# Patient Record
Sex: FEMALE | Race: ASIAN | Employment: OTHER | URBAN - METROPOLITAN AREA
[De-identification: names, ages, dates, MRNs, and addresses within clinical notes are randomized per-mention and may not be internally consistent; named-entity substitution may affect disease eponyms.]

---

## 2017-01-01 ENCOUNTER — APPOINTMENT (OUTPATIENT)
Dept: ULTRASOUND IMAGING | Facility: CLINIC | Age: 52
End: 2017-01-01
Attending: EMERGENCY MEDICINE
Payer: MEDICARE

## 2017-01-01 ENCOUNTER — APPOINTMENT (OUTPATIENT)
Dept: GENERAL RADIOLOGY | Facility: CLINIC | Age: 52
End: 2017-01-01
Attending: EMERGENCY MEDICINE
Payer: MEDICARE

## 2017-01-01 ENCOUNTER — HOSPITAL ENCOUNTER (EMERGENCY)
Facility: CLINIC | Age: 52
Discharge: HOME | End: 2017-01-01
Attending: EMERGENCY MEDICINE | Admitting: EMERGENCY MEDICINE
Payer: MEDICARE

## 2017-01-01 ENCOUNTER — APPOINTMENT (OUTPATIENT)
Dept: NUCLEAR MEDICINE | Facility: CLINIC | Age: 52
End: 2017-01-01
Attending: EMERGENCY MEDICINE
Payer: MEDICARE

## 2017-01-01 VITALS
SYSTOLIC BLOOD PRESSURE: 175 MMHG | RESPIRATION RATE: 18 BRPM | OXYGEN SATURATION: 100 % | DIASTOLIC BLOOD PRESSURE: 89 MMHG | TEMPERATURE: 97.8 F | HEIGHT: 59 IN | HEART RATE: 65 BPM

## 2017-01-01 DIAGNOSIS — R06.02 SHORTNESS OF BREATH: ICD-10-CM

## 2017-01-01 LAB
ANION GAP SERPL CALCULATED.3IONS-SCNC: 10 MMOL/L (ref 3–14)
BASOPHILS # BLD AUTO: 0 10E9/L (ref 0–0.2)
BASOPHILS NFR BLD AUTO: 0.3 %
BUN SERPL-MCNC: 22 MG/DL (ref 7–30)
CALCIUM SERPL-MCNC: 9.6 MG/DL (ref 8.5–10.1)
CHLORIDE SERPL-SCNC: 108 MMOL/L (ref 94–109)
CO2 SERPL-SCNC: 21 MMOL/L (ref 20–32)
CREAT SERPL-MCNC: 1.49 MG/DL (ref 0.52–1.04)
D DIMER PPP FEU-MCNC: 0.6 UG/ML FEU (ref 0–0.5)
DIFFERENTIAL METHOD BLD: ABNORMAL
EOSINOPHIL # BLD AUTO: 0.1 10E9/L (ref 0–0.7)
EOSINOPHIL NFR BLD AUTO: 1.8 %
ERYTHROCYTE [DISTWIDTH] IN BLOOD BY AUTOMATED COUNT: 13.1 % (ref 10–15)
GFR SERPL CREATININE-BSD FRML MDRD: 37 ML/MIN/1.7M2
GLUCOSE SERPL-MCNC: 95 MG/DL (ref 70–99)
HCT VFR BLD AUTO: 33.6 % (ref 35–47)
HGB BLD-MCNC: 10.9 G/DL (ref 11.7–15.7)
IMM GRANULOCYTES # BLD: 0 10E9/L (ref 0–0.4)
IMM GRANULOCYTES NFR BLD: 0.3 %
INTERPRETATION ECG - MUSE: NORMAL
LYMPHOCYTES # BLD AUTO: 1.3 10E9/L (ref 0.8–5.3)
LYMPHOCYTES NFR BLD AUTO: 32.5 %
MCH RBC QN AUTO: 27.8 PG (ref 26.5–33)
MCHC RBC AUTO-ENTMCNC: 32.4 G/DL (ref 31.5–36.5)
MCV RBC AUTO: 86 FL (ref 78–100)
MONOCYTES # BLD AUTO: 0.5 10E9/L (ref 0–1.3)
MONOCYTES NFR BLD AUTO: 12.3 %
NEUTROPHILS # BLD AUTO: 2.1 10E9/L (ref 1.6–8.3)
NEUTROPHILS NFR BLD AUTO: 52.8 %
NRBC # BLD AUTO: 0 10*3/UL
NRBC BLD AUTO-RTO: 1 /100
NT-PROBNP SERPL-MCNC: 1695 PG/ML (ref 0–900)
PLATELET # BLD AUTO: 154 10E9/L (ref 150–450)
POTASSIUM SERPL-SCNC: 4 MMOL/L (ref 3.4–5.3)
RBC # BLD AUTO: 3.92 10E12/L (ref 3.8–5.2)
SODIUM SERPL-SCNC: 139 MMOL/L (ref 133–144)
TROPONIN I SERPL-MCNC: 0.02 UG/L (ref 0–0.04)
TROPONIN I SERPL-MCNC: 0.02 UG/L (ref 0–0.04)
WBC # BLD AUTO: 3.9 10E9/L (ref 4–11)

## 2017-01-01 PROCEDURE — 99285 EMERGENCY DEPT VISIT HI MDM: CPT | Mod: 25 | Performed by: EMERGENCY MEDICINE

## 2017-01-01 PROCEDURE — 93010 ELECTROCARDIOGRAM REPORT: CPT | Mod: Z6 | Performed by: EMERGENCY MEDICINE

## 2017-01-01 PROCEDURE — 84484 ASSAY OF TROPONIN QUANT: CPT | Performed by: EMERGENCY MEDICINE

## 2017-01-01 PROCEDURE — 85379 FIBRIN DEGRADATION QUANT: CPT | Performed by: EMERGENCY MEDICINE

## 2017-01-01 PROCEDURE — 83880 ASSAY OF NATRIURETIC PEPTIDE: CPT | Performed by: EMERGENCY MEDICINE

## 2017-01-01 PROCEDURE — 93005 ELECTROCARDIOGRAM TRACING: CPT | Performed by: EMERGENCY MEDICINE

## 2017-01-01 PROCEDURE — 99284 EMERGENCY DEPT VISIT MOD MDM: CPT | Mod: 25 | Performed by: EMERGENCY MEDICINE

## 2017-01-01 PROCEDURE — 71020 XR CHEST 2 VW: CPT

## 2017-01-01 PROCEDURE — A9540 TC99M MAA: HCPCS

## 2017-01-01 PROCEDURE — 27210210 NM LUNG SCAN VENTILATION AND PERFUSION

## 2017-01-01 PROCEDURE — A9567 TECHNETIUM TC-99M AEROSOL: HCPCS

## 2017-01-01 PROCEDURE — 93970 EXTREMITY STUDY: CPT

## 2017-01-01 PROCEDURE — 34300033 ZZH RX 343

## 2017-01-01 PROCEDURE — 85025 COMPLETE CBC W/AUTO DIFF WBC: CPT | Performed by: EMERGENCY MEDICINE

## 2017-01-01 PROCEDURE — 36415 COLL VENOUS BLD VENIPUNCTURE: CPT | Performed by: EMERGENCY MEDICINE

## 2017-01-01 PROCEDURE — 80048 BASIC METABOLIC PNL TOTAL CA: CPT | Performed by: EMERGENCY MEDICINE

## 2017-01-01 RX ORDER — LIDOCAINE 40 MG/G
CREAM TOPICAL
Status: DISCONTINUED | OUTPATIENT
Start: 2017-01-01 | End: 2017-01-01 | Stop reason: HOSPADM

## 2017-01-01 RX ORDER — ONDANSETRON 2 MG/ML
4 INJECTION INTRAMUSCULAR; INTRAVENOUS ONCE
Status: DISCONTINUED | OUTPATIENT
Start: 2017-01-01 | End: 2017-01-01 | Stop reason: HOSPADM

## 2017-01-01 RX ADMIN — Medication 6.8 MCI.: at 13:35

## 2017-01-01 RX ADMIN — KIT FOR THE PREPARATION OF TECHNETIUM TC 99M PENTETATE 2 MCI.: 20 INJECTION, POWDER, LYOPHILIZED, FOR SOLUTION INTRAVENOUS; RESPIRATORY (INHALATION) at 13:15

## 2017-01-01 ASSESSMENT — ENCOUNTER SYMPTOMS
FEVER: 0
ABDOMINAL PAIN: 0
SHORTNESS OF BREATH: 1

## 2017-01-01 NOTE — PHARMACY-ADMISSION MEDICATION HISTORY
Admission medication history interview status for the 1/1/2017 admission is complete. See Epic admission navigator for allergy information, pharmacy, prior to admission medications and immunization status.     Medication history interview sources:  Patient, Daughter ([place] [phone]), Pill bottles    Changes made to PTA medication list (reason)  Added: none  Deleted: none  Changed: none    Additional medication history information (including reliability of information, actions taken by pharmacist):  *Patient and family appeared to be reliable historians  *Patient reports that her Gengraf was replaced with cyclosporine modified (generic equivalent) a couple of days ago.  She has taken 3 doses (2 on 12/31/16 and 1 on 1/1/17) of this different form of cyclosporine and has experienced shortness of breath every time she has taken it.  She thinks her symptoms are because of this new brand of cyclosporine   *Patient takes Sensipar every other day and did take today, 1/1/17  *Patient is on brand mycophenolate (Cellcept)  *Per [facility], has received an influenza vaccine this season.      Prior to Admission medications    Medication Sig Last Dose Taking? Auth Provider   GENGRAF 25 MG PO CAPSULE Take 2 capsules (50 mg) by mouth 2 times daily 1/1/2017 at AM Yes Dipak Meehan MD   CELLCEPT 250 MG PO CAPSULE Take 4 capsules (1,000 mg) by mouth 2 times daily 1/1/2017 at AM Yes Dipak Meehan MD   sulfamethoxazole-trimethoprim (BACTRIM,SEPTRA) 400-80 MG per tablet TAKE ONE TABLET BY MOUTH EVERY DAY 12/31/2016 at PM Yes Dipak Meehan MD   cinacalcet (SENSIPAR) 30 MG tablet Take one half tablet every other day. 1/1/2017 at AM Yes Dipak Meehan MD         Medication history completed by: Marifer Franklin, Pharmacy Intern

## 2017-01-01 NOTE — ED NOTES
Pt arrived to ED after taking her BP at home that was 180-205 systolically. Pt reports she recently had a change in medication and tonight has felt very nauseated and short of breath

## 2017-01-01 NOTE — ED AVS SNAPSHOT
[facility], Emergency Department    [address] [address]    Phone:  [phone]                                       Joshua Lala   MRN: [id number]    Department:  [facility], Emergency Department   Date of Visit:  1/1/2017           Patient Information     Date Of Birth          1965        Your diagnoses for this visit were:     Shortness of breath        You were seen by Jose Eduardo Iyer MD.        Discharge Instructions       Please make an appointment to follow up with Your Primary Care Provider as soon as possible.  Results for orders placed or performed during the hospital encounter of 01/01/17   XR Chest 2 Views    Narrative    XR CHEST 2 VW  1/1/2017 3:33 AM      HISTORY: dyspnea    COMPARISON: 5/30/2016    FINDINGS: PA and lateral views of the chest. Stable cardiomegaly.  Calcific atherosclerosis of the thoracic aorta. No acute airspace  opacities. No pneumothorax or pleural effusion. Cholecystectomy clips  right upper quadrant.         Impression    IMPRESSION: No acute pulmonary findings. Stable cardiomegaly.    I have personally reviewed the examination and initial interpretation  and I agree with the findings.    OMEGA MANRIQUE MD   NM Lung Scan Ventilation and Perfusion    Narrative     Multiple segmental ventilation and perfusion defects. No unmatched  perfusion defects to suggest pulmonary embolism.   US Lower Extremity Venous Duplex Bilateral    Narrative    EXAMINATION: DOPPLER VENOUS ULTRASOUND OF BILATERAL LOWER EXTREMITIES,  1/1/2017 8:37 AM     COMPARISON: None.    HISTORY: Eval for DVT, swelling    TECHNIQUE:  Gray-scale evaluation with compression, spectral flow and  color Doppler assessment of the deep venous system of both legs from  groin to knee, and then at the ankles.    FINDINGS:  In both lower extremities, the common femoral, femoral, popliteal and  posterior tibial veins demonstrate normal compressibility and blood  flow.        Impression     IMPRESSION: No evidence of deep venous thrombosis in either lower  extremity.    I have personally reviewed the examination and initial interpretation  and I agree with the findings.    OMEGA MANRIQUE MD   Basic metabolic panel   Result Value Ref Range    Sodium 139 133 - 144 mmol/L    Potassium 4.0 3.4 - 5.3 mmol/L    Chloride 108 94 - 109 mmol/L    Carbon Dioxide 21 20 - 32 mmol/L    Anion Gap 10 3 - 14 mmol/L    Glucose 95 70 - 99 mg/dL    Urea Nitrogen 22 7 - 30 mg/dL    Creatinine 1.49 (H) 0.52 - 1.04 mg/dL    GFR Estimate 37 (L) >60 mL/min/1.7m2    GFR Estimate If Black 45 (L) >60 mL/min/1.7m2    Calcium 9.6 8.5 - 10.1 mg/dL   Nt probnp inpatient (BNP)   Result Value Ref Range    N-Terminal Pro BNP Inpatient 1695 (H) 0 - 900 pg/mL   Troponin I   Result Value Ref Range    Troponin I ES 0.016 0.000 - 0.045 ug/L   CBC with platelets differential   Result Value Ref Range    WBC 3.9 (L) 4.0 - 11.0 10e9/L    RBC Count 3.92 3.8 - 5.2 10e12/L    Hemoglobin 10.9 (L) 11.7 - 15.7 g/dL    Hematocrit 33.6 (L) 35.0 - 47.0 %    MCV 86 78 - 100 fl    MCH 27.8 26.5 - 33.0 pg    MCHC 32.4 31.5 - 36.5 g/dL    RDW 13.1 10.0 - 15.0 %    Platelet Count 154 150 - 450 10e9/L    Diff Method Automated Method     % Neutrophils 52.8 %    % Lymphocytes 32.5 %    % Monocytes 12.3 %    % Eosinophils 1.8 %    % Basophils 0.3 %    % Immature Granulocytes 0.3 %    Nucleated RBCs 1 (H) 0 /100    Absolute Neutrophil 2.1 1.6 - 8.3 10e9/L    Absolute Lymphocytes 1.3 0.8 - 5.3 10e9/L    Absolute Monocytes 0.5 0.0 - 1.3 10e9/L    Absolute Eosinophils 0.1 0.0 - 0.7 10e9/L    Absolute Basophils 0.0 0.0 - 0.2 10e9/L    Abs Immature Granulocytes 0.0 0 - 0.4 10e9/L    Absolute Nucleated RBC 0.0    D dimer quantitative   Result Value Ref Range    D Dimer 0.6 (H) 0.0 - 0.50 ug/ml FEU   Troponin I   Result Value Ref Range    Troponin I ES 0.017 0.000 - 0.045 ug/L   EKG 12-lead, tracing only   Result Value Ref Range    Interpretation ECG Click View Image  link to view waveform and result          Shortness of Breath (Dyspnea)  Shortness of breath is the feeling that you can't catch your breath or get enough air. It is also known as dyspnea.  Dyspnea can be caused by many different conditions. They include:    Acute asthma attack.    Worsening of chronic lung diseases such as chronic bronchitis and emphysema.     Heart failure. This is when weak heart muscle allows extra fluid to collect in the lungs.    Panic attacks or anxiety. Fear can cause rapid breathing (hyperventilation).    Pneumonia, or an infection in the lung tissue.    Exposure to toxic substances, fumes, smoke, or certain medicines.    Blood clot in the lung (pulmonary embolism). This is often from a piece of blood clot in a deep vein of the leg (deep vein thrombosis) that breaks off and travels to the lungs.     Heart attack or heart-related chest pain (angina).    Anemia.    Collapsed lung (pneumothorax).    Dehydration.    Pregnancy.  Based on your visit today, the exact cause of your shortness of breath is not certain. Your tests don t show any of the serious causes of dyspnea. You may need other tests to find out if you have a serious problem. It s important to watch for any new symptoms or symptoms that get worse. Follow up with your healthcare provider as directed.  Home care  Follow these tips to take care of yourself at home:    When your symptoms are better, go back to your usual activities.    If you smoke, you should stop. Join a quit-smoking program or ask your healthcare provider for help.    Eat a healthy diet and get plenty of sleep.    Get regular exercise. Talk with your healthcare provider before starting to exercise, especially if you have other medical problems.    Cut down on the amount of caffeine and stimulants you consume.  Follow-up care  Follow up with your healthcare provider, or as advised.  If tests were done, you will be told if your treatment needs to be changed. You can  call as directed for the results.  (Note: If an X-ray was taken, a specialist will review it. You will be notified of any new findings that may affect your care.)  Call 911 or get immediate medical care  Shortness of breath may be a sign of a serious medical problem. For example, it may be a problem with your heart or lungs. Call 911 if you have worsening shortness of breath or trouble breathing, especially with any of the symptoms below:    You are confused or it s difficult to wake you.    You faint or lose consciousness.    You have a fast heartbeat, or your heartbeat is irregular.     You are coughing up blood.    You have pain in your chest, arm, shoulder, neck, or upper back.    You break out in a sweat.  When to seek medical advice  Call your healthcare provider right away if any of these occur:    Slight shortness of breath or wheezing     Redness, pain or swelling in your leg, arm, or other body area    Swelling in both legs or ankles    Fast weight gain    Dizziness or weakness    Fever of 100.4 F (38 C) or higher, or as directed by your healthcare provider    8805-8866 [employer]. [address] [place] [address]. All rights reserved. This information is not intended as a substitute for professional medical care. Always follow your healthcare professional's instructions.          Future Appointments        Provider Department Dept Phone Center    4/28/2017 8:30 AM Lab [facility] Lab [phone] [facility]    4/28/2017 9:40 AM Dipak Meehan MD [facility] Nephrology [phone] [facility]      24 Hour Appointment Hotline       To make an appointment at any [facility], call [phone] ([phone]). If you don't have a family doctor or clinic, we will help you find one. [facility] clinics are conveniently located to serve the needs of you and your family.             Review of your medicines      Our records show that you are taking the medicines listed below. If these are  "incorrect, please call your family doctor or clinic.        Dose / Directions Last dose taken    cinacalcet 30 MG tablet   Commonly known as:  SENSIPAR   Quantity:  30 tablet        Take one half tablet every other day.   Refills:  4        cycloSPORINE modified capsule   Dose:  50 mg   Quantity:  120 capsule        Take 2 capsules (50 mg) by mouth 2 times daily   Refills:  11        mycophenolate capsule   Dose:  1000 mg   Quantity:  240 capsule        Take 4 capsules (1,000 mg) by mouth 2 times daily   Refills:  6        sulfamethoxazole-trimethoprim 400-80 MG per tablet   Commonly known as:  BACTRIM/SEPTRA   Quantity:  30 tablet        TAKE ONE TABLET BY MOUTH EVERY DAY   Refills:  11                Procedures and tests performed during your visit     Procedure/Test Number of Times Performed    Basic metabolic panel 1    CBC with platelets differential 1    D dimer quantitative 1    ED Bed Request 1    EKG 12-lead, tracing only 1    Medication History IP Pharmacy Consult 1    NM Lung Scan Ventilation and Perfusion 1    Nt probnp inpatient (BNP) 1    Peripheral IV catheter 1    Troponin I 2    US Lower Extremity Venous Duplex Bilateral 1    XR Chest 2 Views 1      Orders Needing Specimen Collection     None      Pending Results     Date and Time Order Name Status Description    1/1/2017 0712 NM Lung Scan Ventilation and Perfusion Preliminary             Thank you for choosing [facility]       Thank you for choosing [facility] for your care. Our goal is always to provide you with excellent care. Hearing back from our patients is one way we can continue to improve our services. Please take a few minutes to complete the written survey that you may receive in the mail after you visit with us. Thank you!        [employer] Information     [employer] lets you send messages to your doctor, view your test results, renew your prescriptions, schedule appointments and more. To sign up, go to [email or web address] . Click on \"Log in\" " "on the left side of the screen, which will take you to the Welcome page. Then click on \"Sign up Now\" on the right side of the page.     You will be asked to enter the access code listed below, as well as some personal information. Please follow the directions to create your username and password.     Your access code is: [email or web address]  Expires: 2017  2:56 PM     Your access code will  in 90 days. If you need help or a new code, please call your [facility] or [phone].        After Visit Summary       This is your record. Keep this with you and show to your community pharmacist(s) and doctor(s) at your next visit.                  "

## 2017-01-01 NOTE — ED NOTES
Patient stated she felt more short of breath; patient repositioned in bed and oxygen applied by NC at 2 L/min. Patient feels better; oxygen saturation levels at 100%.

## 2017-01-01 NOTE — ED PROVIDER NOTES
"  History     Chief Complaint   Patient presents with     Hypertension     Shortness of Breath     Nausea & Vomiting     HPI  Joshua Lala is a 51 year old female with history kidney transplantwho presents with dyspnea.  Has had progressive dyspnea for the last couple of days.  No cough.  No chest pain.  Denies any fever.  No leg pain or leg swelling.  Does note that a couple of days ago they get a new prescription for cyclosporine it was a different brand.  Also noted that earlier tonight her blood pressure was elevated.  She didn't take her home meds.  No URI symptoms.    I have reviewed the Medications, Allergies, Past Medical and Surgical History, and Social History in the [employer] system.  Past Medical History   Diagnosis Date     Renal disease      Hypomagnesaemia      Hypercalcemia      Henoch-Schonlein purpura (H)      Dehydration      Tertiary hyperparathyroidism (H)        Past Surgical History   Procedure Laterality Date     Renal transplant  1998, 2007     Cholecystectomy       Transplant       Kidney 2008       No family history on file.    Social History   Substance Use Topics     Smoking status: Never Smoker      Smokeless tobacco: Never Used     Alcohol Use: No      Comment: ERINN- need       Review of Systems   Constitutional: Negative for fever.   Respiratory: Positive for shortness of breath.    Cardiovascular: Negative for chest pain.   Gastrointestinal: Negative for abdominal pain.   All other systems reviewed and are negative.      Physical Exam   BP: 191/81 mmHg  Pulse: 68  Heart Rate: 65  Temp: 97.8  F (36.6  C)  Resp: 24  Height: 149.9 cm (4' 11\")  SpO2: 98 %  Physical Exam   Vital Signs and Nursing Notes Reviewed.  General:  NAD  HEENT: Oropharynx clear.  No obvious signs of trauma.  PERRL.  EOMI  Neck: Supple.  No lymphadenopathy.  Cardiac: RRR.  No murmurs, rubs or gallops  Lungs: Clear bilaterally.  Normal respiratory rate.    Abdomen:  Soft, Nontender, no rebound or guarding.  Back: No " CVA tenderness.  Skin:  No rash.  No diaphoresis  Extremities:  No cyanosis, clubbing, or edema.  Neurological:  CN II-XII intact, Strength intact, Sensation intact, No pronator drift, Normal gait.  Pulse:  Symmetric in bilateral upper and lower extremities.      ED Course   Procedures             EKG Interpretation:      Interpreted by Jose Eduardo Iyer  Time reviewed: 300  Symptoms at time of EKG: dyspnea   Rhythm: normal sinus   Rate: normal  Axis: normal  Ectopy: none  Conduction: normal  ST Segments/ T Waves: No ST-T wave changes  Q Waves: none  Comparison to prior: Unchanged    Clinical Impression: normal EKG          Critical Care time:  none        I independently visualized the xray:  Chest Xray:  No acute disease.  No infiltate, pneumothorax or effusion.         Labs Ordered and Resulted from Time of ED Arrival Up to the Time of Departure from the ED   BASIC METABOLIC PANEL - Abnormal; Notable for the following:     Creatinine 1.49 (*)     GFR Estimate 37 (*)     GFR Estimate If Black 45 (*)     All other components within normal limits   NT PROBNP INPATIENT - Abnormal; Notable for the following:     N-Terminal Pro BNP Inpatient 1695 (*)     All other components within normal limits   CBC WITH PLATELETS DIFFERENTIAL - Abnormal; Notable for the following:     WBC 3.9 (*)     Hemoglobin 10.9 (*)     Hematocrit 33.6 (*)     Nucleated RBCs 1 (*)     All other components within normal limits   D DIMER QUANTITATIVE - Abnormal; Notable for the following:     D Dimer 0.6 (*)     All other components within normal limits   TROPONIN I   TROPONIN I   PERIPHERAL IV CATHETER       Assessments & Plan (with Medical Decision Making)  51 year old with kidney Of Present with Worsening Dyspnea.  Unclear Etiology.  No Leg Swelling or Findings of CHF.  Chest X-Ray Clear.  No Signs of Pneumonia.  EKG Is Normal.  Initial Troponins Negative.  BNP Is Slightly Elevated but at Baseline.  Creatinine Is at Baseline As Well.  Did  Consider Pulmonary Embolus.  She Is Low Risk but D-Dimer Is Elevated.  Given This Patient Will Need Further Imaging.  We Will Get a VQ Scan.  Discussed with Radiologist.  We'll Also Get Ultrasound of Legs to Look for DVT.  Patient Will Be Signed out to Dr. Solano Pending the above Workup.  If Negative for PE Patient Likely Needs Admission to the Observation Unit for Rule Out.     I have reviewed the nursing notes.    I have reviewed the findings, diagnosis, plan and need for follow up with the patient.    New Prescriptions    No medications on file       Final diagnoses:   None       1/1/2017   [facility], [place], EMERGENCY DEPARTMENT      Jose Eduardo Iyer MD  01/01/17 0856

## 2017-01-01 NOTE — DISCHARGE INSTRUCTIONS
Please make an appointment to follow up with Your Primary Care Provider as soon as possible.  Results for orders placed or performed during the hospital encounter of 01/01/17   XR Chest 2 Views    Narrative    XR CHEST 2 VW  1/1/2017 3:33 AM      HISTORY: dyspnea    COMPARISON: 5/30/2016    FINDINGS: PA and lateral views of the chest. Stable cardiomegaly.  Calcific atherosclerosis of the thoracic aorta. No acute airspace  opacities. No pneumothorax or pleural effusion. Cholecystectomy clips  right upper quadrant.         Impression    IMPRESSION: No acute pulmonary findings. Stable cardiomegaly.    I have personally reviewed the examination and initial interpretation  and I agree with the findings.    OMEGA MANRIQUE MD   NM Lung Scan Ventilation and Perfusion    Narrative     Multiple segmental ventilation and perfusion defects. No unmatched  perfusion defects to suggest pulmonary embolism.   US Lower Extremity Venous Duplex Bilateral    Narrative    EXAMINATION: DOPPLER VENOUS ULTRASOUND OF BILATERAL LOWER EXTREMITIES,  1/1/2017 8:37 AM     COMPARISON: None.    HISTORY: Eval for DVT, swelling    TECHNIQUE:  Gray-scale evaluation with compression, spectral flow and  color Doppler assessment of the deep venous system of both legs from  groin to knee, and then at the ankles.    FINDINGS:  In both lower extremities, the common femoral, femoral, popliteal and  posterior tibial veins demonstrate normal compressibility and blood  flow.        Impression    IMPRESSION: No evidence of deep venous thrombosis in either lower  extremity.    I have personally reviewed the examination and initial interpretation  and I agree with the findings.    OMEGA MANRIQUE MD   Basic metabolic panel   Result Value Ref Range    Sodium 139 133 - 144 mmol/L    Potassium 4.0 3.4 - 5.3 mmol/L    Chloride 108 94 - 109 mmol/L    Carbon Dioxide 21 20 - 32 mmol/L    Anion Gap 10 3 - 14 mmol/L    Glucose 95 70 - 99 mg/dL    Urea Nitrogen 22 7 - 30  mg/dL    Creatinine 1.49 (H) 0.52 - 1.04 mg/dL    GFR Estimate 37 (L) >60 mL/min/1.7m2    GFR Estimate If Black 45 (L) >60 mL/min/1.7m2    Calcium 9.6 8.5 - 10.1 mg/dL   Nt probnp inpatient (BNP)   Result Value Ref Range    N-Terminal Pro BNP Inpatient 1695 (H) 0 - 900 pg/mL   Troponin I   Result Value Ref Range    Troponin I ES 0.016 0.000 - 0.045 ug/L   CBC with platelets differential   Result Value Ref Range    WBC 3.9 (L) 4.0 - 11.0 10e9/L    RBC Count 3.92 3.8 - 5.2 10e12/L    Hemoglobin 10.9 (L) 11.7 - 15.7 g/dL    Hematocrit 33.6 (L) 35.0 - 47.0 %    MCV 86 78 - 100 fl    MCH 27.8 26.5 - 33.0 pg    MCHC 32.4 31.5 - 36.5 g/dL    RDW 13.1 10.0 - 15.0 %    Platelet Count 154 150 - 450 10e9/L    Diff Method Automated Method     % Neutrophils 52.8 %    % Lymphocytes 32.5 %    % Monocytes 12.3 %    % Eosinophils 1.8 %    % Basophils 0.3 %    % Immature Granulocytes 0.3 %    Nucleated RBCs 1 (H) 0 /100    Absolute Neutrophil 2.1 1.6 - 8.3 10e9/L    Absolute Lymphocytes 1.3 0.8 - 5.3 10e9/L    Absolute Monocytes 0.5 0.0 - 1.3 10e9/L    Absolute Eosinophils 0.1 0.0 - 0.7 10e9/L    Absolute Basophils 0.0 0.0 - 0.2 10e9/L    Abs Immature Granulocytes 0.0 0 - 0.4 10e9/L    Absolute Nucleated RBC 0.0    D dimer quantitative   Result Value Ref Range    D Dimer 0.6 (H) 0.0 - 0.50 ug/ml FEU   Troponin I   Result Value Ref Range    Troponin I ES 0.017 0.000 - 0.045 ug/L   EKG 12-lead, tracing only   Result Value Ref Range    Interpretation ECG Click View Image link to view waveform and result          Shortness of Breath (Dyspnea)  Shortness of breath is the feeling that you can't catch your breath or get enough air. It is also known as dyspnea.  Dyspnea can be caused by many different conditions. They include:    Acute asthma attack.    Worsening of chronic lung diseases such as chronic bronchitis and emphysema.     Heart failure. This is when weak heart muscle allows extra fluid to collect in the lungs.    Panic attacks or  anxiety. Fear can cause rapid breathing (hyperventilation).    Pneumonia, or an infection in the lung tissue.    Exposure to toxic substances, fumes, smoke, or certain medicines.    Blood clot in the lung (pulmonary embolism). This is often from a piece of blood clot in a deep vein of the leg (deep vein thrombosis) that breaks off and travels to the lungs.     Heart attack or heart-related chest pain (angina).    Anemia.    Collapsed lung (pneumothorax).    Dehydration.    Pregnancy.  Based on your visit today, the exact cause of your shortness of breath is not certain. Your tests don t show any of the serious causes of dyspnea. You may need other tests to find out if you have a serious problem. It s important to watch for any new symptoms or symptoms that get worse. Follow up with your healthcare provider as directed.  Home care  Follow these tips to take care of yourself at home:    When your symptoms are better, go back to your usual activities.    If you smoke, you should stop. Join a quit-smoking program or ask your healthcare provider for help.    Eat a healthy diet and get plenty of sleep.    Get regular exercise. Talk with your healthcare provider before starting to exercise, especially if you have other medical problems.    Cut down on the amount of caffeine and stimulants you consume.  Follow-up care  Follow up with your healthcare provider, or as advised.  If tests were done, you will be told if your treatment needs to be changed. You can call as directed for the results.  (Note: If an X-ray was taken, a specialist will review it. You will be notified of any new findings that may affect your care.)  Call 911 or get immediate medical care  Shortness of breath may be a sign of a serious medical problem. For example, it may be a problem with your heart or lungs. Call 911 if you have worsening shortness of breath or trouble breathing, especially with any of the symptoms below:    You are confused or it s  difficult to wake you.    You faint or lose consciousness.    You have a fast heartbeat, or your heartbeat is irregular.     You are coughing up blood.    You have pain in your chest, arm, shoulder, neck, or upper back.    You break out in a sweat.  When to seek medical advice  Call your healthcare provider right away if any of these occur:    Slight shortness of breath or wheezing     Redness, pain or swelling in your leg, arm, or other body area    Swelling in both legs or ankles    Fast weight gain    Dizziness or weakness    Fever of 100.4 F (38 C) or higher, or as directed by your healthcare provider    5806-7763 [employer]. [address] [place] [address], [place] [address]. All rights reserved. This information is not intended as a substitute for professional medical care. Always follow your healthcare professional's instructions.

## 2017-01-01 NOTE — ED AVS SNAPSHOT
[facility], [place], Emergency Department    [address] [address]    Phone:  [phone]                                       Joshua Lala   MRN: [id number]    Department:  [facility], Emergency Department   Date of Visit:  1/1/2017           After Visit Summary Signature Page     I have received my discharge instructions, and my questions have been answered. I have discussed any challenges I see with this plan with the nurse or doctor.    ..........................................................................................................................................  Patient/Patient Representative Signature      ..........................................................................................................................................  Patient Representative Print Name and Relationship to Patient    ..................................................               ................................................  Date                                            Time    ..........................................................................................................................................  Reviewed by Signature/Title    ...................................................              ..............................................  Date                                                            Time

## 2017-01-04 ENCOUNTER — TELEPHONE (OUTPATIENT)
Dept: TRANSPLANT | Facility: CLINIC | Age: 52
End: 2017-01-04

## 2017-01-04 DIAGNOSIS — Z94.0 KIDNEY REPLACED BY TRANSPLANT: Primary | ICD-10-CM

## 2017-01-04 DIAGNOSIS — Z94.0 KIDNEY TRANSPLANTED: Primary | ICD-10-CM

## 2017-01-04 RX ORDER — CYCLOSPORINE 25 MG/1
50 CAPSULE, LIQUID FILLED ORAL 2 TIMES DAILY
Qty: 120 CAPSULE | Refills: 11 | Status: SHIPPED | OUTPATIENT
Start: 2017-01-04 | End: 2017-12-18

## 2017-01-04 NOTE — TELEPHONE ENCOUNTER
Spoke to Joshua Weston daughter who reports mother is still SOB  -  She also reports that her mother has not taken any cyclosporine  For the past 4  Days   Reports that mother has some diarrhea with SOB  --[facility]   Reviewed that SOB should have gone away when stopping generic cyclosporine -   Reviewed concern about rejection when not taking immunosuppression --   Plan   Sent RX for brand name Neoral -   Plan to repeat labs in one week   IF continues SOB/Diarrhea  must follow up with local PCP

## 2017-01-04 NOTE — TELEPHONE ENCOUNTER
Daughter Nichelle called;  Mom-Joshua Lala will be starting the Neoral tomorrow.  Nichelle spoke with her mom-Joshua and realized Joshua has been taking the Gengraf  And she believes this has been making Joshua sick.  Nichelle wanted to let you know Joshua was not taking the Neoral yet.  Nichelle's # [phone]

## 2017-01-04 NOTE — TELEPHONE ENCOUNTER
Status: Signed         Expand All Collapse All    Daughter Nichelle called;  Mom-Joshua Lala will be starting the Neoral tomorrow.  Nichelle spoke with her mom-Joshua and realized Joshua has been taking the Gengraf  And she believes this has been making Joshua sick.  Nichelle wanted to let you know Joshua was not taking the Neoral yet.  Nichelle's # [phone]           HasI have reviewed this note and agreed with its content. Please note, pt was recently switched to cyclosporine generic equivalent a couple days ago (from Gengraf). She reports taking 3 doses of it thus far and each time after she takes a dose she feels shortness of breath. It is unclear if this is a true adverse effect associated with the cyclosporine generic equivalent or if there is another underlying medical issue.     Her cyclosporine was left in her PTA med list as Gengraf for the time being for potential safety reasons, but understand that she was recently switched from Gengraf to cyclosporine generic equivalent.     Ac Beltran, PharmD, BCPS  January 1, 2017 been using Gengraf and [facility] told them they need to switch to Nioral because they no longer Gengraf, so they need a new order. Please call daughter Johanna, [phone]

## 2017-01-06 NOTE — TELEPHONE ENCOUNTER
Left message with patient's daughter regarding current health.  Asked her to return call with update and to confirm that her Mom is taking CSA.

## 2017-01-09 NOTE — TELEPHONE ENCOUNTER
Spoke to patient's daughter and she states that patient is doing well and has been taking Neoral.  Made lab appt. For patient on 1/12/17.  Patient has no concerns/questions at this time.

## 2017-01-12 DIAGNOSIS — Z94.0 KIDNEY TRANSPLANTED: ICD-10-CM

## 2017-01-12 LAB
ANION GAP SERPL CALCULATED.3IONS-SCNC: 7 MMOL/L (ref 3–14)
BUN SERPL-MCNC: 16 MG/DL (ref 7–30)
CALCIUM SERPL-MCNC: 9.9 MG/DL (ref 8.5–10.1)
CHLORIDE SERPL-SCNC: 109 MMOL/L (ref 94–109)
CO2 SERPL-SCNC: 22 MMOL/L (ref 20–32)
CREAT SERPL-MCNC: 1.51 MG/DL (ref 0.52–1.04)
CYCLOSPORINE BLD LC/MS/MS-MCNC: 123 UG/L (ref 50–400)
ERYTHROCYTE [DISTWIDTH] IN BLOOD BY AUTOMATED COUNT: 13 % (ref 10–15)
GFR SERPL CREATININE-BSD FRML MDRD: 36 ML/MIN/1.7M2
GLUCOSE SERPL-MCNC: 87 MG/DL (ref 70–99)
HCT VFR BLD AUTO: 32.9 % (ref 35–47)
HGB BLD-MCNC: 10.6 G/DL (ref 11.7–15.7)
MCH RBC QN AUTO: 27.8 PG (ref 26.5–33)
MCHC RBC AUTO-ENTMCNC: 32.2 G/DL (ref 31.5–36.5)
MCV RBC AUTO: 86 FL (ref 78–100)
PLATELET # BLD AUTO: 165 10E9/L (ref 150–450)
POTASSIUM SERPL-SCNC: 4.5 MMOL/L (ref 3.4–5.3)
RBC # BLD AUTO: 3.81 10E12/L (ref 3.8–5.2)
SODIUM SERPL-SCNC: 138 MMOL/L (ref 133–144)
TME LAST DOSE: 2030 H
WBC # BLD AUTO: 3.4 10E9/L (ref 4–11)

## 2017-01-12 PROCEDURE — 80158 DRUG ASSAY CYCLOSPORINE: CPT | Performed by: INTERNAL MEDICINE

## 2017-02-07 DIAGNOSIS — E83.52 PARATHYROID RELATED HYPERCALCEMIA (H): Primary | ICD-10-CM

## 2017-02-07 DIAGNOSIS — E21.5 PARATHYROID RELATED HYPERCALCEMIA (H): Primary | ICD-10-CM

## 2017-02-08 RX ORDER — CINACALCET HYDROCHLORIDE 30 MG/1
TABLET, COATED ORAL
Qty: 30 TABLET | Refills: 4 | Status: SHIPPED | OUTPATIENT
Start: 2017-02-08 | End: 2018-03-02

## 2017-02-08 NOTE — TELEPHONE ENCOUNTER
Last Office Visit with Nephrologist:  10/28/16.  Medication refilled per Nephrology Clinic protocol.     Morena Ghotra RN

## 2017-02-13 ENCOUNTER — TELEPHONE (OUTPATIENT)
Dept: TRANSPLANT | Facility: CLINIC | Age: 52
End: 2017-02-13

## 2017-02-13 DIAGNOSIS — Z94.0 KIDNEY TRANSPLANTED: Primary | ICD-10-CM

## 2017-02-13 NOTE — TELEPHONE ENCOUNTER
CYCLOSPORINE   123 132CM 135CM   Slightly above baseline   Plan call confirm taking cyclosporine  50 mg twice per day   Confirm 12 hour trough Prograf tacrolimus level   If above accurate lower Prograf tacrolimus 50 mg in am and 25 mg in pm   Repeat transplant  Labs with Prograf tacrolimus  2 weeks after dose change Prograf tacrolimus

## 2017-02-13 NOTE — TELEPHONE ENCOUNTER
Spoke to patient via  regarding CSA level = 123.  Patient confirms current dose of Cyclosporine 50 mg BID.  Patient states that this was NOT a good 12 hour trough level.  Patient will repeat labs in 1-2 weeks.

## 2017-02-21 DIAGNOSIS — Z94.0 KIDNEY TRANSPLANTED: ICD-10-CM

## 2017-02-21 LAB
CYCLOSPORINE BLD LC/MS/MS-MCNC: 126 UG/L (ref 50–400)
TME LAST DOSE: NORMAL H

## 2017-02-21 PROCEDURE — 80158 DRUG ASSAY CYCLOSPORINE: CPT | Performed by: INTERNAL MEDICINE

## 2017-02-26 ENCOUNTER — APPOINTMENT (OUTPATIENT)
Dept: GENERAL RADIOLOGY | Facility: CLINIC | Age: 52
End: 2017-02-26
Attending: EMERGENCY MEDICINE
Payer: MEDICARE

## 2017-02-26 ENCOUNTER — HOSPITAL ENCOUNTER (EMERGENCY)
Facility: CLINIC | Age: 52
Discharge: HOME | End: 2017-02-26
Attending: EMERGENCY MEDICINE | Admitting: EMERGENCY MEDICINE
Payer: MEDICARE

## 2017-02-26 VITALS
DIASTOLIC BLOOD PRESSURE: 87 MMHG | TEMPERATURE: 99.3 F | HEART RATE: 80 BPM | RESPIRATION RATE: 20 BRPM | SYSTOLIC BLOOD PRESSURE: 173 MMHG | OXYGEN SATURATION: 94 %

## 2017-02-26 DIAGNOSIS — E21.5 PARATHYROID RELATED HYPERCALCEMIA (H): ICD-10-CM

## 2017-02-26 DIAGNOSIS — Z94.0 S/P KIDNEY TRANSPLANT: ICD-10-CM

## 2017-02-26 DIAGNOSIS — R07.0 THROAT PAIN: ICD-10-CM

## 2017-02-26 DIAGNOSIS — E83.52 PARATHYROID RELATED HYPERCALCEMIA (H): ICD-10-CM

## 2017-02-26 LAB
ALBUMIN UR-MCNC: 30 MG/DL
APPEARANCE UR: CLEAR
BILIRUB UR QL STRIP: NEGATIVE
COLOR UR AUTO: ABNORMAL
DEPRECATED S PYO AG THROAT QL EIA: NORMAL
FLUAV+FLUBV AG SPEC QL: NEGATIVE
FLUAV+FLUBV AG SPEC QL: NORMAL
GLUCOSE UR STRIP-MCNC: NEGATIVE MG/DL
HGB UR QL STRIP: NEGATIVE
KETONES UR STRIP-MCNC: NEGATIVE MG/DL
LEUKOCYTE ESTERASE UR QL STRIP: NEGATIVE
MICRO REPORT STATUS: NORMAL
MUCOUS THREADS #/AREA URNS LPF: PRESENT /LPF
NITRATE UR QL: NEGATIVE
PH UR STRIP: 6 PH (ref 5–7)
RBC #/AREA URNS AUTO: 0 /HPF (ref 0–2)
SP GR UR STRIP: 1 (ref 1–1.03)
SPECIMEN SOURCE: NORMAL
SPECIMEN SOURCE: NORMAL
TRANS CELLS #/AREA URNS HPF: <1 /HPF (ref 0–1)
URN SPEC COLLECT METH UR: ABNORMAL
UROBILINOGEN UR STRIP-MCNC: NORMAL MG/DL (ref 0–2)
WBC #/AREA URNS AUTO: <1 /HPF (ref 0–2)

## 2017-02-26 PROCEDURE — 87804 INFLUENZA ASSAY W/OPTIC: CPT | Performed by: EMERGENCY MEDICINE

## 2017-02-26 PROCEDURE — 99283 EMERGENCY DEPT VISIT LOW MDM: CPT | Mod: Z6 | Performed by: EMERGENCY MEDICINE

## 2017-02-26 PROCEDURE — 81001 URINALYSIS AUTO W/SCOPE: CPT | Performed by: EMERGENCY MEDICINE

## 2017-02-26 PROCEDURE — A9270 NON-COVERED ITEM OR SERVICE: HCPCS | Mod: GY | Performed by: EMERGENCY MEDICINE

## 2017-02-26 PROCEDURE — 71020 XR CHEST 2 VW: CPT

## 2017-02-26 PROCEDURE — 25000131 ZZH RX MED GY IP 250 OP 636 PS 637: Mod: GY | Performed by: EMERGENCY MEDICINE

## 2017-02-26 PROCEDURE — 87081 CULTURE SCREEN ONLY: CPT | Performed by: EMERGENCY MEDICINE

## 2017-02-26 PROCEDURE — 99284 EMERGENCY DEPT VISIT MOD MDM: CPT | Mod: 25 | Performed by: EMERGENCY MEDICINE

## 2017-02-26 PROCEDURE — 87880 STREP A ASSAY W/OPTIC: CPT | Performed by: EMERGENCY MEDICINE

## 2017-02-26 RX ORDER — HYDROCODONE BITARTRATE AND ACETAMINOPHEN 5; 325 MG/1; MG/1
1-2 TABLET ORAL EVERY 6 HOURS PRN
Qty: 6 TABLET | Refills: 0 | Status: SHIPPED | OUTPATIENT
Start: 2017-02-26 | End: 2017-03-04

## 2017-02-26 RX ADMIN — DEXAMETHASONE 10 MG: 2 TABLET ORAL at 20:08

## 2017-02-26 ASSESSMENT — ENCOUNTER SYMPTOMS
DIARRHEA: 1
SORE THROAT: 1
COUGH: 1
DYSURIA: 1
CHILLS: 1
FREQUENCY: 0
TROUBLE SWALLOWING: 1
SHORTNESS OF BREATH: 0
HEMATURIA: 0

## 2017-02-26 NOTE — ED NOTES
Pt arrived in the ED accompanied by her  with reports of fever, cough, and sore throat.  Pt is awake and alert; ambulatory with slow, steady gait.  Pt is Hmong-speaking only,  paged.

## 2017-02-26 NOTE — ED AVS SNAPSHOT
[facility], [place], Emergency Department    [address] [address]    Phone:  [phone]                                       Joshua Lala   MRN: [id number]    Department:  [facility], Emergency Department   Date of Visit:  2/26/2017           After Visit Summary Signature Page     I have received my discharge instructions, and my questions have been answered. I have discussed any challenges I see with this plan with the nurse or doctor.    ..........................................................................................................................................  Patient/Patient Representative Signature      ..........................................................................................................................................  Patient Representative Print Name and Relationship to Patient    ..................................................               ................................................  Date                                            Time    ..........................................................................................................................................  Reviewed by Signature/Title    ...................................................              ..............................................  Date                                                            Time

## 2017-02-26 NOTE — ED AVS SNAPSHOT
[facility], Emergency Department    [address], [place] [address]    Phone:  [phone]                                       Joshua Lala   MRN: [id number]    Department:  [facility], Emergency Department   Date of Visit:  2/26/2017           Patient Information     Date Of Birth          1965        Your diagnoses for this visit were:     Throat pain        You were seen by Krysten Jackson MD.      Follow-up Information     Follow up with Bandar Romero In 2 days.    Specialty:  Family Practice    Why:  As needed, If symptoms worsen    Contact information:    [facility]  [address]  [facility] [address]  [phone]          Follow up with [facility], Emergency Department.    Specialty:  EMERGENCY MEDICINE    Why:  If symptoms worsen    Contact information:    [address] [id number] [phone]    Additional information:    The [facility] is located on the corner of [address] and [address] on the [facility]. It is easily accessible from virtually any point in the [facility] area, via [address] and [place].        Discharge Instructions       Please make an appointment to follow up with Your Primary Care Provider in 2 days. Return earlier if your symptoms persist.      Future Appointments        Provider Department Dept Phone Center    4/28/2017 8:30 AM Lab [facility] Lab [phone] [facility]    4/28/2017 9:40 AM Dipak Meehan MD [facility] Nephrology [phone] [facility]      24 Hour Appointment Hotline       To make an appointment at any [facility], call [phone] ([phone]). If you don't have a family doctor or clinic, we will help you find one. [facility] clinics are conveniently located to serve the needs of you and your family.             Review of your medicines      START taking        Dose / Directions Last dose taken    FIRST-HARINDER MOUTHWASH Susp   Dose:  5-10 mL   Quantity:  237 mL         Swish and swallow 5-10 mLs in mouth every 6 hours as needed   Refills:  1        HYDROcodone-acetaminophen 5-325 MG per tablet   Commonly known as:  NORCO   Dose:  1-2 tablet   Quantity:  6 tablet        Take 1-2 tablets by mouth every 6 hours as needed for moderate to severe pain   Refills:  0          Our records show that you are taking the medicines listed below. If these are incorrect, please call your family doctor or clinic.        Dose / Directions Last dose taken    cycloSPORINE modified capsule   Dose:  50 mg   Quantity:  120 capsule        Take 2 capsules (50 mg) by mouth 2 times daily   Refills:  11        mycophenolate capsule   Dose:  1000 mg   Quantity:  240 capsule        Take 4 capsules (1,000 mg) by mouth 2 times daily   Refills:  6        SENSIPAR 30 MG tablet   Quantity:  30 tablet   Generic drug:  cinacalcet        TAKE ONE-HALF TABLET BY MOUTH EVERY OTHER DAY   Refills:  4        sulfamethoxazole-trimethoprim 400-80 MG per tablet   Commonly known as:  BACTRIM/SEPTRA   Quantity:  30 tablet        TAKE ONE TABLET BY MOUTH EVERY DAY   Refills:  11                Prescriptions were sent or printed at these locations (2 Prescriptions)                   Other Prescriptions                Printed at Department/Unit printer (2 of 2)         HYDROcodone-acetaminophen (NORCO) 5-325 MG per tablet               Diphenhyd-HC-Nystatin-Tetracyc (FIRST-HARINDER MOUTHWASH) SUSP                Procedures and tests performed during your visit     Beta strep group A culture    Influenza A/B antigen swab    Rapid strep screen    UA with Microscopic    XR Chest 2 Views      Orders Needing Specimen Collection     None      Pending Results     Date and Time Order Name Status Description    2/26/2017 1900 Beta strep group A culture In process     2/26/2017 1828 XR Chest 2 Views Preliminary             Pending Culture Results     Date and Time Order Name Status Description    2/26/2017 1900 Beta strep group A culture In  "process             Thank you for choosing [facility]       Thank you for choosing [facility] for your care. Our goal is always to provide you with excellent care. Hearing back from our patients is one way we can continue to improve our services. Please take a few minutes to complete the written survey that you may receive in the mail after you visit with us. Thank you!        [employer] Information     [employer] lets you send messages to your doctor, view your test results, renew your prescriptions, schedule appointments and more. To sign up, go to [email or web address] . Click on \"Log in\" on the left side of the screen, which will take you to the Welcome page. Then click on \"Sign up Now\" on the right side of the page.     You will be asked to enter the access code listed below, as well as some personal information. Please follow the directions to create your username and password.     Your access code is: [email or web address]  Expires: 2017  2:56 PM     Your access code will  in 90 days. If you need help or a new code, please call your [facility] clinic or [phone].        Care EveryWhere ID     This is your Care EveryWhere ID. This could be used by other organizations to access your [facility] medical records  [id number]        After Visit Summary       This is your record. Keep this with you and show to your community pharmacist(s) and doctor(s) at your next visit.                  "

## 2017-02-27 ENCOUNTER — OFFICE VISIT (OUTPATIENT)
Dept: INTERPRETER SERVICES | Facility: CLINIC | Age: 52
End: 2017-02-27

## 2017-02-27 ENCOUNTER — APPOINTMENT (OUTPATIENT)
Dept: CARDIOLOGY | Facility: CLINIC | Age: 52
End: 2017-02-27
Payer: MEDICARE

## 2017-02-27 ENCOUNTER — APPOINTMENT (OUTPATIENT)
Dept: GENERAL RADIOLOGY | Facility: CLINIC | Age: 52
End: 2017-02-27
Attending: EMERGENCY MEDICINE
Payer: MEDICARE

## 2017-02-27 ENCOUNTER — HOSPITAL ENCOUNTER (OUTPATIENT)
Facility: CLINIC | Age: 52
Setting detail: OBSERVATION
Discharge: HOME | End: 2017-02-27
Attending: EMERGENCY MEDICINE | Admitting: INTERNAL MEDICINE
Payer: MEDICARE

## 2017-02-27 VITALS
RESPIRATION RATE: 22 BRPM | BODY MASS INDEX: 31.52 KG/M2 | SYSTOLIC BLOOD PRESSURE: 155 MMHG | OXYGEN SATURATION: 96 % | HEART RATE: 63 BPM | DIASTOLIC BLOOD PRESSURE: 80 MMHG | HEIGHT: 57 IN | TEMPERATURE: 97.8 F | WEIGHT: 146.13 LBS

## 2017-02-27 DIAGNOSIS — I50.30 DIASTOLIC HEART FAILURE, UNSPECIFIED HEART FAILURE CHRONICITY: ICD-10-CM

## 2017-02-27 DIAGNOSIS — R07.9 CHEST PAIN AT REST: ICD-10-CM

## 2017-02-27 DIAGNOSIS — I10 ESSENTIAL HYPERTENSION: Primary | ICD-10-CM

## 2017-02-27 PROBLEM — I20.0 UNSTABLE ANGINA (H): Status: ACTIVE | Noted: 2017-02-27

## 2017-02-27 PROBLEM — R07.89 CHEST PAIN, NON-CARDIAC: Status: ACTIVE | Noted: 2017-02-27

## 2017-02-27 LAB
ALBUMIN SERPL-MCNC: 3.5 G/DL (ref 3.4–5)
ALP SERPL-CCNC: 138 U/L (ref 40–150)
ALT SERPL W P-5'-P-CCNC: 11 U/L (ref 0–50)
ANION GAP SERPL CALCULATED.3IONS-SCNC: 12 MMOL/L (ref 3–14)
AST SERPL W P-5'-P-CCNC: 14 U/L (ref 0–45)
BASOPHILS # BLD AUTO: 0 10E9/L (ref 0–0.2)
BASOPHILS NFR BLD AUTO: 0 %
BILIRUB SERPL-MCNC: 1 MG/DL (ref 0.2–1.3)
BUN SERPL-MCNC: 14 MG/DL (ref 7–30)
CALCIUM SERPL-MCNC: 9.7 MG/DL (ref 8.5–10.1)
CHLORIDE SERPL-SCNC: 99 MMOL/L (ref 94–109)
CO2 SERPL-SCNC: 20 MMOL/L (ref 20–32)
CREAT SERPL-MCNC: 1.6 MG/DL (ref 0.52–1.04)
DIFFERENTIAL METHOD BLD: ABNORMAL
EOSINOPHIL # BLD AUTO: 0 10E9/L (ref 0–0.7)
EOSINOPHIL NFR BLD AUTO: 0 %
ERYTHROCYTE [DISTWIDTH] IN BLOOD BY AUTOMATED COUNT: 12.7 % (ref 10–15)
GFR SERPL CREATININE-BSD FRML MDRD: 34 ML/MIN/1.7M2
GLUCOSE SERPL-MCNC: 325 MG/DL (ref 70–99)
HCT VFR BLD AUTO: 34.7 % (ref 35–47)
HGB BLD-MCNC: 11.3 G/DL (ref 11.7–15.7)
IMM GRANULOCYTES # BLD: 0 10E9/L (ref 0–0.4)
IMM GRANULOCYTES NFR BLD: 0.5 %
INR PPP: 1.11 (ref 0.86–1.14)
INTERPRETATION ECG - MUSE: NORMAL
LIPASE SERPL-CCNC: 326 U/L (ref 73–393)
LYMPHOCYTES # BLD AUTO: 0.3 10E9/L (ref 0.8–5.3)
LYMPHOCYTES NFR BLD AUTO: 5.4 %
MAGNESIUM SERPL-MCNC: 1.6 MG/DL (ref 1.6–2.3)
MCH RBC QN AUTO: 27.8 PG (ref 26.5–33)
MCHC RBC AUTO-ENTMCNC: 32.6 G/DL (ref 31.5–36.5)
MCV RBC AUTO: 86 FL (ref 78–100)
MONOCYTES # BLD AUTO: 0 10E9/L (ref 0–1.3)
MONOCYTES NFR BLD AUTO: 0.2 %
NEUTROPHILS # BLD AUTO: 5.4 10E9/L (ref 1.6–8.3)
NEUTROPHILS NFR BLD AUTO: 93.9 %
NRBC # BLD AUTO: 0 10*3/UL
NRBC BLD AUTO-RTO: 0 /100
NT-PROBNP SERPL-MCNC: 2096 PG/ML (ref 0–900)
PLATELET # BLD AUTO: 149 10E9/L (ref 150–450)
PLATELET # BLD AUTO: 167 10E9/L (ref 150–450)
POTASSIUM SERPL-SCNC: 4.2 MMOL/L (ref 3.4–5.3)
PROT SERPL-MCNC: 8.2 G/DL (ref 6.8–8.8)
RBC # BLD AUTO: 4.06 10E12/L (ref 3.8–5.2)
SODIUM SERPL-SCNC: 131 MMOL/L (ref 133–144)
TROPONIN I SERPL-MCNC: NORMAL UG/L (ref 0–0.04)
TROPONIN I SERPL-MCNC: NORMAL UG/L (ref 0–0.04)
WBC # BLD AUTO: 5.7 10E9/L (ref 4–11)

## 2017-02-27 PROCEDURE — 25000131 ZZH RX MED GY IP 250 OP 636 PS 637: Mod: GY | Performed by: INTERNAL MEDICINE

## 2017-02-27 PROCEDURE — 25000128 H RX IP 250 OP 636: Performed by: INTERNAL MEDICINE

## 2017-02-27 PROCEDURE — 85049 AUTOMATED PLATELET COUNT: CPT | Performed by: INTERNAL MEDICINE

## 2017-02-27 PROCEDURE — 85025 COMPLETE CBC W/AUTO DIFF WBC: CPT | Performed by: EMERGENCY MEDICINE

## 2017-02-27 PROCEDURE — 71010 XR CHEST PORT 1 VW: CPT

## 2017-02-27 PROCEDURE — 83735 ASSAY OF MAGNESIUM: CPT | Performed by: EMERGENCY MEDICINE

## 2017-02-27 PROCEDURE — 36415 COLL VENOUS BLD VENIPUNCTURE: CPT | Performed by: INTERNAL MEDICINE

## 2017-02-27 PROCEDURE — A9270 NON-COVERED ITEM OR SERVICE: HCPCS | Mod: GY | Performed by: EMERGENCY MEDICINE

## 2017-02-27 PROCEDURE — 96374 THER/PROPH/DIAG INJ IV PUSH: CPT | Performed by: EMERGENCY MEDICINE

## 2017-02-27 PROCEDURE — 99285 EMERGENCY DEPT VISIT HI MDM: CPT | Mod: 25 | Performed by: EMERGENCY MEDICINE

## 2017-02-27 PROCEDURE — G0378 HOSPITAL OBSERVATION PER HR: HCPCS

## 2017-02-27 PROCEDURE — 25000132 ZZH RX MED GY IP 250 OP 250 PS 637: Mod: GY | Performed by: INTERNAL MEDICINE

## 2017-02-27 PROCEDURE — 93010 ELECTROCARDIOGRAM REPORT: CPT | Mod: Z6 | Performed by: EMERGENCY MEDICINE

## 2017-02-27 PROCEDURE — 93005 ELECTROCARDIOGRAM TRACING: CPT | Performed by: EMERGENCY MEDICINE

## 2017-02-27 PROCEDURE — 85610 PROTHROMBIN TIME: CPT | Performed by: EMERGENCY MEDICINE

## 2017-02-27 PROCEDURE — A9270 NON-COVERED ITEM OR SERVICE: HCPCS | Mod: GY | Performed by: INTERNAL MEDICINE

## 2017-02-27 PROCEDURE — 84484 ASSAY OF TROPONIN QUANT: CPT | Performed by: EMERGENCY MEDICINE

## 2017-02-27 PROCEDURE — 93306 TTE W/DOPPLER COMPLETE: CPT | Mod: 26 | Performed by: INTERNAL MEDICINE

## 2017-02-27 PROCEDURE — 83880 ASSAY OF NATRIURETIC PEPTIDE: CPT | Performed by: EMERGENCY MEDICINE

## 2017-02-27 PROCEDURE — 93306 TTE W/DOPPLER COMPLETE: CPT

## 2017-02-27 PROCEDURE — 25000132 ZZH RX MED GY IP 250 OP 250 PS 637: Mod: GY | Performed by: EMERGENCY MEDICINE

## 2017-02-27 PROCEDURE — 80053 COMPREHEN METABOLIC PANEL: CPT | Performed by: EMERGENCY MEDICINE

## 2017-02-27 PROCEDURE — 84484 ASSAY OF TROPONIN QUANT: CPT | Mod: 91 | Performed by: INTERNAL MEDICINE

## 2017-02-27 PROCEDURE — 83690 ASSAY OF LIPASE: CPT | Performed by: EMERGENCY MEDICINE

## 2017-02-27 PROCEDURE — 25000128 H RX IP 250 OP 636: Performed by: EMERGENCY MEDICINE

## 2017-02-27 RX ORDER — MYCOPHENOLATE MOFETIL 250 MG/1
1000 CAPSULE ORAL 2 TIMES DAILY
Status: DISCONTINUED | OUTPATIENT
Start: 2017-02-27 | End: 2017-02-27 | Stop reason: HOSPADM

## 2017-02-27 RX ORDER — HEPARIN SODIUM 5000 [USP'U]/.5ML
5000 INJECTION, SOLUTION INTRAVENOUS; SUBCUTANEOUS EVERY 12 HOURS
Status: DISCONTINUED | OUTPATIENT
Start: 2017-02-27 | End: 2017-02-27 | Stop reason: HOSPADM

## 2017-02-27 RX ORDER — MORPHINE SULFATE 4 MG/ML
4 INJECTION, SOLUTION INTRAMUSCULAR; INTRAVENOUS ONCE
Status: COMPLETED | OUTPATIENT
Start: 2017-02-27 | End: 2017-02-27

## 2017-02-27 RX ORDER — ASPIRIN 81 MG/1
324 TABLET, CHEWABLE ORAL ONCE
Status: COMPLETED | OUTPATIENT
Start: 2017-02-27 | End: 2017-02-27

## 2017-02-27 RX ORDER — CYCLOSPORINE 25 MG/1
50 CAPSULE, LIQUID FILLED ORAL 2 TIMES DAILY
Status: DISCONTINUED | OUTPATIENT
Start: 2017-02-27 | End: 2017-02-27 | Stop reason: HOSPADM

## 2017-02-27 RX ORDER — SULFAMETHOXAZOLE AND TRIMETHOPRIM 400; 80 MG/1; MG/1
1 TABLET ORAL DAILY
Status: DISCONTINUED | OUTPATIENT
Start: 2017-02-27 | End: 2017-02-27 | Stop reason: HOSPADM

## 2017-02-27 RX ORDER — NALOXONE HYDROCHLORIDE 0.4 MG/ML
.1-.4 INJECTION, SOLUTION INTRAMUSCULAR; INTRAVENOUS; SUBCUTANEOUS
Status: DISCONTINUED | OUTPATIENT
Start: 2017-02-27 | End: 2017-02-27 | Stop reason: HOSPADM

## 2017-02-27 RX ORDER — SODIUM CHLORIDE 9 MG/ML
INJECTION, SOLUTION INTRAVENOUS CONTINUOUS
Status: ACTIVE | OUTPATIENT
Start: 2017-02-27 | End: 2017-02-27

## 2017-02-27 RX ORDER — AMLODIPINE BESYLATE 2.5 MG/1
2.5 TABLET ORAL DAILY
Status: DISCONTINUED | OUTPATIENT
Start: 2017-02-27 | End: 2017-02-27 | Stop reason: HOSPADM

## 2017-02-27 RX ORDER — AMOXICILLIN 250 MG
1-2 CAPSULE ORAL 2 TIMES DAILY
Status: DISCONTINUED | OUTPATIENT
Start: 2017-02-27 | End: 2017-02-27 | Stop reason: HOSPADM

## 2017-02-27 RX ORDER — LIDOCAINE 40 MG/G
CREAM TOPICAL
Status: DISCONTINUED | OUTPATIENT
Start: 2017-02-27 | End: 2017-02-27 | Stop reason: HOSPADM

## 2017-02-27 RX ORDER — NITROGLYCERIN 0.4 MG/1
0.4 TABLET SUBLINGUAL EVERY 5 MIN PRN
Status: DISCONTINUED | OUTPATIENT
Start: 2017-02-27 | End: 2017-02-27 | Stop reason: HOSPADM

## 2017-02-27 RX ORDER — AMLODIPINE BESYLATE 2.5 MG/1
2.5 TABLET ORAL DAILY
Qty: 30 TABLET | Refills: 0 | Status: SHIPPED | OUTPATIENT
Start: 2017-02-27 | End: 2017-07-28

## 2017-02-27 RX ADMIN — CYCLOSPORINE 50 MG: 25 CAPSULE, LIQUID FILLED ORAL at 08:53

## 2017-02-27 RX ADMIN — ASPIRIN 81 MG CHEWABLE TABLET 324 MG: 81 TABLET CHEWABLE at 03:09

## 2017-02-27 RX ADMIN — CINACALCET HYDROCHLORIDE 15 MG: 30 TABLET, COATED ORAL at 11:48

## 2017-02-27 RX ADMIN — AMLODIPINE BESYLATE 2.5 MG: 2.5 TABLET ORAL at 11:52

## 2017-02-27 RX ADMIN — HEPARIN SODIUM 5000 UNITS: 5000 INJECTION, SOLUTION INTRAVENOUS; SUBCUTANEOUS at 08:55

## 2017-02-27 RX ADMIN — NITROGLYCERIN 0.4 MG: 0.4 TABLET SUBLINGUAL at 03:48

## 2017-02-27 RX ADMIN — MYCOPHENOLATE MOFETIL 1000 MG: 250 CAPSULE ORAL at 08:52

## 2017-02-27 RX ADMIN — MORPHINE SULFATE 4 MG: 4 INJECTION, SOLUTION INTRAMUSCULAR; INTRAVENOUS at 03:10

## 2017-02-27 ASSESSMENT — ACTIVITIES OF DAILY LIVING (ADL)
COMMUNICATION: 2-->DIFFICULTY UNDERSTANDING (NOT RELATED TO LANGUAGE BARRIER)
TOILETING: 2-->ASSISTIVE PERSON
COGNITION: 0 - NO COGNITION ISSUES REPORTED
AMBULATION: 2-->ASSISTIVE PERSON
EATING: 2-->ASSISTIVE PERSON
TRANSFERRING: 2-->ASSISTIVE PERSON
SWALLOWING: 0-->SWALLOWS FOODS/LIQUIDS WITHOUT DIFFICULTY
TOILETING: 2-->ASSISTIVE PERSON
DRESS: 2-->ASSISTIVE PERSON
TRANSFERRING: 2-->ASSISTIVE PERSON
RETIRED_EATING: 0-->INDEPENDENT
FALL_HISTORY_WITHIN_LAST_SIX_MONTHS: NO
DRESS: 2-->ASSISTIVE PERSON
BATHING: 2-->ASSISTIVE PERSON
RETIRED_COMMUNICATION: 2-->DIFFICULTY UNDERSTANDING (NOT RELATED TO LANGUAGE BARRIER)
SWALLOWING: 2-->DIFFICULTY SWALLOWING LIQUIDS/FOODS
BATHING: 2-->ASSISTIVE PERSON
AMBULATION: 2-->ASSISTIVE PERSON

## 2017-02-27 ASSESSMENT — ENCOUNTER SYMPTOMS
NAUSEA: 1
SHORTNESS OF BREATH: 1
PALPITATIONS: 1

## 2017-02-27 NOTE — ED NOTES
Patient comes in from home with c/o chest pain and blood pressure of 207/140. Patient reports chest pain started around midnight and radiates to back; denies nausea. Reports taking pain pill at home, not aspirin or nitro. Patient is alert and oriented.

## 2017-02-27 NOTE — DISCHARGE SUMMARY
"                                                                 Medicine Discharge Summary  Joshua Lala MRN: [id number]  1965  Home clinic: [facility]  Primary care provider: Bandar Romero  ___________________________________          Date of Admission:  2/27/2017  Date of Discharge:  2/27/2017  Admitting Physician:  Lula Miranda MD  Discharge Physician:  Esther Jordan MD  Discharging Service:  Cardiology, Cards 1     Primary Provider: Bandar Romero         Reason for Admission:   Chest pain          Discharge Diagnosis:   Atypical chest pain  Hypertension         Procedures & Significant Findings:   ECHO  Interpretation Summary  Global and regional left ventricular function is hyperkinetic with an EF of  65-70%.  No regional wall motion abnormalities are seen.  Right ventricular function, chamber size, wall motion, and thickness are  normal.  Ascending aorta 4 cm.  No pericardial effusion is present.         Consultations:   None, discussed with transplant nephrology         Hospital Course by Problem:    Atypical chest pain  Chest pain had resolved after admission. Suspect this was musculoskeletal related to recent viral illness. Also could be related to uncontrolled hypertension as she was elevated to 180s systolic on presentation. No ischemic EKG changes, troponin negative x 2, and no wall motion abnormalities on ECHO. If symptoms were to recur, stress test could be considered.     Hypertension  Has multiple elevated readings in clinic and at the ED. Was started on amlodipine 2.5 mg daily. She will follow up with her nephrologist Dr. Meehan in 1-2 weeks to access BP control.     ESRD s/p DDKT x2  Discussed case with transplant nephrology. Will continue current immunosuppression and follow up as stated above.    Physical Exam on day of Discharge:  Blood pressure 155/80, pulse 63, temperature 97.8  F (36.6  C), temperature source Oral, resp. rate 22, height 1.448 m (4' 9\"), weight 66.3 kg " (146 lb 2 oz), SpO2 96 %, not currently breastfeeding.    General: lying in bed, comfortable  HEENT: MMM, EOMI, no icterus  CV: regular, 2/6 systolic murmur at LUSB and at apex  Resp: clear bilaterally  Abd: Soft, non-tender, BS+  Extremities: no edema  Neuro: alert, moving all extremities without focal deficit    Lines/Tubes:  None         Pending Results:   None          Discharge Medications:     Current Discharge Medication List      START taking these medications    Details   amLODIPine (NORVASC) 2.5 MG tablet Take 1 tablet (2.5 mg) by mouth daily  Qty: 30 tablet, Refills: 0    Associated Diagnoses: Essential hypertension         CONTINUE these medications which have NOT CHANGED    Details   SENSIPAR 30 MG tablet TAKE ONE-HALF TABLET BY MOUTH EVERY OTHER DAY  Qty: 30 tablet, Refills: 4    Associated Diagnoses: Parathyroid related hypercalcemia (H)      CELLCEPT 250 MG PO CAPSULE Take 4 capsules (1,000 mg) by mouth 2 times daily  Qty: 240 capsule, Refills: 6    Associated Diagnoses: Kidney transplanted      HYDROcodone-acetaminophen (NORCO) 5-325 MG per tablet Take 1-2 tablets by mouth every 6 hours as needed for moderate to severe pain  Qty: 6 tablet, Refills: 0      Diphenhyd-HC-Nystatin-Tetracyc (FIRST-HARINDER MOUTHWASH) SUSP Swish and swallow 5-10 mLs in mouth every 6 hours as needed  Qty: 237 mL, Refills: 1      NEORAL 25 MG PO CAPSULE Take 2 capsules (50 mg) by mouth 2 times daily  Qty: 120 capsule, Refills: 11    Associated Diagnoses: Kidney replaced by transplant      sulfamethoxazole-trimethoprim (BACTRIM,SEPTRA) 400-80 MG per tablet TAKE ONE TABLET BY MOUTH EVERY DAY  Qty: 30 tablet, Refills: 11    Comments: Refill  Associated Diagnoses: Kidney replaced by transplant                  Discharge Instructions and Follow-Up:     Discharge Procedure Orders  Reason for your hospital stay   Order Comments: You were in the hospital due to chest pain. This likely was related to your ongoing viral infection. You  were found to have elevated blood pressures and were started on a medication to help.     Adult [facility] Follow-up and recommended labs and tests   Order Comments: Follow up with Dr. Meehan , at (location with clinic name or city) [facility] Nephrology, within 1-2  for hospital follow- up, regarding new diagnosis and discuss if there is any ongoing chest pain. No follow up labs or test are needed.    Appointments on [place] and/or [facility] (with [facility] or [facility] provider or service). Call [phone] if you haven't heard regarding these appointments within 7 days of discharge.     Activity   Order Comments: Your activity upon discharge: activity as tolerated   Order Specific Question Answer Comments   Is discharge order? Yes      Discharge Instructions   Order Comments: You will start a new medication called amlodipine for your high blood pressure. Please take this medication daily. You will follow up with Dr. Meehan in 1-2 weeks to see if your blood pressure is lower and to see if you are still having episodes of chest pain.     Full Code     Diet   Order Comments: Follow this diet upon discharge: Orders Placed This Encounter     Renal Diet (non-dialysis)   Order Specific Question Answer Comments   Is discharge order? Yes             Discharge Disposition:   Home         Condition on Discharge:   Discharge condition: Stable   Code status on discharge: Full Code        Date of service: 2/27/2017    The patient was discussed with Dr. Jordan, who is in agreement of above plan    Ramana Hendrix, PGY-2  Internal Medicine  [phone]    ATTENDING ATTESTATION:  Patient has been seen and evaluated by me on February 27, 2017. I agree with the discharge summary note above. I have reviewed pertinent labs and studies. More than 30 minutes was spent organizing this patient's hospital discharge.     Esther Jordan MD, MS  Staff Cardiologist, [facility]   Pager: [phone]

## 2017-02-27 NOTE — IP AVS SNAPSHOT
[address] Observation [address] [address]    Phone:  [phone]    Fax:  [phone]                                       After Visit Summary   2/27/2017    Joshua Lala    MRN: [id number]           After Visit Summary Signature Page     I have received my discharge instructions, and my questions have been answered. I have discussed any challenges I see with this plan with the nurse or doctor.    ..........................................................................................................................................  Patient/Patient Representative Signature      ..........................................................................................................................................  Patient Representative Print Name and Relationship to Patient    ..................................................               ................................................  Date                                            Time    ..........................................................................................................................................  Reviewed by Signature/Title    ...................................................              ..............................................  Date                                                            Time

## 2017-02-27 NOTE — DISCHARGE INSTRUCTIONS
Please make an appointment to follow up with Your Primary Care Provider in 2 days. Return earlier if your symptoms persist.

## 2017-02-27 NOTE — H&P
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       #Unstable Angina:   #Recent findings of LVH, PH, diastolic dysfunction   Trops and EKG neg in ED on admission. Increasing frequency of chest pain over the past 2 years; awoke her during sleep on night of admission.  Seen by Dr. Jim of Cardiology for initial evaluation in Pulmonary Hypertension clinic due to CT chest 10/15 showing an enlarged pulmonary artery concerning for PH. Per assessment tat that time: \"Her dyspnea on exertion is concerning and unexplained. Multiple TTE and stress tests have been essentially normal. She has evidence of LVH and diastolic dysfunction on TTE 10/15, not seen on 12/15. Multiple CT scans have showed cardiomegaly and dilated pulmonary artery with pulmonary arterial calcifications of unclear significance. She also has chest pain with exertion as well as separate abdominal pain with eating. ddx for DUEÑAS includes high output HF (AV fistula and signs of high output on exam) vs exercise induced PH, vs HFpEF. Her chest pain needs further ischemic evaluation despite negative stress testing. Her abdominal pain may be GERD but is somewhat concerning for mesenteric ischemia given its association with eating as well as her history of HSP.\"  Plan was to pursue RHC, coronary angiogram, and PFT's, but pt did not followup.  Her symptoms now are atypical for angina. Her blood pressure is elevated and has the case on prior outpatient " visits. We will focus on managing her blood pressure this admission in light of neg troponins and prior negative stress tests and have her follow up in the outpatient setting if her symptoms persist for further ischemia workup.     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 Follows with Dr. Meehan. Last visit 10/28/16. Baseline Cr appears to be 1.3-1.6. Cr of 1.6 on admission.   --Cellcept level    #Hyperparathyroidism: 2/2 CKD. Calcium normal on admit.   --Continue PTA Sensipar 15mg qod     ##Other  - Prophylaxis:   -DVT: Heparin    -GI:    -Bowel: Senna docusate  - FEN: NPO   - IVF: None  - Electrolyte Protocol:   - Telemetry: Yes  - Code status: Full     Disposition:  Admit to [place] [address]     Daisylaury Neff  PGY-2  Internal Medicine  [phone]    This patient will be formally staffed in AM.       Chief Complaint   Chest pain   This history was primarily obtained through sign out from ED provider and chart review, given unavailability of [facility]  and pt with limited English.       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      Recently evaluated in ED on 2/26 evening for mildly productive cough, sore throat, and subjective fever. Influenza, strep, CXR negative, felt likely viral URI, sent home with magic mouth wash and Percocet.  At around 10pm that evening, told  that she had been experiencing some chest pain and nausea. She tried to sleep through it, but at approximately 3am, she had recurrence of the chest pain, so her  brought her into the ED.     ED Course: Hypertensive to 207/108 mmHg, HR 80, normal  sats. Given aspirin, morphine, NTG with relief in chest pain and decrease in BP to 157/72.     At time of exam, pt denies any chest pain, sob, abdominal pain, nausea/vomiting.       Review of Systems   All 12 systems reviewed.  Relevant positives/negatives noted in HPI above        Past Medical History   (Reviewed and updated)  Past Medical History   Diagnosis Date     Dehydration      Henoch-Schonlein purpura (H)      Hypercalcemia      Hypomagnesaemia      Renal disease      Tertiary hyperparathyroidism (H)           Past Surgical History   (Reviewed and updated)  Past Surgical History   Procedure Laterality Date     Renal transplant  1998, 2007     Cholecystectomy       Transplant       Kidney 2008         Allergies   (Reviewed and updated)   Allergies   Allergen Reactions     Contrast Dye Shortness Of Breath         Medications   (Reviewed and updated)    Current Facility-Administered Medications on File Prior to Encounter:  [COMPLETED] dexamethasone (DECADRON) tablet 10 mg     Current Outpatient Prescriptions on File Prior to Encounter:  HYDROcodone-acetaminophen (NORCO) 5-325 MG per tablet Take 1-2 tablets by mouth every 6 hours as needed for moderate to severe pain   Diphenhyd-HC-Nystatin-Tetracyc (FIRST-HARINDER MOUTHWASH) SUSP Swish and swallow 5-10 mLs in mouth every 6 hours as needed   SENSIPAR 30 MG tablet TAKE ONE-HALF TABLET BY MOUTH EVERY OTHER DAY   NEORAL 25 MG PO CAPSULE Take 2 capsules (50 mg) by mouth 2 times daily   CELLCEPT 250 MG PO CAPSULE Take 4 capsules (1,000 mg) by mouth 2 times daily   sulfamethoxazole-trimethoprim (BACTRIM,SEPTRA) 400-80 MG per tablet TAKE ONE TABLET BY MOUTH EVERY DAY         Family History   (Reviewed and updated)  No family history on file.      Social History   (Reviewed and updated)  Social History   Substance Use Topics     Smoking status: Never Smoker     Smokeless tobacco: Never Used     Alcohol use No       Physical Exam   Vitals: Blood pressure 157/72,  "temperature 97.9  F (36.6  C), temperature source Oral, resp. rate 22, height 1.448 m (4' 9\"), weight 66.3 kg (146 lb 2 oz), SpO2 98 %, not currently breastfeeding.  Gen: Hmong speaking woman, Resting comfortably, in no acute distress  Head: Normocephalic, atraumatic   Eyes: NICA, EOMI   ENT: No sinus tenderness, oropharynx clear with no erythema or exudates, no LAD, neck supple   CV: Normal rate, regular rhythm, no murmurs/gallops/rubs  Resp: Non-labored breathing on 2LNC for comfort, CTAB with no wheezing or rhonchi   Abd: +BS, soft, NTND, no costovertebral angle tenderness, no peritoneal signs   Ext: Warm, well-perfused, no edema      Data   Labs:  BMP  Recent Labs  Lab 02/27/17 0302   *   POTASSIUM 4.2   CHLORIDE 99   SHAVON 9.7   CO2 20   BUN 14   CR 1.60*   *     CBC  Recent Labs  Lab 02/27/17 0302   WBC 5.7   RBC 4.06   HGB 11.3*   HCT 34.7*   MCV 86   MCH 27.8   MCHC 32.6   RDW 12.7   *     INR  Recent Labs  Lab 02/27/17 0302   INR 1.11     LFTs  Recent Labs  Lab 02/27/17 0302   ALKPHOS 138   AST 14   ALT 11   BILITOTAL 1.0   PROTTOTAL 8.2   ALBUMIN 3.5      PANC  Recent Labs  Lab 02/27/17 0302   LIPASE 326     CXR:   2/26 no acute pathology     ATTENDING ATTESTATION:  Patient has been seen and evaluated by me on February 27, 2017. I have reviewed the medications, laboratory, imaging, and other relevant results.  I agree with the above note which I have edited, as necessary.  I have discussed my assessment and plan with the house staff.    Esther Jordan MD, MS  Staff Cardiologist, [facility]  Pager: [phone]                      "

## 2017-02-27 NOTE — ED PROVIDER NOTES
History     Chief Complaint   Patient presents with     Fever     Cough     HPI  Joshua Lala is a 51 year old female with a history of kidney transplant x 2 and parathyroid-related hypercalcemia who presents for evaluation of fever and cough. The patient reports that 3 days ago, she was out for a walk, when she developed a cough mildly productive of whitish sputum, which has progressively been worsening. She states that she developed a sore throat shortly thereafter, which also has been worsening, now to the point where swallowing is quite difficult, only tolerating small quantities of fluids. The patient states that she has had sore throat previously, but never as bad as this bout. She has tried liquid Tylenol which helped the pain somewhat. The patient endorses some chills in this timeframe, but no fevers. She denies any otodynia. The patient reports that she's had diarrhea, which is new for her, and just yesterday she's begun to have intermittent dysuria as well, though no associated urinary frequency change and no hematuria. The patient denies any shortness of breath or chest pain.    I have reviewed the Medications, Allergies, Past Medical and Surgical History, and Social History in the Epic system.    No current facility-administered medications for this encounter.      Current Outpatient Prescriptions   Medication     HYDROcodone-acetaminophen (NORCO) 5-325 MG per tablet     Diphenhyd-HC-Nystatin-Tetracyc (FIRST-HARINDER MOUTHWASH) SUSP     SENSIPAR 30 MG tablet     NEORAL 25 MG PO CAPSULE     CELLCEPT 250 MG PO CAPSULE     sulfamethoxazole-trimethoprim (BACTRIM,SEPTRA) 400-80 MG per tablet     Past Medical History   Diagnosis Date     Dehydration      Henoch-Schonlein purpura (H)      Hypercalcemia      Hypomagnesaemia      Renal disease      Tertiary hyperparathyroidism (H)        Past Surgical History   Procedure Laterality Date     Renal transplant  1998, 2007     Cholecystectomy       Transplant       Kidney  2008       No family history on file.    Social History   Substance Use Topics     Smoking status: Never Smoker     Smokeless tobacco: Never Used     Alcohol use No        Allergies   Allergen Reactions     Contrast Dye Shortness Of Breath       Review of Systems   Constitutional: Positive for chills.   HENT: Positive for sore throat and trouble swallowing.    Respiratory: Positive for cough. Negative for shortness of breath.    Cardiovascular: Negative for chest pain.   Gastrointestinal: Positive for diarrhea.   Genitourinary: Positive for dysuria. Negative for frequency and hematuria.   All other systems reviewed and are negative.      Physical Exam   BP: 165/78  Pulse: 80  Temp: 99.3  F (37.4  C)  Resp: 20  SpO2: 94 %  Physical Exam   Constitutional: She appears well-developed and well-nourished. No distress.   HENT:   Head: Normocephalic.   Mouth/Throat: Uvula is midline. Mucous membranes are not pale. No oral lesions. No dental abscesses. Posterior oropharyngeal erythema present. No oropharyngeal exudate, posterior oropharyngeal edema or tonsillar abscesses.   Eyes: Right eye exhibits no discharge. Left eye exhibits no discharge.   Neck: No tracheal tenderness present. No rigidity. Normal range of motion present.   Cardiovascular: Normal rate.    Pulmonary/Chest: Effort normal. No stridor. No respiratory distress. She has no wheezes.   Abdominal: Soft. She exhibits no distension. There is no tenderness. There is no CVA tenderness.   Musculoskeletal: She exhibits no edema.   Lymphadenopathy:     She has no cervical adenopathy.   Neurological: She is alert. No cranial nerve deficit.   Skin: She is not diaphoretic. No erythema.   Psychiatric: She has a normal mood and affect.       ED Course     Procedures    Results for orders placed or performed during the hospital encounter of 02/26/17 (from the past 24 hour(s))   Influenza A/B antigen swab   Result Value Ref Range    Influenza A/B Agn Specimen Nasopharyngeal      Influenza A Negative NEG    Influenza B  NEG     Negative   Test results must be correlated with clinical data. If necessary, results   should be confirmed by a molecular assay or viral culture.     XR Chest 2 Views    Narrative    Preliminary     Impression    Impression:  No acute airspace disease.    Full report to follow.   UA with Microscopic   Result Value Ref Range    Color Urine Straw     Appearance Urine Clear     Glucose Urine Negative NEG mg/dL    Bilirubin Urine Negative NEG    Ketones Urine Negative NEG mg/dL    Specific Gravity Urine 1.002 (L) 1.003 - 1.035    Blood Urine Negative NEG    pH Urine 6.0 5.0 - 7.0 pH    Protein Albumin Urine 30 (A) NEG mg/dL    Urobilinogen mg/dL Normal 0.0 - 2.0 mg/dL    Nitrite Urine Negative NEG    Leukocyte Esterase Urine Negative NEG    Source Midstream Urine     WBC Urine <1 0 - 2 /HPF    RBC Urine 0 0 - 2 /HPF    Transitional Epi <1 0 - 1 /HPF    Mucous Urine Present (A) NEG /LPF   Rapid strep screen   Result Value Ref Range    Specimen Description Throat     Rapid Strep A Screen       NEGATIVE: No Group A streptococcal antigen detected by immunoassay, await   culture report.      Micro Report Status FINAL 02/26/2017        Assessments & Plan (with Medical Decision Making)   Joshua Lala is a 51 year old female who is presenting to the ED with sore throat and a cough. The patient appears well. No exudates. No peritonsillar abscess. Neck is freely mobile. I suspect viral URI symptoms. No concern for major systemic illness.    I have reviewed the nursing notes.    I have reviewed the findings, diagnosis, plan and need for follow up with the patient.    New Prescriptions    DIPHENHYD-HC-NYSTATIN-TETRACYC (FIRST-HARINDER MOUTHWASH) SUSP    Swish and swallow 5-10 mLs in mouth every 6 hours as needed    HYDROCODONE-ACETAMINOPHEN (NORCO) 5-325 MG PER TABLET    Take 1-2 tablets by mouth every 6 hours as needed for moderate to severe pain       Final diagnoses:   Throat pain      I, Fermin Hagan, am serving as a trained medical scribe to document services personally performed by Krysten Jackosn MD, based on the provider's statements to me.      I, Krysten Jackson MD, was physically present and have reviewed and verified the accuracy of this note documented by Fermin Hagan.      2/26/2017   [facility], [place], EMERGENCY DEPARTMENT     Krysten Jackson MD  02/26/17 [id number]

## 2017-02-27 NOTE — ED PROVIDER NOTES
History   No chief complaint on file.    The history is provided by the patient and the spouse. The history is limited by a language barrier.  used:  translated has no ong  was available.     Joshua Lala is a 51 year old female with a history of Henoch-Schonlein purpura, Tertiary Hyperparathyroidism, and Kidney Transplant x2 (1998, 2007) who presents to ER for chest pain.  Patient was seen earlier yesterday evening for sore throat.  She states receiving some pain medication and after workup was discharged home.  She was feeling better after the pain medication however reports just prior to bed possibly around 10:30 to 11pm per her 's report she began having some heavy beating in her chest with shortness of breath.  He reports that they went to bed however she woke him around 12:30 AM complaining about chest pain.  She tried to get up and let it subside.  Symptoms seemed to improve per their report and they went back to bed.  She will come at 3 AM with return or continued pain and continued shortness of breath with feelings of palpitation before he brought her to the ER.  She denies any fevers or chills, no vomiting but nausea, no abdominal pain, no lightheadedness.  She does report some worsening of symptoms when she lays completely flat.  No leg swelling, no leg pain per report.    Further questioning with her  after reviewing her chart where it was found that she had cardiac appointment in December 2015 that recommended angiography he states that she has had chest pain intermittently since that time but they have not followed up with a cardiologist.  From description it sounds that the chest pain has been similar to episode as tonight.    I have reviewed the Medications, Allergies, Past Medical and Surgical History, and Social History in the [employer] system.  Past Medical History   Diagnosis Date     Dehydration      Henoch-Schonlein purpura (H)      Hypercalcemia   "    Hypomagnesaemia      Renal disease      Tertiary hyperparathyroidism (H)        Past Surgical History   Procedure Laterality Date     Renal transplant  1998, 2007     Cholecystectomy       Transplant       Kidney 2008       No family history on file.    Social History   Substance Use Topics     Smoking status: Never Smoker     Smokeless tobacco: Never Used     Alcohol use No        Allergies   Allergen Reactions     Contrast Dye Shortness Of Breath     Current Facility-Administered Medications   Medication     lidocaine 1 % 1 mL     lidocaine (LMX4) kit     sodium chloride (PF) 0.9% PF flush 3 mL     sodium chloride (PF) 0.9% PF flush 3 mL     nitroglycerin (NITROSTAT) sublingual tablet 0.4 mg     Review of Systems   Respiratory: Positive for shortness of breath.    Cardiovascular: Positive for chest pain and palpitations.   Gastrointestinal: Positive for nausea.   All other systems reviewed and are negative.    Physical Exam   BP: (!) 185/91  Heart Rate: 87  Temp: 97.9  F (36.6  C)  Resp: 18  Height: 144.8 cm (4' 9\")  Weight: 65.8 kg (145 lb)  SpO2: 100 %  Physical Exam   Constitutional: She appears well-developed and well-nourished. She appears distressed (tachypnic).   HENT:   Head: Normocephalic and atraumatic.   Nose: Nose normal.   Mouth/Throat: Uvula is midline. Mucous membranes are not dry. No trismus in the jaw. No uvula swelling. Posterior oropharyngeal erythema present. No oropharyngeal exudate, posterior oropharyngeal edema or tonsillar abscesses.   Eyes: Conjunctivae and EOM are normal. Pupils are equal, round, and reactive to light.   Neck: Normal range of motion. Neck supple. No tracheal deviation present.   Pulmonary/Chest: Accessory muscle usage present. No stridor. Tachypnea noted. No respiratory distress. She has no decreased breath sounds. She has no wheezes. She has no rhonchi. She has rales in the right lower field and the left lower field.   Abdominal: Soft. There is no tenderness. "   Musculoskeletal: Normal range of motion. She exhibits edema (trace bilateral lower extremity edema). She exhibits no tenderness.   Neurological: She is alert.   Skin: Skin is warm and dry. She is not diaphoretic. No erythema. No pallor.   Psychiatric: She has a normal mood and affect.   Nursing note and vitals reviewed.    ED Course     ED Course     Procedures             EKG Interpretation:      Interpreted by Arnoldo Arellano  Time reviewed: 0248  Symptoms at time of EKG: chest pain   Rhythm: normal sinus   Rate: normal  Axis: normal  Ectopy: none  Conduction: normal  ST Segments/ T Waves: Nonspecific ST finding  Q Waves: none  Comparison to prior: Unchanged from January 1, 2017.    Clinical Impression: Nonspecific EKG.    Critical Care time:  none        Labs Ordered and Resulted from Time of ED Arrival Up to the Time of Departure from the ED   CBC WITH PLATELETS DIFFERENTIAL - Abnormal; Notable for the following:        Result Value    Hemoglobin 11.3 (*)     Hematocrit 34.7 (*)     Platelet Count 149 (*)     Absolute Lymphocytes 0.3 (*)     All other components within normal limits   COMPREHENSIVE METABOLIC PANEL - Abnormal; Notable for the following:     Sodium 131 (*)     Glucose 325 (*)     Creatinine 1.60 (*)     GFR Estimate 34 (*)     GFR Estimate If Black 41 (*)     All other components within normal limits   NT PROBNP INPATIENT - Abnormal; Notable for the following:     N-Terminal Pro BNP Inpatient 2096 (*)     All other components within normal limits   TROPONIN I   INR   LIPASE   MAGNESIUM   PULSE OXIMETRY NURSING   CARDIAC CONTINUOUS MONITORING   PERIPHERAL IV CATHETER     Xr Chest 2 Views    Result Date: 2/26/2017  EXAM: XR CHEST 2 VW  2/26/2017 6:56 PM HISTORY:  cough producing sputum   COMPARISON:  1/1/2017 FINDINGS: PA and lateral radiographs of the chest. Stable cardiomegaly. The mediastinal border and pulmonary vasculature are within normal limits. No focal airspace opacities. No  pneumothorax. No pleural effusions. Cholecystectomy clips present.     IMPRESSION: Stable cardiomegaly with no focal airspace disease. I have personally reviewed the examination and initial interpretation and I agree with the findings. SANG RAY MD    Xr Chest Port 1 View    IMPRESSION: 1. No acute airspace disease. 2. Stable cardiomegaly.    Assessments & Plan (with Medical Decision Making)   I was physically present and have reviewed and verified the accuracy of this note documented by (myself).     Disclaimer: This note consists of symbols derived from keyboarding, dictation, and/or voice recognition software. As a result, there may be errors in the script that have gone undetected.  Please consider this when interpreting information found in the chart.These sections of the chart were reviewed for accuracy to the best of my knowledge and ability.    Patient was clinically assessed and consented to treatment. After assessment, medical decision making and workup were discussed with the patient. The patient was agreeable to plan for testing, workup, and treatment.  Joshua Lala is a 51 year old female who presents today for chest pain.  Patient was to keep neck on arrival and did complain of continued chest pain.  She had EKG that was done that was similar to previous EKGs.  No signs of acute ischemia.  She was placed on oxygen and had labs drawn.  She was given nitroglycerin and aspirin.  She had good relief of some of the pain and blood pressure began to come down as well.  Patient received a dose of morphine 4 mg as well with resolution of pain.  Her blood pressure continued to come down without further doses of nitroglycerin.  Patient was kept on cardiac monitor with no ectopy noticed.  Chest x-ray was done which showed stable cardiomegaly but no new pulmonary edema.  Labs returned showing a negative troponin.  Her BNP was elevated to 2096 from 1500 just a month ago.  Creatinine is stable at 1.6.  There  is no leukocytosis and hemoglobin was stable.  Patient was not hypoxemic and breathing did  and improve with less tachypnea and less discomfort.  Patient still preferred to be sitting up.  After labs returned and symptoms were better controlled I did speak with Dr. Miranda from cardiology.  Given the patient's concern appointment with recommendation for angiography 2 years ago along with a diastolic failure in history as well as the episodes of chest pain she will be plan for admission to cardiology for further workup and rule out.  Patient was feeling better and more comfortable.  She'll be plan for cardiology admission for further care.    I have reviewed the nursing notes.    I have reviewed the findings, diagnosis, plan and need for follow up with the patient.  Current Discharge Medication List          Final diagnoses:   Chest pain at rest   Diastolic heart failure, unspecified heart failure chronicity (H)       2/27/2017   [facility], [place], EMERGENCY DEPARTMENT     Arnoldo Arellano MD  02/27/17 0610

## 2017-02-27 NOTE — UTILIZATION REVIEW
"  Admission Status; Secondary Review Determination       Under the authority of the Utilization Management Committee, the utilization review process indicated a secondary review on the above patient.  The review outcome is based on review of the medical records, discussions with staff, and applying clinical experience noted on the date of the review.       (x) Observation Status Appropriate - This patient does not meet hospital inpatient criteria and is placed in observation status. If this patient's primary payer is Medicare and was admitted as an inpatient, Condition Code 44 should be used and patient status changed to \"observation\".     RATIONALE FOR DETERMINATION   51 year old female with a history of Henoch-Schonlein purpura, Tertiary Hyperparathyroidism, and Kidney Transplant x2 (1998, 2007) who presents to ER for chest pain. Patient was seen earlier yesterday evening for sore throat. She states receiving some pain medication and after workup was discharged home. She was feeling better after the pain medication however reports around 10:30 pm  she began having some heavy beating in her chest with shortness of breath. Normal VSS, trop and no EKG changes. Appropriate for OBS admit to eval cause of CP, discussed with attending.      The severity of illness, intensity of service provided, expected LOS and risk for adverse outcome make the care appropriate for further observation; however, doesn't meet criteria for hospital inpatient admission. This was discussed with attending physician who concurred with this determination.        The information on this document is developed by the utilization review team in order for the business office to ensure compliance.  This only denotes the appropriateness of proper admission status and does not reflect the quality of care rendered.         The definitions of Inpatient Status and Observation Status used in making the determination above are those provided in the CMS " Coverage Manual, Chapter 1 and Chapter 6, section 70.4.      Sincerely,     Dom Simmons MD    Physician Advisor  Utilization Review/ Case Management  [facility].

## 2017-02-27 NOTE — PLAN OF CARE
Problem: Goal Outcome Summary  Goal: Goal Outcome Summary  Outcome: No Change  Admission          2/27/2017  2:49 AM  -----------------------------------------------------------  Diagnosis: Chest Pain     Admitted from: [facility]  Via: stretcher  Accompanied by: Gilbert Larios RN.  Belongings: in personal bag at the window seal.   Family with come to collect earing/necklace and jewery.    Admission Profile: complete with  Hmong language.  Teaching: orientation to unit, call don't fall, use of console, meal times, visiting hours,  when to call for the RN (angina/sob/dizzyness, etc.), and enforced importance of safety   Access: PIV SL.  No blood pressure/draw on L-arm d/t fistula.  Telemetry:Placed on pt.  Commode order and placed patient on bed alarm.    Ht./Wt.: complete     Temp:  [97.5  F (36.4  C)-97.9  F (36.6  C)] 97.5  F (36.4  C)  Heart Rate:  [59-87] 60  Resp:  [16-24] 22  BP: (149-212)/() 150/75  SpO2:  [95 %-100 %] 98 %

## 2017-02-27 NOTE — IP AVS SNAPSHOT
MRN:[id number]                      After Visit Summary   2/27/2017    Joshua Lala    MRN: [id number]           Thank you!     Thank you for choosing [facility] for your care. Our goal is always to provide you with excellent care. Hearing back from our patients is one way we can continue to improve our services. Please take a few minutes to complete the written survey that you may receive in the mail after you visit with us. Thank you!        Patient Information     Date Of Birth          1965        About your hospital stay     You were admitted on:  February 27, 2017 You last received care in the:  Unit 6D Observation [facility]    You were discharged on:  February 27, 2017        Reason for your hospital stay       You were in the hospital due to chest pain. This likely was related to your ongoing viral infection. You were found to have elevated blood pressures and were started on a medication to help.                  Who to Call     For medical emergencies, please call 911.  For non-urgent questions about your medical care, please call your primary care provider or clinic, [phone]          Attending Provider     Provider Specialty    Arnoldo Arellano MD Emergency Medicine    Miranda, Lula Billings MD Cardiology    JamesFazal MD Clinical Cardiac Electrophysiology       Primary Care Provider Office Phone # Fax #    Phua Heather [phone]       [facility] [address] [address]        After Care Instructions     Activity       Your activity upon discharge: activity as tolerated            Diet       Follow this diet upon discharge: Orders Placed This Encounter      Renal Diet (non-dialysis)            Discharge Instructions       You will start a new medication called amlodipine for your high blood pressure. Please take this medication daily. You will follow up with Dr. Meehan in 1-2 weeks to see if your blood pressure is lower and  to see if you are still having episodes of chest pain.                  Follow-up Appointments     Adult [facility] Follow-up and recommended labs and tests       Follow up with Dr. Meehan , at (location with clinic name or city) [facility], within 1-2  for hospital follow- up, regarding new diagnosis and discuss if there is any ongoing chest pain. No follow up labs or test are needed.    Appointments on [place] and/or [facility] (with [facility] or [facility] provider or service). Call [phone] if you haven't heard regarding these appointments within 7 days of discharge.                  Your next 10 appointments already scheduled     Feb 28, 2017 10:00 AM [facility] Inpatient with Jennifer Romero    Services Department ([facility])    [address] [place] [phone]               Mar 01, 2017  9:00 AM [facility] Inpatient with Josefa Ontiveros    Services Department ([facility])    [address] [place] [phone]               Mar 02, 2017  9:00 AM [facility] Inpatient with melina Robles MD    Services Department ([facility])    [address] [place] [phone]               Mar 03, 2017  9:00 AM [facility] Inpatient with Vee Robles MD    Services Department ([facility])    [address] [place] [phone]               Apr 28, 2017  8:30 AM CDT   Lab with  LAB   [facility] ([facility])    [address] [place] [id number]   [phone]            Apr 28, 2017  9:40 AM CDT   (Arrive by 9:10 AM)   Return Kidney Transplant with Dipak Meehan MD   [facility]ph[facility] ([facility])    [address] [place] [address]  "[facility] [id number] [phone]              General Recommendations To Control Heart Failure When You Get Home     Instructions To Patients and Families: Please read and check off each of these important instructions as you do them when you get home.           Weight and symptoms      ___ Put a scale in your bathroom  ___ Post a weight chart or calendar next to the scale  ___Weigh yourself every day as soon as you you get up in the morning. You should only be wearing your pajamas. Write your weight on the chart/calendar.  ___ Bring your weight chart/calendar with you to all appointments    ___Call your doctor if you gain 2 pounds in 1 day or 5 pounds in 1 week from your \"dry\" weight (baseline weight). Also call your doctor if you have shortness of breath that gets worse over time, leg swelling or fatigue.         Medicines and diet     ___ Make sure to take your medicines as prescribed.    ___Bring a current list of your medicines and all of your medicine bottles with you to all appointments.    ___ Limit fluids if you still have swelling or shortness of breath, or if your doctor tells you to do so.  ___ Eat less than 2000 mg of sodium (salt) every day. Read food labels, and do not add salt to meals.   ___ Heart healthy diet with low fat and low cholesterol          Activity and suggested lifestyle changes    ___ Stay active. Talk to your doctor about an exercise program that is safe for your heart.    ___ Stop smoking. Reduce alcohol use.      ___ Lose weight if you are overweight. Extra weight puts a lot of stress on the heart.          Control for Leg Swelling   ___ Keep your legs elevated (raised) as needed for swelling. If swelling is uncomfortable or elevation doesn t help, ask your doctor about using ACE wrap or Jobst stockings.          Follow-up appointments   ___ Make a [facility] appointment with a basic metabolic panel lab draw 3 to 5 days after you leave the hospital. Call one of " "the following locations:   [facility]  [phone],  [facility] [phone],  [facility]  [phone].     ___ Make sure to take your medications as prescribed and bring an accurate list of your medications and your weight chart/calendar to your follow up appointment at the [facility] for continued education and adjustments          What is the C[facility] clinic?    The [facility] (Cardiomyopathy, Optimization, Rehabilitation, Education) Clinic is a heart failure specialty clinic within the [facility]. At [facility] you will work with nurse practitioners to carefully adjust medicines, get education and learn who and when to call if symptoms appear. [facility] nurses specialize in helping you:    better understand your disease.    slow the progress of your disease.    improve the length and quality of your life.    detect future heart problems before they become life threatening.    avoid hospital stays.            Pending Results     Date and Time Order Name Status Description    2/26/2017 1900 Beta strep group A culture Preliminary             Statement of Approval     Ordered          02/27/17 1538  I have reviewed and agree with all the recommendations and orders detailed in this document.  EFFECTIVE NOW     Approved and electronically signed by:  Ramana Hendrix MD             Admission Information     Date & Time Department Dept. Phone    2/27/2017 Unit 6D Observation [facility] [place] [phone]      Your Vitals Were     Blood Pressure Pulse Temperature Respirations Height Weight    155/80 (BP Location: Right arm) 63 97.8  F (36.6  C) (Oral) 22 1.448 m (4' 9\") 66.3 kg (146 lb 2 oz)    Pulse Oximetry BMI (Body Mass Index)                96% 31.62 kg/m2          MyChart Information     [employer] lets you send messages to your doctor, view your test results, renew your prescriptions, " "schedule appointments and more. To sign up, go to [email or web address] . Click on \"Log in\" on the left side of the screen, which will take you to the Welcome page. Then click on \"Sign up Now\" on the right side of the page.     You will be asked to enter the access code listed below, as well as some personal information. Please follow the directions to create your username and password.     Your access code is: [email or web address]  Expires: 2017  2:56 PM     Your access code will  in 90 days. If you need help or a new code, please call your [facility] clinic or [phone].        Care EveryWhere ID     This is your Care EveryWhere ID. This could be used by other organizations to access your [facility] medical records  [id number]           Review of your medicines      START taking        Dose / Directions    amLODIPine 2.5 MG tablet   Commonly known as:  NORVASC        Dose:  2.5 mg   Take 1 tablet (2.5 mg) by mouth daily   Quantity:  30 tablet   Refills:  0         CONTINUE these medicines which have NOT CHANGED        Dose / Directions    cycloSPORINE modified capsule   Used for:  Kidney replaced by transplant        Dose:  50 mg   Take 2 capsules (50 mg) by mouth 2 times daily   Quantity:  120 capsule   Refills:  11       FIRST-HARINDER MOUTHWASH Susp        Dose:  5-10 mL   Swish and swallow 5-10 mLs in mouth every 6 hours as needed   Quantity:  237 mL   Refills:  1       HYDROcodone-acetaminophen 5-325 MG per tablet   Commonly known as:  NORCO        Dose:  1-2 tablet   Take 1-2 tablets by mouth every 6 hours as needed for moderate to severe pain   Quantity:  6 tablet   Refills:  0       mycophenolate capsule   Used for:  Kidney transplanted        Dose:  1000 mg   Take 4 capsules (1,000 mg) by mouth 2 times daily   Quantity:  240 capsule   Refills:  6       SENSIPAR 30 MG tablet   Used for:  Parathyroid related hypercalcemia (H)   Generic drug:  cinacalcet        TAKE ONE-HALF TABLET BY MOUTH EVERY OTHER DAY "   Quantity:  30 tablet   Refills:  4       sulfamethoxazole-trimethoprim 400-80 MG per tablet   Commonly known as:  BACTRIM/SEPTRA   Used for:  Kidney replaced by transplant        TAKE ONE TABLET BY MOUTH EVERY DAY   Quantity:  30 tablet   Refills:  11            Where to get your medicines      These medications were sent to [facility] [place] - [place], [place] - [address]  [address] [address]     Phone:  [phone]     amLODIPine 2.5 MG tablet                Protect others around you: Learn how to safely use, store and throw away your medicines at www.disposemymeds.org.             Medication List: This is a list of all your medications and when to take them. Check marks below indicate your daily home schedule. Keep this list as a reference.      Medications           Morning Afternoon Evening Bedtime As Needed    amLODIPine 2.5 MG tablet   Commonly known as:  NORVASC   Take 1 tablet (2.5 mg) by mouth daily   Last time this was given:  2.5 mg on 2/27/2017 11:52 AM                                   cycloSPORINE modified capsule   Take 2 capsules (50 mg) by mouth 2 times daily   Last time this was given:  50 mg on 2/27/2017  8:53 AM                                      [facility] MOUTHWASH Susp   Swish and swallow 5-10 mLs in mouth every 6 hours as needed                                   HYDROcodone-acetaminophen 5-325 MG per tablet   Commonly known as:  NORCO   Take 1-2 tablets by mouth every 6 hours as needed for moderate to severe pain                                   mycophenolate capsule   Take 4 capsules (1,000 mg) by mouth 2 times daily   Last time this was given:  1,000 mg on 2/27/2017  8:52 AM                                      SENSIPAR 30 MG tablet   TAKE ONE-HALF TABLET BY MOUTH EVERY OTHER DAY   Last time this was given:  15 mg on 2/27/2017 11:48 AM   Generic drug:  cinacalcet            Every other day                       sulfamethoxazole-trimethoprim  400-80 MG per tablet   Commonly known as:  BACTRIM/SEPTRA   TAKE ONE TABLET BY MOUTH EVERY DAY

## 2017-02-28 LAB
BACTERIA SPEC CULT: NORMAL
MICRO REPORT STATUS: NORMAL
SPECIMEN SOURCE: NORMAL

## 2017-02-28 NOTE — PLAN OF CARE
Problem: Individualization  Goal: Patient Preferences  Pt deemed ready for discharge. PIV and tele removed. Pt was educated on new prescription of Norvasc, pt understood indications and use of new med. Pt picked up new prescription in the discharge pharmacy. Pt safely discharged to home accompanied by daughter.

## 2017-03-06 ENCOUNTER — HOSPITAL ENCOUNTER (EMERGENCY)
Facility: CLINIC | Age: 52
Discharge: HOME | End: 2017-03-06
Attending: EMERGENCY MEDICINE | Admitting: EMERGENCY MEDICINE
Payer: MEDICARE

## 2017-03-06 ENCOUNTER — APPOINTMENT (OUTPATIENT)
Dept: GENERAL RADIOLOGY | Facility: CLINIC | Age: 52
End: 2017-03-06
Attending: EMERGENCY MEDICINE
Payer: MEDICARE

## 2017-03-06 ENCOUNTER — APPOINTMENT (OUTPATIENT)
Dept: ULTRASOUND IMAGING | Facility: CLINIC | Age: 52
End: 2017-03-06
Attending: EMERGENCY MEDICINE
Payer: MEDICARE

## 2017-03-06 VITALS
HEART RATE: 79 BPM | SYSTOLIC BLOOD PRESSURE: 175 MMHG | HEIGHT: 57 IN | DIASTOLIC BLOOD PRESSURE: 87 MMHG | WEIGHT: 147.7 LBS | BODY MASS INDEX: 31.87 KG/M2 | OXYGEN SATURATION: 97 % | RESPIRATION RATE: 18 BRPM | TEMPERATURE: 99 F

## 2017-03-06 DIAGNOSIS — R39.11 URINARY HESITANCY: ICD-10-CM

## 2017-03-06 DIAGNOSIS — R05.9 COUGH: ICD-10-CM

## 2017-03-06 LAB
ALBUMIN SERPL-MCNC: 3.3 G/DL (ref 3.4–5)
ALBUMIN UR-MCNC: 10 MG/DL
ALP SERPL-CCNC: 106 U/L (ref 40–150)
ALT SERPL W P-5'-P-CCNC: 13 U/L (ref 0–50)
ANION GAP SERPL CALCULATED.3IONS-SCNC: 9 MMOL/L (ref 3–14)
APPEARANCE UR: CLEAR
AST SERPL W P-5'-P-CCNC: 18 U/L (ref 0–45)
BASOPHILS # BLD AUTO: 0 10E9/L (ref 0–0.2)
BASOPHILS NFR BLD AUTO: 0.2 %
BILIRUB DIRECT SERPL-MCNC: 0.3 MG/DL (ref 0–0.2)
BILIRUB SERPL-MCNC: 0.6 MG/DL (ref 0.2–1.3)
BILIRUB UR QL STRIP: NEGATIVE
BUN SERPL-MCNC: 13 MG/DL (ref 7–30)
CALCIUM SERPL-MCNC: 9.9 MG/DL (ref 8.5–10.1)
CHLORIDE SERPL-SCNC: 105 MMOL/L (ref 94–109)
CO2 SERPL-SCNC: 22 MMOL/L (ref 20–32)
COLOR UR AUTO: ABNORMAL
CREAT SERPL-MCNC: 1.55 MG/DL (ref 0.52–1.04)
DEPRECATED S PYO AG THROAT QL EIA: NORMAL
DIFFERENTIAL METHOD BLD: ABNORMAL
EOSINOPHIL # BLD AUTO: 0.2 10E9/L (ref 0–0.7)
EOSINOPHIL NFR BLD AUTO: 2.3 %
ERYTHROCYTE [DISTWIDTH] IN BLOOD BY AUTOMATED COUNT: 12.9 % (ref 10–15)
FLUAV+FLUBV AG SPEC QL: NEGATIVE
FLUAV+FLUBV AG SPEC QL: NORMAL
GFR SERPL CREATININE-BSD FRML MDRD: 35 ML/MIN/1.7M2
GLUCOSE SERPL-MCNC: 80 MG/DL (ref 70–99)
GLUCOSE UR STRIP-MCNC: NEGATIVE MG/DL
HCT VFR BLD AUTO: 34.4 % (ref 35–47)
HETEROPH AB SER QL: NEGATIVE
HGB BLD-MCNC: 11.2 G/DL (ref 11.7–15.7)
HGB UR QL STRIP: NEGATIVE
IMM GRANULOCYTES # BLD: 0.1 10E9/L (ref 0–0.4)
IMM GRANULOCYTES NFR BLD: 0.7 %
INR PPP: 1.06 (ref 0.86–1.14)
KETONES UR STRIP-MCNC: NEGATIVE MG/DL
LACTATE BLD-SCNC: 0.9 MMOL/L (ref 0.7–2.1)
LEUKOCYTE ESTERASE UR QL STRIP: NEGATIVE
LYMPHOCYTES # BLD AUTO: 1.4 10E9/L (ref 0.8–5.3)
LYMPHOCYTES NFR BLD AUTO: 17 %
MCH RBC QN AUTO: 28.2 PG (ref 26.5–33)
MCHC RBC AUTO-ENTMCNC: 32.6 G/DL (ref 31.5–36.5)
MCV RBC AUTO: 87 FL (ref 78–100)
MICRO REPORT STATUS: NORMAL
MONOCYTES # BLD AUTO: 0.9 10E9/L (ref 0–1.3)
MONOCYTES NFR BLD AUTO: 11.1 %
NEUTROPHILS # BLD AUTO: 5.7 10E9/L (ref 1.6–8.3)
NEUTROPHILS NFR BLD AUTO: 68.7 %
NITRATE UR QL: NEGATIVE
NRBC # BLD AUTO: 0 10*3/UL
NRBC BLD AUTO-RTO: 0 /100
PH UR STRIP: 5.5 PH (ref 5–7)
PLATELET # BLD AUTO: 234 10E9/L (ref 150–450)
POTASSIUM SERPL-SCNC: 4.4 MMOL/L (ref 3.4–5.3)
PROT SERPL-MCNC: 8 G/DL (ref 6.8–8.8)
RBC # BLD AUTO: 3.97 10E12/L (ref 3.8–5.2)
RBC #/AREA URNS AUTO: 0 /HPF (ref 0–2)
SODIUM SERPL-SCNC: 136 MMOL/L (ref 133–144)
SP GR UR STRIP: 1 (ref 1–1.03)
SPECIMEN SOURCE: NORMAL
SPECIMEN SOURCE: NORMAL
SQUAMOUS #/AREA URNS AUTO: <1 /HPF (ref 0–1)
URN SPEC COLLECT METH UR: ABNORMAL
UROBILINOGEN UR STRIP-MCNC: NORMAL MG/DL (ref 0–2)
WBC # BLD AUTO: 8.3 10E9/L (ref 4–11)
WBC #/AREA URNS AUTO: <1 /HPF (ref 0–2)

## 2017-03-06 PROCEDURE — 81001 URINALYSIS AUTO W/SCOPE: CPT | Performed by: EMERGENCY MEDICINE

## 2017-03-06 PROCEDURE — 80076 HEPATIC FUNCTION PANEL: CPT | Performed by: EMERGENCY MEDICINE

## 2017-03-06 PROCEDURE — 87081 CULTURE SCREEN ONLY: CPT | Performed by: EMERGENCY MEDICINE

## 2017-03-06 PROCEDURE — 86308 HETEROPHILE ANTIBODY SCREEN: CPT | Performed by: EMERGENCY MEDICINE

## 2017-03-06 PROCEDURE — 87804 INFLUENZA ASSAY W/OPTIC: CPT | Mod: 91 | Performed by: EMERGENCY MEDICINE

## 2017-03-06 PROCEDURE — 76776 US EXAM K TRANSPL W/DOPPLER: CPT

## 2017-03-06 PROCEDURE — 87880 STREP A ASSAY W/OPTIC: CPT | Performed by: EMERGENCY MEDICINE

## 2017-03-06 PROCEDURE — 99284 EMERGENCY DEPT VISIT MOD MDM: CPT

## 2017-03-06 PROCEDURE — 85025 COMPLETE CBC W/AUTO DIFF WBC: CPT | Performed by: EMERGENCY MEDICINE

## 2017-03-06 PROCEDURE — 80048 BASIC METABOLIC PNL TOTAL CA: CPT | Performed by: EMERGENCY MEDICINE

## 2017-03-06 PROCEDURE — 99284 EMERGENCY DEPT VISIT MOD MDM: CPT | Mod: Z6 | Performed by: EMERGENCY MEDICINE

## 2017-03-06 PROCEDURE — 71020 XR CHEST 2 VW: CPT

## 2017-03-06 PROCEDURE — 85610 PROTHROMBIN TIME: CPT | Performed by: EMERGENCY MEDICINE

## 2017-03-06 PROCEDURE — 83605 ASSAY OF LACTIC ACID: CPT | Performed by: EMERGENCY MEDICINE

## 2017-03-06 ASSESSMENT — ENCOUNTER SYMPTOMS
NUMBNESS: 0
DIARRHEA: 0
SHORTNESS OF BREATH: 0
DIZZINESS: 0
HEADACHES: 0
WEAKNESS: 0
VOMITING: 0
DYSPHORIC MOOD: 0
VOICE CHANGE: 0
DIFFICULTY URINATING: 1
BACK PAIN: 0
PALPITATIONS: 0
POLYDIPSIA: 0
EYE PAIN: 0
FREQUENCY: 0
NERVOUS/ANXIOUS: 0
DIAPHORESIS: 0
HEMATURIA: 0
COLOR CHANGE: 0
SORE THROAT: 1
BRUISES/BLEEDS EASILY: 0
LIGHT-HEADEDNESS: 0
DYSURIA: 0
BLOOD IN STOOL: 0
ADENOPATHY: 0
CONSTIPATION: 0
ABDOMINAL PAIN: 0
CHILLS: 1
NAUSEA: 0
NECK PAIN: 0

## 2017-03-06 NOTE — ED AVS SNAPSHOT
[facility], [place], Emergency Department    [address] [address]    Phone:  [phone]                                       Joshua Lala   MRN: [id number]    Department:  [facility], Emergency Department   Date of Visit:  3/6/2017           After Visit Summary Signature Page     I have received my discharge instructions, and my questions have been answered. I have discussed any challenges I see with this plan with the nurse or doctor.    ..........................................................................................................................................  Patient/Patient Representative Signature      ..........................................................................................................................................  Patient Representative Print Name and Relationship to Patient    ..................................................               ................................................  Date                                            Time    ..........................................................................................................................................  Reviewed by Signature/Title    ...................................................              ..............................................  Date                                                            Time

## 2017-03-06 NOTE — ED PROVIDER NOTES
"  History     Chief Complaint   Patient presents with     Dysuria     Pt reports decreased urine output since seen last week for cough. Productive cough continues. hx kidney tx.     Cough     The history is provided by the patient. The history is limited by a language barrier.  used: Refused formal interpetation. Patient's daughter interpreted for the patient (Dianaong). lLater when daughter was away, patient did agree for us to use the iPad , which was utilized.     Joshua Lala is a 51 year old female with a history of Henoch-Schonlein purpura, hypercalcemia, hypomagnesemia, tertiary hyperparathyroidism, s/p cholecystectomy, and renal disease s/p kidney transplant (2007) who presents for evaluation of cough and decreased urine output.     For about the past week, patient complains of difficulty urinating described as having the urge to urinate, or at least feeling like she should given what she believes would be an appropriate amount for her amount of fluid consumed, but when she sits down to pee, thinking it should be time to urinate, she passes urine that is normal in appearance, just lower in volume than what she would expect. She denies dysuria or hematuria. She also complains of chills (feels like that a lot normally too apparently), and some feeling warmer (but no measured fevers), as well as a persistent intermittent cough, having had a recent respiratory infection (reports has been improving). Patient also notes a mild sore throat, some mild discomfort with swallowing and some throat \"itchiness\" secondary to her cough. No difficulty breathing. No drooling, handling own secretions well. Does not feel like it's hard to breathe. She denies abdominal pain, nausea, vomiting, or diarrhea. No rash or skin changes.     No other symptoms or complaints at this time. Please see ROS for further details.     I have reviewed the Medications, Allergies, Past Medical and Surgical History, and " Social History in the Epic system.  Past Medical History   Diagnosis Date     Dehydration      Henoch-Schonlein purpura (H)      Hypercalcemia      Hypomagnesaemia      Renal disease      Tertiary hyperparathyroidism (H)        Past Surgical History   Procedure Laterality Date     Renal transplant  1998, 2007     Cholecystectomy       Transplant       Kidney 2008       No family history on file.    Social History   Substance Use Topics     Smoking status: Never Smoker     Smokeless tobacco: Never Used     Alcohol use No     No current facility-administered medications for this encounter.      Current Outpatient Prescriptions   Medication     amLODIPine (NORVASC) 2.5 MG tablet     Diphenhyd-HC-Nystatin-Tetracyc (FIRST-HARINDER MOUTHWASH) SUSP     SENSIPAR 30 MG tablet     NEORAL 25 MG PO CAPSULE     CELLCEPT 250 MG PO CAPSULE     sulfamethoxazole-trimethoprim (BACTRIM,SEPTRA) 400-80 MG per tablet        Allergies   Allergen Reactions     Contrast Dye Shortness Of Breath       Review of Systems   Constitutional: Positive for chills. Negative for diaphoresis. Fever: no measured fevers.   HENT: Positive for sore throat. Negative for ear pain, tinnitus, trouble swallowing (discomfort with swallowing with sore throat, but able to so easily, handling own secretions w/o deficit) and voice change.    Eyes: Negative for pain and visual disturbance.   Respiratory: Positive for cough (no hemoptysis). Negative for shortness of breath.    Cardiovascular: Negative for chest pain, palpitations and leg swelling.   Gastrointestinal: Negative for abdominal pain, blood in stool, constipation, diarrhea, nausea and vomiting.   Endocrine: Negative for polydipsia and polyuria.   Genitourinary: Positive for difficulty urinating and urgency. Negative for dysuria, frequency and hematuria.   Musculoskeletal: Negative for back pain and neck pain.   Skin: Negative for color change and rash.   Allergic/Immunologic: Positive for immunocompromised  "state (renal tx patient, immunosuppresed. ).   Neurological: Negative for dizziness, weakness, light-headedness, numbness and headaches.   Hematological: Negative for adenopathy. Does not bruise/bleed easily.   Psychiatric/Behavioral: Negative for dysphoric mood. The patient is not nervous/anxious.        Physical Exam   BP: 165/67  Pulse: 79  Temp: 99  F (37.2  C)  Resp: 24  Height: 144.8 cm (4' 9\")  Weight: 67 kg (147 lb 11.2 oz)  SpO2: 96 %  Physical Exam  CONSTITUTIONAL: Well-developed and well-nourished. Awake and alert. Non-toxic appearance. No acute distress.   HENT:   - Head: Normocephalic and atraumatic.   - Ears: Hearing and external ear grossly normal.   - Nose: Nose normal. No rhinorrhea. No epistaxis.   - Mouth/Throat: Oropharynx is clear and moist and mucous membranes are normal. No exudates, no drooling, no tongue elevation, no trismus.   EYES: Conjunctivae and lids are normal. No scleral icterus.   NECK: Normal range of motion and phonation normal. Neck supple. No tracheal deviation, no stridor. No edema or erythema noted.  CARDIOVASCULAR: Normal rate, regular rhythm and loud heart murmur w/o rub  PULMONARY/CHEST: Effort normal/normal work of breathing. No accessory muscle usage or stridor. No respiratory distress. RR normal. Normal SpO2. Very faint basilar expiratory wheezes.  ABDOMEN: Soft, non-distended. No tenderness. No peritoneal findings/no rigidity, rebound or guarding.   MUSCULOSKELETAL: Extremities warm and seemingly well perfused. No edema or calf tenderness.  NEUROLOGIC: Awake, alert. Not disoriented. She displays no atrophy and no tremor. Normal tone. No seizure activity. Coordination normal. GCS 15  SKIN: Skin is warm and dry. No rash noted. No diaphoresis. No pallor.   PSYCHIATRIC: Normal mood and affect. Speech and behavior normal. Thought processes linear. Cognition and memory are normal.     ED Course   4:07 PM  The patient was seen and examined by Dr. Feldman in Room 10.     ED " Course   Value Comment By Time   Hemoglobin: (!) 11.2 Near baseline Karen Feldman MD 03/06 1620    Urine negative for UTI or blood. No ketones. Karen Feldman MD 03/06 1621   Creatinine: (!) 1.55 Slightly improved from previous. Karen Feldman MD 03/06 1704     Procedures       Labs Ordered and Resulted from Time of ED Arrival Up to the Time of Departure from the ED   ROUTINE UA WITH MICROSCOPIC REFLEX TO CULTURE - Abnormal; Notable for the following:        Result Value    Protein Albumin Urine 10 (*)     All other components within normal limits   CBC WITH PLATELETS DIFFERENTIAL - Abnormal; Notable for the following:     Hemoglobin 11.2 (*)     Hematocrit 34.4 (*)     All other components within normal limits   BASIC METABOLIC PANEL   LACTIC ACID WHOLE BLOOD   HEPATIC PANEL   INR   INFLUENZA A/B ANTIGEN       Assessments & Plan (with Medical Decision Making)   IMPRESSION: 50 yo F, who underwent kidney tx 7 years ago, with PMH notable for previous HSP, other electrolyte abnormalities, etc. Who presents today with ongoing but improving cough, and some urinary hesitancy where she feels like she would need to pee, but then doesn't urinate very much, as described further in the HPI/ROS. Clinically looks well. Has a murmur but reportedly not new, lungs are clear, normal work of breathing, RR and SpO2 on RA. Abdomen benign.    Unclear cause of symptoms at this point. Pt reports being in process of recovering from respiratory infections and does not appear significantly symptomatic or to have any notable clinically worrisome effects from such. Will evaluate with new CXR, viral swabs and such, urine studies, and renal US of transplanted kidney to ensure appropriate function given urine symptoms.     PLAN: Labs, urine studies, CXR, transplanted renal US    RESULTS:  See ED Course section above for particular pertinent findings and comments  - Labs: Negative flu, mono and strep  - Urine: No UTI, no blood, no  ketones  - Imaging: Images and written preliminary reports reviewed by myself and revealed:  --- US: Elevated RI of arcuate arteries (discussed w/ Transplant)  --- CXR: Prelim written report denies any acute changes from previous.     RE-EVALUATION:  See ED Course section above for particular pertinent findings and comments  - The patient's symptoms were stable during ED stay. No O2 requirement, no other abnormal findings. Continues to look well.  - Patient observed for multiple hours with multiple repeat exams and remains stable.  - Patient further clarifies that its not that she's not urinating, just that she would expect to urinate more based upon the amount of fluids she drank.     DISCUSSIONS:  - I discussed the care of the patient with Transplant. They are not immediately concerned about her US results given her baseline/slightly improved renal function on labs, and think can be D/C'd to have f/u with usual providers as outpatient.   - I have reviewed the findings, plan, strict return instructions and need for follow up with the patient through use of iPad . She expressed understanding and agreement with this plan. All questions answered to the best of our ability at this time.  DISPOSITION/PLANNING:  - FINAL IMPRESSION: Ongoing cough, urinary hesitancy  - DISPOSITION: D/C to home  --- Follow-up: with PCP, Transplant and usual providers   --- Recommendations: Conservative symptom management, strict return instructions.       ______________________________________________________________________________    - I have reviewed the available nursing notes.    New Prescriptions    No medications on file       Final diagnoses:   None   I, Annette Alex, am serving as a trained medical scribe to document services personally performed by Karen Feldman MD, based on the provider's statements to me.   I, Karen Feldman MD, was physically present and have reviewed and verified the accuracy of this note  documented by Annette Alex.      3/6/2017   [facility], [place], EMERGENCY DEPARTMENT     Karen Feldman MD  03/08/17 5800

## 2017-03-06 NOTE — ED NOTES
Pt reports decreased urine output since seen last week for cough. Productive cough continues. hx kidney tx.

## 2017-03-06 NOTE — ED AVS SNAPSHOT
[facility], Emergency Department    [address] [address]    Phone:  [phone]                                       Joshua Lala   MRN: [id number]    Department:  [facility], Emergency Department   Date of Visit:  3/6/2017           Patient Information     Date Of Birth          1965        Your diagnoses for this visit were:     Urinary hesitancy        You were seen by Karen Feldman MD.        Discharge Instructions       TODAY'S VISIT:  You were seen today for ongoing cough as well as urinary concerns.  - Your laboratory studies and urine studies today were grossly reassuring.  - He did have a mild abnormality on your renal ultrasound but we discussed this with the Transplant Surgery team and based upon this I think it would still be safe free to be discharged and follow-up as an outpatient and did not need any further emergent evaluation or management here today but that you should follow-up regularly with you usual providers.    FOLLOW-UP:  Please make an appointment to follow up with Your Primary Care Provider and Transplant teams/Coordinator.    PRESCRIPTIONS / MEDICATIONS:  - No new changes recommended.     OTHER INSTRUCTIONS:  - Stay hydrated.   - Watch closely for any new/worsening symptoms.     RETURN TO THE EMERGENCY DEPARTMENT  Return to the Emergency Department at any time for new/worsening symptoms.           Future Appointments        Provider Department Dept Phone Center    4/28/2017 8:30 AM Lab [facility] Lab [phone] [facility]    4/28/2017 9:40 AM Dipak Meehan MD [facility] Nephrology [phone] [facility]      24 Hour Appointment Hotline       To make an appointment at any [facility], call [phone] ([phone]). If you don't have a family doctor or clinic, we will help you find one. [facility] are conveniently located to serve the needs of you and your family.             Review of your medicines      Our records show that you are taking the  medicines listed below. If these are incorrect, please call your family doctor or clinic.        Dose / Directions Last dose taken    amLODIPine 2.5 MG tablet   Commonly known as:  NORVASC   Dose:  2.5 mg   Quantity:  30 tablet        Take 1 tablet (2.5 mg) by mouth daily   Refills:  0        cycloSPORINE modified capsule   Dose:  50 mg   Quantity:  120 capsule        Take 2 capsules (50 mg) by mouth 2 times daily   Refills:  11        FIRST-HARINDER MOUTHWASH Susp   Dose:  5-10 mL   Quantity:  237 mL        Swish and swallow 5-10 mLs in mouth every 6 hours as needed   Refills:  1        mycophenolate capsule   Dose:  1000 mg   Quantity:  240 capsule        Take 4 capsules (1,000 mg) by mouth 2 times daily   Refills:  6        SENSIPAR 30 MG tablet   Quantity:  30 tablet   Generic drug:  cinacalcet        TAKE ONE-HALF TABLET BY MOUTH EVERY OTHER DAY   Refills:  4        sulfamethoxazole-trimethoprim 400-80 MG per tablet   Commonly known as:  BACTRIM/SEPTRA   Quantity:  30 tablet        TAKE ONE TABLET BY MOUTH EVERY DAY   Refills:  11                Procedures and tests performed during your visit     Basic metabolic panel    Beta strep group A culture    CBC with platelets differential    Hepatic panel    INR    Influenza A/B antigen    Lactic acid whole blood    Mononucleosis screen    Rapid strep screen    UA with Microscopic reflex to Culture    US Renal Transplant    XR Chest 2 Views      Orders Needing Specimen Collection     None      Pending Results     Date and Time Order Name Status Description    3/6/2017 1639 Beta strep group A culture In process     3/6/2017 1549 XR Chest 2 Views Preliminary             Pending Culture Results     Date and Time Order Name Status Description    3/6/2017 1639 Beta strep group A culture In process             Thank you for choosing [facility]       Thank you for choosing [facility] for your care. Our goal is always to provide you with excellent care. Hearing back from our  "patients is one way we can continue to improve our services. Please take a few minutes to complete the written survey that you may receive in the mail after you visit with us. Thank you!        [employer] Information     [employer] lets you send messages to your doctor, view your test results, renew your prescriptions, schedule appointments and more. To sign up, go to [email or web address] . Click on \"Log in\" on the left side of the screen, which will take you to the Welcome page. Then click on \"Sign up Now\" on the right side of the page.     You will be asked to enter the access code listed below, as well as some personal information. Please follow the directions to create your username and password.     Your access code is: [email or web address]  Expires: 2017  2:56 PM     Your access code will  in 90 days. If you need help or a new code, please call your [place] clinic or [phone].        Care EveryWhere ID     This is your Care EveryWhere ID. This could be used by other organizations to access your [facility] medical records  [id number]        After Visit Summary       This is your record. Keep this with you and show to your community pharmacist(s) and doctor(s) at your next visit.                  "

## 2017-03-07 NOTE — DISCHARGE INSTRUCTIONS
TODAY'S VISIT:  You were seen today for ongoing cough as well as urinary concerns.  - Your laboratory studies and urine studies today were grossly reassuring.  - He did have a mild abnormality on your renal ultrasound but we discussed this with the Transplant Surgery team and based upon this I think it would still be safe free to be discharged and follow-up as an outpatient and did not need any further emergent evaluation or management here today but that you should follow-up regularly with you usual providers.    FOLLOW-UP:  Please make an appointment to follow up with Your Primary Care Provider and Transplant teams/Coordinator.    PRESCRIPTIONS / MEDICATIONS:  - No new changes recommended.     OTHER INSTRUCTIONS:  - Stay hydrated.   - Watch closely for any new/worsening symptoms.     RETURN TO THE EMERGENCY DEPARTMENT  Return to the Emergency Department at any time for new/worsening symptoms.

## 2017-03-08 LAB
BACTERIA SPEC CULT: NORMAL
MICRO REPORT STATUS: NORMAL
SPECIMEN SOURCE: NORMAL

## 2017-03-08 ASSESSMENT — ENCOUNTER SYMPTOMS
COUGH: 1
TROUBLE SWALLOWING: 0

## 2017-03-17 DIAGNOSIS — Z94.0 S/P KIDNEY TRANSPLANT: Primary | ICD-10-CM

## 2017-03-22 ENCOUNTER — OFFICE VISIT (OUTPATIENT)
Dept: NEPHROLOGY | Facility: CLINIC | Age: 52
End: 2017-03-22
Payer: MEDICARE

## 2017-03-22 VITALS
HEART RATE: 67 BPM | WEIGHT: 148.2 LBS | SYSTOLIC BLOOD PRESSURE: 172 MMHG | OXYGEN SATURATION: 99 % | TEMPERATURE: 98.7 F | DIASTOLIC BLOOD PRESSURE: 72 MMHG | BODY MASS INDEX: 31.97 KG/M2 | HEIGHT: 57 IN

## 2017-03-22 DIAGNOSIS — N25.81 SECONDARY RENAL HYPERPARATHYROIDISM (H): ICD-10-CM

## 2017-03-22 DIAGNOSIS — N18.30 CKD (CHRONIC KIDNEY DISEASE) STAGE 3, GFR 30-59 ML/MIN (H): Primary | ICD-10-CM

## 2017-03-22 PROCEDURE — 99213 OFFICE O/P EST LOW 20 MIN: CPT | Mod: ZF

## 2017-03-22 ASSESSMENT — PAIN SCALES - GENERAL: PAINLEVEL: SEVERE PAIN (6)

## 2017-03-22 NOTE — MR AVS SNAPSHOT
"              After Visit Summary   3/22/2017    Joshua Lala    MRN: [id number]           Patient Information     Date Of Birth          1965        Visit Information        Provider Department      3/22/2017 12:30 PM Abby Nunez NP; [employer] [employer] [facility] Nephrology        Today's Diagnoses     CKD (chronic kidney disease) stage 3, GFR 30-59 ml/min    -  1    Secondary renal hyperparathyroidism (H)           Follow-ups after your visit        Your next 10 appointments already scheduled     Apr 28, 2017  8:30 AM CDT   Lab with  LAB   [facility] ([facility])    [address] [place] [address] [phone]            Apr 28, 2017  9:40 AM CDT   (Arrive by 9:10 AM)   Return Kidney Transplant with Dipak Meehan MD   [facility]phrology ([facility])    [address] [place] [address] [phone]              Who to contact     If you have questions or need follow up information about today's clinic visit or your schedule please contact [facility] NEPHROLOGY directly at [phone].  Normal or non-critical lab and imaging results will be communicated to you by [employer], letter or phone within 4 business days after the clinic has received the results. If you do not hear from us within 7 days, please contact the clinic through [employer] or phone. If you have a critical or abnormal lab result, we will notify you by phone as soon as possible.  Submit refill requests through [employer] or call your pharmacy and they will forward the refill request to us. Please allow 3 business days for your refill to be completed.          Additional Information About Your Visit        MyChart Information     [employer] lets you send messages to your doctor, view your test results, renew your prescriptions, schedule appointments and more. To sign up, go to [email or web address] . Click on \"Log in\" on the left " "side of the screen, which will take you to the Welcome page. Then click on \"Sign up Now\" on the right side of the page.     You will be asked to enter the access code listed below, as well as some personal information. Please follow the directions to create your username and password.     Your access code is: [email or web address]  Expires: 2017  3:56 PM     Your access code will  in 90 days. If you need help or a new code, please call your [place] clinic or [phone].        Care EveryWhere ID     This is your Care EveryWhere ID. This could be used by other organizations to access your [place] medical records  [id number]        Your Vitals Were     Pulse Temperature Height Pulse Oximetry BMI (Body Mass Index)       67 98.7  F (37.1  C) (Oral) 1.448 m (4' 9\") 99% 32.07 kg/m2        Blood Pressure from Last 3 Encounters:   17 172/72   17 175/87   17 155/80    Weight from Last 3 Encounters:   17 67.2 kg (148 lb 3.2 oz)   17 67 kg (147 lb 11.2 oz)   17 66.3 kg (146 lb 2 oz)              Today, you had the following     No orders found for display       Primary Care Provider Office Phone # Fax #    Phua Heather [phone]       [facility] [address] [address]        Thank you!     Thank you for choosing [facility] NEPHROLOGY  for your care. Our goal is always to provide you with excellent care. Hearing back from our patients is one way we can continue to improve our services. Please take a few minutes to complete the written survey that you may receive in the mail after your visit with us. Thank you!             Your Updated Medication List - Protect others around you: Learn how to safely use, store and throw away your medicines at www.disposemymeds.org.          This list is accurate as of: 3/22/17  2:09 PM.  Always use your most recent med list.                   Brand Name Dispense Instructions for use    amLODIPine 2.5 MG tablet    " NORVASC    30 tablet    Take 1 tablet (2.5 mg) by mouth daily       cycloSPORINE modified capsule     120 capsule    Take 2 capsules (50 mg) by mouth 2 times daily       FIRST-HARINDER MOUTHWASH Susp     237 mL    Swish and swallow 5-10 mLs in mouth every 6 hours as needed       mycophenolate capsule     240 capsule    Take 4 capsules (1,000 mg) by mouth 2 times daily       SENSIPAR 30 MG tablet   Generic drug:  cinacalcet     30 tablet    TAKE ONE-HALF TABLET BY MOUTH EVERY OTHER DAY       sulfamethoxazole-trimethoprim 400-80 MG per tablet    BACTRIM/SEPTRA    30 tablet    TAKE ONE TABLET BY MOUTH EVERY DAY

## 2017-03-22 NOTE — PROGRESS NOTES
Nephrology clinic visit 3/22/17    Assessment and Plan:   1. CKD, stage 3- Renal function is stable. Creat 1.5, slightly higher than Dr Meehan's visit in Oct. 2016. Was 1.3 at that visit    2. Henoch Scholein Purpura s/p DDKT 2007 - IS regimen Cellcept, Gengraf. Most recent Gengraf level was 126 in Feb which is stable.     3. White coat HTN - Blood pressures in clinic today 160-170/. Not on antihypertensives. Monitors blood pressures at home and notes they are generally in the 130-140/ range.   - Recommend that she bring in her b/p machine next visit to check against ours.    - May need ambulatory blood pressure monitoring     4. Hyperparathyroidism - Remains on qod Cinacelet    5. Recent URI - Has been to ED x 3 since January for c/o cough, ST, dyspnea. All symptoms have resolved. Influenza was neg. No assoc edema.     6. Depression/anxiety - Pt notes symptoms of poor appetite, disrupted sleep, low motivation, anxiety and past h/o depression. She has been prescribed antidepressants in the past but did not use regularly. She was just seen by her PCP in January and did not mention these symptoms. Discussed benefits of treating depression.     7. Disposition - Has previously scheduled appt with Dr Meehan end of April.     Assessment and plan was discussed with patient and she voiced her understanding and agreement.    Reason for Visit:  Joshua Lala is a 51 year old female presents today for CKD f/u    HPI:  Ms Lala is a 50 yo female with h/o HSP, s/p DDKT 2007 with CKD stage 3. Last seen by Dr Meehan in October. Has been seen in ED in Jan, Feb and March for c/o cough, dyspnea, ST. Denies edema. Patient presents with interpretor. No labs obtained prior to this visit.     Home BP: 130-140/    ROS:  Patient notes decreased cough, dyspnea. No further ST. Denies CP, abdominal pain, but has poor appetite, disrupted sleep, low energy, anxiety. Denies bowel concerns. No voiding concerns. No edema    Active Medical  "Problems:    Henoch Schonlein Purpura  DDKT 2007  CKD3  Secondary Hyperparathyroidism  Depression  Language barrier    Personal Hx:   Social History     Social History     Marital status:      Spouse name: N/A     Number of children: N/A     Years of education: N/A     Occupational History     Not on file.     Social History Main Topics     Smoking status: Never Smoker     Smokeless tobacco: Never Used     Alcohol use No     Drug use: No     Sexual activity: Not on file      Comment: ERINN- need      Other Topics Concern     Not on file     Social History Narrative       Allergies:  Allergies   Allergen Reactions     Contrast Dye Shortness Of Breath       Medications:  Prior to Admission medications    Medication Sig Start Date End Date Taking? Authorizing Provider   amLODIPine (NORVASC) 2.5 MG tablet Take 1 tablet (2.5 mg) by mouth daily 2/27/17  Yes Esther Jordan MD   Diphenhyd-HC-Nystatin-Tetracyc (FIRST-HARINDER MOUTHWASH) SUSP Swish and swallow 5-10 mLs in mouth every 6 hours as needed 2/26/17  Yes Krysten Jackson MD   SENSIPAR 30 MG tablet TAKE ONE-HALF TABLET BY MOUTH EVERY OTHER DAY 2/8/17  Yes Dipak Meehan MD   NEORAL 25 MG PO CAPSULE Take 2 capsules (50 mg) by mouth 2 times daily 1/4/17  Yes Dipak Meehan MD   CELLCEPT 250 MG PO CAPSULE Take 4 capsules (1,000 mg) by mouth 2 times daily 10/7/16  Yes Dipak Meehan MD   sulfamethoxazole-trimethoprim (BACTRIM,SEPTRA) 400-80 MG per tablet TAKE ONE TABLET BY MOUTH EVERY DAY 4/7/16  Yes Dipak Meehan MD       Vitals:  /72  Pulse 67  Temp 98.7  F (37.1  C) (Oral)  Ht 1.448 m (4' 9\")  Wt 67.2 kg (148 lb 3.2 oz)  SpO2 99%  BMI 32.07 kg/m2    Exam:  General: WDWN female in NAD. Daughter,interpretor present  CARDIAC: RRR  LUNGS: CTA.   ABDOMEN: Soft, NT  EXT: No edema      Results:  Results for ALIN ARCHIBALD (MRN [id number]) as of 3/22/2017 13:53   Ref. Range 3/6/2017 15:44   Sodium Latest Ref Range: 133 - 144 mmol/L 136   Potassium " Latest Ref Range: 3.4 - 5.3 mmol/L 4.4   Chloride Latest Ref Range: 94 - 109 mmol/L 105   Carbon Dioxide Latest Ref Range: 20 - 32 mmol/L 22   Urea Nitrogen Latest Ref Range: 7 - 30 mg/dL 13   Creatinine Latest Ref Range: 0.52 - 1.04 mg/dL 1.55 (H)   GFR Estimate Latest Ref Range: >60 mL/min/1.7m2 35 (L)   GFR Estimate If Black Latest Ref Range: >60 mL/min/1.7m2 43 (L)   Calcium Latest Ref Range: 8.5 - 10.1 mg/dL 9.9   Anion Gap Latest Ref Range: 3 - 14 mmol/L 9   Albumin Latest Ref Range: 3.4 - 5.0 g/dL 3.3 (L)   Protein Total Latest Ref Range: 6.8 - 8.8 g/dL 8.0   Bilirubin Total Latest Ref Range: 0.2 - 1.3 mg/dL 0.6   Alkaline Phosphatase Latest Ref Range: 40 - 150 U/L 106   ALT Latest Ref Range: 0 - 50 U/L 13   AST Latest Ref Range: 0 - 45 U/L 18   Bilirubin Direct Latest Ref Range: 0.0 - 0.2 mg/dL 0.3 (H)   Lactic Acid Latest Ref Range: 0.7 - 2.1 mmol/L 0.9

## 2017-03-22 NOTE — NURSING NOTE
"Chief Complaint   Patient presents with     RECHECK     Follow up CKD stage 3       Initial /72  Pulse 67  Temp 98.7  F (37.1  C) (Oral)  Ht 1.448 m (4' 9\")  Wt 67.2 kg (148 lb 3.2 oz)  SpO2 99%  BMI 32.07 kg/m2 Estimated body mass index is 32.07 kg/(m^2) as calculated from the following:    Height as of this encounter: 1.448 m (4' 9\").    Weight as of this encounter: 67.2 kg (148 lb 3.2 oz).  Medication Reconciliation: complete   Batool Dean. CMA    "

## 2017-03-22 NOTE — LETTER
3/22/2017      RE: Joshua Lala  [address]  [place] [address]       Nephrology clinic visit 3/22/17    Assessment and Plan:   1. CKD, stage 3- Renal function is stable. Creat 1.5, slightly higher than Dr Meehan's visit in Oct. 2016. Was 1.3 at that visit    2. Henoch Scholein Purpura s/p DDKT 2007 - IS regimen Cellcept, Gengraf. Most recent Gengraf level was 126 in Feb which is stable.     3. White coat HTN - Blood pressures in clinic today 160-170/. Not on antihypertensives. Monitors blood pressures at home and notes they are generally in the 130-140/ range.   - Recommend that she bring in her b/p machine next visit to check against ours.    - May need ambulatory blood pressure monitoring     4. Hyperparathyroidism - Remains on qod Cinacelet    5. Recent URI - Has been to ED x 3 since January for c/o cough, ST, dyspnea. All symptoms have resolved. Influenza was neg. No assoc edema.     6. Depression/anxiety - Pt notes symptoms of poor appetite, disrupted sleep, low motivation, anxiety and past h/o depression. She has been prescribed antidepressants in the past but did not use regularly. She was just seen by her PCP in January and did not mention these symptoms. Discussed benefits of treating depression.     7. Disposition - Has previously scheduled appt with Dr Meehan end of April.     Assessment and plan was discussed with patient and she voiced her understanding and agreement.    Reason for Visit:  Joshua Lala is a 51 year old female presents today for CKD f/u    HPI:  Ms Lala is a 50 yo female with h/o HSP, s/p DDKT 2007 with CKD stage 3. Last seen by Dr Meehan in October. Has been seen in ED in Jan, Feb and March for c/o cough, dyspnea, ST. Denies edema. Patient presents with interpretor. No labs obtained prior to this visit.     Home BP: 130-140/    ROS:  Patient notes decreased cough, dyspnea. No further ST. Denies CP, abdominal pain, but has poor appetite, disrupted sleep, low energy, anxiety. Denies  "bowel concerns. No voiding concerns. No edema    Active Medical Problems:    Henoch Schonlein Purpura  DDKT 2007  CKD3  Secondary Hyperparathyroidism  Depression  Language barrier    Personal Hx:   Social History     Social History     Marital status:      Spouse name: N/A     Number of children: N/A     Years of education: N/A     Occupational History     Not on file.     Social History Main Topics     Smoking status: Never Smoker     Smokeless tobacco: Never Used     Alcohol use No     Drug use: No     Sexual activity: Not on file      Comment: ERINN- need      Other Topics Concern     Not on file     Social History Narrative       Allergies:  Allergies   Allergen Reactions     Contrast Dye Shortness Of Breath       Medications:  Prior to Admission medications    Medication Sig Start Date End Date Taking? Authorizing Provider   amLODIPine (NORVASC) 2.5 MG tablet Take 1 tablet (2.5 mg) by mouth daily 2/27/17  Yes Esther Jordan MD   Diphenhyd-HC-Nystatin-Tetracyc (FIRST-HARINDER MOUTHWASH) SUSP Swish and swallow 5-10 mLs in mouth every 6 hours as needed 2/26/17  Yes Krysten Jackson MD   SENSIPAR 30 MG tablet TAKE ONE-HALF TABLET BY MOUTH EVERY OTHER DAY 2/8/17  Yes Dipak Meehan MD   NEORAL 25 MG PO CAPSULE Take 2 capsules (50 mg) by mouth 2 times daily 1/4/17  Yes Dipak Meehan MD   CELLCEPT 250 MG PO CAPSULE Take 4 capsules (1,000 mg) by mouth 2 times daily 10/7/16  Yes Dipak Meehan MD   sulfamethoxazole-trimethoprim (BACTRIM,SEPTRA) 400-80 MG per tablet TAKE ONE TABLET BY MOUTH EVERY DAY 4/7/16  Yes Dipak Meehan MD       Vitals:  /72  Pulse 67  Temp 98.7  F (37.1  C) (Oral)  Ht 1.448 m (4' 9\")  Wt 67.2 kg (148 lb 3.2 oz)  SpO2 99%  BMI 32.07 kg/m2    Exam:  General: WDWN female in NAD. Daughter,interpretor present  CARDIAC: RRR  LUNGS: CTA.   ABDOMEN: Soft, NT  EXT: No edema      Results:  Results for ALIN ARCHIBALD (MRN [id number]) as of 3/22/2017 13:53   Ref. Range 3/6/2017 " 15:44   Sodium Latest Ref Range: 133 - 144 mmol/L 136   Potassium Latest Ref Range: 3.4 - 5.3 mmol/L 4.4   Chloride Latest Ref Range: 94 - 109 mmol/L 105   Carbon Dioxide Latest Ref Range: 20 - 32 mmol/L 22   Urea Nitrogen Latest Ref Range: 7 - 30 mg/dL 13   Creatinine Latest Ref Range: 0.52 - 1.04 mg/dL 1.55 (H)   GFR Estimate Latest Ref Range: >60 mL/min/1.7m2 35 (L)   GFR Estimate If Black Latest Ref Range: >60 mL/min/1.7m2 43 (L)   Calcium Latest Ref Range: 8.5 - 10.1 mg/dL 9.9   Anion Gap Latest Ref Range: 3 - 14 mmol/L 9   Albumin Latest Ref Range: 3.4 - 5.0 g/dL 3.3 (L)   Protein Total Latest Ref Range: 6.8 - 8.8 g/dL 8.0   Bilirubin Total Latest Ref Range: 0.2 - 1.3 mg/dL 0.6   Alkaline Phosphatase Latest Ref Range: 40 - 150 U/L 106   ALT Latest Ref Range: 0 - 50 U/L 13   AST Latest Ref Range: 0 - 45 U/L 18   Bilirubin Direct Latest Ref Range: 0.0 - 0.2 mg/dL 0.3 (H)   Lactic Acid Latest Ref Range: 0.7 - 2.1 mmol/L 0.9       Abby Nunez, NP

## 2017-04-05 DIAGNOSIS — Z94.0 KIDNEY REPLACED BY TRANSPLANT: Primary | ICD-10-CM

## 2017-04-06 RX ORDER — SULFAMETHOXAZOLE AND TRIMETHOPRIM 400; 80 MG/1; MG/1
1 TABLET ORAL DAILY
Qty: 30 TABLET | Refills: 11 | Status: SHIPPED | OUTPATIENT
Start: 2017-04-06 | End: 2017-04-28

## 2017-04-28 ENCOUNTER — OFFICE VISIT (OUTPATIENT)
Dept: NEPHROLOGY | Facility: CLINIC | Age: 52
End: 2017-04-28
Attending: INTERNAL MEDICINE
Payer: MEDICARE

## 2017-04-28 VITALS
SYSTOLIC BLOOD PRESSURE: 159 MMHG | HEIGHT: 57 IN | DIASTOLIC BLOOD PRESSURE: 80 MMHG | HEART RATE: 65 BPM | TEMPERATURE: 97.9 F | BODY MASS INDEX: 32.12 KG/M2 | OXYGEN SATURATION: 98 % | WEIGHT: 148.9 LBS

## 2017-04-28 DIAGNOSIS — Z94.0 S/P KIDNEY TRANSPLANT: Primary | ICD-10-CM

## 2017-04-28 DIAGNOSIS — Z94.0 KIDNEY TRANSPLANTED: ICD-10-CM

## 2017-04-28 DIAGNOSIS — Z94.0 S/P KIDNEY TRANSPLANT: ICD-10-CM

## 2017-04-28 DIAGNOSIS — I10 ESSENTIAL HYPERTENSION: ICD-10-CM

## 2017-04-28 DIAGNOSIS — N18.30 CKD (CHRONIC KIDNEY DISEASE) STAGE 3, GFR 30-59 ML/MIN (H): ICD-10-CM

## 2017-04-28 LAB
ALBUMIN SERPL-MCNC: 3.5 G/DL (ref 3.4–5)
ANION GAP SERPL CALCULATED.3IONS-SCNC: 8 MMOL/L (ref 3–14)
BUN SERPL-MCNC: 14 MG/DL (ref 7–30)
CALCIUM SERPL-MCNC: 9.7 MG/DL (ref 8.5–10.1)
CHLORIDE SERPL-SCNC: 107 MMOL/L (ref 94–109)
CO2 SERPL-SCNC: 23 MMOL/L (ref 20–32)
CREAT SERPL-MCNC: 1.67 MG/DL (ref 0.52–1.04)
CREAT UR-MCNC: 27 MG/DL
ERYTHROCYTE [DISTWIDTH] IN BLOOD BY AUTOMATED COUNT: 13 % (ref 10–15)
GFR SERPL CREATININE-BSD FRML MDRD: 32 ML/MIN/1.7M2
GLUCOSE SERPL-MCNC: 89 MG/DL (ref 70–99)
HCT VFR BLD AUTO: 34.3 % (ref 35–47)
HGB BLD-MCNC: 10.7 G/DL (ref 11.7–15.7)
MCH RBC QN AUTO: 27.6 PG (ref 26.5–33)
MCHC RBC AUTO-ENTMCNC: 31.2 G/DL (ref 31.5–36.5)
MCV RBC AUTO: 89 FL (ref 78–100)
PHOSPHATE SERPL-MCNC: 2.8 MG/DL (ref 2.5–4.5)
PLATELET # BLD AUTO: 169 10E9/L (ref 150–450)
POTASSIUM SERPL-SCNC: 4.9 MMOL/L (ref 3.4–5.3)
PROT UR-MCNC: 0.49 G/L
PROT/CREAT 24H UR: 1.86 G/G CR (ref 0–0.2)
RBC # BLD AUTO: 3.87 10E12/L (ref 3.8–5.2)
SODIUM SERPL-SCNC: 138 MMOL/L (ref 133–144)
WBC # BLD AUTO: 3.2 10E9/L (ref 4–11)

## 2017-04-28 PROCEDURE — 84156 ASSAY OF PROTEIN URINE: CPT

## 2017-04-28 PROCEDURE — 85027 COMPLETE CBC AUTOMATED: CPT

## 2017-04-28 PROCEDURE — 36415 COLL VENOUS BLD VENIPUNCTURE: CPT

## 2017-04-28 PROCEDURE — 99213 OFFICE O/P EST LOW 20 MIN: CPT | Mod: ZF

## 2017-04-28 PROCEDURE — 80069 RENAL FUNCTION PANEL: CPT

## 2017-04-28 ASSESSMENT — PAIN SCALES - GENERAL: PAINLEVEL: MILD PAIN (2)

## 2017-04-28 NOTE — LETTER
4/28/2017       RE: Joshua Lala  [address]  [place] [address]     Dear Colleague,    Thank you for referring your patient, Joshua Lala, to the [facility] NEPHROLOGY at [facility]. Please see a copy of my visit note below.    Reason for Visit:  Joshua Lala is a 51 year old year old female with DDKT 2007 for HSP and CKD stage 3, who presents for f/u.    HPI:  Patient interviewed with  and younger daughter present. Was seen in ED 2 months ago with diarrhea which she attributed to change in Rx from Gengraf to another generic cyclosporine dispensed by pharmacy. Was reversed by coordinator. Last month was seen in ED for cough and decreased urine output and by renal NP Soni Coyx in f/u. Now says she is feeling well. No diarrhea, cough gone, energy ok. No real health concerns today.      ROS: A comprehensive review of systems was performed and was negative except as noted in the HPI.    Patient Active Problem List   Diagnosis     S/P kidney transplant     Parathyroid related hypercalcemia (H)     Encounter for long-term current use of medication     CKD (chronic kidney disease) stage 3, GFR 30-59 ml/min     Flu syndrome     Influenza     Chest pain     Shortness of breath     Acute kidney injury (H)     Acute pharyngitis     Weakness     Diarrhea     Decreased urine volume     Essential hypertension     Chest pain at rest     Chest pain, non-cardiac     Personal Hx:   Social History     Social History     Marital status:      Spouse name: N/A     Number of children: N/A     Years of education: N/A     Occupational History     Not on file.     Social History Main Topics     Smoking status: Never Smoker     Smokeless tobacco: Never Used     Alcohol use No     Drug use: No     Sexual activity: Not on file      Comment: ERINN- need      Other Topics Concern     Not on file     Social History Narrative       Pain Score this Visit: None    Allergies   Allergen  "Reactions     Contrast Dye Shortness Of Breath     Medications:  Prior to Admission medications    Medication Sig Start Date End Date Taking? Authorizing Provider   CELLCEPT 250 MG capsule Take 4 capsules by mouth 2 times daily. 5/17/13   Ryley Higgins MD   sulfamethoxazole-trimethoprim (BACTRIM) 400-80 MG per tablet Take 1 tablet by mouth daily. 5/17/13   Ryley Higgins MD   GENGRAF 25 MG capsule Take 2 capsules by mouth 2 times daily. 3/25/13   Amanda Houston MD   omeprazole (PRILOSEC) 20 MG capsule Take 20 mg by mouth daily.    Reported, Patient     Vitals:  /80  Pulse 65  Temp 97.9  F (36.6  C) (Oral)  Ht 1.448 m (4' 9\")  Wt 67.5 kg (148 lb 14.4 oz)  SpO2 98%  BMI 32.22 kg/m2     Exam:   Alert, in no acute distress.  HEENT: Eyes, ears, nose, mouth grossly normal  Nodes: No cervical adenopathy  Chest: Clear to percussion and auscultation. Normal breath sounds bilaterally  Heart: Regular rate and rhythm. Normal S1 and S2, 3/6 GISSEL LUSB, no gallop or rub  Abdomen: Normal bowel sounds. No masses or tenderness. Graft palpable in RLQ, firm and not tender. Nl size, no bruit.  Extremities: No edema; large tortuous LUE fistula  Neuro: Oriented. Normal gait. No tremor.  Skin: No lesions noted  Psych - smiled at joke, normally responsive      Results:   Recent Results (from the past 168 hour(s))   Renal panel    Collection Time: 04/28/17  8:30 AM   Result Value Ref Range    Sodium 138 133 - 144 mmol/L    Potassium 4.9 3.4 - 5.3 mmol/L    Chloride 107 94 - 109 mmol/L    Carbon Dioxide 23 20 - 32 mmol/L    Anion Gap 8 3 - 14 mmol/L    Glucose 89 70 - 99 mg/dL    Urea Nitrogen 14 7 - 30 mg/dL    Creatinine 1.67 (H) 0.52 - 1.04 mg/dL    GFR Estimate 32 (L) >60 mL/min/1.7m2    GFR Estimate If Black 39 (L) >60 mL/min/1.7m2    Calcium 9.7 8.5 - 10.1 mg/dL    Phosphorus 2.8 2.5 - 4.5 mg/dL    Albumin 3.5 3.4 - 5.0 g/dL   CBC with platelets    Collection Time: 04/28/17  8:30 AM   Result Value Ref Range    WBC 3.2 " (L) 4.0 - 11.0 10e9/L    RBC Count 3.87 3.8 - 5.2 10e12/L    Hemoglobin 10.7 (L) 11.7 - 15.7 g/dL    Hematocrit 34.3 (L) 35.0 - 47.0 %    MCV 89 78 - 100 fl    MCH 27.6 26.5 - 33.0 pg    MCHC 31.2 (L) 31.5 - 36.5 g/dL    RDW 13.0 10.0 - 15.0 %    Platelet Count 169 150 - 450 10e9/L   Protein  random urine    Collection Time: 04/28/17  8:43 AM   Result Value Ref Range    Protein Random Urine 0.49 g/L    Protein Total Urine g/gr Creatinine 1.86 (H) 0 - 0.2 g/g Cr   Creatinine urine calculation only    Collection Time: 04/28/17  8:43 AM   Result Value Ref Range    Creatinine Urine 27 mg/dL       Assessment and Plan:     1. DDKT  2. Chronic Kidney Disease (CKD), Stage 3 - Renal function slightly worse with creat 1.67. She has had previous increases in creat without identified cause, but still must be concerned if this is a new trend. Will repeat creat(BMP) in 7-10 d. If again worse, will get renal U/S and graft biopsy. Otherwise, RTC 3 months. Will discuss with her coordinator.  3. Hyperparathyroidism - Ca controlled on low dose of cinacalcet (15 mg every other day).   4. IS - will not change doses or meds.  5. Depression - no spontaneous complaints today. Has not wanted interventio in past.  RTC 6 months with labs.  Assessment and plan was discussed with the patient.    CC  Patient Care Team:  Tay Romero as PCP - Cindy Gamez RN as Registered Nurse (Transplant)  TAY ROMERO              Again, thank you for allowing me to participate in the care of your patient.      Sincerely,    Dipak Meehan MD

## 2017-04-28 NOTE — NURSING NOTE
"Chief Complaint   Patient presents with     RECHECK     kIDNEY TRANSPLANT follow up       Initial /80  Pulse 65  Temp 97.9  F (36.6  C) (Oral)  Ht 1.448 m (4' 9\")  Wt 67.5 kg (148 lb 14.4 oz)  SpO2 98%  BMI 32.22 kg/m2 Estimated body mass index is 32.22 kg/(m^2) as calculated from the following:    Height as of this encounter: 1.448 m (4' 9\").    Weight as of this encounter: 67.5 kg (148 lb 14.4 oz).  Medication Reconciliation: complete    "

## 2017-04-28 NOTE — MR AVS SNAPSHOT
After Visit Summary   4/28/2017    Joshua Lala    MRN: [id number]           Patient Information     Date Of Birth          1965        Visit Information        Provider Department      4/28/2017 9:10 AM Dipak Meehan MD; [employer] [employer] [facility] Nephrology        Today's Diagnoses     S/P kidney transplant    -  1    CKD (chronic kidney disease) stage 3, GFR 30-59 ml/min        Essential hypertension           Follow-ups after your visit        Follow-up notes from your care team     Return in about 3 months (around 7/28/2017).      Your next 10 appointments already scheduled     May 12, 2017  8:30 AM CDT   LAB with  LAB   [facility] ([facility])    [address] [place] [id number] [phone]           Patient must bring picture ID.  Patient should be prepared to give a urine specimen  Please do not eat 10-12 hours before your appointment if you are coming in fasting for labs on lipids, cholesterol, or glucose (sugar).  Pregnant women should follow their Care Team instructions. Water with medications is okay. Do not drink coffee or other fluids.   If you have concerns about taking  your medications, please ask at office or if scheduling via [employer], send a message by clicking on Secure Messaging, Message Your Care Team.            Jul 28, 2017  8:00 AM CDT   Lab with  LAB   [facility] ([facility])    [address] [place] [id number] [phone]            Jul 28, 2017  9:10 AM CDT   (Arrive by 8:40 AM)   Return Kidney Transplant with Dipak Meehan MD   [facility]phrology ([facility])    [address] [place] [id number] [phone]              Who to contact     If you have questions or need follow up information about today's clinic visit or your schedule please contact [facility] NEPHROLOGY directly at  "[phone].  Normal or non-critical lab and imaging results will be communicated to you by MyChart, letter or phone within 4 business days after the clinic has received the results. If you do not hear from us within 7 days, please contact the clinic through [employer] or phone. If you have a critical or abnormal lab result, we will notify you by phone as soon as possible.  Submit refill requests through [email or web address] or call your pharmacy and they will forward the refill request to us. Please allow 3 business days for your refill to be completed.          Additional Information About Your Visit        [employer] Information     [email or web address] lets you send messages to your doctor, view your test results, renew your prescriptions, schedule appointments and more. To sign up, go to [email or web address] . Click on \"Log in\" on the left side of the screen, which will take you to the Welcome page. Then click on \"Sign up Now\" on the right side of the page.     You will be asked to enter the access code listed below, as well as some personal information. Please follow the directions to create your username and password.     Your access code is: [email or web address]  Expires: 2017  6:31 AM     Your access code will  in 90 days. If you need help or a new code, please call your [facility] clinic or [phone].        Care EveryWhere ID     This is your Care EveryWhere ID. This could be used by other organizations to access your [facility] medical records  [id number]        Your Vitals Were     Pulse Temperature Height Pulse Oximetry BMI (Body Mass Index)       65 97.9  F (36.6  C) (Oral) 1.448 m (4' 9\") 98% 32.22 kg/m2        Blood Pressure from Last 3 Encounters:   17 159/80   17 172/72   17 175/87    Weight from Last 3 Encounters:   17 67.5 kg (148 lb 14.4 oz)   17 67.2 kg (148 lb 3.2 oz)   17 67 kg (147 lb 11.2 oz)              Today, you had the following     No orders found for display       "   Today's Medication Changes          These changes are accurate as of: 4/28/17  9:50 AM.  If you have any questions, ask your nurse or doctor.               These medicines have changed or have updated prescriptions.        Dose/Directions    sulfamethoxazole-trimethoprim 400-80 MG per tablet   Commonly known as:  BACTRIM/SEPTRA   This may have changed:  Another medication with the same name was removed. Continue taking this medication, and follow the directions you see here.   Used for:  Kidney replaced by transplant   Changed by:  Dipak Meehan MD        TAKE ONE TABLET BY MOUTH EVERY DAY   Quantity:  30 tablet   Refills:  11                Primary Care Provider Office Phone # Fax #    Phua Heather [phone]       [facility] [address] [facility] [address]        Thank you!     Thank you for choosing [facility] NEPHROLOGY  for your care. Our goal is always to provide you with excellent care. Hearing back from our patients is one way we can continue to improve our services. Please take a few minutes to complete the written survey that you may receive in the mail after your visit with us. Thank you!             Your Updated Medication List - Protect others around you: Learn how to safely use, store and throw away your medicines at www.disposemymeds.org.          This list is accurate as of: 4/28/17  9:50 AM.  Always use your most recent med list.                   Brand Name Dispense Instructions for use    amLODIPine 2.5 MG tablet    NORVASC    30 tablet    Take 1 tablet (2.5 mg) by mouth daily       cycloSPORINE modified capsule     120 capsule    Take 2 capsules (50 mg) by mouth 2 times daily       FIRST-HARINDER MOUTHWASH Susp     237 mL    Swish and swallow 5-10 mLs in mouth every 6 hours as needed       mycophenolate capsule     240 capsule    Take 4 capsules (1,000 mg) by mouth 2 times daily       SENSIPAR 30 MG tablet   Generic drug:  cinacalcet     30 tablet    TAKE ONE-HALF  TABLET BY MOUTH EVERY OTHER DAY       sulfamethoxazole-trimethoprim 400-80 MG per tablet    BACTRIM/SEPTRA    30 tablet    TAKE ONE TABLET BY MOUTH EVERY DAY

## 2017-04-28 NOTE — PROGRESS NOTES
Reason for Visit:  Joshua aLla is a 51 year old year old female with DDKT 2007 for HSP and CKD stage 3, who presents for f/u.    HPI:  Patient interviewed with  and younger daughter present. Was seen in ED 2 months ago with diarrhea which she attributed to change in Rx from Gengraf to another generic cyclosporine dispensed by pharmacy. Was reversed by coordinator. Last month was seen in ED for cough and decreased urine output and by renal NP Soni Jessica in f/u. Now says she is feeling well. No diarrhea, cough gone, energy ok. No real health concerns today.      ROS: A comprehensive review of systems was performed and was negative except as noted in the HPI.    Patient Active Problem List   Diagnosis     S/P kidney transplant     Parathyroid related hypercalcemia (H)     Encounter for long-term current use of medication     CKD (chronic kidney disease) stage 3, GFR 30-59 ml/min     Flu syndrome     Influenza     Chest pain     Shortness of breath     Acute kidney injury (H)     Acute pharyngitis     Weakness     Diarrhea     Decreased urine volume     Essential hypertension     Chest pain at rest     Chest pain, non-cardiac     Personal Hx:   Social History     Social History     Marital status:      Spouse name: N/A     Number of children: N/A     Years of education: N/A     Occupational History     Not on file.     Social History Main Topics     Smoking status: Never Smoker     Smokeless tobacco: Never Used     Alcohol use No     Drug use: No     Sexual activity: Not on file      Comment: ERINN- need      Other Topics Concern     Not on file     Social History Narrative       Pain Score this Visit: None    Allergies   Allergen Reactions     Contrast Dye Shortness Of Breath     Medications:  Prior to Admission medications    Medication Sig Start Date End Date Taking? Authorizing Provider   CELLCEPT 250 MG capsule Take 4 capsules by mouth 2 times daily. 5/17/13   Ryley Higgins MD  "  sulfamethoxazole-trimethoprim (BACTRIM) 400-80 MG per tablet Take 1 tablet by mouth daily. 5/17/13   Ryley Higgins MD   GENGRAF 25 MG capsule Take 2 capsules by mouth 2 times daily. 3/25/13   Amanda Houston MD   omeprazole (PRILOSEC) 20 MG capsule Take 20 mg by mouth daily.    Reported, Patient     Vitals:  /80  Pulse 65  Temp 97.9  F (36.6  C) (Oral)  Ht 1.448 m (4' 9\")  Wt 67.5 kg (148 lb 14.4 oz)  SpO2 98%  BMI 32.22 kg/m2     Exam:   Alert, in no acute distress.  HEENT: Eyes, ears, nose, mouth grossly normal  Nodes: No cervical adenopathy  Chest: Clear to percussion and auscultation. Normal breath sounds bilaterally  Heart: Regular rate and rhythm. Normal S1 and S2, 3/6 GISSEL LUSB, no gallop or rub  Abdomen: Normal bowel sounds. No masses or tenderness. Graft palpable in RLQ, firm and not tender. Nl size, no bruit.  Extremities: No edema; large tortuous LUE fistula  Neuro: Oriented. Normal gait. No tremor.  Skin: No lesions noted  Psych - smiled at joke, normally responsive      Results:   Recent Results (from the past 168 hour(s))   Renal panel    Collection Time: 04/28/17  8:30 AM   Result Value Ref Range    Sodium 138 133 - 144 mmol/L    Potassium 4.9 3.4 - 5.3 mmol/L    Chloride 107 94 - 109 mmol/L    Carbon Dioxide 23 20 - 32 mmol/L    Anion Gap 8 3 - 14 mmol/L    Glucose 89 70 - 99 mg/dL    Urea Nitrogen 14 7 - 30 mg/dL    Creatinine 1.67 (H) 0.52 - 1.04 mg/dL    GFR Estimate 32 (L) >60 mL/min/1.7m2    GFR Estimate If Black 39 (L) >60 mL/min/1.7m2    Calcium 9.7 8.5 - 10.1 mg/dL    Phosphorus 2.8 2.5 - 4.5 mg/dL    Albumin 3.5 3.4 - 5.0 g/dL   CBC with platelets    Collection Time: 04/28/17  8:30 AM   Result Value Ref Range    WBC 3.2 (L) 4.0 - 11.0 10e9/L    RBC Count 3.87 3.8 - 5.2 10e12/L    Hemoglobin 10.7 (L) 11.7 - 15.7 g/dL    Hematocrit 34.3 (L) 35.0 - 47.0 %    MCV 89 78 - 100 fl    MCH 27.6 26.5 - 33.0 pg    MCHC 31.2 (L) 31.5 - 36.5 g/dL    RDW 13.0 10.0 - 15.0 %    Platelet " Count 169 150 - 450 10e9/L   Protein  random urine    Collection Time: 04/28/17  8:43 AM   Result Value Ref Range    Protein Random Urine 0.49 g/L    Protein Total Urine g/gr Creatinine 1.86 (H) 0 - 0.2 g/g Cr   Creatinine urine calculation only    Collection Time: 04/28/17  8:43 AM   Result Value Ref Range    Creatinine Urine 27 mg/dL       Assessment and Plan:     1. DDKT  2. Chronic Kidney Disease (CKD), Stage 3 - Renal function slightly worse with creat 1.67. She has had previous increases in creat without identified cause, but still must be concerned if this is a new trend. Will repeat creat(BMP) in 7-10 d. If again worse, will get renal U/S and graft biopsy. Otherwise, RTC 3 months. Will discuss with her coordinator.  3. Hyperparathyroidism - Ca controlled on low dose of cinacalcet (15 mg every other day).   4. IS - will not change doses or meds.  5. Depression - no spontaneous complaints today. Has not wanted interventio in past.  RTC 6 months with labs.  Assessment and plan was discussed with the patient.    CC  Patient Care Team:  Tay Romero as PCP - Cindy Gamez RN as Registered Nurse (Transplant)  TAY ROMERO

## 2017-05-03 DIAGNOSIS — Z94.0 KIDNEY REPLACED BY TRANSPLANT: Primary | ICD-10-CM

## 2017-05-03 RX ORDER — MYCOPHENOLATE MOFETIL 250 MG/1
1000 CAPSULE ORAL 2 TIMES DAILY
Qty: 240 CAPSULE | Refills: 11 | Status: ON HOLD | OUTPATIENT
Start: 2017-05-03 | End: 2019-04-08

## 2017-05-12 DIAGNOSIS — N18.30 CKD (CHRONIC KIDNEY DISEASE) STAGE 3, GFR 30-59 ML/MIN (H): ICD-10-CM

## 2017-05-12 LAB
ANION GAP SERPL CALCULATED.3IONS-SCNC: 9 MMOL/L (ref 3–14)
BUN SERPL-MCNC: 16 MG/DL (ref 7–30)
CALCIUM SERPL-MCNC: 9.6 MG/DL (ref 8.5–10.1)
CHLORIDE SERPL-SCNC: 106 MMOL/L (ref 94–109)
CO2 SERPL-SCNC: 21 MMOL/L (ref 20–32)
CREAT SERPL-MCNC: 1.6 MG/DL (ref 0.52–1.04)
GFR SERPL CREATININE-BSD FRML MDRD: 34 ML/MIN/1.7M2
GLUCOSE SERPL-MCNC: 91 MG/DL (ref 70–99)
POTASSIUM SERPL-SCNC: 4.7 MMOL/L (ref 3.4–5.3)
SODIUM SERPL-SCNC: 136 MMOL/L (ref 133–144)

## 2017-05-23 ENCOUNTER — TELEPHONE (OUTPATIENT)
Dept: TRANSPLANT | Facility: CLINIC | Age: 52
End: 2017-05-23

## 2017-05-23 NOTE — TELEPHONE ENCOUNTER
----- Message from Dipak Meehan MD sent at 5/12/2017  9:42 AM CDT -----  Regarding: RE: Cr 1.6  Does she need a kidney bx - slightly improved   Lets just watch since this value is a little better.  ----- Message -----     From: Cindy Weir RN     Sent: 5/12/2017   9:34 AM       To: Dipak Meehan MD  Subject: Cr 1.6  Does she need a kidney bx - slightly#    Dr Meehan    I assume that she dose NOT need a kidney biopsy Cr 1.6 from 1.67 since creatinine lower on repeat ? Thanks   (does spill protein ) -        2. Chronic Kidney Disease (CKD), Stage 3 - Renal function slightly worse with creat 1.67. She has had previous increases in creat without identified cause, but still must be concerned if this is a new trend. Will repeat creat(BMP) in 7-10 d. If again worse, will get renal U/S and graft biopsy. Otherwise, RTC 3 months. Will discuss with her coordinator.

## 2017-07-20 ENCOUNTER — TELEPHONE (OUTPATIENT)
Dept: NEPHROLOGY | Facility: CLINIC | Age: 52
End: 2017-07-20

## 2017-07-20 NOTE — TELEPHONE ENCOUNTER
Hello, this is Zoran's office from Medicine Specialty on the 3rd floor at [facility] located at [address] [place] [address]. We are reminding you of your upcoming Nephrology appointment on 7/31/2017 at 9:40 am. Please arrive an hour prior to your appointment time for labs. Also, please bring an updated medication list including dose of prescribed and over the counter medications you are currently taking or your labeled medication bottles with you to your appointment. If you have any questions or would like to cancel or reschedule your appointment, please call us at [phone].     If you are having labs draw same day you need a lab appointment scheduled 1 hour prior to your visit.    You are welcome to have your labs done 2-7 days before your appointment at any [facility] or [facility] facility. If you have your labs completed before your appointment, please still come 30 minutes early for check-in.     Thank-You for allowing us to be a member of your Health Care Team,     Batool Crespo., CMA

## 2017-07-21 DIAGNOSIS — Z79.899 ENCOUNTER FOR LONG-TERM CURRENT USE OF MEDICATION: ICD-10-CM

## 2017-07-21 DIAGNOSIS — Z94.0 KIDNEY REPLACED BY TRANSPLANT: Primary | ICD-10-CM

## 2017-07-21 DIAGNOSIS — Z48.298 AFTERCARE FOLLOWING ORGAN TRANSPLANT: ICD-10-CM

## 2017-07-28 ENCOUNTER — OFFICE VISIT (OUTPATIENT)
Dept: NEPHROLOGY | Facility: CLINIC | Age: 52
End: 2017-07-28
Attending: INTERNAL MEDICINE
Payer: MEDICARE

## 2017-07-28 VITALS
TEMPERATURE: 97.5 F | BODY MASS INDEX: 28.78 KG/M2 | WEIGHT: 146.6 LBS | SYSTOLIC BLOOD PRESSURE: 170 MMHG | HEART RATE: 61 BPM | DIASTOLIC BLOOD PRESSURE: 77 MMHG | OXYGEN SATURATION: 98 % | HEIGHT: 60 IN

## 2017-07-28 DIAGNOSIS — I10 ESSENTIAL HYPERTENSION: ICD-10-CM

## 2017-07-28 DIAGNOSIS — Z79.899 ENCOUNTER FOR LONG-TERM CURRENT USE OF MEDICATION: ICD-10-CM

## 2017-07-28 DIAGNOSIS — Z94.0 S/P KIDNEY TRANSPLANT: Primary | ICD-10-CM

## 2017-07-28 DIAGNOSIS — E83.52 PARATHYROID RELATED HYPERCALCEMIA (H): ICD-10-CM

## 2017-07-28 DIAGNOSIS — Z94.0 KIDNEY REPLACED BY TRANSPLANT: ICD-10-CM

## 2017-07-28 DIAGNOSIS — E21.5 PARATHYROID RELATED HYPERCALCEMIA (H): ICD-10-CM

## 2017-07-28 DIAGNOSIS — Z48.298 AFTERCARE FOLLOWING ORGAN TRANSPLANT: ICD-10-CM

## 2017-07-28 DIAGNOSIS — N18.30 CKD (CHRONIC KIDNEY DISEASE) STAGE 3, GFR 30-59 ML/MIN (H): ICD-10-CM

## 2017-07-28 LAB
ANION GAP SERPL CALCULATED.3IONS-SCNC: 7 MMOL/L (ref 3–14)
BUN SERPL-MCNC: 26 MG/DL (ref 7–30)
CALCIUM SERPL-MCNC: 10 MG/DL (ref 8.5–10.1)
CHLORIDE SERPL-SCNC: 104 MMOL/L (ref 94–109)
CO2 SERPL-SCNC: 22 MMOL/L (ref 20–32)
CREAT SERPL-MCNC: 1.68 MG/DL (ref 0.52–1.04)
CYCLOSPORINE BLD LC/MS/MS-MCNC: 129 UG/L (ref 50–400)
ERYTHROCYTE [DISTWIDTH] IN BLOOD BY AUTOMATED COUNT: 12.6 % (ref 10–15)
GFR SERPL CREATININE-BSD FRML MDRD: 32 ML/MIN/1.7M2
GLUCOSE SERPL-MCNC: 92 MG/DL (ref 70–99)
HCT VFR BLD AUTO: 35.3 % (ref 35–47)
HGB BLD-MCNC: 11.3 G/DL (ref 11.7–15.7)
MCH RBC QN AUTO: 27.8 PG (ref 26.5–33)
MCHC RBC AUTO-ENTMCNC: 32 G/DL (ref 31.5–36.5)
MCV RBC AUTO: 87 FL (ref 78–100)
PLATELET # BLD AUTO: 158 10E9/L (ref 150–450)
POTASSIUM SERPL-SCNC: 4.4 MMOL/L (ref 3.4–5.3)
RBC # BLD AUTO: 4.07 10E12/L (ref 3.8–5.2)
SODIUM SERPL-SCNC: 132 MMOL/L (ref 133–144)
TME LAST DOSE: 2100 H
WBC # BLD AUTO: 3.6 10E9/L (ref 4–11)

## 2017-07-28 PROCEDURE — 99212 OFFICE O/P EST SF 10 MIN: CPT | Mod: ZF

## 2017-07-28 PROCEDURE — 36415 COLL VENOUS BLD VENIPUNCTURE: CPT | Performed by: INTERNAL MEDICINE

## 2017-07-28 PROCEDURE — 99213 OFFICE O/P EST LOW 20 MIN: CPT

## 2017-07-28 PROCEDURE — 80158 DRUG ASSAY CYCLOSPORINE: CPT | Performed by: INTERNAL MEDICINE

## 2017-07-28 PROCEDURE — 85027 COMPLETE CBC AUTOMATED: CPT | Performed by: INTERNAL MEDICINE

## 2017-07-28 PROCEDURE — 80048 BASIC METABOLIC PNL TOTAL CA: CPT | Performed by: INTERNAL MEDICINE

## 2017-07-28 RX ORDER — AMLODIPINE BESYLATE 5 MG/1
5 TABLET ORAL DAILY
Qty: 30 TABLET | Refills: 11 | Status: ON HOLD | OUTPATIENT
Start: 2017-07-28 | End: 2019-04-08

## 2017-07-28 RX ORDER — GUAIFENESIN 600 MG/1
600 TABLET, EXTENDED RELEASE ORAL PRN
Status: ON HOLD | COMMUNITY
End: 2020-03-15

## 2017-07-28 ASSESSMENT — PAIN SCALES - GENERAL: PAINLEVEL: NO PAIN (0)

## 2017-07-28 NOTE — PROGRESS NOTES
Reason for Visit:  Joshua Lala is a 51 year old year old female with DDKT 2007 for HSP and CKD stage 3, who presents for f/u.    HPI:  Patient interviewed with . Today pt is smiling, very well groomed. Has had no interval health issues and no real health concerns today. Appetite and energy good. No abd pain.      ROS: A comprehensive review of systems was performed and was negative except as noted in the HPI.    Patient Active Problem List   Diagnosis     S/P kidney transplant     Parathyroid related hypercalcemia (H)     Encounter for long-term current use of medication     CKD (chronic kidney disease) stage 3, GFR 30-59 ml/min     Flu syndrome     Influenza     Chest pain     Shortness of breath     Acute kidney injury (H)     Acute pharyngitis     Weakness     Diarrhea     Decreased urine volume     Essential hypertension     Chest pain at rest     Chest pain, non-cardiac     Personal Hx:   Social History     Social History     Marital status:      Spouse name: N/A     Number of children: N/A     Years of education: N/A     Occupational History     Not on file.     Social History Main Topics     Smoking status: Never Smoker     Smokeless tobacco: Never Used     Alcohol use No     Drug use: No     Sexual activity: Not on file      Comment: ERINN- need      Other Topics Concern     Not on file     Social History Narrative       Pain Score this Visit: None    Allergies   Allergen Reactions     Contrast Dye Shortness Of Breath     Medications:  Current Outpatient Prescriptions   Medication     guaiFENesin (MUCINEX) 600 MG 12 hr tablet     CELLCEPT 250 MG PO CAPSULE     amLODIPine (NORVASC) 2.5 MG tablet     SENSIPAR 30 MG tablet     NEORAL 25 MG PO CAPSULE     CELLCEPT 250 MG PO CAPSULE     sulfamethoxazole-trimethoprim (BACTRIM,SEPTRA) 400-80 MG per tablet     Diphenhyd-HC-Nystatin-Tetracyc (FIRST-HARINDER MOUTHWASH) SUSP     No current facility-administered medications for this visit.         Prior to Admission medications    Medication Sig Start Date End Date Taking? Authorizing Provider   CELLCEPT 250 MG capsule Take 4 capsules by mouth 2 times daily. 5/17/13   Ryley Higgins MD   sulfamethoxazole-trimethoprim (BACTRIM) 400-80 MG per tablet Take 1 tablet by mouth daily. 5/17/13   Ryley Higgins MD   GENGRAF 25 MG capsule Take 2 capsules by mouth 2 times daily. 3/25/13   Amanda Houston MD   omeprazole (PRILOSEC) 20 MG capsule Take 20 mg by mouth daily.    Reported, Patient     Vitals:  Pulse 61  Temp 97.5  F (36.4  C) (Oral)  Wt 66.5 kg (146 lb 9.6 oz)  SpO2 98%  BMI 31.72 kg/m2     Exam:   Alert, in no acute distress.  HEENT: Eyes, ears, nose, mouth grossly normal  Nodes: No cervical adenopathy  Chest: Clear to percussion and auscultation. Normal breath sounds bilaterally  Heart: Regular rate and rhythm. Normal S1 and S2, 3/6 GISSEL LUSB, no gallop or rub  Abdomen: Normal bowel sounds. No masses or tenderness. Graft palpable in RLQ, firm and not tender. Nl size, no bruit.  Extremities: No edema; large tortuous LUE fistula  Neuro: Oriented. Normal gait. No tremor.  Skin: No lesions noted  Psych - normally responsive      Results:   Recent Results (from the past 168 hour(s))   CBC with platelets    Collection Time: 07/28/17  8:37 AM   Result Value Ref Range    WBC 3.6 (L) 4.0 - 11.0 10e9/L    RBC Count 4.07 3.8 - 5.2 10e12/L    Hemoglobin 11.3 (L) 11.7 - 15.7 g/dL    Hematocrit 35.3 35.0 - 47.0 %    MCV 87 78 - 100 fl    MCH 27.8 26.5 - 33.0 pg    MCHC 32.0 31.5 - 36.5 g/dL    RDW 12.6 10.0 - 15.0 %    Platelet Count 158 150 - 450 10e9/L   Basic metabolic panel    Collection Time: 07/28/17  8:37 AM   Result Value Ref Range    Sodium 132 (L) 133 - 144 mmol/L    Potassium 4.4 3.4 - 5.3 mmol/L    Chloride 104 94 - 109 mmol/L    Carbon Dioxide 22 20 - 32 mmol/L    Anion Gap 7 3 - 14 mmol/L    Glucose 92 70 - 99 mg/dL    Urea Nitrogen 26 7 - 30 mg/dL    Creatinine 1.68 (H) 0.52 - 1.04 mg/dL     GFR Estimate 32 (L) >60 mL/min/1.7m2    GFR Estimate If Black 39 (L) >60 mL/min/1.7m2    Calcium 10.0 8.5 - 10.1 mg/dL       Assessment and Plan:     1. DDKT  2. Chronic Kidney Disease (CKD), Stage 3 - Renal function again unchanged.  3. Hypertension - NEW PROBLEM- chart says she is on amlodipine 2.5 mg since February, but patient denies. BP is high. Will start (restat) amlodipine at 5 mg daily.   4 Hyperparathyroidism - Ca controlled on low dose of cinacalcet (15 mg every other day).   5. IS - will not change doses or meds.    RTC 6 months with labs.  Assessment and plan was discussed with the patient.    CC  Patient Care Team:  Tay Romero as PCP - Cindy Gamez RN as Registered Nurse (Transplant)  TAY ROMERO

## 2017-07-28 NOTE — LETTER
7/28/2017      RE: Joshua Lala  [address]  [place] [address]       Reason for Visit:  Joshua Lala is a 51 year old year old female with DDKT 2007 for HSP and CKD stage 3, who presents for f/u.    HPI:  Patient interviewed with . Today pt is smiling, very well groomed. Has had no interval health issues and no real health concerns today. Appetite and energy good. No abd pain.      ROS: A comprehensive review of systems was performed and was negative except as noted in the HPI.    Patient Active Problem List   Diagnosis     S/P kidney transplant     Parathyroid related hypercalcemia (H)     Encounter for long-term current use of medication     CKD (chronic kidney disease) stage 3, GFR 30-59 ml/min     Flu syndrome     Influenza     Chest pain     Shortness of breath     Acute kidney injury (H)     Acute pharyngitis     Weakness     Diarrhea     Decreased urine volume     Essential hypertension     Chest pain at rest     Chest pain, non-cardiac     Personal Hx:   Social History     Social History     Marital status:      Spouse name: N/A     Number of children: N/A     Years of education: N/A     Occupational History     Not on file.     Social History Main Topics     Smoking status: Never Smoker     Smokeless tobacco: Never Used     Alcohol use No     Drug use: No     Sexual activity: Not on file      Comment: ERINN- need      Other Topics Concern     Not on file     Social History Narrative       Pain Score this Visit: None    Allergies   Allergen Reactions     Contrast Dye Shortness Of Breath     Medications:  Current Outpatient Prescriptions   Medication     guaiFENesin (MUCINEX) 600 MG 12 hr tablet     CELLCEPT 250 MG PO CAPSULE     amLODIPine (NORVASC) 2.5 MG tablet     SENSIPAR 30 MG tablet     NEORAL 25 MG PO CAPSULE     CELLCEPT 250 MG PO CAPSULE     sulfamethoxazole-trimethoprim (BACTRIM,SEPTRA) 400-80 MG per tablet     Diphenhyd-HC-Nystatin-Tetracyc (FIRST-HARINDER MOUTHWASH) SUSP      No current facility-administered medications for this visit.        Prior to Admission medications    Medication Sig Start Date End Date Taking? Authorizing Provider   CELLCEPT 250 MG capsule Take 4 capsules by mouth 2 times daily. 5/17/13   Ryley Higgins MD   sulfamethoxazole-trimethoprim (BACTRIM) 400-80 MG per tablet Take 1 tablet by mouth daily. 5/17/13   Ryley Higgins MD   GENGRAF 25 MG capsule Take 2 capsules by mouth 2 times daily. 3/25/13   Amanda Houston MD   omeprazole (PRILOSEC) 20 MG capsule Take 20 mg by mouth daily.    Reported, Patient     Vitals:  Pulse 61  Temp 97.5  F (36.4  C) (Oral)  Wt 66.5 kg (146 lb 9.6 oz)  SpO2 98%  BMI 31.72 kg/m2     Exam:   Alert, in no acute distress.  HEENT: Eyes, ears, nose, mouth grossly normal  Nodes: No cervical adenopathy  Chest: Clear to percussion and auscultation. Normal breath sounds bilaterally  Heart: Regular rate and rhythm. Normal S1 and S2, 3/6 GISSEL LUSB, no gallop or rub  Abdomen: Normal bowel sounds. No masses or tenderness. Graft palpable in RLQ, firm and not tender. Nl size, no bruit.  Extremities: No edema; large tortuous LUE fistula  Neuro: Oriented. Normal gait. No tremor.  Skin: No lesions noted  Psych - normally responsive      Results:   Recent Results (from the past 168 hour(s))   CBC with platelets    Collection Time: 07/28/17  8:37 AM   Result Value Ref Range    WBC 3.6 (L) 4.0 - 11.0 10e9/L    RBC Count 4.07 3.8 - 5.2 10e12/L    Hemoglobin 11.3 (L) 11.7 - 15.7 g/dL    Hematocrit 35.3 35.0 - 47.0 %    MCV 87 78 - 100 fl    MCH 27.8 26.5 - 33.0 pg    MCHC 32.0 31.5 - 36.5 g/dL    RDW 12.6 10.0 - 15.0 %    Platelet Count 158 150 - 450 10e9/L   Basic metabolic panel    Collection Time: 07/28/17  8:37 AM   Result Value Ref Range    Sodium 132 (L) 133 - 144 mmol/L    Potassium 4.4 3.4 - 5.3 mmol/L    Chloride 104 94 - 109 mmol/L    Carbon Dioxide 22 20 - 32 mmol/L    Anion Gap 7 3 - 14 mmol/L    Glucose 92 70 - 99 mg/dL    Urea  Nitrogen 26 7 - 30 mg/dL    Creatinine 1.68 (H) 0.52 - 1.04 mg/dL    GFR Estimate 32 (L) >60 mL/min/1.7m2    GFR Estimate If Black 39 (L) >60 mL/min/1.7m2    Calcium 10.0 8.5 - 10.1 mg/dL       Assessment and Plan:     1. DDKT  2. Chronic Kidney Disease (CKD), Stage 3 - Renal function again unchanged.  3. Hypertension - NEW PROBLEM- chart says she is on amlodipine 2.5 mg since February, but patient denies. BP is high. Will start (restat) amlodipine at 5 mg daily.   4 Hyperparathyroidism - Ca controlled on low dose of cinacalcet (15 mg every other day).   5. IS - will not change doses or meds.    RTC 6 months with labs.  Assessment and plan was discussed with the patient.    CC  Patient Care Team:  Tay Romero as PCP - Cindy Gamez RN as Registered Nurse (Transplant)  TAY ROMERO MD

## 2017-07-28 NOTE — NURSING NOTE
Chief Complaint   Patient presents with     RECHECK     Follow up TOSIN, CKD stage 3.       Initial /77  Pulse 61  Temp 97.5  F (36.4  C) (Oral)  Ht 1.524 m (5')  Wt 66.5 kg (146 lb 9.6 oz)  SpO2 98%  BMI 28.63 kg/m2 Estimated body mass index is 28.63 kg/(m^2) as calculated from the following:    Height as of this encounter: 1.524 m (5').    Weight as of this encounter: 66.5 kg (146 lb 9.6 oz).  Medication Reconciliation: complete   Batool Crespo.,CMA

## 2017-07-28 NOTE — MR AVS SNAPSHOT
After Visit Summary   7/28/2017    Joshua Lala    MRN: [id number]           Patient Information     Date Of Birth          1965        Visit Information        Provider Department      7/28/2017 9:10 AM Dipak Meehan MD; [place] [employer] [facility] Nephrology        Today's Diagnoses     S/P kidney transplant    -  1    Parathyroid related hypercalcemia (H)        CKD (chronic kidney disease) stage 3, GFR 30-59 ml/min        Essential hypertension           Follow-ups after your visit        Follow-up notes from your care team     Return in about 6 months (around 1/28/2018).      Your next 10 appointments already scheduled     Feb 02, 2018  7:45 AM CST   Lab with  LAB   [facility] ([facility])    [address] [place] [address]  [address] [facility] [address] [phone]            Feb 02, 2018  8:40 AM CST   (Arrive by 8:10 AM)   Return Kidney Transplant with Dipak Meehan MD   [facility] Nephrology ([facility])    [address] [place] [address] [phone]              Who to contact     If you have questions or need follow up information about today's clinic visit or your schedule please contact [facility] NEPHROLOGY directly at [phone].  Normal or non-critical lab and imaging results will be communicated to you by [employer], letter or phone within 4 business days after the clinic has received the results. If you do not hear from us within 7 days, please contact the clinic through [employer] or phone. If you have a critical or abnormal lab result, we will notify you by phone as soon as possible.  Submit refill requests through [employer] or call your pharmacy and they will forward the refill request to us. Please allow 3 business days for your refill to be completed.          Additional Information About Your Visit        [employer] Information     [employer] lets you send messages to your doctor, view your test  "results, renew your prescriptions, schedule appointments and more. To sign up, go to [email or web address] . Click on \"Log in\" on the left side of the screen, which will take you to the Welcome page. Then click on \"Sign up Now\" on the right side of the page.     You will be asked to enter the access code listed below, as well as some personal information. Please follow the directions to create your username and password.     Your access code is: [email or web address]  Expires: 10/12/2017  6:31 AM     Your access code will  in 90 days. If you need help or a new code, please call your [facility] clinic or [phone].        Care EveryWhere ID     This is your Care EveryWhere ID. This could be used by other organizations to access your [facility] medical records  [id number]        Your Vitals Were     Pulse Temperature Height Pulse Oximetry BMI (Body Mass Index)       61 97.5  F (36.4  C) (Oral) 1.524 m (5') 98% 28.63 kg/m2        Blood Pressure from Last 3 Encounters:   17 170/77   17 159/80   17 172/72    Weight from Last 3 Encounters:   17 66.5 kg (146 lb 9.6 oz)   17 67.5 kg (148 lb 14.4 oz)   17 67.2 kg (148 lb 3.2 oz)              Today, you had the following     No orders found for display         Today's Medication Changes          These changes are accurate as of: 17  9:49 AM.  If you have any questions, ask your nurse or doctor.               These medicines have changed or have updated prescriptions.        Dose/Directions    amLODIPine 5 MG tablet   Commonly known as:  NORVASC   This may have changed:    - medication strength  - how much to take   Used for:  Essential hypertension   Changed by:  Dipak Meehan MD        Dose:  5 mg   Take 1 tablet (5 mg) by mouth daily   Quantity:  30 tablet   Refills:  11            Where to get your medicines      These medications were sent to [facility] - [place], [place] - [facility] [address]   [address] , [facility] [address]    Hours:  Mon-Fri 8:30am-5:00pm Toll Free [phone] Phone:  [phone]     amLODIPine 5 MG tablet                Primary Care Provider Office Phone # Fax #    Bandar Romero [phone]       [facility] [address]  [facility] [address]        Equal Access to Services     BEN JUAREZ : Hadii aad ku hadasho Soomaali, waaxda luqadaha, qaybta kaalmada adeegyada, waxay idiin hayaan adeeg khtroysh la'portian ah. So [facility] [phone].    ATENCIÓN: Si ventura adair, tiene a dobbs disposición servicios gratuitos de asistencia lingüística. Maciej al [phone].    We comply with applicable federal civil rights laws and [place] laws. We do not discriminate on the basis of race, color, national origin, age, disability sex, sexual orientation or gender identity.            Thank you!     Thank you for choosing [facility] NEPHROLOGY  for your care. Our goal is always to provide you with excellent care. Hearing back from our patients is one way we can continue to improve our services. Please take a few minutes to complete the written survey that you may receive in the mail after your visit with us. Thank you!             Your Updated Medication List - Protect others around you: Learn how to safely use, store and throw away your medicines at www.disposemymeds.org.          This list is accurate as of: 7/28/17  9:49 AM.  Always use your most recent med list.                   Brand Name Dispense Instructions for use Diagnosis    amLODIPine 5 MG tablet    NORVASC    30 tablet    Take 1 tablet (5 mg) by mouth daily    Essential hypertension       cycloSPORINE modified capsule     120 capsule    Take 2 capsules (50 mg) by mouth 2 times daily    Kidney replaced by transplant       FIRST-HARINDER MOUTHWASH Susp     237 mL    Swish and swallow 5-10 mLs in mouth every 6 hours as needed        guaiFENesin 600 MG 12 hr tablet    MUCINEX     Take 600 mg by mouth as needed        *  mycophenolate capsule     240 capsule    Take 4 capsules (1,000 mg) by mouth 2 times daily    Kidney transplanted       * mycophenolate capsule     240 capsule    Take 4 capsules (1,000 mg) by mouth 2 times daily    Kidney replaced by transplant       SENSIPAR 30 MG tablet   Generic drug:  cinacalcet     30 tablet    TAKE ONE-HALF TABLET BY MOUTH EVERY OTHER DAY    Parathyroid related hypercalcemia (H)       sulfamethoxazole-trimethoprim 400-80 MG per tablet    BACTRIM/SEPTRA    30 tablet    TAKE ONE TABLET BY MOUTH EVERY DAY    Kidney replaced by transplant       * Notice:  This list has 2 medication(s) that are the same as other medications prescribed for you. Read the directions carefully, and ask your doctor or other care provider to review them with you.

## 2017-11-17 DIAGNOSIS — Z94.0 KIDNEY TRANSPLANTED: ICD-10-CM

## 2017-11-17 DIAGNOSIS — Z94.0 KIDNEY REPLACED BY TRANSPLANT: ICD-10-CM

## 2017-11-17 DIAGNOSIS — Z79.899 ENCOUNTER FOR LONG-TERM CURRENT USE OF MEDICATION: ICD-10-CM

## 2017-11-17 DIAGNOSIS — Z48.298 AFTERCARE FOLLOWING ORGAN TRANSPLANT: ICD-10-CM

## 2017-11-17 LAB
ANION GAP SERPL CALCULATED.3IONS-SCNC: 8 MMOL/L (ref 3–14)
BUN SERPL-MCNC: 20 MG/DL (ref 7–30)
CALCIUM SERPL-MCNC: 10 MG/DL (ref 8.5–10.1)
CHLORIDE SERPL-SCNC: 107 MMOL/L (ref 94–109)
CO2 SERPL-SCNC: 20 MMOL/L (ref 20–32)
CREAT SERPL-MCNC: 1.49 MG/DL (ref 0.52–1.04)
CREAT UR-MCNC: 24 MG/DL
CYCLOSPORINE BLD LC/MS/MS-MCNC: 135 UG/L (ref 50–400)
ERYTHROCYTE [DISTWIDTH] IN BLOOD BY AUTOMATED COUNT: 12.6 % (ref 10–15)
GFR SERPL CREATININE-BSD FRML MDRD: 37 ML/MIN/1.7M2
GLUCOSE SERPL-MCNC: 91 MG/DL (ref 70–99)
HCT VFR BLD AUTO: 34.9 % (ref 35–47)
HGB BLD-MCNC: 11.3 G/DL (ref 11.7–15.7)
MCH RBC QN AUTO: 28.3 PG (ref 26.5–33)
MCHC RBC AUTO-ENTMCNC: 32.4 G/DL (ref 31.5–36.5)
MCV RBC AUTO: 87 FL (ref 78–100)
PLATELET # BLD AUTO: 170 10E9/L (ref 150–450)
POTASSIUM SERPL-SCNC: 4.6 MMOL/L (ref 3.4–5.3)
PROT UR-MCNC: 0.41 G/L
PROT/CREAT 24H UR: 1.74 G/G CR (ref 0–0.2)
RBC # BLD AUTO: 4 10E12/L (ref 3.8–5.2)
SODIUM SERPL-SCNC: 136 MMOL/L (ref 133–144)
TME LAST DOSE: NORMAL H
WBC # BLD AUTO: 3.4 10E9/L (ref 4–11)

## 2017-11-17 PROCEDURE — 80158 DRUG ASSAY CYCLOSPORINE: CPT | Performed by: INTERNAL MEDICINE

## 2017-12-18 DIAGNOSIS — Z94.0 KIDNEY REPLACED BY TRANSPLANT: ICD-10-CM

## 2017-12-18 RX ORDER — CYCLOSPORINE 25 MG/1
50 CAPSULE, LIQUID FILLED ORAL 2 TIMES DAILY
Qty: 120 CAPSULE | Refills: 11 | Status: SHIPPED | OUTPATIENT
Start: 2017-12-18 | End: 2018-01-29

## 2017-12-18 NOTE — TELEPHONE ENCOUNTER
Drug Name: neoral 25mg  Last Fill Date: 11/15/17  Quantity: 120    Shannan Wiley   [facility]  [phone]

## 2018-01-26 DIAGNOSIS — Z94.0 KIDNEY REPLACED BY TRANSPLANT: ICD-10-CM

## 2018-01-26 DIAGNOSIS — Z48.298 AFTERCARE FOLLOWING ORGAN TRANSPLANT: ICD-10-CM

## 2018-01-26 DIAGNOSIS — Z79.899 ENCOUNTER FOR LONG-TERM CURRENT USE OF MEDICATION: ICD-10-CM

## 2018-01-26 LAB
ANION GAP SERPL CALCULATED.3IONS-SCNC: 6 MMOL/L (ref 3–14)
BUN SERPL-MCNC: 13 MG/DL (ref 7–30)
CALCIUM SERPL-MCNC: 9.4 MG/DL (ref 8.5–10.1)
CHLORIDE SERPL-SCNC: 110 MMOL/L (ref 94–109)
CO2 SERPL-SCNC: 24 MMOL/L (ref 20–32)
CREAT SERPL-MCNC: 1.67 MG/DL (ref 0.52–1.04)
CYCLOSPORINE BLD LC/MS/MS-MCNC: 133 UG/L (ref 50–400)
ERYTHROCYTE [DISTWIDTH] IN BLOOD BY AUTOMATED COUNT: 12.8 % (ref 10–15)
GFR SERPL CREATININE-BSD FRML MDRD: 32 ML/MIN/1.7M2
GLUCOSE SERPL-MCNC: 85 MG/DL (ref 70–99)
HCT VFR BLD AUTO: 33.2 % (ref 35–47)
HGB BLD-MCNC: 10.2 G/DL (ref 11.7–15.7)
MCH RBC QN AUTO: 27.8 PG (ref 26.5–33)
MCHC RBC AUTO-ENTMCNC: 30.7 G/DL (ref 31.5–36.5)
MCV RBC AUTO: 91 FL (ref 78–100)
PLATELET # BLD AUTO: 143 10E9/L (ref 150–450)
POTASSIUM SERPL-SCNC: 4.3 MMOL/L (ref 3.4–5.3)
RBC # BLD AUTO: 3.67 10E12/L (ref 3.8–5.2)
SODIUM SERPL-SCNC: 140 MMOL/L (ref 133–144)
TME LAST DOSE: NORMAL H
WBC # BLD AUTO: 3 10E9/L (ref 4–11)

## 2018-01-26 PROCEDURE — 80158 DRUG ASSAY CYCLOSPORINE: CPT | Performed by: INTERNAL MEDICINE

## 2018-01-29 ENCOUNTER — HOSPITAL ENCOUNTER (EMERGENCY)
Facility: CLINIC | Age: 53
Discharge: HOME | End: 2018-01-29
Attending: FAMILY MEDICINE | Admitting: FAMILY MEDICINE
Payer: MEDICARE

## 2018-01-29 VITALS
SYSTOLIC BLOOD PRESSURE: 172 MMHG | DIASTOLIC BLOOD PRESSURE: 78 MMHG | TEMPERATURE: 98.8 F | BODY MASS INDEX: 30.56 KG/M2 | OXYGEN SATURATION: 98 % | WEIGHT: 156.5 LBS | RESPIRATION RATE: 18 BRPM | HEART RATE: 80 BPM

## 2018-01-29 DIAGNOSIS — Z01.89 LABORATORY TEST: ICD-10-CM

## 2018-01-29 DIAGNOSIS — Z94.0 KIDNEY REPLACED BY TRANSPLANT: ICD-10-CM

## 2018-01-29 DIAGNOSIS — Z94.0 KIDNEY TRANSPLANTED: ICD-10-CM

## 2018-01-29 LAB
ALBUMIN SERPL-MCNC: 3.6 G/DL (ref 3.4–5)
ALBUMIN UR-MCNC: 30 MG/DL
ALP SERPL-CCNC: 147 U/L (ref 40–150)
ALT SERPL W P-5'-P-CCNC: 15 U/L (ref 0–50)
ANION GAP SERPL CALCULATED.3IONS-SCNC: 9 MMOL/L (ref 3–14)
APPEARANCE UR: CLEAR
AST SERPL W P-5'-P-CCNC: 26 U/L (ref 0–45)
BASOPHILS # BLD AUTO: 0 10E9/L (ref 0–0.2)
BASOPHILS NFR BLD AUTO: 0.2 %
BILIRUB SERPL-MCNC: 0.9 MG/DL (ref 0.2–1.3)
BILIRUB UR QL STRIP: NEGATIVE
BUN SERPL-MCNC: 14 MG/DL (ref 7–30)
CALCIUM SERPL-MCNC: 9.6 MG/DL (ref 8.5–10.1)
CHLORIDE SERPL-SCNC: 111 MMOL/L (ref 94–109)
CO2 SERPL-SCNC: 20 MMOL/L (ref 20–32)
COLOR UR AUTO: ABNORMAL
CREAT SERPL-MCNC: 1.48 MG/DL (ref 0.52–1.04)
DIFFERENTIAL METHOD BLD: ABNORMAL
EOSINOPHIL # BLD AUTO: 0.1 10E9/L (ref 0–0.7)
EOSINOPHIL NFR BLD AUTO: 1.9 %
ERYTHROCYTE [DISTWIDTH] IN BLOOD BY AUTOMATED COUNT: 13.1 % (ref 10–15)
GFR SERPL CREATININE-BSD FRML MDRD: 37 ML/MIN/1.7M2
GLUCOSE SERPL-MCNC: 92 MG/DL (ref 70–99)
GLUCOSE UR STRIP-MCNC: NEGATIVE MG/DL
HCT VFR BLD AUTO: 33.2 % (ref 35–47)
HGB BLD-MCNC: 10.3 G/DL (ref 11.7–15.7)
HGB UR QL STRIP: NEGATIVE
IMM GRANULOCYTES # BLD: 0 10E9/L (ref 0–0.4)
IMM GRANULOCYTES NFR BLD: 0 %
KETONES UR STRIP-MCNC: NEGATIVE MG/DL
LEUKOCYTE ESTERASE UR QL STRIP: ABNORMAL
LYMPHOCYTES # BLD AUTO: 1.2 10E9/L (ref 0.8–5.3)
LYMPHOCYTES NFR BLD AUTO: 28.8 %
MAGNESIUM SERPL-MCNC: 1.7 MG/DL (ref 1.6–2.3)
MCH RBC QN AUTO: 27.8 PG (ref 26.5–33)
MCHC RBC AUTO-ENTMCNC: 31 G/DL (ref 31.5–36.5)
MCV RBC AUTO: 90 FL (ref 78–100)
MONOCYTES # BLD AUTO: 0.4 10E9/L (ref 0–1.3)
MONOCYTES NFR BLD AUTO: 8.7 %
NEUTROPHILS # BLD AUTO: 2.5 10E9/L (ref 1.6–8.3)
NEUTROPHILS NFR BLD AUTO: 60.4 %
NITRATE UR QL: NEGATIVE
NRBC # BLD AUTO: 0 10*3/UL
NRBC BLD AUTO-RTO: 0 /100
PH UR STRIP: 6 PH (ref 5–7)
PHOSPHATE SERPL-MCNC: 2.8 MG/DL (ref 2.5–4.5)
PLATELET # BLD AUTO: 156 10E9/L (ref 150–450)
POTASSIUM SERPL-SCNC: 4.6 MMOL/L (ref 3.4–5.3)
PROT SERPL-MCNC: 7.9 G/DL (ref 6.8–8.8)
RBC # BLD AUTO: 3.71 10E12/L (ref 3.8–5.2)
RBC #/AREA URNS AUTO: 0 /HPF (ref 0–2)
SODIUM SERPL-SCNC: 140 MMOL/L (ref 133–144)
SOURCE: ABNORMAL
SP GR UR STRIP: 1 (ref 1–1.03)
UROBILINOGEN UR STRIP-MCNC: NORMAL MG/DL (ref 0–2)
WBC # BLD AUTO: 4.2 10E9/L (ref 4–11)
WBC #/AREA URNS AUTO: 5 /HPF (ref 0–2)

## 2018-01-29 PROCEDURE — 80053 COMPREHEN METABOLIC PANEL: CPT | Performed by: FAMILY MEDICINE

## 2018-01-29 PROCEDURE — 81001 URINALYSIS AUTO W/SCOPE: CPT | Performed by: FAMILY MEDICINE

## 2018-01-29 PROCEDURE — 99283 EMERGENCY DEPT VISIT LOW MDM: CPT | Mod: Z6 | Performed by: FAMILY MEDICINE

## 2018-01-29 PROCEDURE — 84100 ASSAY OF PHOSPHORUS: CPT | Performed by: FAMILY MEDICINE

## 2018-01-29 PROCEDURE — 99284 EMERGENCY DEPT VISIT MOD MDM: CPT | Performed by: FAMILY MEDICINE

## 2018-01-29 PROCEDURE — 85025 COMPLETE CBC W/AUTO DIFF WBC: CPT | Performed by: FAMILY MEDICINE

## 2018-01-29 PROCEDURE — 83735 ASSAY OF MAGNESIUM: CPT | Performed by: FAMILY MEDICINE

## 2018-01-29 RX ORDER — CYCLOSPORINE 25 MG/1
25 CAPSULE, LIQUID FILLED ORAL 2 TIMES DAILY
Qty: 60 CAPSULE | Refills: 11 | Status: SHIPPED | OUTPATIENT
Start: 2018-01-29 | End: 2018-02-19

## 2018-01-29 RX ORDER — LIDOCAINE 40 MG/G
CREAM TOPICAL
Status: DISCONTINUED | OUTPATIENT
Start: 2018-01-29 | End: 2018-01-29 | Stop reason: HOSPADM

## 2018-01-29 ASSESSMENT — ENCOUNTER SYMPTOMS
FEVER: 0
COUGH: 0

## 2018-01-29 NOTE — TELEPHONE ENCOUNTER
Spoke to patient with  and she confirmed 12 hour CSA level, will take 25 mg twice per day and has follow up lab scheduled. Patient does complain of pain with urination and advised to see PCP today for this issue, patient agreed.

## 2018-01-29 NOTE — TELEPHONE ENCOUNTER
Cyclosporine 133, goal per protocol 75  Please call pt to verify this was a good trough level. Confirm dose 50 mg BID. Decrease cyclosporine to 25 mg BID. Repeat labs in 1-2 weeks.

## 2018-01-29 NOTE — ED AVS SNAPSHOT
[facility], [place], Emergency Department    [address] [address]    Phone:  [phone]                                       Joshua Lala   MRN: [id number]    Department:  [facility], Emergency Department   Date of Visit:  1/29/2018           After Visit Summary Signature Page     I have received my discharge instructions, and my questions have been answered. I have discussed any challenges I see with this plan with the nurse or doctor.    ..........................................................................................................................................  Patient/Patient Representative Signature      ..........................................................................................................................................  Patient Representative Print Name and Relationship to Patient    ..................................................               ................................................  Date                                            Time    ..........................................................................................................................................  Reviewed by Signature/Title    ...................................................              ..............................................  Date                                                            Time

## 2018-01-29 NOTE — ED AVS SNAPSHOT
[facility], Emergency Department    [address] [address]    Phone:  [phone]                                       Joshua Lala   MRN: [id number]    Department:  [facility], [place], Emergency Department   Date of Visit:  1/29/2018           Patient Information     Date Of Birth          1965        Your diagnoses for this visit were:     Kidney transplanted     Laboratory test        You were seen by Chandra Walsh MD.      Follow-up Information     Follow up with Bandar Romero.    Specialty:  Family Practice    Contact information:    [facility]  [address]  [facility] [address]  [phone]          Discharge Instructions       Home.  Your labs looked good/stable.  Call you transplant coordinator tomorrow to review lab tests.  See MD Friday as planned.  Return if any concerns.    Results for orders placed or performed during the hospital encounter of 01/29/18   CBC with platelets differential   Result Value Ref Range    WBC 4.2 4.0 - 11.0 10e9/L    RBC Count 3.71 (L) 3.8 - 5.2 10e12/L    Hemoglobin 10.3 (L) 11.7 - 15.7 g/dL    Hematocrit 33.2 (L) 35.0 - 47.0 %    MCV 90 78 - 100 fl    MCH 27.8 26.5 - 33.0 pg    MCHC 31.0 (L) 31.5 - 36.5 g/dL    RDW 13.1 10.0 - 15.0 %    Platelet Count 156 150 - 450 10e9/L    Diff Method Automated Method     % Neutrophils 60.4 %    % Lymphocytes 28.8 %    % Monocytes 8.7 %    % Eosinophils 1.9 %    % Basophils 0.2 %    % Immature Granulocytes 0.0 %    Nucleated RBCs 0 0 /100    Absolute Neutrophil 2.5 1.6 - 8.3 10e9/L    Absolute Lymphocytes 1.2 0.8 - 5.3 10e9/L    Absolute Monocytes 0.4 0.0 - 1.3 10e9/L    Absolute Eosinophils 0.1 0.0 - 0.7 10e9/L    Absolute Basophils 0.0 0.0 - 0.2 10e9/L    Abs Immature Granulocytes 0.0 0 - 0.4 10e9/L    Absolute Nucleated RBC 0.0    Comprehensive metabolic panel   Result Value Ref Range    Sodium 140 133 - 144 mmol/L    Potassium 4.6 3.4 - 5.3 mmol/L    Chloride 111 (H) 94 - 109 mmol/L    Carbon  Dioxide 20 20 - 32 mmol/L    Anion Gap 9 3 - 14 mmol/L    Glucose 92 70 - 99 mg/dL    Urea Nitrogen 14 7 - 30 mg/dL    Creatinine 1.48 (H) 0.52 - 1.04 mg/dL    GFR Estimate 37 (L) >60 mL/min/1.7m2    GFR Estimate If Black 45 (L) >60 mL/min/1.7m2    Calcium 9.6 8.5 - 10.1 mg/dL    Bilirubin Total 0.9 0.2 - 1.3 mg/dL    Albumin 3.6 3.4 - 5.0 g/dL    Protein Total 7.9 6.8 - 8.8 g/dL    Alkaline Phosphatase 147 40 - 150 U/L    ALT 15 0 - 50 U/L    AST 26 0 - 45 U/L   Magnesium   Result Value Ref Range    Magnesium 1.7 1.6 - 2.3 mg/dL   Phosphorus   Result Value Ref Range    Phosphorus 2.8 2.5 - 4.5 mg/dL   UA with Microscopic reflex to Culture   Result Value Ref Range    Color Urine Straw     Appearance Urine Clear     Glucose Urine Negative NEG^Negative mg/dL    Bilirubin Urine Negative NEG^Negative    Ketones Urine Negative NEG^Negative mg/dL    Specific Gravity Urine 1.002 (L) 1.003 - 1.035    Blood Urine Negative NEG^Negative    pH Urine 6.0 5.0 - 7.0 pH    Protein Albumin Urine 30 (A) NEG^Negative mg/dL    Urobilinogen mg/dL Normal 0.0 - 2.0 mg/dL    Nitrite Urine Negative NEG^Negative    Leukocyte Esterase Urine Small (A) NEG^Negative    Source Clean catch urine     WBC Urine 5 (H) 0 - 2 /HPF    RBC Urine 0 0 - 2 /HPF           Future Appointments        Provider Department Dept Phone Center    2/2/2018 7:45 AM Lab [facility] Lab [phone] [facility]    2/2/2018 8:40 AM Dipak Meehan MD [facility] Nephrology [phone] [facility]      24 Hour Appointment Hotline       To make an appointment at any [facility], call [phone] ([phone]). If you don't have a family doctor or clinic, we will help you find one. [facility] (formerly [facility]] are conveniently located to serve the needs of you and your family.             Review of your medicines      Our records show that you are taking the medicines listed below. If these are incorrect, please call your family doctor or clinic.        Dose / Directions Last dose taken     amLODIPine 5 MG tablet   Commonly known as:  NORVASC   Dose:  5 mg   Quantity:  30 tablet        Take 1 tablet (5 mg) by mouth daily   Refills:  11        cycloSPORINE modified 25 MG capsule   Dose:  25 mg   Quantity:  60 capsule        Take 1 capsule (25 mg) by mouth 2 times daily   Refills:  11        FIRST-HARINDER MOUTHWASH Susp   Dose:  5-10 mL   Quantity:  237 mL        Swish and swallow 5-10 mLs in mouth every 6 hours as needed   Refills:  1        guaiFENesin 600 MG 12 hr tablet   Commonly known as:  MUCINEX   Dose:  600 mg        Take 600 mg by mouth as needed   Refills:  0        * mycophenolate 250 MG capsule   Dose:  1000 mg   Quantity:  240 capsule        Take 4 capsules (1,000 mg) by mouth 2 times daily   Refills:  6        * mycophenolate 250 MG capsule   Dose:  1000 mg   Quantity:  240 capsule        Take 4 capsules (1,000 mg) by mouth 2 times daily   Refills:  11        SENSIPAR 30 MG tablet   Quantity:  30 tablet   Generic drug:  cinacalcet        TAKE ONE-HALF TABLET BY MOUTH EVERY OTHER DAY   Refills:  4        sulfamethoxazole-trimethoprim 400-80 MG per tablet   Commonly known as:  BACTRIM/SEPTRA   Quantity:  30 tablet        TAKE ONE TABLET BY MOUTH EVERY DAY   Refills:  11        * Notice:  This list has 2 medication(s) that are the same as other medications prescribed for you. Read the directions carefully, and ask your doctor or other care provider to review them with you.            Procedures and tests performed during your visit     CBC with platelets differential    Comprehensive metabolic panel    Magnesium    Peripheral IV catheter    Phosphorus    UA with Microscopic reflex to Culture      Orders Needing Specimen Collection     None      Pending Results     No orders found from 1/27/2018 to 1/30/2018.            Pending Culture Results     No orders found from 1/27/2018 to 1/30/2018.            Pending Results Instructions     If you had any lab results that were not finalized at the  "time of your Discharge, you can call the ED Lab Result RN at [phone]. You will be contacted by this team for any positive Lab results or changes in treatment. The nurses are available 7 days a week from 10A to 6:30P.  You can leave a message 24 hours per day and they will return your call.        Thank you for choosing [facility]       Thank you for choosing [facility] for your care. Our goal is always to provide you with excellent care. Hearing back from our patients is one way we can continue to improve our services. Please take a few minutes to complete the written survey that you may receive in the mail after you visit with us. Thank you!        [employer] Information     [facility] lets you send messages to your doctor, view your test results, renew your prescriptions, schedule appointments and more. To sign up, go to [email or web address] . Click on \"Log in\" on the left side of the screen, which will take you to the Welcome page. Then click on \"Sign up Now\" on the right side of the page.     You will be asked to enter the access code listed below, as well as some personal information. Please follow the directions to create your username and password.     Your access code is: [email or web address]  Expires: 2018  6:30 AM     Your access code will  in 90 days. If you need help or a new code, please call your [facility] clinic or [phone].        Care EveryWhere ID     This is your Care EveryWhere ID. This could be used by other organizations to access your [facility] medical records  [id number]        Equal Access to Services     [facility] HospitalSHANNA : Hadii lorrie cervantes Sostacy, waaxda luqadaha, qaybta kaalmada valarie, ervin pichardo . So [facility] [phone].    ATENCIÓN: Si habla español, tiene a dobbs disposición servicios gratuitos de asistencia lingüística. Llame al [phone].    We comply with applicable federal civil rights laws and [place] laws. We do not discriminate on the " basis of race, color, national origin, age, disability, sex, sexual orientation, or gender identity.            After Visit Summary       This is your record. Keep this with you and show to your community pharmacist(s) and doctor(s) at your next visit.

## 2018-01-29 NOTE — ED NOTES
Pt arrived to the ED with c/o painfulness with urination for about three days. Pt also has a history of Kidney transplant and was informed to reduce her transplant medications due to high medication levels in her blood. On arrival /84. All other vitals stable, afebrile. Hmong  used during triage.

## 2018-01-29 NOTE — ED PROVIDER NOTES
History   No chief complaint on file.  HPI interpreted by patient's daughter per her request.   HPI  Joshua Lala is a 52 year old female with a history of kidney txp (2007), CKD,  parathyroid related hypercalcemia, and Henoch-Schonlein among others who presents to the ED with follow up after abnormal lab results last Friday, 3 days ago. Patient states she had high kidney function results. She does report she has had decreased urine output since last night and has a bit of right lower leg swelling. She otherwise currently denies any fever, cough, or chest pain.  Patient otherwise denies chest pain shortness of breath is noted.  No fevers or chills.  Generalized weakness without focal findings noted.  Now presents with her daughter who does interpret.    PAST MEDICAL HISTORY  Past Medical History:   Diagnosis Date     Dehydration      Henoch-Schonlein purpura (H)      Hypercalcemia      Hypomagnesaemia      Renal disease      Tertiary hyperparathyroidism (H)      PAST SURGICAL HISTORY  Past Surgical History:   Procedure Laterality Date     CHOLECYSTECTOMY       renal transplant  1998, 2007     TRANSPLANT      Kidney 2008     FAMILY HISTORY  No family history on file.  SOCIAL HISTORY  Social History   Substance Use Topics     Smoking status: Never Smoker     Smokeless tobacco: Never Used     Alcohol use No     MEDICATIONS  No current facility-administered medications for this encounter.      Current Outpatient Prescriptions   Medication     NEORAL (BRAND) 25 MG CAPSULE     amLODIPine (NORVASC) 5 MG tablet     CELLCEPT 250 MG PO CAPSULE     SENSIPAR 30 MG tablet     CELLCEPT 250 MG PO CAPSULE     sulfamethoxazole-trimethoprim (BACTRIM,SEPTRA) 400-80 MG per tablet     guaiFENesin (MUCINEX) 600 MG 12 hr tablet     Diphenhyd-HC-Nystatin-Tetracyc (FIRST-[facility] MOUTHWASH) SUSP     ALLERGIES  Allergies   Allergen Reactions     Contrast Dye Shortness Of Breath     No Clinical Screening - See Comments Shortness Of Breath       I  have reviewed the Medications, Allergies, Past Medical and Surgical History, and Social History in the Epic system.    Review of Systems   Constitutional: Positive for activity change, appetite change and fatigue. Negative for fever.   HENT: Negative for sore throat and trouble swallowing.    Eyes: Negative for visual disturbance.   Respiratory: Negative for cough, choking and shortness of breath.    Cardiovascular: Positive for leg swelling. Negative for chest pain.        Patient has noticed some mild lower extremity swelling without pain.   Gastrointestinal: Negative for diarrhea, nausea and vomiting.   Genitourinary: Positive for decreased urine volume. Negative for dysuria.   Musculoskeletal: Negative for gait problem.   Skin: Negative for rash.   Allergic/Immunologic: Positive for immunocompromised state.   Neurological: Positive for weakness.        Generalized   Psychiatric/Behavioral: Positive for decreased concentration and dysphoric mood.   All other systems reviewed and are negative.      Physical Exam   BP: 189/84  Pulse: 80  Heart Rate: 74  Temp: 98.8  F (37.1  C)  Resp: 16  Weight: 71 kg (156 lb 8 oz)  SpO2: 98 %      Physical Exam   Constitutional: She is oriented to person, place, and time. She appears well-developed and well-nourished. She appears distressed.   Patient minimal distress this point daughter does interpret.   HENT:   Head: Normocephalic and atraumatic.   Eyes: EOM are normal. Pupils are equal, round, and reactive to light. No scleral icterus.   Neck: Normal range of motion. Neck supple.   Cardiovascular: Regular rhythm.    Pulmonary/Chest: No stridor. No respiratory distress. She has no rales.   Abdominal: She exhibits no distension. There is no tenderness.   Musculoskeletal: She exhibits edema.   Minimal edema lower extremities without Homans sign   Neurological: She is alert and oriented to person, place, and time. She has normal reflexes. No cranial nerve deficit. Coordination  normal.   No focal findings   Skin: Skin is warm and dry. No rash noted. She is not diaphoretic. No erythema. No pallor.   Psychiatric:   Mildly flat otherwise appropriate   Nursing note and vitals reviewed.      ED Course     ED Course     Procedures   5:47 PM  The patient was seen and examined by Dr. Walsh in Room 19.           Her daughter to interpret.  Patient evaluated the ER.  Patient had IV established.  Labs are drawn.  Patient did receive a liter normal saline bolus in the ER.  Findings reveal white count 4.2 hemoglobin 10.3.  Sodium 140 potassium 4.6 chloride 111 creatinine 1.48 which is improved.  Glucose 92 bicarb is 20.  Magnesium 1.7 phosphorus 2.8.  Calcium 9.6.  Urinalysis did show 5 white cells no significant signs of infection at this point.  The ER patient otherwise doing well discussed lab findings which appear to be improved from previous.  No abnormal findings or any other concerns at this point she is not chest pain or any other neurological findings.  This point patient comfortable going home feeling better will follow up with transplant coordinator tomorrow to review laboratory findings and further recommendations return if any concerns.  Both patient and daughter agree.    Critical Care time:  none             Labs Ordered and Resulted from Time of ED Arrival Up to the Time of Departure from the ED   CBC WITH PLATELETS DIFFERENTIAL - Abnormal; Notable for the following:        Result Value    RBC Count 3.71 (*)     Hemoglobin 10.3 (*)     Hematocrit 33.2 (*)     MCHC 31.0 (*)     All other components within normal limits   COMPREHENSIVE METABOLIC PANEL - Abnormal; Notable for the following:     Chloride 111 (*)     Creatinine 1.48 (*)     GFR Estimate 37 (*)     GFR Estimate If Black 45 (*)     All other components within normal limits   ROUTINE UA WITH MICROSCOPIC REFLEX TO CULTURE - Abnormal; Notable for the following:     Specific Gravity Urine 1.002 (*)     Protein Albumin Urine 30 (*)      Leukocyte Esterase Urine Small (*)     WBC Urine 5 (*)     All other components within normal limits   MAGNESIUM   PHOSPHORUS   PERIPHERAL IV CATHETER     Results for orders placed or performed during the hospital encounter of 01/29/18   CBC with platelets differential   Result Value Ref Range    WBC 4.2 4.0 - 11.0 10e9/L    RBC Count 3.71 (L) 3.8 - 5.2 10e12/L    Hemoglobin 10.3 (L) 11.7 - 15.7 g/dL    Hematocrit 33.2 (L) 35.0 - 47.0 %    MCV 90 78 - 100 fl    MCH 27.8 26.5 - 33.0 pg    MCHC 31.0 (L) 31.5 - 36.5 g/dL    RDW 13.1 10.0 - 15.0 %    Platelet Count 156 150 - 450 10e9/L    Diff Method Automated Method     % Neutrophils 60.4 %    % Lymphocytes 28.8 %    % Monocytes 8.7 %    % Eosinophils 1.9 %    % Basophils 0.2 %    % Immature Granulocytes 0.0 %    Nucleated RBCs 0 0 /100    Absolute Neutrophil 2.5 1.6 - 8.3 10e9/L    Absolute Lymphocytes 1.2 0.8 - 5.3 10e9/L    Absolute Monocytes 0.4 0.0 - 1.3 10e9/L    Absolute Eosinophils 0.1 0.0 - 0.7 10e9/L    Absolute Basophils 0.0 0.0 - 0.2 10e9/L    Abs Immature Granulocytes 0.0 0 - 0.4 10e9/L    Absolute Nucleated RBC 0.0    Comprehensive metabolic panel   Result Value Ref Range    Sodium 140 133 - 144 mmol/L    Potassium 4.6 3.4 - 5.3 mmol/L    Chloride 111 (H) 94 - 109 mmol/L    Carbon Dioxide 20 20 - 32 mmol/L    Anion Gap 9 3 - 14 mmol/L    Glucose 92 70 - 99 mg/dL    Urea Nitrogen 14 7 - 30 mg/dL    Creatinine 1.48 (H) 0.52 - 1.04 mg/dL    GFR Estimate 37 (L) >60 mL/min/1.7m2    GFR Estimate If Black 45 (L) >60 mL/min/1.7m2    Calcium 9.6 8.5 - 10.1 mg/dL    Bilirubin Total 0.9 0.2 - 1.3 mg/dL    Albumin 3.6 3.4 - 5.0 g/dL    Protein Total 7.9 6.8 - 8.8 g/dL    Alkaline Phosphatase 147 40 - 150 U/L    ALT 15 0 - 50 U/L    AST 26 0 - 45 U/L   Magnesium   Result Value Ref Range    Magnesium 1.7 1.6 - 2.3 mg/dL   Phosphorus   Result Value Ref Range    Phosphorus 2.8 2.5 - 4.5 mg/dL   UA with Microscopic reflex to Culture   Result Value Ref Range    Color  Urine Straw     Appearance Urine Clear     Glucose Urine Negative NEG^Negative mg/dL    Bilirubin Urine Negative NEG^Negative    Ketones Urine Negative NEG^Negative mg/dL    Specific Gravity Urine 1.002 (L) 1.003 - 1.035    Blood Urine Negative NEG^Negative    pH Urine 6.0 5.0 - 7.0 pH    Protein Albumin Urine 30 (A) NEG^Negative mg/dL    Urobilinogen mg/dL Normal 0.0 - 2.0 mg/dL    Nitrite Urine Negative NEG^Negative    Leukocyte Esterase Urine Small (A) NEG^Negative    Source Clean catch urine     WBC Urine 5 (H) 0 - 2 /HPF    RBC Urine 0 0 - 2 /HPF              Assessments & Plan (with Medical Decision Making)  52-year-old female history of previous kidney transplant presents for lab recheck.  Patient has some mild general fatigue but no chest pain no signs of neurological focal findings.  No sign of infection.  Patient evaluated in the ER.  I did review records in [facility].  Repeated laboratory testing patient's recent CellCept level was normal.  Patient repeat CBC chemistries reveal improved creatinine function 1.48.  Other testing such as calcium magnesium phosphorus normal urine displays no sign of infection.  Patient is point feeling better discharged follow-up assessment coronary tomorrow return if any concerns.         I have reviewed the nursing notes.    I have reviewed the findings, diagnosis, plan and need for follow up with the patient.    Discharge Medication List as of 1/29/2018  8:12 PM          Final diagnoses:   Kidney transplanted   Laboratory test     INeil, am serving as a trained medical scribe to document services personally performed by Chandra Walsh MD, based on the provider's statements to me.      IChandra MD, was physically present and have reviewed and verified the accuracy of this note documented by Neil Rivas.     1/29/2018   [facility] EMERGENCY DEPARTMENT    This note was created at least in part by the use of dragon voice dictation system. Inadvertent  typographical errors may still exist.  Chandra Walsh MD.         Chandra Walsh MD  01/30/18 2971

## 2018-01-30 ENCOUNTER — TELEPHONE (OUTPATIENT)
Dept: TRANSPLANT | Facility: CLINIC | Age: 53
End: 2018-01-30

## 2018-01-30 ASSESSMENT — ENCOUNTER SYMPTOMS
SORE THROAT: 0
FATIGUE: 1
CHOKING: 0
SHORTNESS OF BREATH: 0
DIARRHEA: 0
ACTIVITY CHANGE: 1
DYSPHORIC MOOD: 1
VOMITING: 0
NAUSEA: 0
APPETITE CHANGE: 1
TROUBLE SWALLOWING: 0
DECREASED CONCENTRATION: 1
WEAKNESS: 1
DYSURIA: 0

## 2018-01-30 NOTE — TELEPHONE ENCOUNTER
"Spoke with Nichelle. Her mother is feeling \"fine\" today. Has appointment with her MD on Friday. Will let us know if anything changes.   "

## 2018-01-30 NOTE — TELEPHONE ENCOUNTER
Patient Call: Transplant Illness  Duration of illness: Went to the ED yesterday 01/29/18.   Regarding Joshua's labs  Please call Daughter Nichelle Holly # [phone]

## 2018-01-30 NOTE — DISCHARGE INSTRUCTIONS
Home.  Your labs looked good/stable.  Call you transplant coordinator tomorrow to review lab tests.  See MD Friday as planned.  Return if any concerns.    Results for orders placed or performed during the hospital encounter of 01/29/18   CBC with platelets differential   Result Value Ref Range    WBC 4.2 4.0 - 11.0 10e9/L    RBC Count 3.71 (L) 3.8 - 5.2 10e12/L    Hemoglobin 10.3 (L) 11.7 - 15.7 g/dL    Hematocrit 33.2 (L) 35.0 - 47.0 %    MCV 90 78 - 100 fl    MCH 27.8 26.5 - 33.0 pg    MCHC 31.0 (L) 31.5 - 36.5 g/dL    RDW 13.1 10.0 - 15.0 %    Platelet Count 156 150 - 450 10e9/L    Diff Method Automated Method     % Neutrophils 60.4 %    % Lymphocytes 28.8 %    % Monocytes 8.7 %    % Eosinophils 1.9 %    % Basophils 0.2 %    % Immature Granulocytes 0.0 %    Nucleated RBCs 0 0 /100    Absolute Neutrophil 2.5 1.6 - 8.3 10e9/L    Absolute Lymphocytes 1.2 0.8 - 5.3 10e9/L    Absolute Monocytes 0.4 0.0 - 1.3 10e9/L    Absolute Eosinophils 0.1 0.0 - 0.7 10e9/L    Absolute Basophils 0.0 0.0 - 0.2 10e9/L    Abs Immature Granulocytes 0.0 0 - 0.4 10e9/L    Absolute Nucleated RBC 0.0    Comprehensive metabolic panel   Result Value Ref Range    Sodium 140 133 - 144 mmol/L    Potassium 4.6 3.4 - 5.3 mmol/L    Chloride 111 (H) 94 - 109 mmol/L    Carbon Dioxide 20 20 - 32 mmol/L    Anion Gap 9 3 - 14 mmol/L    Glucose 92 70 - 99 mg/dL    Urea Nitrogen 14 7 - 30 mg/dL    Creatinine 1.48 (H) 0.52 - 1.04 mg/dL    GFR Estimate 37 (L) >60 mL/min/1.7m2    GFR Estimate If Black 45 (L) >60 mL/min/1.7m2    Calcium 9.6 8.5 - 10.1 mg/dL    Bilirubin Total 0.9 0.2 - 1.3 mg/dL    Albumin 3.6 3.4 - 5.0 g/dL    Protein Total 7.9 6.8 - 8.8 g/dL    Alkaline Phosphatase 147 40 - 150 U/L    ALT 15 0 - 50 U/L    AST 26 0 - 45 U/L   Magnesium   Result Value Ref Range    Magnesium 1.7 1.6 - 2.3 mg/dL   Phosphorus   Result Value Ref Range    Phosphorus 2.8 2.5 - 4.5 mg/dL   UA with Microscopic reflex to Culture   Result Value Ref Range    Color Urine  Straw     Appearance Urine Clear     Glucose Urine Negative NEG^Negative mg/dL    Bilirubin Urine Negative NEG^Negative    Ketones Urine Negative NEG^Negative mg/dL    Specific Gravity Urine 1.002 (L) 1.003 - 1.035    Blood Urine Negative NEG^Negative    pH Urine 6.0 5.0 - 7.0 pH    Protein Albumin Urine 30 (A) NEG^Negative mg/dL    Urobilinogen mg/dL Normal 0.0 - 2.0 mg/dL    Nitrite Urine Negative NEG^Negative    Leukocyte Esterase Urine Small (A) NEG^Negative    Source Clean catch urine     WBC Urine 5 (H) 0 - 2 /HPF    RBC Urine 0 0 - 2 /HPF

## 2018-02-02 ENCOUNTER — OFFICE VISIT (OUTPATIENT)
Dept: NEPHROLOGY | Facility: CLINIC | Age: 53
End: 2018-02-02
Attending: INTERNAL MEDICINE
Payer: MEDICARE

## 2018-02-02 VITALS
DIASTOLIC BLOOD PRESSURE: 77 MMHG | HEART RATE: 65 BPM | SYSTOLIC BLOOD PRESSURE: 166 MMHG | RESPIRATION RATE: 20 BRPM | BODY MASS INDEX: 30.15 KG/M2 | WEIGHT: 153.6 LBS | HEIGHT: 60 IN

## 2018-02-02 DIAGNOSIS — I10 ESSENTIAL HYPERTENSION: ICD-10-CM

## 2018-02-02 DIAGNOSIS — E83.52 PARATHYROID RELATED HYPERCALCEMIA (H): ICD-10-CM

## 2018-02-02 DIAGNOSIS — Z94.0 S/P KIDNEY TRANSPLANT: ICD-10-CM

## 2018-02-02 DIAGNOSIS — Z94.0 KIDNEY REPLACED BY TRANSPLANT: ICD-10-CM

## 2018-02-02 DIAGNOSIS — N18.30 CKD (CHRONIC KIDNEY DISEASE) STAGE 3, GFR 30-59 ML/MIN (H): Primary | ICD-10-CM

## 2018-02-02 DIAGNOSIS — Z79.899 ENCOUNTER FOR LONG-TERM CURRENT USE OF MEDICATION: ICD-10-CM

## 2018-02-02 DIAGNOSIS — Z23 INFLUENZA VACCINE NEEDED: ICD-10-CM

## 2018-02-02 DIAGNOSIS — Z48.298 AFTERCARE FOLLOWING ORGAN TRANSPLANT: ICD-10-CM

## 2018-02-02 DIAGNOSIS — E21.5 PARATHYROID RELATED HYPERCALCEMIA (H): ICD-10-CM

## 2018-02-02 LAB
ANION GAP SERPL CALCULATED.3IONS-SCNC: 6 MMOL/L (ref 3–14)
BUN SERPL-MCNC: 10 MG/DL (ref 7–30)
CALCIUM SERPL-MCNC: 9.2 MG/DL (ref 8.5–10.1)
CHLORIDE SERPL-SCNC: 111 MMOL/L (ref 94–109)
CO2 SERPL-SCNC: 24 MMOL/L (ref 20–32)
CREAT SERPL-MCNC: 1.63 MG/DL (ref 0.52–1.04)
CREAT UR-MCNC: 26 MG/DL
CYCLOSPORINE BLD LC/MS/MS-MCNC: 86 UG/L (ref 50–400)
ERYTHROCYTE [DISTWIDTH] IN BLOOD BY AUTOMATED COUNT: 12.6 % (ref 10–15)
GFR SERPL CREATININE-BSD FRML MDRD: 33 ML/MIN/1.7M2
GLUCOSE SERPL-MCNC: 89 MG/DL (ref 70–99)
HCT VFR BLD AUTO: 33.7 % (ref 35–47)
HGB BLD-MCNC: 10.6 G/DL (ref 11.7–15.7)
MCH RBC QN AUTO: 28 PG (ref 26.5–33)
MCHC RBC AUTO-ENTMCNC: 31.5 G/DL (ref 31.5–36.5)
MCV RBC AUTO: 89 FL (ref 78–100)
PLATELET # BLD AUTO: 150 10E9/L (ref 150–450)
POTASSIUM SERPL-SCNC: 4.4 MMOL/L (ref 3.4–5.3)
PROT UR-MCNC: 0.6 G/L
PROT/CREAT 24H UR: 2.31 G/G CR (ref 0–0.2)
RBC # BLD AUTO: 3.79 10E12/L (ref 3.8–5.2)
SODIUM SERPL-SCNC: 141 MMOL/L (ref 133–144)
TME LAST DOSE: NORMAL H
WBC # BLD AUTO: 3 10E9/L (ref 4–11)

## 2018-02-02 PROCEDURE — 90686 IIV4 VACC NO PRSV 0.5 ML IM: CPT | Mod: ZF | Performed by: INTERNAL MEDICINE

## 2018-02-02 PROCEDURE — G0008 ADMIN INFLUENZA VIRUS VAC: HCPCS | Mod: ZF

## 2018-02-02 PROCEDURE — 80048 BASIC METABOLIC PNL TOTAL CA: CPT | Performed by: INTERNAL MEDICINE

## 2018-02-02 PROCEDURE — 84156 ASSAY OF PROTEIN URINE: CPT | Performed by: INTERNAL MEDICINE

## 2018-02-02 PROCEDURE — 80158 DRUG ASSAY CYCLOSPORINE: CPT | Performed by: INTERNAL MEDICINE

## 2018-02-02 PROCEDURE — 25000128 H RX IP 250 OP 636: Mod: ZF | Performed by: INTERNAL MEDICINE

## 2018-02-02 PROCEDURE — 36415 COLL VENOUS BLD VENIPUNCTURE: CPT | Performed by: INTERNAL MEDICINE

## 2018-02-02 PROCEDURE — 85027 COMPLETE CBC AUTOMATED: CPT | Performed by: INTERNAL MEDICINE

## 2018-02-02 PROCEDURE — G0463 HOSPITAL OUTPT CLINIC VISIT: HCPCS | Mod: ZF

## 2018-02-02 RX ORDER — LISINOPRIL 5 MG/1
5 TABLET ORAL DAILY
Qty: 30 TABLET | Refills: 11 | Status: ON HOLD | OUTPATIENT
Start: 2018-02-02 | End: 2019-04-08

## 2018-02-02 RX ADMIN — INFLUENZA A VIRUS A/MICHIGAN/45/2015 X-275 (H1N1) ANTIGEN (FORMALDEHYDE INACTIVATED), INFLUENZA A VIRUS A/HONG KONG/4801/2014 X-263B (H3N2) ANTIGEN (FORMALDEHYDE INACTIVATED), INFLUENZA B VIRUS B/PHUKET/3073/2013 ANTIGEN (FORMALDEHYDE INACTIVATED), AND INFLUENZA B VIRUS B/BRISBANE/60/2008 ANTIGEN (FORMALDEHYDE INACTIVATED) 0.5 ML: 15; 15; 15; 15 INJECTION, SUSPENSION INTRAMUSCULAR at 09:29

## 2018-02-02 ASSESSMENT — PAIN SCALES - GENERAL: PAINLEVEL: NO PAIN (0)

## 2018-02-02 NOTE — PROGRESS NOTES
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    HPI:  Patient interviewed with . Has had no interval health issues and no real health concerns today. Appetite poor but weight stable. Mild edema at end of day. No abd pain. Has not yet had flu vaccine this year. Takes her BP meds. Had cyclosporine dose reduced this week because CSA level still > 100.    Baseline creat 1.3-1.6 (since 2003 or before).    ROS: A comprehensive review of systems was performed and was negative except as noted in the HPI.    Patient Active Problem List   Diagnosis     S/P kidney transplant     Parathyroid related hypercalcemia (H)     Encounter for long-term current use of medication     CKD (chronic kidney disease) stage 3, GFR 30-59 ml/min     Flu syndrome     Influenza     Chest pain     Shortness of breath     Acute kidney injury (H)     Acute pharyngitis     Weakness     Diarrhea     Decreased urine volume     Essential hypertension     Chest pain at rest     Chest pain, non-cardiac     Personal Hx:   Social History     Social History     Marital status:      Spouse name: N/A     Number of children: N/A     Years of education: N/A     Occupational History     Not on file.     Social History Main Topics     Smoking status: Never Smoker     Smokeless tobacco: Never Used     Alcohol use No     Drug use: No     Sexual activity: Not on file      Comment: ERINN- need      Other Topics Concern     Not on file     Social History Narrative       Pain Score this Visit: None    Allergies   Allergen Reactions     Contrast Dye Shortness Of Breath     No Clinical Screening - See Comments Shortness Of Breath     Medications:  Current Outpatient Prescriptions   Medication     NEORAL (BRAND) 25 MG CAPSULE     guaiFENesin (MUCINEX) 600 MG 12 hr tablet     amLODIPine (NORVASC) 5 MG tablet     CELLCEPT 250 MG PO CAPSULE     Diphenhyd-HC-Nystatin-Tetracyc  ([facility] MOUTHWASH) SUSP     SENSIPAR 30 MG tablet     CELLCEPT 250 MG PO CAPSULE     sulfamethoxazole-trimethoprim (BACTRIM,SEPTRA) 400-80 MG per tablet     No current facility-administered medications for this visit.      Vitals:  /77  Pulse 65  Resp 20  Ht 1.524 m (5')  Wt 69.7 kg (153 lb 9.6 oz)  BMI 30 kg/m2     Exam:   Alert, in no acute distress.  HEENT: Eyes, ears, nose, mouth grossly normal  Nodes: No cervical adenopathy  Chest: Clear to percussion and auscultation. Normal breath sounds bilaterally  Heart: Regular rate and rhythm. Normal S1 and S2, 3/6 GISSEL LUSB, no gallop or rub  Abdomen: Normal bowel sounds. No masses or tenderness. Graft palpable in RLQ, firm and not tender. Nl size, no bruit.  Extremities: No edema; large tortuous LUE fistula  Neuro: Oriented. Normal gait. No tremor.  Skin: No lesions noted  Psych - normally responsive      Results:   Recent Results (from the past 168 hour(s))   UA with Microscopic reflex to Culture    Collection Time: 01/29/18  6:07 PM   Result Value Ref Range    Color Urine Straw     Appearance Urine Clear     Glucose Urine Negative NEG^Negative mg/dL    Bilirubin Urine Negative NEG^Negative    Ketones Urine Negative NEG^Negative mg/dL    Specific Gravity Urine 1.002 (L) 1.003 - 1.035    Blood Urine Negative NEG^Negative    pH Urine 6.0 5.0 - 7.0 pH    Protein Albumin Urine 30 (A) NEG^Negative mg/dL    Urobilinogen mg/dL Normal 0.0 - 2.0 mg/dL    Nitrite Urine Negative NEG^Negative    Leukocyte Esterase Urine Small (A) NEG^Negative    Source Clean catch urine     WBC Urine 5 (H) 0 - 2 /HPF    RBC Urine 0 0 - 2 /HPF   CBC with platelets differential    Collection Time: 01/29/18  7:01 PM   Result Value Ref Range    WBC 4.2 4.0 - 11.0 10e9/L    RBC Count 3.71 (L) 3.8 - 5.2 10e12/L    Hemoglobin 10.3 (L) 11.7 - 15.7 g/dL    Hematocrit 33.2 (L) 35.0 - 47.0 %    MCV 90 78 - 100 fl    MCH 27.8 26.5 - 33.0 pg    MCHC 31.0 (L) 31.5 - 36.5 g/dL    RDW 13.1 10.0 -  15.0 %    Platelet Count 156 150 - 450 10e9/L    Diff Method Automated Method     % Neutrophils 60.4 %    % Lymphocytes 28.8 %    % Monocytes 8.7 %    % Eosinophils 1.9 %    % Basophils 0.2 %    % Immature Granulocytes 0.0 %    Nucleated RBCs 0 0 /100    Absolute Neutrophil 2.5 1.6 - 8.3 10e9/L    Absolute Lymphocytes 1.2 0.8 - 5.3 10e9/L    Absolute Monocytes 0.4 0.0 - 1.3 10e9/L    Absolute Eosinophils 0.1 0.0 - 0.7 10e9/L    Absolute Basophils 0.0 0.0 - 0.2 10e9/L    Abs Immature Granulocytes 0.0 0 - 0.4 10e9/L    Absolute Nucleated RBC 0.0    Comprehensive metabolic panel    Collection Time: 01/29/18  7:01 PM   Result Value Ref Range    Sodium 140 133 - 144 mmol/L    Potassium 4.6 3.4 - 5.3 mmol/L    Chloride 111 (H) 94 - 109 mmol/L    Carbon Dioxide 20 20 - 32 mmol/L    Anion Gap 9 3 - 14 mmol/L    Glucose 92 70 - 99 mg/dL    Urea Nitrogen 14 7 - 30 mg/dL    Creatinine 1.48 (H) 0.52 - 1.04 mg/dL    GFR Estimate 37 (L) >60 mL/min/1.7m2    GFR Estimate If Black 45 (L) >60 mL/min/1.7m2    Calcium 9.6 8.5 - 10.1 mg/dL    Bilirubin Total 0.9 0.2 - 1.3 mg/dL    Albumin 3.6 3.4 - 5.0 g/dL    Protein Total 7.9 6.8 - 8.8 g/dL    Alkaline Phosphatase 147 40 - 150 U/L    ALT 15 0 - 50 U/L    AST 26 0 - 45 U/L   Magnesium    Collection Time: 01/29/18  7:01 PM   Result Value Ref Range    Magnesium 1.7 1.6 - 2.3 mg/dL   Phosphorus    Collection Time: 01/29/18  7:01 PM   Result Value Ref Range    Phosphorus 2.8 2.5 - 4.5 mg/dL   CBC with platelets    Collection Time: 02/02/18  7:29 AM   Result Value Ref Range    WBC 3.0 (L) 4.0 - 11.0 10e9/L    RBC Count 3.79 (L) 3.8 - 5.2 10e12/L    Hemoglobin 10.6 (L) 11.7 - 15.7 g/dL    Hematocrit 33.7 (L) 35.0 - 47.0 %    MCV 89 78 - 100 fl    MCH 28.0 26.5 - 33.0 pg    MCHC 31.5 31.5 - 36.5 g/dL    RDW 12.6 10.0 - 15.0 %    Platelet Count 150 150 - 450 10e9/L   Basic metabolic panel    Collection Time: 02/02/18  7:29 AM   Result Value Ref Range    Sodium 141 133 - 144 mmol/L    Potassium  4.4 3.4 - 5.3 mmol/L    Chloride 111 (H) 94 - 109 mmol/L    Carbon Dioxide 24 20 - 32 mmol/L    Anion Gap 6 3 - 14 mmol/L    Glucose 89 70 - 99 mg/dL    Urea Nitrogen 10 7 - 30 mg/dL    Creatinine 1.63 (H) 0.52 - 1.04 mg/dL    GFR Estimate 33 (L) >60 mL/min/1.7m2    GFR Estimate If Black 40 (L) >60 mL/min/1.7m2    Calcium 9.2 8.5 - 10.1 mg/dL   Protein  random urine    Collection Time: 02/02/18  7:42 AM   Result Value Ref Range    Protein Random Urine 0.60 g/L    Protein Total Urine g/gr Creatinine 2.31 (H) 0 - 0.2 g/g Cr   Creatinine urine calculation only    Collection Time: 02/02/18  7:42 AM   Result Value Ref Range    Creatinine Urine 26 mg/dL       Assessment and Plan:     1. DDKT  2. Chronic Kidney Disease (CKD), Stage 3 - Renal function again unchanged.  3. Hypertension - not at goal. Add lisinopril 5 mg daily. If edema gets worse, consider diuretic.   4 Hyperparathyroidism - Ca remains controlled on low dose of cinacalcet (15 mg every other day).   5. IS - working to get CSA level <100.    RTC 4 months.  Assessment and plan was discussed with the patient and daughter.    CC  Patient Care Team:  Tay Romero as PCP - Cindy Gamez RN as Registered Nurse (Transplant)  TAY ROMERO

## 2018-02-02 NOTE — LETTER
2/2/2018      RE: Joshua Lala  [address]  [place] [address]       Reason for Visit:  Joshua Lala is a 52 year old year old female with DDKT 2007 for HSP and CKD stage 3, who presents for f/u.    HPI:  Patient interviewed with . Has had no interval health issues and no real health concerns today. Appetite poor but weight stable. Mild edema at end of day. No abd pain. Has not yet had flu vaccine this year. Takes her BP meds. Had cyclosporine dose reduced this week because CSA level still > 100.    Baseline creat 1.3-1.6 (since 2003 or before).    ROS: A comprehensive review of systems was performed and was negative except as noted in the HPI.    Patient Active Problem List   Diagnosis     S/P kidney transplant     Parathyroid related hypercalcemia (H)     Encounter for long-term current use of medication     CKD (chronic kidney disease) stage 3, GFR 30-59 ml/min     Flu syndrome     Influenza     Chest pain     Shortness of breath     Acute kidney injury (H)     Acute pharyngitis     Weakness     Diarrhea     Decreased urine volume     Essential hypertension     Chest pain at rest     Chest pain, non-cardiac     Personal Hx:   Social History     Social History     Marital status:      Spouse name: N/A     Number of children: N/A     Years of education: N/A     Occupational History     Not on file.     Social History Main Topics     Smoking status: Never Smoker     Smokeless tobacco: Never Used     Alcohol use No     Drug use: No     Sexual activity: Not on file      Comment: ERINN- need      Other Topics Concern     Not on file     Social History Narrative       Pain Score this Visit: None    Allergies   Allergen Reactions     Contrast Dye Shortness Of Breath     No Clinical Screening - See Comments Shortness Of Breath     Medications:  Current Outpatient Prescriptions   Medication     NEORAL (BRAND) 25 MG CAPSULE     guaiFENesin (MUCINEX) 600 MG 12 hr tablet     amLODIPine (NORVASC) 5  MG tablet     CELLCEPT 250 MG PO CAPSULE     Diphenhyd-HC-Nystatin-Tetracyc ([facility] MOUTHWASH) SUSP     SENSIPAR 30 MG tablet     CELLCEPT 250 MG PO CAPSULE     sulfamethoxazole-trimethoprim (BACTRIM,SEPTRA) 400-80 MG per tablet     No current facility-administered medications for this visit.      Vitals:  /77  Pulse 65  Resp 20  Ht 1.524 m (5')  Wt 69.7 kg (153 lb 9.6 oz)  BMI 30 kg/m2     Exam:   Alert, in no acute distress.  HEENT: Eyes, ears, nose, mouth grossly normal  Nodes: No cervical adenopathy  Chest: Clear to percussion and auscultation. Normal breath sounds bilaterally  Heart: Regular rate and rhythm. Normal S1 and S2, 3/6 GISSEL LUSB, no gallop or rub  Abdomen: Normal bowel sounds. No masses or tenderness. Graft palpable in RLQ, firm and not tender. Nl size, no bruit.  Extremities: No edema; large tortuous LUE fistula  Neuro: Oriented. Normal gait. No tremor.  Skin: No lesions noted  Psych - normally responsive      Results:   Recent Results (from the past 168 hour(s))   UA with Microscopic reflex to Culture    Collection Time: 01/29/18  6:07 PM   Result Value Ref Range    Color Urine Straw     Appearance Urine Clear     Glucose Urine Negative NEG^Negative mg/dL    Bilirubin Urine Negative NEG^Negative    Ketones Urine Negative NEG^Negative mg/dL    Specific Gravity Urine 1.002 (L) 1.003 - 1.035    Blood Urine Negative NEG^Negative    pH Urine 6.0 5.0 - 7.0 pH    Protein Albumin Urine 30 (A) NEG^Negative mg/dL    Urobilinogen mg/dL Normal 0.0 - 2.0 mg/dL    Nitrite Urine Negative NEG^Negative    Leukocyte Esterase Urine Small (A) NEG^Negative    Source Clean catch urine     WBC Urine 5 (H) 0 - 2 /HPF    RBC Urine 0 0 - 2 /HPF   CBC with platelets differential    Collection Time: 01/29/18  7:01 PM   Result Value Ref Range    WBC 4.2 4.0 - 11.0 10e9/L    RBC Count 3.71 (L) 3.8 - 5.2 10e12/L    Hemoglobin 10.3 (L) 11.7 - 15.7 g/dL    Hematocrit 33.2 (L) 35.0 - 47.0 %    MCV 90 78 - 100 fl     MCH 27.8 26.5 - 33.0 pg    MCHC 31.0 (L) 31.5 - 36.5 g/dL    RDW 13.1 10.0 - 15.0 %    Platelet Count 156 150 - 450 10e9/L    Diff Method Automated Method     % Neutrophils 60.4 %    % Lymphocytes 28.8 %    % Monocytes 8.7 %    % Eosinophils 1.9 %    % Basophils 0.2 %    % Immature Granulocytes 0.0 %    Nucleated RBCs 0 0 /100    Absolute Neutrophil 2.5 1.6 - 8.3 10e9/L    Absolute Lymphocytes 1.2 0.8 - 5.3 10e9/L    Absolute Monocytes 0.4 0.0 - 1.3 10e9/L    Absolute Eosinophils 0.1 0.0 - 0.7 10e9/L    Absolute Basophils 0.0 0.0 - 0.2 10e9/L    Abs Immature Granulocytes 0.0 0 - 0.4 10e9/L    Absolute Nucleated RBC 0.0    Comprehensive metabolic panel    Collection Time: 01/29/18  7:01 PM   Result Value Ref Range    Sodium 140 133 - 144 mmol/L    Potassium 4.6 3.4 - 5.3 mmol/L    Chloride 111 (H) 94 - 109 mmol/L    Carbon Dioxide 20 20 - 32 mmol/L    Anion Gap 9 3 - 14 mmol/L    Glucose 92 70 - 99 mg/dL    Urea Nitrogen 14 7 - 30 mg/dL    Creatinine 1.48 (H) 0.52 - 1.04 mg/dL    GFR Estimate 37 (L) >60 mL/min/1.7m2    GFR Estimate If Black 45 (L) >60 mL/min/1.7m2    Calcium 9.6 8.5 - 10.1 mg/dL    Bilirubin Total 0.9 0.2 - 1.3 mg/dL    Albumin 3.6 3.4 - 5.0 g/dL    Protein Total 7.9 6.8 - 8.8 g/dL    Alkaline Phosphatase 147 40 - 150 U/L    ALT 15 0 - 50 U/L    AST 26 0 - 45 U/L   Magnesium    Collection Time: 01/29/18  7:01 PM   Result Value Ref Range    Magnesium 1.7 1.6 - 2.3 mg/dL   Phosphorus    Collection Time: 01/29/18  7:01 PM   Result Value Ref Range    Phosphorus 2.8 2.5 - 4.5 mg/dL   CBC with platelets    Collection Time: 02/02/18  7:29 AM   Result Value Ref Range    WBC 3.0 (L) 4.0 - 11.0 10e9/L    RBC Count 3.79 (L) 3.8 - 5.2 10e12/L    Hemoglobin 10.6 (L) 11.7 - 15.7 g/dL    Hematocrit 33.7 (L) 35.0 - 47.0 %    MCV 89 78 - 100 fl    MCH 28.0 26.5 - 33.0 pg    MCHC 31.5 31.5 - 36.5 g/dL    RDW 12.6 10.0 - 15.0 %    Platelet Count 150 150 - 450 10e9/L   Basic metabolic panel    Collection Time: 02/02/18   7:29 AM   Result Value Ref Range    Sodium 141 133 - 144 mmol/L    Potassium 4.4 3.4 - 5.3 mmol/L    Chloride 111 (H) 94 - 109 mmol/L    Carbon Dioxide 24 20 - 32 mmol/L    Anion Gap 6 3 - 14 mmol/L    Glucose 89 70 - 99 mg/dL    Urea Nitrogen 10 7 - 30 mg/dL    Creatinine 1.63 (H) 0.52 - 1.04 mg/dL    GFR Estimate 33 (L) >60 mL/min/1.7m2    GFR Estimate If Black 40 (L) >60 mL/min/1.7m2    Calcium 9.2 8.5 - 10.1 mg/dL   Protein  random urine    Collection Time: 02/02/18  7:42 AM   Result Value Ref Range    Protein Random Urine 0.60 g/L    Protein Total Urine g/gr Creatinine 2.31 (H) 0 - 0.2 g/g Cr   Creatinine urine calculation only    Collection Time: 02/02/18  7:42 AM   Result Value Ref Range    Creatinine Urine 26 mg/dL       Assessment and Plan:     1. DDKT  2. Chronic Kidney Disease (CKD), Stage 3 - Renal function again unchanged.  3. Hypertension - not at goal. Add lisinopril 5 mg daily. If edema gets worse, consider diuretic.   4 Hyperparathyroidism - Ca remains controlled on low dose of cinacalcet (15 mg every other day).   5. IS - working to get CSA level <100.    RTC 4 months.  Assessment and plan was discussed with the patient and daughter.    CC  Patient Care Team:  Bandar Romero as PCP - [facility] Center[facility]indy fay RN as Registered Nurse (Transplant)

## 2018-02-02 NOTE — NURSING NOTE
Chief Complaint   Patient presents with     RECHECK     6 MONTHS KIDNEY TX FOLLOW UP       Initial /77  Pulse 65  Resp 20  Ht 1.524 m (5')  Wt 69.7 kg (153 lb 9.6 oz)  BMI 30 kg/m2 Estimated body mass index is 30 kg/(m^2) as calculated from the following:    Height as of this encounter: 1.524 m (5').    Weight as of this encounter: 69.7 kg (153 lb 9.6 oz).  Medication Reconciliation: complete   ALFONSO LUIS CMA

## 2018-02-02 NOTE — NURSING NOTE
Joshua Lala      1.  Has the patient received the information for the influenza vaccine? YES    2.  Does the patient have any of the following contraindications?     Allergy to eggs? No     Allergic reaction to previous influenza vaccines? No     Any other problems to previous influenza vaccines? No     Paralyzed by Guillain-Channing syndrome? No     Currently pregnant? NO     Current moderate or severe illness? No     Allergy to contact lens solution? No    3.  The vaccine has been administered in the usual fashion and the patient was instructed to wait 20 minutes before leaving the building in the event of an allergic reaction: YES    Vaccination given by ALFONSO LUIS.  Recorded by Alfonso Luis

## 2018-02-02 NOTE — MR AVS SNAPSHOT
After Visit Summary   2/2/2018    Joshua Lala    MRN: [id number]           Patient Information     Date Of Birth          1965        Visit Information        Provider Department      2/2/2018 8:10 AM Dipak Meehan MD; [place] [employer] [facility]        Today's Diagnoses     CKD (chronic kidney disease) stage 3, GFR 30-59 ml/min    -  1    S/P kidney transplant        Essential hypertension        Parathyroid related hypercalcemia (H)        Influenza vaccine needed           Follow-ups after your visit        Follow-up notes from your care team     Return in about 4 months (around 6/2/2018).      Your next 10 appointments already scheduled     Jun 29, 2018  7:45 AM CDT   Lab with  LAB   [facility] ([facility])    [address]  1st Floor  [facility] [address] [phone]            Jun 29, 2018  8:40 AM CDT   (Arrive by 8:10 AM)   Return Kidney Transplant with Dipak Meehan MD   [facility] ([facility])    [address]  [address]  [facility] [address] [phone]              Who to contact     If you have questions or need follow up information about today's clinic visit or your schedule please contact [facility]ROLOG[facility] directly at [phone].  Normal or non-critical lab and imaging results will be communicated to you by [employer], letter or phone within 4 business days after the clinic has received the results. If you do not hear from us within 7 days, please contact the clinic through [employer] or phone. If you have a critical or abnormal lab result, we will notify you by phone as soon as possible.  Submit refill requests through Store[employer] or call your pharmacy and they will forward the refill request to us. Please allow 3 business days for your refill to be completed.          Additional Information About Your Visit        [employer] Information     Storee lets you send messages to  "your doctor, view your test results, renew your prescriptions, schedule appointments and more. To sign up, go to [email or web address] . Click on \"Log in\" on the left side of the screen, which will take you to the Welcome page. Then click on \"Sign up Now\" on the right side of the page.     You will be asked to enter the access code listed below, as well as some personal information. Please follow the directions to create your username and password.     Your access code is: [email or web address]  Expires: 2018  6:30 AM     Your access code will  in 90 days. If you need help or a new code, please call your [facility] clinic or [phone].        Care EveryWhere ID     This is your Care EveryWhere ID. This could be used by other organizations to access your [facility] medical records  [id number]        Your Vitals Were     Pulse Respirations Height BMI (Body Mass Index)          65 20 1.524 m (5') 30 kg/m2         Blood Pressure from Last 3 Encounters:   18 166/77   18 172/78   17 170/77    Weight from Last 3 Encounters:   18 69.7 kg (153 lb 9.6 oz)   18 71 kg (156 lb 8 oz)   17 66.5 kg (146 lb 9.6 oz)              We Performed the Following     FLU Vaccine, 3 YRS +, Quadrivalent          Today's Medication Changes          These changes are accurate as of 18  9:13 AM.  If you have any questions, ask your nurse or doctor.               Start taking these medicines.        Dose/Directions    lisinopril 5 MG tablet   Commonly known as:  PRINIVIL/ZESTRIL   Used for:  Essential hypertension   Started by:  Dipak Meehan MD        Dose:  5 mg   Take 1 tablet (5 mg) by mouth daily   Quantity:  30 tablet   Refills:  11            Where to get your medicines      These medications were sent to [facility] - [place], [place] - [address]   [address] [place] [address] [place] , [facility] [address]    Hours:  Mon-Fri 8:30am-5:00pm Toll Free [phone] Phone:  " [phone]     lisinopril 5 MG tablet                Primary Care Provider Office Phone # Fax #    Bandar Romero [phone]       [facility] [address]  [facility] [address]        Equal Access to Services     CECILPÉREZ LARRY : Hadii lorrie curtis hadhaleigho Soomaali, waaxda luqadaha, qaybta kaalmada adeegyada, waxay idiin hayportian adeangela faustin kylee walton. So [facility] [phone].    ATENCIÓN: Si habla español, tiene a dobbs disposición servicios gratuitos de asistencia lingüística. Llame al [phone].    We comply with applicable federal civil rights laws and [place] laws. We do not discriminate on the basis of race, color, national origin, age, disability, sex, sexual orientation, or gender identity.            Thank you!     Thank you for choosing [facility]  for your care. Our goal is always to provide you with excellent care. Hearing back from our patients is one way we can continue to improve our services. Please take a few minutes to complete the written survey that you may receive in the mail after your visit with us. Thank you!             Your Updated Medication List - Protect others around you: Learn how to safely use, store and throw away your medicines at www.disposemymeds.org.          This list is accurate as of 2/2/18  9:13 AM.  Always use your most recent med list.                   Brand Name Dispense Instructions for use Diagnosis    amLODIPine 5 MG tablet    NORVASC    30 tablet    Take 1 tablet (5 mg) by mouth daily    Essential hypertension       cycloSPORINE modified 25 MG capsule     60 capsule    Take 1 capsule (25 mg) by mouth 2 times daily    Kidney replaced by transplant       [facility] MOUTHWASH Susp     237 mL    Swish and swallow 5-10 mLs in mouth every 6 hours as needed        guaiFENesin 600 MG 12 hr tablet    MUCINEX     Take 600 mg by mouth as needed        lisinopril 5 MG tablet    PRINIVIL/ZESTRIL    30 tablet    Take 1 tablet (5 mg) by mouth daily     Essential hypertension       * mycophenolate 250 MG capsule     240 capsule    Take 4 capsules (1,000 mg) by mouth 2 times daily    Kidney transplanted       * mycophenolate 250 MG capsule     240 capsule    Take 4 capsules (1,000 mg) by mouth 2 times daily    Kidney replaced by transplant       SENSIPAR 30 MG tablet   Generic drug:  cinacalcet     30 tablet    TAKE ONE-HALF TABLET BY MOUTH EVERY OTHER DAY    Parathyroid related hypercalcemia (H)       sulfamethoxazole-trimethoprim 400-80 MG per tablet    BACTRIM/SEPTRA    30 tablet    TAKE ONE TABLET BY MOUTH EVERY DAY    Kidney replaced by transplant       * Notice:  This list has 2 medication(s) that are the same as other medications prescribed for you. Read the directions carefully, and ask your doctor or other care provider to review them with you.

## 2018-02-02 NOTE — LETTER
2/2/2018       RE: Joshua Lala  [address]  [place] [address]     Dear Colleague,    Thank you for referring your patient, Joshua Lala, to the [facility] at [facility]. Please see a copy of my visit note below.    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    HPI:  Patient interviewed with . Has had no interval health issues and no real health concerns today. Appetite poor but weight stable. Mild edema at end of day. No abd pain. Has not yet had flu vaccine this year. Takes her BP meds. Had cyclosporine dose reduced this week because CSA level still > 100.    Baseline creat 1.3-1.6 (since 2003 or before).    ROS: A comprehensive review of systems was performed and was negative except as noted in the HPI.    Patient Active Problem List   Diagnosis     S/P kidney transplant     Parathyroid related hypercalcemia (H)     Encounter for long-term current use of medication     CKD (chronic kidney disease) stage 3, GFR 30-59 ml/min     Flu syndrome     Influenza     Chest pain     Shortness of breath     Acute kidney injury (H)     Acute pharyngitis     Weakness     Diarrhea     Decreased urine volume     Essential hypertension     Chest pain at rest     Chest pain, non-cardiac     Personal Hx:   Social History     Social History     Marital status:      Spouse name: N/A     Number of children: N/A     Years of education: N/A     Occupational History     Not on file.     Social History Main Topics     Smoking status: Never Smoker     Smokeless tobacco: Never Used     Alcohol use No     Drug use: No     Sexual activity: Not on file      Comment: ERINN- need      Other Topics Concern     Not on file     Social History Narrative       Pain Score this Visit: None    Allergies   Allergen Reactions     Contrast Dye Shortness Of Breath     No Clinical Screening - See Comments  Shortness Of Breath     Medications:  Current Outpatient Prescriptions   Medication     NEORAL (BRAND) 25 MG CAPSULE     guaiFENesin (MUCINEX) 600 MG 12 hr tablet     amLODIPine (NORVASC) 5 MG tablet     CELLCEPT 250 MG PO CAPSULE     Diphenhyd-HC-Nystatin-Tetracyc (FIRST-HARINDER MOUTHWASH) SUSP     SENSIPAR 30 MG tablet     CELLCEPT 250 MG PO CAPSULE     sulfamethoxazole-trimethoprim (BACTRIM,SEPTRA) 400-80 MG per tablet     No current facility-administered medications for this visit.      Vitals:  /77  Pulse 65  Resp 20  Ht 1.524 m (5')  Wt 69.7 kg (153 lb 9.6 oz)  BMI 30 kg/m2     Exam:   Alert, in no acute distress.  HEENT: Eyes, ears, nose, mouth grossly normal  Nodes: No cervical adenopathy  Chest: Clear to percussion and auscultation. Normal breath sounds bilaterally  Heart: Regular rate and rhythm. Normal S1 and S2, 3/6 GISSEL LUSB, no gallop or rub  Abdomen: Normal bowel sounds. No masses or tenderness. Graft palpable in RLQ, firm and not tender. Nl size, no bruit.  Extremities: No edema; large tortuous LUE fistula  Neuro: Oriented. Normal gait. No tremor.  Skin: No lesions noted  Psych - normally responsive      Results:   Recent Results (from the past 168 hour(s))   UA with Microscopic reflex to Culture    Collection Time: 01/29/18  6:07 PM   Result Value Ref Range    Color Urine Straw     Appearance Urine Clear     Glucose Urine Negative NEG^Negative mg/dL    Bilirubin Urine Negative NEG^Negative    Ketones Urine Negative NEG^Negative mg/dL    Specific Gravity Urine 1.002 (L) 1.003 - 1.035    Blood Urine Negative NEG^Negative    pH Urine 6.0 5.0 - 7.0 pH    Protein Albumin Urine 30 (A) NEG^Negative mg/dL    Urobilinogen mg/dL Normal 0.0 - 2.0 mg/dL    Nitrite Urine Negative NEG^Negative    Leukocyte Esterase Urine Small (A) NEG^Negative    Source Clean catch urine     WBC Urine 5 (H) 0 - 2 /HPF    RBC Urine 0 0 - 2 /HPF   CBC with platelets differential    Collection Time: 01/29/18  7:01 PM    Result Value Ref Range    WBC 4.2 4.0 - 11.0 10e9/L    RBC Count 3.71 (L) 3.8 - 5.2 10e12/L    Hemoglobin 10.3 (L) 11.7 - 15.7 g/dL    Hematocrit 33.2 (L) 35.0 - 47.0 %    MCV 90 78 - 100 fl    MCH 27.8 26.5 - 33.0 pg    MCHC 31.0 (L) 31.5 - 36.5 g/dL    RDW 13.1 10.0 - 15.0 %    Platelet Count 156 150 - 450 10e9/L    Diff Method Automated Method     % Neutrophils 60.4 %    % Lymphocytes 28.8 %    % Monocytes 8.7 %    % Eosinophils 1.9 %    % Basophils 0.2 %    % Immature Granulocytes 0.0 %    Nucleated RBCs 0 0 /100    Absolute Neutrophil 2.5 1.6 - 8.3 10e9/L    Absolute Lymphocytes 1.2 0.8 - 5.3 10e9/L    Absolute Monocytes 0.4 0.0 - 1.3 10e9/L    Absolute Eosinophils 0.1 0.0 - 0.7 10e9/L    Absolute Basophils 0.0 0.0 - 0.2 10e9/L    Abs Immature Granulocytes 0.0 0 - 0.4 10e9/L    Absolute Nucleated RBC 0.0    Comprehensive metabolic panel    Collection Time: 01/29/18  7:01 PM   Result Value Ref Range    Sodium 140 133 - 144 mmol/L    Potassium 4.6 3.4 - 5.3 mmol/L    Chloride 111 (H) 94 - 109 mmol/L    Carbon Dioxide 20 20 - 32 mmol/L    Anion Gap 9 3 - 14 mmol/L    Glucose 92 70 - 99 mg/dL    Urea Nitrogen 14 7 - 30 mg/dL    Creatinine 1.48 (H) 0.52 - 1.04 mg/dL    GFR Estimate 37 (L) >60 mL/min/1.7m2    GFR Estimate If Black 45 (L) >60 mL/min/1.7m2    Calcium 9.6 8.5 - 10.1 mg/dL    Bilirubin Total 0.9 0.2 - 1.3 mg/dL    Albumin 3.6 3.4 - 5.0 g/dL    Protein Total 7.9 6.8 - 8.8 g/dL    Alkaline Phosphatase 147 40 - 150 U/L    ALT 15 0 - 50 U/L    AST 26 0 - 45 U/L   Magnesium    Collection Time: 01/29/18  7:01 PM   Result Value Ref Range    Magnesium 1.7 1.6 - 2.3 mg/dL   Phosphorus    Collection Time: 01/29/18  7:01 PM   Result Value Ref Range    Phosphorus 2.8 2.5 - 4.5 mg/dL   CBC with platelets    Collection Time: 02/02/18  7:29 AM   Result Value Ref Range    WBC 3.0 (L) 4.0 - 11.0 10e9/L    RBC Count 3.79 (L) 3.8 - 5.2 10e12/L    Hemoglobin 10.6 (L) 11.7 - 15.7 g/dL    Hematocrit 33.7 (L) 35.0 - 47.0 %     MCV 89 78 - 100 fl    MCH 28.0 26.5 - 33.0 pg    MCHC 31.5 31.5 - 36.5 g/dL    RDW 12.6 10.0 - 15.0 %    Platelet Count 150 150 - 450 10e9/L   Basic metabolic panel    Collection Time: 02/02/18  7:29 AM   Result Value Ref Range    Sodium 141 133 - 144 mmol/L    Potassium 4.4 3.4 - 5.3 mmol/L    Chloride 111 (H) 94 - 109 mmol/L    Carbon Dioxide 24 20 - 32 mmol/L    Anion Gap 6 3 - 14 mmol/L    Glucose 89 70 - 99 mg/dL    Urea Nitrogen 10 7 - 30 mg/dL    Creatinine 1.63 (H) 0.52 - 1.04 mg/dL    GFR Estimate 33 (L) >60 mL/min/1.7m2    GFR Estimate If Black 40 (L) >60 mL/min/1.7m2    Calcium 9.2 8.5 - 10.1 mg/dL   Protein  random urine    Collection Time: 02/02/18  7:42 AM   Result Value Ref Range    Protein Random Urine 0.60 g/L    Protein Total Urine g/gr Creatinine 2.31 (H) 0 - 0.2 g/g Cr   Creatinine urine calculation only    Collection Time: 02/02/18  7:42 AM   Result Value Ref Range    Creatinine Urine 26 mg/dL       Assessment and Plan:     1. DDKT  2. Chronic Kidney Disease (CKD), Stage 3 - Renal function again unchanged.  3. Hypertension - not at goal. Add lisinopril 5 mg daily. If edema gets worse, consider diuretic.   4 Hyperparathyroidism - Ca remains controlled on low dose of cinacalcet (15 mg every other day).   5. IS - working to get CSA level <100.    RTC 4 months.  Assessment and plan was discussed with the patient and daughter.    Dipak Meehan MD        CC  Patient Care Team:  Bandar Romero as PCP - General  KermitCindy fay RN as Registered Nurse (Transplant)

## 2018-02-19 DIAGNOSIS — Z94.0 KIDNEY REPLACED BY TRANSPLANT: ICD-10-CM

## 2018-02-19 RX ORDER — CYCLOSPORINE 25 MG/1
25 CAPSULE, LIQUID FILLED ORAL 2 TIMES DAILY
Qty: 60 CAPSULE | Refills: 11 | Status: SHIPPED | OUTPATIENT
Start: 2018-02-19 | End: 2019-02-13

## 2018-02-19 RX ORDER — CYCLOSPORINE 25 MG/1
25 CAPSULE, LIQUID FILLED ORAL 2 TIMES DAILY
Qty: 60 CAPSULE | Refills: 11 | Status: CANCELLED | OUTPATIENT
Start: 2018-02-19

## 2018-02-19 NOTE — TELEPHONE ENCOUNTER
Need new rx for  Neoral 25mg  With a NORMA 1    Please send new rx    Thank you    Shannan Wiley   [facility]  [phone]

## 2018-03-02 DIAGNOSIS — E21.5 PARATHYROID RELATED HYPERCALCEMIA (H): ICD-10-CM

## 2018-03-02 DIAGNOSIS — E83.52 PARATHYROID RELATED HYPERCALCEMIA (H): ICD-10-CM

## 2018-03-02 RX ORDER — CINACALCET 30 MG/1
TABLET, FILM COATED ORAL
Qty: 30 TABLET | Refills: 4 | Status: SHIPPED | OUTPATIENT
Start: 2018-03-02 | End: 2019-03-15

## 2018-03-07 ENCOUNTER — TELEPHONE (OUTPATIENT)
Dept: TRANSPLANT | Facility: CLINIC | Age: 53
End: 2018-03-07

## 2018-03-07 DIAGNOSIS — Z94.0 KIDNEY REPLACED BY TRANSPLANT: Primary | ICD-10-CM

## 2018-03-07 NOTE — TELEPHONE ENCOUNTER
Patient Call: Transplant   Reason for call: patient has some concerns since one of her medication has been decreased she has less urination per patient's daughter. Patient will need a [facility] .

## 2018-03-08 NOTE — TELEPHONE ENCOUNTER
Returned call to patient to discuss symptoms.   She reports decreased urination and a weak stream while urinating for about the last month. It was difficult to get more information about her urine output, but patient says she is urinating less frequently and the volume is less. She has no other urinary symptoms- no pain/ burning, no cloudiness, no foul odor, no fever, no nausea, no flank pain.     Patient is wondering if symptoms could be related to the decrease in her Neoral dose that was made on 1/29. Informed patient I would review with Dr. Meehan, but symptoms may require further evaluation by her PCP.    Jerman Bauer RN

## 2018-03-09 NOTE — TELEPHONE ENCOUNTER
Reviewed with Dr. Meehan. He recommends starting with getting a BMP checked. Called patient to review recommendations. She will come in tomorrow for labs.    Jerman Bauer RN

## 2018-03-10 DIAGNOSIS — Z94.0 KIDNEY REPLACED BY TRANSPLANT: ICD-10-CM

## 2018-03-10 LAB
ANION GAP SERPL CALCULATED.3IONS-SCNC: 6 MMOL/L (ref 3–14)
BUN SERPL-MCNC: 16 MG/DL (ref 7–30)
CALCIUM SERPL-MCNC: 9.9 MG/DL (ref 8.5–10.1)
CHLORIDE SERPL-SCNC: 111 MMOL/L (ref 94–109)
CO2 SERPL-SCNC: 24 MMOL/L (ref 20–32)
CREAT SERPL-MCNC: 1.55 MG/DL (ref 0.52–1.04)
GFR SERPL CREATININE-BSD FRML MDRD: 35 ML/MIN/1.7M2
GLUCOSE SERPL-MCNC: 91 MG/DL (ref 70–99)
POTASSIUM SERPL-SCNC: 4.6 MMOL/L (ref 3.4–5.3)
SODIUM SERPL-SCNC: 140 MMOL/L (ref 133–144)

## 2018-03-28 DIAGNOSIS — Z94.0 KIDNEY REPLACED BY TRANSPLANT: Primary | ICD-10-CM

## 2018-03-29 RX ORDER — SULFAMETHOXAZOLE AND TRIMETHOPRIM 400; 80 MG/1; MG/1
1 TABLET ORAL DAILY
Qty: 30 TABLET | Refills: 11 | Status: SHIPPED | OUTPATIENT
Start: 2018-03-29 | End: 2019-03-15

## 2018-05-02 DIAGNOSIS — Z94.0 KIDNEY TRANSPLANTED: ICD-10-CM

## 2018-05-02 RX ORDER — MYCOPHENOLATE MOFETIL 250 MG/1
1000 CAPSULE ORAL 2 TIMES DAILY
Qty: 240 CAPSULE | Refills: 11 | Status: SHIPPED | OUTPATIENT
Start: 2018-05-02 | End: 2019-02-14

## 2018-05-02 NOTE — TELEPHONE ENCOUNTER
Drug Name: cellcept 250mg  Last Fill Date: 3/29/18  Quantity: 240    Shannan Saurabh   [facility]  [phone]

## 2018-05-29 ENCOUNTER — TELEPHONE (OUTPATIENT)
Dept: TRANSPLANT | Facility: CLINIC | Age: 53
End: 2018-05-29

## 2018-05-29 ENCOUNTER — HOSPITAL ENCOUNTER (EMERGENCY)
Facility: CLINIC | Age: 53
Discharge: HOME | End: 2018-05-29
Attending: EMERGENCY MEDICINE | Admitting: EMERGENCY MEDICINE
Payer: MEDICARE

## 2018-05-29 ENCOUNTER — APPOINTMENT (OUTPATIENT)
Dept: ULTRASOUND IMAGING | Facility: CLINIC | Age: 53
End: 2018-05-29
Attending: EMERGENCY MEDICINE
Payer: MEDICARE

## 2018-05-29 VITALS
RESPIRATION RATE: 16 BRPM | OXYGEN SATURATION: 96 % | TEMPERATURE: 97.6 F | SYSTOLIC BLOOD PRESSURE: 148 MMHG | DIASTOLIC BLOOD PRESSURE: 70 MMHG | WEIGHT: 155.8 LBS | HEART RATE: 66 BPM | BODY MASS INDEX: 30.43 KG/M2

## 2018-05-29 DIAGNOSIS — Z94.0 S/P KIDNEY TRANSPLANT: ICD-10-CM

## 2018-05-29 DIAGNOSIS — R82.90 ABNORMAL URINALYSIS: ICD-10-CM

## 2018-05-29 DIAGNOSIS — N18.9 CHRONIC RENAL IMPAIRMENT, UNSPECIFIED CKD STAGE: ICD-10-CM

## 2018-05-29 LAB
ALBUMIN UR-MCNC: 30 MG/DL
ANION GAP SERPL CALCULATED.3IONS-SCNC: 10 MMOL/L (ref 3–14)
APPEARANCE UR: CLEAR
BASOPHILS # BLD AUTO: 0 10E9/L (ref 0–0.2)
BASOPHILS NFR BLD AUTO: 0.2 %
BILIRUB UR QL STRIP: NEGATIVE
BUN SERPL-MCNC: 18 MG/DL (ref 7–30)
CALCIUM SERPL-MCNC: 10 MG/DL (ref 8.5–10.1)
CHLORIDE SERPL-SCNC: 111 MMOL/L (ref 94–109)
CO2 SERPL-SCNC: 20 MMOL/L (ref 20–32)
COLOR UR AUTO: ABNORMAL
CREAT SERPL-MCNC: 1.59 MG/DL (ref 0.52–1.04)
DIFFERENTIAL METHOD BLD: ABNORMAL
EOSINOPHIL # BLD AUTO: 0.1 10E9/L (ref 0–0.7)
EOSINOPHIL NFR BLD AUTO: 2.3 %
ERYTHROCYTE [DISTWIDTH] IN BLOOD BY AUTOMATED COUNT: 13.4 % (ref 10–15)
GFR SERPL CREATININE-BSD FRML MDRD: 34 ML/MIN/1.7M2
GLUCOSE SERPL-MCNC: 89 MG/DL (ref 70–99)
GLUCOSE UR STRIP-MCNC: NEGATIVE MG/DL
HCT VFR BLD AUTO: 32.8 % (ref 35–47)
HGB BLD-MCNC: 10.5 G/DL (ref 11.7–15.7)
HGB UR QL STRIP: NEGATIVE
IMM GRANULOCYTES # BLD: 0 10E9/L (ref 0–0.4)
IMM GRANULOCYTES NFR BLD: 0.5 %
KETONES UR STRIP-MCNC: NEGATIVE MG/DL
LEUKOCYTE ESTERASE UR QL STRIP: ABNORMAL
LYMPHOCYTES # BLD AUTO: 1.2 10E9/L (ref 0.8–5.3)
LYMPHOCYTES NFR BLD AUTO: 27 %
MCH RBC QN AUTO: 27.6 PG (ref 26.5–33)
MCHC RBC AUTO-ENTMCNC: 32 G/DL (ref 31.5–36.5)
MCV RBC AUTO: 86 FL (ref 78–100)
MONOCYTES # BLD AUTO: 0.6 10E9/L (ref 0–1.3)
MONOCYTES NFR BLD AUTO: 13.8 %
NEUTROPHILS # BLD AUTO: 2.4 10E9/L (ref 1.6–8.3)
NEUTROPHILS NFR BLD AUTO: 56.2 %
NITRATE UR QL: NEGATIVE
NRBC # BLD AUTO: 0 10*3/UL
NRBC BLD AUTO-RTO: 0 /100
PH UR STRIP: 6 PH (ref 5–7)
PLATELET # BLD AUTO: 138 10E9/L (ref 150–450)
POTASSIUM SERPL-SCNC: 4.2 MMOL/L (ref 3.4–5.3)
RBC # BLD AUTO: 3.8 10E12/L (ref 3.8–5.2)
RBC #/AREA URNS AUTO: 1 /HPF (ref 0–2)
SODIUM SERPL-SCNC: 140 MMOL/L (ref 133–144)
SOURCE: ABNORMAL
SP GR UR STRIP: 1 (ref 1–1.03)
TRANS CELLS #/AREA URNS HPF: <1 /HPF (ref 0–1)
UROBILINOGEN UR STRIP-MCNC: NORMAL MG/DL (ref 0–2)
WBC # BLD AUTO: 4.3 10E9/L (ref 4–11)
WBC #/AREA URNS AUTO: 7 /HPF (ref 0–5)

## 2018-05-29 PROCEDURE — 76776 US EXAM K TRANSPL W/DOPPLER: CPT

## 2018-05-29 PROCEDURE — 99285 EMERGENCY DEPT VISIT HI MDM: CPT | Mod: Z6 | Performed by: EMERGENCY MEDICINE

## 2018-05-29 PROCEDURE — 25000128 H RX IP 250 OP 636: Performed by: EMERGENCY MEDICINE

## 2018-05-29 PROCEDURE — 85025 COMPLETE CBC W/AUTO DIFF WBC: CPT | Performed by: EMERGENCY MEDICINE

## 2018-05-29 PROCEDURE — 81001 URINALYSIS AUTO W/SCOPE: CPT | Performed by: EMERGENCY MEDICINE

## 2018-05-29 PROCEDURE — 99284 EMERGENCY DEPT VISIT MOD MDM: CPT | Mod: 25 | Performed by: EMERGENCY MEDICINE

## 2018-05-29 PROCEDURE — 80048 BASIC METABOLIC PNL TOTAL CA: CPT | Performed by: EMERGENCY MEDICINE

## 2018-05-29 PROCEDURE — 96360 HYDRATION IV INFUSION INIT: CPT | Performed by: EMERGENCY MEDICINE

## 2018-05-29 PROCEDURE — 87086 URINE CULTURE/COLONY COUNT: CPT | Performed by: EMERGENCY MEDICINE

## 2018-05-29 RX ORDER — LEVOFLOXACIN 500 MG/1
500 TABLET, FILM COATED ORAL DAILY
Qty: 5 TABLET | Refills: 0 | Status: SHIPPED | OUTPATIENT
Start: 2018-05-29 | End: 2018-06-03

## 2018-05-29 RX ADMIN — SODIUM CHLORIDE 500 ML: 9 INJECTION, SOLUTION INTRAVENOUS at 21:22

## 2018-05-29 ASSESSMENT — ENCOUNTER SYMPTOMS
ARTHRALGIAS: 0
NECK STIFFNESS: 0
COUGH: 0
DIFFICULTY URINATING: 1
SORE THROAT: 0
DIARRHEA: 0
FEVER: 0
NAUSEA: 0
APPETITE CHANGE: 1
HEADACHES: 0
SHORTNESS OF BREATH: 0
VOMITING: 0
FATIGUE: 1
ABDOMINAL PAIN: 0
RHINORRHEA: 0
COLOR CHANGE: 0
EYE REDNESS: 0
DYSURIA: 0
CONFUSION: 0
HEMATURIA: 0
VOICE CHANGE: 0

## 2018-05-29 NOTE — ED TRIAGE NOTES
Pt presents ambulatory to triage with c/o urinary retention stating she has been unable to urinate for 3 days. States she has had only very little out. Pt also reports fatigue and loss of appetite. Hx: Dialysis prior to Kidney Transplant in 2017

## 2018-05-29 NOTE — ED AVS SNAPSHOT
[facility], Emergency Department    [address] [address]    Phone:  [phone]                                       Joshua Lala   MRN: [id number]    Department:  [facility], Emergency Department   Date of Visit:  5/29/2018           Patient Information     Date Of Birth          1965        Your diagnoses for this visit were:     S/P kidney transplant     Abnormal urinalysis        You were seen by Radha Person MD.        Discharge Instructions       You have been seen in the emergency department due to decreased urine output.  All of your blood tests today look normal or baseline for you.  Your creatinine is the same as it has been in the past several times it has been checked, today is 1.59.  It is possible you may have an early urinary tract infection, we are going to give you a prescription for antibiotics to treat this. Your ultrasound looks improved from before.     You need to contact your clinic tomorrow to clarify whether or not you should be on additional blood pressure medications.  It looks like at your last clinic visit they recommended additional blood pressure medicines, your blood pressure is high today so you should contact your clinic to find out if you need to be on those.      Continue to drink plenty of fluids and follow-up with your clinic    Your next 10 appointments already scheduled     Jun 29, 2018  7:45 AM CDT   Lab with  LAB   [facility] ([facility])    [address] [place] [address]  1st Floor  [facility] [address] [phone]            Jun 29, 2018  8:40 AM CDT   (Arrive by 8:10 AM)   Return Kidney Transplant with Dipak Meehan MD   [facility] ([facility])    [address] [place] [address]  [address]  [facility] [address] [phone]              24 Hour Appointment Hotline       To make an appointment at any [facility], call [phone] ([phone]). If you don't have a  family doctor or clinic, we will help you find one. [facility] clinics are conveniently located to serve the needs of you and your family.             Review of your medicines      START taking        Dose / Directions Last dose taken    levofloxacin 500 MG tablet   Commonly known as:  LEVAQUIN   Dose:  500 mg   Quantity:  5 tablet        Take 1 tablet (500 mg) by mouth daily for 5 days   Refills:  0          Our records show that you are taking the medicines listed below. If these are incorrect, please call your family doctor or clinic.        Dose / Directions Last dose taken    amLODIPine 5 MG tablet   Commonly known as:  NORVASC   Dose:  5 mg   Quantity:  30 tablet        Take 1 tablet (5 mg) by mouth daily   Refills:  11        cinacalcet 30 MG tablet   Commonly known as:  SENSIPAR   Quantity:  30 tablet        TAKE ONE-HALF TABLET BY MOUTH EVERY OTHER DAY   Refills:  4        cycloSPORINE modified 25 MG capsule   Dose:  25 mg   Quantity:  60 capsule        Take 1 capsule (25 mg) by mouth 2 times daily   Refills:  11        FIRST-HARINDER MOUTHWASH Susp   Dose:  5-10 mL   Quantity:  237 mL        Swish and swallow 5-10 mLs in mouth every 6 hours as needed   Refills:  1        guaiFENesin 600 MG 12 hr tablet   Commonly known as:  MUCINEX   Dose:  600 mg        Take 600 mg by mouth as needed   Refills:  0        lisinopril 5 MG tablet   Commonly known as:  PRINIVIL/ZESTRIL   Dose:  5 mg   Quantity:  30 tablet        Take 1 tablet (5 mg) by mouth daily   Refills:  11        * mycophenolate 250 MG capsule   Dose:  1000 mg   Quantity:  240 capsule        Take 4 capsules (1,000 mg) by mouth 2 times daily   Refills:  11        * mycophenolate 250 MG capsule   Dose:  1000 mg   Quantity:  240 capsule        Take 4 capsules (1,000 mg) by mouth 2 times daily   Refills:  11        * sulfamethoxazole-trimethoprim 400-80 MG per tablet   Commonly known as:  BACTRIM/SEPTRA   Quantity:  30 tablet        TAKE ONE TABLET BY MOUTH  EVERY DAY   Refills:  11        * sulfamethoxazole-trimethoprim 400-80 MG per tablet   Commonly known as:  BACTRIM/SEPTRA   Dose:  1 tablet   Quantity:  30 tablet        Take 1 tablet by mouth daily   Refills:  11        * Notice:  This list has 4 medication(s) that are the same as other medications prescribed for you. Read the directions carefully, and ask your doctor or other care provider to review them with you.            Prescriptions were sent or printed at these locations (1 Prescription)                   Other Prescriptions                Printed at Department/Unit printer (1 of 1)         levofloxacin (LEVAQUIN) 500 MG tablet                Procedures and tests performed during your visit     Basic metabolic panel    CBC with platelets differential    UA with Microscopic reflex to Culture    US Renal Transplant    Urine Culture Aerobic Bacterial      Orders Needing Specimen Collection     None      Pending Results     Date and Time Order Name Status Description    5/29/2018 2052 Urine Culture Aerobic Bacterial Preliminary     5/29/2018 2039 US Renal Transplant Preliminary             Pending Culture Results     Date and Time Order Name Status Description    5/29/2018 2052 Urine Culture Aerobic Bacterial Preliminary             Pending Results Instructions     If you had any lab results that were not finalized at the time of your Discharge, you can call the ED Lab Result RN at [phone]. You will be contacted by this team for any positive Lab results or changes in treatment. The nurses are available 7 days a week from 10A to 6:30P.  You can leave a message 24 hours per day and they will return your call.        Thank you for choosing [facility]       Thank you for choosing [facility] for your care. Our goal is always to provide you with excellent care. Hearing back from our patients is one way we can continue to improve our services. Please take a few minutes to complete the written survey that you may  "receive in the mail after you visit with us. Thank you!        [employer] Information     [employer] lets you send messages to your doctor, view your test results, renew your prescriptions, schedule appointments and more. To sign up, go to [email or web address] . Click on \"Log in\" on the left side of the screen, which will take you to the Welcome page. Then click on \"Sign up Now\" on the right side of the page.     You will be asked to enter the access code listed below, as well as some personal information. Please follow the directions to create your username and password.     Your access code is: [email or web address]  Expires: 6/3/2018 10:19 PM     Your access code will  in 90 days. If you need help or a new code, please call your [facility] clinic or [phone].        Care EveryWhere ID     This is your Care EveryWhere ID. This could be used by other organizations to access your [facility] medical records  [id number]        Equal Access to Services     [facility] HospitalSHANNA : Hadii aad ku hadasho Soaydeeali, waaxda luqadaha, qaybta kaalmada adeangelayasammy, ervin pichardo . So [facility] [phone].    ATENCIÓN: Si habla español, tiene a dobbs disposición servicios gratuitos de asistencia lingüística. Llame al [phone].    We comply with applicable federal civil rights laws and [place] laws. We do not discriminate on the basis of race, color, national origin, age, disability, sex, sexual orientation, or gender identity.            After Visit Summary       This is your record. Keep this with you and show to your community pharmacist(s) and doctor(s) at your next visit.                  "

## 2018-05-29 NOTE — ED AVS SNAPSHOT
[facility] [place], Emergency Department    [address] [address]    Phone:  [phone]                                       Joshua Lala   MRN: [id number]    Department:  [facility], Emergency Department   Date of Visit:  5/29/2018           After Visit Summary Signature Page     I have received my discharge instructions, and my questions have been answered. I have discussed any challenges I see with this plan with the nurse or doctor.    ..........................................................................................................................................  Patient/Patient Representative Signature      ..........................................................................................................................................  Patient Representative Print Name and Relationship to Patient    ..................................................               ................................................  Date                                            Time    ..........................................................................................................................................  Reviewed by Signature/Title    ...................................................              ..............................................  Date                                                            Time

## 2018-05-29 NOTE — TELEPHONE ENCOUNTER
Patient Call: General    Reason for call: Family called stated wanted this mssage added to request- pt drinking fluids OK- no appetite and fatigue    Call back needed? Yes    Return Call Needed  Call patient back with an   When to return call?: Same day: Route [facility]

## 2018-05-29 NOTE — TELEPHONE ENCOUNTER
Patient Call: Transplant Illness  If the patient reports CHEST PAIN, SEVERE SHORTNESS OF BREATH, ONE SIDED WEAKNESS, or DIFFICULTY SPEAKING: CONTACT RN FACE-TO-FACE IMMEDIATELY    Duration of illness: More than a couple of days   (small amount of urine this AM 05/19/18)  Transplanted organ? kidney  Illness: Unable to urinate   Requested a lab appt today at 5:00pm., 05/29/18  This has been scheduled.

## 2018-05-29 NOTE — TELEPHONE ENCOUNTER
"Per Nichelle, her mother told her this morning that she has no urinated \"really anything\" in three days. She is fatigued, decreased appetite. Encouraged her to have mother seen today to be assessed. She voiced understanding.   "

## 2018-05-29 NOTE — ED PROVIDER NOTES
History     Chief Complaint   Patient presents with     Urinary Retention     The history is provided by the patient.     Joshua Lala is a 52 year old female with a history of kidney transplantation in 2008, parathyroid-related hypercalcemia, and hypertension who presents to the emergency department today for evaluation of urinary retention.  The patient is brought in by her daughter who translates a history from [facility] [place] [facility] at the patient's request.    The patient reports that she is unable to urinate despite the urge to do so.  She states that the last time she urinated was shortly after arrival to this ER this afternoon, though it was only a very small amount.  She states that her urinary retention is in spite of heavy fluid intake.  Additionally, the patient does report feeling rather fatigued and also reports an associated decreased appetite, though states that she has been eating and drinking okay.  The patient does report a history of similar urinary retention and has required emergent urinary catheterization, but states she she has never required home catheterization or chronic sutherland.  The patient denies any fevers, vomiting, diarrhea, or abdominal pain.  No hematuria or dysuria. She denies any cough, cold, runny nose, sore throat, or other URI type symptoms.  She denies chest pain, shortness breath, or leg swelling, additionally    As above, the patient has a history of kidney transplant 2008 performed here.  Daughter reports that since then the patient has had fluctuating kidney function.  The patient also does have a history of hypertension.  I asked the patient whether she has been taking her blood pressure medications, which are listed as lisinopril and amlodipine.  While it seems that she has been on amlodipine for a number of years and lisinopril prescribed just February of this year, the patient reports that she has never taken either of these medication.  There appears to be significant confusion about  this on the patient's part and it is not clear if she is or is not taking antihypertensives. The patient denies a history of kidney stone.  She states that she was having kidney infections leading up to her kidney transplant, but states she has had none subsequent to that procedure.  Other than the transplant, she states she has had no other abdominal surgeries, reporting that she does have her appendix, gallbladder, uterus, and other abdominal viscera intact.      No current facility-administered medications for this encounter.      Current Outpatient Prescriptions   Medication     amLODIPine (NORVASC) 5 MG tablet     CELLCEPT (BRAND) 250 MG CAPSULE     CELLCEPT 250 MG PO CAPSULE     cinacalcet (SENSIPAR) 30 MG tablet     Diphenhyd-HC-Nystatin-Tetracyc (FIRST-HARINDER MOUTHWASH) SUSP     guaiFENesin (MUCINEX) 600 MG 12 hr tablet     levofloxacin (LEVAQUIN) 500 MG tablet     lisinopril (PRINIVIL/ZESTRIL) 5 MG tablet     NEORAL (BRAND) 25 MG CAPSULE     sulfamethoxazole-trimethoprim (BACTRIM,SEPTRA) 400-80 MG per tablet     sulfamethoxazole-trimethoprim (BACTRIM/SEPTRA) 400-80 MG per tablet     Past Medical History:   Diagnosis Date     Dehydration      Henoch-Schonlein purpura (H)      Hypercalcemia      Hypomagnesaemia      Renal disease      Tertiary hyperparathyroidism (H)        Past Surgical History:   Procedure Laterality Date     CHOLECYSTECTOMY       renal transplant  1998, 2007     TRANSPLANT      Kidney 2008       No family history on file.    Social History   Substance Use Topics     Smoking status: Never Smoker     Smokeless tobacco: Never Used     Alcohol use No     Allergies   Allergen Reactions     Contrast Dye Shortness Of Breath     No Clinical Screening - See Comments Shortness Of Breath           I have reviewed the Medications, Allergies, Past Medical and Surgical History, and Social History in the Epic system.    Review of Systems   Constitutional: Positive for appetite change (Decreased) and  fatigue. Negative for fever.   HENT: Negative for congestion, rhinorrhea, sore throat and voice change.    Eyes: Negative for redness.   Respiratory: Negative for cough and shortness of breath.    Cardiovascular: Negative for chest pain.   Gastrointestinal: Negative for abdominal pain, diarrhea, nausea and vomiting.   Genitourinary: Positive for difficulty urinating (Retention). Negative for dysuria and hematuria.   Musculoskeletal: Negative for arthralgias and neck stiffness.   Skin: Negative for color change.   Neurological: Negative for headaches.   Psychiatric/Behavioral: Negative for confusion.   All other systems reviewed and are negative.      Physical Exam   BP: 172/72  Pulse: 66  Heart Rate: 68  Temp: 98.4  F (36.9  C)  Resp: 16  Weight: 70.7 kg (155 lb 12.8 oz)  SpO2: 98 %      Physical Exam   Constitutional: No distress.    female, lying back in bed, NAD   HENT:   Head: Normocephalic and atraumatic.   Mouth/Throat: Oropharynx is clear and moist. No oropharyngeal exudate.   Eyes: Pupils are equal, round, and reactive to light. No scleral icterus.   Cardiovascular: Normal rate, regular rhythm, normal heart sounds and intact distal pulses.    No murmur heard.  Pulmonary/Chest: Effort normal and breath sounds normal. No respiratory distress. She has no wheezes. She has no rales.   Abdominal: Soft. Bowel sounds are normal. She exhibits no distension. There is no tenderness. There is no rebound and no guarding.   Obese, soft, nontender   Musculoskeletal: She exhibits no edema or tenderness.   Skin: Skin is warm. No rash noted. She is not diaphoretic.   Nursing note and vitals reviewed.      ED Course     ED Course     6:54 PM  The patient was seen and examined by Radha Person MD, in Room 12.     Procedures             Critical Care time:  none             Results for orders placed or performed during the hospital encounter of 05/29/18 (from the past 24 hour(s))   CBC with platelets differential    Result Value Ref Range    WBC 4.3 4.0 - 11.0 10e9/L    RBC Count 3.80 3.8 - 5.2 10e12/L    Hemoglobin 10.5 (L) 11.7 - 15.7 g/dL    Hematocrit 32.8 (L) 35.0 - 47.0 %    MCV 86 78 - 100 fl    MCH 27.6 26.5 - 33.0 pg    MCHC 32.0 31.5 - 36.5 g/dL    RDW 13.4 10.0 - 15.0 %    Platelet Count 138 (L) 150 - 450 10e9/L    Diff Method Automated Method     % Neutrophils 56.2 %    % Lymphocytes 27.0 %    % Monocytes 13.8 %    % Eosinophils 2.3 %    % Basophils 0.2 %    % Immature Granulocytes 0.5 %    Nucleated RBCs 0 0 /100    Absolute Neutrophil 2.4 1.6 - 8.3 10e9/L    Absolute Lymphocytes 1.2 0.8 - 5.3 10e9/L    Absolute Monocytes 0.6 0.0 - 1.3 10e9/L    Absolute Eosinophils 0.1 0.0 - 0.7 10e9/L    Absolute Basophils 0.0 0.0 - 0.2 10e9/L    Abs Immature Granulocytes 0.0 0 - 0.4 10e9/L    Absolute Nucleated RBC 0.0    Basic metabolic panel   Result Value Ref Range    Sodium 140 133 - 144 mmol/L    Potassium 4.2 3.4 - 5.3 mmol/L    Chloride 111 (H) 94 - 109 mmol/L    Carbon Dioxide 20 20 - 32 mmol/L    Anion Gap 10 3 - 14 mmol/L    Glucose 89 70 - 99 mg/dL    Urea Nitrogen 18 7 - 30 mg/dL    Creatinine 1.59 (H) 0.52 - 1.04 mg/dL    GFR Estimate 34 (L) >60 mL/min/1.7m2    GFR Estimate If Black 41 (L) >60 mL/min/1.7m2    Calcium 10.0 8.5 - 10.1 mg/dL   UA with Microscopic reflex to Culture   Result Value Ref Range    Color Urine Straw     Appearance Urine Clear     Glucose Urine Negative NEG^Negative mg/dL    Bilirubin Urine Negative NEG^Negative    Ketones Urine Negative NEG^Negative mg/dL    Specific Gravity Urine 1.003 1.003 - 1.035    Blood Urine Negative NEG^Negative    pH Urine 6.0 5.0 - 7.0 pH    Protein Albumin Urine 30 (A) NEG^Negative mg/dL    Urobilinogen mg/dL Normal 0.0 - 2.0 mg/dL    Nitrite Urine Negative NEG^Negative    Leukocyte Esterase Urine Large (A) NEG^Negative    Source Clean catch urine     WBC Urine 7 (H) 0 - 5 /HPF    RBC Urine 1 0 - 2 /HPF    Transitional Epi <1 0 - 1 /HPF   Urine Culture Aerobic  Bacterial   Result Value Ref Range    Specimen Description Midstream Urine     Special Requests Specimen received in preservative     Culture Micro PENDING    US Renal Transplant    Narrative    1.  Elevated but slightly improved resistive indices in the arcuate  arteries ranging between 0.71-0.87, previously 0.81-0.93. Otherwise  patent Doppler evaluation of the transplant kidney. These findings can  be seen in rejection or medical renal disease.  2.  No hydronephrosis.                Assessments & Plan (with Medical Decision Making)     This is a 52-year-old with a history of a kidney transplant in 2007 who presents to the ED today due to poor urine output for the past 3 days.  Patient reports she feels need to urinate but has been unable to urinate much.  She does states that she is eating and drinking well and has no reason to be dehydrated.  Certainly need to consider acute kidney injury/dehydration as etiology.  Also obstructive process is always possible.  On exam she is alert, cooperative, in no distress.  Pressure is 172/72.  Interestingly the patient states that she was supposed to be on antihypertensives but there was some sort of problem with her pharmacy receiving it therefore she has not started it.  It is a bit unclear to me exactly when that was supposed to happen as the patient and her family are confused by what she was supposed to be taking and when she was supposed to be starting it.  Interestingly also there was an office visit to her nephrologist in on 2/2/18 where it was documented that she takes her blood pressure medications.    Bladder scan done here in the Emergency Department shows 48 mL's.  We did establish IV access and we did draw blood for analysis.      This part of the document was transcribed by Erin Reed Scribe.      CBC shows a normal white count of 4.3, hemoglobin is 10.5, BMP is essentially normal or baseline for her. Electrolytes are normal, creatinine is 1.59,  which is stable from most recent comparison in March, which was 1.55. UA showed large LE with just 7 white cells and 0 red cells. This will be cultured. We did have the patient void here in the ED and post-void residual was less than 50 mL. I do not believe that urinary retention is responsible for her symptoms based on this. We also did a renal transplant ultrasound which shows elevated but slightly improved resistive indices in the arcuate arteries. Patent Doppler evaluation of the transplanted kidney. No hydronephrosis.    The patient is eager to go home. I will instruct her to continue taking her regular medications. Of note, she needs to contact her clinic, who is managing her hypertension. Again, it is unclear to me if she is to be on two antihypertensive medications, as the patient states she is only aware that she should be on one. She needs to contact them to clarify this and if so take those regularly. I will give her a prescription for Levaquin, she is not having dysuria or hematuria, but given difficulty with urination and no other explanation I think it is reasonable to treat. The urine will be cultured. She needs to follow up with her clinic as directed.    This part of the document was transcribed by Fermin Hagan for Radha Person MD.    I have reviewed the nursing notes.    I have reviewed the findings, diagnosis, plan and need for follow up with the patient.    Discharge Medication List as of 5/29/2018 11:14 PM      START taking these medications    Details   levofloxacin (LEVAQUIN) 500 MG tablet Take 1 tablet (500 mg) by mouth daily for 5 days, Disp-5 tablet, R-0, Local Print             Final diagnoses:   S/P kidney transplant   Abnormal urinalysis   Chronic renal impairment, unspecified CKD stage     I, Fermin Hagan, am serving as a trained medical scribe to document services personally performed by Radha Person MD, based on the provider's statements to me.      I, Radha Person MD, was  physically present and have reviewed and verified the accuracy of this note documented by Fermin Hagan.    5/29/2018   [facility], [place], EMERGENCY DEPARTMENT     Radha Person MD  05/30/18 0030

## 2018-05-30 LAB
BACTERIA SPEC CULT: NO GROWTH
Lab: NORMAL
SPECIMEN SOURCE: NORMAL

## 2018-05-30 NOTE — DISCHARGE INSTRUCTIONS
You have been seen in the emergency department due to decreased urine output.  All of your blood tests today look normal or baseline for you.  Your creatinine is the same as it has been in the past several times it has been checked, today is 1.59.  It is possible you may have an early urinary tract infection, we are going to give you a prescription for antibiotics to treat this. Your ultrasound looks improved from before.     You need to contact your clinic tomorrow to clarify whether or not you should be on additional blood pressure medications.  It looks like at your last clinic visit they recommended additional blood pressure medicines, your blood pressure is high today so you should contact your clinic to find out if you need to be on those.      Continue to drink plenty of fluids and follow-up with your clinic

## 2018-05-31 ENCOUNTER — TELEPHONE (OUTPATIENT)
Dept: EMERGENCY MEDICINE | Facility: CLINIC | Age: 53
End: 2018-05-31

## 2018-05-31 ENCOUNTER — TELEPHONE (OUTPATIENT)
Dept: TRANSPLANT | Facility: CLINIC | Age: 53
End: 2018-05-31

## 2018-05-31 NOTE — TELEPHONE ENCOUNTER
Patient Call: General    Reason for call: Daughter called- Mom went to ER a few days ago and was told to get appointment soon for  follow- up.  Having trouble getting it scheduled- call Pt with .      Call back needed? Yes pt with

## 2018-05-31 NOTE — TELEPHONE ENCOUNTER
"[facility] Emergency Department Lab result notification:    [facility] ED lab result protocol used  Urine Culture    Reason for call  Notify of lab results, assess symptoms,  review ED providers recommendations/discharge instructions (if necessary) and advise per ED lab result f/u protocol    Lab Result  Final urine culture report shows \"NO GROWTH\" and is NEGATIVE.  Emergency Dept discharge antibiotic: Levofloxacin (Levaquin) 500 mg PO tablet, 1 tablet (500 mg) by mouth once daily for 5 days.  Is ED discharge Rx antibiotic for UTI only (Yes/No): Yes  Recommendations per [facility] ED Lab result protocol - Urine culture protocol.  Information table from ED Provider visit on 5/29/18  Symptoms reported at ED visit (Chief complaint, HPI) Patient presents with     Urinary Retention      The history is provided by the patient.      Joshua Lala is a 52 year old female with a history of kidney transplantation in 2008, parathyroid-related hypercalcemia, and hypertension who presents to the emergency department today for evaluation of urinary retention.  The patient is brought in by her daughter who translates a history from [facility] at the patient's request.     The patient reports that she is unable to urinate despite the urge to do so.  She states that the last time she urinated was shortly after arrival to this ER this afternoon, though it was only a very small amount.  She states that her urinary retention is in spite of heavy fluid intake.  Additionally, the patient does report feeling rather fatigued and also reports an associated decreased appetite, though states that she has been eating and drinking okay.  The patient does report a history of similar urinary retention and has required emergent urinary catheterization, but states she she has never required home catheterization or chronic sutherland.  The patient denies any fevers, vomiting, diarrhea, or abdominal pain.  No hematuria or dysuria. She denies any cough, cold, runny " nose, sore throat, or other URI type symptoms.  She denies chest pain, shortness breath, or leg swelling, additionally     As above, the patient has a history of kidney transplant 2008 performed here.  Daughter reports that since then the patient has had fluctuating kidney function.  The patient also does have a history of hypertension.  I asked the patient whether she has been taking her blood pressure medications, which are listed as lisinopril and amlodipine.  While it seems that she has been on amlodipine for a number of years and lisinopril prescribed just February of this year, the patient reports that she has never taken either of these medication.  There appears to be significant confusion about this on the patient's part and it is not clear if she is or is not taking antihypertensives. The patient denies a history of kidney stone.  She states that she was having kidney infections leading up to her kidney transplant, but states she has had none subsequent to that procedure.  Other than the transplant, she states she has had no other abdominal surgeries, reporting that she does have her appendix, gallbladder, uterus, and other abdominal viscera intact.   ED providers Impression and Plan (applicable information) Assessments & Plan (with Medical Decision Making)      This is a 52-year-old with a history of a kidney transplant in 2007 who presents to the ED today due to poor urine output for the past 3 days.  Patient reports she feels need to urinate but has been unable to urinate much.  She does states that she is eating and drinking well and has no reason to be dehydrated.  Certainly need to consider acute kidney injury/dehydration as etiology.  Also obstructive process is always possible.  On exam she is alert, cooperative, in no distress.  Pressure is 172/72.  Interestingly the patient states that she was supposed to be on antihypertensives but there was some sort of problem with her pharmacy receiving it  therefore she has not started it.  It is a bit unclear to me exactly when that was supposed to happen as the patient and her family are confused by what she was supposed to be taking and when she was supposed to be starting it.  Interestingly also there was an office visit to her nephrologist in on 2/2/18 where it was documented that she takes her blood pressure medications.     Bladder scan done here in the Emergency Department shows 48 mL's.  We did establish IV access and we did draw blood for analysis.       This part of the document was transcribed by Erin Reed Scribe.       CBC shows a normal white count of 4.3, hemoglobin is 10.5, BMP is essentially normal or baseline for her. Electrolytes are normal, creatinine is 1.59, which is stable from most recent comparison in March, which was 1.55. UA showed large LE with just 7 white cells and 0 red cells. This will be cultured. We did have the patient void here in the ED and post-void residual was less than 50 mL. I do not believe that urinary retention is responsible for her symptoms based on this. We also did a renal transplant ultrasound which shows elevated but slightly improved resistive indices in the arcuate arteries. Patent Doppler evaluation of the transplanted kidney. No hydronephrosis.     The patient is eager to go home. I will instruct her to continue taking her regular medications. Of note, she needs to contact her clinic, who is managing her hypertension. Again, it is unclear to me if she is to be on two antihypertensive medications, as the patient states she is only aware that she should be on one. She needs to contact them to clarify this and if so take those regularly. I will give her a prescription for Levaquin, she is not having dysuria or hematuria, but given difficulty with urination and no other explanation I think it is reasonable to treat. The urine will be cultured. She needs to follow up with her clinic as directed.                   START taking these medications     Details   levofloxacin (LEVAQUIN) 500 MG tablet Take 1 tablet (500 mg) by mouth daily for 5 days, Disp-5 tablet, R-0, Local Print           Final diagnoses:   S/P kidney transplant   Abnormal urinalysis   Chronic renal impairment, unspecified CKD stage       Miscellaneous information Speaks Polina     RN Assessment (Patient s current Symptoms), include time called.  [Insert Left message here if message left]  1:21 pm Contacted ong  to speak with patient. Voice mail box not set up yet.    1:21 pm Next I tried calling her Daughter Nichelle who speaks English and was with her in the ED. Message left to call us back at [phone], between 10 am and 6 pm, seven days a week. May leave a message 24/7, if no one available.     PCP follow-up Questions asked: NO    Tanvi Chu RN  [place] [facility] RN  Lung Nodule and ED Lab Result F/u RN  Epic pool (ED late result f/u RN): [id number]  # [phone]

## 2018-05-31 NOTE — TELEPHONE ENCOUNTER
[facility] Emergency Department Lab result notification     Patient/parent Name  Daughter Nichelle    RN Assessment (Patient s current Symptoms), include time called.  [Insert Left message here if message left]  Says her mom is still having some trouble urinating. Medication list says patient is on a daily Bactrim as below:    sulfamethoxazole-trimethoprim (BACTRIM/SEPTRA) 400-80 MG per tablet 30 tablet 11 3/29/2018     Sig - Route: Take 1 tablet by mouth daily - Oral      RN Recommendations/Instructions per [facility] ED lab result protocol  I let the daughter know the final urine culture was negative and that if Mom is on daily antibiotic that may be why final culture was negative. Also, provider may want her on only one antibiotic at a time. Daughter not sure what her mom is taking. I advised the daughter to have Mom seen today by her PCP or call the nephrologist today for advise on antibiotics and continued trouble urinating. Can always have her seen in the ED today. Very important to sort this out today. Daughter voices understanding and is in agreement with this plan.      Please Contact your PCP clinic or return to the Emergency department if your:    Symptoms return.    Symptoms do not improve after 3 days on antibiotic.    Symptoms do not resolve after completing antibiotic.    Symptoms worsen or other concerning symptom's.    PCP follow-up Questions asked: YES       Tanvi Chu RN  [facility] RN  Lung Nodule and ED Lab Result F/u RN  Epic pool (ED late result f/u RN): [id number]  # [phone]

## 2018-06-20 ENCOUNTER — TELEPHONE (OUTPATIENT)
Dept: NEPHROLOGY | Facility: CLINIC | Age: 53
End: 2018-06-20

## 2018-06-20 NOTE — TELEPHONE ENCOUNTER
[facility] Call Center    Phone Message    May a detailed message be left on voicemail: yes    Reason for Call: Other: Pt daughter had to reschedule 6/29/18 due to lack of transportation     Action Taken: Message routed to:  [facility] ([facility] Pt was in the ED in May for a kidney issue so is the appt. now on 8/24/18 ok or does she need to be squeezed in sooner. - please call if she does

## 2018-08-22 DIAGNOSIS — Z48.298 AFTERCARE FOLLOWING ORGAN TRANSPLANT: ICD-10-CM

## 2018-08-22 DIAGNOSIS — Z94.0 KIDNEY REPLACED BY TRANSPLANT: Primary | ICD-10-CM

## 2018-08-22 DIAGNOSIS — Z79.899 ENCOUNTER FOR LONG-TERM CURRENT USE OF MEDICATION: ICD-10-CM

## 2018-08-23 ENCOUNTER — TELEPHONE (OUTPATIENT)
Dept: NEPHROLOGY | Facility: CLINIC | Age: 53
End: 2018-08-23

## 2018-11-07 ENCOUNTER — APPOINTMENT (OUTPATIENT)
Dept: ULTRASOUND IMAGING | Facility: CLINIC | Age: 53
End: 2018-11-07
Attending: FAMILY MEDICINE
Payer: MEDICARE

## 2018-11-07 ENCOUNTER — HOSPITAL ENCOUNTER (EMERGENCY)
Facility: CLINIC | Age: 53
Discharge: HOME | End: 2018-11-07
Attending: FAMILY MEDICINE | Admitting: FAMILY MEDICINE
Payer: MEDICARE

## 2018-11-07 VITALS
BODY MASS INDEX: 30.47 KG/M2 | RESPIRATION RATE: 18 BRPM | HEIGHT: 60 IN | WEIGHT: 155.2 LBS | TEMPERATURE: 99.6 F | DIASTOLIC BLOOD PRESSURE: 89 MMHG | OXYGEN SATURATION: 97 % | SYSTOLIC BLOOD PRESSURE: 168 MMHG

## 2018-11-07 DIAGNOSIS — E86.0 DEHYDRATION: ICD-10-CM

## 2018-11-07 DIAGNOSIS — Z94.0 KIDNEY REPLACED BY TRANSPLANT: ICD-10-CM

## 2018-11-07 DIAGNOSIS — R53.1 GENERALIZED WEAKNESS: ICD-10-CM

## 2018-11-07 LAB
ALBUMIN SERPL-MCNC: 3.4 G/DL (ref 3.4–5)
ALBUMIN UR-MCNC: 30 MG/DL
ALP SERPL-CCNC: 134 U/L (ref 40–150)
ALT SERPL W P-5'-P-CCNC: 14 U/L (ref 0–50)
ANION GAP SERPL CALCULATED.3IONS-SCNC: 9 MMOL/L (ref 3–14)
APPEARANCE UR: CLEAR
AST SERPL W P-5'-P-CCNC: 19 U/L (ref 0–45)
BASOPHILS # BLD AUTO: 0 10E9/L (ref 0–0.2)
BASOPHILS NFR BLD AUTO: 0.2 %
BILIRUB SERPL-MCNC: 0.8 MG/DL (ref 0.2–1.3)
BILIRUB UR QL STRIP: NEGATIVE
BUN SERPL-MCNC: 16 MG/DL (ref 7–30)
CALCIUM SERPL-MCNC: 9.4 MG/DL (ref 8.5–10.1)
CHLORIDE SERPL-SCNC: 103 MMOL/L (ref 94–109)
CO2 SERPL-SCNC: 22 MMOL/L (ref 20–32)
COLOR UR AUTO: ABNORMAL
CREAT SERPL-MCNC: 1.56 MG/DL (ref 0.52–1.04)
DIFFERENTIAL METHOD BLD: ABNORMAL
EOSINOPHIL # BLD AUTO: 0.1 10E9/L (ref 0–0.7)
EOSINOPHIL NFR BLD AUTO: 0.9 %
ERYTHROCYTE [DISTWIDTH] IN BLOOD BY AUTOMATED COUNT: 12.8 % (ref 10–15)
FLUAV+FLUBV RNA SPEC QL NAA+PROBE: NEGATIVE
FLUAV+FLUBV RNA SPEC QL NAA+PROBE: NEGATIVE
GFR SERPL CREATININE-BSD FRML MDRD: 35 ML/MIN/1.7M2
GLUCOSE SERPL-MCNC: 158 MG/DL (ref 70–99)
GLUCOSE UR STRIP-MCNC: NEGATIVE MG/DL
HCT VFR BLD AUTO: 35 % (ref 35–47)
HGB BLD-MCNC: 11.1 G/DL (ref 11.7–15.7)
HGB UR QL STRIP: NEGATIVE
IMM GRANULOCYTES # BLD: 0 10E9/L (ref 0–0.4)
IMM GRANULOCYTES NFR BLD: 0.2 %
INTERPRETATION ECG - MUSE: NORMAL
KETONES UR STRIP-MCNC: NEGATIVE MG/DL
LEUKOCYTE ESTERASE UR QL STRIP: ABNORMAL
LYMPHOCYTES # BLD AUTO: 1.5 10E9/L (ref 0.8–5.3)
LYMPHOCYTES NFR BLD AUTO: 23 %
MAGNESIUM SERPL-MCNC: 1.9 MG/DL (ref 1.6–2.3)
MCH RBC QN AUTO: 27.5 PG (ref 26.5–33)
MCHC RBC AUTO-ENTMCNC: 31.7 G/DL (ref 31.5–36.5)
MCV RBC AUTO: 87 FL (ref 78–100)
MONOCYTES # BLD AUTO: 0.4 10E9/L (ref 0–1.3)
MONOCYTES NFR BLD AUTO: 6.9 %
NEUTROPHILS # BLD AUTO: 4.4 10E9/L (ref 1.6–8.3)
NEUTROPHILS NFR BLD AUTO: 68.8 %
NITRATE UR QL: NEGATIVE
NRBC # BLD AUTO: 0 10*3/UL
NRBC BLD AUTO-RTO: 0 /100
PH UR STRIP: 6 PH (ref 5–7)
PLATELET # BLD AUTO: 173 10E9/L (ref 150–450)
POTASSIUM SERPL-SCNC: 4.1 MMOL/L (ref 3.4–5.3)
PROT SERPL-MCNC: 8.4 G/DL (ref 6.8–8.8)
RBC # BLD AUTO: 4.04 10E12/L (ref 3.8–5.2)
RBC #/AREA URNS AUTO: <1 /HPF (ref 0–2)
RSV RNA SPEC NAA+PROBE: NEGATIVE
SODIUM SERPL-SCNC: 134 MMOL/L (ref 133–144)
SOURCE: ABNORMAL
SP GR UR STRIP: 1 (ref 1–1.03)
SPECIMEN SOURCE: NORMAL
TROPONIN I SERPL-MCNC: <0.015 UG/L (ref 0–0.04)
TSH SERPL DL<=0.005 MIU/L-ACNC: 2.71 MU/L (ref 0.4–4)
UROBILINOGEN UR STRIP-MCNC: NORMAL MG/DL (ref 0–2)
WBC # BLD AUTO: 6.4 10E9/L (ref 4–11)
WBC #/AREA URNS AUTO: 2 /HPF (ref 0–5)

## 2018-11-07 PROCEDURE — 51798 US URINE CAPACITY MEASURE: CPT

## 2018-11-07 PROCEDURE — 93005 ELECTROCARDIOGRAM TRACING: CPT

## 2018-11-07 PROCEDURE — 80053 COMPREHEN METABOLIC PANEL: CPT | Performed by: EMERGENCY MEDICINE

## 2018-11-07 PROCEDURE — 84443 ASSAY THYROID STIM HORMONE: CPT | Performed by: EMERGENCY MEDICINE

## 2018-11-07 PROCEDURE — 93010 ELECTROCARDIOGRAM REPORT: CPT | Mod: Z6 | Performed by: FAMILY MEDICINE

## 2018-11-07 PROCEDURE — 96360 HYDRATION IV INFUSION INIT: CPT

## 2018-11-07 PROCEDURE — 76776 US EXAM K TRANSPL W/DOPPLER: CPT

## 2018-11-07 PROCEDURE — 99285 EMERGENCY DEPT VISIT HI MDM: CPT | Mod: 25

## 2018-11-07 PROCEDURE — 96361 HYDRATE IV INFUSION ADD-ON: CPT

## 2018-11-07 PROCEDURE — 81001 URINALYSIS AUTO W/SCOPE: CPT | Performed by: EMERGENCY MEDICINE

## 2018-11-07 PROCEDURE — 84484 ASSAY OF TROPONIN QUANT: CPT | Performed by: EMERGENCY MEDICINE

## 2018-11-07 PROCEDURE — 83735 ASSAY OF MAGNESIUM: CPT | Performed by: EMERGENCY MEDICINE

## 2018-11-07 PROCEDURE — 87631 RESP VIRUS 3-5 TARGETS: CPT | Performed by: FAMILY MEDICINE

## 2018-11-07 PROCEDURE — 25000128 H RX IP 250 OP 636: Performed by: FAMILY MEDICINE

## 2018-11-07 PROCEDURE — 84443 ASSAY THYROID STIM HORMONE: CPT | Performed by: FAMILY MEDICINE

## 2018-11-07 PROCEDURE — 85025 COMPLETE CBC W/AUTO DIFF WBC: CPT | Performed by: EMERGENCY MEDICINE

## 2018-11-07 PROCEDURE — 99285 EMERGENCY DEPT VISIT HI MDM: CPT | Mod: 25 | Performed by: FAMILY MEDICINE

## 2018-11-07 RX ADMIN — SODIUM CHLORIDE 1000 ML: 9 INJECTION, SOLUTION INTRAVENOUS at 20:29

## 2018-11-07 ASSESSMENT — ENCOUNTER SYMPTOMS
ABDOMINAL PAIN: 0
SHORTNESS OF BREATH: 0
HEADACHES: 1
NECK STIFFNESS: 0
COUGH: 0
DIFFICULTY URINATING: 1
EYE REDNESS: 0
ARTHRALGIAS: 0
CONFUSION: 0
FEVER: 0
CHILLS: 1
SORE THROAT: 0
COLOR CHANGE: 0
MYALGIAS: 1

## 2018-11-07 NOTE — ED AVS SNAPSHOT
[facility], [place], Emergency Department    [address] [address]    Phone:  [phone]                                       Joshua Lala   MRN: [id number]    Department:  [facility], [place], Emergency Department   Date of Visit:  11/7/2018           Patient Information     Date Of Birth          1965        Your diagnoses for this visit were:     Generalized weakness     Dehydration     Kidney replaced by transplant        You were seen by Chandra Walsh MD.      Follow-up Information     Schedule an appointment as soon as possible for a visit with Bandar Romero MD.    Specialty:  Family Practice    Contact information:    [facility]  [address]  [facility] [address]  [phone]          Discharge Instructions       Home.  Your labs and ekg look good.  Ultrasound appears stable.  It appears your symptoms may be related to some dehydration.  Make sure to encourage fluids.  Monitor symptoms.  Follow up with MD for recheck.  Return if any concerns..  Results for orders placed or performed during the hospital encounter of 11/07/18   US Renal Transplant    Narrative    EXAMINATION: US RENAL TRANSPLANT,  11/7/2018 9:56 PM     COMPARISON: 5/29/2018, 3/6/2017    HISTORY: rlq pain with urinary sx hx of renal transplant;     TECHNIQUE:  Grey-scale, color Doppler and spectral flow analysis.    FINDINGS:  The transplant kidney is located in the right lower quadrant, and  measures 11.3 cm. Parenchyma is of normal thickness and echogenicity.  No focal lesions. No hydronephrosis. No perinephric fluid collection.    Renal artery flow:   171 cm/s cm/sec peak systolic at hilum, previously 89 cm/s.  244 cm/s peak systolic at anastomosis, previously 90 cm/s.  Arcuate artery resistive indices (upper to lower): 0.78, 0.88, 0.83    Renal Vein Flow:  40 cm/s at hilum, previously 39 cm/s.   52 cm/s at anastomosis, previously 23 cm/s.    Iliac artery flow:  231.0 cm/s peak systolic above anastomosis,  previously 150 cm/s.  116.1 cm/s peak systolic below anastomosis, previously 139 cm/s.    Iliac vein flow:  Patent above and below the anastomosis.      Impression    IMPRESSION:  1. Normal grayscale evaluation of the transplant kidney. Elevated peak  systolic velocities in the renal artery at the hilum and anastomosis  preservation of systolic upstroke and no evidence of stenosis.  2. Elevated resistive indices of the arcuate arteries, slightly  increased from prior. This can be indicative of rejection or medical  renal disease.  3. No evidence of hydronephrosis or perirenal collections.    I have personally reviewed the examination and initial interpretation  and I agree with the findings.    KRISTY ASTORGA MD   CBC with platelets differential   Result Value Ref Range    WBC 6.4 4.0 - 11.0 10e9/L    RBC Count 4.04 3.8 - 5.2 10e12/L    Hemoglobin 11.1 (L) 11.7 - 15.7 g/dL    Hematocrit 35.0 35.0 - 47.0 %    MCV 87 78 - 100 fl    MCH 27.5 26.5 - 33.0 pg    MCHC 31.7 31.5 - 36.5 g/dL    RDW 12.8 10.0 - 15.0 %    Platelet Count 173 150 - 450 10e9/L    Diff Method Automated Method     % Neutrophils 68.8 %    % Lymphocytes 23.0 %    % Monocytes 6.9 %    % Eosinophils 0.9 %    % Basophils 0.2 %    % Immature Granulocytes 0.2 %    Nucleated RBCs 0 0 /100    Absolute Neutrophil 4.4 1.6 - 8.3 10e9/L    Absolute Lymphocytes 1.5 0.8 - 5.3 10e9/L    Absolute Monocytes 0.4 0.0 - 1.3 10e9/L    Absolute Eosinophils 0.1 0.0 - 0.7 10e9/L    Absolute Basophils 0.0 0.0 - 0.2 10e9/L    Abs Immature Granulocytes 0.0 0 - 0.4 10e9/L    Absolute Nucleated RBC 0.0    Comprehensive metabolic panel   Result Value Ref Range    Sodium 134 133 - 144 mmol/L    Potassium 4.1 3.4 - 5.3 mmol/L    Chloride 103 94 - 109 mmol/L    Carbon Dioxide 22 20 - 32 mmol/L    Anion Gap 9 3 - 14 mmol/L    Glucose 158 (H) 70 - 99 mg/dL    Urea Nitrogen 16 7 - 30 mg/dL    Creatinine 1.56 (H) 0.52 - 1.04 mg/dL    GFR Estimate 35 (L) >60 mL/min/1.7m2    GFR  Estimate If Black 42 (L) >60 mL/min/1.7m2    Calcium 9.4 8.5 - 10.1 mg/dL    Bilirubin Total 0.8 0.2 - 1.3 mg/dL    Albumin 3.4 3.4 - 5.0 g/dL    Protein Total 8.4 6.8 - 8.8 g/dL    Alkaline Phosphatase 134 40 - 150 U/L    ALT 14 0 - 50 U/L    AST 19 0 - 45 U/L   UA reflex to Microscopic   Result Value Ref Range    Color Urine Straw     Appearance Urine Clear     Glucose Urine Negative NEG^Negative mg/dL    Bilirubin Urine Negative NEG^Negative    Ketones Urine Negative NEG^Negative mg/dL    Specific Gravity Urine 1.003 1.003 - 1.035    Blood Urine Negative NEG^Negative    pH Urine 6.0 5.0 - 7.0 pH    Protein Albumin Urine 30 (A) NEG^Negative mg/dL    Urobilinogen mg/dL Normal 0.0 - 2.0 mg/dL    Nitrite Urine Negative NEG^Negative    Leukocyte Esterase Urine Moderate (A) NEG^Negative    Source Clean catch urine     RBC Urine <1 0 - 2 /HPF    WBC Urine 2 0 - 5 /HPF   Magnesium   Result Value Ref Range    Magnesium 1.9 1.6 - 2.3 mg/dL   TSH with free T4 reflex   Result Value Ref Range    TSH 2.71 0.40 - 4.00 mU/L   Troponin I   Result Value Ref Range    Troponin I ES <0.015 0.000 - 0.045 ug/L   EKG 12 lead   Result Value Ref Range    Interpretation ECG Click View Image link to view waveform and result    Influenza A and B and RSV PCR   Result Value Ref Range    Specimen Description Nasopharyngeal     Influenza A PCR Negative NEG^Negative    Influenza B PCR Negative NEG^Negative    Resp Syncytial Virus Negative NEG^Negative           24 Hour Appointment Hotline       To make an appointment at any [facility], call [phone] ([phone]). If you don't have a family doctor or clinic, we will help you find one. [facility] are conveniently located to serve the needs of you and your family.             Review of your medicines      Our records show that you are taking the medicines listed below. If these are incorrect, please call your family doctor or clinic.        Dose / Directions Last dose taken     amLODIPine 5 MG tablet   Commonly known as:  NORVASC   Dose:  5 mg   Quantity:  30 tablet        Take 1 tablet (5 mg) by mouth daily   Refills:  11        cinacalcet 30 MG tablet   Commonly known as:  SENSIPAR   Quantity:  30 tablet        TAKE ONE-HALF TABLET BY MOUTH EVERY OTHER DAY   Refills:  4        cycloSPORINE modified 25 MG capsule   Dose:  25 mg   Quantity:  60 capsule        Take 1 capsule (25 mg) by mouth 2 times daily   Refills:  11        FIRST-HARINDER MOUTHWASH Susp   Dose:  5-10 mL   Quantity:  237 mL        Swish and swallow 5-10 mLs in mouth every 6 hours as needed   Refills:  1        guaiFENesin 600 MG 12 hr tablet   Commonly known as:  MUCINEX   Dose:  600 mg        Take 600 mg by mouth as needed   Refills:  0        lisinopril 5 MG tablet   Commonly known as:  PRINIVIL/ZESTRIL   Dose:  5 mg   Quantity:  30 tablet        Take 1 tablet (5 mg) by mouth daily   Refills:  11        * mycophenolate 250 MG capsule   Dose:  1000 mg   Quantity:  240 capsule        Take 4 capsules (1,000 mg) by mouth 2 times daily   Refills:  11        * mycophenolate 250 MG capsule   Dose:  1000 mg   Quantity:  240 capsule        Take 4 capsules (1,000 mg) by mouth 2 times daily   Refills:  11        * sulfamethoxazole-trimethoprim 400-80 MG per tablet   Commonly known as:  BACTRIM/SEPTRA   Quantity:  30 tablet        TAKE ONE TABLET BY MOUTH EVERY DAY   Refills:  11        * sulfamethoxazole-trimethoprim 400-80 MG per tablet   Commonly known as:  BACTRIM/SEPTRA   Dose:  1 tablet   Quantity:  30 tablet        Take 1 tablet by mouth daily   Refills:  11        * Notice:  This list has 4 medication(s) that are the same as other medications prescribed for you. Read the directions carefully, and ask your doctor or other care provider to review them with you.            Procedures and tests performed during your visit     Bladder scan    CBC with platelets differential    Comprehensive metabolic panel    EKG 12 lead     "Influenza A and B and RSV PCR    Magnesium    TSH with free T4 reflex    Troponin I    UA reflex to Microscopic    US Renal Transplant      Orders Needing Specimen Collection     None      Pending Results     No orders found from 2018 to 2018.            Pending Culture Results     No orders found from 2018 to 2018.            Pending Results Instructions     If you had any lab results that were not finalized at the time of your Discharge, you can call the ED Lab Result RN at [phone]. You will be contacted by this team for any positive Lab results or changes in treatment. The nurses are available 7 days a week from 10A to 6:30P.  You can leave a message 24 hours per day and they will return your call.        Thank you for choosing [facility]       Thank you for choosing [facility] for your care. Our goal is always to provide you with excellent care. Hearing back from our patients is one way we can continue to improve our services. Please take a few minutes to complete the written survey that you may receive in the mail after you visit with us. Thank you!        [employer] Information     [employer] lets you send messages to your doctor, view your test results, renew your prescriptions, schedule appointments and more. To sign up, go to [email or web address] . Click on \"Log in\" on the left side of the screen, which will take you to the Welcome page. Then click on \"Sign up Now\" on the right side of the page.     You will be asked to enter the access code listed below, as well as some personal information. Please follow the directions to create your username and password.     Your access code is: [email or web address]  Expires: 2019 11:13 PM     Your access code will  in 90 days. If you need help or a new code, please call your [facility] clinic or [phone].        Care EveryWhere ID     This is your Care EveryWhere ID. This could be used by other organizations to access your [facility] medical " records  [id number]        Equal Access to Services     BEN JUAREZ : Nathan Garcia, ezra mak, ervin goss. So [facility] [phone].    ATENCIÓN: Si habla español, tiene a dobbs disposición servicios gratuitos de asistencia lingüística. Llame al [phone].    We comply with applicable federal civil rights laws and [place] laws. We do not discriminate on the basis of race, color, national origin, age, disability, sex, sexual orientation, or gender identity.            After Visit Summary       This is your record. Keep this with you and show to your community pharmacist(s) and doctor(s) at your next visit.

## 2018-11-07 NOTE — ED AVS SNAPSHOT
[facility], [place], Emergency Department    [address] [address]    Phone:  [phone]                                       Joshua Lala   MRN: [id number]    Department:  [facility], Emergency Department   Date of Visit:  11/7/2018           After Visit Summary Signature Page     I have received my discharge instructions, and my questions have been answered. I have discussed any challenges I see with this plan with the nurse or doctor.    ..........................................................................................................................................  Patient/Patient Representative Signature      ..........................................................................................................................................  Patient Representative Print Name and Relationship to Patient    ..................................................               ................................................  Date                                   Time    ..........................................................................................................................................  Reviewed by Signature/Title    ...................................................              ..............................................  Date                                               Time          22EPIC Rev 08/18

## 2018-11-08 ASSESSMENT — ENCOUNTER SYMPTOMS
DIARRHEA: 0
WEAKNESS: 1
NAUSEA: 0
DYSPHORIC MOOD: 1
VOMITING: 0
APPETITE CHANGE: 1
TROUBLE SWALLOWING: 0
DECREASED CONCENTRATION: 1
BLOOD IN STOOL: 0
FLANK PAIN: 0

## 2018-11-08 NOTE — ED TRIAGE NOTES
Presents with generalized weakness/body aches, chills and urinary retention starting yesterday. Hx kidney tx 2007.

## 2018-11-08 NOTE — DISCHARGE INSTRUCTIONS
Home.  Your labs and ekg look good.  Ultrasound appears stable.  It appears your symptoms may be related to some dehydration.  Make sure to encourage fluids.  Monitor symptoms.  Follow up with MD for recheck.  Return if any concerns..  Results for orders placed or performed during the hospital encounter of 11/07/18   US Renal Transplant    Narrative    EXAMINATION: US RENAL TRANSPLANT,  11/7/2018 9:56 PM     COMPARISON: 5/29/2018, 3/6/2017    HISTORY: rlq pain with urinary sx hx of renal transplant;     TECHNIQUE:  Grey-scale, color Doppler and spectral flow analysis.    FINDINGS:  The transplant kidney is located in the right lower quadrant, and  measures 11.3 cm. Parenchyma is of normal thickness and echogenicity.  No focal lesions. No hydronephrosis. No perinephric fluid collection.    Renal artery flow:   171 cm/s cm/sec peak systolic at hilum, previously 89 cm/s.  244 cm/s peak systolic at anastomosis, previously 90 cm/s.  Arcuate artery resistive indices (upper to lower): 0.78, 0.88, 0.83    Renal Vein Flow:  40 cm/s at hilum, previously 39 cm/s.   52 cm/s at anastomosis, previously 23 cm/s.    Iliac artery flow:  231.0 cm/s peak systolic above anastomosis, previously 150 cm/s.  116.1 cm/s peak systolic below anastomosis, previously 139 cm/s.    Iliac vein flow:  Patent above and below the anastomosis.      Impression    IMPRESSION:  1. Normal grayscale evaluation of the transplant kidney. Elevated peak  systolic velocities in the renal artery at the hilum and anastomosis  preservation of systolic upstroke and no evidence of stenosis.  2. Elevated resistive indices of the arcuate arteries, slightly  increased from prior. This can be indicative of rejection or medical  renal disease.  3. No evidence of hydronephrosis or perirenal collections.    I have personally reviewed the examination and initial interpretation  and I agree with the findings.    KRISTY ASTORGA MD   CBC with platelets differential   Result  Value Ref Range    WBC 6.4 4.0 - 11.0 10e9/L    RBC Count 4.04 3.8 - 5.2 10e12/L    Hemoglobin 11.1 (L) 11.7 - 15.7 g/dL    Hematocrit 35.0 35.0 - 47.0 %    MCV 87 78 - 100 fl    MCH 27.5 26.5 - 33.0 pg    MCHC 31.7 31.5 - 36.5 g/dL    RDW 12.8 10.0 - 15.0 %    Platelet Count 173 150 - 450 10e9/L    Diff Method Automated Method     % Neutrophils 68.8 %    % Lymphocytes 23.0 %    % Monocytes 6.9 %    % Eosinophils 0.9 %    % Basophils 0.2 %    % Immature Granulocytes 0.2 %    Nucleated RBCs 0 0 /100    Absolute Neutrophil 4.4 1.6 - 8.3 10e9/L    Absolute Lymphocytes 1.5 0.8 - 5.3 10e9/L    Absolute Monocytes 0.4 0.0 - 1.3 10e9/L    Absolute Eosinophils 0.1 0.0 - 0.7 10e9/L    Absolute Basophils 0.0 0.0 - 0.2 10e9/L    Abs Immature Granulocytes 0.0 0 - 0.4 10e9/L    Absolute Nucleated RBC 0.0    Comprehensive metabolic panel   Result Value Ref Range    Sodium 134 133 - 144 mmol/L    Potassium 4.1 3.4 - 5.3 mmol/L    Chloride 103 94 - 109 mmol/L    Carbon Dioxide 22 20 - 32 mmol/L    Anion Gap 9 3 - 14 mmol/L    Glucose 158 (H) 70 - 99 mg/dL    Urea Nitrogen 16 7 - 30 mg/dL    Creatinine 1.56 (H) 0.52 - 1.04 mg/dL    GFR Estimate 35 (L) >60 mL/min/1.7m2    GFR Estimate If Black 42 (L) >60 mL/min/1.7m2    Calcium 9.4 8.5 - 10.1 mg/dL    Bilirubin Total 0.8 0.2 - 1.3 mg/dL    Albumin 3.4 3.4 - 5.0 g/dL    Protein Total 8.4 6.8 - 8.8 g/dL    Alkaline Phosphatase 134 40 - 150 U/L    ALT 14 0 - 50 U/L    AST 19 0 - 45 U/L   UA reflex to Microscopic   Result Value Ref Range    Color Urine Straw     Appearance Urine Clear     Glucose Urine Negative NEG^Negative mg/dL    Bilirubin Urine Negative NEG^Negative    Ketones Urine Negative NEG^Negative mg/dL    Specific Gravity Urine 1.003 1.003 - 1.035    Blood Urine Negative NEG^Negative    pH Urine 6.0 5.0 - 7.0 pH    Protein Albumin Urine 30 (A) NEG^Negative mg/dL    Urobilinogen mg/dL Normal 0.0 - 2.0 mg/dL    Nitrite Urine Negative NEG^Negative    Leukocyte Esterase Urine  Moderate (A) NEG^Negative    Source Clean catch urine     RBC Urine <1 0 - 2 /HPF    WBC Urine 2 0 - 5 /HPF   Magnesium   Result Value Ref Range    Magnesium 1.9 1.6 - 2.3 mg/dL   TSH with free T4 reflex   Result Value Ref Range    TSH 2.71 0.40 - 4.00 mU/L   Troponin I   Result Value Ref Range    Troponin I ES <0.015 0.000 - 0.045 ug/L   EKG 12 lead   Result Value Ref Range    Interpretation ECG Click View Image link to view waveform and result    Influenza A and B and RSV PCR   Result Value Ref Range    Specimen Description Nasopharyngeal     Influenza A PCR Negative NEG^Negative    Influenza B PCR Negative NEG^Negative    Resp Syncytial Virus Negative NEG^Negative

## 2018-11-08 NOTE — ED PROVIDER NOTES
History     Chief Complaint   Patient presents with     Generalized Weakness     Urinary Retention     HPI  Joshua Lala is a 52 year old female with a history of kidney transplant (2008), tertiary hyperparathyroidsism who presents to the Emergency Department for the evaluation of generalized body aches and urinary retention. Patient states that since yesterday, she has been experiencing chills and generalized body aches with a mild headache. Patient notes that she feels that she needs to urinate but is unable to when she tries. Patient denies sore throat, cough, or change in baseline shortness of breath. Patient has not had the flu shot. Patient notes a decrease in appetite. Patient states she has had these symptoms in the past.  Patient denies cough otherwise no shortness of breath.  No blood in the urine.  No back pain.    I have reviewed the Medications, Allergies, Past Medical and Surgical History, and Social History in the [facility] system.  Past Medical History:   Diagnosis Date     Dehydration      Henoch-Schonlein purpura (H)      Hypercalcemia      Hypomagnesaemia      Renal disease      Tertiary hyperparathyroidism (H)        Past Surgical History:   Procedure Laterality Date     CHOLECYSTECTOMY       renal transplant  1998, 2007     TRANSPLANT      Kidney 2008       No family history on file.    Social History   Substance Use Topics     Smoking status: Never Smoker     Smokeless tobacco: Never Used     Alcohol use No       No current facility-administered medications for this encounter.      Current Outpatient Prescriptions   Medication     amLODIPine (NORVASC) 5 MG tablet     CELLCEPT (BRAND) 250 MG CAPSULE     cinacalcet (SENSIPAR) 30 MG tablet     lisinopril (PRINIVIL/ZESTRIL) 5 MG tablet     NEORAL (BRAND) 25 MG CAPSULE     sulfamethoxazole-trimethoprim (BACTRIM,SEPTRA) 400-80 MG per tablet     CELLCEPT 250 MG PO CAPSULE     Diphenhyd-HC-Nystatin-Tetracyc (FIRST-[facility] MOUTHWASH) SUSP     guaiFENesin  (MUCINEX) 600 MG 12 hr tablet     sulfamethoxazole-trimethoprim (BACTRIM/SEPTRA) 400-80 MG per tablet        Allergies   Allergen Reactions     Contrast Dye Shortness Of Breath     No Clinical Screening - See Comments Shortness Of Breath       Review of Systems   Constitutional: Positive for appetite change and chills. Negative for fever.        Loss of appetite   HENT: Negative for congestion, sore throat and trouble swallowing.    Eyes: Negative for redness and visual disturbance.   Respiratory: Negative for cough and shortness of breath.    Cardiovascular: Negative for chest pain.   Gastrointestinal: Negative for abdominal pain, blood in stool, diarrhea, nausea and vomiting.   Genitourinary: Positive for difficulty urinating and urgency. Negative for flank pain.   Musculoskeletal: Positive for myalgias. Negative for arthralgias and neck stiffness.   Skin: Negative for color change and rash.   Allergic/Immunologic: Positive for immunocompromised state.   Neurological: Positive for weakness (general) and headaches.   Psychiatric/Behavioral: Positive for decreased concentration and dysphoric mood. Negative for confusion.   All other systems reviewed and are negative.      Physical Exam   BP: 177/86  Heart Rate: 96  Temp: 99.6  F (37.6  C)  Resp: 18  Height: 152.4 cm (5')  Weight: 70.4 kg (155 lb 3.2 oz)  SpO2: 95 %      Physical Exam   Constitutional: She is oriented to person, place, and time. She appears well-developed and well-nourished. She appears distressed.   Patient with daughter daughter does interpret.  Patient does not appear to be toxic   HENT:   Head: Normocephalic and atraumatic.   Mouth/Throat: Oropharynx is clear and moist. No oropharyngeal exudate.   Eyes: Pupils are equal, round, and reactive to light. No scleral icterus.   Neck: Normal range of motion. Neck supple.   Cardiovascular: Regular rhythm and intact distal pulses.    Murmur (3/6 systolic murmur) heard.  Pulmonary/Chest: Breath sounds  normal. No stridor. No respiratory distress. She has no wheezes. She has no rales. She exhibits no tenderness.   Abdominal: Soft. Bowel sounds are normal. There is no tenderness. There is no rebound and no guarding.   Musculoskeletal: She exhibits no edema, tenderness or deformity.   Neurological: She is alert and oriented to person, place, and time. She has normal reflexes. No cranial nerve deficit. Coordination normal.   Skin: Skin is warm and dry. No rash noted. She is not diaphoretic. No erythema. No pallor.   Psychiatric:   Mildly flattened affect but appropriate   Nursing note and vitals reviewed.      ED Course   8:10 PM  The patient was seen and examined by Chandra Walsh MD in Room 7.     ED Course       Previous patient value in the ER.  IV established patient did receive a liter normal saline feeling better here in the ER.  We did do post void residuals patient able to empty her bladder.  Urinalysis negative for infection.  Influenza AB and RSV by PCR were negative.  White count was 6.4.  Creatinine 1.56 which is stable.  Magnesium is 1.9 as patient has has history of low magnesium TSH is 2.71 troponin is negative.  Renal ultrasound revealed normal evaluation of the transplanted kidney there is no evidence of stenosis seen with flow minimal changes noted in the arterial flow also.    As noted with the fluids patient feeling better at this point discussed with patient EKG did not show any acute findings otherwise troponin negative unclear if this is more reflective of dehydration she has better IV fluids make sure she is encouraging fluids follow-up with MD for further evaluation return if any concerns agrees to plan discharge with daughter.    Procedures             EKG Interpretation:      Interpreted by Chandra Walsh MD  Time reviewed: 20:31  Symptoms at time of EKG: generalized weakness   Rhythm: normal sinus   Rate: normal  Axis: normal  Ectopy: none  Conduction: normal  ST Segments/ T Waves: No ST-T  wave changes  Q Waves: none  Comparison to prior: Unchanged from 2/27/2017    Clinical Impression: no significant changes compared to prior          Critical Care time:  none             Labs Ordered and Resulted from Time of ED Arrival Up to the Time of Departure from the ED   CBC WITH PLATELETS DIFFERENTIAL - Abnormal; Notable for the following:        Result Value    Hemoglobin 11.1 (*)     All other components within normal limits   COMPREHENSIVE METABOLIC PANEL - Abnormal; Notable for the following:     Glucose 158 (*)     Creatinine 1.56 (*)     GFR Estimate 35 (*)     GFR Estimate If Black 42 (*)     All other components within normal limits   URINE MACROSCOPIC WITH REFLEX TO MICRO - Abnormal; Notable for the following:     Protein Albumin Urine 30 (*)     Leukocyte Esterase Urine Moderate (*)     All other components within normal limits   MAGNESIUM   TSH WITH FREE T4 REFLEX   TROPONIN I   BLADDER SCAN   INFLUENZA A AND B AND RSV PCR     Results for orders placed or performed during the hospital encounter of 11/07/18   US Renal Transplant    Narrative    EXAMINATION: US RENAL TRANSPLANT,  11/7/2018 9:56 PM     COMPARISON: 5/29/2018, 3/6/2017    HISTORY: rlq pain with urinary sx hx of renal transplant;     TECHNIQUE:  Grey-scale, color Doppler and spectral flow analysis.    FINDINGS:  The transplant kidney is located in the right lower quadrant, and  measures 11.3 cm. Parenchyma is of normal thickness and echogenicity.  No focal lesions. No hydronephrosis. No perinephric fluid collection.    Renal artery flow:   171 cm/s cm/sec peak systolic at hilum, previously 89 cm/s.  244 cm/s peak systolic at anastomosis, previously 90 cm/s.  Arcuate artery resistive indices (upper to lower): 0.78, 0.88, 0.83    Renal Vein Flow:  40 cm/s at hilum, previously 39 cm/s.   52 cm/s at anastomosis, previously 23 cm/s.    Iliac artery flow:  231.0 cm/s peak systolic above anastomosis, previously 150 cm/s.  116.1 cm/s peak  systolic below anastomosis, previously 139 cm/s.    Iliac vein flow:  Patent above and below the anastomosis.      Impression    IMPRESSION:  1. Normal grayscale evaluation of the transplant kidney. Elevated peak  systolic velocities in the renal artery at the hilum and anastomosis  preservation of systolic upstroke and no evidence of stenosis.  2. Elevated resistive indices of the arcuate arteries, slightly  increased from prior. This can be indicative of rejection or medical  renal disease.  3. No evidence of hydronephrosis or perirenal collections.    I have personally reviewed the examination and initial interpretation  and I agree with the findings.    KRISTY ASTORGA MD   CBC with platelets differential   Result Value Ref Range    WBC 6.4 4.0 - 11.0 10e9/L    RBC Count 4.04 3.8 - 5.2 10e12/L    Hemoglobin 11.1 (L) 11.7 - 15.7 g/dL    Hematocrit 35.0 35.0 - 47.0 %    MCV 87 78 - 100 fl    MCH 27.5 26.5 - 33.0 pg    MCHC 31.7 31.5 - 36.5 g/dL    RDW 12.8 10.0 - 15.0 %    Platelet Count 173 150 - 450 10e9/L    Diff Method Automated Method     % Neutrophils 68.8 %    % Lymphocytes 23.0 %    % Monocytes 6.9 %    % Eosinophils 0.9 %    % Basophils 0.2 %    % Immature Granulocytes 0.2 %    Nucleated RBCs 0 0 /100    Absolute Neutrophil 4.4 1.6 - 8.3 10e9/L    Absolute Lymphocytes 1.5 0.8 - 5.3 10e9/L    Absolute Monocytes 0.4 0.0 - 1.3 10e9/L    Absolute Eosinophils 0.1 0.0 - 0.7 10e9/L    Absolute Basophils 0.0 0.0 - 0.2 10e9/L    Abs Immature Granulocytes 0.0 0 - 0.4 10e9/L    Absolute Nucleated RBC 0.0    Comprehensive metabolic panel   Result Value Ref Range    Sodium 134 133 - 144 mmol/L    Potassium 4.1 3.4 - 5.3 mmol/L    Chloride 103 94 - 109 mmol/L    Carbon Dioxide 22 20 - 32 mmol/L    Anion Gap 9 3 - 14 mmol/L    Glucose 158 (H) 70 - 99 mg/dL    Urea Nitrogen 16 7 - 30 mg/dL    Creatinine 1.56 (H) 0.52 - 1.04 mg/dL    GFR Estimate 35 (L) >60 mL/min/1.7m2    GFR Estimate If Black 42 (L) >60 mL/min/1.7m2     Calcium 9.4 8.5 - 10.1 mg/dL    Bilirubin Total 0.8 0.2 - 1.3 mg/dL    Albumin 3.4 3.4 - 5.0 g/dL    Protein Total 8.4 6.8 - 8.8 g/dL    Alkaline Phosphatase 134 40 - 150 U/L    ALT 14 0 - 50 U/L    AST 19 0 - 45 U/L   UA reflex to Microscopic   Result Value Ref Range    Color Urine Straw     Appearance Urine Clear     Glucose Urine Negative NEG^Negative mg/dL    Bilirubin Urine Negative NEG^Negative    Ketones Urine Negative NEG^Negative mg/dL    Specific Gravity Urine 1.003 1.003 - 1.035    Blood Urine Negative NEG^Negative    pH Urine 6.0 5.0 - 7.0 pH    Protein Albumin Urine 30 (A) NEG^Negative mg/dL    Urobilinogen mg/dL Normal 0.0 - 2.0 mg/dL    Nitrite Urine Negative NEG^Negative    Leukocyte Esterase Urine Moderate (A) NEG^Negative    Source Clean catch urine     RBC Urine <1 0 - 2 /HPF    WBC Urine 2 0 - 5 /HPF   Magnesium   Result Value Ref Range    Magnesium 1.9 1.6 - 2.3 mg/dL   TSH with free T4 reflex   Result Value Ref Range    TSH 2.71 0.40 - 4.00 mU/L   Troponin I   Result Value Ref Range    Troponin I ES <0.015 0.000 - 0.045 ug/L   EKG 12 lead   Result Value Ref Range    Interpretation ECG Click View Image link to view waveform and result    Influenza A and B and RSV PCR   Result Value Ref Range    Specimen Description Nasopharyngeal     Influenza A PCR Negative NEG^Negative    Influenza B PCR Negative NEG^Negative    Resp Syncytial Virus Negative NEG^Negative              Assessments & Plan (with Medical Decision Making)  52-year-old female history of kidney transplant in the past.  Patient now presents with generalized weakness for the last few days with this also some urinary symptoms feeling she is unable to fully empty her bladder.  No reports of fever coughing headache but generalized achiness that came on yesterday.  Patient is not got influenza testing as.  She presented the ER.  Nontoxic vital signs stable after liter fluid feeling better cardiac evaluation EKG did not show any hyperacute  ischemic changes troponin was negative.  Patient has no pulmonary symptoms therefore chest x-ray not done.  Urinalysis negative for infection CBC chemistries otherwise stable without any changes renal ultrasound shows stable transplant also with no signs of stenosis etc.  Patient feeling better at this point symptoms may be that reflective of dehydration as her influenza AB and RSV were negative.  She will encourage fluids monitor symptoms follow-up with MD return if any concerns.         I have reviewed the nursing notes.    I have reviewed the findings, diagnosis, plan and need for follow up with the patient.    Discharge Medication List as of 11/7/2018 11:13 PM          Final diagnoses:   Generalized weakness   Dehydration   Kidney replaced by transplant     I, Fransico Worrell, am serving as a trained medical scribe to document services personally performed by Chandra Walsh MD, based on the provider's statements to me.   I, Chandra Walsh MD, was physically present and have reviewed and verified the accuracy of this note documented by Fransico Worrell.    11/7/2018   [facility] EMERGENCY DEPARTMENT      This note was created at least in part by the use of dragon voice dictation system. Inadvertent typographical errors may still exist.  Chandra Walsh MD.         Chandra Walsh MD  11/08/18 0052

## 2019-01-23 ENCOUNTER — PRE VISIT (OUTPATIENT)
Dept: UROLOGY | Facility: CLINIC | Age: 54
End: 2019-01-23

## 2019-01-23 NOTE — TELEPHONE ENCOUNTER
MEDICAL RECORDS REQUEST   [facility]  Urology Clinic  [address] [place] [address]  PHONE: [phone]  Fax: [phone]        FUTURE VISIT INFORMATION                                                   Joshua Lala : 1965 scheduled for future visit at [facility]    APPOINTMENT INFORMATION:    Date: 2019    Provider:  CHARLINE NINO    Reason for Visit/Diagnosis: RENAL CYSTS    REFERRAL INFORMATION:    Referring provider:  LOU SCHULZ    Specialty: MD    Referring providers clinic:  [facility] contact number:    [phone];   RECORDS REQUESTED FOR VISIT                                                     NOTES  STATUS/DETAILS   OFFICE NOTE from referring provider  yes   OFFICE NOTE from other specialist  no   DISCHARGE SUMMARY from hospital  no   DISCHARGE REPORT from the ER  no   OPERATIVE REPORT  no   MEDICATION LIST  yes       PRE-VISIT CHECKLIST      Record collection complete Yes   Appointment appropriately scheduled           (right time/right provider) Yes   MyChart activation Yes   Questionnaire complete If no, please explain IN PROCESS     Completed by: Bridgette Aragon

## 2019-01-30 ENCOUNTER — DOCUMENTATION ONLY (OUTPATIENT)
Dept: CARE COORDINATION | Facility: CLINIC | Age: 54
End: 2019-01-30

## 2019-02-13 DIAGNOSIS — Z94.0 KIDNEY REPLACED BY TRANSPLANT: ICD-10-CM

## 2019-02-13 RX ORDER — CYCLOSPORINE 25 MG/1
25 CAPSULE, LIQUID FILLED ORAL 2 TIMES DAILY
Qty: 60 CAPSULE | Refills: 0 | Status: SHIPPED | OUTPATIENT
Start: 2019-02-13 | End: 2019-02-26

## 2019-02-14 DIAGNOSIS — Z94.0 KIDNEY TRANSPLANTED: ICD-10-CM

## 2019-02-14 RX ORDER — MYCOPHENOLATE MOFETIL 250 MG/1
1000 CAPSULE ORAL 2 TIMES DAILY
Qty: 240 CAPSULE | Refills: 0 | Status: ON HOLD | OUTPATIENT
Start: 2019-02-14 | End: 2019-04-08

## 2019-02-16 DIAGNOSIS — Z79.899 ENCOUNTER FOR LONG-TERM CURRENT USE OF MEDICATION: ICD-10-CM

## 2019-02-16 DIAGNOSIS — Z48.298 AFTERCARE FOLLOWING ORGAN TRANSPLANT: ICD-10-CM

## 2019-02-16 DIAGNOSIS — Z94.0 KIDNEY REPLACED BY TRANSPLANT: ICD-10-CM

## 2019-02-16 LAB
ANION GAP SERPL CALCULATED.3IONS-SCNC: 7 MMOL/L (ref 3–14)
BUN SERPL-MCNC: 16 MG/DL (ref 7–30)
CALCIUM SERPL-MCNC: 9.4 MG/DL (ref 8.5–10.1)
CHLORIDE SERPL-SCNC: 109 MMOL/L (ref 94–109)
CO2 SERPL-SCNC: 23 MMOL/L (ref 20–32)
CREAT SERPL-MCNC: 1.61 MG/DL (ref 0.52–1.04)
CREAT UR-MCNC: 24 MG/DL
CYCLOSPORINE BLD LC/MS/MS-MCNC: 52 UG/L (ref 50–400)
ERYTHROCYTE [DISTWIDTH] IN BLOOD BY AUTOMATED COUNT: 12.7 % (ref 10–15)
GFR SERPL CREATININE-BSD FRML MDRD: 36 ML/MIN/{1.73_M2}
GLUCOSE SERPL-MCNC: 92 MG/DL (ref 70–99)
HCT VFR BLD AUTO: 37.3 % (ref 35–47)
HGB BLD-MCNC: 11.2 G/DL (ref 11.7–15.7)
MCH RBC QN AUTO: 26.6 PG (ref 26.5–33)
MCHC RBC AUTO-ENTMCNC: 30 G/DL (ref 31.5–36.5)
MCV RBC AUTO: 89 FL (ref 78–100)
PLATELET # BLD AUTO: 192 10E9/L (ref 150–450)
POTASSIUM SERPL-SCNC: 4.5 MMOL/L (ref 3.4–5.3)
PROT UR-MCNC: 0.93 G/L
PROT/CREAT 24H UR: 3.92 G/G CR (ref 0–0.2)
RBC # BLD AUTO: 4.21 10E12/L (ref 3.8–5.2)
SODIUM SERPL-SCNC: 139 MMOL/L (ref 133–144)
TME LAST DOSE: 2000 H
WBC # BLD AUTO: 4.1 10E9/L (ref 4–11)

## 2019-02-16 PROCEDURE — 80158 DRUG ASSAY CYCLOSPORINE: CPT | Performed by: INTERNAL MEDICINE

## 2019-02-18 ENCOUNTER — TELEPHONE (OUTPATIENT)
Dept: TRANSPLANT | Facility: CLINIC | Age: 54
End: 2019-02-18

## 2019-02-18 NOTE — TELEPHONE ENCOUNTER
Reviewed increased proteinuria with Dr. Abdul.    No changes at this time, patient should be scheduled for a clinic appointment.   Message sent to .     Called Joshua's daughter Nichelle and explained the need for a transplant nephrology appointment.    Dr. Meehan retired, so she will see one of our other nephrologists.  Nichelle verbalized understanding.

## 2019-02-19 ENCOUNTER — OFFICE VISIT (OUTPATIENT)
Dept: UROLOGY | Facility: CLINIC | Age: 54
End: 2019-02-19
Payer: MEDICARE

## 2019-02-19 ENCOUNTER — TELEPHONE (OUTPATIENT)
Dept: TRANSPLANT | Facility: CLINIC | Age: 54
End: 2019-02-19

## 2019-02-19 VITALS
BODY MASS INDEX: 31.18 KG/M2 | DIASTOLIC BLOOD PRESSURE: 81 MMHG | SYSTOLIC BLOOD PRESSURE: 162 MMHG | HEART RATE: 70 BPM | HEIGHT: 60 IN | WEIGHT: 158.8 LBS

## 2019-02-19 DIAGNOSIS — Z94.0 KIDNEY REPLACED BY TRANSPLANT: Primary | ICD-10-CM

## 2019-02-19 DIAGNOSIS — Z94.0 KIDNEY TRANSPLANTED: Primary | ICD-10-CM

## 2019-02-19 RX ORDER — MYCOPHENOLATE MOFETIL 250 MG/1
CAPSULE ORAL
COMMUNITY
End: 2019-03-15

## 2019-02-19 RX ORDER — CYCLOSPORINE 25 MG/1
CAPSULE ORAL
Status: ON HOLD | COMMUNITY
End: 2019-04-08

## 2019-02-19 RX ORDER — AMOXICILLIN 500 MG/1
500 TABLET, FILM COATED ORAL DAILY
Status: ON HOLD | COMMUNITY
End: 2019-04-08

## 2019-02-19 RX ORDER — ALBUTEROL SULFATE 90 UG/1
AEROSOL, METERED RESPIRATORY (INHALATION)
COMMUNITY
End: 2019-03-12

## 2019-02-19 RX ORDER — CINACALCET 30 MG/1
TABLET, FILM COATED ORAL
Status: ON HOLD | COMMUNITY
End: 2019-04-08

## 2019-02-19 ASSESSMENT — MIFFLIN-ST. JEOR: SCORE: 1246.81

## 2019-02-19 ASSESSMENT — PAIN SCALES - GENERAL: PAINLEVEL: NO PAIN (0)

## 2019-02-19 NOTE — PATIENT INSTRUCTIONS
Follow up as needed.    It was a pleasure meeting with you today.  Thank you for allowing me and my team the privilege of caring for you today.  YOU are the reason we are here, and I truly hope we provided you with the excellent service you deserve.  Please let us know if there is anything else we can do for you so that we can be sure you are leaving completely satisfied with your care experience.        MARILYN Juarez

## 2019-02-19 NOTE — TELEPHONE ENCOUNTER
Spoke with Nichelle.  She is unsure but thinks the trough might have been a little long.  She states current dose is 25 mg twice daily, not 50 mg twice daily.  She denies any recent illness, medication changes, or missed doses.  Joshua verbalized understanding to have her mother repeat labs in the next week with a good 12 hour level.  Lab order updated.

## 2019-02-19 NOTE — PROGRESS NOTES
Urology Clinic    Estrada Blackmon MD  Date of Service: 2019     Name: Joshua Lala  MRN: [id number]  Age: 53 year old  : 1965  Referring provider: Referred Self     An  was present for the duration of the visit.     Assessment and Plan:  -54 yo F with hx of renal transplant referred for unclear reason (renal cyst vs stone)  -She denies knowledge of reason for scheduling this appt.  Her recent renal trasnplant US has been unremarkable without evidence of cysts or stones.  She suspects this appt was possibly made in error.  Will contact referring provider to clarify goals of care.    Chief Complaint:   Renal cysts     HPI:   Joshua Lala  is a 53 year old female status post kidney transplant who presents for evaluation of renal cysts. She has a hx of DDKT  for HSP and CKD stage 3.  Her transplant has been functioning well to her knowledge.  She denies hematuria, dysuria, UTI.  Has had an episode or two of slow stream and difficulty emptying bladder over past year but currently voiding without complaints.  No family history of kidney cancer.    Renal US is reviewed from  - WNL, no evidence of hydronephrosis or perirenal collections    Review of Systems:   Pertinent items are noted in HPI or as below, remainder of complete ROS is negative.      Active Medications:     Current Outpatient Medications:      albuterol (VENTOLIN HFA) 108 (90 Base) MCG/ACT inhaler, Ventolin HFA 90 mcg/actuation aerosol inhaler, Disp: , Rfl:      amLODIPine (NORVASC) 5 MG tablet, Take 1 tablet (5 mg) by mouth daily, Disp: 30 tablet, Rfl: 11     amoxicillin (AMOXIL) 500 MG tablet, Take 500 mg by mouth daily, Disp: , Rfl:      CELLCEPT (BRAND) 250 MG capsule, Take 4 capsules (1,000 mg) by mouth 2 times daily LABS REQUIRED FOR REFILLS, Disp: 240 capsule, Rfl: 0     cinacalcet (SENSIPAR) 30 MG tablet, TAKE ONE-HALF TABLET BY MOUTH EVERY OTHER DAY, Disp: 30 tablet, Rfl: 4     GENGRAF (BRAND) 25 MG CAPSULE, Gengraf  25 mg capsule  Take 2 capsules twice a day by oral route., Disp: , Rfl:      lisinopril (PRINIVIL/ZESTRIL) 5 MG tablet, Take 1 tablet (5 mg) by mouth daily, Disp: 30 tablet, Rfl: 11     sulfamethoxazole-trimethoprim (BACTRIM,SEPTRA) 400-80 MG per tablet, TAKE ONE TABLET BY MOUTH EVERY DAY, Disp: 30 tablet, Rfl: 11     sulfamethoxazole-trimethoprim (BACTRIM/SEPTRA) 400-80 MG per tablet, Take 1 tablet by mouth daily, Disp: 30 tablet, Rfl: 11     CELLCEPT (BRAND) 250 MG capsule, CellCept 250 mg capsule  Take 4 capsules twice a day by oral route., Disp: , Rfl:      CELLCEPT 250 MG PO CAPSULE, Take 4 capsules (1,000 mg) by mouth 2 times daily (Patient not taking: Reported on 2/19/2019), Disp: 240 capsule, Rfl: 11     cinacalcet (SENSIPAR) 30 MG tablet, Sensipar 30 mg tablet, Disp: , Rfl:      Diphenhyd-HC-Nystatin-Tetracyc (FIRST-[facility] MOUTHWASH) SUSP, Swish and swallow 5-10 mLs in mouth every 6 hours as needed (Patient not taking: Reported on 2/19/2019), Disp: 237 mL, Rfl: 1     guaiFENesin (MUCINEX) 600 MG 12 hr tablet, Take 600 mg by mouth as needed, Disp: , Rfl:      NEORAL (BRAND) 25 MG capsule, Take 1 capsule (25 mg) by mouth 2 times daily LABS REQUIRED FOR REFILLS, Disp: 60 capsule, Rfl: 0      Allergies:   Contrast dye and No clinical screening - see comments      Past Medical History:  Henoch-Schonlein purpura  Tertiary hyperparathyroidism   Parathyroid related hypercalcemia   Chronic kidney disease stage 3   Hypertension     Past Surgical History:  Cholecystectomy   Renal transplant 1998, 2007     Family History:   No past pertinent family history.      Social History:   No tobacco use.   No alcohol use.      Physical Exam:   Patient is a 53 year old  female   Vitals: Blood pressure 162/81, pulse 70, height 1.524 m (5'), weight 72 kg (158 lb 12.8 oz), not currently breastfeeding.  General Appearance Adult: Alert, no acute distress, oriented  HENT: throat/mouth:normal, good dentition  Neck: No adenopathy,masses  or thyromegaly  Lungs: no respiratory distress, or pursed lip breathing  Heart: No obvious jugular venous distension present  Abdomen: soft, nontender, no organomegaly or masses, Body mass index is 31.01 kg/m .  Lymphatics: No cervical or supraclavicular adenopathy  Musculoskeltal: extremities normal, no peripheral edema  Skin: no suspicious lesions or rashes  Neuro: Alert, oriented, speech and mentation normal  Psych: affect and mood normal  Gait: Normal  : deferred    Radiologic Imaging:   I have personally reviewed the results of the above imaging studies. The results were discussed with the patient.     Results for orders placed or performed during the hospital encounter of 11/07/18   US Renal Transplant    Narrative    EXAMINATION:  RENAL TRANSPLANT,  11/7/2018 9:56 PM     COMPARISON: 5/29/2018, 3/6/2017    HISTORY: rlq pain with urinary sx hx of renal transplant;     TECHNIQUE:  Grey-scale, color Doppler and spectral flow analysis.    FINDINGS:  The transplant kidney is located in the right lower quadrant, and  measures 11.3 cm. Parenchyma is of normal thickness and echogenicity.  No focal lesions. No hydronephrosis. No perinephric fluid collection.    Renal artery flow:   171 cm/s cm/sec peak systolic at hilum, previously 89 cm/s.  244 cm/s peak systolic at anastomosis, previously 90 cm/s.  Arcuate artery resistive indices (upper to lower): 0.78, 0.88, 0.83    Renal Vein Flow:  40 cm/s at hilum, previously 39 cm/s.   52 cm/s at anastomosis, previously 23 cm/s.    Iliac artery flow:  231.0 cm/s peak systolic above anastomosis, previously 150 cm/s.  116.1 cm/s peak systolic below anastomosis, previously 139 cm/s.    Iliac vein flow:  Patent above and below the anastomosis.      Impression    IMPRESSION:  1. Normal grayscale evaluation of the transplant kidney. Elevated peak  systolic velocities in the renal artery at the hilum and anastomosis  preservation of systolic upstroke and no evidence of  stenosis.  2. Elevated resistive indices of the arcuate arteries, slightly  increased from prior. This can be indicative of rejection or medical  renal disease.  3. No evidence of hydronephrosis or perirenal collections.    I have personally reviewed the examination and initial interpretation  and I agree with the findings.    KRISTY ASTORGA MD     Laboratory:   I personally reviewed all applicable laboratory data and went over findings with patient  Significant for:    CBC RESULTS:  Recent Labs   Lab Test 02/16/19 0735 11/07/18 1914 05/29/18 1938 02/02/18  0729   WBC 4.1 6.4 4.3 3.0*   HGB 11.2* 11.1* 10.5* 10.6*    173 138* 150     BMP RESULTS:  Recent Labs   Lab Test 02/16/19  0735 11/07/18  1914 05/29/18  1938 03/10/18  0839    134 140 140   POTASSIUM 4.5 4.1 4.2 4.6   CHLORIDE 109 103 111* 111*   CO2 23 22 20 24   ANIONGAP 7 9 10 6   GLC 92 158* 89 91   BUN 16 16 18 16   CR 1.61* 1.56* 1.59* 1.55*   GFRESTIMATED 36* 35* 34* 35*   GFRESTBLACK 42* 42* 41* 42*   SHAVON 9.4 9.4 10.0 9.9     UA RESULTS:   Recent Labs   Lab Test 11/07/18 1914 05/29/18 2052 01/29/18  1807   SG 1.003 1.003 1.002*   URINEPH 6.0 6.0 6.0   NITRITE Negative Negative Negative   RBCU <1 1 0   WBCU 2 7* 5*     Scribe Disclosure:   I, Shoshana Anthony, am serving as a scribe to document services personally performed by Estrada Blackmon MD at this visit, based upon the provider's statements to me. All documentation has been reviewed by the aforementioned provider prior to being entered into the official medical record.     Portions of this medical record were completed by a scribe. UPON MY REVIEW AND AUTHENTICATION BY ELECTRONIC SIGNATURE, this confirms (a) I performed the applicable clinical services, and (b) the record is accurate.    I, Estrada Blackmon saw and evaluated this patient and agree with the plan as stated above.  I personally performed all listed procedures.

## 2019-02-19 NOTE — TELEPHONE ENCOUNTER
ISSUE:  CSA level 52, goal 75    PLAN:  Call and confirm a 12 hour trough and current cyclosporine dose of 50 mg twice daily   Any recent illness, medication changes, or missed doses?  Last level was 2/2018.  Recommend repeating int he next week with a 12 hour trough and will adjust dose at that time.    OUTCOME:  Called patients daughter Nichelle to discuss low CSA level.

## 2019-02-19 NOTE — LETTER
RE: Joshua Lala  [address]  [place] [address]     Dear Colleague,    Thank you for referring your patient, Joshua Lala, to the [facility] UROLOGY AND INST FOR PROSTATE AND UROLOGIC CANCERS at [facility]. Please see a copy of my visit note below.      Urology Clinic    Estrada Blackmon MD  Date of Service: 2019     Name: Joshua Lala  MRN: [id number]  Age: 53 year old  : 1965  Referring provider: Referred Self     An  was present for the duration of the visit.     Assessment and Plan:  -54 yo F with hx of renal transplant referred for unclear reason (renal cyst vs stone)  -She denies knowledge of reason for scheduling this appt.  Her recent renal trasnplant US has been unremarkable without evidence of cysts or stones.  She suspects this appt was possibly made in error.  Will contact referring provider to clarify goals of care.    Chief Complaint:   Renal cysts     HPI:   Joshua Lala  is a 53 year old female status post kidney transplant who presents for evaluation of renal cysts. She has a hx of DDKT  for HSP and CKD stage 3.  Her transplant has been functioning well to her knowledge.  She denies hematuria, dysuria, UTI.  Has had an episode or two of slow stream and difficulty emptying bladder over past year but currently voiding without complaints.  No family history of kidney cancer.    Renal US is reviewed from  - WNL, no evidence of hydronephrosis or perirenal collections    Active Medications:     Current Outpatient Medications:      albuterol (VENTOLIN HFA) 108 (90 Base) MCG/ACT inhaler, Ventolin HFA 90 mcg/actuation aerosol inhaler, Disp: , Rfl:      amLODIPine (NORVASC) 5 MG tablet, Take 1 tablet (5 mg) by mouth daily, Disp: 30 tablet, Rfl: 11     amoxicillin (AMOXIL) 500 MG tablet, Take 500 mg by mouth daily, Disp: , Rfl:      CELLCEPT (BRAND) 250 MG capsule, Take 4 capsules (1,000 mg) by mouth 2 times daily LABS REQUIRED FOR REFILLS,  Disp: 240 capsule, Rfl: 0     cinacalcet (SENSIPAR) 30 MG tablet, TAKE ONE-HALF TABLET BY MOUTH EVERY OTHER DAY, Disp: 30 tablet, Rfl: 4     GENGRAF (BRAND) 25 MG CAPSULE, Gengraf 25 mg capsule  Take 2 capsules twice a day by oral route., Disp: , Rfl:      lisinopril (PRINIVIL/ZESTRIL) 5 MG tablet, Take 1 tablet (5 mg) by mouth daily, Disp: 30 tablet, Rfl: 11     sulfamethoxazole-trimethoprim (BACTRIM,SEPTRA) 400-80 MG per tablet, TAKE ONE TABLET BY MOUTH EVERY DAY, Disp: 30 tablet, Rfl: 11     sulfamethoxazole-trimethoprim (BACTRIM/SEPTRA) 400-80 MG per tablet, Take 1 tablet by mouth daily, Disp: 30 tablet, Rfl: 11     CELLCEPT (BRAND) 250 MG capsule, CellCept 250 mg capsule  Take 4 capsules twice a day by oral route., Disp: , Rfl:      CELLCEPT 250 MG PO CAPSULE, Take 4 capsules (1,000 mg) by mouth 2 times daily (Patient not taking: Reported on 2/19/2019), Disp: 240 capsule, Rfl: 11     cinacalcet (SENSIPAR) 30 MG tablet, Sensipar 30 mg tablet, Disp: , Rfl:      Diphenhyd-HC-Nystatin-Tetracyc (FIRST-HARINDER MOUTHWASH) SUSP, Swish and swallow 5-10 mLs in mouth every 6 hours as needed (Patient not taking: Reported on 2/19/2019), Disp: 237 mL, Rfl: 1     guaiFENesin (MUCINEX) 600 MG 12 hr tablet, Take 600 mg by mouth as needed, Disp: , Rfl:      NEORAL (BRAND) 25 MG capsule, Take 1 capsule (25 mg) by mouth 2 times daily LABS REQUIRED FOR REFILLS, Disp: 60 capsule, Rfl: 0      Allergies:   Contrast dye and No clinical screening - see comments      Past Medical History:  Henoch-Schonlein purpura  Tertiary hyperparathyroidism   Parathyroid related hypercalcemia   Chronic kidney disease stage 3   Hypertension     Past Surgical History:  Cholecystectomy   Renal transplant 1998, 2007     Family History:   No past pertinent family history.      Social History:   No tobacco use.   No alcohol use.      Physical Exam:   Patient is a 53 year old  female   Vitals: Blood pressure 162/81, pulse 70, height 1.524 m (5'), weight 72 kg  (158 lb 12.8 oz), not currently breastfeeding.  General Appearance Adult: Alert, no acute distress, oriented  HENT: throat/mouth:normal, good dentition  Neck: No adenopathy,masses or thyromegaly  Lungs: no respiratory distress, or pursed lip breathing  Heart: No obvious jugular venous distension present  Abdomen: soft, nontender, no organomegaly or masses, Body mass index is 31.01 kg/m .  Lymphatics: No cervical or supraclavicular adenopathy  Musculoskeltal: extremities normal, no peripheral edema  Skin: no suspicious lesions or rashes  Neuro: Alert, oriented, speech and mentation normal  Psych: affect and mood normal  Gait: Normal  : deferred    Radiologic Imaging:   I have personally reviewed the results of the above imaging studies. The results were discussed with the patient.     Results for orders placed or performed during the hospital encounter of 11/07/18   US Renal Transplant    Narrative    EXAMINATION:  RENAL TRANSPLANT,  11/7/2018 9:56 PM     COMPARISON: 5/29/2018, 3/6/2017    HISTORY: rlq pain with urinary sx hx of renal transplant;     TECHNIQUE:  Grey-scale, color Doppler and spectral flow analysis.    FINDINGS:  The transplant kidney is located in the right lower quadrant, and  measures 11.3 cm. Parenchyma is of normal thickness and echogenicity.  No focal lesions. No hydronephrosis. No perinephric fluid collection.    Renal artery flow:   171 cm/s cm/sec peak systolic at hilum, previously 89 cm/s.  244 cm/s peak systolic at anastomosis, previously 90 cm/s.  Arcuate artery resistive indices (upper to lower): 0.78, 0.88, 0.83    Renal Vein Flow:  40 cm/s at hilum, previously 39 cm/s.   52 cm/s at anastomosis, previously 23 cm/s.    Iliac artery flow:  231.0 cm/s peak systolic above anastomosis, previously 150 cm/s.  116.1 cm/s peak systolic below anastomosis, previously 139 cm/s.    Iliac vein flow:  Patent above and below the anastomosis.      Impression    IMPRESSION:  1. Normal grayscale  evaluation of the transplant kidney. Elevated peak  systolic velocities in the renal artery at the hilum and anastomosis  preservation of systolic upstroke and no evidence of stenosis.  2. Elevated resistive indices of the arcuate arteries, slightly  increased from prior. This can be indicative of rejection or medical  renal disease.  3. No evidence of hydronephrosis or perirenal collections.    I have personally reviewed the examination and initial interpretation  and I agree with the findings.    KRISTY ASTORGA MD     Laboratory:   I personally reviewed all applicable laboratory data and went over findings with patient  Significant for:    CBC RESULTS:  Recent Labs   Lab Test 02/16/19 0735 11/07/18 1914 05/29/18 1938 02/02/18  0729   WBC 4.1 6.4 4.3 3.0*   HGB 11.2* 11.1* 10.5* 10.6*    173 138* 150     BMP RESULTS:  Recent Labs   Lab Test 02/16/19  0735 11/07/18  1914 05/29/18  1938 03/10/18  0839    134 140 140   POTASSIUM 4.5 4.1 4.2 4.6   CHLORIDE 109 103 111* 111*   CO2 23 22 20 24   ANIONGAP 7 9 10 6   GLC 92 158* 89 91   BUN 16 16 18 16   CR 1.61* 1.56* 1.59* 1.55*   GFRESTIMATED 36* 35* 34* 35*   GFRESTBLACK 42* 42* 41* 42*   SHAVON 9.4 9.4 10.0 9.9     UA RESULTS:   Recent Labs   Lab Test 11/07/18 1914 05/29/18 2052 01/29/18  1807   SG 1.003 1.003 1.002*   URINEPH 6.0 6.0 6.0   NITRITE Negative Negative Negative   RBCU <1 1 0   WBCU 2 7* 5*     Scribe Disclosure:   I, Shoshana Anthony, am serving as a scribe to document services personally performed by Estrada Blackmon MD at this visit, based upon the provider's statements to me. All documentation has been reviewed by the aforementioned provider prior to being entered into the official medical record.     Portions of this medical record were completed by a scribe. UPON MY REVIEW AND AUTHENTICATION BY ELECTRONIC SIGNATURE, this confirms (a) I performed the applicable clinical services, and (b) the record is accurate.    IEstrada S.  Lorri saw and evaluated this patient and agree with the plan as stated above.  I personally performed all listed procedures.    Again, thank you for allowing me to participate in the care of your patient.      Sincerely,    Estrada Blackmon MD

## 2019-02-19 NOTE — TELEPHONE ENCOUNTER
Pt.'s daughter, Nichelle, called me back and I have scheduled the lab appointment for this Saturday and the nephrology appointment is on 3/28/19 and a reminder has been sent

## 2019-02-19 NOTE — NURSING NOTE
Chief Complaint   Patient presents with     Consult For     Renal Cysts       Blood pressure 162/81, pulse 70, height 1.524 m (5'), weight 72 kg (158 lb 12.8 oz), not currently breastfeeding. Body mass index is 31.01 kg/m .    Patient Active Problem List   Diagnosis     S/P kidney transplant     Parathyroid related hypercalcemia (H)     Encounter for long-term current use of medication     CKD (chronic kidney disease) stage 3, GFR 30-59 ml/min (H)     Flu syndrome     Influenza     Chest pain     Shortness of breath     Acute kidney injury (H)     Acute pharyngitis     Weakness     Diarrhea     Decreased urine volume     Essential hypertension     Chest pain at rest     Chest pain, non-cardiac       Allergies   Allergen Reactions     Contrast Dye Shortness Of Breath     No Clinical Screening - See Comments Shortness Of Breath       Current Outpatient Medications   Medication Sig Dispense Refill     albuterol (VENTOLIN HFA) 108 (90 Base) MCG/ACT inhaler Ventolin HFA 90 mcg/actuation aerosol inhaler       amLODIPine (NORVASC) 5 MG tablet Take 1 tablet (5 mg) by mouth daily 30 tablet 11     amoxicillin (AMOXIL) 500 MG tablet Take 500 mg by mouth daily       CELLCEPT (BRAND) 250 MG capsule Take 4 capsules (1,000 mg) by mouth 2 times daily LABS REQUIRED FOR REFILLS 240 capsule 0     cinacalcet (SENSIPAR) 30 MG tablet TAKE ONE-HALF TABLET BY MOUTH EVERY OTHER DAY 30 tablet 4     GENGRAF (BRAND) 25 MG CAPSULE Gengraf 25 mg capsule   Take 2 capsules twice a day by oral route.       lisinopril (PRINIVIL/ZESTRIL) 5 MG tablet Take 1 tablet (5 mg) by mouth daily 30 tablet 11     sulfamethoxazole-trimethoprim (BACTRIM,SEPTRA) 400-80 MG per tablet TAKE ONE TABLET BY MOUTH EVERY DAY 30 tablet 11     sulfamethoxazole-trimethoprim (BACTRIM/SEPTRA) 400-80 MG per tablet Take 1 tablet by mouth daily 30 tablet 11     CELLCEPT (BRAND) 250 MG capsule CellCept 250 mg capsule   Take 4 capsules twice a day by oral route.       CELLCEPT 250  MG PO CAPSULE Take 4 capsules (1,000 mg) by mouth 2 times daily (Patient not taking: Reported on 2/19/2019) 240 capsule 11     cinacalcet (SENSIPAR) 30 MG tablet Sensipar 30 mg tablet       Diphenhyd-HC-Nystatin-Tetracyc (FIRST-HARINDER MOUTHWASH) SUSP Swish and swallow 5-10 mLs in mouth every 6 hours as needed (Patient not taking: Reported on 2/19/2019) 237 mL 1     guaiFENesin (MUCINEX) 600 MG 12 hr tablet Take 600 mg by mouth as needed       NEORAL (BRAND) 25 MG capsule Take 1 capsule (25 mg) by mouth 2 times daily LABS REQUIRED FOR REFILLS 60 capsule 0       Social History     Tobacco Use     Smoking status: Never Smoker     Smokeless tobacco: Never Used   Substance Use Topics     Alcohol use: No     Drug use: No       Kaity Freire LPN  2/19/2019  9:25 AM

## 2019-02-23 DIAGNOSIS — Z94.0 KIDNEY TRANSPLANTED: ICD-10-CM

## 2019-02-23 LAB
CYCLOSPORINE BLD LC/MS/MS-MCNC: 52 UG/L (ref 50–400)
TME LAST DOSE: NORMAL H

## 2019-02-23 PROCEDURE — 80158 DRUG ASSAY CYCLOSPORINE: CPT | Performed by: INTERNAL MEDICINE

## 2019-02-25 ENCOUNTER — TELEPHONE (OUTPATIENT)
Dept: TRANSPLANT | Facility: CLINIC | Age: 54
End: 2019-02-25

## 2019-02-25 DIAGNOSIS — Z94.0 KIDNEY REPLACED BY TRANSPLANT: Primary | ICD-10-CM

## 2019-02-25 NOTE — TELEPHONE ENCOUNTER
ISSUE:  CSA 52, goal 75    PLAN:  Call and confirm a 12 hour trough and current cyclosporine dose of 25 mg twice daily   Any recent illness, medication changes, or missed doses?  Recommned increasing dose to 25 mg AM and 50 mg PM and repeat level in 1-2 weeks    OUTCOME:  Called and left a v/m for patients daughter Nichelle to return call to sot office to discuss.

## 2019-02-26 RX ORDER — CYCLOSPORINE 25 MG/1
CAPSULE, LIQUID FILLED ORAL
Qty: 90 CAPSULE | Refills: 1 | Status: SHIPPED | OUTPATIENT
Start: 2019-02-26 | End: 2019-02-28

## 2019-02-26 NOTE — TELEPHONE ENCOUNTER
Patients daughter returned call.  She confirms a 12 hour trough and current cyclosporine dose of 25 mg twice daily.  She denied any illness, medication changes, or missed doses.  She verbalized understanding to increase dose to 25 mg AM and 50 mg PM and repeat level in 1-2 weeks.   Rx updated, lab order placed.

## 2019-02-26 NOTE — TELEPHONE ENCOUNTER
Called and left a v/m for Nichelle to return call to sot office to discuss cyclosporine dose change.

## 2019-03-12 ENCOUNTER — APPOINTMENT (OUTPATIENT)
Dept: GENERAL RADIOLOGY | Facility: CLINIC | Age: 54
End: 2019-03-12
Attending: EMERGENCY MEDICINE
Payer: MEDICARE

## 2019-03-12 ENCOUNTER — HOSPITAL ENCOUNTER (EMERGENCY)
Facility: CLINIC | Age: 54
Discharge: HOME | End: 2019-03-12
Attending: INTERNAL MEDICINE | Admitting: INTERNAL MEDICINE
Payer: MEDICARE

## 2019-03-12 VITALS
SYSTOLIC BLOOD PRESSURE: 171 MMHG | TEMPERATURE: 98.2 F | HEIGHT: 60 IN | HEART RATE: 76 BPM | WEIGHT: 160.9 LBS | DIASTOLIC BLOOD PRESSURE: 73 MMHG | RESPIRATION RATE: 16 BRPM | BODY MASS INDEX: 31.59 KG/M2 | OXYGEN SATURATION: 96 %

## 2019-03-12 DIAGNOSIS — J20.9 ACUTE BRONCHITIS WITH SYMPTOMS > 10 DAYS: ICD-10-CM

## 2019-03-12 DIAGNOSIS — J98.01 ACUTE BRONCHOSPASM: ICD-10-CM

## 2019-03-12 DIAGNOSIS — Z94.0 STATUS POST KIDNEY TRANSPLANT: ICD-10-CM

## 2019-03-12 LAB
ALBUMIN SERPL-MCNC: 3.4 G/DL (ref 3.4–5)
ALP SERPL-CCNC: 136 U/L (ref 40–150)
ALT SERPL W P-5'-P-CCNC: 15 U/L (ref 0–50)
ANION GAP SERPL CALCULATED.3IONS-SCNC: 12 MMOL/L (ref 3–14)
AST SERPL W P-5'-P-CCNC: 26 U/L (ref 0–45)
BASOPHILS # BLD AUTO: 0 10E9/L (ref 0–0.2)
BASOPHILS NFR BLD AUTO: 0.6 %
BILIRUB SERPL-MCNC: 0.5 MG/DL (ref 0.2–1.3)
BUN SERPL-MCNC: 12 MG/DL (ref 7–30)
CALCIUM SERPL-MCNC: 8.8 MG/DL (ref 8.5–10.1)
CHLORIDE SERPL-SCNC: 100 MMOL/L (ref 94–109)
CO2 BLDCOV-SCNC: 20 MMOL/L (ref 21–28)
CO2 SERPL-SCNC: 20 MMOL/L (ref 20–32)
CREAT SERPL-MCNC: 1.64 MG/DL (ref 0.52–1.04)
DEPRECATED S PYO AG THROAT QL EIA: NORMAL
DIFFERENTIAL METHOD BLD: ABNORMAL
EOSINOPHIL # BLD AUTO: 0.1 10E9/L (ref 0–0.7)
EOSINOPHIL NFR BLD AUTO: 4.2 %
ERYTHROCYTE [DISTWIDTH] IN BLOOD BY AUTOMATED COUNT: 12.5 % (ref 10–15)
FLUAV+FLUBV RNA SPEC QL NAA+PROBE: NEGATIVE
FLUAV+FLUBV RNA SPEC QL NAA+PROBE: NEGATIVE
GFR SERPL CREATININE-BSD FRML MDRD: 35 ML/MIN/{1.73_M2}
GLUCOSE SERPL-MCNC: 129 MG/DL (ref 70–99)
HCT VFR BLD AUTO: 34.1 % (ref 35–47)
HGB BLD-MCNC: 11.2 G/DL (ref 11.7–15.7)
IMM GRANULOCYTES # BLD: 0 10E9/L (ref 0–0.4)
IMM GRANULOCYTES NFR BLD: 0.3 %
LACTATE BLD-SCNC: 1.3 MMOL/L (ref 0.7–2.1)
LYMPHOCYTES # BLD AUTO: 0.7 10E9/L (ref 0.8–5.3)
LYMPHOCYTES NFR BLD AUTO: 22.4 %
MCH RBC QN AUTO: 28.4 PG (ref 26.5–33)
MCHC RBC AUTO-ENTMCNC: 32.8 G/DL (ref 31.5–36.5)
MCV RBC AUTO: 86 FL (ref 78–100)
MONOCYTES # BLD AUTO: 0.7 10E9/L (ref 0–1.3)
MONOCYTES NFR BLD AUTO: 22.1 %
NEUTROPHILS # BLD AUTO: 1.6 10E9/L (ref 1.6–8.3)
NEUTROPHILS NFR BLD AUTO: 50.4 %
NRBC # BLD AUTO: 0 10*3/UL
NRBC BLD AUTO-RTO: 0 /100
PCO2 BLDV: 33 MM HG (ref 40–50)
PH BLDV: 7.39 PH (ref 7.32–7.43)
PLATELET # BLD AUTO: 189 10E9/L (ref 150–450)
PLATELET # BLD EST: ABNORMAL 10*3/UL
PO2 BLDV: 75 MM HG (ref 25–47)
POTASSIUM SERPL-SCNC: 4 MMOL/L (ref 3.4–5.3)
PROT SERPL-MCNC: 7.6 G/DL (ref 6.8–8.8)
RBC # BLD AUTO: 3.95 10E12/L (ref 3.8–5.2)
RSV RNA SPEC NAA+PROBE: NEGATIVE
SAO2 % BLDV FROM PO2: 95 %
SODIUM SERPL-SCNC: 131 MMOL/L (ref 133–144)
SPECIMEN SOURCE: NORMAL
SPECIMEN SOURCE: NORMAL
WBC # BLD AUTO: 3.1 10E9/L (ref 4–11)

## 2019-03-12 PROCEDURE — 94640 AIRWAY INHALATION TREATMENT: CPT | Performed by: INTERNAL MEDICINE

## 2019-03-12 PROCEDURE — 87081 CULTURE SCREEN ONLY: CPT | Performed by: INTERNAL MEDICINE

## 2019-03-12 PROCEDURE — 25000125 ZZHC RX 250: Performed by: EMERGENCY MEDICINE

## 2019-03-12 PROCEDURE — 99284 EMERGENCY DEPT VISIT MOD MDM: CPT | Mod: Z6 | Performed by: INTERNAL MEDICINE

## 2019-03-12 PROCEDURE — 82803 BLOOD GASES ANY COMBINATION: CPT

## 2019-03-12 PROCEDURE — 83605 ASSAY OF LACTIC ACID: CPT

## 2019-03-12 PROCEDURE — 80053 COMPREHEN METABOLIC PANEL: CPT | Performed by: EMERGENCY MEDICINE

## 2019-03-12 PROCEDURE — 96374 THER/PROPH/DIAG INJ IV PUSH: CPT | Performed by: INTERNAL MEDICINE

## 2019-03-12 PROCEDURE — 99284 EMERGENCY DEPT VISIT MOD MDM: CPT | Mod: 25 | Performed by: INTERNAL MEDICINE

## 2019-03-12 PROCEDURE — 85025 COMPLETE CBC W/AUTO DIFF WBC: CPT | Performed by: EMERGENCY MEDICINE

## 2019-03-12 PROCEDURE — 87880 STREP A ASSAY W/OPTIC: CPT | Performed by: INTERNAL MEDICINE

## 2019-03-12 PROCEDURE — 25000128 H RX IP 250 OP 636: Performed by: INTERNAL MEDICINE

## 2019-03-12 PROCEDURE — 71046 X-RAY EXAM CHEST 2 VIEWS: CPT

## 2019-03-12 PROCEDURE — 87631 RESP VIRUS 3-5 TARGETS: CPT | Performed by: EMERGENCY MEDICINE

## 2019-03-12 RX ORDER — METHYLPREDNISOLONE SODIUM SUCCINATE 125 MG/2ML
125 INJECTION, POWDER, LYOPHILIZED, FOR SOLUTION INTRAMUSCULAR; INTRAVENOUS ONCE
Status: COMPLETED | OUTPATIENT
Start: 2019-03-12 | End: 2019-03-12

## 2019-03-12 RX ORDER — PREDNISONE 20 MG/1
40 TABLET ORAL DAILY
Qty: 10 TABLET | Refills: 0 | Status: ON HOLD | OUTPATIENT
Start: 2019-03-12 | End: 2019-04-08

## 2019-03-12 RX ORDER — ALBUTEROL SULFATE 90 UG/1
2 AEROSOL, METERED RESPIRATORY (INHALATION) EVERY 4 HOURS PRN
Qty: 8 G | Refills: 0 | Status: ON HOLD | OUTPATIENT
Start: 2019-03-12 | End: 2020-03-15

## 2019-03-12 RX ORDER — IPRATROPIUM BROMIDE AND ALBUTEROL SULFATE 2.5; .5 MG/3ML; MG/3ML
3 SOLUTION RESPIRATORY (INHALATION) ONCE
Status: COMPLETED | OUTPATIENT
Start: 2019-03-12 | End: 2019-03-12

## 2019-03-12 RX ORDER — AZITHROMYCIN 250 MG/1
TABLET, FILM COATED ORAL
Qty: 6 TABLET | Refills: 0 | Status: ON HOLD | OUTPATIENT
Start: 2019-03-12 | End: 2019-04-08

## 2019-03-12 RX ADMIN — METHYLPREDNISOLONE SODIUM SUCCINATE 125 MG: 125 INJECTION, POWDER, LYOPHILIZED, FOR SOLUTION INTRAMUSCULAR; INTRAVENOUS at 21:46

## 2019-03-12 RX ADMIN — IPRATROPIUM BROMIDE AND ALBUTEROL SULFATE 3 ML: .5; 3 SOLUTION RESPIRATORY (INHALATION) at 21:47

## 2019-03-12 ASSESSMENT — MIFFLIN-ST. JEOR: SCORE: 1256.34

## 2019-03-12 ASSESSMENT — ENCOUNTER SYMPTOMS
SORE THROAT: 1
COUGH: 1
SHORTNESS OF BREATH: 1

## 2019-03-12 NOTE — ED AVS SNAPSHOT
[facility], [place], Emergency Department  [address] [address]  Phone:  [phone]                                    Joshua Lala   MRN: [id number]    Department:  [facility], Emergency Department   Date of Visit:  3/12/2019           After Visit Summary Signature Page    I have received my discharge instructions, and my questions have been answered. I have discussed any challenges I see with this plan with the nurse or doctor.    ..........................................................................................................................................  Patient/Patient Representative Signature      ..........................................................................................................................................  Patient Representative Print Name and Relationship to Patient    ..................................................               ................................................  Date                                   Time    ..........................................................................................................................................  Reviewed by Signature/Title    ...................................................              ..............................................  Date                                               Time          22EPIC Rev 08/18

## 2019-03-13 NOTE — ED TRIAGE NOTES
Patient presents ambulatory to triage with c/o SOB. Patient is Hmong speaking, hx provided by daughter. Patient reports cough and sore throat for one week and SOB as of yesterday. Afebrile during triage and sats 98% on RA. /87, but patient reports not taking BP medication for at least one year since PCP retired and no new PCP established.

## 2019-03-13 NOTE — DISCHARGE INSTRUCTIONS
Viral or Bacterial Bronchitis with Wheezing (Adult)    Bronchitis is an infection of the air passages. It often occurs during a cold and is usually caused by a virus. Symptoms include cough with mucus (phlegm) and low-grade fever. This illness is contagious during the first few days and is spread through the air by coughing and sneezing, or by direct contact (touching the sick person and then touching your own eyes, nose, or mouth).  If there is a lot of inflammation, air flow is restricted. The air passages may also go into spasm, especially if you have asthma. This causes wheezing and difficulty breathing even in people who do not have asthma.  Bronchitis usually lasts 7 to 14 days. The wheezing should improve with treatment during the first week. An inhaler is often prescribed to relax the air passages and stop wheezing. Antibiotics will be prescribed if your doctor thinks there is also a secondary bacterial infection.  Home care    If symptoms are severe, rest at home for the first 2 to 3 days. When you go back to your usual activities, don't let yourself get too tired.    Dont s'moke. Also avoid being exposed to secondhand smoke.    You may use over-the-counter medicine to control fever or pain, unless another medicine was prescribed. Note: If you have chronic liver or kidney disease or have ever had a stomach ulcer or gastrointestinal bleeding, talk with your healthcare provider before using these medicines. Also talk to your provider if you are taking medicine to prevent blood clots.) Aspirin should never be given to anyone younger than 18 years of age who is ill with a viral infection or fever. It may cause severe liver or brain damage.    Your appetite may be poor, so a light diet is fine. Stay well hydrated by drinking 6 to 8 glasses of fluids per day (such as water, soft drinks, sports drinks, juices, tea, or soup). Extra fluids will help loosen secretions in the nose and lungs.    Over-the-counter  cough, cold, and sore-throat medicines will not shorten the length of the illness, but they may be helpful to reduce symptoms. (Note: Don't use decongestants if you have high blood pressure.)    If you were given an inhaler, use it exactly as directed. If you need to use it more often than prescribed, your condition may be worsening. If this happens, contact your healthcare provider.    If prescribed, finish all antibiotic medicine, even if you are feeling better after only a few days.  Follow-up care  Follow up with your healthcare provider, or as advised. If you had an X-ray or ECG (electrocardiogram), a specialist will review it. You will be notified of any new findings that may affect your care.  If you are age 65 or older, or if you have a chronic lung disease or condition that affects your immune system, or you smoke, ask your healthcare provider about getting a pneumococcal vaccine and a yearly flu shot (influenza vaccine).  When to seek medical advice  Call your healthcare provider right away if any of these occur:    Fever of 100.4 F (38 C) or higher, or as directed by your healthcare provider    Coughing up increasing amounts of colored sputum    Weakness, drowsiness, headache, facial pain, ear pain, or a stiff neck  Call 911  Call 911 if any of these occur.    Coughing up blood    Worsening weakness, drowsiness, headache, or stiff neck    Increased wheezing not helped with medication, shortness of breath, or pain with breathing   Date Last Reviewed: 6/1/2018 2000-2018 [employer]. [address] [place] [address], [place] [address]. All rights reserved. This information is not intended as a substitute for professional medical care. Always follow your healthcare professional's instructions.      Please make an appointment to follow up with Your Primary Care Provider in 3-7 days even if entirely better.

## 2019-03-14 LAB
BACTERIA SPEC CULT: NORMAL
Lab: NORMAL
SPECIMEN SOURCE: NORMAL

## 2019-03-14 NOTE — RESULT ENCOUNTER NOTE
Final Beta strep group A r/o culture is NEGATIVE for Group A streptococcus.    No treatment or change in treatment per [facility] Strep protocol.

## 2019-03-15 DIAGNOSIS — Z94.0 KIDNEY TRANSPLANTED: Primary | ICD-10-CM

## 2019-03-15 DIAGNOSIS — E83.52 PARATHYROID RELATED HYPERCALCEMIA (H): ICD-10-CM

## 2019-03-15 DIAGNOSIS — E21.5 PARATHYROID RELATED HYPERCALCEMIA (H): ICD-10-CM

## 2019-03-15 DIAGNOSIS — Z94.0 KIDNEY REPLACED BY TRANSPLANT: Primary | ICD-10-CM

## 2019-03-15 RX ORDER — SULFAMETHOXAZOLE AND TRIMETHOPRIM 400; 80 MG/1; MG/1
1 TABLET ORAL DAILY
Qty: 30 TABLET | Refills: 11 | Status: ON HOLD | OUTPATIENT
Start: 2019-03-15 | End: 2020-03-16

## 2019-03-15 RX ORDER — CINACALCET 30 MG/1
TABLET, FILM COATED ORAL
Qty: 60 TABLET | Refills: 11 | Status: ON HOLD | OUTPATIENT
Start: 2019-03-15 | End: 2019-04-08

## 2019-03-15 RX ORDER — MYCOPHENOLATE MOFETIL 250 MG/1
1000 CAPSULE ORAL 2 TIMES DAILY
Qty: 240 CAPSULE | Refills: 3 | Status: SHIPPED | OUTPATIENT
Start: 2019-03-15 | End: 2019-07-15

## 2019-03-15 NOTE — TELEPHONE ENCOUNTER
Last Office Visit with Nephrologist:  2/8/18. Scheduled for follow up on 3/28/19.  Medication refilled per Nephrology Clinic protocol.     Morena Ghotra RN

## 2019-03-20 ENCOUNTER — TELEPHONE (OUTPATIENT)
Dept: TRANSPLANT | Facility: CLINIC | Age: 54
End: 2019-03-20

## 2019-03-20 DIAGNOSIS — Z94.0 KIDNEY REPLACED BY TRANSPLANT: ICD-10-CM

## 2019-03-20 RX ORDER — CYCLOSPORINE 25 MG/1
CAPSULE, LIQUID FILLED ORAL
Qty: 90 CAPSULE | Refills: 0 | Status: SHIPPED | OUTPATIENT
Start: 2019-03-20 | End: 2019-05-22

## 2019-03-20 NOTE — TELEPHONE ENCOUNTER
Returned call to patients daughter.  Joshua is almost out of cyclosproine and needs a refill to be sent.  She has an appointment coming up.  Rx sent to pharmacy.

## 2019-03-20 NOTE — TELEPHONE ENCOUNTER
Joshua's daughter called because the placed a refill request and the pharmacy told her that Joshua needs to be seen before she can get refills. She has an appointment scheduled next week, please contact pharmacy to release refills. She is nearly out of meds.

## 2019-03-26 NOTE — TELEPHONE ENCOUNTER
Pt's daughter would like to know if she needs to get labs before her appt. Please connect with the pt's family to advise.

## 2019-03-26 NOTE — TELEPHONE ENCOUNTER
Returned call to patients daughter.  She has questions if labs are needed before her clinic appointment.  We discussed that she does need a 12 hour cyclosporine level as her dose was increased and she did not have a repeat level.    Diane verbalized understanding and states she will call the lab to schedule this, but it won't be until the weekend.

## 2019-03-28 ENCOUNTER — TELEPHONE (OUTPATIENT)
Dept: NEPHROLOGY | Facility: CLINIC | Age: 54
End: 2019-03-28

## 2019-03-28 ENCOUNTER — OFFICE VISIT (OUTPATIENT)
Dept: NEPHROLOGY | Facility: CLINIC | Age: 54
End: 2019-03-28
Attending: INTERNAL MEDICINE
Payer: MEDICARE

## 2019-03-28 VITALS
TEMPERATURE: 97.8 F | WEIGHT: 157.8 LBS | BODY MASS INDEX: 30.82 KG/M2 | SYSTOLIC BLOOD PRESSURE: 190 MMHG | DIASTOLIC BLOOD PRESSURE: 91 MMHG | HEART RATE: 77 BPM | OXYGEN SATURATION: 96 %

## 2019-03-28 DIAGNOSIS — Z94.0 KIDNEY REPLACED BY TRANSPLANT: ICD-10-CM

## 2019-03-28 DIAGNOSIS — R05.9 COUGH: ICD-10-CM

## 2019-03-28 DIAGNOSIS — I15.1 HYPERTENSION DUE TO KIDNEY TRANSPLANT: ICD-10-CM

## 2019-03-28 DIAGNOSIS — Z94.0 HYPERTENSION DUE TO KIDNEY TRANSPLANT: ICD-10-CM

## 2019-03-28 DIAGNOSIS — N18.30 CKD (CHRONIC KIDNEY DISEASE) STAGE 3, GFR 30-59 ML/MIN (H): ICD-10-CM

## 2019-03-28 DIAGNOSIS — Z29.89 NEED FOR PNEUMOCYSTIS PROPHYLAXIS: ICD-10-CM

## 2019-03-28 DIAGNOSIS — R06.2 WHEEZING: ICD-10-CM

## 2019-03-28 DIAGNOSIS — E87.1 HYPONATREMIA: ICD-10-CM

## 2019-03-28 DIAGNOSIS — Z94.0 S/P KIDNEY TRANSPLANT: ICD-10-CM

## 2019-03-28 DIAGNOSIS — R80.9 NEPHROTIC RANGE PROTEINURIA: ICD-10-CM

## 2019-03-28 DIAGNOSIS — Z48.298 AFTERCARE FOLLOWING ORGAN TRANSPLANT: Primary | ICD-10-CM

## 2019-03-28 DIAGNOSIS — D84.9 IMMUNOSUPPRESSION (H): ICD-10-CM

## 2019-03-28 DIAGNOSIS — N25.81 SECONDARY RENAL HYPERPARATHYROIDISM (H): ICD-10-CM

## 2019-03-28 LAB
ALBUMIN UR-MCNC: 100 MG/DL
APPEARANCE UR: CLEAR
BILIRUB UR QL STRIP: NEGATIVE
COLOR UR AUTO: ABNORMAL
GLUCOSE UR STRIP-MCNC: NEGATIVE MG/DL
HGB UR QL STRIP: NEGATIVE
KETONES UR STRIP-MCNC: NEGATIVE MG/DL
LEUKOCYTE ESTERASE UR QL STRIP: ABNORMAL
MUCOUS THREADS #/AREA URNS LPF: PRESENT /LPF
NITRATE UR QL: NEGATIVE
PH UR STRIP: 6 PH (ref 5–7)
RBC #/AREA URNS AUTO: 1 /HPF (ref 0–2)
SOURCE: ABNORMAL
SP GR UR STRIP: 1 (ref 1–1.03)
TRANS CELLS #/AREA URNS HPF: <1 /HPF
UROBILINOGEN UR STRIP-MCNC: 0 MG/DL (ref 0–2)
WBC #/AREA URNS AUTO: 3 /HPF (ref 0–5)

## 2019-03-28 PROCEDURE — G0463 HOSPITAL OUTPT CLINIC VISIT: HCPCS | Mod: ZF

## 2019-03-28 PROCEDURE — 81001 URINALYSIS AUTO W/SCOPE: CPT | Performed by: INTERNAL MEDICINE

## 2019-03-28 RX ORDER — HYDROCHLOROTHIAZIDE 25 MG/1
25 TABLET ORAL DAILY
Qty: 30 TABLET | Refills: 11 | Status: ON HOLD | OUTPATIENT
Start: 2019-03-28 | End: 2019-04-08

## 2019-03-28 RX ORDER — HYDROCHLOROTHIAZIDE 25 MG/1
12.5 TABLET ORAL DAILY
Qty: 30 TABLET | Refills: 11 | Status: SHIPPED | OUTPATIENT
Start: 2019-03-28 | End: 2019-03-28

## 2019-03-28 ASSESSMENT — PAIN SCALES - GENERAL: PAINLEVEL: NO PAIN (0)

## 2019-03-28 NOTE — NURSING NOTE
Chief Complaint   Patient presents with     RECHECK     Follow Up Kidney TX     Vital signs:  Temp: 97.8  F (36.6  C) Temp src: Oral BP: (!) 190/91 Pulse: 77     SpO2: 96 %       Weight: 71.6 kg (157 lb 12.8 oz)  Estimated body mass index is 30.82 kg/m  as calculated from the following:    Height as of 3/12/19: 1.524 m (5').    Weight as of this encounter: 71.6 kg (157 lb 12.8 oz).      Shellie Gregg

## 2019-03-28 NOTE — PROGRESS NOTES
CHRONIC TRANSPLANT NEPHROLOGY VISIT    Assessment & Plan   # DDKT: baseline Cr ~ 1.3-1.6; Stable   - Proteinuria: Nephrotic range   - Date of DSA last checked: 4/2016 Latest DSA: Yes, DR51   - BK Viremia: No   - Kidney Tx Biopsy: No    The patient has worsening proteinuria for the past several years.  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  A kidney biopsy would be necessary to diagnose chronic antibody mediated rejection.  The treatment options are limited.  Other potential for proteinuria etiologies include chronic / secondary fsgs changes or recurrent disease (IgA/HSP).  I will plan to check a urinalysis to ensure no hematuria is presents (recurrent disease) and will plan to maximize blood pressure control with ACEI/ARB.  I discussed a kidney biopsy with the patient today or increasing the amount of immunosuppression but will elect to not biopsy the patient or increase the immunosuppression today.    # Immunosuppression: Cyclosporine (goal  ) and Mycophenolate mofetil (goal  1-3.5)   - Changes: No    # Cough - the patient has cough likely secondary to the wheezing and volume overload on exam.  Will trial a course of albuterol inhaler and start a diuretics with hctz.  I will ask a nurse to check in with Ms. Lala next week on how she is doing.    # Hypertension: Inadequate control; Goal BP: < 130/80 on lisinopril 5 mg daily and amlodipine 5 mg daily   - Changes: Yes - start hctz 25 mg daily and uptitrate lisinopril with readings on Monday    # Anemia in chronic renal disease: Hgb: Stable   - Iron studies: Not checked recently    # Mineral Bone Disorder:    - Secondary renal hyperparathyroidism; PTH level is: Not checked recently  - Vitamin D; level is: Not checked recently  - Calcium; level is: Normal  - Phosphorus; level is: Not checked recently   - Continue sensipar     # Electrolytes:   - Potassium; level: Normal  - Magnesium; level:  Not checked recently  - Bicarbonate; level: Normal    # PCP prophylaxis - bactrim    # Urinary retention - patient does not have any infectious appearing etiologies on urinalysis today.    # Skin Cancer Risk:    - Discussed sun protection and recommend regular follow up with Dermatology    # Medical Compliance: Yes    Return visit: No Follow-up on file.    # Transplant History:  Etiology of kidney failure: IgA nephropathy  Tx: DDKT  Transplant: 2007 (Kidney), 1998 (Kidney)  Donor Type:  - Brain Death Donor Class: Standard Criteria Donor  History of EBV viremia: Yes  Significant changes in immunosuppression: None  Significant transplant-related complications: EBV viremia    Transplant Office Phone Number: [phone]    Assessment and plan was discussed with the patient and she voiced her understanding and agreement.    Viral Abel Abdul MD    Chief Complaint   Ms. Lala is a 53 year old here for routine follow up and proteinuria.    History of Present Illness    The patient was previously followed by Dr. Meehan and last seen by him 2018. I have reviewed that chart and the subsequent notes in epic and have interviewed the patient with an  for the HPI.  The patient has been in the ED three times since her last nephrology visit.  Most recently she was in the ED a few weeks ago with a chronic cough.  She has ongoing cough symptoms despite a 5 day course of azithromycin and possibly prednisone (she denies taking prednisone).  She notes the cough is productive of white sputum but she deneis fevers or chills.  She endores lower extremity edema and orthopnea/pnd.  The cough has been ongoing for several weeks now and an xray was done in the ED that showed cardiomegaly.  She notes hypertension at home.    Recent Hospitalizations:  [x] No [] Yes    New Medical Issues: [] No [x] Yes See above   Decreased energy: [x] No [] Yes    Chest pain or SOB with exertion:  [] No [x] Yes See above    Appetite change or weight change: [x] No [] Yes    Nausea, vomiting or diarrhea:  [x] No [] Yes    Fever, sweats or chills: [x] No [] Yes    Leg swelling: [x] No [] Yes      Other medical issues:  No    Home BP: 170/80    Review of Systems   A comprehensive review of systems was obtained and negative, except as noted in the HPI or PMH.    Problem List   Patient Active Problem List   Diagnosis     S/P kidney transplant     Parathyroid related hypercalcemia (H)     Encounter for long-term current use of medication     CKD (chronic kidney disease) stage 3, GFR 30-59 ml/min (H)     Flu syndrome     Influenza     Chest pain     Shortness of breath     Acute kidney injury (H)     Acute pharyngitis     Weakness     Diarrhea     Decreased urine volume     Essential hypertension     Chest pain at rest     Chest pain, non-cardiac       Social History   Social History     Tobacco Use     Smoking status: Never Smoker     Smokeless tobacco: Never Used   Substance Use Topics     Alcohol use: No     Drug use: No       Allergies   Allergies   Allergen Reactions     Contrast Dye Shortness Of Breath     No Clinical Screening - See Comments Shortness Of Breath       Medications   Current Outpatient Medications   Medication Sig     albuterol (PROAIR HFA) 108 (90 Base) MCG/ACT inhaler Inhale 2 puffs into the lungs every 4 hours as needed for shortness of breath / dyspnea     amLODIPine (NORVASC) 5 MG tablet Take 1 tablet (5 mg) by mouth daily     amoxicillin (AMOXIL) 500 MG tablet Take 500 mg by mouth daily     azithromycin (ZITHROMAX Z-YAKELIN) 250 MG tablet As directed     CELLCEPT (BRAND) 250 MG capsule Take 4 capsules (1,000 mg) by mouth 2 times daily     CELLCEPT (BRAND) 250 MG capsule Take 4 capsules (1,000 mg) by mouth 2 times daily LABS REQUIRED FOR REFILLS     CELLCEPT 250 MG PO CAPSULE Take 4 capsules (1,000 mg) by mouth 2 times daily (Patient not taking: Reported on 2/19/2019)     cinacalcet (SENSIPAR) 30 MG tablet TAKE  ONE-HALF TABLET BY MOUTH EVERY OTHER DAY     cinacalcet (SENSIPAR) 30 MG tablet Sensipar 30 mg tablet     Diphenhyd-HC-Nystatin-Tetracyc (FIRST-HARINDER MOUTHWASH) SUSP Swish and swallow 5-10 mLs in mouth every 6 hours as needed (Patient not taking: Reported on 2/19/2019)     GENGRAF (BRAND) 25 MG CAPSULE Gengraf 25 mg capsule   Take 2 capsules twice a day by oral route.     guaiFENesin (MUCINEX) 600 MG 12 hr tablet Take 600 mg by mouth as needed     lisinopril (PRINIVIL/ZESTRIL) 5 MG tablet Take 1 tablet (5 mg) by mouth daily     NEORAL (BRAND) 25 MG capsule Take 1 capsule (25 mg) by mouth every morning and 2 capsules (50 mg) by mouth every evening     sulfamethoxazole-trimethoprim (BACTRIM,SEPTRA) 400-80 MG per tablet TAKE ONE TABLET BY MOUTH EVERY DAY     sulfamethoxazole-trimethoprim (BACTRIM/SEPTRA) 400-80 MG tablet Take 1 tablet by mouth daily     No current facility-administered medications for this visit.      There are no discontinued medications.    Physical Exam   Vital Signs: There were no vitals taken for this visit.    GENERAL APPEARANCE: alert and no distress  HENT: mouth without ulcers or lesions  LYMPHATICS: no cervical or supraclavicular nodes  RESP: diffuse wheezing present in all lung fields.  CV: regular rhythm, normal rate, no rub, no murmur  EDEMA: +1 LE edema bilaterally  ABDOMEN: soft, nondistended, nontender, bowel sounds normal  MS: extremities normal - no gross deformities noted, no evidence of inflammation in joints, no muscle tenderness  SKIN: no rash  TX KIDNEY: normal      Data     Renal Latest Ref Rng & Units 3/12/2019 2/16/2019 11/7/2018   Na 133 - 144 mmol/L 131(L) 139 134   K 3.4 - 5.3 mmol/L 4.0 4.5 4.1   Cl 94 - 109 mmol/L 100 109 103   CO2 20 - 32 mmol/L 20 23 22   BUN 7 - 30 mg/dL 12 16 16   Cr 0.52 - 1.04 mg/dL 1.64(H) 1.61(H) 1.56(H)   Glucose 70 - 99 mg/dL 129(H) 92 158(H)   Ca  8.5 - 10.1 mg/dL 8.8 9.4 9.4   Mg 1.6 - 2.3 mg/dL - - 1.9     Bone Health Latest Ref Rng & Units  1/29/2018 4/28/2017 4/29/2016   Phos 2.5 - 4.5 mg/dL 2.8 2.8 -   PTHi 12 - 72 pg/mL - - 124(H)     Heme Latest Ref Rng & Units 3/12/2019 2/16/2019 11/7/2018   WBC 4.0 - 11.0 10e9/L 3.1(L) 4.1 6.4   Hgb 11.7 - 15.7 g/dL 11.2(L) 11.2(L) 11.1(L)   Plt 150 - 450 10e9/L 189 192 173     Liver Latest Ref Rng & Units 3/12/2019 11/7/2018 1/29/2018   AP 40 - 150 U/L 136 134 147   TBili 0.2 - 1.3 mg/dL 0.5 0.8 0.9   DBili 0.0 - 0.2 mg/dL - - -   ALT 0 - 50 U/L 15 14 15   AST 0 - 45 U/L 26 19 26   Tot Protein 6.8 - 8.8 g/dL 7.6 8.4 7.9   Albumin 3.4 - 5.0 g/dL 3.4 3.4 3.6     Pancreas Latest Ref Rng & Units 2/27/2017 4/25/2016 4/18/2016   Amylase 30 - 110 U/L - - -   Lipase 73 - 393 U/L 326 422(H) 440(H)        [facility] Txp Virology Latest Ref Rng & Units 10/28/2016 4/29/2016 4/18/2016   CMV IgG EU/mL - - -   CVM DNA Quant - - - Plasma  CORRECTED ON 04/19 AT 0949: PREVIOUSLY REPORTED AS Whole blood, EDTA   anticoagulant     BK Spec - Plasma Plasma -   BK Res BKNEG copies/mL BK Virus DNA Not Detected BK Virus DNA Not Detected -   BK Log <2.7 Log copies/mL Not Calculated   The Real-Time quantitative BK Virus assay was developed and its performance   characteristics determined by the Infectious Diseases Diagnostic Laboratory at   the [facility] in [place], [place]. The   primers and probes for each analyte are Analyte Specific Reagents (ASRs)   manufactured by [employer].   ASRs are used in many laboratory tests necessary for standard medical care and   generally do not require U.S. Food and Drug Administration approval. The FDA   has determined that such clearance or approval is not necessary.   This test is used for clinical purposes. It should not be regarded as   investigational or for research. This laboratory is certified under the   Clinical Laboratory Improvement Amendments of 1988 (CLIA-88) as qualified to   perform high complexity clinical laboratory testing.   Not Calculated   The Real-Time  quantitative BK Virus assay was developed and its performance   characteristics determined by the Infectious Diseases Diagnostic Laboratory at   the [facility] in [place], [place]. The   primers and probes for each analyte are Analyte Specific Reagents (ASRs)   manufactured by [employer].   ASRs are used in many laboratory tests necessary for standard medical care and   generally do not require U.S. Food and Drug Administration approval. The FDA   has determined that such clearance or approval is not necessary.   This test is used for clinical purposes. It should not be regarded as   investigational or for research. This laboratory is certified under the   Clinical Laboratory Improvement Amendments of 1988 (CLIA-88) as qualified to   perform high complexity clinical laboratory testing.   -   EBV IgG - - - -   Hep B Surf - - - -   HIV 1&2 NEG - - -            Recent Labs   Lab Test 10/27/15  0555 11/27/15  0710 12/09/15  0550   DOSMPA Not Provided 9P  11/26 Not Provided   MPACID 0.94* 0.71* 2.02   MPAG 190.4* 169.8* 191.8*

## 2019-03-28 NOTE — LETTER
3/28/2019      RE: Joshua Lala  [address]  [place] [address]       CHRONIC TRANSPLANT NEPHROLOGY VISIT    Assessment & Plan   # DDKT: baseline Cr ~ 1.3-1.6; Stable   - Proteinuria: Nephrotic range   - Date of DSA last checked: 4/2016 Latest DSA: Yes, DR51   - BK Viremia: No   - Kidney Tx Biopsy: No    The patient has worsening proteinuria for the past several years.  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  A kidney biopsy would be necessary to diagnose chronic antibody mediated rejection.  The treatment options are limited.  Other potential for proteinuria etiologies include chronic / secondary fsgs changes or recurrent disease (IgA/HSP).  I will plan to check a urinalysis to ensure no hematuria is presents (recurrent disease) and will plan to maximize blood pressure control with ACEI/ARB.  I discussed a kidney biopsy with the patient today or increasing the amount of immunosuppression but will elect to not biopsy the patient or increase the immunosuppression today.    # Immunosuppression: Cyclosporine (goal  ) and Mycophenolate mofetil (goal  1-3.5)   - Changes: No    # Cough - the patient has cough likely secondary to the wheezing and volume overload on exam.  Will trial a course of albuterol inhaler and start a diuretics with hctz.  I will ask a nurse to check in with Ms. Lala next week on how she is doing.    # Hypertension: Inadequate control; Goal BP: < 130/80 on lisinopril 5 mg daily and amlodipine 5 mg daily   - Changes: Yes - start hctz 25 mg daily and uptitrate lisinopril with readings on Monday    # Anemia in chronic renal disease: Hgb: Stable   - Iron studies: Not checked recently    # Mineral Bone Disorder:    - Secondary renal hyperparathyroidism; PTH level is: Not checked recently  - Vitamin D; level is: Not checked recently  - Calcium; level is: Normal  - Phosphorus; level is: Not checked recently   - Continue  sensipar     # Electrolytes:   - Potassium; level: Normal  - Magnesium; level: Not checked recently  - Bicarbonate; level: Normal    # PCP prophylaxis - bactrim    # Urinary retention - patient does not have any infectious appearing etiologies on urinalysis today.    # Skin Cancer Risk:    - Discussed sun protection and recommend regular follow up with Dermatology    # Medical Compliance: Yes    Return visit: No Follow-up on file.    # Transplant History:  Etiology of kidney failure: IgA nephropathy  Tx: DDKT  Transplant: 2007 (Kidney), 1998 (Kidney)  Donor Type:  - Brain Death Donor Class: Standard Criteria Donor  History of EBV viremia: Yes  Significant changes in immunosuppression: None  Significant transplant-related complications: EBV viremia    Transplant Office Phone Number: [phone]    Assessment and plan was discussed with the patient and she voiced her understanding and agreement.    Viral Abel Abdul MD    Chief Complaint   Ms. Lala is a 53 year old here for routine follow up and proteinuria.    History of Present Illness    The patient was previously followed by Dr. Meehan and last seen by him 2018. I have reviewed that chart and the subsequent notes in epic and have interviewed the patient with an  for the HPI.  The patient has been in the ED three times since her last nephrology visit.  Most recently she was in the ED a few weeks ago with a chronic cough.  She has ongoing cough symptoms despite a 5 day course of azithromycin and possibly prednisone (she denies taking prednisone).  She notes the cough is productive of white sputum but she deneis fevers or chills.  She endores lower extremity edema and orthopnea/pnd.  The cough has been ongoing for several weeks now and an xray was done in the ED that showed cardiomegaly.  She notes hypertension at home.    Recent Hospitalizations:  [x] No [] Yes    New Medical Issues: [] No [x] Yes See above   Decreased energy: [x]  No [] Yes    Chest pain or SOB with exertion:  [] No [x] Yes See above   Appetite change or weight change: [x] No [] Yes    Nausea, vomiting or diarrhea:  [x] No [] Yes    Fever, sweats or chills: [x] No [] Yes    Leg swelling: [x] No [] Yes      Other medical issues:  No    Home BP: 170/80    Review of Systems   A comprehensive review of systems was obtained and negative, except as noted in the HPI or PMH.    Problem List   Patient Active Problem List   Diagnosis     S/P kidney transplant     Parathyroid related hypercalcemia (H)     Encounter for long-term current use of medication     CKD (chronic kidney disease) stage 3, GFR 30-59 ml/min (H)     Flu syndrome     Influenza     Chest pain     Shortness of breath     Acute kidney injury (H)     Acute pharyngitis     Weakness     Diarrhea     Decreased urine volume     Essential hypertension     Chest pain at rest     Chest pain, non-cardiac       Social History   Social History     Tobacco Use     Smoking status: Never Smoker     Smokeless tobacco: Never Used   Substance Use Topics     Alcohol use: No     Drug use: No       Allergies   Allergies   Allergen Reactions     Contrast Dye Shortness Of Breath     No Clinical Screening - See Comments Shortness Of Breath       Medications   Current Outpatient Medications   Medication Sig     albuterol (PROAIR HFA) 108 (90 Base) MCG/ACT inhaler Inhale 2 puffs into the lungs every 4 hours as needed for shortness of breath / dyspnea     amLODIPine (NORVASC) 5 MG tablet Take 1 tablet (5 mg) by mouth daily     amoxicillin (AMOXIL) 500 MG tablet Take 500 mg by mouth daily     azithromycin (ZITHROMAX Z-YAKELIN) 250 MG tablet As directed     CELLCEPT (BRAND) 250 MG capsule Take 4 capsules (1,000 mg) by mouth 2 times daily     CELLCEPT (BRAND) 250 MG capsule Take 4 capsules (1,000 mg) by mouth 2 times daily LABS REQUIRED FOR REFILLS     CELLCEPT 250 MG PO CAPSULE Take 4 capsules (1,000 mg) by mouth 2 times daily (Patient not taking:  Reported on 2/19/2019)     cinacalcet (SENSIPAR) 30 MG tablet TAKE ONE-HALF TABLET BY MOUTH EVERY OTHER DAY     cinacalcet (SENSIPAR) 30 MG tablet Sensipar 30 mg tablet     Diphenhyd-HC-Nystatin-Tetracyc (FIRST-HARINDER MOUTHWASH) SUSP Swish and swallow 5-10 mLs in mouth every 6 hours as needed (Patient not taking: Reported on 2/19/2019)     GENGRAF (BRAND) 25 MG CAPSULE Gengraf 25 mg capsule   Take 2 capsules twice a day by oral route.     guaiFENesin (MUCINEX) 600 MG 12 hr tablet Take 600 mg by mouth as needed     lisinopril (PRINIVIL/ZESTRIL) 5 MG tablet Take 1 tablet (5 mg) by mouth daily     NEORAL (BRAND) 25 MG capsule Take 1 capsule (25 mg) by mouth every morning and 2 capsules (50 mg) by mouth every evening     sulfamethoxazole-trimethoprim (BACTRIM,SEPTRA) 400-80 MG per tablet TAKE ONE TABLET BY MOUTH EVERY DAY     sulfamethoxazole-trimethoprim (BACTRIM/SEPTRA) 400-80 MG tablet Take 1 tablet by mouth daily     No current facility-administered medications for this visit.      There are no discontinued medications.    Physical Exam   Vital Signs: There were no vitals taken for this visit.    GENERAL APPEARANCE: alert and no distress  HENT: mouth without ulcers or lesions  LYMPHATICS: no cervical or supraclavicular nodes  RESP: diffuse wheezing present in all lung fields.  CV: regular rhythm, normal rate, no rub, no murmur  EDEMA: +1 LE edema bilaterally  ABDOMEN: soft, nondistended, nontender, bowel sounds normal  MS: extremities normal - no gross deformities noted, no evidence of inflammation in joints, no muscle tenderness  SKIN: no rash  TX KIDNEY: normal      Data     Renal Latest Ref Rng & Units 3/12/2019 2/16/2019 11/7/2018   Na 133 - 144 mmol/L 131(L) 139 134   K 3.4 - 5.3 mmol/L 4.0 4.5 4.1   Cl 94 - 109 mmol/L 100 109 103   CO2 20 - 32 mmol/L 20 23 22   BUN 7 - 30 mg/dL 12 16 16   Cr 0.52 - 1.04 mg/dL 1.64(H) 1.61(H) 1.56(H)   Glucose 70 - 99 mg/dL 129(H) 92 158(H)   Ca  8.5 - 10.1 mg/dL 8.8 9.4 9.4    Mg 1.6 - 2.3 mg/dL - - 1.9     Bone Health Latest Ref Rng & Units 1/29/2018 4/28/2017 4/29/2016   Phos 2.5 - 4.5 mg/dL 2.8 2.8 -   PTHi 12 - 72 pg/mL - - 124(H)     Heme Latest Ref Rng & Units 3/12/2019 2/16/2019 11/7/2018   WBC 4.0 - 11.0 10e9/L 3.1(L) 4.1 6.4   Hgb 11.7 - 15.7 g/dL 11.2(L) 11.2(L) 11.1(L)   Plt 150 - 450 10e9/L 189 192 173     Liver Latest Ref Rng & Units 3/12/2019 11/7/2018 1/29/2018   AP 40 - 150 U/L 136 134 147   TBili 0.2 - 1.3 mg/dL 0.5 0.8 0.9   DBili 0.0 - 0.2 mg/dL - - -   ALT 0 - 50 U/L 15 14 15   AST 0 - 45 U/L 26 19 26   Tot Protein 6.8 - 8.8 g/dL 7.6 8.4 7.9   Albumin 3.4 - 5.0 g/dL 3.4 3.4 3.6     Pancreas Latest Ref Rng & Units 2/27/2017 4/25/2016 4/18/2016   Amylase 30 - 110 U/L - - -   Lipase 73 - 393 U/L 326 422(H) 440(H)        [facility] Txp Virology Latest Ref Rng & Units 10/28/2016 4/29/2016 4/18/2016   CMV IgG EU/mL - - -   CVM DNA Quant - - - Plasma  CORRECTED ON 04/19 AT 0949: PREVIOUSLY REPORTED AS Whole blood, EDTA   anticoagulant     BK Spec - Plasma Plasma -   BK Res BKNEG copies/mL BK Virus DNA Not Detected BK Virus DNA Not Detected -   BK Log <2.7 Log copies/mL Not Calculated   The Real-Time quantitative BK Virus assay was developed and its performance   characteristics determined by the Infectious Diseases Diagnostic Laboratory at   the [facility] in [place], [place]. The   primers and probes for each analyte are Analyte Specific Reagents (ASRs)   manufactured by [employer].   ASRs are used in many laboratory tests necessary for standard medical care and   generally do not require U.S. Food and Drug Administration approval. The FDA   has determined that such clearance or approval is not necessary.   This test is used for clinical purposes. It should not be regarded as   investigational or for research. This laboratory is certified under the   Clinical Laboratory Improvement Amendments of 1988 (CLIA-88) as qualified to   perform high  complexity clinical laboratory testing.   Not Calculated   The Real-Time quantitative BK Virus assay was developed and its performance   characteristics determined by the Infectious Diseases Diagnostic Laboratory at   the [facility] in [place], [place]. The   primers and probes for each analyte are Analyte Specific Reagents (ASRs)   manufactured by [employer].   ASRs are used in many laboratory tests necessary for standard medical care and   generally do not require U.S. Food and Drug Administration approval. The FDA   has determined that such clearance or approval is not necessary.   This test is used for clinical purposes. It should not be regarded as   investigational or for research. This laboratory is certified under the   Clinical Laboratory Improvement Amendments of 1988 (CLIA-88) as qualified to   perform high complexity clinical laboratory testing.   -   EBV IgG - - - -   Hep B Surf - - - -   HIV 1&2 NEG - - -            Recent Labs   Lab Test 10/27/15  0555 11/27/15  0710 12/09/15  0550   DOSMPA Not Provided 9P  11/26 Not Provided   MPACID 0.94* 0.71* 2.02   MPAG 190.4* 169.8* 191.8*       Kidney/Pancreas Recipient

## 2019-03-30 DIAGNOSIS — Z94.0 KIDNEY REPLACED BY TRANSPLANT: ICD-10-CM

## 2019-03-30 LAB
CYCLOSPORINE BLD LC/MS/MS-MCNC: 134 UG/L (ref 50–400)
TME LAST DOSE: 200 H

## 2019-03-30 PROCEDURE — 80158 DRUG ASSAY CYCLOSPORINE: CPT | Performed by: INTERNAL MEDICINE

## 2019-04-01 ENCOUNTER — TELEPHONE (OUTPATIENT)
Dept: TRANSPLANT | Facility: CLINIC | Age: 54
End: 2019-04-01

## 2019-04-01 NOTE — TELEPHONE ENCOUNTER
ISSUE:  , goal     PLAN:  Call and confirm a 12 hour trough and current cyclosporine dose of 25 mg AM and 50 mg PM.   Any recent illness, diarrhea, or medication changes?  Plan to repeat level this week     OUTCOME:  Called patients daughter Nichelle.  She confirms an accurate trough and current dose.   She denies any diarrhea or medication changes, other than hydrochlorothiazide that Dr. Abdul recently started.  Her mother is getting over bronchitis.  I advised that she repeat the CSA level this week before any dose adjustments are made. Nichelle verbalized understanding    She has a few questions regarding her BP regimen.  I told Nichelle I would have our nephrology RN reach out to her.

## 2019-04-02 ENCOUNTER — APPOINTMENT (OUTPATIENT)
Dept: GENERAL RADIOLOGY | Facility: CLINIC | Age: 54
DRG: 641 | End: 2019-04-02
Attending: EMERGENCY MEDICINE
Payer: MEDICARE

## 2019-04-02 ENCOUNTER — HOSPITAL ENCOUNTER (INPATIENT)
Facility: CLINIC | Age: 54
LOS: 5 days | Discharge: HOME | DRG: 641 | End: 2019-04-08
Attending: EMERGENCY MEDICINE | Admitting: PEDIATRICS
Payer: MEDICARE

## 2019-04-02 DIAGNOSIS — N18.30 CKD (CHRONIC KIDNEY DISEASE) STAGE 3, GFR 30-59 ML/MIN (H): ICD-10-CM

## 2019-04-02 DIAGNOSIS — E87.1 HYPONATREMIA: ICD-10-CM

## 2019-04-02 DIAGNOSIS — N17.9 ACUTE KIDNEY INJURY (H): ICD-10-CM

## 2019-04-02 DIAGNOSIS — I10 ESSENTIAL HYPERTENSION: ICD-10-CM

## 2019-04-02 DIAGNOSIS — E87.70 HYPERVOLEMIA, UNSPECIFIED HYPERVOLEMIA TYPE: ICD-10-CM

## 2019-04-02 DIAGNOSIS — R53.83 FATIGUE, UNSPECIFIED TYPE: ICD-10-CM

## 2019-04-02 DIAGNOSIS — K21.00 GASTROESOPHAGEAL REFLUX DISEASE WITH ESOPHAGITIS: Primary | ICD-10-CM

## 2019-04-02 LAB
ALBUMIN UR-MCNC: 30 MG/DL
ANION GAP SERPL CALCULATED.3IONS-SCNC: 10 MMOL/L (ref 3–14)
APPEARANCE UR: CLEAR
BASOPHILS # BLD AUTO: 0 10E9/L (ref 0–0.2)
BASOPHILS NFR BLD AUTO: 0.5 %
BILIRUB UR QL STRIP: NEGATIVE
BUN SERPL-MCNC: 16 MG/DL (ref 7–30)
CALCIUM SERPL-MCNC: 9.3 MG/DL (ref 8.5–10.1)
CHLORIDE SERPL-SCNC: 91 MMOL/L (ref 94–109)
CO2 SERPL-SCNC: 23 MMOL/L (ref 20–32)
COLOR UR AUTO: ABNORMAL
CREAT SERPL-MCNC: 1.88 MG/DL (ref 0.52–1.04)
DIFFERENTIAL METHOD BLD: ABNORMAL
EOSINOPHIL # BLD AUTO: 0.2 10E9/L (ref 0–0.7)
EOSINOPHIL NFR BLD AUTO: 4.2 %
ERYTHROCYTE [DISTWIDTH] IN BLOOD BY AUTOMATED COUNT: 12.6 % (ref 10–15)
FLUAV+FLUBV RNA SPEC QL NAA+PROBE: NEGATIVE
FLUAV+FLUBV RNA SPEC QL NAA+PROBE: NEGATIVE
GFR SERPL CREATININE-BSD FRML MDRD: 30 ML/MIN/{1.73_M2}
GLUCOSE SERPL-MCNC: 114 MG/DL (ref 70–99)
GLUCOSE UR STRIP-MCNC: NEGATIVE MG/DL
HCT VFR BLD AUTO: 35.4 % (ref 35–47)
HGB BLD-MCNC: 11.3 G/DL (ref 11.7–15.7)
HGB UR QL STRIP: NEGATIVE
IMM GRANULOCYTES # BLD: 0 10E9/L (ref 0–0.4)
IMM GRANULOCYTES NFR BLD: 0.2 %
KETONES UR STRIP-MCNC: NEGATIVE MG/DL
LEUKOCYTE ESTERASE UR QL STRIP: ABNORMAL
LYMPHOCYTES # BLD AUTO: 0.8 10E9/L (ref 0.8–5.3)
LYMPHOCYTES NFR BLD AUTO: 19.8 %
MCH RBC QN AUTO: 27.5 PG (ref 26.5–33)
MCHC RBC AUTO-ENTMCNC: 31.9 G/DL (ref 31.5–36.5)
MCV RBC AUTO: 86 FL (ref 78–100)
MONOCYTES # BLD AUTO: 0.9 10E9/L (ref 0–1.3)
MONOCYTES NFR BLD AUTO: 20.5 %
NEUTROPHILS # BLD AUTO: 2.3 10E9/L (ref 1.6–8.3)
NEUTROPHILS NFR BLD AUTO: 54.8 %
NITRATE UR QL: NEGATIVE
NRBC # BLD AUTO: 0 10*3/UL
NRBC BLD AUTO-RTO: 0 /100
NT-PROBNP SERPL-MCNC: 821 PG/ML (ref 0–900)
PH UR STRIP: 6.5 PH (ref 5–7)
PLATELET # BLD AUTO: 212 10E9/L (ref 150–450)
POTASSIUM SERPL-SCNC: 4.2 MMOL/L (ref 3.4–5.3)
RBC # BLD AUTO: 4.11 10E12/L (ref 3.8–5.2)
RBC #/AREA URNS AUTO: 0 /HPF (ref 0–2)
RSV RNA SPEC NAA+PROBE: NEGATIVE
SODIUM SERPL-SCNC: 124 MMOL/L (ref 133–144)
SOURCE: ABNORMAL
SP GR UR STRIP: 1 (ref 1–1.03)
SPECIMEN SOURCE: NORMAL
SQUAMOUS #/AREA URNS AUTO: <1 /HPF (ref 0–1)
TRANS CELLS #/AREA URNS HPF: <1 /HPF (ref 0–1)
TROPONIN I SERPL-MCNC: <0.015 UG/L (ref 0–0.04)
UROBILINOGEN UR STRIP-MCNC: NORMAL MG/DL (ref 0–2)
WBC # BLD AUTO: 4.3 10E9/L (ref 4–11)
WBC #/AREA URNS AUTO: 3 /HPF (ref 0–5)

## 2019-04-02 PROCEDURE — 80048 BASIC METABOLIC PNL TOTAL CA: CPT | Performed by: EMERGENCY MEDICINE

## 2019-04-02 PROCEDURE — 25000128 H RX IP 250 OP 636: Performed by: EMERGENCY MEDICINE

## 2019-04-02 PROCEDURE — 83880 ASSAY OF NATRIURETIC PEPTIDE: CPT | Performed by: EMERGENCY MEDICINE

## 2019-04-02 PROCEDURE — 99285 EMERGENCY DEPT VISIT HI MDM: CPT | Mod: 25 | Performed by: EMERGENCY MEDICINE

## 2019-04-02 PROCEDURE — 25800030 ZZH RX IP 258 OP 636: Performed by: EMERGENCY MEDICINE

## 2019-04-02 PROCEDURE — 87631 RESP VIRUS 3-5 TARGETS: CPT | Performed by: EMERGENCY MEDICINE

## 2019-04-02 PROCEDURE — 93010 ELECTROCARDIOGRAM REPORT: CPT | Mod: Z6 | Performed by: EMERGENCY MEDICINE

## 2019-04-02 PROCEDURE — 96361 HYDRATE IV INFUSION ADD-ON: CPT | Performed by: EMERGENCY MEDICINE

## 2019-04-02 PROCEDURE — 84484 ASSAY OF TROPONIN QUANT: CPT | Performed by: EMERGENCY MEDICINE

## 2019-04-02 PROCEDURE — 71046 X-RAY EXAM CHEST 2 VIEWS: CPT

## 2019-04-02 PROCEDURE — 81001 URINALYSIS AUTO W/SCOPE: CPT | Performed by: EMERGENCY MEDICINE

## 2019-04-02 PROCEDURE — 85025 COMPLETE CBC W/AUTO DIFF WBC: CPT | Performed by: EMERGENCY MEDICINE

## 2019-04-02 PROCEDURE — 93005 ELECTROCARDIOGRAM TRACING: CPT | Performed by: EMERGENCY MEDICINE

## 2019-04-02 PROCEDURE — 96374 THER/PROPH/DIAG INJ IV PUSH: CPT | Performed by: EMERGENCY MEDICINE

## 2019-04-02 RX ORDER — SODIUM CHLORIDE 9 MG/ML
INJECTION, SOLUTION INTRAVENOUS CONTINUOUS
Status: DISCONTINUED | OUTPATIENT
Start: 2019-04-02 | End: 2019-04-03

## 2019-04-02 RX ORDER — FUROSEMIDE 10 MG/ML
40 INJECTION INTRAMUSCULAR; INTRAVENOUS ONCE
Status: COMPLETED | OUTPATIENT
Start: 2019-04-02 | End: 2019-04-02

## 2019-04-02 RX ADMIN — FUROSEMIDE 40 MG: 10 INJECTION, SOLUTION INTRAVENOUS at 20:30

## 2019-04-02 RX ADMIN — SODIUM CHLORIDE: 9 INJECTION, SOLUTION INTRAVENOUS at 21:21

## 2019-04-02 ASSESSMENT — ENCOUNTER SYMPTOMS
BRUISES/BLEEDS EASILY: 0
FEVER: 0
ARTHRALGIAS: 0
NECK STIFFNESS: 0
DIFFICULTY URINATING: 0
ABDOMINAL PAIN: 0
SHORTNESS OF BREATH: 1
NAUSEA: 0
HEADACHES: 0
FATIGUE: 1
CONFUSION: 0
CHILLS: 0
LIGHT-HEADEDNESS: 0
COLOR CHANGE: 0
VOMITING: 0
POLYDIPSIA: 0
COUGH: 1
EYE REDNESS: 0
ADENOPATHY: 0

## 2019-04-02 ASSESSMENT — MIFFLIN-ST. JEOR: SCORE: 1236.39

## 2019-04-02 NOTE — ED TRIAGE NOTES
Pt has a productive cough for 2 weeks with frothy, thick secretions. Pt states she has chest tightness but no pain.

## 2019-04-03 ENCOUNTER — APPOINTMENT (OUTPATIENT)
Dept: CARDIOLOGY | Facility: CLINIC | Age: 54
DRG: 641 | End: 2019-04-03
Attending: INTERNAL MEDICINE
Payer: MEDICARE

## 2019-04-03 ENCOUNTER — OFFICE VISIT (OUTPATIENT)
Dept: INTERPRETER SERVICES | Facility: CLINIC | Age: 54
End: 2019-04-03
Payer: MEDICARE

## 2019-04-03 PROBLEM — R05.9 COUGH: Status: ACTIVE | Noted: 2019-04-03

## 2019-04-03 LAB
ANION GAP SERPL CALCULATED.3IONS-SCNC: 10 MMOL/L (ref 3–14)
BUN SERPL-MCNC: 18 MG/DL (ref 7–30)
CALCIUM SERPL-MCNC: 9.7 MG/DL (ref 8.5–10.1)
CHLORIDE SERPL-SCNC: 94 MMOL/L (ref 94–109)
CO2 SERPL-SCNC: 24 MMOL/L (ref 20–32)
CREAT SERPL-MCNC: 1.94 MG/DL (ref 0.52–1.04)
CREAT UR-MCNC: 33 MG/DL
ERYTHROCYTE [DISTWIDTH] IN BLOOD BY AUTOMATED COUNT: 12.5 % (ref 10–15)
GFR SERPL CREATININE-BSD FRML MDRD: 29 ML/MIN/{1.73_M2}
GLUCOSE SERPL-MCNC: 136 MG/DL (ref 70–99)
HCT VFR BLD AUTO: 35 % (ref 35–47)
HGB BLD-MCNC: 11.3 G/DL (ref 11.7–15.7)
INTERPRETATION ECG - MUSE: NORMAL
LACTATE BLD-SCNC: 1.3 MMOL/L (ref 0.7–2)
MCH RBC QN AUTO: 27.8 PG (ref 26.5–33)
MCHC RBC AUTO-ENTMCNC: 32.3 G/DL (ref 31.5–36.5)
MCV RBC AUTO: 86 FL (ref 78–100)
OSMOLALITY UR: 159 MMOL/KG (ref 100–1200)
PLATELET # BLD AUTO: 196 10E9/L (ref 150–450)
POTASSIUM SERPL-SCNC: 3.5 MMOL/L (ref 3.4–5.3)
RBC # BLD AUTO: 4.07 10E12/L (ref 3.8–5.2)
SODIUM SERPL-SCNC: 124 MMOL/L (ref 133–144)
SODIUM SERPL-SCNC: 128 MMOL/L (ref 133–144)
SODIUM UR-SCNC: 38 MMOL/L
WBC # BLD AUTO: 3 10E9/L (ref 4–11)

## 2019-04-03 PROCEDURE — 25500064 ZZH RX 255 OP 636: Performed by: INTERNAL MEDICINE

## 2019-04-03 PROCEDURE — T1013 SIGN LANG/ORAL INTERPRETER: HCPCS | Mod: U3

## 2019-04-03 PROCEDURE — 84295 ASSAY OF SERUM SODIUM: CPT | Performed by: STUDENT IN AN ORGANIZED HEALTH CARE EDUCATION/TRAINING PROGRAM

## 2019-04-03 PROCEDURE — 84300 ASSAY OF URINE SODIUM: CPT | Performed by: STUDENT IN AN ORGANIZED HEALTH CARE EDUCATION/TRAINING PROGRAM

## 2019-04-03 PROCEDURE — 25000131 ZZH RX MED GY IP 250 OP 636 PS 637: Mod: GY | Performed by: STUDENT IN AN ORGANIZED HEALTH CARE EDUCATION/TRAINING PROGRAM

## 2019-04-03 PROCEDURE — 40000264 ECHOCARDIOGRAM COMPLETE

## 2019-04-03 PROCEDURE — 99207 ZZC APP CREDIT; MD BILLING SHARED VISIT: CPT | Performed by: NURSE PRACTITIONER

## 2019-04-03 PROCEDURE — 12000001 ZZH R&B MED SURG/OB UMMC

## 2019-04-03 PROCEDURE — 83605 ASSAY OF LACTIC ACID: CPT

## 2019-04-03 PROCEDURE — 25800030 ZZH RX IP 258 OP 636: Performed by: STUDENT IN AN ORGANIZED HEALTH CARE EDUCATION/TRAINING PROGRAM

## 2019-04-03 PROCEDURE — 83935 ASSAY OF URINE OSMOLALITY: CPT | Performed by: STUDENT IN AN ORGANIZED HEALTH CARE EDUCATION/TRAINING PROGRAM

## 2019-04-03 PROCEDURE — 85027 COMPLETE CBC AUTOMATED: CPT | Performed by: STUDENT IN AN ORGANIZED HEALTH CARE EDUCATION/TRAINING PROGRAM

## 2019-04-03 PROCEDURE — 99220 ZZC INITIAL OBSERVATION CARE,LEVL III: CPT | Mod: AI | Performed by: PEDIATRICS

## 2019-04-03 PROCEDURE — 82570 ASSAY OF URINE CREATININE: CPT | Performed by: STUDENT IN AN ORGANIZED HEALTH CARE EDUCATION/TRAINING PROGRAM

## 2019-04-03 PROCEDURE — 25000132 ZZH RX MED GY IP 250 OP 250 PS 637: Mod: GY | Performed by: STUDENT IN AN ORGANIZED HEALTH CARE EDUCATION/TRAINING PROGRAM

## 2019-04-03 PROCEDURE — 36415 COLL VENOUS BLD VENIPUNCTURE: CPT | Performed by: STUDENT IN AN ORGANIZED HEALTH CARE EDUCATION/TRAINING PROGRAM

## 2019-04-03 PROCEDURE — 93306 TTE W/DOPPLER COMPLETE: CPT | Mod: 26 | Performed by: INTERNAL MEDICINE

## 2019-04-03 PROCEDURE — A9270 NON-COVERED ITEM OR SERVICE: HCPCS | Mod: GY | Performed by: STUDENT IN AN ORGANIZED HEALTH CARE EDUCATION/TRAINING PROGRAM

## 2019-04-03 PROCEDURE — 82306 VITAMIN D 25 HYDROXY: CPT | Performed by: STUDENT IN AN ORGANIZED HEALTH CARE EDUCATION/TRAINING PROGRAM

## 2019-04-03 PROCEDURE — G0378 HOSPITAL OBSERVATION PER HR: HCPCS

## 2019-04-03 PROCEDURE — 80048 BASIC METABOLIC PNL TOTAL CA: CPT | Performed by: STUDENT IN AN ORGANIZED HEALTH CARE EDUCATION/TRAINING PROGRAM

## 2019-04-03 RX ORDER — CINACALCET 30 MG/1
30 TABLET, FILM COATED ORAL DAILY
Status: DISCONTINUED | OUTPATIENT
Start: 2019-04-03 | End: 2019-04-03

## 2019-04-03 RX ORDER — AMLODIPINE BESYLATE 5 MG/1
5 TABLET ORAL DAILY
Status: DISCONTINUED | OUTPATIENT
Start: 2019-04-03 | End: 2019-04-06

## 2019-04-03 RX ORDER — LISINOPRIL 5 MG/1
5 TABLET ORAL DAILY
Status: DISCONTINUED | OUTPATIENT
Start: 2019-04-03 | End: 2019-04-04

## 2019-04-03 RX ORDER — AMOXICILLIN 250 MG
1 CAPSULE ORAL 2 TIMES DAILY PRN
Status: DISCONTINUED | OUTPATIENT
Start: 2019-04-03 | End: 2019-04-08 | Stop reason: HOSPADM

## 2019-04-03 RX ORDER — ALBUTEROL SULFATE 90 UG/1
2 AEROSOL, METERED RESPIRATORY (INHALATION) EVERY 4 HOURS PRN
Status: DISCONTINUED | OUTPATIENT
Start: 2019-04-03 | End: 2019-04-08 | Stop reason: HOSPADM

## 2019-04-03 RX ORDER — NALOXONE HYDROCHLORIDE 0.4 MG/ML
.1-.4 INJECTION, SOLUTION INTRAMUSCULAR; INTRAVENOUS; SUBCUTANEOUS
Status: DISCONTINUED | OUTPATIENT
Start: 2019-04-03 | End: 2019-04-08 | Stop reason: HOSPADM

## 2019-04-03 RX ORDER — CYCLOSPORINE 25 MG/1
50 CAPSULE, LIQUID FILLED ORAL AT BEDTIME
Status: DISCONTINUED | OUTPATIENT
Start: 2019-04-03 | End: 2019-04-08 | Stop reason: HOSPADM

## 2019-04-03 RX ORDER — SULFAMETHOXAZOLE AND TRIMETHOPRIM 400; 80 MG/1; MG/1
1 TABLET ORAL DAILY
Status: DISCONTINUED | OUTPATIENT
Start: 2019-04-03 | End: 2019-04-08 | Stop reason: HOSPADM

## 2019-04-03 RX ORDER — MYCOPHENOLATE MOFETIL 250 MG/1
1000 CAPSULE ORAL 2 TIMES DAILY
Status: DISCONTINUED | OUTPATIENT
Start: 2019-04-03 | End: 2019-04-03

## 2019-04-03 RX ORDER — HYDROCHLOROTHIAZIDE 25 MG/1
25 TABLET ORAL DAILY
Status: DISCONTINUED | OUTPATIENT
Start: 2019-04-03 | End: 2019-04-03

## 2019-04-03 RX ORDER — POLYETHYLENE GLYCOL 3350 17 G/17G
17 POWDER, FOR SOLUTION ORAL DAILY PRN
Status: DISCONTINUED | OUTPATIENT
Start: 2019-04-03 | End: 2019-04-08 | Stop reason: HOSPADM

## 2019-04-03 RX ORDER — MYCOPHENOLATE MOFETIL 250 MG/1
1000 CAPSULE ORAL 2 TIMES DAILY
Status: DISCONTINUED | OUTPATIENT
Start: 2019-04-03 | End: 2019-04-08 | Stop reason: HOSPADM

## 2019-04-03 RX ORDER — CYCLOSPORINE 25 MG/1
25 CAPSULE, LIQUID FILLED ORAL EVERY MORNING
Status: DISCONTINUED | OUTPATIENT
Start: 2019-04-03 | End: 2019-04-08 | Stop reason: HOSPADM

## 2019-04-03 RX ORDER — CYCLOSPORINE 25 MG/1
50 CAPSULE ORAL
Status: DISCONTINUED | OUTPATIENT
Start: 2019-04-03 | End: 2019-04-03

## 2019-04-03 RX ORDER — AMOXICILLIN 250 MG
2 CAPSULE ORAL 2 TIMES DAILY PRN
Status: DISCONTINUED | OUTPATIENT
Start: 2019-04-03 | End: 2019-04-08 | Stop reason: HOSPADM

## 2019-04-03 RX ORDER — CYCLOSPORINE 25 MG/1
25 CAPSULE, LIQUID FILLED ORAL
Status: DISCONTINUED | OUTPATIENT
Start: 2019-04-03 | End: 2019-04-03

## 2019-04-03 RX ADMIN — CYCLOSPORINE 25 MG: 25 CAPSULE, LIQUID FILLED ORAL at 12:24

## 2019-04-03 RX ADMIN — AMLODIPINE BESYLATE 5 MG: 5 TABLET ORAL at 09:00

## 2019-04-03 RX ADMIN — CYCLOSPORINE 50 MG: 25 CAPSULE, LIQUID FILLED ORAL at 20:06

## 2019-04-03 RX ADMIN — MYCOPHENOLATE MOFETIL 1000 MG: 250 CAPSULE ORAL at 08:00

## 2019-04-03 RX ADMIN — UMECLIDINIUM BROMIDE AND VILANTEROL TRIFENATATE 1 PUFF: 62.5; 25 POWDER RESPIRATORY (INHALATION) at 09:02

## 2019-04-03 RX ADMIN — HUMAN ALBUMIN MICROSPHERES AND PERFLUTREN 6 ML: 10; .22 INJECTION, SOLUTION INTRAVENOUS at 15:30

## 2019-04-03 RX ADMIN — MYCOPHENOLATE MOFETIL 1000 MG: 250 CAPSULE ORAL at 20:07

## 2019-04-03 RX ADMIN — LISINOPRIL 5 MG: 5 TABLET ORAL at 09:00

## 2019-04-03 RX ADMIN — SULFAMETHOXAZOLE AND TRIMETHOPRIM 1 TABLET: 400; 80 TABLET ORAL at 09:00

## 2019-04-03 RX ADMIN — SODIUM CHLORIDE, POTASSIUM CHLORIDE, SODIUM LACTATE AND CALCIUM CHLORIDE 500 ML: 600; 310; 30; 20 INJECTION, SOLUTION INTRAVENOUS at 04:00

## 2019-04-03 ASSESSMENT — MIFFLIN-ST. JEOR
SCORE: 1227.31
SCORE: 1208.26

## 2019-04-03 ASSESSMENT — ACTIVITIES OF DAILY LIVING (ADL)
ADLS_ACUITY_SCORE: 19
ADLS_ACUITY_SCORE: 20
ADLS_ACUITY_SCORE: 19

## 2019-04-03 NOTE — UTILIZATION REVIEW
"  Admission Status; Secondary Review Determination     Admission Date: 4/2/2019  7:39 PM      Under the authority of the Utilization Management Committee, the utilization review process indicated a secondary review on the above patient.  The review outcome is based on review of the medical records, discussions with staff, and applying clinical experience noted on the date of the review.        ()      Inpatient Status Appropriate - This patient's medical care is consistent with medical management for inpatient care and reasonable inpatient medical practice.      (x) Observation Status Appropriate - This patient does not meet hospital inpatient criteria and is placed in observation status. If this patient's primary payer is Medicare and was admitted as an inpatient, Condition Code 44 should be used and patient status changed to \"observation\".   () Admission Status NOT Appropriate - This patient's medical care is not consistent with medical management for Inpatient or Observation Status.          RATIONALE FOR DETERMINATION   Ms. 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  She presented to the hospital with cough.  She was found to have mild hyponatremia.  She was placed in the hospital for further monitoring of sodium level.  She was initially placed in the hospital on inpatient status.  She is not requiring continuous IV fluids.  She did have one dose of IV furosemide.  Was then given a bolus of IV fluid.  She is not requiring IV antibiotics.  Observation status at the hospital is appropriate hospital status at this time.          The severity of illness, intensity of service provided, expected LOS and risk for adverse outcome make the care appropriate for observation level care at the hospital.        The information on this " document is developed by the utilization review team in order for the business office to ensure compliance.  This only denotes the appropriateness of proper admission status and does not reflect the quality of care rendered.         The definitions of Inpatient Status and Observation Status used in making the determination above are those provided in the CMS Coverage Manual, Chapter 1 and Chapter 6, section 70.4.      Sincerely,     William Mendiola D.O.  Utilization Review/ Case Management  [facility].

## 2019-04-03 NOTE — TELEPHONE ENCOUNTER
Spoke with Nichelle. Said hydrochlorothiazide was causing muscle cramps and fatigue. Patient is now in the hospital, was told she may need a biopsy (Nichelle heard this from her sister, unsure if it's true or not). Advised those orders would need to come from nephrology / transplant, and that they should consult our team for that decision. Did explain the role of a biopsy, if requested. She denied further questions at this time.    Morena Ghotra RN

## 2019-04-03 NOTE — ED PROVIDER NOTES
[facility] EMERGENCY DEPARTMENT ([facility])  4/02/19   History     Chief Complaint   Patient presents with     Cough     Shortness of Breath     The history is provided by the patient. A  was used ([employer] ).     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  The patient reports that she first began having shortness of breath with exertion 3 days ago and she also developed a productive cough of frothy, thick secretions.  The patient notes that she also has chest tightness, but denies any chest pain.  She also denies any fevers or chills.  The patient does note that she has had some mild swelling in her bilateral ankles.  The patient also notes a decrease in urine output for the last 3 days.  The patient denies any positive sick contacts and denies any recent travel.  The patient does note that she has a Respimat inhaler at home, but this has not been helping with her symptoms for the past 3 days.    I have reviewed the Medications, Allergies, Past Medical and Surgical History, and Social History in the [employer] system.    Past Medical History:   Diagnosis Date     Dehydration      Henoch-Schonlein purpura (H)      Hypercalcemia      Hypomagnesaemia      Renal disease      Tertiary hyperparathyroidism (H)        Past Surgical History:   Procedure Laterality Date     CHOLECYSTECTOMY       renal transplant  1998, 2007     TRANSPLANT      Kidney 2008       History reviewed. No pertinent family history.    Social History     Tobacco Use     Smoking status: Never Smoker     Smokeless tobacco: Never Used   Substance Use Topics     Alcohol use: No       Current Facility-Administered Medications   Medication     sodium chloride 0.9%  infusion     Current Outpatient Medications   Medication     albuterol (PROAIR HFA) 108 (90 Base) MCG/ACT inhaler     amLODIPine (NORVASC) 5 MG tablet     CELLCEPT (BRAND) 250 MG capsule     cinacalcet (SENSIPAR) 30 MG tablet     GENGRAF (BRAND) 25 MG CAPSULE     hydrochlorothiazide (HYDRODIURIL) 25 MG tablet     Ipratropium-Albuterol (COMBIVENT RESPIMAT)  MCG/ACT inhaler     lisinopril (PRINIVIL/ZESTRIL) 5 MG tablet     NEORAL (BRAND) 25 MG capsule     sulfamethoxazole-trimethoprim (BACTRIM,SEPTRA) 400-80 MG per tablet     amoxicillin (AMOXIL) 500 MG tablet     azithromycin (ZITHROMAX Z-YAKELIN) 250 MG tablet     CELLCEPT (BRAND) 250 MG capsule     CELLCEPT 250 MG PO CAPSULE     cinacalcet (SENSIPAR) 30 MG tablet     Diphenhyd-HC-Nystatin-Tetracyc (FIRST-HARNIDER MOUTHWASH) SUSP     guaiFENesin (MUCINEX) 600 MG 12 hr tablet     sulfamethoxazole-trimethoprim (BACTRIM/SEPTRA) 400-80 MG tablet        Allergies   Allergen Reactions     Contrast Dye Shortness Of Breath     No Clinical Screening - See Comments Shortness Of Breath         Review of Systems   Constitutional: Positive for fatigue. Negative for chills and fever.   HENT: Negative for congestion.    Eyes: Negative for redness.   Respiratory: Positive for cough and shortness of breath.    Cardiovascular: Negative for chest pain.   Gastrointestinal: Negative for abdominal pain, nausea and vomiting.   Endocrine: Negative for polydipsia and polyuria.   Genitourinary: Negative for difficulty urinating.   Musculoskeletal: Negative for arthralgias and neck stiffness.   Skin: Negative for color change.   Allergic/Immunologic: Positive for immunocompromised state.   Neurological: Negative for light-headedness and headaches.   Hematological: Negative for adenopathy. Does not bruise/bleed easily.   Psychiatric/Behavioral: Negative for confusion.   All other systems reviewed and are negative.      Physical Exam   BP: 171/85  Pulse: 88  Temp: 99  F (37.2  C)  Resp:  "22  Height: 149.9 cm (4' 11\")  Weight: 72.6 kg (160 lb)  SpO2: 95 %      Physical Exam   Constitutional: She is oriented to person, place, and time. She appears well-developed and well-nourished. No distress.   HENT:   Head: Normocephalic and atraumatic.   Mouth/Throat: Oropharynx is clear and moist. No oropharyngeal exudate.   Eyes: EOM are normal. No scleral icterus.   Neck: Normal range of motion. Neck supple.   Cardiovascular: Normal rate, normal heart sounds and intact distal pulses.   Pulmonary/Chest: Effort normal and breath sounds normal. No stridor. No respiratory distress. She has no wheezes. She has no rales.   Abdominal: Soft. Bowel sounds are normal. She exhibits no distension and no mass. There is no tenderness. There is no rebound and no guarding.   No tenderness in the right lower quadrant over the transplanted kidney.   Musculoskeletal: Normal range of motion. She exhibits edema ( 1 b/l LEs). She exhibits no tenderness.   Neurological: She is alert and oriented to person, place, and time. No cranial nerve deficit. She exhibits normal muscle tone. Coordination normal.   Skin: Skin is warm. No rash noted. She is not diaphoretic.   Psychiatric: She has a normal mood and affect. Her behavior is normal. Judgment and thought content normal.       ED Course        Procedures             EKG Interpretation:      Interpreted by China Sparks  Time reviewed: 8:26 PM  Symptoms at time of EKG: Shortness of breath  Rhythm: sinus with sinus arrhythmia  Rate: 83  Axis: normal  Ectopy: none  Conduction: normal  ST Segments/ T Waves: No ST-T wave changes  Q Waves: none  Comparison to prior: Unchanged from old EKG done on November 7, 2018    Clinical Impression: Sinus rhythm with sinus arrhythmia no acute ischemia          Critical Care time:  none             Labs Ordered and Resulted from Time of ED Arrival Up to the Time of Departure from the ED   CBC WITH PLATELETS DIFFERENTIAL - Abnormal; Notable for the " following components:       Result Value    Hemoglobin 11.3 (*)     All other components within normal limits   BASIC METABOLIC PANEL - Abnormal; Notable for the following components:    Sodium 124 (*)     Chloride 91 (*)     Glucose 114 (*)     Creatinine 1.88 (*)     GFR Estimate 30 (*)     GFR Estimate If Black 35 (*)     All other components within normal limits   ROUTINE UA WITH MICROSCOPIC - Abnormal; Notable for the following components:    Protein Albumin Urine 30 (*)     Leukocyte Esterase Urine Moderate (*)     All other components within normal limits   NT PROBNP INPATIENT   TROPONIN I   PERIPHERAL IV CATHETER   CARDIAC CONTINUOUS MONITORING   INFLUENZA A AND B AND RSV PCR            Assessments & Plan (with Medical Decision Making)   53-year-old woman with history of kidney transplant presenting with worsening shortness of breath and cough for several weeks.  Differential diagnosis: Pneumonia, influenza, fluid overload, pneumothorax, CHF, unlikely ACS.    After thorough history and physical examination, patient appears to be in no acute distress; however, she is somewhat tachypneic, but her oxygenation is within normal limits on room air.  I will obtain laboratory studies, chest x-ray and EKG for further diagnostic evaluation.  Patient and her  agree with the plan.    Laboratory studies returned with no leukocytosis, WBC is normal at 4300.  There is mild anemia with hemoglobin of 11.3; however, this is at patient's baseline.  Electrolytes show elevated creatinine at 1.8 which is somewhat higher than patient's baseline of 1.6.  There is evidence of hyponatremia with sodium of 124 and hypochloremia with chloride of 91; these are both worse than patient's baseline.  I will initiate a slow infusion of IV normal saline.  BNP is within normal limits at 821 and troponin is undetectable.  EKG showed no acute ischemic changes.  Influenza testing is negative.  Urinalysis shows no evidence of infection.  I  reviewed patient's chest x-ray and I read the radiology report; no evidence of pneumonia, however, there is cardiomegaly.  I do believe that clinically patient may be somewhat fluid overloaded and she did receive a dose of IV Lasix.  I did review her past medical records and it appears that she was recently seen in her transplant nephrology clinic where they started her on oral hydrochlorothiazide; however, it appears that her symptoms are not improving.  Prior to that she was on a course of oral azithromycin and prednisone for possible pneumonia; however, her symptoms have not improved even after that.  At this time, I believe that she needs to be hospitalized for further correction of her hyponatremia, fluid overload and traction of her acute kidney injury.  She and her  agree with the plan.    Official [employer]  was used throughout patient's entire Emergency Department stay.    This part of the medical record was transcribed by Pedro Haile, Medical Scribe, from a dictation done by Mark Sparks MD.       I have reviewed the nursing notes.    I have reviewed the findings, diagnosis, plan and need for follow up with the patient.       Medication List      ASK your doctor about these medications    predniSONE 20 MG tablet  Commonly known as:  DELTASONE  40 mg, Oral, DAILY  Ask about: Should I take this medication?            Final diagnoses:   Hyponatremia   Acute kidney injury (H)   Hypervolemia, unspecified hypervolemia type   Fatigue, unspecified type     I, Pedro Haile, am serving as a trained medical scribe to document services personally performed by Mark Sparks MD, based on the provider's statements to me.   I, Mark Sparks MD, was physically present and have reviewed and verified the accuracy of this note documented by Pedro Haile.    4/2/2019   [facility], [place], EMERGENCY DEPARTMENT     China Sparks MD  04/03/19 0044

## 2019-04-03 NOTE — ED NOTES
[facility], [place]   ED Nurse to Floor Handoff     Joshua Lala is a 53 year old female who speaks Hmong and lives with family members,  in a home  They arrived in the ED by car from home    ED Chief Complaint: Cough and Shortness of Breath    ED Dx;   Final diagnoses:   Hyponatremia   Acute kidney injury (H)   Hypervolemia, unspecified hypervolemia type   Fatigue, unspecified type         Needed?: Yes    Allergies:   Allergies   Allergen Reactions     Contrast Dye Shortness Of Breath     No Clinical Screening - See Comments Shortness Of Breath   .  Past Medical Hx:   Past Medical History:   Diagnosis Date     Dehydration      Henoch-Schonlein purpura (H)      Hypercalcemia      Hypomagnesaemia      Renal disease      Tertiary hyperparathyroidism (H)       Baseline Mental status: WDL  Current Mental Status changes: at basesline    Infection present or suspected this encounter: no  Sepsis suspected: No  Isolation type: No active isolations     Activity level - Baseline/Home:  Independent  Activity Level - Current:   Stand with Assist    Bariatric equipment needed?: No    In the ED these meds were given:   Medications   sodium chloride 0.9% infusion ( Intravenous New Bag 4/2/19 2121)   furosemide (LASIX) injection 40 mg (40 mg Intravenous Given 4/2/19 2030)       Drips running?  Yes NS 75/hr    Home pump  No    Current LDAs  Peripheral IV 04/02/19 Right Upper forearm (Active)   Site Assessment WDL 4/2/2019  7:52 PM   Line Status Saline locked 4/2/2019  7:52 PM   Number of days: 0       Labs results:   Labs Ordered and Resulted from Time of ED Arrival Up to the Time of Departure from the ED   CBC WITH PLATELETS DIFFERENTIAL - Abnormal; Notable for the following components:       Result Value    Hemoglobin 11.3 (*)     All other components within normal limits   BASIC METABOLIC PANEL - Abnormal; Notable for the following components:    Sodium 124 (*)     Chloride 91 (*)     Glucose  "114 (*)     Creatinine 1.88 (*)     GFR Estimate 30 (*)     GFR Estimate If Black 35 (*)     All other components within normal limits   ROUTINE UA WITH MICROSCOPIC - Abnormal; Notable for the following components:    Protein Albumin Urine 30 (*)     Leukocyte Esterase Urine Moderate (*)     All other components within normal limits   NT PROBNP INPATIENT   TROPONIN I   PERIPHERAL IV CATHETER   CARDIAC CONTINUOUS MONITORING   INFLUENZA A AND B AND RSV PCR       Imaging Studies:   Recent Results (from the past 24 hour(s))   Chest XR,  PA & LAT    Narrative    XR CHEST 2 VW 4/2/2019 6:50 PM    Comparison: 3/12/2019    History: cough    Findings: PA and lateral views of the chest. Enlarged cardiac  silhouette. Pulmonary vasculature is distinct. Chronic blunting of the  right costophrenic angle, unchanged. Vascular calcifications of the  aortic knob. No pneumothorax. No convincing pleural effusion. No acute  airspace disease. Cholecystectomy clips in the right upper quadrant.      Impression    Impression: No acute airspace disease. Unchanged cardiomegaly.    I have personally reviewed the examination and initial interpretation  and I agree with the findings.    JESUSITA NAM MD       Recent vital signs:   /87   Pulse 77   Temp 99  F (37.2  C) (Oral)   Resp 22   Ht 1.499 m (4' 11\")   Wt 72.6 kg (160 lb)   SpO2 95%   BMI 32.32 kg/m      Cardiac Rhythm: Normal Sinus  Pt needs tele? Yes  Skin/wound Issues: L arm fistula    Code Status: Full Code    Pain control: pt had none    Nausea control: pt had none    Abnormal labs/tests/findings requiring intervention: see results     Family present during ED course? Yes   Family Comments/Social Situation comments:     Tasks needing completion: None    FESTUS MCCARTHY, RN  [phone] [facility]      "

## 2019-04-03 NOTE — PROGRESS NOTES
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      # Cough  # Suspected pulmonary HTN  # Hx grade III diastolic dysfunction   Onset of productive cough 3 weeks ago, now w/ continued cough and fatigue.  Possibly 2/2 resolving viral/bacterial illness vs worsening diastolic dysfunction.  S/p tx of URI w/ Azithromycin and Prednisone on 3/12 without significant improvement.  Recommended to have further workup with [facility] but lost to follow up with Cardiology after 2017. Low suspicion for infection given afebrile, normal HR, normal SpO2, and neg CXR.  Leukopenic to 3.0, possibly 2/2 supratherapeutic IS levels.   - TTE completed today 4/3, awaiting formal read   - Low threshold to repeat sputum cx, RVP, rapid flu, strep if febrile or new hypoxia, tachypnea  - Daily weights  - I/Os   - Trend CBC daily     # Acute on chronic hyponatremia - Felt to be hypovolemia hyponatremia 2/2 recent poor PO intake and initiation of hydrochlorothiazide. Received IV lasix in ED, likely also contributing to labile Na over last 24 hr, 124 -> 128 -> 124.  BL closer to 130s, > 48 hr since last Na elevation which is suggestive of chronic nature. Other lytes wnl.    - Appreciate Nephrology recs, will follow urine lytes though may be affected by recent doses of hydrochlorothiazide and lasix   - Na checks Q6H for now, should not correct > 6-8 mEq over next 24 hr   - Continue holding hydrochlorothiazide  - Trend BMP daily         # IgA nephropathy s/p DDKT x2 (1998, 2007) c/b chronic  rejection and EBV viremia  # Anemia   Anemia felt to be likely 2/2 chronic renal disease/insufficiency, though no recent iron studies available.  Hgb 11.3 this admission. No s/s acute bleed.     - Appreciate Transp Nephrology recs   - IS: Currently on Cyclosporine (goal ) and Mycophenolate (goal 1.0-3.5), check CSA level in AM  - Ppx: Continue Bactrim  - Trend renal function daily  - Repeat CBC in AM          # HTN - BPs stable. Continue Amlodipine. Hold hydrochlorothiazide.           Diet: Combination Diet Regular Diet Adult    DVT Prophylaxis: Mechanical   Staples Catheter: not present  Code Status: Full Code      Disposition Plan   Expected discharge: 1-2 days, recommended to prior living arrangement once Nephrology consult completed, TTE reviewed, and sodium levels improving.  Entered: MELANY Duque CNP 04/03/2019, 8:21 AM       The patient's care was discussed with the Attending Physician, Dr. Ovi Perez.    MELANY Duque CNP  Hospitalist Service, [address]  Pager: [phone]  Please see sticky note for cross cover information    ______________________________________________________________________    Interval History     Joshua is seen w/ her .  She reports cough, but denies dyspnea and chest pain. No fevers or chills.  Cough is intermittently productive.     Data reviewed today: I reviewed all medications, new labs and imaging results over the last 24 hours.   Physical Exam   Vital Signs: Temp: 97.5  F (36.4  C) Temp src: Oral BP: 179/83 Pulse: 80 Heart Rate: 86 Resp: 24 SpO2: 95 % O2 Device: Nasal cannula Oxygen Delivery: 1.5 LPM  Weight: 158 lbs 0 oz    GENERAL: Alert and oriented x 3. Well nourished, well developed.  No acute distress.    HEENT: Normocephalic, atraumatic. Anicteric sclera. Mucous membranes moist.   CV: RRR. S1, S2. No murmurs appreciated.   RESPIRATORY: Effort normal on room air. Lungs w/ bilateral rhonchi.   GI:  Abdomen soft and non distended, bowel sounds present x all 4 quadrants. No tenderness, rebound, or guarding.   NEUROLOGICAL: No focal deficits. Follows commands.  Strength equal in upper and lower extremities.   MUSCULOSKELETAL: No joint swelling or tenderness. Moves all extremities.   EXTREMITIES: No gross deformities. No peripheral edema.   SKIN: Grossly warm, dry, and intact. No jaundice. No rashes.       Data   Recent Labs   Lab 04/03/19  0747 04/02/19  1950   WBC 3.0* 4.3   HGB 11.3* 11.3*   MCV 86 86    212   NA  --  124*   POTASSIUM  --  4.2   CHLORIDE  --  91*   CO2  --  23   BUN  --  16   CR  --  1.88*   ANIONGAP  --  10   SHAVON  --  9.3   GLC  --  114*   TROPI  --  <0.015     Recent Results (from the past 24 hour(s))   Chest XR,  PA & LAT    Narrative    XR CHEST 2 VW 4/2/2019 6:50 PM    Comparison: 3/12/2019    History: cough    Findings: PA and lateral views of the chest. Enlarged cardiac  silhouette. Pulmonary vasculature is distinct. Chronic blunting of the  right costophrenic angle, unchanged. Vascular calcifications of the  aortic knob. No pneumothorax. No convincing pleural effusion. No acute  airspace disease. Cholecystectomy clips in the right upper quadrant.      Impression    Impression: No acute airspace disease. Unchanged cardiomegaly.    I have personally reviewed the examination and initial interpretation  and I agree with the findings.    JESUSITA NAM MD     Medications       amLODIPine  5 mg Oral Daily     cycloSPORINE modified  50 mg Oral BID IS     cycloSPORINE modified  25 mg Oral BID IS     [START ON 4/4/2019] cycloSPORINE modified  50 mg Oral At Bedtime     lisinopril  5 mg Oral Daily     mycophenolate  1,000 mg Oral BID     sulfamethoxazole-trimethoprim  1 tablet Oral Daily     umeclidinium-vilanterol  1 puff Inhalation Daily

## 2019-04-03 NOTE — PLAN OF CARE
"Hmong speaking.  was here this morning but went home and plans to return ~ 3pm. Pt denies pain. PIV SL'd in her R Lower arm. L arm limb alert band on due to a fistula. Regular diet: she only took sips and nibbles. Denies nausea states: \"no appetite\". Steady on her feet. She walks in the pang with SBA, walked the 7D loop and back. Triggered protocol this morning --Lactic acid: 1.3 at ~ 9 am. She is alert and appropriate.  at bedside so I could inform Ms Lala of the plan for an Echo this afternoon. Pt states she has no questions at this time, except: \"When will I  go home?\" She is aware that is a question for her MD.     "

## 2019-04-03 NOTE — PLAN OF CARE
Patient arrived from ED around 0100. VSS on 1.5L O2 NC. Hypertensive. On Continuous Pulse Ox. Hmong speaking.  who speaks English stayed overnight in room; helped to translate, in addition to iPad. Denies pain and nausea. Regular diet. Denies shortness of breath and chest pain. Infrequent, productive, frothy cough. Adequate UOP. Up SBA. +BM on shift per patient. Received 500mL LR Bolus overnight through PIV. Has fistula in left arm. Continue POC.

## 2019-04-03 NOTE — CONSULTS
[facility], [place]  Transplant Nephrology Consult  Date of Admission:  4/2/2019  Requesting physician: Trnih Perez,*    Assessment & Plan   # DDKT: baseline Cr ~ 1.4-1.6; Increased to 1.9. TOSIN may be in the setting of intravascular volume depletion vs high CNI level. She has non-pitting LE edema, and a clear chest/lung exam. Unlikely an immunologic etiology for his TOSIN (11 years out from transplant, taking CSA as prescribed). 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 Nephrotic range (3.92g/g) which may also be in the setting of recurrent IgA, FSGS. She was started on hydrochlorothiazide 1 week ago. Agree with holding diuretics right now. Would hold ACEi for now, we can restart an ARB once we can volume replete her and Cr returns to baseline.     - Proteinuria: Nephrotic range   - Latest DSA: Yes Latest DSA: Yes, DR51: 2318   - BK Viremia: No   - Kidney Tx Biopsy: No    # Immunosuppression: Cyclosporine (goal  ) and Mycophenolate mofetil (goal  1-3.5)   - Changes: No, last level 3/30 was 134. Repeat CSA tomorrow morning    # Prophylaxis:               - PJP: Bactrim    # Hypertension/Volume Status: Controlled; Goal BP: < 140/90, on amlodipine   - Changes: No. Consider changing to ARB due to cough    # Anemia in chronic renal disease: Hgb: Stable 11.3   - Iron studies: Not checked recently    # Mineral Bone Disorder:    - Secondary renal hyperparathyroidism; PTH level is: Minimally elevated 124  - Vitamin D; level is: Not checked recently  - Calcium; level is: Normal  - Phosphorus; level is: Not checked recently    # Electrolytes:   - Potassium; level: Normal 3.5  - Magnesium; level: Normal 1.9  - Bicarbonate; level: Normal 24              - Sodium level; Low 122, may be in the setting of hydrochlorothiazide, but now it is stopped and would expect a rise. Will watch for another day before further testing. Get urine sodium, urine  osmolality today. q6h Na check.     # Skin Cancer Risk:    - Discussed sun protection and recommend regular follow up with Dermatology.    # SOB  # Cough  # Diastolic cardiac dysfunction              - Respiratory symptoms possibly related to compromised cardiac diastolic function. Possible pHTN given elevated RA pressure on past echocardiograms. Get TTE. Pulmonology involvement would be warranted.     # Medical Compliance: Yes    # Transplant History:  Etiology of kidney failure: IgA nephropathy  Tx: DDKT  Transplant: 2007 (Kidney), 1998 (Kidney)  Donor Type:  - Brain Death Donor Class: Standard Criteria Donor  Significant changes in immunosuppression: None  Significant transplant-related complications: EBV viremia    Recommendations were communicated to the primary team via this note.    Seen and discussed with Dr. Olvin Leary MD  Pager: [phone]    Attestation:  This patient has been seen and evaluated by me, Akil Bah MD.  I have reviewed the note and agree with plan of care as documented by the fellow.       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     She has a history of DSA ( mfi), and no BK history. She has worsening nephrotic range proteinuria, which is felt to be from IgA vs FSGS vs TG. She is on ARB/ACEi low dose (started ). She is on CSA and MMF, which she reports taking regularly.     She presented to her outpatient transplant nephrologist 7 days ago with complaints of SOB and cough. She was started on hydrochlorothiazide due to an exam consistent with volume overload. She presented to the ED yesterday with worsening cough, malaise, and tingling left-arm pain. She had a creatinine 1.9. Over  the last year, she has 3 admissions to the ED for cough and SOB. She has been treated with azithromycin, prednisone, and albuterol nebulizers.     She reports a fall in urine output, and thick white sputum with coughing. She denies fevers, chills, or rigors. She has non-pitting LE edema. Her UA is bland, and her CXR today reveals normal lung aeration without infiltrate or effusions. A bedside echocardiogram revealed a collapsible IVC and a hyperdynamic LV    Review of Systems    CONSTITUTIONAL: NEGATIVE for fever, chills, change in weight  INTEGUMENTARY/SKIN: NEGATIVE for worrisome rashes, moles or lesions  EYES: NEGATIVE for vision changes or irritation  ENT/MOUTH: NEGATIVE for ear, mouth and throat problems  RESP: POSITIVE for cough and SOB  BREAST: NEGATIVE for masses, tenderness or discharge  CV: NEGATIVE for chest pain, palpitations or peripheral edema  GI: NEGATIVE for nausea, abdominal pain, heartburn, or change in bowel habits  : NEGATIVE for frequency, dysuria, or hematuria  MUSCULOSKELETAL: NEGATIVE for significant arthralgias or myalgia  NEURO: NEGATIVE for weakness, dizziness or paresthesias  ENDOCRINE: NEGATIVE for temperature intolerance, skin/hair changes  HEME: NEGATIVE for bleeding problems  PSYCHIATRIC: NEGATIVE for changes in mood or affect    Past Medical History    I have reviewed this patient's medical history and updated it with pertinent information if needed.   Past Medical History:   Diagnosis Date     Dehydration      Henoch-Schonlein purpura (H)      Hypercalcemia      Hypomagnesaemia      Renal disease      Tertiary hyperparathyroidism (H)        Past Surgical History   I have reviewed this patient's surgical history and updated it with pertinent information if needed.  Past Surgical History:   Procedure Laterality Date     CHOLECYSTECTOMY       renal transplant  1998, 2007     TRANSPLANT      Kidney 2008       Social History   I have reviewed this patient's social history and updated  it with pertinent information if needed. Joshua Lala  reports that  has never smoked. she has never used smokeless tobacco. She reports that she does not drink alcohol or use drugs.    Family History   I have reviewed this patient's family history and updated it with pertinent information if needed.   No renal family history    Prior to Admission Medications   Prior to Admission Medications   Prescriptions Last Dose Informant Patient Reported? Taking?   CELLCEPT (BRAND) 250 MG capsule 4/2/2019 at Unknown time  No Yes   Sig: Take 4 capsules (1,000 mg) by mouth 2 times daily LABS REQUIRED FOR REFILLS   CELLCEPT (BRAND) 250 MG capsule   No No   Sig: Take 4 capsules (1,000 mg) by mouth 2 times daily   Patient not taking: Reported on 3/28/2019   CELLCEPT 250 MG PO CAPSULE   No No   Sig: Take 4 capsules (1,000 mg) by mouth 2 times daily   Diphenhyd-HC-Nystatin-Tetracyc (FIRST-HARINDER MOUTHWASH) SUSP   No No   Sig: Swish and swallow 5-10 mLs in mouth every 6 hours as needed   Patient not taking: Reported on 2/19/2019   GENGRAF (BRAND) 25 MG CAPSULE 4/2/2019 at Unknown time  Yes Yes   Sig: Gengraf 25 mg capsule   Take 2 capsules twice a day by oral route.   Ipratropium-Albuterol (COMBIVENT RESPIMAT)  MCG/ACT inhaler 4/2/2019 at Unknown time  No Yes   Sig: Inhale 1 puff into the lungs 4 times daily   NEORAL (BRAND) 25 MG capsule 4/2/2019 at Unknown time  No Yes   Sig: Take 1 capsule (25 mg) by mouth every morning and 2 capsules (50 mg) by mouth every evening   albuterol (PROAIR HFA) 108 (90 Base) MCG/ACT inhaler 4/2/2019 at Unknown time  No Yes   Sig: Inhale 2 puffs into the lungs every 4 hours as needed for shortness of breath / dyspnea   amLODIPine (NORVASC) 5 MG tablet 4/2/2019 at Unknown time  No Yes   Sig: Take 1 tablet (5 mg) by mouth daily   amoxicillin (AMOXIL) 500 MG tablet Unknown at Unknown time  Yes No   Sig: Take 500 mg by mouth daily   azithromycin (ZITHROMAX Z-YAKELIN) 250 MG tablet Unknown at Unknown time  No No  "  Sig: As directed   cinacalcet (SENSIPAR) 30 MG tablet 2019 at Unknown time  Yes Yes   Sig: Sensipar 30 mg tablet   cinacalcet (SENSIPAR) 30 MG tablet   No No   Sig: TAKE ONE-HALF TABLET BY MOUTH EVERY OTHER DAY   Patient not taking: Reported on 3/28/2019   guaiFENesin (MUCINEX) 600 MG 12 hr tablet Unknown at Unknown time  Yes No   Sig: Take 600 mg by mouth as needed   hydrochlorothiazide (HYDRODIURIL) 25 MG tablet 2019 at Unknown time  No Yes   Sig: Take 1 tablet (25 mg) by mouth daily   lisinopril (PRINIVIL/ZESTRIL) 5 MG tablet 2019 at Unknown time  No Yes   Sig: Take 1 tablet (5 mg) by mouth daily   predniSONE (DELTASONE) 20 MG tablet   No No   Sig: Take 40 mg by mouth daily for 5 days.   sulfamethoxazole-trimethoprim (BACTRIM,SEPTRA) 400-80 MG per tablet 2019 at Unknown time  No Yes   Sig: TAKE ONE TABLET BY MOUTH EVERY DAY   sulfamethoxazole-trimethoprim (BACTRIM/SEPTRA) 400-80 MG tablet   No No   Sig: Take 1 tablet by mouth daily   Patient not taking: Reported on 3/28/2019      Facility-Administered Medications: None     Allergies   Allergies   Allergen Reactions     Contrast Dye Shortness Of Breath     No Clinical Screening - See Comments Shortness Of Breath       Physical Exam   Temp  Av.7  F (36.5  C)  Min: 96.5  F (35.8  C)  Max: 99  F (37.2  C)      Pulse  Av.5  Min: 74  Max: 92 Resp  Av  Min: 14  Max: 29  SpO2  Av.4 %  Min: 90 %  Max: 100 %     /66 (BP Location: Right arm)   Pulse 84   Temp 97.1  F (36.2  C) (Oral)   Resp 20   Ht 1.499 m (4' 11\")   Wt 69.8 kg (153 lb 12.8 oz)   SpO2 98%   BMI 31.06 kg/m     Date 19 07 - 19 0659   Shift 1450-3309 5501-7712 2097-0472 24 Hour Total   INTAKE   P.O. 300   300   Shift Total(mL/kg) 300(4.3)   300(4.3)   OUTPUT   Urine 600   600   Shift Total(mL/kg) 600(8.6)   600(8.6)   Weight (kg) 69.76 69.76 69.76 69.76      Admit Weight: 72.6 kg (160 lb)     GENERAL APPEARANCE: alert and no distress  HENT: mouth " without ulcers or lesions  LYMPHATICS: no cervical or supraclavicular nodes  RESP: lungs clear to auscultation - no rales, rhonchi or wheezes. On 1.5L nasal cannula  CV: regular rhythm, normal rate, no rub, no murmur  EDEMA: + non-pitting LE edema bilaterally  ABDOMEN: soft, nondistended, nontender, bowel sounds normal  MS: extremities normal - no gross deformities noted, no evidence of inflammation in joints, no muscle tenderness  SKIN: no rash    Data   CMP  Recent Labs   Lab 04/03/19  1258 04/03/19  0747 04/02/19  1950   * 128* 124*   POTASSIUM  --  3.5 4.2   CHLORIDE  --  94 91*   CO2  --  24 23   ANIONGAP  --  10 10   GLC  --  136* 114*   BUN  --  18 16   CR  --  1.94* 1.88*   GFRESTIMATED  --  29* 30*   GFRESTBLACK  --  33* 35*   SHAVON  --  9.7 9.3     CBC  Recent Labs   Lab 04/03/19  0747 04/02/19  1950   HGB 11.3* 11.3*   WBC 3.0* 4.3   RBC 4.07 4.11   HCT 35.0 35.4   MCV 86 86   MCH 27.8 27.5   MCHC 32.3 31.9   RDW 12.5 12.6    212     INRNo lab results found in last 7 days.  ABGNo lab results found in last 7 days.   Urine Studies  Recent Labs   Lab Test 04/02/19 2008 03/28/19  1150 11/07/18  1914 05/29/18 2052   COLOR Straw Straw Straw Straw   APPEARANCE Clear Clear Clear Clear   URINEGLC Negative Negative Negative Negative   URINEBILI Negative Negative Negative Negative   URINEKETONE Negative Negative Negative Negative   SG 1.003 1.004 1.003 1.003   UBLD Negative Negative Negative Negative   URINEPH 6.5 6.0 6.0 6.0   PROTEIN 30* 100* 30* 30*   NITRITE Negative Negative Negative Negative   LEUKEST Moderate* Trace* Moderate* Large*   RBCU 0 1 <1 1   WBCU 3 3 2 7*     Recent Labs   Lab Test 02/16/19  0737 02/02/18  0742 11/17/17  0739 04/28/17  0843 10/28/16  0913 04/29/16  1145 06/12/15  1011 01/23/15  0700 02/05/13  0715 10/18/12  1247 04/02/12  1510 10/10/11  1356 02/16/11  0715   UTPG 3.92* 2.31* 1.74* 1.86* 1.84* 0.49* 0.73* 0.77* 0.63* 0.43* 0.57* 0.46* 0.21*     PTH  Recent Labs   Lab  Test 04/29/16  1043 02/05/16  1039 10/27/15  0555 03/26/15  1734 12/16/13  0636 10/01/13  0718 04/02/13  0700 03/01/13  0918 07/19/12  0649 10/10/11  1341   PTHI 124* 140* 218* 251* 166* 223* 182* 217* 171* 350*     Iron Studies  No lab results found.    IMAGING:  All imaging studies reviewed by me.

## 2019-04-03 NOTE — H&P
INTERNAL MEDICINE HISTORY & PHYSICAL   Joshua Lala ([id number]) admitted on 4/2/2019  Primary care provider: Bandar Romero         Chief Complaint:   -Cough           Assessment and Plans   Ms. Lala is a 52yo female with PMH of HSP s/p kidney transplant (1998 with chronic rejection and retransplant 2007) complicated by proteinuria, HTN, anemia secondary to chronic renal disease, probable PH, grade III diastolic dysfunction who presents with chief complain of cough.      #Cough  Patient with increased cough over the past 3 weeks productive of clear sputum. Denies fever, chills, or sick contacts. She was prescribed Azithromycin and Prednisone on 3/12 that did not help to alleviate her cough. She was also prescribe hydrochlorothiazide on 3/28 by nephrologist which helped her cough some. Patient afebrile, with normal O2 saturations, WBC count WNL, , CXR with unchanged cardiomegaly and no acute airspace disease. Considered cough to be secondary to hypervolemia but with no O2 requirement, no evidence of peripheral edema and bedside ultrasound with collapsible IVC and hyperdynamic LV, it's unlikely to be secondary to hypervolemia. BNP was also 806 (has been 2639-2098 in the past) which also argues against hypervolemia. Could be secondary to lisinopril, although she has been on this for some time. Flu and RSV negative, however, I think that cough is most likely secondary to post-viral illness considering productive cough, increased fatigue and rhonchi on pulmonary exam.   - Continuous pulse oximetry  - O2 as needed   - Daily weights, I/Os    #Chronic hyponatremia  Patient's sodium 124 upon admission with >48 since last sodium evaluation, so will assume chronic. Likely secondary to hypovolemic hyponatremia in setting of poor po intake and hydrochlorothiazide. Patient has hypochloremia and slight increase in creatinine, which also suggests volume depletion.   - Received IV Lasix in ED   - Will give 500mL LR bolus over 2  "hours   - Repeat BMP in the AM  - q6h sodium checks   - Avoid overcorrection. Goal 6-8 over 24 hours.   - Hold hydrochlorothiazide in setting of hypovolemia     #Hx of probable PH and grade III diastolic dysfunction   Evaluated by cardiology in 12/2015 for DUEÑAS and CT chest (10/2015) showing enlarged pulmonary artery with concern for PH. Recommended RHC for definitive workup of PH as well as an LVEDP. Coronary angiogram was also recommended as patient was experiencing chest pain with exertion. It appears patient never had these tests performed. She did have an echo in 02/2017 that showed grade III diastolic dysfunction. Upon interviewing patient, she endorses \"throat tightness\" with exertion which resolves with rest. She also has murmur on physical exam.   - Consider TTE for further evaluation     #HTN  - Hold hydrochlorothiazide  - Continue Amlodipine     Chronic problems    #DDKT c/b proteinuria  - Continue immunosuppressive medications and Bactrim       FEN: Regular diet, 500mL LR bolus  DVT Prophylaxis: Mechanical   Disposition: Home once medically stable   Code Status: Full     Seen and discussed with Dr. Pedraza who agrees with above assessment and plan.    Mercedes López DO   PGY-1 Internal Medicine  Pager [phone]         History of Present Illness   Ms. Lala is a 54yo female with PMH of HSP s/p kidney transplant (1998 with chronic rejection and retransplant 2007) complicated by proteinuria, HTN, anemia secondary to chronic renal disease, probable PH, grade III diastolic dysfunction who presents with chief complain of cough.       The patient states that she developed a productive cough of clear sputum about 3 weeks ago. She presented to the ED on 3/12 with similar complaint and was discharged home on Azithromycin and Prednisone, which she states did not help her cough. Concerned her cough was due to hypervolemia, the patient's nephrologist prescribed her hydrochlorothiazide on 3/28, which she states only " "helped her cough slightly. She states that she coughs constantly, and it is not related to position or food. She denies sick contacts, fever, chills, SOB, rhinitis, or pharyngitis. The patient denies chest pain, but she admits to having her throat \"tighten up\" whenever she exerts herself. This feeling does not radiate, and it goes away once she has stopped exerting herself. She denies nausea, vomiting, sweating, lightheadedness with these episodes. The patient admits to sleeping on her belly at night because otherwise she gets a wheeze in her throat. The patient started having increased fatigue two days ago, which is why she presented to the ED today.     ED course : 40mg IV Lasix and mIVFs     ROS (+)cough  ROS (-) HA, fever, chills, night sweats, rashes   Sore throat, SOB, chest pain    Abdominal pain, Nausea, vomitting, diarrhea, constipation   Remainder of 12pt-ROS negative except mentioned above         Past Medical History     Past Medical History:   Diagnosis Date     Dehydration      Henoch-Schonlein purpura (H)      Hypercalcemia      Hypomagnesaemia      Renal disease      Tertiary hyperparathyroidism (H)            Past Surgical History     Past Surgical History:   Procedure Laterality Date     CHOLECYSTECTOMY       renal transplant  1998, 2007     TRANSPLANT      Kidney 2008            Medications       No current facility-administered medications on file prior to encounter.   Current Outpatient Medications on File Prior to Encounter:  albuterol (PROAIR HFA) 108 (90 Base) MCG/ACT inhaler Inhale 2 puffs into the lungs every 4 hours as needed for shortness of breath / dyspnea   amLODIPine (NORVASC) 5 MG tablet Take 1 tablet (5 mg) by mouth daily   CELLCEPT (BRAND) 250 MG capsule Take 4 capsules (1,000 mg) by mouth 2 times daily LABS REQUIRED FOR REFILLS   cinacalcet (SENSIPAR) 30 MG tablet Sensipar 30 mg tablet   GENGRAF (BRAND) 25 MG CAPSULE Gengraf 25 mg capsule Take 2 capsules twice a day by oral route. " "  hydrochlorothiazide (HYDRODIURIL) 25 MG tablet Take 1 tablet (25 mg) by mouth daily   Ipratropium-Albuterol (COMBIVENT RESPIMAT)  MCG/ACT inhaler Inhale 1 puff into the lungs 4 times daily   lisinopril (PRINIVIL/ZESTRIL) 5 MG tablet Take 1 tablet (5 mg) by mouth daily   NEORAL (BRAND) 25 MG capsule Take 1 capsule (25 mg) by mouth every morning and 2 capsules (50 mg) by mouth every evening   sulfamethoxazole-trimethoprim (BACTRIM,SEPTRA) 400-80 MG per tablet TAKE ONE TABLET BY MOUTH EVERY DAY   amoxicillin (AMOXIL) 500 MG tablet Take 500 mg by mouth daily   azithromycin (ZITHROMAX Z-YAKELIN) 250 MG tablet As directed   CELLCEPT (BRAND) 250 MG capsule Take 4 capsules (1,000 mg) by mouth 2 times daily (Patient not taking: Reported on 3/28/2019)   CELLCEPT 250 MG PO CAPSULE Take 4 capsules (1,000 mg) by mouth 2 times daily   cinacalcet (SENSIPAR) 30 MG tablet TAKE ONE-HALF TABLET BY MOUTH EVERY OTHER DAY (Patient not taking: Reported on 3/28/2019)   Diphenhyd-HC-Nystatin-Tetracyc (FIRST-HARINDER MOUTHWASH) SUSP Swish and swallow 5-10 mLs in mouth every 6 hours as needed (Patient not taking: Reported on 2019)   guaiFENesin (MUCINEX) 600 MG 12 hr tablet Take 600 mg by mouth as needed   [] predniSONE (DELTASONE) 20 MG tablet Take 40 mg by mouth daily for 5 days.   sulfamethoxazole-trimethoprim (BACTRIM/SEPTRA) 400-80 MG tablet Take 1 tablet by mouth daily (Patient not taking: Reported on 3/28/2019)            Allergies     Allergies   Allergen Reactions     Contrast Dye Shortness Of Breath     No Clinical Screening - See Comments Shortness Of Breath            Social History   Patient denies EtOH, tobacco or drug use.          Family History     No Family history of heart failure.         Vitals and Exam     /83   Pulse 80   Temp 97.5  F (36.4  C) (Oral)   Resp 24   Ht 1.499 m (4' 11\")   Wt 72.6 kg (160 lb)   SpO2 97%   BMI 32.32 kg/m    Wt Readings from Last 2 Encounters:   19 72.6 kg (160 " lb)   03/28/19 71.6 kg (157 lb 12.8 oz)     No intake or output data in the 24 hours ending 04/03/19 0122    General: AAOx3, not in acute distress  HEENT:  Normocephalic, PERRL, EOMI  Neck: No goiter, no JVD  Cardiac: RRR. Systolic murmur   Pulm: Rhonchorus breath sounds bilaterally, no crackles   Abd: +BS, soft, non distended, non tender.   Skin: No rash  MSK: No peripheral edema   Lymph: No adenopathy  Neuro: CN grossly intact, no focal deficits         Labs     CBC  Recent Labs   Lab 04/02/19 1950   WBC 4.3   RBC 4.11   HGB 11.3*   HCT 35.4   MCV 86   MCH 27.5   MCHC 31.9   RDW 12.6          BMP  Recent Labs   Lab 04/02/19 1950   *   POTASSIUM 4.2   CHLORIDE 91*   CO2 23   ANIONGAP 10   *   BUN 16   CR 1.88*   GFRESTIMATED 30*   GFRESTBLACK 35*   SHAVON 9.3        Urinalysis  Recent Labs   Lab Test 04/02/19 2008   COLOR Straw   APPEARANCE Clear   URINEGLC Negative   URINEBILI Negative   URINEKETONE Negative   SG 1.003   UBLD Negative   URINEPH 6.5   PROTEIN 30*   NITRITE Negative   LEUKEST Moderate*   RBCU 0   WBCU 3     Imaging/procedure results:  Recent Results (from the past 48 hour(s))   Chest XR,  PA & LAT    Narrative    XR CHEST 2 VW 4/2/2019 6:50 PM    Comparison: 3/12/2019    History: cough    Findings: PA and lateral views of the chest. Enlarged cardiac  silhouette. Pulmonary vasculature is distinct. Chronic blunting of the  right costophrenic angle, unchanged. Vascular calcifications of the  aortic knob. No pneumothorax. No convincing pleural effusion. No acute  airspace disease. Cholecystectomy clips in the right upper quadrant.      Impression    Impression: No acute airspace disease. Unchanged cardiomegaly.    I have personally reviewed the examination and initial interpretation  and I agree with the findings.    JESUSITA NAM MD     Physician Attestation   I, Fransico Pedraza, saw this patient with the resident and agree with the resident/fellow's findings and plan of care as  documented in the note.      I personally reviewed vital signs, medications, labs and imaging.    Fransico Pedraza MD  Date of Service (when I saw the patient): 04/03/19

## 2019-04-04 PROBLEM — E87.1 HYPONATREMIA: Status: ACTIVE | Noted: 2019-04-04

## 2019-04-04 LAB
ANION GAP SERPL CALCULATED.3IONS-SCNC: 11 MMOL/L (ref 3–14)
BASOPHILS # BLD AUTO: 0 10E9/L (ref 0–0.2)
BASOPHILS NFR BLD AUTO: 0.5 %
BUN SERPL-MCNC: 16 MG/DL (ref 7–30)
CALCIUM SERPL-MCNC: 9.3 MG/DL (ref 8.5–10.1)
CHLORIDE SERPL-SCNC: 89 MMOL/L (ref 94–109)
CO2 SERPL-SCNC: 22 MMOL/L (ref 20–32)
CORTIS SERPL-MCNC: 12.8 UG/DL (ref 4–22)
CREAT SERPL-MCNC: 1.88 MG/DL (ref 0.52–1.04)
CYCLOSPORINE BLD LC/MS/MS-MCNC: 116 UG/L (ref 50–400)
DEPRECATED CALCIDIOL+CALCIFEROL SERPL-MC: 18 UG/L (ref 20–75)
DIFFERENTIAL METHOD BLD: ABNORMAL
EOSINOPHIL # BLD AUTO: 0.2 10E9/L (ref 0–0.7)
EOSINOPHIL NFR BLD AUTO: 4.1 %
ERYTHROCYTE [DISTWIDTH] IN BLOOD BY AUTOMATED COUNT: 12.4 % (ref 10–15)
GFR SERPL CREATININE-BSD FRML MDRD: 30 ML/MIN/{1.73_M2}
GLUCOSE SERPL-MCNC: 94 MG/DL (ref 70–99)
HCT VFR BLD AUTO: 34.6 % (ref 35–47)
HGB BLD-MCNC: 11.4 G/DL (ref 11.7–15.7)
IMM GRANULOCYTES # BLD: 0 10E9/L (ref 0–0.4)
IMM GRANULOCYTES NFR BLD: 0.3 %
LYMPHOCYTES # BLD AUTO: 1 10E9/L (ref 0.8–5.3)
LYMPHOCYTES NFR BLD AUTO: 24.8 %
MCH RBC QN AUTO: 28 PG (ref 26.5–33)
MCHC RBC AUTO-ENTMCNC: 32.9 G/DL (ref 31.5–36.5)
MCV RBC AUTO: 85 FL (ref 78–100)
MONOCYTES # BLD AUTO: 0.6 10E9/L (ref 0–1.3)
MONOCYTES NFR BLD AUTO: 14.5 %
NEUTROPHILS # BLD AUTO: 2.2 10E9/L (ref 1.6–8.3)
NEUTROPHILS NFR BLD AUTO: 55.8 %
NRBC # BLD AUTO: 0 10*3/UL
NRBC BLD AUTO-RTO: 0 /100
OSMOLALITY SERPL: 241 MMOL/KG (ref 275–295)
OSMOLALITY SERPL: 251 MMOL/KG (ref 275–295)
OSMOLALITY SERPL: 253 MMOL/KG (ref 275–295)
OSMOLALITY UR: 107 MMOL/KG (ref 100–1200)
OSMOLALITY UR: 109 MMOL/KG (ref 100–1200)
OSMOLALITY UR: 187 MMOL/KG (ref 100–1200)
PLATELET # BLD AUTO: 194 10E9/L (ref 150–450)
POTASSIUM SERPL-SCNC: 3.8 MMOL/L (ref 3.4–5.3)
RBC # BLD AUTO: 4.07 10E12/L (ref 3.8–5.2)
SODIUM SERPL-SCNC: 116 MMOL/L (ref 133–144)
SODIUM SERPL-SCNC: 116 MMOL/L (ref 133–144)
SODIUM SERPL-SCNC: 117 MMOL/L (ref 133–144)
SODIUM SERPL-SCNC: 122 MMOL/L (ref 133–144)
SODIUM UR-SCNC: 30 MMOL/L
TME LAST DOSE: NORMAL H
TSH SERPL DL<=0.005 MIU/L-ACNC: 2.13 MU/L (ref 0.4–4)
URATE SERPL-MCNC: 5.3 MG/DL (ref 2.6–6)
WBC # BLD AUTO: 3.9 10E9/L (ref 4–11)

## 2019-04-04 PROCEDURE — 99233 SBSQ HOSP IP/OBS HIGH 50: CPT | Performed by: INTERNAL MEDICINE

## 2019-04-04 PROCEDURE — 27211414 ZZ H ADHESIVE SKIN CLOSURE, DERMABOND

## 2019-04-04 PROCEDURE — 25000128 H RX IP 250 OP 636: Performed by: NURSE PRACTITIONER

## 2019-04-04 PROCEDURE — 25000131 ZZH RX MED GY IP 250 OP 636 PS 637: Mod: GY | Performed by: STUDENT IN AN ORGANIZED HEALTH CARE EDUCATION/TRAINING PROGRAM

## 2019-04-04 PROCEDURE — 84550 ASSAY OF BLOOD/URIC ACID: CPT | Performed by: INTERNAL MEDICINE

## 2019-04-04 PROCEDURE — 83935 ASSAY OF URINE OSMOLALITY: CPT | Performed by: INTERNAL MEDICINE

## 2019-04-04 PROCEDURE — 36415 COLL VENOUS BLD VENIPUNCTURE: CPT | Performed by: INTERNAL MEDICINE

## 2019-04-04 PROCEDURE — 99207 ZZC APP CREDIT; MD BILLING SHARED VISIT: CPT | Performed by: NURSE PRACTITIONER

## 2019-04-04 PROCEDURE — A9270 NON-COVERED ITEM OR SERVICE: HCPCS | Mod: GY | Performed by: NURSE PRACTITIONER

## 2019-04-04 PROCEDURE — 84295 ASSAY OF SERUM SODIUM: CPT | Performed by: NURSE PRACTITIONER

## 2019-04-04 PROCEDURE — 80048 BASIC METABOLIC PNL TOTAL CA: CPT | Performed by: INTERNAL MEDICINE

## 2019-04-04 PROCEDURE — 83935 ASSAY OF URINE OSMOLALITY: CPT | Performed by: NURSE PRACTITIONER

## 2019-04-04 PROCEDURE — 83930 ASSAY OF BLOOD OSMOLALITY: CPT | Performed by: NURSE PRACTITIONER

## 2019-04-04 PROCEDURE — 12000004 ZZH R&B IMCU UMMC

## 2019-04-04 PROCEDURE — 36569 INSJ PICC 5 YR+ W/O IMAGING: CPT

## 2019-04-04 PROCEDURE — 82533 TOTAL CORTISOL: CPT | Performed by: STUDENT IN AN ORGANIZED HEALTH CARE EDUCATION/TRAINING PROGRAM

## 2019-04-04 PROCEDURE — 85025 COMPLETE CBC W/AUTO DIFF WBC: CPT | Performed by: NURSE PRACTITIONER

## 2019-04-04 PROCEDURE — 84300 ASSAY OF URINE SODIUM: CPT | Performed by: INTERNAL MEDICINE

## 2019-04-04 PROCEDURE — 25000132 ZZH RX MED GY IP 250 OP 250 PS 637: Mod: GY | Performed by: STUDENT IN AN ORGANIZED HEALTH CARE EDUCATION/TRAINING PROGRAM

## 2019-04-04 PROCEDURE — 25000132 ZZH RX MED GY IP 250 OP 250 PS 637: Mod: GY | Performed by: PHYSICIAN ASSISTANT

## 2019-04-04 PROCEDURE — 25000125 ZZHC RX 250: Performed by: NURSE PRACTITIONER

## 2019-04-04 PROCEDURE — A9270 NON-COVERED ITEM OR SERVICE: HCPCS | Mod: GY | Performed by: STUDENT IN AN ORGANIZED HEALTH CARE EDUCATION/TRAINING PROGRAM

## 2019-04-04 PROCEDURE — 25000132 ZZH RX MED GY IP 250 OP 250 PS 637: Mod: GY | Performed by: NURSE PRACTITIONER

## 2019-04-04 PROCEDURE — A9270 NON-COVERED ITEM OR SERVICE: HCPCS | Mod: GY | Performed by: PHYSICIAN ASSISTANT

## 2019-04-04 PROCEDURE — 25000131 ZZH RX MED GY IP 250 OP 636 PS 637: Mod: GY | Performed by: NURSE PRACTITIONER

## 2019-04-04 PROCEDURE — 83930 ASSAY OF BLOOD OSMOLALITY: CPT | Performed by: INTERNAL MEDICINE

## 2019-04-04 PROCEDURE — A9270 NON-COVERED ITEM OR SERVICE: HCPCS | Mod: GY | Performed by: INTERNAL MEDICINE

## 2019-04-04 PROCEDURE — 25000128 H RX IP 250 OP 636: Performed by: INTERNAL MEDICINE

## 2019-04-04 PROCEDURE — 25000132 ZZH RX MED GY IP 250 OP 250 PS 637: Mod: GY | Performed by: INTERNAL MEDICINE

## 2019-04-04 PROCEDURE — 36415 COLL VENOUS BLD VENIPUNCTURE: CPT | Performed by: NURSE PRACTITIONER

## 2019-04-04 PROCEDURE — 84295 ASSAY OF SERUM SODIUM: CPT | Performed by: INTERNAL MEDICINE

## 2019-04-04 PROCEDURE — 25000125 ZZHC RX 250: Performed by: INTERNAL MEDICINE

## 2019-04-04 PROCEDURE — 80158 DRUG ASSAY CYCLOSPORINE: CPT | Performed by: PHYSICIAN ASSISTANT

## 2019-04-04 PROCEDURE — 36415 COLL VENOUS BLD VENIPUNCTURE: CPT | Performed by: PHYSICIAN ASSISTANT

## 2019-04-04 PROCEDURE — 27210185 ZZH KIT OPEN ENDED DOUBLE LUMEN

## 2019-04-04 PROCEDURE — 84443 ASSAY THYROID STIM HORMONE: CPT | Performed by: INTERNAL MEDICINE

## 2019-04-04 RX ORDER — HEPARIN SODIUM,PORCINE 10 UNIT/ML
5-10 VIAL (ML) INTRAVENOUS
Status: DISCONTINUED | OUTPATIENT
Start: 2019-04-04 | End: 2019-04-08 | Stop reason: HOSPADM

## 2019-04-04 RX ORDER — GUAIFENESIN 600 MG/1
600 TABLET, EXTENDED RELEASE ORAL 2 TIMES DAILY
Status: DISCONTINUED | OUTPATIENT
Start: 2019-04-04 | End: 2019-04-08 | Stop reason: HOSPADM

## 2019-04-04 RX ORDER — HEPARIN SODIUM,PORCINE 10 UNIT/ML
5-10 VIAL (ML) INTRAVENOUS EVERY 24 HOURS
Status: DISCONTINUED | OUTPATIENT
Start: 2019-04-04 | End: 2019-04-08 | Stop reason: HOSPADM

## 2019-04-04 RX ORDER — ONDANSETRON 4 MG/1
4 TABLET, ORALLY DISINTEGRATING ORAL EVERY 6 HOURS PRN
Status: DISCONTINUED | OUTPATIENT
Start: 2019-04-04 | End: 2019-04-08 | Stop reason: HOSPADM

## 2019-04-04 RX ORDER — HEPARIN SODIUM,PORCINE 10 UNIT/ML
2-5 VIAL (ML) INTRAVENOUS
Status: COMPLETED | OUTPATIENT
Start: 2019-04-04 | End: 2019-04-04

## 2019-04-04 RX ORDER — LIDOCAINE 40 MG/G
CREAM TOPICAL
Status: DISCONTINUED | OUTPATIENT
Start: 2019-04-04 | End: 2019-04-08 | Stop reason: HOSPADM

## 2019-04-04 RX ORDER — SIMETHICONE 80 MG
80 TABLET,CHEWABLE ORAL
Status: COMPLETED | OUTPATIENT
Start: 2019-04-04 | End: 2019-04-04

## 2019-04-04 RX ORDER — CALCIUM CARBONATE 500 MG/1
500 TABLET, CHEWABLE ORAL DAILY PRN
Status: DISCONTINUED | OUTPATIENT
Start: 2019-04-04 | End: 2019-04-08 | Stop reason: HOSPADM

## 2019-04-04 RX ORDER — ONDANSETRON 2 MG/ML
4 INJECTION INTRAMUSCULAR; INTRAVENOUS EVERY 6 HOURS PRN
Status: DISCONTINUED | OUTPATIENT
Start: 2019-04-04 | End: 2019-04-08 | Stop reason: HOSPADM

## 2019-04-04 RX ORDER — 3% SODIUM CHLORIDE 3 G/100ML
INJECTION, SOLUTION INTRAVENOUS CONTINUOUS
Status: DISCONTINUED | OUTPATIENT
Start: 2019-04-04 | End: 2019-04-04

## 2019-04-04 RX ADMIN — GUAIFENESIN 600 MG: 600 TABLET, EXTENDED RELEASE ORAL at 12:14

## 2019-04-04 RX ADMIN — AMLODIPINE BESYLATE 5 MG: 5 TABLET ORAL at 08:20

## 2019-04-04 RX ADMIN — LIDOCAINE HYDROCHLORIDE 2 ML: 10 INJECTION, SOLUTION EPIDURAL; INFILTRATION; INTRACAUDAL; PERINEURAL at 17:41

## 2019-04-04 RX ADMIN — CYCLOSPORINE 25 MG: 25 CAPSULE, LIQUID FILLED ORAL at 09:00

## 2019-04-04 RX ADMIN — SODIUM CHLORIDE 250 ML: 9 INJECTION, SOLUTION INTRAVENOUS at 14:25

## 2019-04-04 RX ADMIN — GUAIFENESIN 600 MG: 600 TABLET, EXTENDED RELEASE ORAL at 20:05

## 2019-04-04 RX ADMIN — MYCOPHENOLATE MOFETIL 1000 MG: 250 CAPSULE ORAL at 08:58

## 2019-04-04 RX ADMIN — CYCLOSPORINE 50 MG: 25 CAPSULE, LIQUID FILLED ORAL at 21:17

## 2019-04-04 RX ADMIN — SODIUM CHLORIDE: 234 INJECTION INTRAMUSCULAR; INTRAVENOUS; SUBCUTANEOUS at 19:02

## 2019-04-04 RX ADMIN — MYCOPHENOLATE MOFETIL 1000 MG: 250 CAPSULE ORAL at 20:06

## 2019-04-04 RX ADMIN — CALCIUM CARBONATE (ANTACID) CHEW TAB 500 MG 500 MG: 500 CHEW TAB at 14:28

## 2019-04-04 RX ADMIN — SODIUM CHLORIDE 250 ML: 9 INJECTION, SOLUTION INTRAVENOUS at 18:07

## 2019-04-04 RX ADMIN — SIMETHICONE CHEW TAB 80 MG 80 MG: 80 TABLET ORAL at 22:28

## 2019-04-04 RX ADMIN — CALCIUM CARBONATE (ANTACID) CHEW TAB 500 MG 500 MG: 500 CHEW TAB at 20:06

## 2019-04-04 RX ADMIN — CALCIUM CARBONATE (ANTACID) CHEW TAB 500 MG 500 MG: 500 CHEW TAB at 00:28

## 2019-04-04 RX ADMIN — UMECLIDINIUM BROMIDE AND VILANTEROL TRIFENATATE 1 PUFF: 62.5; 25 POWDER RESPIRATORY (INHALATION) at 08:21

## 2019-04-04 RX ADMIN — SULFAMETHOXAZOLE AND TRIMETHOPRIM 1 TABLET: 400; 80 TABLET ORAL at 08:20

## 2019-04-04 RX ADMIN — ONDANSETRON 4 MG: 4 TABLET, ORALLY DISINTEGRATING ORAL at 15:00

## 2019-04-04 RX ADMIN — Medication 5 ML: at 21:18

## 2019-04-04 ASSESSMENT — ACTIVITIES OF DAILY LIVING (ADL)
ADLS_ACUITY_SCORE: 19

## 2019-04-04 ASSESSMENT — PAIN DESCRIPTION - DESCRIPTORS: DESCRIPTORS: BURNING

## 2019-04-04 NOTE — PLAN OF CARE
Pt is Hmong speaking and video Ipad interpretor was used to get consent for the PICC line. Pt had no IV access upon arrival. Pt was taken to get a PICC line and transferred off the unit to 6B. Report was given to Renu PABON on 6b. Belongings and medications were sent w/ daughter.

## 2019-04-04 NOTE — PROVIDER NOTIFICATION
Observation Goals:      -Diagnostic tests and consults completed and resulted: In progress   -Vital signs normal or at patient baseline: Goal met    -Tolerating oral intake to maintain hydration: Goal met   -Safe disposition plan has been identified: In progress

## 2019-04-04 NOTE — PROGRESS NOTES
Accessed right medial brachial vein used 5 fr. Catheter felt resistance and attempted 4 fr. catheter and unable to thread catheter past 20 cm. Midline placed 15 cm. Notified patient's nurse and Dr. Tsang unsuccessful PICC placement.

## 2019-04-04 NOTE — PLAN OF CARE
"Observation Goals      -Diagnostic tests and consults completed and resulted: In progress   -Vital signs normal or at patient baseline: Goal met    -Tolerating oral intake to maintain hydration:  good with po fluids c/o \"no appetite\" nibbles of solid foods.  C/o \"heartburn\".   -Safe disposition plan has been identified: In progress       "

## 2019-04-04 NOTE — PLAN OF CARE
PIV infiltrated. NS Bolus stopped. I have requested Vasc team stat for PIV while await PICC placement. 7C NST has called [place] [facility]  [place] bedside.  at bedside drawing from R hand.

## 2019-04-04 NOTE — PROGRESS NOTES
[facility], [place]  Transplant Nephrology Progress Note  Date of Admission:  4/2/2019    Recommendations Today:  - Send to 6B for hyponatremia management. 3% saline at 30cc/hr. Get serum Na q2h, get urine Na and urine osmolality q4h. If increase in Na >3mEq between two readings, or >122 before midnight tonight, call nephrology fellow on call.   - Give 500cc NS bolus now  - Fluid restriction to <1.5L daily  - Strict I/O.    Assessment & Plan   # DDKT: baseline Cr ~ 1.4-1.6; Stable to 1.88. TOSIN may be in the setting of intravascular volume depletion vs high CNI level. She has non-pitting LE edema, and a clear chest/lung exam. Unlikely an immunologic etiology for his TOSIN (11 years out from transplant, taking CSA as prescribed). 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 Nephrotic range (3.92g/g) which may also be in the setting of recurrent IgA, FSGS. She was started on hydrochlorothiazide 1 week ago. Agree with holding diuretics right now. Would hold ACEi for now, we can restart an ARB once we can volume replete her and Cr returns to baseline.      - Proteinuria: Nephrotic range   - Latest DSA: Yes Latest DSA: Yes, DR51: [id number]   - BK Viremia: No   - Kidney Tx Biopsy: No    # Immunosuppression: Cyclosporine (goal  ) and Mycophenolate mofetil (goal  1-3.5)   - Changes: No, last level 4/4 was 116. Maintain same CSA dose.    # Prophylaxis:               - PJP: Bactrim single strength    # Hypertension/Volume Status: Controlled; Goal BP: < 140/90, on amlodipine   - Changes: No. Consider changing to ARB due to cough    # Anemia in chronic renal disease: Hgb: Stable 11.4   - Iron studies: Not checked recently    # Mineral Bone Disorder:    - Secondary renal hyperparathyroidism; PTH level is: Minimally elevated 124  - Vitamin D; level is: Not checked recently  - Calcium; level is: Normal 9.3  - Phosphorus; level is: Not checked recently    # Electrolytes:   -  Potassium; level: Normal 3.8  - Magnesium; level: Normal 1.9  - Bicarbonate; level: Normal 22              - Sodium level; Low 116, symptomatic. May be in the setting of hydrochlorothiazide, but now it is stopped and would expect a rise. This may be a low ECV case that has progressed to ATN-physiology. Sonal higher than expected if this were purely a prerenal etiology. Urine osmolality ~156, which is not appropriate for her level of hyponatremia. I do not feel this is SIADH given her urine findings. We recommend 500cc bolus NS over 2 hours. She will need a transfer to a different nursing floor that are able to do frequent labs. Fluid restrict to <1.5L daily. Strict I/O.                - Should not exceed 124meQ by tomorrow morning.               - Check TSH, am cortisol, uric acid               - Replace potasium with 20-40mEq                - Start either 100cc bolus 3% saline followed by q2h serum sodium and q4h urine sodium and urine osmolality. (alternatively, we can do a 3% infusion at 20-30cc/hr but we need STRICT q2h Na labs otherwise we will be giving 3% gtt and lose track of the Na control, leading to dangerously rising Na levels).               - If >3mEq between two values, can lower 3%, give D5W back to the patient, or also could give ddAVP (would do the first two options, ddAVP is great for control of hyponatremia correction with 3% but is slower)    # Skin Cancer Risk:    - Discussed sun protection and recommend regular follow up with Dermatology.    # SOB  # Cough  # Diastolic cardiac dysfunction              - Respiratory symptoms possibly related to compromised cardiac diastolic function. Possible pHTN given elevated RA pressure on past echocardiograms. Get TTE. Pulmonology involvement would be warranted.     # Medical Compliance: Yes    # Transplant History:  Etiology of kidney failure: IgA nephropathy  Tx: DDKT  Transplant: 2007 (Kidney), 1998 (Kidney)  Donor Type:  - Brain  "Death Donor Class: Standard Criteria Donor  Significant changes in immunosuppression: None  Significant transplant-related complications: EBV viremia    Recommendations were communicated to the primary team via this note.    Seen and discussed with Dr. Olvin Leary MD  Pager: [phone]    Attestation:  This patient has been seen and evaluated by me, Akil Bah MD.  I have reviewed the note and agree with plan of care as documented by the fellow.       REASON FOR CONSULT   Cough, SOB, TOSIN    INTERVAL HISTORY  Joshua Lala was seen and examined this morning. She slept well, and denies any complaints. She reports nausea and vomiting, accompanied by dizzines and confusion. She has not been eating/drinking much while inpatient. She denies fevers, chills, or rigors. She has no shortness of breath. She has epigastric pain, similar to her GERD pain at home.    Review of Systems    4-point ROS is negative    Physical Exam      /71 (BP Location: Right arm)   Pulse 71   Temp 97.9  F (36.6  C)   Resp 20   Ht 1.499 m (4' 11\")   Wt 69.8 kg (153 lb 12.8 oz)   SpO2 96%   BMI 31.06 kg/m       GENERAL APPEARANCE: alert and no distress  HENT: mouth without ulcers or lesions  LYMPHATICS: no cervical or supraclavicular nodes  RESP: lungs clear to auscultation - no rales, rhonchi or wheezes. On 1.5L nasal cannula  CV: regular rhythm, normal rate, no rub, no murmur  EDEMA: + non-pitting LE edema bilaterally  ABDOMEN: soft, nondistended, nontender, bowel sounds normal  MS: extremities normal - no gross deformities noted, no evidence of inflammation in joints, no muscle tenderness  SKIN: no rash  Access: None  Tx allograft: Stable    Data   CMP  Recent Labs   Lab 04/04/19  1256 04/04/19  0700 04/03/19  1258 04/03/19  0747 04/02/19  1950   * 122* 124* 128* 124*   POTASSIUM  --  3.8  --  3.5 4.2   CHLORIDE  --  89*  --  94 91*   CO2  --  22  --  24 23   ANIONGAP  --  11  --  10 10   GLC  --  94  --  136* 114* "   BUN  --  16  --  18 16   CR  --  1.88*  --  1.94* 1.88*   GFRESTIMATED  --  30*  --  29* 30*   GFRESTBLACK  --  35*  --  33* 35*   SHAVON  --  9.3  --  9.7 9.3     CBC  Recent Labs   Lab 04/04/19  0918 04/03/19  0747 04/02/19  1950   HGB 11.4* 11.3* 11.3*   WBC 3.9* 3.0* 4.3   RBC 4.07 4.07 4.11   HCT 34.6* 35.0 35.4   MCV 85 86 86   MCH 28.0 27.8 27.5   MCHC 32.9 32.3 31.9   RDW 12.4 12.5 12.6    196 212     INRNo lab results found in last 7 days.  ABGNo lab results found in last 7 days.   Urine Studies  Recent Labs   Lab Test 04/02/19 2008 03/28/19  1150 11/07/18  1914 05/29/18 2052   COLOR Straw Straw Straw Straw   APPEARANCE Clear Clear Clear Clear   URINEGLC Negative Negative Negative Negative   URINEBILI Negative Negative Negative Negative   URINEKETONE Negative Negative Negative Negative   SG 1.003 1.004 1.003 1.003   UBLD Negative Negative Negative Negative   URINEPH 6.5 6.0 6.0 6.0   PROTEIN 30* 100* 30* 30*   NITRITE Negative Negative Negative Negative   LEUKEST Moderate* Trace* Moderate* Large*   RBCU 0 1 <1 1   WBCU 3 3 2 7*     Recent Labs   Lab Test 02/16/19  0737 02/02/18  0742 11/17/17  0739 04/28/17  0843 10/28/16  0913 04/29/16  1145 06/12/15  1011 01/23/15  0700 02/05/13  0715 10/18/12  1247 04/02/12  1510 10/10/11  1356 02/16/11  0715   UTPG 3.92* 2.31* 1.74* 1.86* 1.84* 0.49* 0.73* 0.77* 0.63* 0.43* 0.57* 0.46* 0.21*     PTH  Recent Labs   Lab Test 04/29/16  1043 02/05/16  1039 10/27/15  0555 03/26/15  1734 12/16/13  0636 10/01/13  0718 04/02/13  0700 03/01/13  0918 07/19/12  0649 10/10/11  1341   PTHI 124* 140* 218* 251* 166* 223* 182* 217* 171* 350*     Iron Studies  No lab results found.    IMAGING:  All imaging studies reviewed by me.

## 2019-04-04 NOTE — PLAN OF CARE
VSS on 1.5L NC O2 overnight for sleep; continuous pulse ox on. Hmong speaking;  phone utilized overnight. Patient complained of heartburn around 0000. PRN Tums administered with relief. PIV saline locked. Left limb alert due to fistula. Regular diet; denies nausea. Poor appetite and PO intake. Voiding, not saving. No BM on shift. Up SBA; fatigued when ambulating to bathroom. Denies shortness of breath. Has infrequent, productive cough. Need urine lab samples that were just ordered this AM. PLAN: Possible discharge today.

## 2019-04-04 NOTE — PROGRESS NOTES
[facility], [facility] Progress Note - Hospitalist Service, Gold 3       Date of Admission:  4/2/2019  Assessment & Plan      Joshua Lala is a 56 year old Hmong speaking female with past medical history significant for IgA nephropathy s/p DDKT x2 (1998, 2007) c/b chronic rejection and EBV viremia, HTN, suspected pulmonary HTN, grade III diastolic dysfunction, and anemia admitted for worsening cough concerning for volume overload and incidentally found to have hyponatremia to 124.      # Acute severe hyponatremia - Na decreased from 122 to 116 this afternoon.  Likely hypovolemic hyponatremia, urine lytes not c/w SIADH.  Serum osmo critically low at 241. TSH, cortisol, uric acid wnl.   Currently symptomatic with lethargy, dizziness, and fatigue.   - D/w Nephrology, will bolus w/ NS 250cc over 1 hour now  - Tele  - STAT orders placed for PICC line   - Free water restriction 1.5L daily   - Transfer to  for further monitoring  - Once on 6B, will start hypertonic saline infusion protocol per Nephrology recs (already ordered):    - Start hypertonic saline 3% at 30cc/hr on arrival to    - STRICT Q2H sodium checks    - STRICT Q4H urine osmolality checks    - If > 3mEq between two readings or > 122 before midnight, call on-call  Nephrology fellow     # Cough  # Suspected pulmonary HTN  # Hx grade III diastolic dysfunction   Onset of productive cough 3 weeks ago, now w/ continued cough and fatigue.  Possibly 2/2 resolving viral/bacterial illness vs worsening diastolic dysfunction.  S/p tx of URI w/ Azithromycin and Prednisone on 3/12 without significant improvement.  Recommended to have further workup with [facility] but lost to follow up with Cardiology after 2017. Low suspicion for infection given afebrile, normal HR, normal SpO2, and neg CXR.  Leukopenic to 3.0, possibly 2/2 supratherapeutic IS levels. TTE on 4/3 with EF 60-65%, no WMAs, normal wall thickness, and grade II moderate diastolic  dysfunction, with RVSP 28.9 plus RAP.  Appears improved from previous in 2/2017.   - Low threshold to repeat sputum cx, RVP, rapid flu, strep if febrile or new hypoxia, tachypnea  - Daily weights, I/Os   - Trend CBC daily     # IgA nephropathy s/p DDKT x2 (1998, 2007) c/b chronic rejection and EBV viremia  # Anemia   Anemia felt to be likely 2/2 chronic renal disease/insufficiency, though no recent iron studies available.  Hgb 11.3 this admission. No s/s acute bleed.     - Appreciate Transp Nephrology recs   - IS: Currently on Cyclosporine (goal ) and Mycophenolate (goal 1.0-3.5), check CSA level in AM  - Ppx: Continue Bactrim  - Trend renal function daily  - Repeat CBC in AM        # HTN - BPs stable. Continue Amlodipine. Hold hydrochlorothiazide.           Diet: Combination Diet Regular Diet Adult    DVT Prophylaxis: Mechanical   Staples Catheter: not present  Code Status: Full Code      Disposition Plan   Expected discharge: 2 - 3 days, recommended to prior living arrangement once sodium levels improving.  Entered: MELANY Duque CNP 04/04/2019, 8:25 AM       The patient's care was discussed with the Attending Physician, Dr. Trinh Perez.    MELANY Duque CNP  Hospitalist Service, [address]  Pager: [phone]  Please see sticky note for cross cover information    ______________________________________________________________________    Interval History     Joshua is seen w/ her .  Continues to report cough productive of sputum.  Denies chest pain or dyspnea, despite being on supplemental oxygen overnight.  Reported some dizziness and unsteadiness.  Denies nausea, vomiting, abdominal pain, and dysuria.      Data reviewed today: I reviewed all medications, new labs and imaging results over the last 24 hours.   Physical Exam   Vital Signs: Temp: 96.1  F (35.6  C) Temp src: Oral BP: 118/67 Pulse: 70 Heart Rate: 77 Resp: 20 SpO2: 96 % O2 Device:  None (Room air) Oxygen Delivery: 1.5 LPM  Weight: 153 lbs 12.8 oz    GENERAL: Alert and oriented x 3. Well nourished, well developed.  No acute distress.    HEENT: Normocephalic, atraumatic. Anicteric sclera. Mucous membranes moist.   CV: RRR. S1, S2. No murmurs appreciated.   RESPIRATORY: Effort normal on room air. Lungs w/ bilateral rhonchi, no wheezes.   GI: Abdomen soft and non distended, bowel sounds present x all 4 quadrants. No tenderness, rebound, or guarding.   NEUROLOGICAL: No focal deficits. Follows commands.  Strength equal in upper and lower extremities.   MUSCULOSKELETAL: No joint swelling or tenderness. Moves all extremities.   EXTREMITIES: No gross deformities. No peripheral edema.   SKIN: Grossly warm, dry, and intact. Slight tenting. No jaundice. No rashes.       Data   Recent Labs   Lab 04/04/19  0700 04/03/19  1258 04/03/19  0747 04/02/19  1950   WBC  --   --  3.0* 4.3   HGB  --   --  11.3* 11.3*   MCV  --   --  86 86   PLT  --   --  196 212   * 124* 128* 124*   POTASSIUM 3.8  --  3.5 4.2   CHLORIDE 89*  --  94 91*   CO2 22  --  24 23   BUN 16  --  18 16   CR 1.88*  --  1.94* 1.88*   ANIONGAP 11  --  10 10   SHAVON 9.3  --  9.7 9.3   GLC 94  --  136* 114*   TROPI  --   --   --  <0.015     No results found for this or any previous visit (from the past 24 hour(s)).  Medications       amLODIPine  5 mg Oral Daily     cycloSPORINE modified  25 mg Oral QAM     cycloSPORINE modified  50 mg Oral At Bedtime     mycophenolate  1,000 mg Oral BID     sulfamethoxazole-trimethoprim  1 tablet Oral Daily     umeclidinium-vilanterol  1 puff Inhalation Daily

## 2019-04-04 NOTE — PROGRESS NOTES
Observation Goals     -Diagnostic tests and consults completed and resulted: In progress   -Vital signs normal or at patient baseline: Goal met    -Tolerating oral intake to maintain hydration: Goal met   -Safe disposition plan has been identified: In progress

## 2019-04-04 NOTE — PLAN OF CARE
Lab called to report Critical values. I notified Ann-Marie TUBBS and she wrote orders-- NS Bolus 250 ml started. Pt  had a 300 ml emesis of undigested food. No antiemetics available therefore text sent a request an antiemetic. Presently, Dr Perez at bedside informing pt of need for a PICC, frequent lab draws and need to move her to a monitored bed.  Dr Perez states she will call her family (/daughter).

## 2019-04-04 NOTE — PLAN OF CARE
Pt is Hmong speaking.  at bedside. A x O. AVSS. Denies n/v. SBA. Fatigued when ambulating to the bathroom. PIV SL. Regular diet, tolerating well. Calls appropriately. Continue POC.

## 2019-04-04 NOTE — PROGRESS NOTES
"CLINICAL NUTRITION SERVICES - ASSESSMENT NOTE     Nutrition Prescription    Malnutrition Status:    Unable to determine due to pt busy with cares during attempts to visit    Recommendations already ordered by Registered Dietitian (RD):  PRN supplements    Future/Additional Recommendations:  1. Offer supplements and/or scheduled snacks if poor appetite continues. If eating <50% of meals TID, recommend starting calorie counts.  2. Recommend supplement Vitamin D given low level on 4/3     REASON FOR ASSESSMENT  Joshua Lala is a/an 53 year old female assessed by the dietitian for Admission Nutrition Risk Screen for reduced oral intake over the last month    NUTRITION HISTORY  PMH includes DDKT x2 (1998, 2007) c/b chronic rejection, HTN, and grade III diastolic dysfunction.  Admit with worsening cough and concern for volume overload.  Pt busy with cares during attempts to visit, obtained info from chart review.    CURRENT NUTRITION ORDERS  Diet: Regular  Intake/Tolerance: poor appetite since admission.  Emesis today.    LABS  Labs reviewed  - Na+ 116 (L), trending down --> pt has transfer orders to a higher level of care  - Vitamin D deficiency screen 18 (L)    MEDICATIONS  Medications reviewed    ANTHROPOMETRICS  Height: 149.9 cm (4' 11\")  Most Recent Weight: 69.8 kg (153 lb 12.8 oz)    IBW: 44.3 kg  BMI: Obesity Grade I BMI 30-34.9  Weight History: Wt overall stable the past months with some fluctuations  Wt Readings from Last 10 Encounters:   04/03/19 69.8 kg (153 lb 12.8 oz)   03/28/19 71.6 kg (157 lb 12.8 oz)   03/12/19 73 kg (160 lb 14.4 oz)   02/19/19 72 kg (158 lb 12.8 oz)   11/07/18 70.4 kg (155 lb 3.2 oz)   05/29/18 70.7 kg (155 lb 12.8 oz)   02/02/18 69.7 kg (153 lb 9.6 oz)   01/29/18 71 kg (156 lb 8 oz)   07/28/17 66.5 kg (146 lb 9.6 oz)   04/28/17 67.5 kg (148 lb 14.4 oz)      Dosing Weight: 51 kg (adjusted based on actual wt on 4/3 and IBW)    ASSESSED NUTRITION NEEDS  Estimated Energy Needs: 9207-3848 " kcals/day (25 - 30 kcals/kg)  Justification: Maintenance  Estimated Protein Needs: 51-61 grams protein/day (1 - 1.2 grams of pro/kg)  Justification: Hypercatabolism with acute illness  Estimated Fluid Needs: 3386-3599 mL/day (25 - 30 mL/kg)   Justification: Maintenance, or other per provider pending fluid status    PHYSICAL FINDINGS  See malnutrition section below.    MALNUTRITION  % Intake: Unable to assess  % Weight Loss: None noted  Subcutaneous Fat Loss: Unable to assess  Muscle Loss: Unable to assess  Fluid Accumulation/Edema: None noted per chart review today  Malnutrition Diagnosis: Unable to determine due to pt busy with cares during attempts to visit    NUTRITION DIAGNOSIS  Inadequate oral intake related to poor appetite as evidenced by poor PO intake noted since admission x 1 day     INTERVENTIONS  Implementation  Nutrition Education: Unable to complete due to pt busy during attempts to visit  Medical Food Supplement Therapy: PRN supplements     Goals  Patient to consume % of nutritionally adequate meal trays TID, or the equivalent with supplements/snacks.     Monitoring/Evaluation  Progress toward goals will be monitored and evaluated per protocol.     Koki Cordova RD, LD  [address]C RD pager: [phone]

## 2019-04-05 ENCOUNTER — OFFICE VISIT (OUTPATIENT)
Dept: INTERPRETER SERVICES | Facility: CLINIC | Age: 54
End: 2019-04-05
Payer: MEDICARE

## 2019-04-05 ENCOUNTER — APPOINTMENT (OUTPATIENT)
Dept: CT IMAGING | Facility: CLINIC | Age: 54
DRG: 641 | End: 2019-04-05
Attending: NURSE PRACTITIONER
Payer: MEDICARE

## 2019-04-05 ENCOUNTER — APPOINTMENT (OUTPATIENT)
Dept: PHYSICAL THERAPY | Facility: CLINIC | Age: 54
DRG: 641 | End: 2019-04-05
Attending: NURSE PRACTITIONER
Payer: MEDICARE

## 2019-04-05 LAB
ANION GAP SERPL CALCULATED.3IONS-SCNC: 10 MMOL/L (ref 3–14)
BUN SERPL-MCNC: 16 MG/DL (ref 7–30)
CALCIUM SERPL-MCNC: 8.8 MG/DL (ref 8.5–10.1)
CHLORIDE SERPL-SCNC: 87 MMOL/L (ref 94–109)
CO2 SERPL-SCNC: 21 MMOL/L (ref 20–32)
CREAT SERPL-MCNC: 1.84 MG/DL (ref 0.52–1.04)
GFR SERPL CREATININE-BSD FRML MDRD: 31 ML/MIN/{1.73_M2}
GLUCOSE SERPL-MCNC: 129 MG/DL (ref 70–99)
LACTATE BLD-SCNC: 1 MMOL/L (ref 0.7–2)
OSMOLALITY SERPL: 249 MMOL/KG (ref 275–295)
OSMOLALITY SERPL: 251 MMOL/KG (ref 275–295)
OSMOLALITY SERPL: 251 MMOL/KG (ref 275–295)
OSMOLALITY SERPL: 256 MMOL/KG (ref 275–295)
OSMOLALITY SERPL: 258 MMOL/KG (ref 275–295)
OSMOLALITY SERPL: 261 MMOL/KG (ref 275–295)
OSMOLALITY UR: 109 MMOL/KG (ref 100–1200)
OSMOLALITY UR: 177 MMOL/KG (ref 100–1200)
OSMOLALITY UR: 203 MMOL/KG (ref 100–1200)
OSMOLALITY UR: 210 MMOL/KG (ref 100–1200)
POTASSIUM SERPL-SCNC: 3.6 MMOL/L (ref 3.4–5.3)
SODIUM SERPL-SCNC: 118 MMOL/L (ref 133–144)
SODIUM SERPL-SCNC: 119 MMOL/L (ref 133–144)
SODIUM SERPL-SCNC: 120 MMOL/L (ref 133–144)
SODIUM SERPL-SCNC: 120 MMOL/L (ref 133–144)
SODIUM SERPL-SCNC: 122 MMOL/L (ref 133–144)
SODIUM SERPL-SCNC: 122 MMOL/L (ref 133–144)

## 2019-04-05 PROCEDURE — A9270 NON-COVERED ITEM OR SERVICE: HCPCS | Mod: GY | Performed by: STUDENT IN AN ORGANIZED HEALTH CARE EDUCATION/TRAINING PROGRAM

## 2019-04-05 PROCEDURE — 25000125 ZZHC RX 250: Performed by: NURSE PRACTITIONER

## 2019-04-05 PROCEDURE — T1013 SIGN LANG/ORAL INTERPRETER: HCPCS | Mod: U3

## 2019-04-05 PROCEDURE — 36415 COLL VENOUS BLD VENIPUNCTURE: CPT | Performed by: INTERNAL MEDICINE

## 2019-04-05 PROCEDURE — 94762 N-INVAS EAR/PLS OXIMTRY CONT: CPT

## 2019-04-05 PROCEDURE — 25000132 ZZH RX MED GY IP 250 OP 250 PS 637: Mod: GY | Performed by: INTERNAL MEDICINE

## 2019-04-05 PROCEDURE — 80048 BASIC METABOLIC PNL TOTAL CA: CPT | Performed by: NURSE PRACTITIONER

## 2019-04-05 PROCEDURE — 83935 ASSAY OF URINE OSMOLALITY: CPT | Performed by: NURSE PRACTITIONER

## 2019-04-05 PROCEDURE — 25000131 ZZH RX MED GY IP 250 OP 636 PS 637: Mod: GY | Performed by: STUDENT IN AN ORGANIZED HEALTH CARE EDUCATION/TRAINING PROGRAM

## 2019-04-05 PROCEDURE — 99233 SBSQ HOSP IP/OBS HIGH 50: CPT | Performed by: INTERNAL MEDICINE

## 2019-04-05 PROCEDURE — 40000275 ZZH STATISTIC RCP TIME EA 10 MIN

## 2019-04-05 PROCEDURE — 40000802 ZZH SITE CHECK

## 2019-04-05 PROCEDURE — 36415 COLL VENOUS BLD VENIPUNCTURE: CPT | Performed by: NURSE PRACTITIONER

## 2019-04-05 PROCEDURE — A9270 NON-COVERED ITEM OR SERVICE: HCPCS | Mod: GY | Performed by: INTERNAL MEDICINE

## 2019-04-05 PROCEDURE — 36592 COLLECT BLOOD FROM PICC: CPT | Performed by: INTERNAL MEDICINE

## 2019-04-05 PROCEDURE — 97530 THERAPEUTIC ACTIVITIES: CPT | Mod: GP

## 2019-04-05 PROCEDURE — 25000132 ZZH RX MED GY IP 250 OP 250 PS 637: Mod: GY | Performed by: NURSE PRACTITIONER

## 2019-04-05 PROCEDURE — A9270 NON-COVERED ITEM OR SERVICE: HCPCS | Mod: GY | Performed by: NURSE PRACTITIONER

## 2019-04-05 PROCEDURE — 25000128 H RX IP 250 OP 636: Performed by: INTERNAL MEDICINE

## 2019-04-05 PROCEDURE — 25000132 ZZH RX MED GY IP 250 OP 250 PS 637: Mod: GY | Performed by: STUDENT IN AN ORGANIZED HEALTH CARE EDUCATION/TRAINING PROGRAM

## 2019-04-05 PROCEDURE — 84295 ASSAY OF SERUM SODIUM: CPT | Performed by: INTERNAL MEDICINE

## 2019-04-05 PROCEDURE — 84295 ASSAY OF SERUM SODIUM: CPT | Performed by: NURSE PRACTITIONER

## 2019-04-05 PROCEDURE — 74176 CT ABD & PELVIS W/O CONTRAST: CPT

## 2019-04-05 PROCEDURE — 97110 THERAPEUTIC EXERCISES: CPT | Mod: GP

## 2019-04-05 PROCEDURE — 99207 ZZC APP CREDIT; MD BILLING SHARED VISIT: CPT | Performed by: NURSE PRACTITIONER

## 2019-04-05 PROCEDURE — 36592 COLLECT BLOOD FROM PICC: CPT | Performed by: NURSE PRACTITIONER

## 2019-04-05 PROCEDURE — 83930 ASSAY OF BLOOD OSMOLALITY: CPT | Performed by: INTERNAL MEDICINE

## 2019-04-05 PROCEDURE — 97161 PT EVAL LOW COMPLEX 20 MIN: CPT | Mod: GP

## 2019-04-05 PROCEDURE — 83605 ASSAY OF LACTIC ACID: CPT

## 2019-04-05 PROCEDURE — 12000004 ZZH R&B IMCU UMMC

## 2019-04-05 RX ORDER — SIMETHICONE 80 MG
80 TABLET,CHEWABLE ORAL EVERY 6 HOURS PRN
Status: DISCONTINUED | OUTPATIENT
Start: 2019-04-05 | End: 2019-04-08 | Stop reason: HOSPADM

## 2019-04-05 RX ADMIN — GUAIFENESIN 600 MG: 600 TABLET, EXTENDED RELEASE ORAL at 20:44

## 2019-04-05 RX ADMIN — SULFAMETHOXAZOLE AND TRIMETHOPRIM 1 TABLET: 400; 80 TABLET ORAL at 08:18

## 2019-04-05 RX ADMIN — AMLODIPINE BESYLATE 5 MG: 5 TABLET ORAL at 08:18

## 2019-04-05 RX ADMIN — MYCOPHENOLATE MOFETIL 1000 MG: 250 CAPSULE ORAL at 08:17

## 2019-04-05 RX ADMIN — MYCOPHENOLATE MOFETIL 1000 MG: 250 CAPSULE ORAL at 20:44

## 2019-04-05 RX ADMIN — Medication 5 ML: at 20:44

## 2019-04-05 RX ADMIN — OMEPRAZOLE 20 MG: 20 CAPSULE, DELAYED RELEASE ORAL at 10:58

## 2019-04-05 RX ADMIN — SODIUM CHLORIDE: 234 INJECTION INTRAMUSCULAR; INTRAVENOUS; SUBCUTANEOUS at 18:33

## 2019-04-05 RX ADMIN — CYCLOSPORINE 50 MG: 25 CAPSULE, LIQUID FILLED ORAL at 20:44

## 2019-04-05 RX ADMIN — UMECLIDINIUM BROMIDE AND VILANTEROL TRIFENATATE 1 PUFF: 62.5; 25 POWDER RESPIRATORY (INHALATION) at 08:17

## 2019-04-05 RX ADMIN — SIMETHICONE CHEW TAB 80 MG 80 MG: 80 TABLET ORAL at 09:07

## 2019-04-05 RX ADMIN — GUAIFENESIN 600 MG: 600 TABLET, EXTENDED RELEASE ORAL at 08:18

## 2019-04-05 RX ADMIN — CALCIUM CARBONATE (ANTACID) CHEW TAB 500 MG 500 MG: 500 CHEW TAB at 04:19

## 2019-04-05 RX ADMIN — CYCLOSPORINE 25 MG: 25 CAPSULE, LIQUID FILLED ORAL at 08:18

## 2019-04-05 ASSESSMENT — MIFFLIN-ST. JEOR: SCORE: 1229.13

## 2019-04-05 ASSESSMENT — ACTIVITIES OF DAILY LIVING (ADL)
ADLS_ACUITY_SCORE: 19
ADLS_ACUITY_SCORE: 15
ADLS_ACUITY_SCORE: 19

## 2019-04-05 NOTE — PLAN OF CARE
PT 6B / Discharge Planner PT   Patient plan for discharge: Home  Current status: PT evaluation completed. Patient completes bed mobility and transfers with mod IND, ambulates 2 laps around unit without AD and navigates steps with SBA. Mild DUEÑAS, maintains SpO2>90% on RA.   Barriers to return to prior living situation: acute medical needs deconditioning  Recommendations for discharge: Home with PRN assist from family  Rationale for recommendations: Patient is mobilizing well, limited by mild DUEÑAS, lives with family and will have assist as needed.       Entered by: Lex Sesay 04/05/2019 2:45 PM        Physical Therapy Discharge Summary    Reason for therapy discharge:    All goals and outcomes met, no further needs identified.  Safe discharge plan in place.    Progress towards therapy goal(s). See goals on Care Plan in [facility] electronic health record for goal details.  Goals met/adequate for discharge.    Therapy recommendation(s):    Recommend continued activity and ambulation 3x/day while in hospital.

## 2019-04-05 NOTE — PLAN OF CARE
Neuro: A&Ox4.   Cardiac: SR. VSS.   Respiratory: Sating 97% on RA.  GI/: Adequate urine output.  Diet/appetite: Tolerating diet. Eating well. 1.5L FR  Activity:  Assist of SB, up to chair and in halls.  Pain: At acceptable level on current regimen.   Skin: No new deficits noted.  LDA's: 2% at 50cc/hr    Plan: Continue with POC. Notify primary team with changes.

## 2019-04-05 NOTE — PROVIDER NOTIFICATION
Time of notification: 7:40 PM  Provider notified: night cross cover  Patient status: pt complaining of chest/epigastric tightness when lying down  Temp:  [96.1  F (35.6  C)-98  F (36.7  C)] 98  F (36.7  C)  Pulse:  [70-71] 71  Heart Rate:  [77] 77  Resp:  [16-20] 20  BP: (118-162)/(67-75) 148/73  SpO2:  [90 %-99 %] 97 %  Orders received: continue to monitor

## 2019-04-05 NOTE — PLAN OF CARE
Neuro: A&Ox4. Hmong speaking. Family at bedside.  Cardiac: Afebrile. SR with HR's in the 80's. BP goal < 140/90.  Respiratory: Sating >92% on 1L NC for comfort. LS clear with expiratory wheezes. Good, productive cough.  GI/: Adequate urine output. +BS, +flatus, -BM. C/o abdominal pain/discomfort. PRN Tums given x1 w/o relief.  Diet/appetite: Tolerating regular diet. Eating well. Daily FR <1.5L. Strict I/O. Denies N/V.  Activity: SBA, up to bathroom.  Pain: At acceptable level on current regimen.   Skin: No new deficits noted.  LDA's: R DL midline infusing 2% saline at 30mL/hr, L HD AVF    Plan: Trending Na q2hrs, Notify Nephrology if Na >3mEq between 2 readings. Trending urine Na and urine osmolality q4hrs. Continue with POC. Notify primary team with changes.

## 2019-04-05 NOTE — PROGRESS NOTES
04/05/19 1300   Quick Adds   Type of Visit Initial PT Evaluation       Present yes   Language Hmong   Living Environment   Lives With child(dorinda), adult  (daughter and son-in-law)   Living Arrangements house   Home Accessibility stairs to enter home;stairs within home   Number of Stairs, Main Entrance 6   Number of Stairs, Within Home, Primary 10   Transportation Anticipated family or friend will provide   Living Environment Comment [place] lives with daughter and son-in-law, 6 [address] and bed/bath on 2nd level. Daughter and son-in-law work full-time, patient is home by herself during the day. Patient says family provides transportation.   Self-Care   Usual Activity Tolerance good   Current Activity Tolerance fair   Regular Exercise Yes   Activity/Exercise Type walking   Exercise Amount/Frequency 2 times/wk  (walks at shopping mall on Sat & Sun)   Equipment Currently Used at Home none   Activity/Exercise/Self-Care Comment Patient is independent with ADLs/IADLs, says she can be forgetful, uses phone alarm for reminders for med management.   Functional Level Prior   Ambulation 0-->independent   Transferring 0-->independent   Toileting 0-->independent   Bathing 0-->independent   Communication 0-->understands/communicates without difficulty   Swallowing 0-->swallows foods/liquids without difficulty   Cognition 0 - no cognition issues reported   Fall history within last six months no   Which of the above functional risks had a recent onset or change? none   Prior Functional Level Comment Pt was IND in all ADLs and functional mobility   General Information   Onset of Illness/Injury or Date of Surgery - Date 04/02/19   Referring Physician Ann-Marie Joshua APRN CNP   Patient/Family Goals Statement to return home   Pertinent History of Current Problem (include personal factors and/or comorbidities that impact the POC) Joshua Lala is a 56 year old Hmong speaking female with past medical history  significant for IgA nephropathy s/p DDKT x2 (1998, 2007) c/b chronic rejection and EBV viremia, HTN, suspected pulmonary HTN, grade III diastolic dysfunction, and anemia admitted for worsening cough concerning for volume overload and incidentally found to have hyponatremia to 124.    Precautions/Limitations no known precautions/limitations   General Observations Patient greeted sitting up in bed, agreeable to PT, RN ok'd session.   General Info Comments Activity: ambulate every shift   Cognitive Status Examination   Orientation orientation to person, place and time   Level of Consciousness alert   Follows Commands and Answers Questions 100% of the time   Personal Safety and Judgment intact   Cognitive Comment Patient states she can be forgetful but no significant concerns, uses alarm for medication.   Pain Assessment   Patient Currently in Pain No  (pt has some ulcer pain unrelated to her cough)   Integumentary/Edema   Integumentary/Edema no deficits were identifed   Posture    Posture Not impaired   Range of Motion (ROM)   ROM Quick Adds No deficits were identified   Strength   Manual Muscle Testing Quick Adds No deficits were identified   Strength Comments pt demonstrated functional mobility indicating 3+/5 strength throughout BLEs   Bed Mobility   Bed Mobility Comments modified independence w/ all bed mobility   Transfer Skills   Transfer Comments modified independence w/ all transfers   Gait   Gait Comments Pt ambulates without AD and SBA, decreased nilton but no overt LOB    Balance   Balance no deficits were identified   General Therapy Interventions   Planned Therapy Interventions gait training;transfer training;home program guidelines;progressive activity/exercise   Clinical Impression   Criteria for Skilled Therapeutic Intervention yes, treatment indicated   PT Diagnosis deconditioning   Influenced by the following impairments medical status, SOB, decreased activity tolerance   Functional limitations due  "to impairments decreased functional endurance for daily mobility and ADLs   Clinical Presentation Stable/Uncomplicated   Clinical Presentation Rationale clincial judgement   Clinical Decision Making (Complexity) Low complexity   Therapy Frequency` other (see comments)  (One time eval and treat)   Predicted Duration of Therapy Intervention (days/wks) (one time eval and treat)   Anticipated Discharge Disposition Home with Assist   Risk & Benefits of therapy have been explained Yes   Patient, Family & other staff in agreement with plan of care Yes   [facility] TM \"6 Clicks\"   2016, Trustees of [facility], under license to [employer].  All rights reserved.   6 Clicks Short Forms Basic Mobility Inpatient Short Form   [facility] AM-PAC  \"6 Clicks\" V.2 Basic Mobility Inpatient Short Form   1. Turning from your back to your side while in a flat bed without using bedrails? 4 - None   2. Moving from lying on your back to sitting on the side of a flat bed without using bedrails? 4 - None   3. Moving to and from a bed to a chair (including a wheelchair)? 4 - None   4. Standing up from a chair using your arms (e.g., wheelchair, or bedside chair)? 4 - None   5. To walk in hospital room? 4 - None   6. Climbing 3-5 steps with a railing? 4 - None   Basic Mobility Raw Score (Score out of 24.Lower scores equate to lower levels of function) 24   Total Evaluation Time   Total Evaluation Time (Minutes) 7     "

## 2019-04-05 NOTE — PROVIDER NOTIFICATION
Time of notification: 5:30 AM  Provider notified: [employer] Cover  Patient status: Pt c/o heartburn and abdominal pain. PRN Tums given w/o relief.   Temp:  [96.1  F (35.6  C)-98  F (36.7  C)] 98  F (36.7  C)  Pulse:  [70-71] 70  Heart Rate:  [71-81] 71  Resp:  [18-20] 20  BP: (118-155)/(67-77) 144/77  SpO2:  [96 %-98 %] 97 %  Orders received: PRN PO Simethicone ordered q6hrs. Continue with POC.

## 2019-04-05 NOTE — PROVIDER NOTIFICATION
Time of notification: 9:52 PM  Provider notified: night cross cover  Patient status: vss, pt complaining of increased tingling in chest, feeling  uncomfortable  Temp:  [96.1  F (35.6  C)-98  F (36.7  C)] 97.8  F (36.6  C)  Pulse:  [70-71] 71  Heart Rate:  [75-77] 75  Resp:  [16-20] 18  BP: (118-162)/(67-75) 144/73  SpO2:  [90 %-99 %] 97 %  Orders received: no interventions at this time

## 2019-04-05 NOTE — PLAN OF CARE
Neuro: A&Ox4. Hmong speaking. Family assisted with communication. Neuro intact  Cardiac: SR. VSS.   Respiratory: Sating >94% on 1L NC for pt comfort. Overnight oximetry study  GI/: Adequate urine output. No bm this shift  Diet/appetite: Tolerating regular diet. Poor appetite  Activity:  standby assist to bathroom  Pain: pt complained of chest tightness, radiating from epigastric region to chest. Complained of gas pain/abdominal fullness. PRN simethicone and Tums given with minimal relief  Skin: No new deficits noted.  LDA's: R Midline DL infusing 2% sodium infusion @ 30/hr    Plan: Na check q2hr, urine osmolality and blood osmolality q4hr check, Per hospitalist note, notify on call nephrology fellow if >3 change between 2 readings  or >122 before 0000

## 2019-04-05 NOTE — PROVIDER NOTIFICATION
Time of notification: 7:00 PM  Provider notified: night cross cover  Patient status: vss, 0.9 na bolus complete, 2%na maintenance started.  Temp:  [96.1  F (35.6  C)-98  F (36.7  C)] 98  F (36.7  C)  Pulse:  [70-71] 71  Heart Rate:  [77] 77  Resp:  [16-20] 20  BP: (118-162)/(67-75) 148/73  SpO2:  [90 %-99 %] 97 %  Orders received:

## 2019-04-05 NOTE — PROGRESS NOTES
[facility], [facility] Progress Note - Hospitalist Service, Gold 3       Date of Admission:  4/2/2019  Assessment & Plan      Joshua Lala is a 56 year old Hmong speaking female with past medical history significant for IgA nephropathy s/p DDKT x2 (1998, 2007) c/b chronic rejection and EBV viremia, HTN, suspected pulmonary HTN, grade III diastolic dysfunction, and anemia admitted for worsening cough concerning for volume overload and incidentally found to have hyponatremia to 124.      # Acute severe hyponatremia - Improving. Na with significant decrease on 4/4, 122 -> 116.  Likely hypovolemic hyponatremia, urine lytes not c/w SIADH.  Serum osmo critically low at 241. TSH, cortisol, uric acid wnl.  Started on hypertonic saline 2% at 30cc/hr yesterday 4/4 at 1900 via midline catheter. PICC unable to be placed.  Has been stable overnight, correcting slowly.  Last check at 1300 w/ Na 118.    - Increase hypertonic saline to 50cc/hr   - Continue STRICT Q2H sodium checks  - STRICT Q4H urine osmolality checks   - If > 3mEq between two readings or > 122 before midnight, call on-call Nephrology fellow   - Free water restriction 1.5L daily      # Cough  # Chest tightness   # Suspected pulmonary HTN  # Grade II diastolic dysfunction   Onset of productive cough 3 weeks ago, now w/ continued cough and fatigue.  Seems to be improving today.  Possibly 2/2 resolving viral/bacterial illness vs worsening diastolic dysfunction.  S/p tx of URI w/ Azithromycin and Prednisone on 3/12 without significant improvement.  Recommended to have further workup with [facility] but lost to follow up with Cardiology after 2017. Low suspicion for infection given afebrile, normal HR, normal SpO2, and neg CXR.  Leukopenic to 3.0, possibly 2/2 supratherapeutic IS levels. TTE on 4/3 with EF 60-65%, no WMAs, normal wall thickness, and grade II moderate diastolic dysfunction, with RVSP 28.9 plus RAP.  Appears improved from  "previous in 2/2017.   - CT C/A/P without contrast   - Low threshold to repeat sputum cx, RVP, rapid flu, strep if febrile or new hypoxia, tachypnea  - Daily weights, I/Os   - Trend CBC daily     # Abdominal discomfort/dyspepsia  # Nausea, vomiting   # GERD   Reports issues w/ heartburn today.  No abdominal pain on exam.  Had episode of emesis last night but none today.  Having BMs.   - Start Omeprazole 20mg daily  - PRN Maalox     # IgA nephropathy s/p DDKT x2 (1998, 2007) c/b chronic rejection and EBV viremia  # Anemia   Anemia felt to be likely 2/2 chronic renal disease/insufficiency, though no recent iron studies available.  Hgb 11.3 this admission. No s/s acute bleed.     - Appreciate Transp Nephrology following   - IS: Currently on Cyclosporine (goal ) and Mycophenolate (goal 1.0-3.5)  - Ppx: Continue Bactrim  - Trend renal function daily  - Repeat CBC in AM        # HTN - BPs stable. Continue Amlodipine. Hold hydrochlorothiazide.   - Start Losartan 25mg         Diet: Combination Diet Regular Diet Adult  Snacks/Supplements Adult: Other; Pt may order supplements PRN; Between Meals  Fluid restriction OTHER (SEE COMMENTS) (Free water limit 1.5 L)    DVT Prophylaxis: Mechanical   Staples Catheter: not present  Code Status: Full Code      Disposition Plan   Expected discharge: 2 - 3 days, recommended to prior living arrangement once sodium levels improving.  Entered: MELANY Duque CNP 04/05/2019, 7:38 AM       The patient's care was discussed with the Attending Physician, Dr. Trinh Perez.    MELANY Duque CNP  Hospitalist Service, [address]  Pager: [phone]  Please see sticky note for cross cover information    ______________________________________________________________________    Interval History     Joshua is seen w/ .  She feels a little bit better today.  Less fatigued.  Reports heartburn and reflux symptoms, having \"chest " "tightness\" with laying down.  Denies chest pain, abdominal pain, constipation, and dysuria.     Data reviewed today: I reviewed all medications, new labs and imaging results over the last 24 hours.   Physical Exam   Vital Signs: Temp: 98  F (36.7  C) Temp src: Oral BP: 164/73 Pulse: 70 Heart Rate: 74 Resp: 22 SpO2: 98 % O2 Device: Nasal cannula Oxygen Delivery: 1 LPM  Weight: 158 lbs 6.4 oz    GENERAL: Alert and oriented x 3. Well nourished, well developed.  No acute distress.    HEENT: Normocephalic, atraumatic. Anicteric sclera. Mucous membranes moist.   CV: RRR. S1, S2. No murmurs appreciated.   RESPIRATORY: Effort normal on 1L NC. Lungs w/ bilateral rhonchi, no wheezes.   GI: Abdomen soft and non distended, bowel sounds present x all 4 quadrants. No tenderness, rebound, or guarding.   NEUROLOGICAL: No focal deficits. Follows commands.  Strength equal in upper and lower extremities.   MUSCULOSKELETAL: No joint swelling or tenderness. Moves all extremities.   EXTREMITIES: No gross deformities. No peripheral edema.   SKIN: Grossly warm, dry, and intact. Slight tenting. No jaundice. No rashes.       Data   Recent Labs   Lab 04/05/19  0549 04/05/19  0201 04/04/19  2332  04/04/19  0918 04/04/19  0700  04/03/19  0747 04/02/19  1950   WBC  --   --   --   --  3.9*  --   --  3.0* 4.3   HGB  --   --   --   --  11.4*  --   --  11.3* 11.3*   MCV  --   --   --   --  85  --   --  86 86   PLT  --   --   --   --  194  --   --  196 212   * 120* 118*   < >  --  122*   < > 128* 124*   POTASSIUM 3.6  --   --   --   --  3.8  --  3.5 4.2   CHLORIDE 87*  --   --   --   --  89*  --  94 91*   CO2 21  --   --   --   --  22  --  24 23   BUN 16  --   --   --   --  16  --  18 16   CR 1.84*  --   --   --   --  1.88*  --  1.94* 1.88*   ANIONGAP 10  --   --   --   --  11  --  10 10   SHAVON 8.8  --   --   --   --  9.3  --  9.7 9.3   *  --   --   --   --  94  --  136* 114*   TROPI  --   --   --   --   --   --   --   --  <0.015    < > = " values in this interval not displayed.     No results found for this or any previous visit (from the past 24 hour(s)).  Medications     2% sodium chloride infusion 30 mL/hr at 04/05/19 0400       amLODIPine  5 mg Oral Daily     cycloSPORINE modified  25 mg Oral QAM     cycloSPORINE modified  50 mg Oral At Bedtime     guaiFENesin  600 mg Oral BID     heparin lock flush  5-10 mL Intracatheter Q24H     mycophenolate  1,000 mg Oral BID     sulfamethoxazole-trimethoprim  1 tablet Oral Daily     umeclidinium-vilanterol  1 puff Inhalation Daily

## 2019-04-05 NOTE — PROGRESS NOTES
[facility], [place]  Transplant Nephrology Progress Note  Date of Admission:  4/2/2019    Assessment & Plan   # DDKT: Stable, mild TOSIN with creatinine ~ 1.8-1.9 for the last few days.  Okay urine output.  Patient was likely a bit prerenal on admission with diuretic and CNI, as well as ACEI.  Doubt rejection at this time post transplant and okay drug levels, although patient likely has some chronic changes.  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 Nephrotic range (~ 4 grams), which has trended up over the last couple of years.  Differential dx for proteinuria would be recurrent IgA versus denovo GN (membranous nephropathy or FSGS).  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   - Baseline Cr ~ 1.4-1.6;      - Proteinuria: Nephrotic range at ~ 4 grams   - Date DSA last checked: 4/2016  Latest DSA: Yes, DR51: 2318   - BK Viremia: No   - Kidney Tx Biopsy: No    # Immunosuppression: Cyclosporine (goal  ) and Mycophenolate mofetil (goal  1-3.5)   - Changes: No    # Prophylaxis:               - PJP: Sulfa/TMP (Bactrim)    # Hypertension/Volume Status: Borderline control; Goal BP: < 130/80,   - Changes: Yes - would recommend increasing amlodipine to 10 mg nightly. Consider changing to ARB due to cough once kidney function improves.    # Anemia in Chronic Renal Disease: Hgb: Stable   - Iron studies: Not checked recently    # Mineral Bone Disorder:    - Secondary renal hyperparathyroidism; PTH level is: Minimally elevated   - Vitamin D; level is: Low and recommend starting cholecalciferol 1000 units daily.   - Calcium; level is: Normal    # Electrolytes:   - Potassium; level: Normal  - Magnesium; level: Normal  - Bicarbonate; level: Normal    # Hyponatremia: Sodium level; Low, stable to slightly improved at 119 up from a low of ~ 116 yesterday.  It was felt patient was symptomatic.  Patient was started on 2% NS.  Patient appears euvolemic now.  Urine sodium has been in the 30s, which is neither low or high, but with trend down being at 30 mmol/L last check.  Urine osmolality has been low in the 100s until slightly above 200 this morning.  This might be most likely due to thiazide use with slow resolution of thiazide effect, in the setting of decreased kidney function.  TSH and am cortisol levels are normal.   - Fluid restrict to < 1.5L daily. Strict I/O.    - Should not exceed 124meQ by tomorrow morning.   - Okay to continue 2% NS IV fluids, possibly at slightly increased volume of 25 ml/hr.  Continue frequent serum sodium monitoring.    # Cough: Unclear etiology.   - When considering to add back antihypertension medications, would change ACEI to ARB.    # Diastolic cardiac dysfunction              - Respiratory symptoms possibly related to compromised cardiac diastolic function. Possible pHTN given elevated RA pressure on past echocardiograms. Get TTE. Pulmonology involvement would be warranted.     # Medical Compliance: Yes    # Transplant History:  Etiology of kidney failure: IgA nephropathy  Tx: DDKT  Transplant: 2007 (Kidney), 1998 (Kidney)  Donor Type:  - Brain Death Donor Class: Standard Criteria Donor  Significant changes in immunosuppression: None  Significant transplant-related complications: EBV viremia    Recommendations were communicated to the primary team via this note.    Seen and discussed with Dr. Olvin Bah MD  Pager: [phone]    Interval History  Ms. Bailey kidney function is stable with creatinine ~ 1.8.  Good urine output.  Serum sodium is stable, but low at 119.  Osmolality in the 250s.  Patient complains of heartburn symptoms, but no nausea or vomiting.  No diarrhea.  No chest pain or shortness of breath.  Occasional cough.  No fever, sweats or chills.  Doesn't feel puffy.    Review of Systems    4 point ROS was obtained and  "negative, except as noted in Interval History.    Physical Exam      /73 (BP Location: Right arm)   Pulse 70   Temp 98  F (36.7  C) (Oral)   Resp 22   Ht 1.499 m (4' 11\")   Wt 71.8 kg (158 lb 6.4 oz)   SpO2 98%   BMI 31.99 kg/m       GENERAL APPEARANCE: alert and no distress  HENT: mouth without ulcers or lesions  RESP: slight diffuse rhonchi, otherwise lungs clear to auscultation - no rales or wheezes.  CV: regular rhythm, normal rate, no rub, no murmur  EDEMA: trace LE edema bilaterally  ABDOMEN: soft, nondistended, nontender, bowel sounds normal, overweight  MS: extremities normal - no gross deformities noted, no evidence of inflammation in joints, no muscle tenderness  SKIN: no rash  TX KIDNEY: normal  DIALYSIS ACCESS: None    Data   CMP  Recent Labs   Lab 04/05/19  0805 04/05/19  0549 04/05/19  0201 04/04/19  2332  04/04/19  0700  04/03/19  0747 04/02/19  1950   * 119* 120* 118*   < > 122*   < > 128* 124*   POTASSIUM  --  3.6  --   --   --  3.8  --  3.5 4.2   CHLORIDE  --  87*  --   --   --  89*  --  94 91*   CO2  --  21  --   --   --  22  --  24 23   ANIONGAP  --  10  --   --   --  11  --  10 10   GLC  --  129*  --   --   --  94  --  136* 114*   BUN  --  16  --   --   --  16  --  18 16   CR  --  1.84*  --   --   --  1.88*  --  1.94* 1.88*   GFRESTIMATED  --  31*  --   --   --  30*  --  29* 30*   GFRESTBLACK  --  36*  --   --   --  35*  --  33* 35*   SHAVON  --  8.8  --   --   --  9.3  --  9.7 9.3    < > = values in this interval not displayed.     CBC  Recent Labs   Lab 04/04/19  0918 04/03/19  0747 04/02/19  1950   HGB 11.4* 11.3* 11.3*   WBC 3.9* 3.0* 4.3   RBC 4.07 4.07 4.11   HCT 34.6* 35.0 35.4   MCV 85 86 86   MCH 28.0 27.8 27.5   MCHC 32.9 32.3 31.9   RDW 12.4 12.5 12.6    196 212     INRNo lab results found in last 7 days.  ABGNo lab results found in last 7 days.   Urine Studies  Recent Labs   Lab Test 04/02/19 2008 03/28/19  1150 11/07/18  1914 05/29/18 2052   COLOR Straw " Straw Straw Straw   APPEARANCE Clear Clear Clear Clear   URINEGLC Negative Negative Negative Negative   URINEBILI Negative Negative Negative Negative   URINEKETONE Negative Negative Negative Negative   SG 1.003 1.004 1.003 1.003   UBLD Negative Negative Negative Negative   URINEPH 6.5 6.0 6.0 6.0   PROTEIN 30* 100* 30* 30*   NITRITE Negative Negative Negative Negative   LEUKEST Moderate* Trace* Moderate* Large*   RBCU 0 1 <1 1   WBCU 3 3 2 7*     Recent Labs   Lab Test 02/16/19  0737 02/02/18  0742 11/17/17  0739 04/28/17  0843 10/28/16  0913 04/29/16  1145 06/12/15  1011 01/23/15  0700 02/05/13  0715 10/18/12  1247 04/02/12  1510 10/10/11  1356 02/16/11  0715   UTPG 3.92* 2.31* 1.74* 1.86* 1.84* 0.49* 0.73* 0.77* 0.63* 0.43* 0.57* 0.46* 0.21*     PTH  Recent Labs   Lab Test 04/29/16  1043 02/05/16  1039 10/27/15  0555 03/26/15  1734 12/16/13  0636 10/01/13  0718 04/02/13  0700 03/01/13  0918 07/19/12  0649 10/10/11  1341   PTHI 124* 140* 218* 251* 166* 223* 182* 217* 171* 350*     Iron Studies  No lab results found.    IMAGING:  All imaging studies reviewed by me.

## 2019-04-06 LAB
ANION GAP SERPL CALCULATED.3IONS-SCNC: 8 MMOL/L (ref 3–14)
BASOPHILS # BLD AUTO: 0 10E9/L (ref 0–0.2)
BASOPHILS NFR BLD AUTO: 0.3 %
BUN SERPL-MCNC: 15 MG/DL (ref 7–30)
CALCIUM SERPL-MCNC: 8.6 MG/DL (ref 8.5–10.1)
CHLORIDE SERPL-SCNC: 93 MMOL/L (ref 94–109)
CO2 SERPL-SCNC: 20 MMOL/L (ref 20–32)
CREAT SERPL-MCNC: 1.63 MG/DL (ref 0.52–1.04)
DIFFERENTIAL METHOD BLD: ABNORMAL
EOSINOPHIL # BLD AUTO: 0.1 10E9/L (ref 0–0.7)
EOSINOPHIL NFR BLD AUTO: 4 %
ERYTHROCYTE [DISTWIDTH] IN BLOOD BY AUTOMATED COUNT: 12.7 % (ref 10–15)
GFR SERPL CREATININE-BSD FRML MDRD: 36 ML/MIN/{1.73_M2}
GLUCOSE BLDC GLUCOMTR-MCNC: 116 MG/DL (ref 70–99)
GLUCOSE SERPL-MCNC: 272 MG/DL (ref 70–99)
HCT VFR BLD AUTO: 28.5 % (ref 35–47)
HGB BLD-MCNC: 9.1 G/DL (ref 11.7–15.7)
IMM GRANULOCYTES # BLD: 0 10E9/L (ref 0–0.4)
IMM GRANULOCYTES NFR BLD: 0.3 %
LACTATE BLD-SCNC: 0.7 MMOL/L (ref 0.7–2)
LYMPHOCYTES # BLD AUTO: 0.7 10E9/L (ref 0.8–5.3)
LYMPHOCYTES NFR BLD AUTO: 21.5 %
MCH RBC QN AUTO: 27.2 PG (ref 26.5–33)
MCHC RBC AUTO-ENTMCNC: 31.9 G/DL (ref 31.5–36.5)
MCV RBC AUTO: 85 FL (ref 78–100)
MONOCYTES # BLD AUTO: 0.5 10E9/L (ref 0–1.3)
MONOCYTES NFR BLD AUTO: 17.9 %
NEUTROPHILS # BLD AUTO: 1.7 10E9/L (ref 1.6–8.3)
NEUTROPHILS NFR BLD AUTO: 56 %
NRBC # BLD AUTO: 0 10*3/UL
NRBC BLD AUTO-RTO: 0 /100
OSMOLALITY SERPL: 261 MMOL/KG (ref 275–295)
OSMOLALITY SERPL: 266 MMOL/KG (ref 275–295)
OSMOLALITY SERPL: 269 MMOL/KG (ref 275–295)
OSMOLALITY SERPL: 270 MMOL/KG (ref 275–295)
OSMOLALITY SERPL: 275 MMOL/KG (ref 275–295)
OSMOLALITY UR: 83 MMOL/KG (ref 100–1200)
OSMOLALITY UR: 96 MMOL/KG (ref 100–1200)
OSMOLALITY UR: 98 MMOL/KG (ref 100–1200)
PLATELET # BLD AUTO: 206 10E9/L (ref 150–450)
POTASSIUM SERPL-SCNC: 4.2 MMOL/L (ref 3.4–5.3)
RBC # BLD AUTO: 3.34 10E12/L (ref 3.8–5.2)
SODIUM SERPL-SCNC: 121 MMOL/L (ref 133–144)
SODIUM SERPL-SCNC: 124 MMOL/L (ref 133–144)
SODIUM SERPL-SCNC: 125 MMOL/L (ref 133–144)
SODIUM SERPL-SCNC: 126 MMOL/L (ref 133–144)
SODIUM SERPL-SCNC: 127 MMOL/L (ref 133–144)
SODIUM SERPL-SCNC: 134 MMOL/L (ref 133–144)
WBC # BLD AUTO: 3 10E9/L (ref 4–11)

## 2019-04-06 PROCEDURE — A9270 NON-COVERED ITEM OR SERVICE: HCPCS | Mod: GY | Performed by: STUDENT IN AN ORGANIZED HEALTH CARE EDUCATION/TRAINING PROGRAM

## 2019-04-06 PROCEDURE — 99233 SBSQ HOSP IP/OBS HIGH 50: CPT | Performed by: INTERNAL MEDICINE

## 2019-04-06 PROCEDURE — 25000132 ZZH RX MED GY IP 250 OP 250 PS 637: Mod: GY | Performed by: STUDENT IN AN ORGANIZED HEALTH CARE EDUCATION/TRAINING PROGRAM

## 2019-04-06 PROCEDURE — 36415 COLL VENOUS BLD VENIPUNCTURE: CPT | Performed by: INTERNAL MEDICINE

## 2019-04-06 PROCEDURE — 80048 BASIC METABOLIC PNL TOTAL CA: CPT | Performed by: INTERNAL MEDICINE

## 2019-04-06 PROCEDURE — 84295 ASSAY OF SERUM SODIUM: CPT | Performed by: NURSE PRACTITIONER

## 2019-04-06 PROCEDURE — 84295 ASSAY OF SERUM SODIUM: CPT | Performed by: INTERNAL MEDICINE

## 2019-04-06 PROCEDURE — 25000128 H RX IP 250 OP 636: Performed by: INTERNAL MEDICINE

## 2019-04-06 PROCEDURE — 25000131 ZZH RX MED GY IP 250 OP 636 PS 637: Mod: GY | Performed by: STUDENT IN AN ORGANIZED HEALTH CARE EDUCATION/TRAINING PROGRAM

## 2019-04-06 PROCEDURE — 83930 ASSAY OF BLOOD OSMOLALITY: CPT | Performed by: NURSE PRACTITIONER

## 2019-04-06 PROCEDURE — 83935 ASSAY OF URINE OSMOLALITY: CPT | Performed by: NURSE PRACTITIONER

## 2019-04-06 PROCEDURE — 83930 ASSAY OF BLOOD OSMOLALITY: CPT | Performed by: INTERNAL MEDICINE

## 2019-04-06 PROCEDURE — A9270 NON-COVERED ITEM OR SERVICE: HCPCS | Mod: GY | Performed by: NURSE PRACTITIONER

## 2019-04-06 PROCEDURE — 83605 ASSAY OF LACTIC ACID: CPT | Performed by: INTERNAL MEDICINE

## 2019-04-06 PROCEDURE — 99207 ZZC APP CREDIT; MD BILLING SHARED VISIT: CPT | Performed by: NURSE PRACTITIONER

## 2019-04-06 PROCEDURE — 36415 COLL VENOUS BLD VENIPUNCTURE: CPT | Performed by: NURSE PRACTITIONER

## 2019-04-06 PROCEDURE — 36592 COLLECT BLOOD FROM PICC: CPT | Performed by: INTERNAL MEDICINE

## 2019-04-06 PROCEDURE — 00000146 ZZHCL STATISTIC GLUCOSE BY METER IP

## 2019-04-06 PROCEDURE — 25000132 ZZH RX MED GY IP 250 OP 250 PS 637: Mod: GY | Performed by: INTERNAL MEDICINE

## 2019-04-06 PROCEDURE — 85004 AUTOMATED DIFF WBC COUNT: CPT | Performed by: INTERNAL MEDICINE

## 2019-04-06 PROCEDURE — 12000004 ZZH R&B IMCU UMMC

## 2019-04-06 PROCEDURE — 25000132 ZZH RX MED GY IP 250 OP 250 PS 637: Mod: GY | Performed by: NURSE PRACTITIONER

## 2019-04-06 PROCEDURE — 85027 COMPLETE CBC AUTOMATED: CPT | Performed by: INTERNAL MEDICINE

## 2019-04-06 PROCEDURE — A9270 NON-COVERED ITEM OR SERVICE: HCPCS | Mod: GY | Performed by: INTERNAL MEDICINE

## 2019-04-06 PROCEDURE — 36592 COLLECT BLOOD FROM PICC: CPT | Performed by: NURSE PRACTITIONER

## 2019-04-06 RX ORDER — ACETAMINOPHEN 325 MG/1
650 TABLET ORAL EVERY 6 HOURS PRN
Status: DISCONTINUED | OUTPATIENT
Start: 2019-04-06 | End: 2019-04-08 | Stop reason: HOSPADM

## 2019-04-06 RX ORDER — DEXTROSE MONOHYDRATE 50 MG/ML
INJECTION, SOLUTION INTRAVENOUS
Status: DISPENSED
Start: 2019-04-06 | End: 2019-04-06

## 2019-04-06 RX ORDER — LOSARTAN POTASSIUM 25 MG/1
25 TABLET ORAL DAILY
Status: DISCONTINUED | OUTPATIENT
Start: 2019-04-06 | End: 2019-04-06

## 2019-04-06 RX ADMIN — GUAIFENESIN 600 MG: 600 TABLET, EXTENDED RELEASE ORAL at 08:44

## 2019-04-06 RX ADMIN — OMEPRAZOLE 20 MG: 20 CAPSULE, DELAYED RELEASE ORAL at 08:43

## 2019-04-06 RX ADMIN — CYCLOSPORINE 25 MG: 25 CAPSULE, LIQUID FILLED ORAL at 08:43

## 2019-04-06 RX ADMIN — DEXTROSE MONOHYDRATE 500 ML: 50 INJECTION, SOLUTION INTRAVENOUS at 06:20

## 2019-04-06 RX ADMIN — SULFAMETHOXAZOLE AND TRIMETHOPRIM 1 TABLET: 400; 80 TABLET ORAL at 08:43

## 2019-04-06 RX ADMIN — SIMETHICONE CHEW TAB 80 MG 80 MG: 80 TABLET ORAL at 23:25

## 2019-04-06 RX ADMIN — Medication 10 ML: at 18:07

## 2019-04-06 RX ADMIN — SIMETHICONE CHEW TAB 80 MG 80 MG: 80 TABLET ORAL at 00:58

## 2019-04-06 RX ADMIN — AMLODIPINE BESYLATE 5 MG: 5 TABLET ORAL at 08:44

## 2019-04-06 RX ADMIN — UMECLIDINIUM BROMIDE AND VILANTEROL TRIFENATATE 1 PUFF: 62.5; 25 POWDER RESPIRATORY (INHALATION) at 08:44

## 2019-04-06 RX ADMIN — GUAIFENESIN 600 MG: 600 TABLET, EXTENDED RELEASE ORAL at 20:46

## 2019-04-06 RX ADMIN — CYCLOSPORINE 50 MG: 25 CAPSULE, LIQUID FILLED ORAL at 20:46

## 2019-04-06 RX ADMIN — LOSARTAN POTASSIUM 25 MG: 25 TABLET ORAL at 08:44

## 2019-04-06 RX ADMIN — MYCOPHENOLATE MOFETIL 1000 MG: 250 CAPSULE ORAL at 08:43

## 2019-04-06 RX ADMIN — MYCOPHENOLATE MOFETIL 1000 MG: 250 CAPSULE ORAL at 20:46

## 2019-04-06 RX ADMIN — Medication 5 ML: at 04:18

## 2019-04-06 ASSESSMENT — ACTIVITIES OF DAILY LIVING (ADL)
ADLS_ACUITY_SCORE: 15

## 2019-04-06 ASSESSMENT — MIFFLIN-ST. JEOR: SCORE: 1242.74

## 2019-04-06 NOTE — PROGRESS NOTES
[facility], [place]  Transplant Nephrology Progress Note  Date of Admission:  4/2/2019    Assessment & Plan   # DDKT: Stable/improved, mild TOSIN with creatinine ~ 1.8-1.9 for the last few days.  Okay urine output.  Patient was likely a bit prerenal on admission with diuretic and CNI, as well as ACEI.  Doubt rejection at this time post transplant and okay drug levels, although patient likely has some chronic changes.  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 Nephrotic range (~ 4 grams), which has trended up over the last couple of years.  Differential dx for proteinuria would be recurrent IgA versus denovo GN (membranous nephropathy or FSGS).  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   - Baseline Cr ~ 1.4-1.6;      - Proteinuria: Nephrotic range at ~ 4 grams   - Date DSA last checked: 4/2016  Latest DSA: Yes, DR51: 2318   - BK Viremia: No   - Kidney Tx Biopsy: No    # Immunosuppression: Cyclosporine (goal  ) and Mycophenolate mofetil (goal  1-3.5)   - Changes: No    # Prophylaxis:               - PJP: Sulfa/TMP (Bactrim)    # Hypertension/Volume Status: Borderline control; Goal BP: < 130/80,   - Changes: Yes - would recommend increasing amlodipine to 10 mg nightly. Consider changing to ARB due to cough once kidney function improves and sodium has normalized. For now can add coreg 6.25 mg po bid.    # Anemia in Chronic Renal Disease: Hgb: Stable   - Iron studies: Not checked recently    # Mineral Bone Disorder:    - Secondary renal hyperparathyroidism; PTH level is: Minimally elevated   - Vitamin D; level is: Low and recommend starting cholecalciferol 1000 units daily.   - Calcium; level is: Normal    # Electrolytes:   - Potassium; level: Normal  - Magnesium; level: Normal  - Bicarbonate; level: Normal    # Hyponatremia: had significant fluctuation but seems to stabilize.  "Will hold off on diuretics and saline for now. No further 2% is on board. Her most recent urine osm is below 100. Repeat Na and if stable space out to ever 6-12 hrs checks. Restrict fluids to 9017-0400 mL per day for now.      # Cough: Unclear etiology.   - When considering to add back antihypertension medications, would change ACEI to ARB would hold off on this until the TOSIN has resolved and the sodium has normalized     # Diastolic cardiac dysfunction              - compensated     # Medical Compliance: Yes    # Transplant History:  Etiology of kidney failure: IgA nephropathy  Tx: DDKT  Transplant: 2007 (Kidney), 1998 (Kidney)  Donor Type:  - Brain Death Donor Class: Standard Criteria Donor  Significant changes in immunosuppression: None  Significant transplant-related complications: EBV viremia    Recommendations were communicated to the primary team via this note.    Moe Johnston MD  Pager: [phone]    Interval History  Ms. Bailey kidney function is improved with creatinine ~ 1.6 mg/dL.  Good urine output.  Serum sodium is stable after some fluctuation overnight requiring free water replacement. Her mentation is good and has no new complaints.      Review of Systems    4 point ROS was obtained and negative, except as noted in Interval History.    Physical Exam      /80 (BP Location: Right arm)   Pulse 73   Temp 97.8  F (36.6  C) (Oral)   Resp 22   Ht 1.499 m (4' 11\")   Wt 73.2 kg (161 lb 6.4 oz)   SpO2 96%   BMI 32.60 kg/m       GENERAL APPEARANCE: alert and no distress  HENT: mouth without ulcers or lesions  RESP: slight diffuse rhonchi, otherwise lungs clear to auscultation - no rales or wheezes.  CV: regular rhythm, normal rate, no rub, no murmur  EDEMA: trace LE edema bilaterally  ABDOMEN: soft, nondistended, nontender, bowel sounds normal, overweight  MS: extremities normal - no gross deformities noted, no evidence of inflammation in joints, no muscle tenderness  SKIN: no " rash  TX KIDNEY: normal  DIALYSIS ACCESS: None    Data   CMP  Recent Labs   Lab 04/06/19  0735 04/06/19  0535 04/06/19  0435 04/06/19  0148  04/05/19  0549  04/04/19  0700  04/03/19  0747   * 127* 134 125*   < > 119*   < > 122*   < > 128*   POTASSIUM 4.2  --   --   --   --  3.6  --  3.8  --  3.5   CHLORIDE 93*  --   --   --   --  87*  --  89*  --  94   CO2 20  --   --   --   --  21  --  22  --  24   ANIONGAP 8  --   --   --   --  10  --  11  --  10   *  --   --   --   --  129*  --  94  --  136*   BUN 15  --   --   --   --  16  --  16  --  18   CR 1.63*  --   --   --   --  1.84*  --  1.88*  --  1.94*   GFRESTIMATED 36*  --   --   --   --  31*  --  30*  --  29*   GFRESTBLACK 41*  --   --   --   --  36*  --  35*  --  33*   SHAVON 8.6  --   --   --   --  8.8  --  9.3  --  9.7    < > = values in this interval not displayed.     CBC  Recent Labs   Lab 04/04/19 0918 04/03/19  0747 04/02/19  1950   HGB 11.4* 11.3* 11.3*   WBC 3.9* 3.0* 4.3   RBC 4.07 4.07 4.11   HCT 34.6* 35.0 35.4   MCV 85 86 86   MCH 28.0 27.8 27.5   MCHC 32.9 32.3 31.9   RDW 12.4 12.5 12.6    196 212     INRNo lab results found in last 7 days.  ABGNo lab results found in last 7 days.   Urine Studies  Recent Labs   Lab Test 04/02/19 2008 03/28/19  1150 11/07/18  1914 05/29/18 2052   COLOR Straw Straw Straw Straw   APPEARANCE Clear Clear Clear Clear   URINEGLC Negative Negative Negative Negative   URINEBILI Negative Negative Negative Negative   URINEKETONE Negative Negative Negative Negative   SG 1.003 1.004 1.003 1.003   UBLD Negative Negative Negative Negative   URINEPH 6.5 6.0 6.0 6.0   PROTEIN 30* 100* 30* 30*   NITRITE Negative Negative Negative Negative   LEUKEST Moderate* Trace* Moderate* Large*   RBCU 0 1 <1 1   WBCU 3 3 2 7*     Recent Labs   Lab Test 02/16/19  0737 02/02/18  0742 11/17/17  0739 04/28/17  0843 10/28/16  0913 04/29/16  1145 06/12/15  1011 01/23/15  0700 02/05/13  0715 10/18/12  1247 04/02/12  1510 10/10/11  1356  02/16/11  0715   UTPG 3.92* 2.31* 1.74* 1.86* 1.84* 0.49* 0.73* 0.77* 0.63* 0.43* 0.57* 0.46* 0.21*     PTH  Recent Labs   Lab Test 04/29/16  1043 02/05/16  1039 10/27/15  0555 03/26/15  1734 12/16/13  0636 10/01/13  0718 04/02/13  0700 03/01/13  0918 07/19/12  0649 10/10/11  1341   PTHI 124* 140* 218* 251* 166* 223* 182* 217* 171* 350*     Iron Studies  No lab results found.    IMAGING:  All imaging studies reviewed by me.

## 2019-04-06 NOTE — PROGRESS NOTES
Time of notification: 10:32 AM  Provider notified: Ann-Marie Joshua CNP [id number]  Patient status: Sodium 126 after morning bolus. BG improved: recheck 116.   Temp:  [97.8  F (36.6  C)-98.7  F (37.1  C)] 97.8  F (36.6  C)  Pulse:  [73] 73  Heart Rate:  [73-78] 74  Resp:  [20-23] 22  BP: (151-162)/(75-95) 161/80  SpO2:  [94 %-96 %] 96 %  Orders received: awaiting callback/orders

## 2019-04-06 NOTE — PLAN OF CARE
Neuro: A&Ox4.Hmong speaking   Cardiac: SR. VSS.   Respiratory: Sating 95%+ on RA.  GI/: Adequate urine output. BM X1 small  Diet/appetite: Tolerating *\Regular diet FR 1.2. Eating well.  Activity: SBA  Pain:Reports epigastric pain has resolved  Skin: No new deficits noted.  LDA's: double midline heparin locked.    Plan: Continue with POC. Notify primary team with changes.

## 2019-04-06 NOTE — PROVIDER NOTIFICATION
Time of notification: 4:35 AM  Provider notified: gold crosscover  Patient status: Na 125. Alert and oriented   Temp:  [97.8  F (36.6  C)-98.7  F (37.1  C)] 98  F (36.7  C)  Pulse:  [73] 73  Heart Rate:  [73-78] 78  Resp:  [20-23] 20  BP: (151-164)/(73-82) 160/75  SpO2:  [94 %-98 %] 96 %  Orders received: pending    Orders received to discontinue 2% saline

## 2019-04-06 NOTE — PLAN OF CARE
Neuro: A&Ox4. Pleasant   Cardiac: SR. HR 70s. Slightly hypertensive. Afebrile   Respiratory: Sating >95% on 1 lpm NC  GI/: Adequate urine output  Diet/appetite: Tolerating regular diet. Eating well. 1.5 L FR   Activity:  Assist of standby, up to chair and in halls.  Pain: Complains of heartburn and headache. Simethicone given and tylenol ordered   Skin: No new deficits noted.  LDA's: 2 lumen midline R arm. L AV fistula     Plan: Continue with POC. Notify primary team with changes. 2% Na infusion discontinued due to increasing Na levels. D5 bolus ordered. Recheck Na at 0800 after bolus.

## 2019-04-06 NOTE — PROGRESS NOTES
Time of notification: 12:19 PM  Provider notified: Ann-Marie Tresruperto SANABRIA 6008  Patient status:Urine osmolality is 96. pt care order to wean O2 patient is on room air.  Temp:  [97.8  F (36.6  C)-98.7  F (37.1  C)] 98.1  F (36.7  C)  Heart Rate:  [73-78] 74  Resp:  [20-22] 20  BP: (140-162)/(72-95) 140/72  SpO2:  [94 %-96 %] 94 %  Orders received: awaiting orders. Will continue to monitor.

## 2019-04-06 NOTE — PROGRESS NOTES
Time of notification: 9:09 AM  Provider notified: Ann-Marie Joshua CNP 6034  Patient status: Blood glucose elevated with morning labs at 272.  Temp:  [97.8  F (36.6  C)-98.7  F (37.1  C)] 97.8  F (36.6  C)  Pulse:  [73] 73  Heart Rate:  [73-78] 74  Resp:  [20-23] 22  BP: (151-162)/(75-95) 161/80  SpO2:  [94 %-96 %] 96 %  Orders received: awaiting call back

## 2019-04-06 NOTE — PROGRESS NOTES
Brief Cross-Cover Note    Patient's Na increased to 134 from 125 within a short time (3 hrs) with only change being discontinuation of hypertonic saline around 4am. Concern that this may have been drawn from line that had 2% saline running previously. Will recheck Na STAT and if truly elevated give D5W bolus per nephrology recs.    Mercedes Jurado MD    *ADDENDUM: STAT Na recheck 127, this is still higher than goal for this morning, per nephrology recs giving 500mL D5W bolus over 2hrs then recheck Na around 8am.

## 2019-04-06 NOTE — PROGRESS NOTES
"[facility], [facility] Progress Note - Hospitalist Service, Gold 3       Date of Admission:  4/2/2019  Assessment & Plan      Joshua Lala is a 56 year old Hmong speaking female with past medical history significant for IgA nephropathy s/p DDKT x2 (1998, 2007) c/b chronic rejection and EBV viremia, HTN, suspected pulmonary HTN, grade III diastolic dysfunction, and anemia admitted for worsening cough concerning for volume overload and incidentally found to have hyponatremia to 124.      # Acute severe hyponatremia  # ?Chronic hyponatremia   Improving. Likely hypovolemic hyponatremia in setting of overdiuresis, urine lytes not c/w SIADH.  TSH, cortisol, uric acid wnl. Na 118 -> 125 overnight 4/5-4/6 on hypertonic saline 2%, w/ isolated 134 (likely drawn from line with 2% saline running previously).  Feels more alert, less fatigued today.  - Appreciate Nephrology recs   - Stopped hypertonic saline AM 4/6  - Continue Na checks Q2H   - Continue free water restriction 1.5L daily      # Cough, ?chest tightness  # Hx pulmonary hypertension    Onset of productive cough 3 weeks ago, improved today. Possibly 2/2 resolving viral/bacterial illness vs untreated GERD, less likely acute CV process.  S/p tx of URI w/ Azithromycin and Prednisone on 3/12 without significant improvement.  Had emesis x 1 overnight 4/4, with positional discomfort at HS.  Reports \"burning\" sensation midepigastric area. Low suspicion for acute infection given afebrile, normal HR, normal SpO2, and neg CXR.  From cardiopulmonary standpoint, TTE on 4/3 with EF 60-65%, no WMAs, normal wall thickness, and grade II moderate diastolic dysfunction, with RVSP 28.9+ RAP - improved from previous in 2/2017, which noted grade III diastolic dysfunction and increased RVSP 38.5.  Previously recommended to have further workup with [facility] but lost to follow up with Cardiology after 2017. CT C/A/P on 4/5 negative.    - Daily weights, I/Os   - " "Overnight oximetry study completed, isolated hypoxia to 86% for less than one minute  - Continuous pulse ox overnight     - Wean O2 as tolerated   - Sleep study outpt     # Uncontrolled GERD - Had emesis x 1 overnight 4/4, with positional discomfort at HS.  Reports \"burning\" sensation midepigastric area, as above.  Last EGD more than 10 years ago at [facility] per pt/family report.  Nausea and vomiting resolved.  No constipation or diarrhea.   - Daily PPI and Carafate at HS   - Anti-emetics PRN   - Zantac BID PRN, Maalox PRN   - Recommend EGD referral from PCP     # IgA nephropathy s/p DDKT x2 (1998, 2007) c/b chronic rejection and EBV viremia  # Anemia   Anemia felt to be likely 2/2 chronic renal disease/insufficiency, though no recent iron studies available.  Hgb 11.3 this admission. No s/s acute bleed. Cr slightly better than baseline.       - Appreciate Transp Nephrology following   - IS: Currently on Cyclosporine (goal ) and Mycophenolate (goal 1.0-3.5)  - Ppx: Continue Bactrim  - Trend renal function daily  - Repeat CBC in AM        # HTN - BPs slightly elevated this AM. Continue Amlodipine. Hold hydrochlorothiazide. Will start Losartan when hyponatremia resolved.   - Increased Amlodipine to 7.5mg w/ next dose   - Monitor BPs     # Leukopenia - WBC 3.0, possibly 2/2 IS. Non-toxic appearing, afebrile.   - Follow CBC in AM        Diet: Combination Diet Regular Diet Adult  Snacks/Supplements Adult: Other; Pt may order supplements PRN; Between Meals  Fluid restriction OTHER (SEE COMMENTS) (Free water limit 1.5 L)    DVT Prophylaxis: Mechanical   Staples Catheter: not present  Code Status: Full Code      Disposition Plan   Expected discharge: 1-2 days, recommended to prior living arrangement once sodium levels improving.  Entered: MELANY Duque CNP 04/06/2019, 7:37 AM       The patient's care was discussed with the Attending Physician, Dr. Trinh Perez.    MELANY Duque CNP  Hospitalist Service, " Gold 3  [facility], [place]  Pager: [phone]  Please see sticky note for cross cover information    ______________________________________________________________________    Interval History     Joshua is seen w/ .  She feels a little bit better today.  Less fatigued.  Reports heartburn and reflux symptoms ongoing but better.  Denies chest pain, abdominal pain, constipation, and dysuria.     Data reviewed today: I reviewed all medications, new labs and imaging results over the last 24 hours.   Physical Exam   Vital Signs: Temp: 97.8  F (36.6  C) Temp src: Oral BP: 161/80 Pulse: 73 Heart Rate: 74 Resp: 22 SpO2: 96 % O2 Device: Nasal cannula Oxygen Delivery: 1 LPM  Weight: 161 lbs 6.4 oz    GENERAL: Alert and oriented x 3. Well nourished, well developed.  No acute distress.    HEENT: Normocephalic, atraumatic. Anicteric sclera. Mucous membranes moist.   CV: RRR. S1, S2. No murmurs appreciated.   RESPIRATORY: Effort normal on room air. Lungs w/ bilateral rhonchi, no wheezes.   GI: Abdomen soft and non distended, bowel sounds present x all 4 quadrants. No tenderness, rebound, or guarding.   NEUROLOGICAL: No focal deficits. Follows commands.  Strength equal in upper and lower extremities.   MUSCULOSKELETAL: No joint swelling or tenderness. Moves all extremities.   EXTREMITIES: No gross deformities. No peripheral edema.   SKIN: Grossly warm, dry, and intact. Slight tenting. No jaundice. No rashes.       Data   Recent Labs   Lab 04/06/19  0535 04/06/19  0435 04/06/19  0148  04/05/19  0549  04/04/19  0918 04/04/19  0700  04/03/19  0747 04/02/19  1950   WBC  --   --   --   --   --   --  3.9*  --   --  3.0* 4.3   HGB  --   --   --   --   --   --  11.4*  --   --  11.3* 11.3*   MCV  --   --   --   --   --   --  85  --   --  86 86   PLT  --   --   --   --   --   --  194  --   --  196 212   * 134 125*   < > 119*   < >  --  122*   < > 128* 124*   POTASSIUM  --   --   --   --  3.6  --    --  3.8  --  3.5 4.2   CHLORIDE  --   --   --   --  87*  --   --  89*  --  94 91*   CO2  --   --   --   --  21  --   --  22  --  24 23   BUN  --   --   --   --  16  --   --  16  --  18 16   CR  --   --   --   --  1.84*  --   --  1.88*  --  1.94* 1.88*   ANIONGAP  --   --   --   --  10  --   --  11  --  10 10   SHAVON  --   --   --   --  8.8  --   --  9.3  --  9.7 9.3   GLC  --   --   --   --  129*  --   --  94  --  136* 114*   TROPI  --   --   --   --   --   --   --   --   --   --  <0.015    < > = values in this interval not displayed.     Recent Results (from the past 24 hour(s))   CT Chest Abdomen Pelvis w/o Contrast    Narrative    EXAMINATION: CT CHEST ABDOMEN PELVIS W/O CONTRAST, 4/5/2019 5:15 PM    TECHNIQUE:  Helical CT images from the thoracic inlet through the  symphysis pubis were obtained without contrast. Contrast dose: None    COMPARISON: Chest radiograph for 2/20/2019, 3/2/2019, renal transplant  ultrasound 1/7/2018    HISTORY: Chest tightness, dyspnea, hyponatremia, ?concern for occult  malignancy    FINDINGS:    Lines/tubes: Right upper extremity PICC tip projects over the right  axillary vein.    Chest:   Thyroid is unremarkable. Cardiomegaly. Mitral enlargement. Mild  calcified plaque of the coronary arteries. Mild/trace calcified plaque  of the aortic annulus. No significant pericardial effusion. Main  pulmonary artery measures 4.1 cm. Calcification within the right lower  lobe pulmonary artery (series 4, image 84), and left lower lobe  pulmonary artery (series 4, image 81) Thoracic aorta measures 4.2 cm.  Normal cervical branching pattern. Mild calcified plaque of the  thoracic aortic arch and major branches. Linear calcified density in  the SMV which may represent chronic thrombus or fibrin sheath. Central  tracheobronchial tree is patent. Mild bronchial wall thickening. No  significant bronchiectasis. Trace interlobular septal thickening.  Right basilar linear scarring. Calcified pleural plaques  bilaterally.  Stable 3 mm pulmonary nodule of the right middle lobe (series 4, image  91). No new or enlarged pulmonary nodule.    Abdomen and pelvis: Noncontrast appearance of the liver is  unremarkable. Postoperative changes of cholecystectomy. No significant  biliary dilatation. Spleen is within normal limits. Splenorenal shunt.  Pancreas is unremarkable. Duodenal diverticulum. Bilateral native  renal atrophy. Nonobstructive 4 mm right superior pole and 5 mm right  inferior pole renal calculi versus vascular calcifications. No  hydronephrosis or hydroureter. Right lower quadrant kidney transplant.  Mild pelviectasis. No peritransplant fluid collection. No  hydronephrosis. Severely atrophic and partially calcified left lower  quadrant renal transplant. Urinary bladder is unremarkable. Pelvic  organs are unremarkable for age. No significant free fluid. No  intra-abdominal free air.    No dilated loops bowel. Moderate colonic stool. Appendix is  unremarkable.    Bones and soft tissues: Degenerative changes of the spine      Impression    IMPRESSION:   1. Stable cardiomegaly. Dilated main pulmonary artery measuring up to  4.1 cm. SVC and lower lobe pulmonary artery calcifications suggestive  of calcified chronic thrombus is unchanged.  2. Postoperative a right lower quadrant renal transplant. Atrophy of  the native kidneys.  3. No findings concerning for malignancy in the chest, abdomen or  pelvis.    I have personally reviewed the examination and initial interpretation  and I agree with the findings.    VALERIE KOROMA MD     Medications       amLODIPine  5 mg Oral Daily     cycloSPORINE modified  25 mg Oral QAM     cycloSPORINE modified  50 mg Oral At Bedtime     D5W         dextrose 5%  500 mL Intravenous Once     guaiFENesin  600 mg Oral BID     heparin lock flush  5-10 mL Intracatheter Q24H     losartan  25 mg Oral Daily     mycophenolate  1,000 mg Oral BID     omeprazole  20 mg Oral QAM AC      sulfamethoxazole-trimethoprim  1 tablet Oral Daily     umeclidinium-vilanterol  1 puff Inhalation Daily

## 2019-04-06 NOTE — PROVIDER NOTIFICATION
Time of notification: 5:44 AM  Provider notified: gold crosscover  Patient status: pt complaining of headache  Temp:  [97.8  F (36.6  C)-98.7  F (37.1  C)] 98.1  F (36.7  C)  Pulse:  [73] 73  Heart Rate:  [73-78] 78  Resp:  [20-23] 20  BP: (151-164)/(73-95) 154/95  SpO2:  [94 %-98 %] 95 %  Orders received: pending

## 2019-04-07 ENCOUNTER — APPOINTMENT (OUTPATIENT)
Dept: GENERAL RADIOLOGY | Facility: CLINIC | Age: 54
DRG: 641 | End: 2019-04-07
Attending: INTERNAL MEDICINE
Payer: MEDICARE

## 2019-04-07 LAB
ANION GAP SERPL CALCULATED.3IONS-SCNC: 10 MMOL/L (ref 3–14)
BASOPHILS # BLD AUTO: 0 10E9/L (ref 0–0.2)
BASOPHILS NFR BLD AUTO: 0.4 %
BUN SERPL-MCNC: 16 MG/DL (ref 7–30)
CALCIUM SERPL-MCNC: 9.2 MG/DL (ref 8.5–10.1)
CHLORIDE SERPL-SCNC: 98 MMOL/L (ref 94–109)
CO2 SERPL-SCNC: 19 MMOL/L (ref 20–32)
CREAT SERPL-MCNC: 1.72 MG/DL (ref 0.52–1.04)
DIFFERENTIAL METHOD BLD: ABNORMAL
EOSINOPHIL # BLD AUTO: 0.2 10E9/L (ref 0–0.7)
EOSINOPHIL NFR BLD AUTO: 4.5 %
ERYTHROCYTE [DISTWIDTH] IN BLOOD BY AUTOMATED COUNT: 12.8 % (ref 10–15)
GFR SERPL CREATININE-BSD FRML MDRD: 33 ML/MIN/{1.73_M2}
GLUCOSE SERPL-MCNC: 87 MG/DL (ref 70–99)
HCT VFR BLD AUTO: 30.7 % (ref 35–47)
HGB BLD-MCNC: 10 G/DL (ref 11.7–15.7)
IMM GRANULOCYTES # BLD: 0 10E9/L (ref 0–0.4)
IMM GRANULOCYTES NFR BLD: 0.4 %
LDH SERPL L TO P-CCNC: 171 U/L (ref 81–234)
LYMPHOCYTES # BLD AUTO: 0.9 10E9/L (ref 0.8–5.3)
LYMPHOCYTES NFR BLD AUTO: 20.1 %
MCH RBC QN AUTO: 28.2 PG (ref 26.5–33)
MCHC RBC AUTO-ENTMCNC: 32.6 G/DL (ref 31.5–36.5)
MCV RBC AUTO: 87 FL (ref 78–100)
MONOCYTES # BLD AUTO: 0.8 10E9/L (ref 0–1.3)
MONOCYTES NFR BLD AUTO: 18.1 %
NEUTROPHILS # BLD AUTO: 2.5 10E9/L (ref 1.6–8.3)
NEUTROPHILS NFR BLD AUTO: 56.5 %
NRBC # BLD AUTO: 0 10*3/UL
NRBC BLD AUTO-RTO: 0 /100
OSMOLALITY SERPL: 262 MMOL/KG (ref 275–295)
OSMOLALITY SERPL: 262 MMOL/KG (ref 275–295)
OSMOLALITY SERPL: 268 MMOL/KG (ref 275–295)
OSMOLALITY SERPL: 269 MMOL/KG (ref 275–295)
OSMOLALITY SERPL: 269 MMOL/KG (ref 275–295)
OSMOLALITY SERPL: 272 MMOL/KG (ref 275–295)
OSMOLALITY UR: 113 MMOL/KG (ref 100–1200)
OSMOLALITY UR: 166 MMOL/KG (ref 100–1200)
OSMOLALITY UR: 220 MMOL/KG (ref 100–1200)
OSMOLALITY UR: 77 MMOL/KG (ref 100–1200)
PLATELET # BLD AUTO: 231 10E9/L (ref 150–450)
POTASSIUM SERPL-SCNC: 4.6 MMOL/L (ref 3.4–5.3)
RBC # BLD AUTO: 3.54 10E12/L (ref 3.8–5.2)
SODIUM SERPL-SCNC: 127 MMOL/L (ref 133–144)
SODIUM SERPL-SCNC: 127 MMOL/L (ref 133–144)
SODIUM SERPL-SCNC: 128 MMOL/L (ref 133–144)
WBC # BLD AUTO: 4.5 10E9/L (ref 4–11)

## 2019-04-07 PROCEDURE — A9270 NON-COVERED ITEM OR SERVICE: HCPCS | Performed by: STUDENT IN AN ORGANIZED HEALTH CARE EDUCATION/TRAINING PROGRAM

## 2019-04-07 PROCEDURE — 99207 ZZC APP CREDIT; MD BILLING SHARED VISIT: CPT | Performed by: NURSE PRACTITIONER

## 2019-04-07 PROCEDURE — 12000004 ZZH R&B IMCU UMMC

## 2019-04-07 PROCEDURE — 25800030 ZZH RX IP 258 OP 636: Performed by: NURSE PRACTITIONER

## 2019-04-07 PROCEDURE — 83615 LACTATE (LD) (LDH) ENZYME: CPT | Performed by: INTERNAL MEDICINE

## 2019-04-07 PROCEDURE — 87385 HISTOPLASMA CAPSUL AG IA: CPT | Performed by: INTERNAL MEDICINE

## 2019-04-07 PROCEDURE — A9270 NON-COVERED ITEM OR SERVICE: HCPCS | Performed by: INTERNAL MEDICINE

## 2019-04-07 PROCEDURE — 25000132 ZZH RX MED GY IP 250 OP 250 PS 637: Performed by: STUDENT IN AN ORGANIZED HEALTH CARE EDUCATION/TRAINING PROGRAM

## 2019-04-07 PROCEDURE — 25000132 ZZH RX MED GY IP 250 OP 250 PS 637: Performed by: INTERNAL MEDICINE

## 2019-04-07 PROCEDURE — 84295 ASSAY OF SERUM SODIUM: CPT | Performed by: INTERNAL MEDICINE

## 2019-04-07 PROCEDURE — 87633 RESP VIRUS 12-25 TARGETS: CPT | Performed by: NURSE PRACTITIONER

## 2019-04-07 PROCEDURE — 25000132 ZZH RX MED GY IP 250 OP 250 PS 637: Performed by: NURSE PRACTITIONER

## 2019-04-07 PROCEDURE — A9270 NON-COVERED ITEM OR SERVICE: HCPCS | Performed by: NURSE PRACTITIONER

## 2019-04-07 PROCEDURE — 25000131 ZZH RX MED GY IP 250 OP 636 PS 637: Performed by: STUDENT IN AN ORGANIZED HEALTH CARE EDUCATION/TRAINING PROGRAM

## 2019-04-07 PROCEDURE — 83935 ASSAY OF URINE OSMOLALITY: CPT | Performed by: INTERNAL MEDICINE

## 2019-04-07 PROCEDURE — 99233 SBSQ HOSP IP/OBS HIGH 50: CPT | Performed by: INTERNAL MEDICINE

## 2019-04-07 PROCEDURE — 84999 UNLISTED CHEMISTRY PROCEDURE: CPT | Performed by: INTERNAL MEDICINE

## 2019-04-07 PROCEDURE — 87449 NOS EACH ORGANISM AG IA: CPT | Performed by: INTERNAL MEDICINE

## 2019-04-07 PROCEDURE — 83930 ASSAY OF BLOOD OSMOLALITY: CPT | Performed by: INTERNAL MEDICINE

## 2019-04-07 PROCEDURE — 84295 ASSAY OF SERUM SODIUM: CPT | Performed by: NURSE PRACTITIONER

## 2019-04-07 PROCEDURE — 80048 BASIC METABOLIC PNL TOTAL CA: CPT | Performed by: INTERNAL MEDICINE

## 2019-04-07 PROCEDURE — 85025 COMPLETE CBC W/AUTO DIFF WBC: CPT | Performed by: INTERNAL MEDICINE

## 2019-04-07 PROCEDURE — 71046 X-RAY EXAM CHEST 2 VIEWS: CPT

## 2019-04-07 PROCEDURE — 87327 CRYPTOCOCCUS NEOFORM AG IA: CPT | Performed by: INTERNAL MEDICINE

## 2019-04-07 PROCEDURE — 36592 COLLECT BLOOD FROM PICC: CPT | Performed by: INTERNAL MEDICINE

## 2019-04-07 PROCEDURE — 87305 ASPERGILLUS AG IA: CPT | Performed by: INTERNAL MEDICINE

## 2019-04-07 PROCEDURE — 25000128 H RX IP 250 OP 636: Performed by: INTERNAL MEDICINE

## 2019-04-07 PROCEDURE — 36415 COLL VENOUS BLD VENIPUNCTURE: CPT | Performed by: INTERNAL MEDICINE

## 2019-04-07 PROCEDURE — 83935 ASSAY OF URINE OSMOLALITY: CPT | Performed by: NURSE PRACTITIONER

## 2019-04-07 RX ORDER — AMLODIPINE BESYLATE 10 MG/1
10 TABLET ORAL DAILY
Status: DISCONTINUED | OUTPATIENT
Start: 2019-04-08 | End: 2019-04-08 | Stop reason: HOSPADM

## 2019-04-07 RX ORDER — SODIUM CHLORIDE 9 MG/ML
INJECTION, SOLUTION INTRAVENOUS CONTINUOUS
Status: DISCONTINUED | OUTPATIENT
Start: 2019-04-07 | End: 2019-04-07

## 2019-04-07 RX ORDER — CALCIUM CARBONATE 500 MG/1
500 TABLET, CHEWABLE ORAL DAILY PRN
Status: DISCONTINUED | OUTPATIENT
Start: 2019-04-07 | End: 2019-04-07

## 2019-04-07 RX ORDER — AMLODIPINE BESYLATE 2.5 MG/1
2.5 TABLET ORAL ONCE
Status: COMPLETED | OUTPATIENT
Start: 2019-04-07 | End: 2019-04-07

## 2019-04-07 RX ADMIN — MYCOPHENOLATE MOFETIL 1000 MG: 250 CAPSULE ORAL at 07:43

## 2019-04-07 RX ADMIN — CALCIUM CARBONATE (ANTACID) CHEW TAB 500 MG 500 MG: 500 CHEW TAB at 17:37

## 2019-04-07 RX ADMIN — CALCIUM CARBONATE (ANTACID) CHEW TAB 500 MG 500 MG: 500 CHEW TAB at 07:43

## 2019-04-07 RX ADMIN — GUAIFENESIN 600 MG: 600 TABLET, EXTENDED RELEASE ORAL at 07:44

## 2019-04-07 RX ADMIN — CYCLOSPORINE 25 MG: 25 CAPSULE, LIQUID FILLED ORAL at 07:43

## 2019-04-07 RX ADMIN — Medication 5 ML: at 11:17

## 2019-04-07 RX ADMIN — SULFAMETHOXAZOLE AND TRIMETHOPRIM 1 TABLET: 400; 80 TABLET ORAL at 07:44

## 2019-04-07 RX ADMIN — UMECLIDINIUM BROMIDE AND VILANTEROL TRIFENATATE 1 PUFF: 62.5; 25 POWDER RESPIRATORY (INHALATION) at 07:45

## 2019-04-07 RX ADMIN — Medication 5 ML: at 16:13

## 2019-04-07 RX ADMIN — MYCOPHENOLATE MOFETIL 1000 MG: 250 CAPSULE ORAL at 20:22

## 2019-04-07 RX ADMIN — OMEPRAZOLE 20 MG: 20 CAPSULE, DELAYED RELEASE ORAL at 07:44

## 2019-04-07 RX ADMIN — SODIUM CHLORIDE: 9 INJECTION, SOLUTION INTRAVENOUS at 10:37

## 2019-04-07 RX ADMIN — CYCLOSPORINE 50 MG: 25 CAPSULE, LIQUID FILLED ORAL at 20:23

## 2019-04-07 RX ADMIN — AMLODIPINE BESYLATE 7.5 MG: 5 TABLET ORAL at 07:44

## 2019-04-07 RX ADMIN — AMLODIPINE BESYLATE 2.5 MG: 2.5 TABLET ORAL at 11:51

## 2019-04-07 RX ADMIN — GUAIFENESIN 600 MG: 600 TABLET, EXTENDED RELEASE ORAL at 20:22

## 2019-04-07 ASSESSMENT — ACTIVITIES OF DAILY LIVING (ADL)
ADLS_ACUITY_SCORE: 15

## 2019-04-07 ASSESSMENT — MIFFLIN-ST. JEOR: SCORE: 1242.74

## 2019-04-07 NOTE — PROGRESS NOTES
Time of notification: 10:43 AM  Provider notified:Ann-Marie Joshua, CNP 8846  Patient status: How fast do you want NA level to increase.   Temp:  [97.7  F (36.5  C)-98.6  F (37  C)] 98.6  F (37  C)  Pulse:  [79] 79  Heart Rate:  [70-82] 73  Resp:  [18-22] 18  BP: (132-158)/(62-75) 144/70  SpO2:  [94 %-97 %] 94 %  Orders received: not to increase more than 1-2 mmol/L by 1600 check.

## 2019-04-07 NOTE — PLAN OF CARE
Neuro: A&Ox4.   Cardiac: SR. VSS.   Respiratory: Sating 95% on RA.  GI/: Adequate urine output. BM X1  Diet/appetite: Tolerating 1.2 FR and Regular diet. Eating well.  Activity:  SBA  Pain: At acceptable level on current regimen.   Skin: No new deficits noted.  LDA's: Double Lumen Midline. NS 75 ml/hour. AV fistula    Plan: Continue with POC. Notify primary team with changes.     Implemented droplet precautions for RVP rule out. Chest Xray completed.

## 2019-04-07 NOTE — PROGRESS NOTES
Time of notification: 4:07 PM  Provider notified:Ann-Marie Tres, CNP 3990  Patient status: Patient reports some epigastric/stomach pain. Reports simethicone further upsets her stomach when she takes it. Pt had PRN tums this AM with relief. Patient is requesting more Tums, may we have order?  Temp:  [97.8  F (36.6  C)-98.6  F (37  C)] 97.8  F (36.6  C)  Pulse:  [79] 79  Heart Rate:  [70-79] 73  Resp:  [18-20] 18  BP: (132-158)/(62-78) 138/78  SpO2:  [94 %-97 %] 96 %  Orders received:  New order for tums

## 2019-04-07 NOTE — PLAN OF CARE
Neuro: A&Ox4. Pleasant   Cardiac: SR. HR 70s. SBP 130s. Afebrile   Respiratory: Sating >95% on RA  GI/: Adequate urine output. 1 BM this shift  Diet/appetite: Tolerating regular diet. Eating well. 1.2 L FR   Activity:  Assist of standby, up to chair and in halls.  Pain: Complains of heartburn. Simethicone given   Skin: No new deficits noted.   LDA's: 2 lumen midline R arm. L AV fistula      Plan: Continue with POC. Notify primary team with changes. Na checks q6h, osmo checks q4h and urine osmo q4h

## 2019-04-07 NOTE — PROGRESS NOTES
Time of notification: 6:20 PM  Provider notified: gold team cross cover at 1221  Patient status:  Last sodium was 127, when would you like to recheck and how frequently?  Temp:  [97.8  F (36.6  C)-98.6  F (37  C)] 97.9  F (36.6  C)  Pulse:  [79] 79  Heart Rate:  [70-80] 80  Resp:  [18-20] 18  BP: (132-158)/(62-78) 146/74  SpO2:  [94 %-98 %] 98 %  Orders received: no recheck on NA tonight

## 2019-04-07 NOTE — PROGRESS NOTES
[facility], [place]  Transplant Nephrology Progress Note  Date of Admission:  4/2/2019    Assessment & Plan   # DDKT: Stable/improved, mild TOSIN with creatinine ~ 1.8-1.9 for the last few days.  Okay urine output.  Patient was likely a bit prerenal on admission with diuretic and CNI, as well as ACEI.  Doubt rejection at this time post transplant and okay drug levels, although patient likely has some chronic changes.  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 Nephrotic range (~ 4 grams), which has trended up over the last couple of years.  Differential dx for proteinuria would be recurrent IgA versus denovo GN (membranous nephropathy or FSGS).  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   - Baseline Cr ~ 1.4-1.6 mg/dL    - Proteinuria: Nephrotic range at ~ 4 grams   - Date DSA last checked: 4/2016  Latest DSA: Yes, DR51: 2318   - BK Viremia: No   - Kidney Tx Biopsy: No    # Immunosuppression: Cyclosporine (goal  ) and Mycophenolate mofetil (goal  1-3.5)   - Changes: No    # Prophylaxis:               - PJP: Sulfa/TMP (Bactrim)    # Hypertension/Volume Status: Borderline control; Goal BP: < 130/80,   - Changes: Yes - would recommend increasing amlodipine to 10 mg nightly. Consider changing to ARB due to cough once kidney function improves and sodium has normalized. For now can add coreg 6.25 mg po bid.    # Anemia in Chronic Renal Disease: Hgb: Stable   - Iron studies: Not checked recently    # Mineral Bone Disorder:    - Secondary renal hyperparathyroidism; PTH level is: Minimally elevated   - Vitamin D; level is: Low and recommend starting cholecalciferol 1000 units daily.   - Calcium; level is: Normal    # Electrolytes:   - Potassium; level: Normal  - Magnesium; level: Normal  - Bicarbonate; level: Normal    # Hyponatremia: had significant fluctuation but seems to stabilize.  "Will start IV fluid with NS today and reassess sodium q 6-12 hrs      # Cough: Unclear etiology, consider pulmonary consultation. Now with expectoration that was blood tinged. Discussed with hospitalist team an expanded work up to rule out infectious etiology. Of note CT on admission was non revealing. Repeat CXR without major changes. Will be cautious with IV fluid, ok to use small dose of lasix for volume management if becomes hypervolemic and stop IVF.   - When considering to add back antihypertension medications, would change ACEI to ARB would hold off on this until the TOSIN has resolved and the sodium has normalized     # Diastolic cardiac dysfunction              - compensated     # Medical Compliance: Yes    # Transplant History:  Etiology of kidney failure: IgA nephropathy  Tx: DDKT  Transplant: 2007 (Kidney), 1998 (Kidney)  Donor Type:  - Brain Death Donor Class: Standard Criteria Donor  Significant changes in immunosuppression: None  Significant transplant-related complications: EBV viremia    Recommendations were communicated to the primary team via this note.    Moe Johnston MD  Pager: [phone]    Interval History  Ms. Lala's kidney function is improved with stable creatinine ~ 1.6 mg/dL.  Good urine output.  Serum sodium is stable Her mentation is good. She continues to cough and sputum was blood tinged or slightly red.       Review of Systems    4 point ROS was obtained and negative, except as noted in Interval History.    Physical Exam      /78 (BP Location: Right arm)   Pulse 79   Temp 97.8  F (36.6  C) (Oral)   Resp 18   Ht 1.499 m (4' 11\")   Wt 73.2 kg (161 lb 6.4 oz)   SpO2 96%   BMI 32.60 kg/m       GENERAL APPEARANCE: alert and no distress  HENT: mouth without ulcers or lesions  RESP: anterior rhonchi, otherwise lungs clear to auscultation - no rales or wheezes.  CV: regular rhythm, normal rate, no rub, no murmur  EDEMA: trace LE edema bilaterally  ABDOMEN: " soft, nondistended, nontender, bowel sounds normal, overweight  MS: extremities normal - no gross deformities noted, no evidence of inflammation in joints, no muscle tenderness  SKIN: no rash  TX KIDNEY: normal  DIALYSIS ACCESS: None    Data   CMP  Recent Labs   Lab 04/07/19  0658 04/07/19  0324 04/06/19  2216 04/06/19  1750  04/06/19  0735  04/05/19  0549  04/04/19  0700   * 128* 127* 127*   < > 121*   < > 119*   < > 122*   POTASSIUM 4.6  --   --   --   --  4.2  --  3.6  --  3.8   CHLORIDE 98  --   --   --   --  93*  --  87*  --  89*   CO2 19*  --   --   --   --  20  --  21  --  22   ANIONGAP 10  --   --   --   --  8  --  10  --  11   GLC 87  --   --   --   --  272*  --  129*  --  94   BUN 16  --   --   --   --  15  --  16  --  16   CR 1.72*  --   --   --   --  1.63*  --  1.84*  --  1.88*   GFRESTIMATED 33*  --   --   --   --  36*  --  31*  --  30*   GFRESTBLACK 39*  --   --   --   --  41*  --  36*  --  35*   SHAVON 9.2  --   --   --   --  8.6  --  8.8  --  9.3    < > = values in this interval not displayed.     CBC  Recent Labs   Lab 04/07/19  1133 04/06/19  0735 04/04/19  0918 04/03/19  0747   HGB 10.0* 9.1* 11.4* 11.3*   WBC 4.5 3.0* 3.9* 3.0*   RBC 3.54* 3.34* 4.07 4.07   HCT 30.7* 28.5* 34.6* 35.0   MCV 87 85 85 86   MCH 28.2 27.2 28.0 27.8   MCHC 32.6 31.9 32.9 32.3   RDW 12.8 12.7 12.4 12.5    206 194 196     INRNo lab results found in last 7 days.  ABGNo lab results found in last 7 days.   Urine Studies  Recent Labs   Lab Test 04/02/19 2008 03/28/19  1150 11/07/18  1914 05/29/18 2052   COLOR Straw Straw Straw Straw   APPEARANCE Clear Clear Clear Clear   URINEGLC Negative Negative Negative Negative   URINEBILI Negative Negative Negative Negative   URINEKETONE Negative Negative Negative Negative   SG 1.003 1.004 1.003 1.003   UBLD Negative Negative Negative Negative   URINEPH 6.5 6.0 6.0 6.0   PROTEIN 30* 100* 30* 30*   NITRITE Negative Negative Negative Negative   LEUKEST Moderate* Trace*  Moderate* Large*   RBCU 0 1 <1 1   WBCU 3 3 2 7*     Recent Labs   Lab Test 02/16/19  0737 02/02/18  0742 11/17/17  0739 04/28/17  0843 10/28/16  0913 04/29/16  1145 06/12/15  1011 01/23/15  0700 02/05/13  0715 10/18/12  1247 04/02/12  1510 10/10/11  1356 02/16/11  0715   UTPG 3.92* 2.31* 1.74* 1.86* 1.84* 0.49* 0.73* 0.77* 0.63* 0.43* 0.57* 0.46* 0.21*     PTH  Recent Labs   Lab Test 04/29/16  1043 02/05/16  1039 10/27/15  0555 03/26/15  1734 12/16/13  0636 10/01/13  0718 04/02/13  0700 03/01/13  0918 07/19/12  0649 10/10/11  1341   PTHI 124* 140* 218* 251* 166* 223* 182* 217* 171* 350*     Iron Studies  No lab results found.    IMAGING:  All imaging studies reviewed by me.

## 2019-04-07 NOTE — PROGRESS NOTES
"[facility], [facility] Progress Note - Hospitalist Service, Gold 3       Date of Admission:  4/2/2019  Assessment & Plan      Joshua Lala is a 56 year old Hmong speaking female with past medical history significant for IgA nephropathy s/p DDKT x2 (1998, 2007) c/b chronic rejection and EBV viremia, HTN, suspected pulmonary HTN, hx grade III diastolic dysfunction, and anemia admitted for worsening cough concerning for volume overload and incidentally found to have hyponatremia to 124.      # Acute severe hyponatremia  # ?Chronic hyponatremia   Improving. Likely hypovolemic hyponatremia in setting of overdiuresis, urine lytes not c/w SIADH.  TSH, cortisol, uric acid wnl. Na 126 ->128 last 24hr.  Stopped hypertonic saline AM 4/6.  Asymptomatic.   - Appreciate Nephrology recs   - Start NS 0.9% at 75cc/hr   - Check Na at 1600; if change +/- of 1-2 mEq will decrease to BID Na checks starting tomorrow 4/8  - Free water restriction 1.5L daily      # Cough, ?chest tightness  # Hx pulmonary hypertension    Onset of productive cough 3 weeks ago, no complaints today. Possibly 2/2 resolving viral/bacterial illness vs untreated GERD, less likely acute CV process.  S/p tx of URI w/ course Azithromycin and Prednisone on 3/12.  Had emesis x 1 overnight 4/4, with positional discomfort at HS.  Reports \"burning\" sensation midepigastric area. Low suspicion for acute infection given afebrile, normal HR, normal SpO2, and neg CXR.  From cardiopulmonary standpoint, TTE on 4/3 with EF 60-65%, no WMAs, normal wall thickness, and grade II moderate diastolic dysfunction, with RVSP 28.9+ RAP - improved from previous in 2/2017, which noted grade III diastolic dysfunction and increased RVSP 38.5.  Previously recommended to have further workup with [facility] but lost to follow up with Cardiology after 2017. CT C/A/P on 4/5 negative.    - Daily weights, I/Os   - Overnight oximetry study completed 4/5, isolated hypoxia to " "86% for less than one minute  - Continuous pulse ox overnight     - Wean O2 as tolerated day/evening   - Would likely benefit from sleep study outpt  - Consider RT chronic disease team consult if persistently hypoxic overnight      # Uncontrolled GERD - Stable, currently feels well.  Had emesis x 1 overnight 4/4, with positional discomfort at HS.  Reports \"burning\" sensation midepigastric area, as above.  Last EGD in 2008 at [facility] per pt/family report, no acute findings.  Nausea and vomiting resolved.  No constipation or diarrhea.   - Daily PPI and Carafate at HS   - Anti-emetics PRN   - Zantac BID PRN, Maalox PRN   - Recommend EGD referral from PCP     # Mild TOSIN   # IgA nephropathy s/p DDKT x2 (1998, 2007) c/b chronic rejection and EBV viremia  # Anemia   Anemia felt to be likely 2/2 chronic renal disease/insufficiency, though no recent iron studies available.  Hgb ~ 11.3 this admission with decrease to 9.1 this AM, possible dilutional component. No s/s acute bleed. BL Cr 1.4-1.5, slightly increased to 1.72 in setting of recent hypertonic saline plus fluid restriction.  On ACE prior to admission and switched to ARB for cough.  Received Losartan x 1 dose on 4/6.    - Appreciate Transp Nephrology following   - IS: Currently on Cyclosporine (goal ) and Mycophenolate (goal 1.0-3.5); no changes w/ PTA doses at this time   - Ppx: Continue Bactrim  - Trend renal function daily  - Monitor renal function daily   - Would stop ACE and discharge on Losartan pending renal fxn       # HTN - BPs slightly elevated last few days, possibly d/t IVFs and holding PTA meds.  Appears euvolemic.   - Continue Amlodipine 7.5mg   - Hold hydrochlorothiazide and Losartan in setting of TOSIN and hyponatremia   - Monitor BPs     # Leukopenia - Resolved, WBC 4.5.       Diet: Combination Diet Regular Diet Adult  Snacks/Supplements Adult: Other; Pt may order supplements PRN; Between Meals  Fluid restriction OTHER (SEE COMMENTS) (Limit 1-1.2 " L, incl free water)    DVT Prophylaxis: Mechanical   Staples Catheter: not present  Code Status: Full Code      Disposition Plan   Expected discharge: 1-2 days, recommended to prior living arrangement once sodium levels improving.  Entered: MELANY Duque CNP 04/07/2019, 7:34 AM       The patient's care was discussed with the Attending Physician, Dr. Trinh Perez.    MELANY Duque CNP  Hospitalist Service, [address]  Pager: [phone]  Please see sticky note for cross cover information    ______________________________________________________________________    Interval History     Joshua is seen w/ .  She feels good today.  Reports heartburn and reflux symptoms ongoing but better.  Denies headaches, dizziness, fatigue, chest pain, abdominal pain, constipation, and dysuria.     Data reviewed today: I reviewed all medications, new labs and imaging results over the last 24 hours.   Physical Exam   Vital Signs: Temp: 98  F (36.7  C) Temp src: Oral BP: 158/75 Pulse: 79 Heart Rate: 79 Resp: 19 SpO2: 94 % O2 Device: None (Room air)    Weight: 161 lbs 6.4 oz    GENERAL: Alert and oriented x 3. Well nourished, well developed.  No acute distress.    HEENT: Normocephalic, atraumatic. Anicteric sclera. Mucous membranes moist.   CV: RRR. S1, S2. No murmurs appreciated.   RESPIRATORY: Effort normal on room air. Lungs w/ bilateral rhonchi, no wheezes.   GI: Abdomen soft and non distended, bowel sounds present x all 4 quadrants. No tenderness, rebound, or guarding.   NEUROLOGICAL: No focal deficits. Follows commands.  Strength equal in upper and lower extremities.   MUSCULOSKELETAL: No joint swelling or tenderness. Moves all extremities.   EXTREMITIES: No gross deformities. No peripheral edema.   SKIN: Grossly warm, dry, and intact. Slight tenting. No jaundice. No rashes.       Data   Recent Labs   Lab 04/07/19  0324 04/06/19  2216 04/06/19  1750  04/06/19  0785   04/05/19  0549  04/04/19  0918 04/04/19  0700  04/03/19  0747 04/02/19  1950   WBC  --   --   --   --  3.0*  --   --   --  3.9*  --   --  3.0* 4.3   HGB  --   --   --   --  9.1*  --   --   --  11.4*  --   --  11.3* 11.3*   MCV  --   --   --   --  85  --   --   --  85  --   --  86 86   PLT  --   --   --   --  206  --   --   --  194  --   --  196 212   * 127* 127*   < > 121*   < > 119*   < >  --  122*   < > 128* 124*   POTASSIUM  --   --   --   --  4.2  --  3.6  --   --  3.8  --  3.5 4.2   CHLORIDE  --   --   --   --  93*  --  87*  --   --  89*  --  94 91*   CO2  --   --   --   --  20  --  21  --   --  22  --  24 23   BUN  --   --   --   --  15  --  16  --   --  16  --  18 16   CR  --   --   --   --  1.63*  --  1.84*  --   --  1.88*  --  1.94* 1.88*   ANIONGAP  --   --   --   --  8  --  10  --   --  11  --  10 10   SHAVON  --   --   --   --  8.6  --  8.8  --   --  9.3  --  9.7 9.3   GLC  --   --   --   --  272*  --  129*  --   --  94  --  136* 114*   TROPI  --   --   --   --   --   --   --   --   --   --   --   --  <0.015    < > = values in this interval not displayed.     No results found for this or any previous visit (from the past 24 hour(s)).  Medications       amLODIPine  7.5 mg Oral Daily     cycloSPORINE modified  25 mg Oral QAM     cycloSPORINE modified  50 mg Oral At Bedtime     guaiFENesin  600 mg Oral BID     heparin lock flush  5-10 mL Intracatheter Q24H     mycophenolate  1,000 mg Oral BID     omeprazole  20 mg Oral QAM AC     sulfamethoxazole-trimethoprim  1 tablet Oral Daily     umeclidinium-vilanterol  1 puff Inhalation Daily

## 2019-04-08 ENCOUNTER — OFFICE VISIT (OUTPATIENT)
Dept: INTERPRETER SERVICES | Facility: CLINIC | Age: 54
End: 2019-04-08
Payer: MEDICARE

## 2019-04-08 ENCOUNTER — PATIENT OUTREACH (OUTPATIENT)
Dept: CARE COORDINATION | Facility: CLINIC | Age: 54
End: 2019-04-08

## 2019-04-08 VITALS
OXYGEN SATURATION: 94 % | BODY MASS INDEX: 32.54 KG/M2 | RESPIRATION RATE: 20 BRPM | TEMPERATURE: 98.7 F | HEIGHT: 59 IN | DIASTOLIC BLOOD PRESSURE: 54 MMHG | HEART RATE: 76 BPM | SYSTOLIC BLOOD PRESSURE: 114 MMHG | WEIGHT: 161.4 LBS

## 2019-04-08 LAB
ANION GAP SERPL CALCULATED.3IONS-SCNC: 8 MMOL/L (ref 3–14)
BUN SERPL-MCNC: 15 MG/DL (ref 7–30)
CALCIUM SERPL-MCNC: 9.4 MG/DL (ref 8.5–10.1)
CHLORIDE SERPL-SCNC: 103 MMOL/L (ref 94–109)
CO2 SERPL-SCNC: 18 MMOL/L (ref 20–32)
CREAT SERPL-MCNC: 1.76 MG/DL (ref 0.52–1.04)
GFR SERPL CREATININE-BSD FRML MDRD: 32 ML/MIN/{1.73_M2}
GLUCOSE SERPL-MCNC: 89 MG/DL (ref 70–99)
OSMOLALITY SERPL: 270 MMOL/KG (ref 275–295)
OSMOLALITY SERPL: 273 MMOL/KG (ref 275–295)
OSMOLALITY SERPL: 285 MMOL/KG (ref 275–295)
OSMOLALITY SERPL: 312 MMOL/KG (ref 275–295)
OSMOLALITY UR: 125 MMOL/KG (ref 100–1200)
POTASSIUM SERPL-SCNC: 4.6 MMOL/L (ref 3.4–5.3)
SODIUM SERPL-SCNC: 130 MMOL/L (ref 133–144)

## 2019-04-08 PROCEDURE — 25000131 ZZH RX MED GY IP 250 OP 636 PS 637: Performed by: STUDENT IN AN ORGANIZED HEALTH CARE EDUCATION/TRAINING PROGRAM

## 2019-04-08 PROCEDURE — A9270 NON-COVERED ITEM OR SERVICE: HCPCS | Performed by: STUDENT IN AN ORGANIZED HEALTH CARE EDUCATION/TRAINING PROGRAM

## 2019-04-08 PROCEDURE — 99239 HOSP IP/OBS DSCHRG MGMT >30: CPT | Performed by: INTERNAL MEDICINE

## 2019-04-08 PROCEDURE — 83935 ASSAY OF URINE OSMOLALITY: CPT | Performed by: NURSE PRACTITIONER

## 2019-04-08 PROCEDURE — A9270 NON-COVERED ITEM OR SERVICE: HCPCS | Performed by: NURSE PRACTITIONER

## 2019-04-08 PROCEDURE — 83930 ASSAY OF BLOOD OSMOLALITY: CPT | Performed by: INTERNAL MEDICINE

## 2019-04-08 PROCEDURE — 25000132 ZZH RX MED GY IP 250 OP 250 PS 637: Performed by: NURSE PRACTITIONER

## 2019-04-08 PROCEDURE — 36415 COLL VENOUS BLD VENIPUNCTURE: CPT | Performed by: INTERNAL MEDICINE

## 2019-04-08 PROCEDURE — 25000132 ZZH RX MED GY IP 250 OP 250 PS 637: Performed by: STUDENT IN AN ORGANIZED HEALTH CARE EDUCATION/TRAINING PROGRAM

## 2019-04-08 PROCEDURE — 40000802 ZZH SITE CHECK

## 2019-04-08 PROCEDURE — 80048 BASIC METABOLIC PNL TOTAL CA: CPT | Performed by: INTERNAL MEDICINE

## 2019-04-08 PROCEDURE — T1013 SIGN LANG/ORAL INTERPRETER: HCPCS | Mod: U3

## 2019-04-08 RX ORDER — CALCIUM CARBONATE 500 MG/1
1 TABLET, CHEWABLE ORAL 2 TIMES DAILY PRN
Qty: 30 TABLET | Refills: 0 | Status: ON HOLD | OUTPATIENT
Start: 2019-04-08 | End: 2020-03-16

## 2019-04-08 RX ORDER — AMLODIPINE BESYLATE 5 MG/1
10 TABLET ORAL DAILY
Qty: 30 TABLET | Refills: 11 | Status: ON HOLD | OUTPATIENT
Start: 2019-04-08 | End: 2020-03-15

## 2019-04-08 RX ADMIN — MYCOPHENOLATE MOFETIL 1000 MG: 250 CAPSULE ORAL at 08:29

## 2019-04-08 RX ADMIN — UMECLIDINIUM BROMIDE AND VILANTEROL TRIFENATATE 1 PUFF: 62.5; 25 POWDER RESPIRATORY (INHALATION) at 09:41

## 2019-04-08 RX ADMIN — GUAIFENESIN 600 MG: 600 TABLET, EXTENDED RELEASE ORAL at 08:32

## 2019-04-08 RX ADMIN — OMEPRAZOLE 20 MG: 20 CAPSULE, DELAYED RELEASE ORAL at 08:31

## 2019-04-08 RX ADMIN — CYCLOSPORINE 25 MG: 25 CAPSULE, LIQUID FILLED ORAL at 08:30

## 2019-04-08 RX ADMIN — AMLODIPINE BESYLATE 10 MG: 10 TABLET ORAL at 08:31

## 2019-04-08 RX ADMIN — SULFAMETHOXAZOLE AND TRIMETHOPRIM 1 TABLET: 400; 80 TABLET ORAL at 08:31

## 2019-04-08 ASSESSMENT — ACTIVITIES OF DAILY LIVING (ADL)
ADLS_ACUITY_SCORE: 15

## 2019-04-08 NOTE — PROGRESS NOTES
[facility], [place]  Transplant Nephrology Progress Note  Date of Admission:  2019    Assessment & Plan   # DDKT: Stable/improved, mild TOSIN with creatinine ~ 1.8-1.9 for the last few days.     - Baseline Cr ~ 1.4-1.6 mg/dL    - Proteinuria: Nephrotic range at ~ 4 grams   - Date DSA last checked: 2016  Latest DSA: Yes, DR51: 2318   - BK Viremia: No   - Kidney Tx Biopsy: No    Creatinine now 1.76 mg / dl. Continue immunosuppression as below.     # Immunosuppression: Cyclosporine (goal  ) and Mycophenolate mofetil (goal  1-3.5)   - Changes: No    Last cyclosporine trough was 116 ng / ml on 19.    # Prophylaxis:               - PJP: Sulfa/TMP (Bactrim)    # Hypertension/Volume Status: Borderline control; Goal BP: < 130/80,   - Changes: none. bp this am was 114/54. On amlodipine 10 mg po daily.     As OP, losartan can be started to replace amlodipine.     # Anemia in Chronic Renal Disease: Hgb: Stable   - Iron studies: Not checked recently     Hemoglobin 10 g / dl today.     # Mineral Bone Disorder:    - Secondary renal hyperparathyroidism; PTH level is: Minimally elevated   - Vitamin D; level is: Low and recommend starting cholecalciferol 1000 units daily.   - Calcium; level is: Normal    # Electrolytes:   - Potassium; level: Normal  - Magnesium; level: Normal  - Bicarbonate; level: Normal    # Hyponatremia: 130 this am. Continues to improve.     Serum osm 270, urine osm now low (), implying autocorrection.  This may be the washout of hydrochlorothiazide vs. ADH shutoff from lung inflammation resolution.    # Cough: Unclear etiology, CT chest negative on . Respiratory panel sent  - pending today.     # Diastolic cardiac dysfunction              - compensated     # Medical Compliance: Yes    # Transplant History:  Etiology of kidney failure: IgA nephropathy  Tx: DDKT  Transplant: 2007 (Kidney), 1998 (Kidney)  Donor Type:  - Brain Death Donor  "Class: Standard Criteria Donor  Significant changes in immunosuppression: None  Significant transplant-related complications: EBV viremia    Recommendations were communicated to the primary team via this note.    Carmelo Donato MD    Interval History  Continued cough. No cp, sob, no n/v/d, no f/s/c. No constipation or diarrhea.     Review of Systems    4 point ROS was obtained and negative, except as noted in Interval History.    Physical Exam      /54 (BP Location: Right arm)   Pulse 76   Temp 98.7  F (37.1  C) (Oral)   Resp 20   Ht 1.499 m (4' 11\")   Wt 73.2 kg (161 lb 6.4 oz)   SpO2 94%   BMI 32.60 kg/m       GENERAL APPEARANCE: alert and no distress  HENT: mouth without ulcers or lesions  RESP: anterior rhonchi, otherwise lungs clear to auscultation - no rales or wheezes.  CV: regular rhythm, normal rate, no rub, no murmur  EDEMA: trace LE edema bilaterally  ABDOMEN: soft, nondistended, nontender, bowel sounds normal, overweight  MS: extremities normal - no gross deformities noted, no evidence of inflammation in joints, no muscle tenderness  SKIN: no rash    Data   CMP  Recent Labs   Lab 04/08/19  0721 04/07/19  1557 04/07/19  0658 04/07/19  0324  04/06/19  0735  04/05/19  0549   * 127* 127* 128*   < > 121*   < > 119*   POTASSIUM 4.6  --  4.6  --   --  4.2  --  3.6   CHLORIDE 103  --  98  --   --  93*  --  87*   CO2 18*  --  19*  --   --  20  --  21   ANIONGAP 8  --  10  --   --  8  --  10   GLC 89  --  87  --   --  272*  --  129*   BUN 15  --  16  --   --  15  --  16   CR 1.76*  --  1.72*  --   --  1.63*  --  1.84*   GFRESTIMATED 32*  --  33*  --   --  36*  --  31*   GFRESTBLACK 38*  --  39*  --   --  41*  --  36*   SHAVON 9.4  --  9.2  --   --  8.6  --  8.8    < > = values in this interval not displayed.     CBC  Recent Labs   Lab 04/07/19  1133 04/06/19  0735 04/04/19  0918 04/03/19  0747   HGB 10.0* 9.1* 11.4* 11.3*   WBC 4.5 3.0* 3.9* 3.0*   RBC 3.54* 3.34* 4.07 4.07   HCT 30.7* 28.5* 34.6* " 35.0   MCV 87 85 85 86   MCH 28.2 27.2 28.0 27.8   MCHC 32.6 31.9 32.9 32.3   RDW 12.8 12.7 12.4 12.5    206 194 196     Urine Studies  Recent Labs   Lab Test 04/02/19 2008 03/28/19  1150 11/07/18  1914 05/29/18  2052   COLOR Straw Straw Straw Straw   APPEARANCE Clear Clear Clear Clear   URINEGLC Negative Negative Negative Negative   URINEBILI Negative Negative Negative Negative   URINEKETONE Negative Negative Negative Negative   SG 1.003 1.004 1.003 1.003   UBLD Negative Negative Negative Negative   URINEPH 6.5 6.0 6.0 6.0   PROTEIN 30* 100* 30* 30*   NITRITE Negative Negative Negative Negative   LEUKEST Moderate* Trace* Moderate* Large*   RBCU 0 1 <1 1   WBCU 3 3 2 7*     Recent Labs   Lab Test 02/16/19  0737 02/02/18  0742 11/17/17  0739 04/28/17  0843 10/28/16  0913 04/29/16  1145 06/12/15  1011 01/23/15  0700 02/05/13  0715 10/18/12  1247 04/02/12  1510 10/10/11  1356 02/16/11  0715   UTPG 3.92* 2.31* 1.74* 1.86* 1.84* 0.49* 0.73* 0.77* 0.63* 0.43* 0.57* 0.46* 0.21*     PTH  Recent Labs   Lab Test 04/29/16  1043 02/05/16  1039 10/27/15  0555 03/26/15  1734 12/16/13  0636 10/01/13  0718 04/02/13  0700 03/01/13  0918 07/19/12  0649 10/10/11  1341   PTHI 124* 140* 218* 251* 166* 223* 182* 217* 171* 350*     IMAGING:  All imaging studies reviewed by me.

## 2019-04-08 NOTE — PLAN OF CARE
DISCHARGE                         4/8/2019  3:00 PM  ----------------------------------------------------------------------------  Discharged to: Home  Via: private transportation via daughter   Accompanied by: Daughter   Discharge Instructions: diet, activity, medications, follow up appointments, when to call the MD, aftercare instructions completed with lAfonso Romero  / Giggzo   Prescriptions: To filled and picked up by Daughter at [facility] discharge pharmacy; medication list reviewed & sent with pt.   Follow Up Appointments: pt will make for Friday; information given  Belongings: All sent with pt  IV: d/c'd  Telemetry: d/c'd  Pt exhibits understanding of above discharge instructions; all questions answered.    Discharge Paperwork: Signed, copied, and sent home with patient.

## 2019-04-08 NOTE — PLAN OF CARE
Neuro: A&Ox4. Pleasant  Cardiac: SR. VSS. HR 70s. Afebrile   Respiratory: Sating >95% on 1 lpm NC  GI/: Adequate urine output. No BM this shift  Diet/appetite: Tolerating regular diet. Eating well. 1.2 L FR- needs teaching and reinforcement  Activity:  Assist of standby, up to chair and in halls.  Pain: At acceptable level on current regimen. Patient denies pain  Skin: No new deficits noted.  LDA's: 2 lumen midline R arm. L arm fistula    Plan: Continue with POC. Notify primary team with changes. Trend Na. Awaiting RVP results

## 2019-04-08 NOTE — DISCHARGE SUMMARY
[facility], [place]  Hospitalist Discharge Summary       Date of Admission:  4/2/2019  Date of Discharge:  4/8/2019  Discharging Provider: Trinh Perez MD  Discharge Team: Hospitalist Service, Gold 3    Discharge Diagnoses   Discharge Diagnoses   Symptomatic hyponatremia  Cough w one episode of blood tinged sputum  IgA nephropathy s/p kidney transplant  CKD  HTN    Follow-ups Needed After Discharge   Follow-up Appointments     Adult [facility] Follow-up and recommended labs and tests      Follow up with primary care provider in 2-3 days for routine hospital   follow up and to have repeat labs ( BMP) done  Follow up with nephrology in 2 weeks time for routine hospital follow up    Appointments on [place] and/or [facility] (with [facility] or [facility]   provider or service). Call [phone] if you haven't heard regarding   these appointments within 7 days of discharge.             Unresulted Labs Ordered in the Past 30 Days of this Admission     Date and Time Order Name Status Description    4/8/2019 0600 Osmolality urine In process     4/8/2019 0500 Osmolality In process     4/7/2019 2100 Osmolality In process     4/7/2019 1901 Histoplasma Capsulatum Antigen In process     4/7/2019 1901 1,3 Beta D glucan fungitell In process     4/7/2019 1901 Aspergillus Galactomannan Antigen In process     4/7/2019 1900 Blastomyces Agn Quant EIA Blood In process     4/7/2019 1828 ARUP Miscellaneous Test In process     4/7/2019 1619 Respiratory Virus Panel by PCR In process           Discharge Disposition   Discharged to home  Condition at discharge: Stable    Hospital Course   Hospital Course  Joshua Lala is a 56 year old Hmong speaking female with past medical history significant for IgA nephropathy s/p DDKT x2 (1998, 2007) c/b chronic rejection and EBV viremia, HTN, suspected pulmonary HTN, hx grade III diastolic dysfunction, and anemia admitted for worsening cough concerning for volume overload and  incidentally found to have hyponatremia to 124.       # Acute severe symptomatic hyponatremia  Na 116 with fatigue and nausea. It was difficult to assess her volume status. She did not significantly improve with fluid restriction nor IV NaCl infusion. She was recently started on hydrochlorothiazide which was stopped on admission. She was ultimately started on 2% NaCl with correction of Na. On discharge, her Na is 130 and her symptoms have resolved. Nephrology was consulted this admission.   She will need close monitoring of Na as outpatient. She will continue fluid restriction 1.5L daily. STOP hydrochlorothiazide.    # Cough  # Hx pulmonary hypertension    Onset of cough was 3 weeks PTA. Possibly 2/2 resolving viral/bacterial illness vs untreated GERD. ACE-I was held on admission. CXR was unremarkable. CT chest was unremarkable. TTE on 4/3 showed EF 60-65%, no WMAs, normal wall thickness, and grade II moderate diastolic dysfunction, with RVSP 28.9+ RAP - improved from previous in 2/2017. Her cough overall improved throughout this admission. During this admission, she had one cough that had streak of blood so further ID work up was sent. She did not have subsequent blood with sputum. She felt this was laryngeal in origin. Since cough could be secondary to ACE-I, renal rec to start ARB.    # Mild TOSIN on CKD  # IgA nephropathy s/p DDKT x2 (1998, 2007) c/b chronic rejection and EBV viremia  BL Cr 1.4-1.5, slightly increased on admission. ACE-I was held.  She was kept on Cyclosporine (goal ) and Mycophenolate (goal 1.0-3.5) during this admission. Plan is to replace ACE with Losartan once TOSIN improved.     # HTN - BPs elevated during this admission after stopping ACE-I and hydrochlorothiazide. She was kept on amlodipine which was uptitrated. ARB to be started instead of ACE-I given episode of cough. hydrochlorothiazide to be stopped.      Consultations This Hospital Stay   NEPHROLOGY KIDNEY/PANCREAS TRANSPLANT ADULT  IP CONSULT  PHYSICAL THERAPY ADULT IP CONSULT  VASCULAR ACCESS ADULT IP CONSULT  VASCULAR ACCESS CARE ADULT IP CONSULT    Code Status   Full Code    Time Spent on this Encounter   I, Trinh Perez, personally saw the patient today and spent greater than 30 minutes discharging this patient.       Trinh Perez MD  [facility], [place]  ______________________________________________________________________    Physical Exam   Vital Signs: Temp: 98.7  F (37.1  C) Temp src: Oral BP: 114/54 Pulse: 76 Heart Rate: 74 Resp: 20 SpO2: 94 % O2 Device: None (Room air) Oxygen Delivery: 1 LPM  Weight: 161 lbs 6.4 oz  General Appearance: Alert and oriented  Respiratory: coarse breath sound, no wheezing or crackles  Cardiovascular: RRR 3/6 systolic murmur  GI: soft non tender non distended       Primary Care Physician   TAY BREWER    Immunizations       Discharge Orders      Reason for your hospital stay    Dear Joshua Lala    Your were hospitalized at [facility] with cough and low sodium levels and treated with medications and a fluid restriction.  Over your hospitalization your sodium and cough improved and today you are ready to be discharged home.  If you continue your fluid restriction, you should continue to improve but if you develop fever, shortness of breath, light headedness, chest pain please seek medical attention.    We are suggesting the following medication changes:  1. Stop taking lisinopril and hydrochlorothiazide  2. Start taking norvasc at the dose of 10mg daily  3. Start taking prilosec daily to help with heartburn  4. Start taking vitamin D (cholecalciferol) daily as recommended by the kidney team      Please have the following tests done:  1. Repeat sodium level in 2-3 days    It was a pleasure meeting with you today. Thank you for allowing me and my team the privilege of caring for you today. You are the reason we are here, and I truly hope we provided  you with the excellent service you deserve. Please let us know if there is anything else we can do for you so that we can be sure you are leaving completely satisfied with your care experience.    Your hospital unit at the time of discharge is 6B so if you have any questions regarding your discharge paperwork please call the hospital at [phone] and ask to talk to a nurse on 6B.    Take care!  Yuridia Rios PA-C  Department of Medicine  [facility]     Adult [facility] [email or web address] Follow-up and recommended labs and tests    Follow up with primary care provider in 2-3 days for routine hospital follow up and to have repeat labs ( BMP) done  Follow up with nephrology in 2 weeks time for routine hospital follow up    Appointments on [place] and/or [facility] (with [facility] [email or web address] or [facility] provider or service). Call [phone] if you haven't heard regarding these appointments within 7 days of discharge.     Activity    Your activity upon discharge: activity as tolerated     When to contact your care team    Please contact your primary care provider or seek medical care if you develop chest pain, shortness of breath, fever >101F, chills, abdominal pain, or if any other concerns arise.     Full Code     Diet    Follow this diet upon discharge: regular diet, 1.5Liter daily fluid restriction       Significant Results and Procedures   Most Recent 3 BMP's:  Recent Labs   Lab Test 04/08/19  0721 04/07/19  1557 04/07/19  0658  04/06/19  0735   * 127* 127*   < > 121*   POTASSIUM 4.6  --  4.6  --  4.2   CHLORIDE 103  --  98  --  93*   CO2 18*  --  19*  --  20   BUN 15  --  16  --  15   CR 1.76*  --  1.72*  --  1.63*   ANIONGAP 8  --  10  --  8   SHAVON 9.4  --  9.2  --  8.6   GLC 89  --  87  --  272*    < > = values in this interval not displayed.   ,   Results for orders placed or performed during the hospital encounter of 04/02/19   Chest XR,  PA & LAT    Narrative    XR CHEST 2 VW 4/2/2019 6:50 PM    Comparison:  3/12/2019    History: cough    Findings: PA and lateral views of the chest. Enlarged cardiac  silhouette. Pulmonary vasculature is distinct. Chronic blunting of the  right costophrenic angle, unchanged. Vascular calcifications of the  aortic knob. No pneumothorax. No convincing pleural effusion. No acute  airspace disease. Cholecystectomy clips in the right upper quadrant.      Impression    Impression: No acute airspace disease. Unchanged cardiomegaly.    I have personally reviewed the examination and initial interpretation  and I agree with the findings.    JESUSITA NAM MD   CT Chest Abdomen Pelvis w/o Contrast    Narrative    EXAMINATION: CT CHEST ABDOMEN PELVIS W/O CONTRAST, 4/5/2019 5:15 PM    TECHNIQUE:  Helical CT images from the thoracic inlet through the  symphysis pubis were obtained without contrast. Contrast dose: None    COMPARISON: Chest radiograph for 2/20/2019, 3/2/2019, renal transplant  ultrasound 1/7/2018    HISTORY: Chest tightness, dyspnea, hyponatremia, ?concern for occult  malignancy    FINDINGS:    Lines/tubes: Right upper extremity PICC tip projects over the right  axillary vein.    Chest:   Thyroid is unremarkable. Cardiomegaly. Mitral enlargement. Mild  calcified plaque of the coronary arteries. Mild/trace calcified plaque  of the aortic annulus. No significant pericardial effusion. Main  pulmonary artery measures 4.1 cm. Calcification within the right lower  lobe pulmonary artery (series 4, image 84), and left lower lobe  pulmonary artery (series 4, image 81) Thoracic aorta measures 4.2 cm.  Normal cervical branching pattern. Mild calcified plaque of the  thoracic aortic arch and major branches. Linear calcified density in  the SMV which may represent chronic thrombus or fibrin sheath. Central  tracheobronchial tree is patent. Mild bronchial wall thickening. No  significant bronchiectasis. Trace interlobular septal thickening.  Right basilar linear scarring. Calcified pleural plaques  bilaterally.  Stable 3 mm pulmonary nodule of the right middle lobe (series 4, image  91). No new or enlarged pulmonary nodule.    Abdomen and pelvis: Noncontrast appearance of the liver is  unremarkable. Postoperative changes of cholecystectomy. No significant  biliary dilatation. Spleen is within normal limits. Splenorenal shunt.  Pancreas is unremarkable. Duodenal diverticulum. Bilateral native  renal atrophy. Nonobstructive 4 mm right superior pole and 5 mm right  inferior pole renal calculi versus vascular calcifications. No  hydronephrosis or hydroureter. Right lower quadrant kidney transplant.  Mild pelviectasis. No peritransplant fluid collection. No  hydronephrosis. Severely atrophic and partially calcified left lower  quadrant renal transplant. Urinary bladder is unremarkable. Pelvic  organs are unremarkable for age. No significant free fluid. No  intra-abdominal free air.    No dilated loops bowel. Moderate colonic stool. Appendix is  unremarkable.    Bones and soft tissues: Degenerative changes of the spine      Impression    IMPRESSION:   1. Stable cardiomegaly. Dilated main pulmonary artery measuring up to  4.1 cm. SVC and lower lobe pulmonary artery calcifications suggestive  of calcified chronic thrombus is unchanged.  2. Postoperative a right lower quadrant renal transplant. Atrophy of  the native kidneys.  3. No findings concerning for malignancy in the chest, abdomen or  pelvis.    I have personally reviewed the examination and initial interpretation  and I agree with the findings.    VALERIE KOROMA MD   XR Chest 2 Views    Narrative    EXAMINATION: XR CHEST 2 VW, 4/7/2019 3:43 PM    COMPARISON: 4/2/2019    HISTORY: worsening breath sounds    FINDINGS: Cardiac silhouette remains enlarged. No pleural effusion or  pneumothorax. Mild basilar atelectasis without change. Cholecystectomy  clips.      Impression    IMPRESSION: Continued enlarged cardiac silhouette. No new airspace  opacities.      VALERIE KOROMA MD       Discharge Medications   Current Discharge Medication List      START taking these medications    Details   calcium carbonate (TUMS) 500 MG chewable tablet Take 1 tablet (500 mg) by mouth 2 times daily as needed for heartburn  Qty: 30 tablet, Refills: 0    Associated Diagnoses: Gastroesophageal reflux disease with esophagitis      omeprazole (PRILOSEC) 20 MG DR capsule Take 1 capsule (20 mg) by mouth every morning (before breakfast)  Qty: 30 capsule, Refills: 0    Associated Diagnoses: Gastroesophageal reflux disease with esophagitis      vitamin D3 (CHOLECALCIFEROL) 1000 units (25 mcg) tablet Take 1 tablet (1,000 Units) by mouth daily  Qty: 30 tablet, Refills: 0    Associated Diagnoses: CKD (chronic kidney disease) stage 3, GFR 30-59 ml/min (H)         CONTINUE these medications which have CHANGED    Details   amLODIPine (NORVASC) 5 MG tablet Take 2 tablets (10 mg) by mouth daily  Qty: 30 tablet, Refills: 11    Associated Diagnoses: Essential hypertension         CONTINUE these medications which have NOT CHANGED    Details   albuterol (PROAIR HFA) 108 (90 Base) MCG/ACT inhaler Inhale 2 puffs into the lungs every 4 hours as needed for shortness of breath / dyspnea  Qty: 8 g, Refills: 0      Ipratropium-Albuterol (COMBIVENT RESPIMAT)  MCG/ACT inhaler Inhale 1 puff into the lungs 4 times daily  Qty: 1 Inhaler, Refills: 0    Associated Diagnoses: Wheezing; Cough      NEORAL (BRAND) 25 MG capsule Take 1 capsule (25 mg) by mouth every morning and 2 capsules (50 mg) by mouth every evening  Qty: 90 capsule, Refills: 0    Associated Diagnoses: Kidney replaced by transplant      CELLCEPT (BRAND) 250 MG capsule Take 4 capsules (1,000 mg) by mouth 2 times daily  Qty: 240 capsule, Refills: 3    Associated Diagnoses: Kidney transplanted      Diphenhyd-HC-Nystatin-Tetracyc (FIRST-HARINDER MOUTHWASH) SUSP Swish and swallow 5-10 mLs in mouth every 6 hours as needed  Qty: 237 mL, Refills: 1       guaiFENesin (MUCINEX) 600 MG 12 hr tablet Take 600 mg by mouth as needed      sulfamethoxazole-trimethoprim (BACTRIM/SEPTRA) 400-80 MG tablet Take 1 tablet by mouth daily  Qty: 30 tablet, Refills: 11    Associated Diagnoses: Kidney replaced by transplant; Parathyroid related hypercalcemia (H)         STOP taking these medications       amoxicillin (AMOXIL) 500 MG tablet Comments:   Reason for Stopping:         azithromycin (ZITHROMAX Z-YAKELIN) 250 MG tablet Comments:   Reason for Stopping:         cinacalcet (SENSIPAR) 30 MG tablet Comments:   Reason for Stopping:         cinacalcet (SENSIPAR) 30 MG tablet Comments:   Reason for Stopping:         GENGRAF (BRAND) 25 MG CAPSULE Comments:   Reason for Stopping:         hydrochlorothiazide (HYDRODIURIL) 25 MG tablet Comments:   Reason for Stopping:         lisinopril (PRINIVIL/ZESTRIL) 5 MG tablet Comments:   Reason for Stopping:         predniSONE (DELTASONE) 20 MG tablet Comments:   Reason for Stopping:             Allergies   Allergies   Allergen Reactions     Contrast Dye Shortness Of Breath     No Clinical Screening - See Comments Shortness Of Breath

## 2019-04-09 ENCOUNTER — TELEPHONE (OUTPATIENT)
Dept: TRANSPLANT | Facility: CLINIC | Age: 54
End: 2019-04-09

## 2019-04-09 LAB
1,3 BETA GLUCAN SER-MCNC: 32 PG/ML
B-D GLUCAN INTERPRETATION (1,3): NEGATIVE
FLUAV H1 2009 PAND RNA SPEC QL NAA+PROBE: NEGATIVE
FLUAV H1 RNA SPEC QL NAA+PROBE: NEGATIVE
FLUAV H3 RNA SPEC QL NAA+PROBE: NEGATIVE
FLUAV RNA SPEC QL NAA+PROBE: NEGATIVE
FLUBV RNA SPEC QL NAA+PROBE: NEGATIVE
GALACTOMANNAN AG SERPL QL IA: NEGATIVE
GALACTOMANNAN AG SERPL-ACNC: 0.03
HADV DNA SPEC QL NAA+PROBE: NEGATIVE
HADV DNA SPEC QL NAA+PROBE: NEGATIVE
HMPV RNA SPEC QL NAA+PROBE: NEGATIVE
HPIV1 RNA SPEC QL NAA+PROBE: NEGATIVE
HPIV2 RNA SPEC QL NAA+PROBE: NEGATIVE
HPIV3 RNA SPEC QL NAA+PROBE: NEGATIVE
MICROBIOLOGIST REVIEW: NORMAL
RESULT: NORMAL
RHINOVIRUS RNA SPEC QL NAA+PROBE: NEGATIVE
RSV RNA SPEC QL NAA+PROBE: NEGATIVE
RSV RNA SPEC QL NAA+PROBE: NEGATIVE
SEND OUTS MISC TEST CODE: NORMAL
SEND OUTS MISC TEST SPECIMEN: NORMAL
SPECIMEN SOURCE: NORMAL
TEST NAME: NORMAL

## 2019-04-09 NOTE — PROGRESS NOTES
[facility]: Post-Discharge Note  SITUATION                                                      Admission:    Admission Date: 04/02/19   Reason for Admission: Symptomatic hyponatremia  Discharge:   Discharge Date: 04/08/19  Discharge Diagnosis: Symptomatic hyponatremia  Discharge Service: Hospitalist    BACKGROUND                                                      Joshua Lala is a 56 year old Hmong speaking female with past medical history significant for IgA nephropathy s/p DDKT x2 (1998, 2007) c/b chronic rejection and EBV viremia, HTN, suspected pulmonary HTN, hx grade III diastolic dysfunction, and anemia admitted for worsening cough concerning for volume overload and incidentally found to have hyponatremia to 124.      ASSESSMENT      Discharge Assessment  Patient reports symptoms are: Unchanged  Does the patient have all of their medications?: Yes  Does patient know what their new medications are for?: Yes  Does patient have a follow-up appointment scheduled?: No  Does patient have any other questions or concerns?: No    Post-op  Did the patient have surgery or a procedure: No  Fever: No  Chills: No  Eating & Drinking: eating and drinking without complaints/concerns  Bowel Function: normal  Urinary Status: voiding without complaint/concerns    PLAN                                                      Outpatient Plan:      Follow up with primary care provider in 2-3 days for routine hospital   follow up and to have repeat labs ( BMP) done  Follow up with nephrology in 2 weeks time for routine hospital follow up    No future appointments.        Evelina Sharma, CMA

## 2019-04-09 NOTE — TELEPHONE ENCOUNTER
Patient Call: General    Reason for call: patient's daughter is needing to make a F/U appointment.    Call back needed? Yes    Return Call Needed  Same as documented in contacts section  When to return call?: Same day: [facility]

## 2019-04-10 LAB
LAB SCANNED RESULT: NORMAL
LAB SCANNED RESULT: NORMAL

## 2019-04-17 ENCOUNTER — TELEPHONE (OUTPATIENT)
Dept: NEPHROLOGY | Facility: CLINIC | Age: 54
End: 2019-04-17

## 2019-04-17 DIAGNOSIS — Z94.0 KIDNEY TRANSPLANTED: Primary | ICD-10-CM

## 2019-04-17 NOTE — TELEPHONE ENCOUNTER
[facility] Call Center    Phone Message    May a detailed message be left on voicemail: yes    Reason for Call: Other: Patient needs a 2 week HFU follow up scheduled per [facility] discharge     Action Taken: Message routed to:  [facility] ([facility] Renal

## 2019-04-18 NOTE — TELEPHONE ENCOUNTER
Called and spoke with patients daughter.  Requested that Joshua get labs done tomorrow of this weekend.   Will then review when we need to see her for a follow up apptSamantha Steward verbalized understanding

## 2019-04-20 DIAGNOSIS — Z94.0 KIDNEY TRANSPLANTED: ICD-10-CM

## 2019-04-20 LAB
ANION GAP SERPL CALCULATED.3IONS-SCNC: 5 MMOL/L (ref 3–14)
BUN SERPL-MCNC: 13 MG/DL (ref 7–30)
CALCIUM SERPL-MCNC: 9.7 MG/DL (ref 8.5–10.1)
CHLORIDE SERPL-SCNC: 108 MMOL/L (ref 94–109)
CO2 SERPL-SCNC: 25 MMOL/L (ref 20–32)
CREAT SERPL-MCNC: 1.78 MG/DL (ref 0.52–1.04)
CYCLOSPORINE BLD LC/MS/MS-MCNC: 111 UG/L (ref 50–400)
ERYTHROCYTE [DISTWIDTH] IN BLOOD BY AUTOMATED COUNT: 13 % (ref 10–15)
GFR SERPL CREATININE-BSD FRML MDRD: 32 ML/MIN/{1.73_M2}
GLUCOSE SERPL-MCNC: 88 MG/DL (ref 70–99)
HCT VFR BLD AUTO: 32.9 % (ref 35–47)
HGB BLD-MCNC: 10.1 G/DL (ref 11.7–15.7)
MCH RBC QN AUTO: 27.8 PG (ref 26.5–33)
MCHC RBC AUTO-ENTMCNC: 30.7 G/DL (ref 31.5–36.5)
MCV RBC AUTO: 91 FL (ref 78–100)
PLATELET # BLD AUTO: 148 10E9/L (ref 150–450)
POTASSIUM SERPL-SCNC: 4.8 MMOL/L (ref 3.4–5.3)
RBC # BLD AUTO: 3.63 10E12/L (ref 3.8–5.2)
SODIUM SERPL-SCNC: 138 MMOL/L (ref 133–144)
TME LAST DOSE: NORMAL H
WBC # BLD AUTO: 3.8 10E9/L (ref 4–11)

## 2019-04-20 PROCEDURE — 80158 DRUG ASSAY CYCLOSPORINE: CPT | Performed by: INTERNAL MEDICINE

## 2019-04-23 ENCOUNTER — TELEPHONE (OUTPATIENT)
Dept: TRANSPLANT | Facility: CLINIC | Age: 54
End: 2019-04-23

## 2019-04-23 NOTE — TELEPHONE ENCOUNTER
Spoke with the patient and confirmed nephrology  appointments on June 14, 2019.  Informed patient an itinerary can be accessed on [employer], and will be sent via mail.

## 2019-04-23 NOTE — TELEPHONE ENCOUNTER
Reviewed patient and labs with Dr. Abdul.   No need to be seen in clinic as she was just seen at the end of March.   Ok for patient to do monthly labs and follow up 3 months from last appointment, near the end of June.   Called patients daughter and left a detailed v/m instructing her to do monthly labs.   Message sent to scheduling.

## 2019-05-18 DIAGNOSIS — Z79.899 ENCOUNTER FOR LONG-TERM CURRENT USE OF MEDICATION: ICD-10-CM

## 2019-05-18 DIAGNOSIS — Z48.298 AFTERCARE FOLLOWING ORGAN TRANSPLANT: ICD-10-CM

## 2019-05-18 DIAGNOSIS — Z94.0 KIDNEY REPLACED BY TRANSPLANT: ICD-10-CM

## 2019-05-18 LAB
ANION GAP SERPL CALCULATED.3IONS-SCNC: 8 MMOL/L (ref 3–14)
BUN SERPL-MCNC: 13 MG/DL (ref 7–30)
CALCIUM SERPL-MCNC: 9.5 MG/DL (ref 8.5–10.1)
CHLORIDE SERPL-SCNC: 109 MMOL/L (ref 94–109)
CO2 SERPL-SCNC: 21 MMOL/L (ref 20–32)
CREAT SERPL-MCNC: 1.84 MG/DL (ref 0.52–1.04)
CYCLOSPORINE BLD LC/MS/MS-MCNC: 119 UG/L (ref 50–400)
ERYTHROCYTE [DISTWIDTH] IN BLOOD BY AUTOMATED COUNT: 12.9 % (ref 10–15)
GFR SERPL CREATININE-BSD FRML MDRD: 31 ML/MIN/{1.73_M2}
GLUCOSE SERPL-MCNC: 102 MG/DL (ref 70–99)
HCT VFR BLD AUTO: 32.7 % (ref 35–47)
HGB BLD-MCNC: 10 G/DL (ref 11.7–15.7)
MCH RBC QN AUTO: 27.8 PG (ref 26.5–33)
MCHC RBC AUTO-ENTMCNC: 30.6 G/DL (ref 31.5–36.5)
MCV RBC AUTO: 91 FL (ref 78–100)
PLATELET # BLD AUTO: 151 10E9/L (ref 150–450)
POTASSIUM SERPL-SCNC: 4.6 MMOL/L (ref 3.4–5.3)
RBC # BLD AUTO: 3.6 10E12/L (ref 3.8–5.2)
SODIUM SERPL-SCNC: 139 MMOL/L (ref 133–144)
TME LAST DOSE: 2100 H
WBC # BLD AUTO: 3.8 10E9/L (ref 4–11)

## 2019-05-18 PROCEDURE — 80158 DRUG ASSAY CYCLOSPORINE: CPT | Performed by: INTERNAL MEDICINE

## 2019-05-20 DIAGNOSIS — Z94.0 KIDNEY REPLACED BY TRANSPLANT: Primary | ICD-10-CM

## 2019-05-20 DIAGNOSIS — Z79.899 ENCOUNTER FOR LONG-TERM CURRENT USE OF MEDICATION: ICD-10-CM

## 2019-05-20 DIAGNOSIS — Z48.298 AFTERCARE FOLLOWING ORGAN TRANSPLANT: ICD-10-CM

## 2019-05-21 ENCOUNTER — TELEPHONE (OUTPATIENT)
Dept: TRANSPLANT | Facility: CLINIC | Age: 54
End: 2019-05-21

## 2019-05-21 DIAGNOSIS — Z94.0 KIDNEY TRANSPLANTED: Primary | ICD-10-CM

## 2019-05-21 DIAGNOSIS — Z94.0 KIDNEY REPLACED BY TRANSPLANT: ICD-10-CM

## 2019-05-21 NOTE — TELEPHONE ENCOUNTER
ISSUE:  CSA level 119, goal  current CSA dose 50 mg AM and 25 mg PM  Level below goal on 25 mg BID  Creatinine elevated 1.84    PLAN:  Reviewed with Dr. Johnston.  Ok for patients CSA goal to be 50-75 as she is on cellcept 1000 mg BID  As long as last level was an accurate trough, plan to decrease cyclosporine dose to 25 mg BID and repeat level in 1 week  Assess hydration status.  How much water is she drinking per day?  Any recent illness, diarrhea, or medication changes?  Encourage good hydration and plan to repeat labs within 1 week  Per Dr. Johnston, plan to get DSA, BK, urine protein creatinine ratio, and ua/uc.     OUTCOME:  Called and left a v/m with patients daughter Nichelle

## 2019-05-22 RX ORDER — CYCLOSPORINE 25 MG/1
25 CAPSULE, LIQUID FILLED ORAL 2 TIMES DAILY
Qty: 60 CAPSULE | Refills: 3 | Status: SHIPPED | OUTPATIENT
Start: 2019-05-22 | End: 2019-06-07 | Stop reason: ALTCHOICE

## 2019-05-22 NOTE — TELEPHONE ENCOUNTER
Spoke with Nichelle.    She confirms current dose and an accurate trough.  She verbalized understanding to decrease cyclosporine dose to 25 mg twice daily.   She denies any recent illness, diarrhea, or medication changes.  She verbalized understanding to have her mother increase her hydration and will plan to repeat all labs next week (CSA, BMP, DSA, BK, UA/UC, and urine protein creatinine ratio).    Nichelle verbalized understanding of the plan.

## 2019-05-22 NOTE — TELEPHONE ENCOUNTER
Attempted to contact patients daughter that manages medications, Nichelle, to discuss dose change and additional labs.  No answer, left message requesting a return call to sot office.

## 2019-06-01 ENCOUNTER — RESULTS ONLY (OUTPATIENT)
Dept: OTHER | Facility: CLINIC | Age: 54
End: 2019-06-01

## 2019-06-01 DIAGNOSIS — Z94.0 KIDNEY TRANSPLANTED: ICD-10-CM

## 2019-06-01 DIAGNOSIS — Z94.0 KIDNEY REPLACED BY TRANSPLANT: ICD-10-CM

## 2019-06-01 DIAGNOSIS — Z48.298 AFTERCARE FOLLOWING ORGAN TRANSPLANT: ICD-10-CM

## 2019-06-01 DIAGNOSIS — Z79.899 ENCOUNTER FOR LONG-TERM CURRENT USE OF MEDICATION: ICD-10-CM

## 2019-06-01 LAB
ALBUMIN UR-MCNC: 100 MG/DL
ANION GAP SERPL CALCULATED.3IONS-SCNC: 6 MMOL/L (ref 3–14)
APPEARANCE UR: CLEAR
BILIRUB UR QL STRIP: NEGATIVE
BUN SERPL-MCNC: 13 MG/DL (ref 7–30)
CALCIUM SERPL-MCNC: 9.5 MG/DL (ref 8.5–10.1)
CHLORIDE SERPL-SCNC: 108 MMOL/L (ref 94–109)
CO2 SERPL-SCNC: 24 MMOL/L (ref 20–32)
COLOR UR AUTO: ABNORMAL
CREAT SERPL-MCNC: 1.84 MG/DL (ref 0.52–1.04)
CREAT UR-MCNC: 24 MG/DL
CYCLOSPORINE BLD LC/MS/MS-MCNC: 71 UG/L (ref 50–400)
ERYTHROCYTE [DISTWIDTH] IN BLOOD BY AUTOMATED COUNT: 12.9 % (ref 10–15)
GFR SERPL CREATININE-BSD FRML MDRD: 31 ML/MIN/{1.73_M2}
GLUCOSE SERPL-MCNC: 94 MG/DL (ref 70–99)
GLUCOSE UR STRIP-MCNC: NEGATIVE MG/DL
HCT VFR BLD AUTO: 35.4 % (ref 35–47)
HGB BLD-MCNC: 10.7 G/DL (ref 11.7–15.7)
HGB UR QL STRIP: ABNORMAL
KETONES UR STRIP-MCNC: NEGATIVE MG/DL
LEUKOCYTE ESTERASE UR QL STRIP: ABNORMAL
MCH RBC QN AUTO: 27.5 PG (ref 26.5–33)
MCHC RBC AUTO-ENTMCNC: 30.2 G/DL (ref 31.5–36.5)
MCV RBC AUTO: 91 FL (ref 78–100)
NITRATE UR QL: NEGATIVE
PH UR STRIP: 5 PH (ref 5–7)
PLATELET # BLD AUTO: 169 10E9/L (ref 150–450)
POTASSIUM SERPL-SCNC: 4.4 MMOL/L (ref 3.4–5.3)
PROT UR-MCNC: 0.95 G/L
PROT/CREAT 24H UR: 3.89 G/G CR (ref 0–0.2)
RBC # BLD AUTO: 3.89 10E12/L (ref 3.8–5.2)
RBC #/AREA URNS AUTO: 3 /HPF (ref 0–2)
SODIUM SERPL-SCNC: 138 MMOL/L (ref 133–144)
SOURCE: ABNORMAL
SP GR UR STRIP: 1 (ref 1–1.03)
SQUAMOUS #/AREA URNS AUTO: <1 /HPF (ref 0–1)
TME LAST DOSE: NORMAL H
UROBILINOGEN UR STRIP-MCNC: 0 MG/DL (ref 0–2)
WBC # BLD AUTO: 3.9 10E9/L (ref 4–11)
WBC #/AREA URNS AUTO: 2 /HPF (ref 0–5)

## 2019-06-01 PROCEDURE — 86832 HLA CLASS I HIGH DEFIN QUAL: CPT | Performed by: INTERNAL MEDICINE

## 2019-06-01 PROCEDURE — 80158 DRUG ASSAY CYCLOSPORINE: CPT | Performed by: INTERNAL MEDICINE

## 2019-06-01 PROCEDURE — 87799 DETECT AGENT NOS DNA QUANT: CPT | Performed by: INTERNAL MEDICINE

## 2019-06-01 PROCEDURE — 86833 HLA CLASS II HIGH DEFIN QUAL: CPT | Performed by: INTERNAL MEDICINE

## 2019-06-03 LAB
BKV DNA # SPEC NAA+PROBE: NORMAL COPIES/ML
BKV DNA SPEC NAA+PROBE-LOG#: NORMAL LOG COPIES/ML
SPECIMEN SOURCE: NORMAL

## 2019-06-04 LAB
DONOR IDENTIFICATION: NORMAL
DR51: 2118
DSA COMMENTS: NORMAL
DSA PRESENT: YES
DSA TEST METHOD: NORMAL
ORGAN: NORMAL
SA1 CELL: NORMAL
SA1 COMMENTS: NORMAL
SA1 HI RISK ABY: NORMAL
SA1 MOD RISK ABY: NORMAL
SA1 TEST METHOD: NORMAL
SA2 CELL: NORMAL
SA2 COMMENTS: NORMAL
SA2 HI RISK ABY UA: NORMAL
SA2 MOD RISK ABY: NORMAL
SA2 TEST METHOD: NORMAL
UNACCEPTABLE ANTIGEN: NORMAL
UNOS CPRA: 61

## 2019-06-07 ENCOUNTER — TELEPHONE (OUTPATIENT)
Dept: TRANSPLANT | Facility: CLINIC | Age: 54
End: 2019-06-07

## 2019-06-07 DIAGNOSIS — Z94.0 KIDNEY TRANSPLANTED: Primary | ICD-10-CM

## 2019-06-07 RX ORDER — TACROLIMUS 0.5 MG/1
2 CAPSULE ORAL 2 TIMES DAILY
Qty: 240 CAPSULE | Refills: 11 | Status: SHIPPED | OUTPATIENT
Start: 2019-06-07 | End: 2019-06-17

## 2019-06-07 NOTE — TELEPHONE ENCOUNTER
Reviewed patients recent labs with Dr. Johnston.    Creatinine continues to be elevated above baseline, most recently 1.84.   DSA reviewed and the DR51 is not new.  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  Per Dr. Johnston, we will plan to switch her from CSA to tacrolimus and continue to monitor her labs.    If creatinine rises above 2, we will plan for a kidney transplant biopsy.   Per Dr. Johnston, tac goal will be 4-6.  Plan to start on 2 mg BID and check labs weekly.     Called and spoke with patients daughter that manages her medications.  Discussed the recommendation to switch from cyclosporine to tacrolimus.    Discussed that she is to stop taking the cyclosporine and then start the tacrolimus the next morning, no overlap is needed.   Nichelle aware that she needs to get a 12 hour tacrolimus level 1 week after starting tac and then weekly X 4.   Nichelle verbalized understanding of the plan and denied questions.    Rx sent, lab orders updated.

## 2019-06-14 ENCOUNTER — OFFICE VISIT (OUTPATIENT)
Dept: NEPHROLOGY | Facility: CLINIC | Age: 54
End: 2019-06-14
Attending: PEDIATRICS
Payer: MEDICARE

## 2019-06-14 VITALS
DIASTOLIC BLOOD PRESSURE: 77 MMHG | WEIGHT: 157.9 LBS | BODY MASS INDEX: 31 KG/M2 | OXYGEN SATURATION: 96 % | HEART RATE: 69 BPM | HEIGHT: 60 IN | TEMPERATURE: 98.6 F | SYSTOLIC BLOOD PRESSURE: 170 MMHG

## 2019-06-14 DIAGNOSIS — R80.9 NEPHROTIC RANGE PROTEINURIA: ICD-10-CM

## 2019-06-14 DIAGNOSIS — I10 BENIGN ESSENTIAL HYPERTENSION: Primary | ICD-10-CM

## 2019-06-14 PROCEDURE — 83883 ASSAY NEPHELOMETRY NOT SPEC: CPT | Performed by: INTERNAL MEDICINE

## 2019-06-14 PROCEDURE — G0463 HOSPITAL OUTPT CLINIC VISIT: HCPCS | Mod: ZF

## 2019-06-14 RX ORDER — FUROSEMIDE 20 MG
20 TABLET ORAL DAILY
Qty: 90 TABLET | Refills: 3 | Status: ON HOLD | OUTPATIENT
Start: 2019-06-14 | End: 2020-03-15

## 2019-06-14 RX ORDER — CINACALCET 30 MG/1
15 TABLET, FILM COATED ORAL EVERY OTHER DAY
Status: ON HOLD | COMMUNITY
End: 2020-03-16

## 2019-06-14 RX ORDER — LOSARTAN POTASSIUM 25 MG/1
25 TABLET ORAL DAILY
Qty: 90 TABLET | Refills: 3 | Status: ON HOLD | OUTPATIENT
Start: 2019-06-14 | End: 2020-03-16

## 2019-06-14 RX ORDER — CYCLOSPORINE 25 MG/1
50 CAPSULE ORAL
Status: ON HOLD | COMMUNITY
End: 2020-03-16

## 2019-06-14 ASSESSMENT — PAIN SCALES - GENERAL: PAINLEVEL: NO PAIN (0)

## 2019-06-14 ASSESSMENT — MIFFLIN-ST. JEOR: SCORE: 1242.73

## 2019-06-14 NOTE — PROGRESS NOTES
CHRONIC TRANSPLANT NEPHROLOGY VISIT    Assessment & Plan   # DDKT: Increased since 2019.  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  Patient still does not want to pursue renal biopsy as it would be just diagnostic with only limited treatment options.  We switched her ciclosporine to tacrolimus today.  We will also control her proteinuria with losartan.   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     # Prophylaxis:   - PJP: Sulfa/TMP (Bactrim)    # Hypertension: Inadequate control; Goal BP: < 130/80                - Changes: Yes-currently not on any antihypertensives per patient.  Will start her on Lasix 20 mg and losartan 25 mg daily.    # Anemia in chronic renal disease: Hgb: Stable              - Iron studies: Not checked recently    # Mineral Bone Disorder:   - Secondary renal hyperparathyroidism; PTH level: Minimally elevated ( pg/ml) 2016. On cincalcet. On cinacalcet 30 mg daily. Will recheck.   - Vitamin D; level: Low  - Calcium; level: Normal  - Phosphorus; level: Not checked recently       # Electrolytes:   - Potassium; level: Normal  - Bicarbonate; level: Normal  - Sodium; level: Normal     # SOB: no changes since one year. ECHO in April normal.    # Skin Cancer Risk:    - Discussed sun protection and recommend regular follow up with Dermatology.    # Medical Compliance: Yes    # Transplant History:  Etiology of kidney failure: IgA nephropathy  Tx: DDKT  Transplant: 2007 (Kidney), 1998 (Kidney)  Donor Type:  - Brain Death            Donor Class: Standard Criteria Donor  History of EBV  viremia: Yes  Significant changes in immunosuppression: None  Significant transplant-related complications: EBV viremia    Transplant Office Phone Number: [phone]    Assessment and plan was discussed with the patient and she voiced her understanding and agreement.    Return visit: Return in about 6 weeks (around 7/26/2019).      Chief Complaint   Ms. Lala is a 53 year old here for routine follow up.    History of Present Illness   She was last seen in clinic on 3/28/2019. At that time she was noted to have increased proteinuria. Etiology was thought to be likely due to chronic AbMR in the setting of positive DSA. Kidney biopsy was not pursued due to limited treatment options.     Since then she was admitted to the hospital once from 4/2- 4/8 fro symptomatic hyponatremia which was thought to be secondary to hydrochlorothiazide which was stopped. Today she is complaining of poor appetite since the last one year. She states she has been having abdominal pain since the past 20 years with no acute changes. Her weight has remained fairly stable. No nausea, vomiting or diarrhea.  No fever, sweats or chills.  No leg swelling.  No pain or burning with urination.      Recent Hospitalizations:  [] No [x] Yes As above   New Medical Issues: [x] No [] Yes Chronic decreased appetite and abdominal pain   Decreased energy: [x] No [] Yes    Chest pain or SOB with exertion:  [] No [] Yes Has chronic SOB with exertion.    Appetite change or weight change: [] No [x] Yes    Nausea, vomiting or diarrhea:  [x] No [] Yes    Fever, sweats or chills: [x] No [] Yes    Leg swelling: [x] No [] Yes      Home BP: 150-170s    Review of Systems   A comprehensive review of systems was obtained and negative, except as noted in the HPI or PMH.    Problem List   Patient Active Problem List   Diagnosis     S/P kidney transplant     Parathyroid related hypercalcemia (H)     Encounter for long-term current use of medication     CKD (chronic kidney  disease) stage 3, GFR 30-59 ml/min (H)     Flu syndrome     Influenza     Chest pain     Shortness of breath     Acute kidney injury (H)     Acute pharyngitis     Weakness     Diarrhea     Decreased urine volume     Essential hypertension     Chest pain at rest     Chest pain, non-cardiac     Cough     Hyponatremia       Social History   Social History     Tobacco Use     Smoking status: Never Smoker     Smokeless tobacco: Never Used   Substance Use Topics     Alcohol use: No     Drug use: No       Allergies   Allergies   Allergen Reactions     Contrast Dye Shortness Of Breath     No Clinical Screening - See Comments Shortness Of Breath       Medications   Current Outpatient Medications   Medication Sig     calcium carbonate (TUMS) 500 MG chewable tablet Take 1 tablet (500 mg) by mouth 2 times daily as needed for heartburn     CELLCEPT (BRAND) 250 MG capsule Take 4 capsules (1,000 mg) by mouth 2 times daily     cinacalcet (SENSIPAR) 30 MG tablet cinacalcet 30 mg tablet     furosemide (LASIX) 20 MG tablet Take 1 tablet (20 mg) by mouth daily     losartan (COZAAR) 25 MG tablet Take 1 tablet (25 mg) by mouth daily     omeprazole (PRILOSEC) 20 MG DR capsule Take 1 capsule (20 mg) by mouth every morning (before breakfast)     sulfamethoxazole-trimethoprim (BACTRIM/SEPTRA) 400-80 MG tablet Take 1 tablet by mouth daily     tacrolimus (GENERIC EQUIVALENT) 0.5 MG capsule Take 4 capsules (2 mg) by mouth 2 times daily     albuterol (PROAIR HFA) 108 (90 Base) MCG/ACT inhaler Inhale 2 puffs into the lungs every 4 hours as needed for shortness of breath / dyspnea (Patient not taking: Reported on 6/14/2019)     amLODIPine (NORVASC) 5 MG tablet Take 2 tablets (10 mg) by mouth daily (Patient not taking: Reported on 6/14/2019)     Diphenhyd-HC-Nystatin-Tetracyc (FIRST-HARINDER MOUTHWASH) SUSP Swish and swallow 5-10 mLs in mouth every 6 hours as needed (Patient not taking: Reported on 2/19/2019)     GENGRAF (BRAND) 25 MG CAPSULE Take 50  mg by mouth     guaiFENesin (MUCINEX) 600 MG 12 hr tablet Take 600 mg by mouth as needed     Ipratropium-Albuterol (COMBIVENT RESPIMAT)  MCG/ACT inhaler Inhale 1 puff into the lungs 4 times daily (Patient not taking: Reported on 6/14/2019)     vitamin D3 (CHOLECALCIFEROL) 1000 units (25 mcg) tablet Take 1 tablet (1,000 Units) by mouth daily (Patient not taking: Reported on 6/14/2019)     No current facility-administered medications for this visit.      There are no discontinued medications.    Physical Exam   Vital Signs: /77 (BP Location: Right arm, Patient Position: Sitting, Cuff Size: Adult Regular)   Pulse 69   Temp 98.6  F (37  C) (Oral)   Ht 1.524 m (5')   Wt 71.6 kg (157 lb 14.4 oz)   SpO2 96%   BMI 30.84 kg/m      GENERAL APPEARANCE: alert and no distress  HENT: mouth without ulcers or lesions  LYMPHATICS: no cervical or supraclavicular nodes  RESP: lungs clear to auscultation - no rales, rhonchi or wheezes  CV: regular rhythm, normal rate, no rub, no murmur  EDEMA: no LE edema bilaterally  ABDOMEN: soft, nondistended, nontender, bowel sounds normal  MS: extremities normal - no gross deformities noted, no evidence of inflammation in joints, no muscle tenderness  SKIN: no rash  TX KIDNEY: normal      Data     Renal Latest Ref Rng & Units 6/1/2019 5/18/2019 4/20/2019   Na 133 - 144 mmol/L 138 139 138   K 3.4 - 5.3 mmol/L 4.4 4.6 4.8   Cl 94 - 109 mmol/L 108 109 108   CO2 20 - 32 mmol/L 24 21 25   BUN 7 - 30 mg/dL 13 13 13   Cr 0.52 - 1.04 mg/dL 1.84(H) 1.84(H) 1.78(H)   Glucose 70 - 99 mg/dL 94 102(H) 88   Ca  8.5 - 10.1 mg/dL 9.5 9.5 9.7   Mg 1.6 - 2.3 mg/dL - - -     Bone Health Latest Ref Rng & Units 4/3/2019 1/29/2018 4/28/2017   Phos 2.5 - 4.5 mg/dL - 2.8 2.8   PTHi 12 - 72 pg/mL - - -   Vit D Def 20 - 75 ug/L 18(L) - -     Heme Latest Ref Rng & Units 6/1/2019 5/18/2019 4/20/2019   WBC 4.0 - 11.0 10e9/L 3.9(L) 3.8(L) 3.8(L)   Hgb 11.7 - 15.7 g/dL 10.7(L) 10.0(L) 10.1(L)   Plt 150 - 450  10e9/L 169 151 148(L)     Liver Latest Ref Rng & Units 3/12/2019 11/7/2018 1/29/2018   AP 40 - 150 U/L 136 134 147   TBili 0.2 - 1.3 mg/dL 0.5 0.8 0.9   DBili 0.0 - 0.2 mg/dL - - -   ALT 0 - 50 U/L 15 14 15   AST 0 - 45 U/L 26 19 26   Tot Protein 6.8 - 8.8 g/dL 7.6 8.4 7.9   Albumin 3.4 - 5.0 g/dL 3.4 3.4 3.6     Pancreas Latest Ref Rng & Units 2/27/2017 4/25/2016 4/18/2016   Amylase 30 - 110 U/L - - -   Lipase 73 - 393 U/L 326 422(H) 440(H)        [facility] Txp Virology Latest Ref Rng & Units 6/1/2019 10/28/2016 4/29/2016   CMV IgG EU/mL - - -   CVM DNA Quant - - - -   BK Spec - Plasma Plasma Plasma   BK Res BKNEG:BK Virus DNA Not Detected copies/mL BK Virus DNA Not Detected BK Virus DNA Not Detected BK Virus DNA Not Detected   BK Log <2.7 Log copies/mL Not Calculated Not Calculated   The Real-Time quantitative BK Virus assay was developed and its performance   characteristics determined by the [facility] at   the [facility] in [place], [place]. The   primers and probes for each analyte are Analyte Specific Reagents (ASRs)   manufactured by [employer].   ASRs are used in many laboratory tests necessary for standard medical care and   generally do not require U.S. Food and Drug Administration approval. The FDA   has determined that such clearance or approval is not necessary.   This test is used for clinical purposes. It should not be regarded as   investigational or for research. This laboratory is certified under the   Clinical Laboratory Improvement Amendments of 1988 (CLIA-88) as qualified to   perform high complexity clinical laboratory testing.   Not Calculated   The Real-Time quantitative BK Virus assay was developed and its performance   characteristics determined by the [facility] Laboratory at   the [facility] in [place], [place]. The   primers and probes for each analyte are Analyte Specific  Reagents (ASRs)   manufactured by [employer].   ASRs are used in many laboratory tests necessary for standard medical care and   generally do not require U.S. Food and Drug Administration approval. The FDA   has determined that such clearance or approval is not necessary.   This test is used for clinical purposes. It should not be regarded as   investigational or for research. This laboratory is certified under the   Clinical Laboratory Improvement Amendments of 1988 (CLIA-88) as qualified to   perform high complexity clinical laboratory testing.     EBV IgG - - - -   Hep B Surf - - - -   HIV 1&2 NEG - - -            Recent Labs   Lab Test 10/27/15  0555 11/27/15  0710 12/09/15  0550   DOSMPA Not Provided 9P  11/26 Not Provided   MPACID 0.94* 0.71* 2.02   MPAG 190.4* 169.8* 191.8*     Patient was seen and evaluated by me, Moe Johnston MD. I have reviewed the note and agree with the the plan of care as documented by the fellow.

## 2019-06-14 NOTE — NURSING NOTE
Chief Complaint   Patient presents with     RECHECK     Kidney tx     /77 (BP Location: Right arm, Patient Position: Sitting, Cuff Size: Adult Regular)   Pulse 69   Temp 98.6  F (37  C) (Oral)   Ht 1.524 m (5')   Wt 71.6 kg (157 lb 14.4 oz)   SpO2 96%   BMI 30.84 kg/m    Ronna Alexandra CMA    6/14/2019 3:14 PM

## 2019-06-14 NOTE — NURSING NOTE
Joshua Lala was seen today in clinic by this writer.   Medications, lab orders, lab frequency, and necessary follow up discussed with patient via .   Patient was provided with a copy of the current lab letter. Patient voiced understanding and agreement of education and plan.   Instructed to  new medications from Pharmacy on 1 st floor.    Pat Collazo

## 2019-06-16 ENCOUNTER — APPOINTMENT (OUTPATIENT)
Dept: ULTRASOUND IMAGING | Facility: CLINIC | Age: 54
End: 2019-06-16
Attending: INTERNAL MEDICINE
Payer: MEDICARE

## 2019-06-16 ENCOUNTER — HOSPITAL ENCOUNTER (EMERGENCY)
Facility: CLINIC | Age: 54
Discharge: HOME | End: 2019-06-16
Attending: INTERNAL MEDICINE | Admitting: INTERNAL MEDICINE
Payer: MEDICARE

## 2019-06-16 VITALS
DIASTOLIC BLOOD PRESSURE: 88 MMHG | SYSTOLIC BLOOD PRESSURE: 186 MMHG | RESPIRATION RATE: 18 BRPM | TEMPERATURE: 97.6 F | HEART RATE: 71 BPM | WEIGHT: 150 LBS | HEIGHT: 60 IN | OXYGEN SATURATION: 97 % | BODY MASS INDEX: 29.45 KG/M2

## 2019-06-16 DIAGNOSIS — R30.0 DYSURIA: ICD-10-CM

## 2019-06-16 DIAGNOSIS — Z94.0 STATUS POST KIDNEY TRANSPLANT: ICD-10-CM

## 2019-06-16 LAB
ALBUMIN SERPL-MCNC: 3.6 G/DL (ref 3.4–5)
ALBUMIN UR-MCNC: 100 MG/DL
ALP SERPL-CCNC: 124 U/L (ref 40–150)
ALT SERPL W P-5'-P-CCNC: 18 U/L (ref 0–50)
ANION GAP SERPL CALCULATED.3IONS-SCNC: 5 MMOL/L (ref 3–14)
APPEARANCE UR: CLEAR
AST SERPL W P-5'-P-CCNC: 39 U/L (ref 0–45)
BASOPHILS # BLD AUTO: 0 10E9/L (ref 0–0.2)
BASOPHILS NFR BLD AUTO: 0.2 %
BILIRUB SERPL-MCNC: 0.7 MG/DL (ref 0.2–1.3)
BILIRUB UR QL STRIP: NEGATIVE
BUN SERPL-MCNC: 13 MG/DL (ref 7–30)
CALCIUM SERPL-MCNC: 9.4 MG/DL (ref 8.5–10.1)
CHLORIDE SERPL-SCNC: 102 MMOL/L (ref 94–109)
CO2 SERPL-SCNC: 25 MMOL/L (ref 20–32)
COLOR UR AUTO: ABNORMAL
CREAT SERPL-MCNC: 1.68 MG/DL (ref 0.52–1.04)
DIFFERENTIAL METHOD BLD: ABNORMAL
DIFFERENTIAL METHOD BLD: NORMAL
EOSINOPHIL # BLD AUTO: 0.1 10E9/L (ref 0–0.7)
EOSINOPHIL NFR BLD AUTO: 2.2 %
ERYTHROCYTE [DISTWIDTH] IN BLOOD BY AUTOMATED COUNT: 12.8 % (ref 10–15)
ERYTHROCYTE [DISTWIDTH] IN BLOOD BY AUTOMATED COUNT: NORMAL % (ref 10–15)
GFR SERPL CREATININE-BSD FRML MDRD: 34 ML/MIN/{1.73_M2}
GLUCOSE SERPL-MCNC: 121 MG/DL (ref 70–99)
GLUCOSE UR STRIP-MCNC: NEGATIVE MG/DL
HCT VFR BLD AUTO: 31.9 % (ref 35–47)
HCT VFR BLD AUTO: NORMAL % (ref 35–47)
HGB BLD-MCNC: 9.8 G/DL (ref 11.7–15.7)
HGB BLD-MCNC: NORMAL G/DL (ref 11.7–15.7)
HGB UR QL STRIP: NEGATIVE
IMM GRANULOCYTES # BLD: 0 10E9/L (ref 0–0.4)
IMM GRANULOCYTES NFR BLD: 0.2 %
KETONES UR STRIP-MCNC: NEGATIVE MG/DL
LEUKOCYTE ESTERASE UR QL STRIP: ABNORMAL
LYMPHOCYTES # BLD AUTO: 0.8 10E9/L (ref 0.8–5.3)
LYMPHOCYTES NFR BLD AUTO: 20.4 %
MCH RBC QN AUTO: 27.2 PG (ref 26.5–33)
MCH RBC QN AUTO: NORMAL PG (ref 26.5–33)
MCHC RBC AUTO-ENTMCNC: 30.7 G/DL (ref 31.5–36.5)
MCHC RBC AUTO-ENTMCNC: NORMAL G/DL (ref 31.5–36.5)
MCV RBC AUTO: 89 FL (ref 78–100)
MCV RBC AUTO: NORMAL FL (ref 78–100)
MONOCYTES # BLD AUTO: 0.6 10E9/L (ref 0–1.3)
MONOCYTES NFR BLD AUTO: 14.7 %
NEUTROPHILS # BLD AUTO: 2.5 10E9/L (ref 1.6–8.3)
NEUTROPHILS NFR BLD AUTO: 62.3 %
NITRATE UR QL: NEGATIVE
NRBC # BLD AUTO: 0 10*3/UL
NRBC BLD AUTO-RTO: 0 /100
PH UR STRIP: 6 PH (ref 5–7)
PLATELET # BLD AUTO: 166 10E9/L (ref 150–450)
PLATELET # BLD AUTO: NORMAL 10E9/L (ref 150–450)
POTASSIUM SERPL-SCNC: 4.7 MMOL/L (ref 3.4–5.3)
PROT SERPL-MCNC: 7.8 G/DL (ref 6.8–8.8)
RBC # BLD AUTO: 3.6 10E12/L (ref 3.8–5.2)
RBC # BLD AUTO: NORMAL 10E12/L (ref 3.8–5.2)
RBC #/AREA URNS AUTO: 0 /HPF (ref 0–2)
SODIUM SERPL-SCNC: 132 MMOL/L (ref 133–144)
SOURCE: ABNORMAL
SP GR UR STRIP: 1 (ref 1–1.03)
TRANS CELLS #/AREA URNS HPF: <1 /HPF (ref 0–1)
UROBILINOGEN UR STRIP-MCNC: NORMAL MG/DL (ref 0–2)
WBC # BLD AUTO: 4 10E9/L (ref 4–11)
WBC # BLD AUTO: NORMAL 10E9/L (ref 4–11)
WBC #/AREA URNS AUTO: 1 /HPF (ref 0–5)

## 2019-06-16 PROCEDURE — 99284 EMERGENCY DEPT VISIT MOD MDM: CPT | Mod: 25 | Performed by: INTERNAL MEDICINE

## 2019-06-16 PROCEDURE — 81001 URINALYSIS AUTO W/SCOPE: CPT | Performed by: INTERNAL MEDICINE

## 2019-06-16 PROCEDURE — 87086 URINE CULTURE/COLONY COUNT: CPT | Performed by: INTERNAL MEDICINE

## 2019-06-16 PROCEDURE — 87591 N.GONORRHOEAE DNA AMP PROB: CPT | Performed by: INTERNAL MEDICINE

## 2019-06-16 PROCEDURE — 85025 COMPLETE CBC W/AUTO DIFF WBC: CPT | Performed by: EMERGENCY MEDICINE

## 2019-06-16 PROCEDURE — 80053 COMPREHEN METABOLIC PANEL: CPT | Performed by: INTERNAL MEDICINE

## 2019-06-16 PROCEDURE — 99284 EMERGENCY DEPT VISIT MOD MDM: CPT | Mod: Z6 | Performed by: INTERNAL MEDICINE

## 2019-06-16 PROCEDURE — 87491 CHLMYD TRACH DNA AMP PROBE: CPT | Performed by: INTERNAL MEDICINE

## 2019-06-16 PROCEDURE — 76776 US EXAM K TRANSPL W/DOPPLER: CPT

## 2019-06-16 PROCEDURE — 36415 COLL VENOUS BLD VENIPUNCTURE: CPT | Performed by: EMERGENCY MEDICINE

## 2019-06-16 ASSESSMENT — MIFFLIN-ST. JEOR: SCORE: 1206.9

## 2019-06-16 ASSESSMENT — ENCOUNTER SYMPTOMS: DYSURIA: 1

## 2019-06-16 NOTE — ED PROVIDER NOTES
"      [facility]ERG[facility]Y DEPARTMENT ([facility])  June 16, 2019    History     Chief Complaint   Patient presents with     Dysuria     HPI  Joshua Lala is a 53 year old female with a history of S/P kidney transplant (performed 2008), acute kidney injury, parathyroid related hypercalcemia, hyponatremia, cholecystectomy, Henoch-Schonlein purpura, and hypertension, who presents to the Emergency Department for evaluation of dysuria. The patient reports, translated by her daughter, that since her medication change Friday she has had difficulties with urinating. She states that every time she urinates she feels a stinging pain she describes as a \"burning sensation everywhere\". Patient's daughter reports that the patient had a kidney transplant 11 years ago, because her kidney failed due to \"a lot of blood in the kidney\".    Per chart review, on Friday (6/14/2019) the patient visited Nephrology. She denied any pain or burning with urination at that time, but complained of poor appetite over the past year. She was started on taking a Lasix 20 mg tablet per day  and a Cozaar 25 mg tablet per day for essential hypertension and was instructed to return in about 6 weeks (around 7/26/2019). On 6/7/2019 she was switched from CSA to tacrolimus with intent to monitor her labs.      I have reviewed the Medications, Allergies, Past Medical and Surgical History, and Social History in the [facility] system.    Past Medical History:   Diagnosis Date     Dehydration      Henoch-Schonlein purpura (H)      Hypercalcemia      Hypomagnesaemia      Renal disease      Tertiary hyperparathyroidism (H)        Past Surgical History:   Procedure Laterality Date     CHOLECYSTECTOMY       renal transplant  1998, 2007     TRANSPLANT      Kidney 2008       No family history on file.    Social History     Tobacco Use     Smoking status: Never Smoker     Smokeless tobacco: Never Used   Substance Use Topics     Alcohol use: No     No current " "facility-administered medications for this encounter.      Current Outpatient Medications   Medication     albuterol (PROAIR HFA) 108 (90 Base) MCG/ACT inhaler     amLODIPine (NORVASC) 5 MG tablet     calcium carbonate (TUMS) 500 MG chewable tablet     CELLCEPT (BRAND) 250 MG capsule     cinacalcet (SENSIPAR) 30 MG tablet     Diphenhyd-HC-Nystatin-Tetracyc (FIRST-HARINDER MOUTHWASH) SUSP     furosemide (LASIX) 20 MG tablet     GENGRAF (BRAND) 25 MG CAPSULE     guaiFENesin (MUCINEX) 600 MG 12 hr tablet     Ipratropium-Albuterol (COMBIVENT RESPIMAT)  MCG/ACT inhaler     losartan (COZAAR) 25 MG tablet     omeprazole (PRILOSEC) 20 MG DR capsule     sulfamethoxazole-trimethoprim (BACTRIM/SEPTRA) 400-80 MG tablet     tacrolimus (GENERIC EQUIVALENT) 0.5 MG capsule     vitamin D3 (CHOLECALCIFEROL) 1000 units (25 mcg) tablet        Allergies   Allergen Reactions     Contrast Dye Shortness Of Breath     No Clinical Screening - See Comments Shortness Of Breath       Review of Systems   Genitourinary: Positive for dysuria.   Musculoskeletal:        Positive for \"burning sensation\" triggered by urination   All other systems reviewed and are negative.      Physical Exam   BP: 150/69  Pulse: 66  Temp: 97.6  F (36.4  C)  Resp: 18  Height: 152.4 cm (5')  Weight: 68 kg (150 lb)  SpO2: 99 %      Physical Exam   Constitutional: No distress.   HENT:   Head: Atraumatic.   Mouth/Throat: Oropharynx is clear and moist. No oropharyngeal exudate.   Eyes: Pupils are equal, round, and reactive to light. No scleral icterus.   Neck: Neck supple. No JVD present.   Cardiovascular: Normal rate, regular rhythm, normal heart sounds and intact distal pulses. Exam reveals no gallop and no friction rub.   No murmur heard.  Pulmonary/Chest: Effort normal and breath sounds normal. No stridor. No respiratory distress. She has no wheezes. She has no rales. She exhibits no tenderness.   Abdominal: Soft. Bowel sounds are normal. She exhibits no distension " and no mass. There is no tenderness. There is no rebound and no guarding. No hernia.   Musculoskeletal: She exhibits no edema or tenderness.   Neurological: She is alert. No cranial nerve deficit. Coordination normal.   Skin: Skin is warm. No rash noted. She is not diaphoretic.       ED Course        Procedures      10:31 AM  The patient was seen and examined by Mello Garibay in Room 06.   Results for orders placed or performed during the hospital encounter of 06/16/19 (from the past 24 hour(s))   CBC with platelets differential     Status: None    Collection Time: 06/16/19 10:39 AM   Result Value Ref Range    WBC Canceled, Test credited 4.0 - 11.0 10e9/L    RBC Count Canceled, Test credited 3.8 - 5.2 10e12/L    Hemoglobin Canceled, Test credited 11.7 - 15.7 g/dL    Hematocrit Canceled, Test credited 35.0 - 47.0 %    MCV Canceled, Test credited 78 - 100 fl    MCH Canceled, Test credited 26.5 - 33.0 pg    MCHC Canceled, Test credited 31.5 - 36.5 g/dL    RDW Canceled, Test credited 10.0 - 15.0 %    Platelet Count Canceled, Test credited 150 - 450 10e9/L    Diff Method Canceled, Test credited    Comprehensive metabolic panel     Status: Abnormal    Collection Time: 06/16/19 10:39 AM   Result Value Ref Range    Sodium 132 (L) 133 - 144 mmol/L    Potassium 4.7 3.4 - 5.3 mmol/L    Chloride 102 94 - 109 mmol/L    Carbon Dioxide 25 20 - 32 mmol/L    Anion Gap 5 3 - 14 mmol/L    Glucose 121 (H) 70 - 99 mg/dL    Urea Nitrogen 13 7 - 30 mg/dL    Creatinine 1.68 (H) 0.52 - 1.04 mg/dL    GFR Estimate 34 (L) >60 mL/min/[1.73_m2]    GFR Estimate If Black 40 (L) >60 mL/min/[1.73_m2]    Calcium 9.4 8.5 - 10.1 mg/dL    Bilirubin Total 0.7 0.2 - 1.3 mg/dL    Albumin 3.6 3.4 - 5.0 g/dL    Protein Total 7.8 6.8 - 8.8 g/dL    Alkaline Phosphatase 124 40 - 150 U/L    ALT 18 0 - 50 U/L    AST 39 0 - 45 U/L   UA with Microscopic     Status: Abnormal    Collection Time: 06/16/19 10:39 AM   Result Value Ref Range    Color Urine Light Yellow      Appearance Urine Clear     Glucose Urine Negative NEG^Negative mg/dL    Bilirubin Urine Negative NEG^Negative    Ketones Urine Negative NEG^Negative mg/dL    Specific Gravity Urine 1.004 1.003 - 1.035    Blood Urine Negative NEG^Negative    pH Urine 6.0 5.0 - 7.0 pH    Protein Albumin Urine 100 (A) NEG^Negative mg/dL    Urobilinogen mg/dL Normal 0.0 - 2.0 mg/dL    Nitrite Urine Negative NEG^Negative    Leukocyte Esterase Urine Trace (A) NEG^Negative    Source Midstream Urine     WBC Urine 1 0 - 5 /HPF    RBC Urine 0 0 - 2 /HPF    Transitional Epi <1 0 - 1 /HPF   Urine Culture     Status: None (Preliminary result)    Collection Time: 06/16/19 10:39 AM   Result Value Ref Range    Specimen Description Midstream Urine     Special Requests Specimen received in preservative     Culture Micro PENDING     Renal Transplant     Status: None    Collection Time: 06/16/19 11:31 AM    Narrative    EXAMINATION:  RENAL TRANSPLANT,  6/16/2019 11:31 AM     COMPARISON: 11/7/2018.    HISTORY: Dysuria.    TECHNIQUE:  Grey-scale, color Doppler and spectral flow analysis.    FINDINGS:  The transplant kidney is located right lower quadrant, and measures  11.4 cm. Parenchyma is of normal thickness and echogenicity. No focal  lesions. No hydronephrosis. No perinephric fluid collection.  Decompressed urinary bladder.    Renal artery flow:   88 cm/sec peak systolic at hilum.  109 peak systolic at anastomosis.  Arcuate artery resistive indices (upper to lower): 0.82, 0.86, 0.82    Renal Vein Flow:  48at hilum.   27 at anastomosis.    Iliac artery flow:  150 peak systolic above anastomosis.  103 peak systolic below anastomosis.    Iliac vein flow:  Patent above and below the anastomosis.      Impression    IMPRESSION:   1. Normal grayscale evaluation of the right lower quadrant renal  transplant.  2. Persistent stable elevated resistive index in the arcuate arteries.  3. No hydronephrosis. No perinephric fluid collections.    JEREMY RILEY  BEATA DING MD   CBC with platelets differential     Status: Abnormal    Collection Time: 06/16/19 11:37 AM   Result Value Ref Range    WBC 4.0 4.0 - 11.0 10e9/L    RBC Count 3.60 (L) 3.8 - 5.2 10e12/L    Hemoglobin 9.8 (L) 11.7 - 15.7 g/dL    Hematocrit 31.9 (L) 35.0 - 47.0 %    MCV 89 78 - 100 fl    MCH 27.2 26.5 - 33.0 pg    MCHC 30.7 (L) 31.5 - 36.5 g/dL    RDW 12.8 10.0 - 15.0 %    Platelet Count 166 150 - 450 10e9/L    Diff Method Automated Method     % Neutrophils 62.3 %    % Lymphocytes 20.4 %    % Monocytes 14.7 %    % Eosinophils 2.2 %    % Basophils 0.2 %    % Immature Granulocytes 0.2 %    Nucleated RBCs 0 0 /100    Absolute Neutrophil 2.5 1.6 - 8.3 10e9/L    Absolute Lymphocytes 0.8 0.8 - 5.3 10e9/L    Absolute Monocytes 0.6 0.0 - 1.3 10e9/L    Absolute Eosinophils 0.1 0.0 - 0.7 10e9/L    Absolute Basophils 0.0 0.0 - 0.2 10e9/L    Abs Immature Granulocytes 0.0 0 - 0.4 10e9/L    Absolute Nucleated RBC 0.0            Labs Ordered and Resulted from Time of ED Arrival Up to the Time of Departure from the ED   COMPREHENSIVE METABOLIC PANEL - Abnormal; Notable for the following components:       Result Value    Sodium 132 (*)     Glucose 121 (*)     Creatinine 1.68 (*)     GFR Estimate 34 (*)     GFR Estimate If Black 40 (*)     All other components within normal limits   ROUTINE UA WITH MICROSCOPIC - Abnormal; Notable for the following components:    Protein Albumin Urine 100 (*)     Leukocyte Esterase Urine Trace (*)     All other components within normal limits   CBC WITH PLATELETS DIFFERENTIAL - Abnormal; Notable for the following components:    RBC Count 3.60 (*)     Hemoglobin 9.8 (*)     Hematocrit 31.9 (*)     MCHC 30.7 (*)     All other components within normal limits   CBC WITH PLATELETS DIFFERENTIAL   URINE CULTURE AEROBIC BACTERIAL   CHLAMYDIA TRACHOMATIS PCR   NEISSERIA GONORRHOEAE PCR            Assessments & Plan (with Medical Decision Making)  Dysuria of unclear etiology appears to be correlated  with switching to generic tacrolimus, no sign of UTI, Uriprobe also sent off, Cr baseline and Transplant kidney US neg, D/W Dr [facility] Transplant-ok to discharge and follow up with Transplant early this week. UC and Uriprobe pending.  S/p kidney transplant 11 years ago, Cr baseline as above.       I have reviewed the nursing notes.    I have reviewed the findings, diagnosis, plan and need for follow up with the patient.       Medication List      There are no discharge medications for this visit.         Final diagnoses:   Dysuria   Status post kidney transplant     IBelinda, am serving as a trained medical scribe to document services personally performed by Mello Garibay MD, based on the provider's statements to me.   Mello HOWARD MD, was physically present and have reviewed and verified the accuracy of this note documented by Belinda Mello.    6/16/2019   [facility], [place], EMERGENCY DEPARTMENT     Mello Garibay MD  06/16/19 0351

## 2019-06-16 NOTE — ED AVS SNAPSHOT
[facility], [place], Emergency Department  [address] [address]  Phone:  [phone]                                    Joshua Lala   MRN: [id number]    Department:  [facility], Emergency Department   Date of Visit:  6/16/2019           After Visit Summary Signature Page    I have received my discharge instructions, and my questions have been answered. I have discussed any challenges I see with this plan with the nurse or doctor.    ..........................................................................................................................................  Patient/Patient Representative Signature      ..........................................................................................................................................  Patient Representative Print Name and Relationship to Patient    ..................................................               ................................................  Date                                   Time    ..........................................................................................................................................  Reviewed by Signature/Title    ...................................................              ..............................................  Date                                               Time          22EPIC Rev 08/18

## 2019-06-16 NOTE — DISCHARGE INSTRUCTIONS
Dysuria with Uncertain Cause (Adult)    The urethra is the tube that allows urine to pass out of the body. In a woman, the urethra is the opening above the vagina. In men, the urethra is the opening on the tip of the penis. Dysuria is the feeling of pain or burning in the urethra when passing urine.  Dysuria can be caused by anything that irritates or inflames the urethra. An infection or chemical irritation can cause this reaction. A bladder infection is the most common cause of dysuria in adults. A urine test can diagnose this. A bladder infection needs antibiotic treatment.  Soaps, lotions, colognes and feminine hygiene products can cause dysuria. So can birth control jellies, creams, and foams. It will go away 1 to 3 days after using these irritants.  Sexually transmitted diseases (STDs) such as chlamydia or gonorrhea can cause dysuria. Your healthcare provider may take a culture sample. Your provider may start you on antibiotic medicine before the culture test returns.  In women who have gone through menopause, dysuria can be from dryness in the lining of the urethra. This can be treated with hormones. Dysuria becomes long-term (chronic) when it lasts for weeks or months. You may need to see a specialist (urologist) to diagnose and treat chronic dysuria.  Home care  These home care tips may help:    Don't use any chemicals or products that you think may be causing your symptoms.    If you were given a prescription medicine, take as directed. Be sure to take it until it is all used up.    If a culture was taken, don't have sex until you have been told that it is negative. This means you don't have an infection. Then follow your healthcare provider's advice to treat your condition.  If a culture was done and it is positive:    Both you and your sexual partner may need to be treated. This is true even if your partner has no symptoms.    Contact your healthcare provider or go to an urgent care clinic or the  [facility] health department to be looked at and treated.    Don't have sex until both you and your partner(s) have finished all antibiotics and your healthcare provider says you are no longer contagious.    Learn about and use safe sex practices. The safest sex is with a partner who has tested negative and only has sex with you. Condoms can prevent STDs from spreading, but they aren't a guarantee.  Follow-up care  Follow up with your healthcare provider, or as advised. If a culture was taken, you may call as directed for the results. If you have an STD, follow up with your provider or the public health department for a complete STD screening, including HIV testing. For more information, contact CDC-INFO at 257-151-1975.  When to seek medical advice  Call your healthcare provider right away if any of these occur:    You aren't better after 3 days of treatment    Fever of 100.4 F (38 C) or higher, or as directed by your healthcare provider    Back or belly pain that gets worse    You can't urinate because of pain    New discharge from the urethra, vagina, or penis    Painful sores on the penis    Rash or joint pain    Painful lumps (lymph nodes) in the groin    Testicle pain or swelling of the scrotum  Date Last Reviewed: 11/1/2016 2000-2018 [employer]. [address] [place] [address], [place] [address]. All rights reserved. This information is not intended as a substitute for professional medical care. Always follow your healthcare professional's instructions.      Please make an appointment to follow up with Transplant Coordinator as soon as possible even if entirely better.

## 2019-06-17 ENCOUNTER — TELEPHONE (OUTPATIENT)
Dept: TRANSPLANT | Facility: CLINIC | Age: 54
End: 2019-06-17

## 2019-06-17 DIAGNOSIS — Z94.0 KIDNEY TRANSPLANTED: Primary | ICD-10-CM

## 2019-06-17 LAB
BACTERIA SPEC CULT: NO GROWTH
C TRACH DNA SPEC QL NAA+PROBE: NEGATIVE
Lab: NORMAL
N GONORRHOEA DNA SPEC QL NAA+PROBE: NEGATIVE
SPECIMEN SOURCE: NORMAL

## 2019-06-17 RX ORDER — CYCLOSPORINE 25 MG/1
25 CAPSULE, LIQUID FILLED ORAL 2 TIMES DAILY
Qty: 60 CAPSULE | Refills: 11 | Status: ON HOLD | OUTPATIENT
Start: 2019-06-17 | End: 2020-03-15

## 2019-06-17 NOTE — TELEPHONE ENCOUNTER
Patient Call: General    Reason for call: daughter called regarding patient's medication change is not going well for the patient.    Call back needed? Yes    Return Call Needed  Same as documented in contacts section  When to return call?: Same day: [facility]

## 2019-06-17 NOTE — TELEPHONE ENCOUNTER
Reviewed patients concerns with Dr. Donato.  Recommendation to stop tacrolimus and restart cyclosporine.   Called Nichelle and discussed the plan to stop tacrolimus and restart cyclosporine 25 mg twice daily.  Nichelle verbalized understanding to contact us if her mother's symptoms do not start improving.   Rx updated.

## 2019-06-17 NOTE — TELEPHONE ENCOUNTER
"Returned call to patients daughter.  She reports is not tolerating the tacrolimus that was recently started.  She was seen in clinic on Friday 6/14 with no complaints, she was started on lasix 20 mg daily and Cozaar 25 mg.    Nichelle reports her mother did not start taking tacrolimus until the morning of 6/15.   She presented to the ED on 6/16 with complaints of \"burning all over her body when trying to urinate.\"  Nichelle also reports she has had fatigue and body aches.  She denies any fevers.   Workup in the ED was negative and patient was told to follow up with transplant regarding medications.   Her mother has take medication Sat. AM/PM, Sun. AM/PM, but is refusing to take further doses.    Will discuss with MD and come up with a plan.     "

## 2019-07-15 DIAGNOSIS — Z94.0 KIDNEY TRANSPLANTED: ICD-10-CM

## 2019-07-15 RX ORDER — MYCOPHENOLATE MOFETIL 250 MG/1
CAPSULE ORAL
Qty: 240 CAPSULE | Refills: 3 | Status: SHIPPED | OUTPATIENT
Start: 2019-07-15 | End: 2019-11-14

## 2019-09-21 DIAGNOSIS — I10 BENIGN ESSENTIAL HYPERTENSION: Primary | ICD-10-CM

## 2019-09-21 DIAGNOSIS — R80.9 NEPHROTIC RANGE PROTEINURIA: ICD-10-CM

## 2019-09-21 DIAGNOSIS — Z94.0 KIDNEY TRANSPLANTED: ICD-10-CM

## 2019-09-21 LAB
ANION GAP SERPL CALCULATED.3IONS-SCNC: 5 MMOL/L (ref 3–14)
BUN SERPL-MCNC: 14 MG/DL (ref 7–30)
CALCIUM SERPL-MCNC: 10 MG/DL (ref 8.5–10.1)
CHLORIDE SERPL-SCNC: 104 MMOL/L (ref 94–109)
CO2 SERPL-SCNC: 24 MMOL/L (ref 20–32)
CREAT SERPL-MCNC: 1.84 MG/DL (ref 0.52–1.04)
ERYTHROCYTE [DISTWIDTH] IN BLOOD BY AUTOMATED COUNT: 12.8 % (ref 10–15)
GFR SERPL CREATININE-BSD FRML MDRD: 31 ML/MIN/{1.73_M2}
GLUCOSE SERPL-MCNC: 93 MG/DL (ref 70–99)
HCT VFR BLD AUTO: 32.4 % (ref 35–47)
HGB BLD-MCNC: 10.2 G/DL (ref 11.7–15.7)
MCH RBC QN AUTO: 27.5 PG (ref 26.5–33)
MCHC RBC AUTO-ENTMCNC: 31.5 G/DL (ref 31.5–36.5)
MCV RBC AUTO: 87 FL (ref 78–100)
PLATELET # BLD AUTO: 168 10E9/L (ref 150–450)
POTASSIUM SERPL-SCNC: 4.5 MMOL/L (ref 3.4–5.3)
RBC # BLD AUTO: 3.71 10E12/L (ref 3.8–5.2)
SODIUM SERPL-SCNC: 132 MMOL/L (ref 133–144)
TACROLIMUS BLD-MCNC: <3 UG/L (ref 5–15)
TME LAST DOSE: ABNORMAL H
WBC # BLD AUTO: 3.3 10E9/L (ref 4–11)

## 2019-09-21 PROCEDURE — 80197 ASSAY OF TACROLIMUS: CPT | Performed by: INTERNAL MEDICINE

## 2019-09-23 ENCOUNTER — TELEPHONE (OUTPATIENT)
Dept: TRANSPLANT | Facility: CLINIC | Age: 54
End: 2019-09-23

## 2019-09-23 DIAGNOSIS — Z94.0 KIDNEY TRANSPLANTED: Primary | ICD-10-CM

## 2019-09-23 NOTE — TELEPHONE ENCOUNTER
Spoke with patients daughter.  She said her mom has felt very fatigued lately.  Denies any illness, n/v, diarrhea or s/s of a UTI.   We discussed there are many causes of fatigue.  Will check EBV/CMV.  If negative patient should follow up with PCP for increased fatigue  She said she is hydrating with about 4-6 of the 12 oz bottles.  I encouraged her to increase her hydration and plan to repeat labs at the end of the week.  Also discussed checking cyclosporine level ensuring labs are drawn 12 hours after her last dose of CSA.  Nichelle verbalized understanding.   Lab orders placed.

## 2019-09-23 NOTE — TELEPHONE ENCOUNTER
Patients daughter returned call.  She is not sure how her mother has been feeling lately.     Attempted to call Joshua via [employer] .  No answer and no v/m option.  Will try back.

## 2019-09-23 NOTE — TELEPHONE ENCOUNTER
ISSUE:  Creatinine elevated 1.84  Tac level drawn.  Patient is no longer on tac, she is back on CSA    PLAN:  Call and assess hydration status.  How much water is he drinking per day?  Any recent illness, diarrhea, s/s of a UTI, or medication changes?  Any increased intake of alcohol or caffeine?  Recommend increasing hydration and repeating labs within 1 week.  Patient needs to obtain CSA level with next lab draw.  Orders updated.    OUTCOME:  Called patients daughter Nichelle, no answer left v/m requesting a return call to sot office to discuss.

## 2019-09-28 DIAGNOSIS — I10 BENIGN ESSENTIAL HYPERTENSION: ICD-10-CM

## 2019-09-28 DIAGNOSIS — Z94.0 KIDNEY TRANSPLANTED: ICD-10-CM

## 2019-09-28 LAB
ANION GAP SERPL CALCULATED.3IONS-SCNC: 6 MMOL/L (ref 3–14)
BUN SERPL-MCNC: 14 MG/DL (ref 7–30)
CALCIUM SERPL-MCNC: 9.6 MG/DL (ref 8.5–10.1)
CHLORIDE SERPL-SCNC: 104 MMOL/L (ref 94–109)
CO2 SERPL-SCNC: 25 MMOL/L (ref 20–32)
CREAT SERPL-MCNC: 1.68 MG/DL (ref 0.52–1.04)
CYCLOSPORINE BLD LC/MS/MS-MCNC: 61 UG/L (ref 50–400)
GFR SERPL CREATININE-BSD FRML MDRD: 34 ML/MIN/{1.73_M2}
GLUCOSE SERPL-MCNC: 94 MG/DL (ref 70–99)
POTASSIUM SERPL-SCNC: 4.6 MMOL/L (ref 3.4–5.3)
SODIUM SERPL-SCNC: 135 MMOL/L (ref 133–144)
TME LAST DOSE: NORMAL H

## 2019-09-28 PROCEDURE — 80158 DRUG ASSAY CYCLOSPORINE: CPT | Performed by: INTERNAL MEDICINE

## 2019-09-28 PROCEDURE — 87799 DETECT AGENT NOS DNA QUANT: CPT | Performed by: INTERNAL MEDICINE

## 2019-09-29 LAB
CMV DNA SPEC NAA+PROBE-ACNC: NORMAL [IU]/ML
CMV DNA SPEC NAA+PROBE-LOG#: NORMAL {LOG_IU}/ML
SPECIMEN SOURCE: NORMAL

## 2019-10-01 LAB
EBV DNA # SPEC NAA+PROBE: <500 {COPIES}/ML
EBV DNA SPEC NAA+PROBE-LOG#: <2.7 {LOG_COPIES}/ML

## 2019-10-02 LAB
ALBUMIN UR QL: DETECTED
ALPHA1 GLOB 24H UR QL ELPH: DETECTED
ALPHA2 GLOB UR QL ELPH: DETECTED
B-GLOBULIN UR QL ELPH: DETECTED
COLLECT DURATION TIME SPEC: ABNORMAL H
GAMMA GLOB UR QL ELPH: DETECTED
INTERPRETATION UR IFE-IMP: ABNORMAL
KAPPA LC FREE 24H UR-MRATE: ABNORMAL MG/D
KAPPA LC FREE UR-MCNC: 6.66 MG/DL (ref 0.14–2.42)
KAPPA LC FREE/LAMBDA FREE UR: 5.74 RATIO (ref 2.04–10.37)
LAMBDA LC FREE 24H UR-MRATE: ABNORMAL MG/D
LAMBDA LC FREE UR-MCNC: 1.16 MG/DL (ref 0.02–0.67)
PROT 24H UR-MRATE: ABNORMAL MG/D (ref 10–140)
SPECIMEN VOL ?TM UR: ABNORMAL ML

## 2019-10-04 ENCOUNTER — TELEPHONE (OUTPATIENT)
Dept: TRANSPLANT | Facility: CLINIC | Age: 54
End: 2019-10-04

## 2019-10-04 DIAGNOSIS — Z94.0 KIDNEY TRANSPLANT RECIPIENT: ICD-10-CM

## 2019-10-04 DIAGNOSIS — R76.8 ELEVATED SERUM IMMUNOGLOBULIN FREE LIGHT CHAIN LEVEL: Primary | ICD-10-CM

## 2019-10-04 DIAGNOSIS — R80.9 PROTEINURIA: ICD-10-CM

## 2019-10-04 NOTE — TELEPHONE ENCOUNTER
Result Notes for Free Kappa and Lambda Light Chains Urine     Notes recorded by Moe Johnston MD on 10/3/2019 at 12:54 PM CDT  Needs hematology/oncology referral for abnormal K/L     PHONE CALLS: 1. Patient, 2. Nichelle, daughter  Discussed the blood and urine findings of abnormal proteins, and Dr. Johnston's recommendation for a referral to Hematology/Oncoloogy. Answered all questions.    PLAN:  Place referral in Epic per Dr. Johnston  Task to  to contact Nichelle with day and time of appointment

## 2019-11-14 DIAGNOSIS — Z94.0 KIDNEY TRANSPLANTED: ICD-10-CM

## 2019-11-14 RX ORDER — MYCOPHENOLATE MOFETIL 250 MG/1
CAPSULE ORAL
Qty: 240 CAPSULE | Refills: 3 | Status: ON HOLD | OUTPATIENT
Start: 2019-11-14 | End: 2020-03-16

## 2020-01-26 ENCOUNTER — HOSPITAL ENCOUNTER (EMERGENCY)
Facility: CLINIC | Age: 55
Discharge: HOME | End: 2020-01-26
Attending: FAMILY MEDICINE | Admitting: FAMILY MEDICINE
Payer: MEDICARE

## 2020-01-26 ENCOUNTER — APPOINTMENT (OUTPATIENT)
Dept: CT IMAGING | Facility: CLINIC | Age: 55
End: 2020-01-26
Attending: FAMILY MEDICINE
Payer: MEDICARE

## 2020-01-26 ENCOUNTER — APPOINTMENT (OUTPATIENT)
Dept: ULTRASOUND IMAGING | Facility: CLINIC | Age: 55
End: 2020-01-26
Attending: FAMILY MEDICINE
Payer: MEDICARE

## 2020-01-26 VITALS
BODY MASS INDEX: 31.12 KG/M2 | WEIGHT: 158.5 LBS | DIASTOLIC BLOOD PRESSURE: 90 MMHG | RESPIRATION RATE: 18 BRPM | TEMPERATURE: 97.8 F | HEART RATE: 69 BPM | OXYGEN SATURATION: 100 % | SYSTOLIC BLOOD PRESSURE: 173 MMHG | HEIGHT: 60 IN

## 2020-01-26 DIAGNOSIS — R10.13 EPIGASTRIC PAIN: ICD-10-CM

## 2020-01-26 DIAGNOSIS — I10 ESSENTIAL HYPERTENSION: ICD-10-CM

## 2020-01-26 DIAGNOSIS — Z94.0 KIDNEY TRANSPLANTED: ICD-10-CM

## 2020-01-26 LAB
ALBUMIN SERPL-MCNC: 3.3 G/DL (ref 3.4–5)
ALP SERPL-CCNC: 126 U/L (ref 40–150)
ALT SERPL W P-5'-P-CCNC: 11 U/L (ref 0–50)
ANION GAP SERPL CALCULATED.3IONS-SCNC: 4 MMOL/L (ref 3–14)
APTT PPP: 39 SEC (ref 22–37)
AST SERPL W P-5'-P-CCNC: 31 U/L (ref 0–45)
BASOPHILS # BLD AUTO: 0 10E9/L (ref 0–0.2)
BASOPHILS NFR BLD AUTO: 0.4 %
BILIRUB SERPL-MCNC: 0.7 MG/DL (ref 0.2–1.3)
BUN SERPL-MCNC: 9 MG/DL (ref 7–30)
CALCIUM SERPL-MCNC: 9.3 MG/DL (ref 8.5–10.1)
CHLORIDE SERPL-SCNC: 110 MMOL/L (ref 94–109)
CO2 SERPL-SCNC: 23 MMOL/L (ref 20–32)
CREAT SERPL-MCNC: 2 MG/DL (ref 0.52–1.04)
DIFFERENTIAL METHOD BLD: ABNORMAL
EOSINOPHIL # BLD AUTO: 0.1 10E9/L (ref 0–0.7)
EOSINOPHIL NFR BLD AUTO: 2.1 %
ERYTHROCYTE [DISTWIDTH] IN BLOOD BY AUTOMATED COUNT: 13.2 % (ref 10–15)
GFR SERPL CREATININE-BSD FRML MDRD: 28 ML/MIN/{1.73_M2}
GLUCOSE SERPL-MCNC: 122 MG/DL (ref 70–99)
HCT VFR BLD AUTO: 32.1 % (ref 35–47)
HGB BLD-MCNC: 9.8 G/DL (ref 11.7–15.7)
IMM GRANULOCYTES # BLD: 0 10E9/L (ref 0–0.4)
IMM GRANULOCYTES NFR BLD: 0 %
INR PPP: 1.15 (ref 0.86–1.14)
INTERPRETATION ECG - MUSE: NORMAL
LIPASE SERPL-CCNC: 295 U/L (ref 73–393)
LYMPHOCYTES # BLD AUTO: 0.7 10E9/L (ref 0.8–5.3)
LYMPHOCYTES NFR BLD AUTO: 24.2 %
MCH RBC QN AUTO: 27 PG (ref 26.5–33)
MCHC RBC AUTO-ENTMCNC: 30.5 G/DL (ref 31.5–36.5)
MCV RBC AUTO: 88 FL (ref 78–100)
MONOCYTES # BLD AUTO: 0.4 10E9/L (ref 0–1.3)
MONOCYTES NFR BLD AUTO: 12.6 %
NEUTROPHILS # BLD AUTO: 1.7 10E9/L (ref 1.6–8.3)
NEUTROPHILS NFR BLD AUTO: 60.7 %
NRBC # BLD AUTO: 0 10*3/UL
NRBC BLD AUTO-RTO: 0 /100
PLATELET # BLD AUTO: 180 10E9/L (ref 150–450)
POTASSIUM SERPL-SCNC: 4.7 MMOL/L (ref 3.4–5.3)
PROT SERPL-MCNC: 7.2 G/DL (ref 6.8–8.8)
RBC # BLD AUTO: 3.63 10E12/L (ref 3.8–5.2)
SODIUM SERPL-SCNC: 138 MMOL/L (ref 133–144)
TROPONIN I SERPL-MCNC: <0.015 UG/L (ref 0–0.04)
WBC # BLD AUTO: 2.9 10E9/L (ref 4–11)

## 2020-01-26 PROCEDURE — 25000128 H RX IP 250 OP 636: Performed by: FAMILY MEDICINE

## 2020-01-26 PROCEDURE — 25000125 ZZHC RX 250: Performed by: FAMILY MEDICINE

## 2020-01-26 PROCEDURE — 25000132 ZZH RX MED GY IP 250 OP 250 PS 637: Mod: GY | Performed by: FAMILY MEDICINE

## 2020-01-26 PROCEDURE — 96376 TX/PRO/DX INJ SAME DRUG ADON: CPT | Performed by: FAMILY MEDICINE

## 2020-01-26 PROCEDURE — 85610 PROTHROMBIN TIME: CPT | Performed by: FAMILY MEDICINE

## 2020-01-26 PROCEDURE — 84484 ASSAY OF TROPONIN QUANT: CPT | Performed by: FAMILY MEDICINE

## 2020-01-26 PROCEDURE — 83690 ASSAY OF LIPASE: CPT | Performed by: FAMILY MEDICINE

## 2020-01-26 PROCEDURE — 25800030 ZZH RX IP 258 OP 636: Performed by: FAMILY MEDICINE

## 2020-01-26 PROCEDURE — 76776 US EXAM K TRANSPL W/DOPPLER: CPT

## 2020-01-26 PROCEDURE — 96365 THER/PROPH/DIAG IV INF INIT: CPT | Performed by: FAMILY MEDICINE

## 2020-01-26 PROCEDURE — 80053 COMPREHEN METABOLIC PANEL: CPT | Performed by: FAMILY MEDICINE

## 2020-01-26 PROCEDURE — 93005 ELECTROCARDIOGRAM TRACING: CPT | Performed by: FAMILY MEDICINE

## 2020-01-26 PROCEDURE — 93010 ELECTROCARDIOGRAM REPORT: CPT | Mod: Z6 | Performed by: FAMILY MEDICINE

## 2020-01-26 PROCEDURE — 99285 EMERGENCY DEPT VISIT HI MDM: CPT | Mod: 25 | Performed by: FAMILY MEDICINE

## 2020-01-26 PROCEDURE — 99291 CRITICAL CARE FIRST HOUR: CPT | Mod: 25 | Performed by: FAMILY MEDICINE

## 2020-01-26 PROCEDURE — 76705 ECHO EXAM OF ABDOMEN: CPT

## 2020-01-26 PROCEDURE — 85730 THROMBOPLASTIN TIME PARTIAL: CPT | Performed by: FAMILY MEDICINE

## 2020-01-26 PROCEDURE — 96375 TX/PRO/DX INJ NEW DRUG ADDON: CPT | Performed by: FAMILY MEDICINE

## 2020-01-26 PROCEDURE — 74176 CT ABD & PELVIS W/O CONTRAST: CPT

## 2020-01-26 PROCEDURE — 85025 COMPLETE CBC W/AUTO DIFF WBC: CPT | Performed by: FAMILY MEDICINE

## 2020-01-26 RX ORDER — SODIUM CHLORIDE 9 MG/ML
1000 INJECTION, SOLUTION INTRAVENOUS CONTINUOUS
Status: DISCONTINUED | OUTPATIENT
Start: 2020-01-26 | End: 2020-01-26 | Stop reason: HOSPADM

## 2020-01-26 RX ORDER — HYDRALAZINE HYDROCHLORIDE 20 MG/ML
10 INJECTION INTRAMUSCULAR; INTRAVENOUS ONCE
Status: COMPLETED | OUTPATIENT
Start: 2020-01-26 | End: 2020-01-26

## 2020-01-26 RX ORDER — SUCRALFATE ORAL 1 G/10ML
1 SUSPENSION ORAL 4 TIMES DAILY
Qty: 420 ML | Refills: 1 | Status: ON HOLD | OUTPATIENT
Start: 2020-01-26 | End: 2020-03-15

## 2020-01-26 RX ORDER — LABETALOL 20 MG/4 ML (5 MG/ML) INTRAVENOUS SYRINGE
10 ONCE
Status: COMPLETED | OUTPATIENT
Start: 2020-01-26 | End: 2020-01-26

## 2020-01-26 RX ORDER — LIDOCAINE 40 MG/G
CREAM TOPICAL
Status: DISCONTINUED | OUTPATIENT
Start: 2020-01-26 | End: 2020-01-26 | Stop reason: HOSPADM

## 2020-01-26 RX ORDER — HYDROCHLOROTHIAZIDE 25 MG/1
25 TABLET ORAL 3 TIMES DAILY
Refills: 11 | Status: ON HOLD | COMMUNITY
Start: 2019-03-28 | End: 2020-03-15

## 2020-01-26 RX ORDER — AMLODIPINE BESYLATE 5 MG/1
10 TABLET ORAL DAILY
Qty: 60 TABLET | Refills: 0 | Status: ON HOLD | OUTPATIENT
Start: 2020-01-26 | End: 2020-03-15

## 2020-01-26 RX ORDER — AMLODIPINE BESYLATE 5 MG/1
10 TABLET ORAL ONCE
Status: COMPLETED | OUTPATIENT
Start: 2020-01-26 | End: 2020-01-26

## 2020-01-26 RX ADMIN — LABETALOL 20 MG/4 ML (5 MG/ML) INTRAVENOUS SYRINGE 10 MG: at 15:55

## 2020-01-26 RX ADMIN — AMLODIPINE BESYLATE 10 MG: 5 TABLET ORAL at 18:36

## 2020-01-26 RX ADMIN — SODIUM CHLORIDE 1000 ML: 9 INJECTION, SOLUTION INTRAVENOUS at 12:36

## 2020-01-26 RX ADMIN — LABETALOL 20 MG/4 ML (5 MG/ML) INTRAVENOUS SYRINGE 10 MG: at 18:37

## 2020-01-26 RX ADMIN — LIDOCAINE HYDROCHLORIDE 30 ML: 20 SOLUTION ORAL; TOPICAL at 13:42

## 2020-01-26 RX ADMIN — HYDRALAZINE HYDROCHLORIDE 10 MG: 20 INJECTION INTRAMUSCULAR; INTRAVENOUS at 14:25

## 2020-01-26 RX ADMIN — FAMOTIDINE 20 MG: 20 INJECTION, SOLUTION INTRAVENOUS at 12:52

## 2020-01-26 ASSESSMENT — ENCOUNTER SYMPTOMS
NAUSEA: 0
ABDOMINAL PAIN: 1
DIARRHEA: 0
ABDOMINAL DISTENTION: 1
VOMITING: 0

## 2020-01-26 ASSESSMENT — MIFFLIN-ST. JEOR: SCORE: 1240.45

## 2020-01-26 NOTE — ED TRIAGE NOTES
Patient presents ambulatory from home with complaints of abdominal pain, and difficulty urinating x 2 days. Denies dysuria, nausea, or vomiting.

## 2020-01-26 NOTE — ED PROVIDER NOTES
[facility] EMERGENCY DEPARTMENT ([facility])  January 26, 2020    History     Chief Complaint   Patient presents with     Abdominal Pain     A  was used (HPI interpreted by patient's daughter, per her request).     Joshua Lala is a 54 year old female with history notable for kidney txp (number 2/2007), CKD, tertiary hyperparathyroidism, and cholecystectomy who presents to the ED with her daughter for 2 days of ongoing mid/epigastric abdominal pain.  Patient states had similar pain previously and states her pain is made worse after eating.  She describes her pain is intermittent and tight and also complains of abdominal distention.  She states she has not been hospitalized previously for abdominal pain.  Patient also reports that she has previous history of gastric ulcers and takes medications for this.  She states that it been scoped previously.  She also complains of decreased urine output. She otherwise denies nausea, vomiting, diarrhea, complications with her current transplant, or other abdominal surgeries.  Treatment noted patient has been blood pressure medications but has not filled them since November as she is been able to do so.  She is still taking her transplant medications all.  Denies headache this point.  Has some mild palpitations in the chest.  But no true chest pain or shortness of breath.  No reports of bleeding at this point.  Her transplant is right lower quadrant.  Now presents evaluation with daughter.  Neurologically no focal findings reported.      PAST MEDICAL HISTORY  Past Medical History:   Diagnosis Date     Dehydration      Henoch-Schonlein purpura (H)      Hypercalcemia      Hypomagnesaemia      Renal disease      Tertiary hyperparathyroidism (H)      PAST SURGICAL HISTORY  Past Surgical History:   Procedure Laterality Date     CHOLECYSTECTOMY       renal transplant  1998, 2007     TRANSPLANT      Kidney 2008     FAMILY HISTORY  History reviewed. No  pertinent family history.  SOCIAL HISTORY  Social History     Tobacco Use     Smoking status: Never Smoker     Smokeless tobacco: Never Used   Substance Use Topics     Alcohol use: No     MEDICATIONS  No current facility-administered medications for this encounter.      Current Outpatient Medications   Medication     amLODIPine (NORVASC) 5 MG tablet     sucralfate (CARAFATE) 1 GM/10ML suspension     albuterol (PROAIR HFA) 108 (90 Base) MCG/ACT inhaler     amLODIPine (NORVASC) 5 MG tablet     calcium carbonate (TUMS) 500 MG chewable tablet     CELLCEPT (BRAND) 250 MG capsule     cinacalcet (SENSIPAR) 30 MG tablet     CINACALCET HCL PO     Diphenhyd-HC-Nystatin-Tetracyc (FIRST-HARINDER MOUTHWASH) SUSP     furosemide (LASIX) 20 MG tablet     GENGRAF (BRAND) 25 MG CAPSULE     guaiFENesin (MUCINEX) 600 MG 12 hr tablet     hydrochlorothiazide (HYDRODIURIL) 25 MG tablet     Ipratropium-Albuterol (COMBIVENT RESPIMAT)  MCG/ACT inhaler     losartan (COZAAR) 25 MG tablet     NEORAL (BRAND) 25 MG capsule     omeprazole (PRILOSEC) 20 MG DR capsule     OMEPRAZOLE PO     sulfamethoxazole-trimethoprim (BACTRIM/SEPTRA) 400-80 MG tablet     vitamin D3 (CHOLECALCIFEROL) 1000 units (25 mcg) tablet     ALLERGIES  Allergies   Allergen Reactions     Contrast Dye Shortness Of Breath     Other reaction(s): Angioedema       I have reviewed the Medications, Allergies, Past Medical and Surgical History, and Social History in the Epic system.    Review of Systems   Constitutional: Positive for activity change and fatigue. Negative for appetite change and fever.   HENT: Negative for congestion, dental problem, facial swelling, sinus pain, trouble swallowing and voice change.    Eyes: Negative for redness and visual disturbance.   Respiratory: Negative for chest tightness and shortness of breath.    Cardiovascular: Positive for palpitations. Negative for chest pain.   Gastrointestinal: Positive for abdominal distention and abdominal pain.  Negative for blood in stool, diarrhea, nausea and vomiting.   Genitourinary: Positive for decreased urine volume. Negative for difficulty urinating.   Musculoskeletal: Positive for gait problem. Negative for arthralgias, back pain and neck stiffness.   Skin: Negative for color change, rash and wound.   Allergic/Immunologic: Positive for immunocompromised state.   Neurological: Positive for weakness and light-headedness. Negative for seizures, syncope, numbness and headaches.   Hematological: Does not bruise/bleed easily.   Psychiatric/Behavioral: Positive for decreased concentration and dysphoric mood. Negative for confusion and hallucinations.   All other systems reviewed and are negative.      Physical Exam   BP: (!) 170/72  Pulse: 70  Heart Rate: 69  Temp: 97.8  F (36.6  C)  Resp: 16  Height: 152.4 cm (5')  Weight: 71.9 kg (158 lb 8 oz)  SpO2: 96 %      Physical Exam  Vitals signs and nursing note reviewed.   Constitutional:       General: She is in acute distress.      Appearance: She is not ill-appearing, toxic-appearing or diaphoretic.      Comments: Patient here with daughter is uncomfortable because of epigastric pain.  Is nontoxic   HENT:      Head: Normocephalic and atraumatic.      Nose: Nose normal.      Mouth/Throat:      Mouth: Mucous membranes are moist.      Pharynx: Oropharynx is clear. No oropharyngeal exudate.   Eyes:      General: No scleral icterus.     Extraocular Movements: Extraocular movements intact.      Conjunctiva/sclera: Conjunctivae normal.      Pupils: Pupils are equal, round, and reactive to light.   Neck:      Musculoskeletal: Normal range of motion. No neck rigidity or muscular tenderness.   Cardiovascular:      Rate and Rhythm: Normal rate and regular rhythm.      Heart sounds: Murmur (early systolic Murmur noted) present.   Pulmonary:      Effort: No respiratory distress.      Breath sounds: Normal breath sounds. No wheezing.   Abdominal:      General: Bowel sounds are normal.  There is no distension.      Palpations: Abdomen is soft. There is no mass.      Tenderness: There is abdominal tenderness.      Hernia: No hernia is present.      Comments: Midepigastric tenderness with with slight right also upper abdominal tenderness.  No rigidity no mass no rebound or guarding.   Musculoskeletal:         General: Swelling present. No tenderness or deformity.   Skin:     General: Skin is warm.      Capillary Refill: Capillary refill takes less than 2 seconds.      Coloration: Skin is not jaundiced or pale.      Findings: No rash.   Neurological:      General: No focal deficit present.      Mental Status: She is oriented to person, place, and time. Mental status is at baseline.      Comments: Per  and family patient is baseline   Psychiatric:      Comments: Slightly uncomfortable otherwise appropriate per  and family member         ED Course        Procedures   11:33 AM  The patient was seen and examined by Dr. Walsh in Room 31.            Patient valuated here in the ER.  Records reviewed in epic also.  Patient's last transplant as noted.  Her blood pressure noted to be somewhat hypertensive then escalated to marked hypertension 200/100 approximately.  Patient had some chest palpitations reevaluated here in the ER.  Patient initially given hydralazine 10 mg IV to help the blood pressure.  Discussed with nephrology also.  We elected then to use labetalol 10 mg IV x2 doses given in the ER which did improve her blood pressure.  Patient did was given a GI cocktail after a right upper quadrant ultrasound was done revealing status post cholecystectomy without any other changes noted.  The GI cocktail seemed to help her epigastric pain which fits more with her pain precipitated by eating history of gastric ulcer etc. but no signs of bleeding.  Patient had an EKG done revealing no hyperacute ischemic changes.  Patient's laboratory testing revealed INR 1.15.  Troponin negative.   Lipase 295.  White count 2.9 hemoglobin noted to be 9.8.  Creatinine slightly elevated 2.00.  Glucose 122.  Other labs stable.  With these findings we like to do a renal ultrasound which was normal for any changes in flow etc.  This was reassured to the patient.  Patient did then describe problems with when she gets epigastric pain she is unable to urinate but then once pain goes away she is able to urinate.  We did order a urine sample on at this point also.  Unable to give us one at this point.  CT scan without contrast of the abdomen pelvis were negative for any acute abnormalities.  Continue to manage patient's blood pressure discussed with her also reviewed records extensively patient has been on amlodipine in the past also with her creatinine being slightly elevated would like to avoid any ACE or arm along with hydrochlorothiazide.  Patient did receive 10 mg which she was on of the amlodipine in the past also here in the ER and prescription written for 10 mg a day.  Patient this point here otherwise feeling better wants to go home.  Blood pressures improved at this point will discharge her with recommendations with improvement of her transient hypertensive episode also here in the ER that was improved.  Is unclear since patient is been out of these medicines since November what her blood pressures been doing but this point no neuro insult etc.  Maxine is comfortable with discharge also.       EKG Interpretation:      Interpreted by Chandra Walsh  Time reviewed: 12:00 pm  Symptoms at time of EKG: abdominal pain  Rhythm: normal sinus   Rate: normal  Axis: normal  Ectopy: none  Conduction: normal  ST Segments/ T Waves: No ST-T wave changes  Q Waves: none  Comparison to prior: Unchanged from 4/2/19    Clinical Impression: normal EKG          Critical Care time:  was 30 minutes for this patient excluding procedures.             Labs Ordered and Resulted from Time of ED Arrival Up to the Time of Departure from the ED    CBC WITH PLATELETS DIFFERENTIAL - Abnormal; Notable for the following components:       Result Value    WBC 2.9 (*)     RBC Count 3.63 (*)     Hemoglobin 9.8 (*)     Hematocrit 32.1 (*)     MCHC 30.5 (*)     Absolute Lymphocytes 0.7 (*)     All other components within normal limits   INR - Abnormal; Notable for the following components:    INR 1.15 (*)     All other components within normal limits   PARTIAL THROMBOPLASTIN TIME - Abnormal; Notable for the following components:    PTT 39 (*)     All other components within normal limits   COMPREHENSIVE METABOLIC PANEL - Abnormal; Notable for the following components:    Chloride 110 (*)     Glucose 122 (*)     Creatinine 2.00 (*)     GFR Estimate 28 (*)     GFR Estimate If Black 32 (*)     Albumin 3.3 (*)     All other components within normal limits   LIPASE   TROPONIN I   PERIPHERAL IV CATHETER           Results for orders placed or performed during the hospital encounter of 01/26/20   US Abdomen Limited     Status: None    Narrative    Ultrasound abdominal right upper quadrant, 1/26/2020 1:11 PM.    Comparison: None.    History: abdominal pain, eval for gall bladder vs pancreatic cause of  pain.    Findings:    There is no ascites.     Liver: The liver parenchyma has normal echotexture and echogenicity.  There are no focal masses. There is no intrahepatic biliary  dilatation.    Gallbladder: Gallbladder surgically absent. Extrahepatic bile duct  measures 7 mm. There is no extrahepatic biliary dilatation. There is  no pericholecystic fluid.    Right Kidney: The native right kidney is poorly visualized.     The pancreas is not seen.       Impression    Impression:   1. Gallbladder is surgically absent. No abnormal common bile duct  dilation.  2. Unable to visualize pancreas on this exam.     I have personally reviewed the examination and initial interpretation  and I agree with the findings.    KRISTY ASTORGA MD    Renal Transplant     Status: None    Narrative     EXAMINATION: US RENAL TRANSPLANT  1/26/2020 3:12 PM      CLINICAL HISTORY: Elevated creat with kidney tx with hypertension.  Eval vasculature also w doppler.    COMPARISON: 6/16/2019, 5/29/2018      FINDINGS:   There is a right lower quadrant renal transplant which measures 11.0,  previously 11.4 cm. The transplant kidney demonstrates normal  echogenicity. There is no peritransplant fluid collection. There is no  urinary tract dilation.     The urinary bladder is not well visualized.    The arcuate artery resistive indices are 0.82, 0.76, and 0.84.   The renal artery anastomosis peak systolic velocity is 108 centimeters  per seconds and the renal artery at the hilum peak systolic velocity  is 80 cm/s. There are no abnormal waveforms in the renal artery.   The renal vein is patent.   The artery and vein are patent above and below the anastomosis.        Impression    IMPRESSION:   1. Normal renal transplant ultrasound.  2. Normal doppler evaluation of the renal transplant.    I have personally reviewed the examination and initial interpretation  and I agree with the findings.    CASANDRA YAO MD   CT Abdomen Pelvis w/o Contrast     Status: None    Narrative    EXAMINATION: CT abdomen/pelvis without contrast.    INDICATION: Abdominal pain with renal tx mid abdominal with some  relation with decrease urine output with upper ab pain?    COMPARISON: Abdominal ultrasound 1/26/2020, CT CAP 4/5/2019.    TECHNIQUE: Routine images from the level of the diaphragm through the  pelvis were obtained without contrast. Total DLP: 774 mGy*cm.    FINDINGS:    Abdomen and pelvis:  The liver parenchyma is homogenous. No focal hepatic lesion.  Postsurgical changes of cholecystectomy. No intrahepatic or extra  hepatic biliary duct dilation. The spleen is grossly unremarkable.  Pancreas is within normal limits. Bilateral adrenal glands are  diminutive.    Bilateral atrophic native kidneys. Several punctate calcifications  noted  within the right native kidney and inferior native left kidney.  Postsurgical changes of right lower quadrant transplant kidney. No  appreciable nephrolithiasis, renal mass or hydronephrosis of the  transplant kidney. No hydroureter. The bladder is grossly  unremarkable. Atrophic and partially calcified left lower quadrant  renal transplant compatible with graft failure. No pelvic masses.  Several calcified uterine vessels.    No intra-abdominal free air. Small amount of free fluid in the pelvis.  The large and small bowel are normal in caliber. Small periampullary  duodenal diverticulum measuring 1.9 cm (series 5, image 145). No  appreciable bowel wall thickening. Stomach is grossly unremarkable.  The appendix is normal.    The abdominal aorta is nonaneurysmal. Moderate atherosclerotic  calcifications of the distal abdominal aorta. There is evidence of  portosystemic shunting with splenorenal shunt and recanalized  umbilical vein. Slightly increased size of a mildly enlarged lymph  node anterior to the IVC measuring 1.2 cm short axis (series 5, image  195).     Bones and soft tissues:  No acute or worrisome osseous lesions. Mild degenerative changes of  the lumbar spine. Stable small soft tissue nodule in the subcutaneous  fats of the anterior abdominal wall, likely scarring. Diffuse  subcutaneous edema in the anterior abdominal wall with associated skin  thickening, measuring up to 9 mm in thickness, new since 4/5/2019.    Lower chest:  Unchanged calcification in the right lower lobe and along the pleura.  No focal airspace opacity. Solid 5 mm nodule in the right middle lobe  is unchanged since 2015 and statistically benign (series 5, image 7).  The heart is grossly enlarged. Anemic appearance of the blood pool.        Impression    IMPRESSION:  1. No subcutaneous edema and diffuse skin thickening in the anterior  abdominal wall, which may represent edema or infection.  2. Slightly increased size of a mildly  enlarged lymph node anterior to  the IVC, likely reactive.  3. Trace new ascites.  4. Unremarkable noncontrast CT appearance of the right lower quadrant  renal transplant. Atrophic left lower quadrant renal transplant with  extensive calcifications compatible with graft failure. Atrophic  native kidneys with nonobstructive nephrolithiasis.   5. Stable cardiomegaly.  6. Evidence of mild portosystemic shunting, unchanged.  7. Stable right lower lobe calcifications and pleural calcifications,  chronic and presumably postinfectious.         I have personally reviewed the examination and initial interpretation  and I agree with the findings.    CASANDRA YAO MD   CBC with platelets differential     Status: Abnormal   Result Value Ref Range    WBC 2.9 (L) 4.0 - 11.0 10e9/L    RBC Count 3.63 (L) 3.8 - 5.2 10e12/L    Hemoglobin 9.8 (L) 11.7 - 15.7 g/dL    Hematocrit 32.1 (L) 35.0 - 47.0 %    MCV 88 78 - 100 fl    MCH 27.0 26.5 - 33.0 pg    MCHC 30.5 (L) 31.5 - 36.5 g/dL    RDW 13.2 10.0 - 15.0 %    Platelet Count 180 150 - 450 10e9/L    Diff Method Automated Method     % Neutrophils 60.7 %    % Lymphocytes 24.2 %    % Monocytes 12.6 %    % Eosinophils 2.1 %    % Basophils 0.4 %    % Immature Granulocytes 0.0 %    Nucleated RBCs 0 0 /100    Absolute Neutrophil 1.7 1.6 - 8.3 10e9/L    Absolute Lymphocytes 0.7 (L) 0.8 - 5.3 10e9/L    Absolute Monocytes 0.4 0.0 - 1.3 10e9/L    Absolute Eosinophils 0.1 0.0 - 0.7 10e9/L    Absolute Basophils 0.0 0.0 - 0.2 10e9/L    Abs Immature Granulocytes 0.0 0 - 0.4 10e9/L    Absolute Nucleated RBC 0.0    INR     Status: Abnormal   Result Value Ref Range    INR 1.15 (H) 0.86 - 1.14   Partial thromboplastin time     Status: Abnormal   Result Value Ref Range    PTT 39 (H) 22 - 37 sec   Comprehensive metabolic panel     Status: Abnormal   Result Value Ref Range    Sodium 138 133 - 144 mmol/L    Potassium 4.7 3.4 - 5.3 mmol/L    Chloride 110 (H) 94 - 109 mmol/L    Carbon Dioxide 23 20 - 32  mmol/L    Anion Gap 4 3 - 14 mmol/L    Glucose 122 (H) 70 - 99 mg/dL    Urea Nitrogen 9 7 - 30 mg/dL    Creatinine 2.00 (H) 0.52 - 1.04 mg/dL    GFR Estimate 28 (L) >60 mL/min/[1.73_m2]    GFR Estimate If Black 32 (L) >60 mL/min/[1.73_m2]    Calcium 9.3 8.5 - 10.1 mg/dL    Bilirubin Total 0.7 0.2 - 1.3 mg/dL    Albumin 3.3 (L) 3.4 - 5.0 g/dL    Protein Total 7.2 6.8 - 8.8 g/dL    Alkaline Phosphatase 126 40 - 150 U/L    ALT 11 0 - 50 U/L    AST 31 0 - 45 U/L   Lipase     Status: None   Result Value Ref Range    Lipase 295 73 - 393 U/L   Troponin I     Status: None   Result Value Ref Range    Troponin I ES <0.015 0.000 - 0.045 ug/L   EKG 12-lead, tracing only     Status: None   Result Value Ref Range    Interpretation ECG Click View Image link to view waveform and result          Assessments & Plan (with Medical Decision Making)  54-year-old female history of kidney transplant x2 last one being a believe in 2007 she is otherwise been doing fairly well.  Patient has history of hypertension had been on medications up till June November but no is been able to fill these.  She still take her transplant medications.  Patient presented to the ER as has history of some gastric ulcer disease is on omeprazole.  She has midepigastric pain for last few days worse with eating.  No reports of bleeding.  Patient valuated here in the ER EKG was normal troponin negative.  Creatinine slightly elevated 2.00 renal ultrasound was negative for any changes seen with flow etc.  Patient's troponin negative EKG without hyperacute changes.  Given GI cocktail with some improvement.  Patient had elevated blood pressure to 200/100 systolic.  Treated with hydralazine initially then switched to labetalol x2 IV blood pressure improved patient after review with pharmacy we started her back on Norvasc 10 mg given first dose in the ER.  Discussed with nephrology fellow also agrees.  Patient here in the ER wants to go home blood pressure improving  epigastric pain improving we did do a CT scan as there is some concern of epigastric pain and urinary symptoms we are unable to get a urine sample her CT scan noncontrast the abdomen pelvis did not show any acute findings.  At this point patient is improved restarted on the Norvasc 10 mg daily follow-up with her doctor along with the transplant coordinator and return if any concerns at this point patient feels better and wants to go home.           I have reviewed the nursing notes.    I have reviewed the findings, diagnosis, plan and need for follow up with the patient.    Discharge Medication List as of 1/26/2020  7:29 PM      START taking these medications    Details   !! amLODIPine (NORVASC) 5 MG tablet Take 2 tablets (10 mg) by mouth daily, Disp-60 tablet, R-0, Local Print      sucralfate (CARAFATE) 1 GM/10ML suspension Take 10 mLs (1 g) by mouth 4 times daily, Disp-420 mL, R-1, Local Print       !! - Potential duplicate medications found. Please discuss with provider.          Final diagnoses:   Epigastric pain   Kidney transplanted   Essential hypertension     INeil, am serving as a trained medical scribe to document services personally performed by Chandra Walsh MD, based on the provider's statements to me.      Chandra HOWARD MD, was physically present and have reviewed and verified the accuracy of this note documented by Neil Rivas.     1/26/2020   [facility], EMERGENCY DEPARTMENT    This note was created at least in part by the use of dragon voice dictation system. Inadvertent typographical errors may still exist.  Chandra Walsh MD.         Chandra Walsh MD  01/27/20 0003

## 2020-01-26 NOTE — ED AVS SNAPSHOT
[facility], [place], Emergency Department  [address] [address]  Phone:  [phone]                                    Joshua Lala   MRN: [id number]    Department:  [facility], Emergency Department   Date of Visit:  1/26/2020           After Visit Summary Signature Page    I have received my discharge instructions, and my questions have been answered. I have discussed any challenges I see with this plan with the nurse or doctor.    ..........................................................................................................................................  Patient/Patient Representative Signature      ..........................................................................................................................................  Patient Representative Print Name and Relationship to Patient    ..................................................               ................................................  Date                                   Time    ..........................................................................................................................................  Reviewed by Signature/Title    ...................................................              ..............................................  Date                                               Time          22EPIC Rev 08/18

## 2020-01-27 ASSESSMENT — ENCOUNTER SYMPTOMS
SINUS PAIN: 0
NECK STIFFNESS: 0
WEAKNESS: 1
COLOR CHANGE: 0
NUMBNESS: 0
SHORTNESS OF BREATH: 0
PALPITATIONS: 1
LIGHT-HEADEDNESS: 1
SEIZURES: 0
DIFFICULTY URINATING: 0
WOUND: 0
FEVER: 0
VOICE CHANGE: 0
HALLUCINATIONS: 0
BACK PAIN: 0
CONFUSION: 0
FATIGUE: 1
ARTHRALGIAS: 0
ACTIVITY CHANGE: 1
CHEST TIGHTNESS: 0
BLOOD IN STOOL: 0
APPETITE CHANGE: 0
HEADACHES: 0
DYSPHORIC MOOD: 1
EYE REDNESS: 0
DECREASED CONCENTRATION: 1
BRUISES/BLEEDS EASILY: 0
TROUBLE SWALLOWING: 0
FACIAL SWELLING: 0

## 2020-01-27 NOTE — DISCHARGE INSTRUCTIONS
Home.  Take the norvasc/amlodipine 10mg daily for blood pressure control.  Your CT of abdomen and ultrasound appear stable.  Labs are stable with slight elevation of creatinine to 2.0  Call your transplant coordinator for follow up on labs.  See MD for blood pressure evaluation.  Return if any concerns.  Take the carafate along with your omeprazole for stomach pain.

## 2020-01-28 ENCOUNTER — HOSPITAL ENCOUNTER (EMERGENCY)
Facility: CLINIC | Age: 55
Discharge: HOME | End: 2020-01-28
Attending: EMERGENCY MEDICINE | Admitting: EMERGENCY MEDICINE
Payer: MEDICARE

## 2020-01-28 ENCOUNTER — APPOINTMENT (OUTPATIENT)
Dept: ULTRASOUND IMAGING | Facility: CLINIC | Age: 55
End: 2020-01-28
Attending: EMERGENCY MEDICINE
Payer: MEDICARE

## 2020-01-28 VITALS
TEMPERATURE: 98.4 F | RESPIRATION RATE: 18 BRPM | OXYGEN SATURATION: 97 % | HEART RATE: 66 BPM | DIASTOLIC BLOOD PRESSURE: 82 MMHG | SYSTOLIC BLOOD PRESSURE: 182 MMHG | BODY MASS INDEX: 30.86 KG/M2 | WEIGHT: 158 LBS

## 2020-01-28 DIAGNOSIS — R34 DECREASED URINE OUTPUT: ICD-10-CM

## 2020-01-28 LAB
ALBUMIN UR-MCNC: 30 MG/DL
ANION GAP SERPL CALCULATED.3IONS-SCNC: 4 MMOL/L (ref 3–14)
APPEARANCE UR: CLEAR
BACTERIA #/AREA URNS HPF: ABNORMAL /HPF
BASOPHILS # BLD AUTO: 0 10E9/L (ref 0–0.2)
BASOPHILS NFR BLD AUTO: 0.9 %
BILIRUB UR QL STRIP: NEGATIVE
BUN SERPL-MCNC: 10 MG/DL (ref 7–30)
CALCIUM SERPL-MCNC: 9.1 MG/DL (ref 8.5–10.1)
CHLORIDE SERPL-SCNC: 108 MMOL/L (ref 94–109)
CO2 SERPL-SCNC: 23 MMOL/L (ref 20–32)
COLOR UR AUTO: ABNORMAL
CREAT SERPL-MCNC: 2.14 MG/DL (ref 0.52–1.04)
DIFFERENTIAL METHOD BLD: ABNORMAL
EOSINOPHIL # BLD AUTO: 0.1 10E9/L (ref 0–0.7)
EOSINOPHIL NFR BLD AUTO: 1.8 %
ERYTHROCYTE [DISTWIDTH] IN BLOOD BY AUTOMATED COUNT: 13.2 % (ref 10–15)
GFR SERPL CREATININE-BSD FRML MDRD: 25 ML/MIN/{1.73_M2}
GLUCOSE SERPL-MCNC: 94 MG/DL (ref 70–99)
GLUCOSE UR STRIP-MCNC: NEGATIVE MG/DL
HCT VFR BLD AUTO: 31.6 % (ref 35–47)
HGB BLD-MCNC: 9.5 G/DL (ref 11.7–15.7)
HGB UR QL STRIP: NEGATIVE
IMM GRANULOCYTES # BLD: 0 10E9/L (ref 0–0.4)
IMM GRANULOCYTES NFR BLD: 0.3 %
KETONES UR STRIP-MCNC: NEGATIVE MG/DL
LEUKOCYTE ESTERASE UR QL STRIP: ABNORMAL
LYMPHOCYTES # BLD AUTO: 0.8 10E9/L (ref 0.8–5.3)
LYMPHOCYTES NFR BLD AUTO: 23.7 %
MCH RBC QN AUTO: 26.9 PG (ref 26.5–33)
MCHC RBC AUTO-ENTMCNC: 30.1 G/DL (ref 31.5–36.5)
MCV RBC AUTO: 90 FL (ref 78–100)
MONOCYTES # BLD AUTO: 0.5 10E9/L (ref 0–1.3)
MONOCYTES NFR BLD AUTO: 14.8 %
NEUTROPHILS # BLD AUTO: 2 10E9/L (ref 1.6–8.3)
NEUTROPHILS NFR BLD AUTO: 58.5 %
NITRATE UR QL: NEGATIVE
NRBC # BLD AUTO: 0 10*3/UL
NRBC BLD AUTO-RTO: 0 /100
PH UR STRIP: 6 PH (ref 5–7)
PLATELET # BLD AUTO: 157 10E9/L (ref 150–450)
POTASSIUM SERPL-SCNC: 4.2 MMOL/L (ref 3.4–5.3)
RBC # BLD AUTO: 3.53 10E12/L (ref 3.8–5.2)
RBC #/AREA URNS AUTO: 0 /HPF (ref 0–2)
SODIUM SERPL-SCNC: 134 MMOL/L (ref 133–144)
SOURCE: ABNORMAL
SP GR UR STRIP: 1 (ref 1–1.03)
SQUAMOUS #/AREA URNS AUTO: <1 /HPF (ref 0–1)
TRANS CELLS #/AREA URNS HPF: <1 /HPF (ref 0–1)
UROBILINOGEN UR STRIP-MCNC: NORMAL MG/DL (ref 0–2)
WBC # BLD AUTO: 3.4 10E9/L (ref 4–11)
WBC #/AREA URNS AUTO: 4 /HPF (ref 0–5)

## 2020-01-28 PROCEDURE — 81001 URINALYSIS AUTO W/SCOPE: CPT | Performed by: EMERGENCY MEDICINE

## 2020-01-28 PROCEDURE — 99284 EMERGENCY DEPT VISIT MOD MDM: CPT | Mod: 25 | Performed by: EMERGENCY MEDICINE

## 2020-01-28 PROCEDURE — 80048 BASIC METABOLIC PNL TOTAL CA: CPT | Performed by: EMERGENCY MEDICINE

## 2020-01-28 PROCEDURE — 76776 US EXAM K TRANSPL W/DOPPLER: CPT

## 2020-01-28 PROCEDURE — 36415 COLL VENOUS BLD VENIPUNCTURE: CPT | Performed by: EMERGENCY MEDICINE

## 2020-01-28 PROCEDURE — 85025 COMPLETE CBC W/AUTO DIFF WBC: CPT | Performed by: EMERGENCY MEDICINE

## 2020-01-28 PROCEDURE — 99284 EMERGENCY DEPT VISIT MOD MDM: CPT | Mod: Z6 | Performed by: EMERGENCY MEDICINE

## 2020-01-28 ASSESSMENT — ENCOUNTER SYMPTOMS: ABDOMINAL DISTENTION: 1

## 2020-01-28 NOTE — ED TRIAGE NOTES
Patient presents to triage c/o difficulty voiding and only voiding small amounts when able to go. Hx of kidney txp 2007.

## 2020-01-28 NOTE — ED AVS SNAPSHOT
[facility], [place], Emergency Department  [address] [address]  Phone:  [phone]                                    Joshua Lala   MRN: [id number]    Department:  [facility], Emergency Department   Date of Visit:  1/28/2020           After Visit Summary Signature Page    I have received my discharge instructions, and my questions have been answered. I have discussed any challenges I see with this plan with the nurse or doctor.    ..........................................................................................................................................  Patient/Patient Representative Signature      ..........................................................................................................................................  Patient Representative Print Name and Relationship to Patient    ..................................................               ................................................  Date                                   Time    ..........................................................................................................................................  Reviewed by Signature/Title    ...................................................              ..............................................  Date                                               Time          22EPIC Rev 08/18

## 2020-01-28 NOTE — ED NOTES
ED Triage Provider Note  [facility]  Encounter Date: Jan 28, 2020  4:23 PM     History:  Chief Complaint   Patient presents with     Dysuria     Joshua Lala is a 54 year old female who presents with decreased urine output over the last 6 days.  Patient has a history of renal transplant in 2007 and has become concerned with the lack of urine recently.  Patient states she will have an urge to urinate and only a minimal amount will come out.  She denies any abdominal pain or feelings of distention.  Patient also denies dysuria, fevers.  Over the same timeframe, she also endorses a decreased appetite.      Review of Systems:  Review of Systems  ROS: 14 point ROS neg other than the symptoms noted above in the HPI.      Exam:  BP (!) 182/82   Pulse 66   Temp 98.4  F (36.9  C) (Oral)   Resp 18   Wt 71.7 kg (158 lb)   SpO2 97%   BMI 30.86 kg/m    General: No acute distress. Appears stated age.   Cardio: Regular rate, extremities well perfused  Resp: Normal work of breathing, grossly normal respiratory rate  Neuro: Alert. CN II-XII grossly intact. Grossly intact strength.   ABD: Soft and nontender    Medical Decision Making:  Patient arriving to the ED with problem as above. A medical screening exam was performed.  Labs and renal ultrasound orders initiated from Triage. The patient is appropriate to wait until next available bed.     Omkar Desir MD      Note created by Omkar Desir MD on 1/28/2020 at 4:23 PM       Omkar Desir MD  01/28/20 0211

## 2020-01-29 NOTE — ED PROVIDER NOTES
"      [place] EMERGENCY DEPARTMENT ([facility])  January 28, 2020    History     Chief Complaint   Patient presents with     Dysuria     A  was used (HPI intepreted by family member, per her request).     Joshua Lala is a 54 year old female with history notable for kidney txp (#2, 2007), CKD, tertiary hyperparathyroidism, and cholecystectomy who presents to the ED for 2 days of ongoing decreased urine output and abdominal distention.  Patient states she has not had symptoms similar to this since her transplant.  She also reports feeling \"heavy and puffy\" in the face when she sits up from lying down.  She states this lasted for about an hour and resolved on its own.    Per chart review, patient was just seen in the ED, 2 days ago with similar symptoms. They did extensive work up including labs and CT scan of the abdomen/pelvis. They were unable to get a urine sample for urinalysis.  CT scan showed trace new ascites and otherwise unremarkable.  Her creatinine was slightly elevated from 1.68 on 9/28/19 to 2.00 on 1/26/20.  She was also noted to be hypertensive and had not filled her antihypertensive medications November last year.  They did restart her on Norvasc 10 mg daily with follow-up with her primary care doctor and transplant coordinator.      PAST MEDICAL HISTORY  Past Medical History:   Diagnosis Date     Dehydration      Henoch-Schonlein purpura (H)      Hypercalcemia      Hypomagnesaemia      Renal disease      Tertiary hyperparathyroidism (H)      PAST SURGICAL HISTORY  Past Surgical History:   Procedure Laterality Date     CHOLECYSTECTOMY       renal transplant  1998, 2007     TRANSPLANT      Kidney 2008     FAMILY HISTORY  No family history on file.  SOCIAL HISTORY  Social History     Tobacco Use     Smoking status: Never Smoker     Smokeless tobacco: Never Used   Substance Use Topics     Alcohol use: No     MEDICATIONS  No current facility-administered medications for this " encounter.      Current Outpatient Medications   Medication     albuterol (PROAIR HFA) 108 (90 Base) MCG/ACT inhaler     amLODIPine (NORVASC) 5 MG tablet     amLODIPine (NORVASC) 5 MG tablet     calcium carbonate (TUMS) 500 MG chewable tablet     CELLCEPT (BRAND) 250 MG capsule     cinacalcet (SENSIPAR) 30 MG tablet     CINACALCET HCL PO     Diphenhyd-HC-Nystatin-Tetracyc (FIRST-HARINDER MOUTHWASH) SUSP     furosemide (LASIX) 20 MG tablet     GENGRAF (BRAND) 25 MG CAPSULE     guaiFENesin (MUCINEX) 600 MG 12 hr tablet     hydrochlorothiazide (HYDRODIURIL) 25 MG tablet     Ipratropium-Albuterol (COMBIVENT RESPIMAT)  MCG/ACT inhaler     losartan (COZAAR) 25 MG tablet     NEORAL (BRAND) 25 MG capsule     omeprazole (PRILOSEC) 20 MG DR capsule     OMEPRAZOLE PO     sucralfate (CARAFATE) 1 GM/10ML suspension     sulfamethoxazole-trimethoprim (BACTRIM/SEPTRA) 400-80 MG tablet     vitamin D3 (CHOLECALCIFEROL) 1000 units (25 mcg) tablet     ALLERGIES  Allergies   Allergen Reactions     Contrast Dye Shortness Of Breath     Other reaction(s): Angioedema       I have reviewed the Medications, Allergies, Past Medical and Surgical History, and Social History in the Epic system.    Review of Systems   Gastrointestinal: Positive for abdominal distention.   Genitourinary: Positive for decreased urine volume.   All other systems reviewed and are negative.      Physical Exam   BP: (!) 182/82  Pulse: 66  Temp: 98.4  F (36.9  C)  Resp: 18  Weight: 71.7 kg (158 lb)  SpO2: 97 %      Physical Exam  Constitutional:       General: She is not in acute distress.     Appearance: She is well-developed. She is not ill-appearing, toxic-appearing or diaphoretic.      Comments: Comfortably resting, lying in bed, NAD, nondiaphoretic, lucid, fully conversant, no  respiratory distress, alert and oriented.  Family is providing translation at the bedside   HENT:      Head: Normocephalic and atraumatic.      Mouth/Throat:      Pharynx: No oropharyngeal  exudate.   Eyes:      General: No scleral icterus.     Pupils: Pupils are equal, round, and reactive to light.   Neck:      Musculoskeletal: Normal range of motion and neck supple.   Cardiovascular:      Rate and Rhythm: Normal rate.      Heart sounds: Normal heart sounds.   Pulmonary:      Effort: No respiratory distress.      Breath sounds: Normal breath sounds.   Abdominal:      Palpations: Abdomen is soft.      Tenderness: There is no abdominal tenderness.   Musculoskeletal:         General: No tenderness.   Skin:     General: Skin is warm and dry.      Coloration: Skin is not pale.      Findings: No erythema or rash.   Neurological:      Mental Status: She is alert and oriented to person, place, and time.         ED Course        Procedures   7:44 PM  The patient was seen and examined by Dr. Solano in Room 32.                Critical Care time:  none         Results for orders placed or performed during the hospital encounter of 01/28/20    Renal Transplant     Status: None    Narrative    EXAMINATION:  RENAL TRANSPLANT,  1/28/2020 5:04 PM     COMPARISON: 1/26/2020, 6/16/2019    HISTORY: Decreased urinary    TECHNIQUE:  Grey-scale, color Doppler and spectral flow analysis.    FINDINGS:  The transplant kidney is located in the right lower quadrant, and  measures 11 point cm length. Parenchyma is of normal thickness and  echogenicity. No focal lesions. No hydronephrosis. No perinephric  fluid collection.    Renal artery flow:   70 cm/sec peak systolic at hilum.  99 cm/sec peak systolic at anastomosis.    Lower Pole Arcuate artery:  0.83 resistive index.     Mid pole Arcuate artery:  0.83 resistive index.     Upper Pole Arcuate artery:  0.82 resistive index.    Renal Vein Flow:  39 cm/sec at hilum.   27 cm/sec at anastomosis.    Iliac artery flow:  123 cm/sec peak systolic above anastomosis.  153 cm/sec peak systolic below anastomosis.    Iliac vein flow:  Patent above and below the anastomosis.      Impression     IMPRESSION:  1.  Normal transplant kidney ultrasound except for mildly elevated  resistive indices which date back to surgery.  2.  Normal Doppler evaluation of the transplant kidney.      I have personally reviewed the examination and initial interpretation  and I agree with the findings.    KRISTY ASTORGA MD   CBC with platelets differential     Status: Abnormal   Result Value Ref Range    WBC 3.4 (L) 4.0 - 11.0 10e9/L    RBC Count 3.53 (L) 3.8 - 5.2 10e12/L    Hemoglobin 9.5 (L) 11.7 - 15.7 g/dL    Hematocrit 31.6 (L) 35.0 - 47.0 %    MCV 90 78 - 100 fl    MCH 26.9 26.5 - 33.0 pg    MCHC 30.1 (L) 31.5 - 36.5 g/dL    RDW 13.2 10.0 - 15.0 %    Platelet Count 157 150 - 450 10e9/L    Diff Method Automated Method     % Neutrophils 58.5 %    % Lymphocytes 23.7 %    % Monocytes 14.8 %    % Eosinophils 1.8 %    % Basophils 0.9 %    % Immature Granulocytes 0.3 %    Nucleated RBCs 0 0 /100    Absolute Neutrophil 2.0 1.6 - 8.3 10e9/L    Absolute Lymphocytes 0.8 0.8 - 5.3 10e9/L    Absolute Monocytes 0.5 0.0 - 1.3 10e9/L    Absolute Eosinophils 0.1 0.0 - 0.7 10e9/L    Absolute Basophils 0.0 0.0 - 0.2 10e9/L    Abs Immature Granulocytes 0.0 0 - 0.4 10e9/L    Absolute Nucleated RBC 0.0    Basic metabolic panel     Status: Abnormal   Result Value Ref Range    Sodium 134 133 - 144 mmol/L    Potassium 4.2 3.4 - 5.3 mmol/L    Chloride 108 94 - 109 mmol/L    Carbon Dioxide 23 20 - 32 mmol/L    Anion Gap 4 3 - 14 mmol/L    Glucose 94 70 - 99 mg/dL    Urea Nitrogen 10 7 - 30 mg/dL    Creatinine 2.14 (H) 0.52 - 1.04 mg/dL    GFR Estimate 25 (L) >60 mL/min/[1.73_m2]    GFR Estimate If Black 29 (L) >60 mL/min/[1.73_m2]    Calcium 9.1 8.5 - 10.1 mg/dL   UA with Microscopic     Status: Abnormal   Result Value Ref Range    Color Urine Light Yellow     Appearance Urine Clear     Glucose Urine Negative NEG^Negative mg/dL    Bilirubin Urine Negative NEG^Negative    Ketones Urine Negative NEG^Negative mg/dL    Specific Gravity Urine 1.002  (L) 1.003 - 1.035    Blood Urine Negative NEG^Negative    pH Urine 6.0 5.0 - 7.0 pH    Protein Albumin Urine 30 (A) NEG^Negative mg/dL    Urobilinogen mg/dL Normal 0.0 - 2.0 mg/dL    Nitrite Urine Negative NEG^Negative    Leukocyte Esterase Urine Moderate (A) NEG^Negative    Source Midstream Urine     WBC Urine 4 0 - 5 /HPF    RBC Urine 0 0 - 2 /HPF    Bacteria Urine Few (A) NEG^Negative /HPF    Squamous Epithelial /HPF Urine <1 0 - 1 /HPF    Transitional Epi <1 0 - 1 /HPF     I have reviewed the patient's prior chart including recent labs, ER visits, and available inpatient discharge summaries if available.         Labs Ordered and Resulted from Time of ED Arrival Up to the Time of Departure from the ED   CBC WITH PLATELETS DIFFERENTIAL - Abnormal; Notable for the following components:       Result Value    WBC 3.4 (*)     RBC Count 3.53 (*)     Hemoglobin 9.5 (*)     Hematocrit 31.6 (*)     MCHC 30.1 (*)     All other components within normal limits   BASIC METABOLIC PANEL - Abnormal; Notable for the following components:    Creatinine 2.14 (*)     GFR Estimate 25 (*)     GFR Estimate If Black 29 (*)     All other components within normal limits   ROUTINE UA WITH MICROSCOPIC - Abnormal; Notable for the following components:    Specific Gravity Urine 1.002 (*)     Protein Albumin Urine 30 (*)     Leukocyte Esterase Urine Moderate (*)     Bacteria Urine Few (*)     All other components within normal limits            Assessments & Plan (with Medical Decision Making)   This is a 54-year-old female patient with a history of a kidney transplant is presenting to the emergency room tonight with concerns for decreased urinary output.  On physical exam the patient is otherwise resting comfortably she is no obvious distress.  Vitals are otherwise stable and she shows no signs of being toxic or septic.  Labs are reviewed which show a slightly elevating creatinine level from 2 days ago.  But not far from baseline.  Her UA is  "unchanged from her previous in the past.  The rest of her laboratory results are unremarkable.  Her transplant kidney ultrasound was also unremarkable.  Bedside ultrasound shows that her bladder does have a small amount of urine within it.  At this time after reviewing the patient's vital signs, physical exam and previous charts I do not believe she needs admission at this time.  I believe she can be safely discharged as her labs and imaging are otherwise reassuring.  She does need to follow-up with her transplant service.  I did review the notes from her most recent emergency department visit and the phone discussion with the ER physician and transplant service.    Pt was discharged home/self-care.  PT was provided written discharge instructions. Additionally verbal instructions were given and discussed with patient.  PT was asked to return to the ED immediately for any new or concerning symptoms.  Pt was in agreement, endorsed understanding, and questions were answered.     I have reviewed the nursing notes.    I have reviewed the findings, diagnosis, plan and need for follow up with the patient.    Discharge Medication List as of 1/28/2020  8:26 PM          Final diagnoses:   Decreased urine output     INeil, am serving as a trained medical scribe to document services personally performed by Huan Solano MD, based on the provider's statements to me.      IHuna MD, was physically present and have reviewed and verified the accuracy of this note documented by Neil Rivas.     \"This dictation was performed with the assistance of voice recognition software and may contain inadvertant transcription  errors,  omissions and/or  inadvertent word substitution.\" --Huan Solano MD     1/28/2020   [facility], EMERGENCY DEPARTMENT     Huan Solano MD  01/29/20 0233    "

## 2020-01-29 NOTE — DISCHARGE INSTRUCTIONS
Please make an appointment to follow up with Your Primary Care Provider as soon as possible.    All of your labs are mostly unchanged. Your ultrasound shows your transplant kidney seems ok.     Results for orders placed or performed during the hospital encounter of 01/28/20   US Renal Transplant     Status: None    Narrative    EXAMINATION: US RENAL TRANSPLANT,  1/28/2020 5:04 PM     COMPARISON: 1/26/2020, 6/16/2019    HISTORY: Decreased urinary    TECHNIQUE:  Grey-scale, color Doppler and spectral flow analysis.    FINDINGS:  The transplant kidney is located in the right lower quadrant, and  measures 11 point cm length. Parenchyma is of normal thickness and  echogenicity. No focal lesions. No hydronephrosis. No perinephric  fluid collection.    Renal artery flow:   70 cm/sec peak systolic at hilum.  99 cm/sec peak systolic at anastomosis.    Lower Pole Arcuate artery:  0.83 resistive index.     Mid pole Arcuate artery:  0.83 resistive index.     Upper Pole Arcuate artery:  0.82 resistive index.    Renal Vein Flow:  39 cm/sec at hilum.   27 cm/sec at anastomosis.    Iliac artery flow:  123 cm/sec peak systolic above anastomosis.  153 cm/sec peak systolic below anastomosis.    Iliac vein flow:  Patent above and below the anastomosis.      Impression    IMPRESSION:  1.  Normal transplant kidney ultrasound except for mildly elevated  resistive indices which date back to surgery.  2.  Normal Doppler evaluation of the transplant kidney.      I have personally reviewed the examination and initial interpretation  and I agree with the findings.    KRISTY ASTORGA MD   CBC with platelets differential     Status: Abnormal   Result Value Ref Range    WBC 3.4 (L) 4.0 - 11.0 10e9/L    RBC Count 3.53 (L) 3.8 - 5.2 10e12/L    Hemoglobin 9.5 (L) 11.7 - 15.7 g/dL    Hematocrit 31.6 (L) 35.0 - 47.0 %    MCV 90 78 - 100 fl    MCH 26.9 26.5 - 33.0 pg    MCHC 30.1 (L) 31.5 - 36.5 g/dL    RDW 13.2 10.0 - 15.0 %    Platelet Count 157 150  - 450 10e9/L    Diff Method Automated Method     % Neutrophils 58.5 %    % Lymphocytes 23.7 %    % Monocytes 14.8 %    % Eosinophils 1.8 %    % Basophils 0.9 %    % Immature Granulocytes 0.3 %    Nucleated RBCs 0 0 /100    Absolute Neutrophil 2.0 1.6 - 8.3 10e9/L    Absolute Lymphocytes 0.8 0.8 - 5.3 10e9/L    Absolute Monocytes 0.5 0.0 - 1.3 10e9/L    Absolute Eosinophils 0.1 0.0 - 0.7 10e9/L    Absolute Basophils 0.0 0.0 - 0.2 10e9/L    Abs Immature Granulocytes 0.0 0 - 0.4 10e9/L    Absolute Nucleated RBC 0.0    Basic metabolic panel     Status: Abnormal   Result Value Ref Range    Sodium 134 133 - 144 mmol/L    Potassium 4.2 3.4 - 5.3 mmol/L    Chloride 108 94 - 109 mmol/L    Carbon Dioxide 23 20 - 32 mmol/L    Anion Gap 4 3 - 14 mmol/L    Glucose 94 70 - 99 mg/dL    Urea Nitrogen 10 7 - 30 mg/dL    Creatinine 2.14 (H) 0.52 - 1.04 mg/dL    GFR Estimate 25 (L) >60 mL/min/[1.73_m2]    GFR Estimate If Black 29 (L) >60 mL/min/[1.73_m2]    Calcium 9.1 8.5 - 10.1 mg/dL   UA with Microscopic     Status: Abnormal   Result Value Ref Range    Color Urine Light Yellow     Appearance Urine Clear     Glucose Urine Negative NEG^Negative mg/dL    Bilirubin Urine Negative NEG^Negative    Ketones Urine Negative NEG^Negative mg/dL    Specific Gravity Urine 1.002 (L) 1.003 - 1.035    Blood Urine Negative NEG^Negative    pH Urine 6.0 5.0 - 7.0 pH    Protein Albumin Urine 30 (A) NEG^Negative mg/dL    Urobilinogen mg/dL Normal 0.0 - 2.0 mg/dL    Nitrite Urine Negative NEG^Negative    Leukocyte Esterase Urine Moderate (A) NEG^Negative    Source Midstream Urine     WBC Urine 4 0 - 5 /HPF    RBC Urine 0 0 - 2 /HPF    Bacteria Urine Few (A) NEG^Negative /HPF    Squamous Epithelial /HPF Urine <1 0 - 1 /HPF    Transitional Epi <1 0 - 1 /HPF

## 2020-01-30 ENCOUNTER — TELEPHONE (OUTPATIENT)
Dept: TRANSPLANT | Facility: CLINIC | Age: 55
End: 2020-01-30

## 2020-01-30 NOTE — TELEPHONE ENCOUNTER
Please review most recent ER visits   Plan to return to transplant  Clinic     Sent  Request to transplant   Schedulers for next available with Delia Rogers  Transplant  outpatient NP

## 2020-01-31 ENCOUNTER — TELEPHONE (OUTPATIENT)
Dept: TRANSPLANT | Facility: CLINIC | Age: 55
End: 2020-01-31

## 2020-02-19 ENCOUNTER — TELEPHONE (OUTPATIENT)
Dept: TRANSPLANT | Facility: CLINIC | Age: 55
End: 2020-02-19

## 2020-02-19 NOTE — TELEPHONE ENCOUNTER
Spoke with daughter  Nichelle who will call me back when she checks her scheduled and check in with other family members for pt to get a ride to appt.

## 2020-03-02 ENCOUNTER — TELEPHONE (OUTPATIENT)
Dept: TRANSPLANT | Facility: CLINIC | Age: 55
End: 2020-03-02

## 2020-03-02 NOTE — TELEPHONE ENCOUNTER
Left message for patient to schedule follow up  appointments.  Asked patient to call back to schedule.

## 2020-03-02 NOTE — TELEPHONE ENCOUNTER
Kaveh Francois Mary K, RN             We have been trying to reach this pt and have left several messages in regards to scheduling her follow up appt with Delia Rogers. I will generate and send out a trying to reach you letter today. The message will be deleted out the que. In the event this pt calls for an appointments please submit new request.    Previous Messages      ----- Message -----   From: Cindy Weir RN   Sent: 1/30/2020   6:31 AM CST   To: Post Transplant Scheduling Pool   Subject: Requesting  appt   next available                  Appointment Request: Delia Rogers   Orders Placed: N/A   Patient Aware? Post ER visits   Physician Override Approved? N/A   Appointment Timeframe Requested:  Next Available with Delia Rogers  - ER visits times 2  recommend follow with transplant  privider

## 2020-03-03 ENCOUNTER — TELEPHONE (OUTPATIENT)
Dept: TRANSPLANT | Facility: CLINIC | Age: 55
End: 2020-03-03

## 2020-03-03 NOTE — TELEPHONE ENCOUNTER
Rashmi Called and said her mom current does not have medical insurance, when her mom in reinstated MA she will call to schedule appt's

## 2020-03-15 ENCOUNTER — APPOINTMENT (OUTPATIENT)
Dept: GENERAL RADIOLOGY | Facility: CLINIC | Age: 55
DRG: 307 | End: 2020-03-15
Attending: EMERGENCY MEDICINE
Payer: MEDICARE

## 2020-03-15 ENCOUNTER — HOSPITAL ENCOUNTER (INPATIENT)
Facility: CLINIC | Age: 55
LOS: 6 days | Discharge: HOME | DRG: 307 | End: 2020-03-21
Attending: EMERGENCY MEDICINE | Admitting: INTERNAL MEDICINE
Payer: MEDICARE

## 2020-03-15 DIAGNOSIS — Z94.0 KIDNEY TRANSPLANT RECIPIENT: ICD-10-CM

## 2020-03-15 DIAGNOSIS — R60.1 ANASARCA: ICD-10-CM

## 2020-03-15 DIAGNOSIS — N39.0 URINARY TRACT INFECTION WITH HEMATURIA, SITE UNSPECIFIED: ICD-10-CM

## 2020-03-15 DIAGNOSIS — N18.9 ANEMIA DUE TO CHRONIC KIDNEY DISEASE, UNSPECIFIED CKD STAGE: Primary | ICD-10-CM

## 2020-03-15 DIAGNOSIS — Z94.0 S/P KIDNEY TRANSPLANT: ICD-10-CM

## 2020-03-15 DIAGNOSIS — I10 BENIGN ESSENTIAL HYPERTENSION: ICD-10-CM

## 2020-03-15 DIAGNOSIS — R31.9 URINARY TRACT INFECTION WITH HEMATURIA, SITE UNSPECIFIED: ICD-10-CM

## 2020-03-15 DIAGNOSIS — K21.00 GASTROESOPHAGEAL REFLUX DISEASE WITH ESOPHAGITIS: ICD-10-CM

## 2020-03-15 DIAGNOSIS — R19.7 DIARRHEA, UNSPECIFIED TYPE: ICD-10-CM

## 2020-03-15 DIAGNOSIS — Z79.60 LONG-TERM USE OF IMMUNOSUPPRESSANT MEDICATION: ICD-10-CM

## 2020-03-15 DIAGNOSIS — D63.1 ANEMIA DUE TO CHRONIC KIDNEY DISEASE, UNSPECIFIED CKD STAGE: Primary | ICD-10-CM

## 2020-03-15 DIAGNOSIS — Z94.0 KIDNEY TRANSPLANTED: ICD-10-CM

## 2020-03-15 DIAGNOSIS — R60.0 PERIPHERAL EDEMA: ICD-10-CM

## 2020-03-15 LAB
ALBUMIN SERPL-MCNC: 3.3 G/DL (ref 3.4–5)
ALBUMIN UR-MCNC: 30 MG/DL
ALP SERPL-CCNC: 119 U/L (ref 40–150)
ALT SERPL W P-5'-P-CCNC: 11 U/L (ref 0–50)
ANION GAP SERPL CALCULATED.3IONS-SCNC: 7 MMOL/L (ref 3–14)
APPEARANCE UR: CLEAR
AST SERPL W P-5'-P-CCNC: 28 U/L (ref 0–45)
BASOPHILS # BLD AUTO: 0 10E9/L (ref 0–0.2)
BASOPHILS NFR BLD AUTO: 0.9 %
BILIRUB SERPL-MCNC: 0.8 MG/DL (ref 0.2–1.3)
BILIRUB UR QL STRIP: NEGATIVE
BUN SERPL-MCNC: 14 MG/DL (ref 7–30)
C COLI+JEJUNI+LARI FUSA STL QL NAA+PROBE: NOT DETECTED
C DIFF TOX B STL QL: NEGATIVE
CALCIUM SERPL-MCNC: 9 MG/DL (ref 8.5–10.1)
CHLORIDE SERPL-SCNC: 108 MMOL/L (ref 94–109)
CO2 SERPL-SCNC: 19 MMOL/L (ref 20–32)
COLOR UR AUTO: ABNORMAL
CREAT SERPL-MCNC: 2.06 MG/DL (ref 0.52–1.04)
DIFFERENTIAL METHOD BLD: ABNORMAL
EC STX1 GENE STL QL NAA+PROBE: NOT DETECTED
EC STX2 GENE STL QL NAA+PROBE: NOT DETECTED
ENTERIC PATHOGEN COMMENT: NORMAL
EOSINOPHIL # BLD AUTO: 0.1 10E9/L (ref 0–0.7)
EOSINOPHIL NFR BLD AUTO: 1.8 %
ERYTHROCYTE [DISTWIDTH] IN BLOOD BY AUTOMATED COUNT: 13.2 % (ref 10–15)
GFR SERPL CREATININE-BSD FRML MDRD: 27 ML/MIN/{1.73_M2}
GLUCOSE SERPL-MCNC: 112 MG/DL (ref 70–99)
GLUCOSE UR STRIP-MCNC: NEGATIVE MG/DL
HCT VFR BLD AUTO: 30.3 % (ref 35–47)
HGB BLD-MCNC: 9.1 G/DL (ref 11.7–15.7)
HGB UR QL STRIP: NEGATIVE
KETONES UR STRIP-MCNC: NEGATIVE MG/DL
LACTATE BLD-SCNC: 0.7 MMOL/L (ref 0.7–2)
LACTATE BLD-SCNC: 1 MMOL/L (ref 0.7–2)
LEUKOCYTE ESTERASE UR QL STRIP: ABNORMAL
LIPASE SERPL-CCNC: 346 U/L (ref 73–393)
LYMPHOCYTES # BLD AUTO: 0.5 10E9/L (ref 0.8–5.3)
LYMPHOCYTES NFR BLD AUTO: 15 %
MCH RBC QN AUTO: 27.2 PG (ref 26.5–33)
MCHC RBC AUTO-ENTMCNC: 30 G/DL (ref 31.5–36.5)
MCV RBC AUTO: 91 FL (ref 78–100)
MONOCYTES # BLD AUTO: 0.3 10E9/L (ref 0–1.3)
MONOCYTES NFR BLD AUTO: 10.6 %
NEUTROPHILS # BLD AUTO: 2.3 10E9/L (ref 1.6–8.3)
NEUTROPHILS NFR BLD AUTO: 71.7 %
NITRATE UR QL: NEGATIVE
NOROV GI+II ORF1-ORF2 JNC STL QL NAA+PR: NOT DETECTED
NT-PROBNP SERPL-MCNC: 4216 PG/ML (ref 0–900)
PH UR STRIP: 6 PH (ref 5–7)
PLATELET # BLD AUTO: 167 10E9/L (ref 150–450)
PLATELET # BLD EST: ABNORMAL 10*3/UL
POTASSIUM SERPL-SCNC: 4.7 MMOL/L (ref 3.4–5.3)
PROT SERPL-MCNC: 7.1 G/DL (ref 6.8–8.8)
RBC # BLD AUTO: 3.34 10E12/L (ref 3.8–5.2)
RBC #/AREA URNS AUTO: 5 /HPF (ref 0–2)
RVA NSP5 STL QL NAA+PROBE: NOT DETECTED
SALMONELLA SP RPOD STL QL NAA+PROBE: NOT DETECTED
SHIGELLA SP+EIEC IPAH STL QL NAA+PROBE: NOT DETECTED
SODIUM SERPL-SCNC: 134 MMOL/L (ref 133–144)
SOURCE: ABNORMAL
SP GR UR STRIP: 1 (ref 1–1.03)
SPECIMEN SOURCE: NORMAL
SQUAMOUS #/AREA URNS AUTO: 1 /HPF (ref 0–1)
TRANS CELLS #/AREA URNS HPF: <1 /HPF (ref 0–1)
TROPONIN I SERPL-MCNC: <0.015 UG/L (ref 0–0.04)
UROBILINOGEN UR STRIP-MCNC: NORMAL MG/DL (ref 0–2)
V CHOL+PARA RFBL+TRKH+TNAA STL QL NAA+PR: NOT DETECTED
VARIANT LYMPHS BLD QL SMEAR: PRESENT
WBC # BLD AUTO: 3.2 10E9/L (ref 4–11)
WBC #/AREA URNS AUTO: 20 /HPF (ref 0–5)
Y ENTERO RECN STL QL NAA+PROBE: NOT DETECTED

## 2020-03-15 PROCEDURE — 25000128 H RX IP 250 OP 636: Performed by: EMERGENCY MEDICINE

## 2020-03-15 PROCEDURE — 85025 COMPLETE CBC W/AUTO DIFF WBC: CPT | Performed by: EMERGENCY MEDICINE

## 2020-03-15 PROCEDURE — 99285 EMERGENCY DEPT VISIT HI MDM: CPT | Mod: 25 | Performed by: EMERGENCY MEDICINE

## 2020-03-15 PROCEDURE — 96365 THER/PROPH/DIAG IV INF INIT: CPT | Performed by: EMERGENCY MEDICINE

## 2020-03-15 PROCEDURE — 83605 ASSAY OF LACTIC ACID: CPT | Performed by: EMERGENCY MEDICINE

## 2020-03-15 PROCEDURE — 87086 URINE CULTURE/COLONY COUNT: CPT | Performed by: EMERGENCY MEDICINE

## 2020-03-15 PROCEDURE — 12000001 ZZH R&B MED SURG/OB UMMC

## 2020-03-15 PROCEDURE — 83880 ASSAY OF NATRIURETIC PEPTIDE: CPT | Performed by: EMERGENCY MEDICINE

## 2020-03-15 PROCEDURE — 87493 C DIFF AMPLIFIED PROBE: CPT | Performed by: EMERGENCY MEDICINE

## 2020-03-15 PROCEDURE — 36415 COLL VENOUS BLD VENIPUNCTURE: CPT | Performed by: INTERNAL MEDICINE

## 2020-03-15 PROCEDURE — 84484 ASSAY OF TROPONIN QUANT: CPT | Performed by: EMERGENCY MEDICINE

## 2020-03-15 PROCEDURE — 83605 ASSAY OF LACTIC ACID: CPT | Performed by: INTERNAL MEDICINE

## 2020-03-15 PROCEDURE — 71046 X-RAY EXAM CHEST 2 VIEWS: CPT

## 2020-03-15 PROCEDURE — 93010 ELECTROCARDIOGRAM REPORT: CPT | Mod: Z6 | Performed by: EMERGENCY MEDICINE

## 2020-03-15 PROCEDURE — 93005 ELECTROCARDIOGRAM TRACING: CPT | Performed by: EMERGENCY MEDICINE

## 2020-03-15 PROCEDURE — 51798 US URINE CAPACITY MEASURE: CPT | Performed by: EMERGENCY MEDICINE

## 2020-03-15 PROCEDURE — 81001 URINALYSIS AUTO W/SCOPE: CPT | Performed by: EMERGENCY MEDICINE

## 2020-03-15 PROCEDURE — 25000132 ZZH RX MED GY IP 250 OP 250 PS 637: Mod: GY | Performed by: NURSE PRACTITIONER

## 2020-03-15 PROCEDURE — 99207 ZZC CDG-MDM COMPONENT: MEETS MODERATE - UP CODED: CPT | Performed by: INTERNAL MEDICINE

## 2020-03-15 PROCEDURE — 99223 1ST HOSP IP/OBS HIGH 75: CPT | Mod: AI | Performed by: INTERNAL MEDICINE

## 2020-03-15 PROCEDURE — 25000131 ZZH RX MED GY IP 250 OP 636 PS 637: Mod: GY | Performed by: NURSE PRACTITIONER

## 2020-03-15 PROCEDURE — 87506 IADNA-DNA/RNA PROBE TQ 6-11: CPT | Performed by: EMERGENCY MEDICINE

## 2020-03-15 PROCEDURE — 99207 ZZC APP CREDIT; MD BILLING SHARED VISIT: CPT | Performed by: NURSE PRACTITIONER

## 2020-03-15 PROCEDURE — 80053 COMPREHEN METABOLIC PANEL: CPT | Performed by: EMERGENCY MEDICINE

## 2020-03-15 PROCEDURE — 87040 BLOOD CULTURE FOR BACTERIA: CPT | Performed by: EMERGENCY MEDICINE

## 2020-03-15 PROCEDURE — 83690 ASSAY OF LIPASE: CPT | Performed by: EMERGENCY MEDICINE

## 2020-03-15 RX ORDER — CINACALCET 30 MG/1
30 TABLET, FILM COATED ORAL DAILY
Status: DISCONTINUED | OUTPATIENT
Start: 2020-03-16 | End: 2020-03-17

## 2020-03-15 RX ORDER — LIDOCAINE 40 MG/G
CREAM TOPICAL
Status: DISCONTINUED | OUTPATIENT
Start: 2020-03-15 | End: 2020-03-21 | Stop reason: HOSPADM

## 2020-03-15 RX ORDER — SULFAMETHOXAZOLE AND TRIMETHOPRIM 400; 80 MG/1; MG/1
1 TABLET ORAL
Status: DISCONTINUED | OUTPATIENT
Start: 2020-03-16 | End: 2020-03-17

## 2020-03-15 RX ORDER — HYDRALAZINE HYDROCHLORIDE 25 MG/1
25 TABLET, FILM COATED ORAL 3 TIMES DAILY
Status: DISCONTINUED | OUTPATIENT
Start: 2020-03-15 | End: 2020-03-17

## 2020-03-15 RX ORDER — BISACODYL 5 MG
10 TABLET, DELAYED RELEASE (ENTERIC COATED) ORAL DAILY PRN
Status: DISCONTINUED | OUTPATIENT
Start: 2020-03-15 | End: 2020-03-21 | Stop reason: HOSPADM

## 2020-03-15 RX ORDER — BISACODYL 5 MG
15 TABLET, DELAYED RELEASE (ENTERIC COATED) ORAL DAILY PRN
Status: DISCONTINUED | OUTPATIENT
Start: 2020-03-15 | End: 2020-03-21 | Stop reason: HOSPADM

## 2020-03-15 RX ORDER — PROCHLORPERAZINE 25 MG
25 SUPPOSITORY, RECTAL RECTAL EVERY 12 HOURS PRN
Status: DISCONTINUED | OUTPATIENT
Start: 2020-03-15 | End: 2020-03-21 | Stop reason: HOSPADM

## 2020-03-15 RX ORDER — MYCOPHENOLATE MOFETIL 250 MG/1
1000 CAPSULE ORAL
Status: DISCONTINUED | OUTPATIENT
Start: 2020-03-15 | End: 2020-03-20

## 2020-03-15 RX ORDER — PREDNISONE 10 MG/1
20 TABLET ORAL DAILY
Status: ON HOLD | COMMUNITY
End: 2020-03-15

## 2020-03-15 RX ORDER — PROCHLORPERAZINE MALEATE 5 MG
10 TABLET ORAL EVERY 6 HOURS PRN
Status: DISCONTINUED | OUTPATIENT
Start: 2020-03-15 | End: 2020-03-21 | Stop reason: HOSPADM

## 2020-03-15 RX ORDER — ONDANSETRON 2 MG/ML
4 INJECTION INTRAMUSCULAR; INTRAVENOUS EVERY 6 HOURS PRN
Status: DISCONTINUED | OUTPATIENT
Start: 2020-03-15 | End: 2020-03-21 | Stop reason: HOSPADM

## 2020-03-15 RX ORDER — CYCLOSPORINE 25 MG/1
50 CAPSULE ORAL
Status: DISCONTINUED | OUTPATIENT
Start: 2020-03-15 | End: 2020-03-16

## 2020-03-15 RX ORDER — ONDANSETRON 4 MG/1
4 TABLET, ORALLY DISINTEGRATING ORAL EVERY 6 HOURS PRN
Status: DISCONTINUED | OUTPATIENT
Start: 2020-03-15 | End: 2020-03-21 | Stop reason: HOSPADM

## 2020-03-15 RX ORDER — ACETAMINOPHEN 325 MG/1
650 TABLET ORAL EVERY 4 HOURS PRN
Status: DISCONTINUED | OUTPATIENT
Start: 2020-03-15 | End: 2020-03-21 | Stop reason: HOSPADM

## 2020-03-15 RX ORDER — HYDRALAZINE HYDROCHLORIDE 25 MG/1
25 TABLET, FILM COATED ORAL 3 TIMES DAILY
Status: ON HOLD | COMMUNITY
End: 2020-03-21

## 2020-03-15 RX ORDER — CEFTRIAXONE 1 G/1
1 INJECTION, POWDER, FOR SOLUTION INTRAMUSCULAR; INTRAVENOUS ONCE
Status: COMPLETED | OUTPATIENT
Start: 2020-03-15 | End: 2020-03-15

## 2020-03-15 RX ORDER — CEFTRIAXONE 1 G/1
1 INJECTION, POWDER, FOR SOLUTION INTRAMUSCULAR; INTRAVENOUS EVERY 24 HOURS
Status: DISCONTINUED | OUTPATIENT
Start: 2020-03-16 | End: 2020-03-17

## 2020-03-15 RX ORDER — NALOXONE HYDROCHLORIDE 0.4 MG/ML
.1-.4 INJECTION, SOLUTION INTRAMUSCULAR; INTRAVENOUS; SUBCUTANEOUS
Status: DISCONTINUED | OUTPATIENT
Start: 2020-03-15 | End: 2020-03-21 | Stop reason: HOSPADM

## 2020-03-15 RX ORDER — LOSARTAN POTASSIUM 25 MG/1
25 TABLET ORAL DAILY
Status: DISCONTINUED | OUTPATIENT
Start: 2020-03-15 | End: 2020-03-21 | Stop reason: HOSPADM

## 2020-03-15 RX ORDER — BISACODYL 5 MG
5 TABLET, DELAYED RELEASE (ENTERIC COATED) ORAL DAILY PRN
Status: DISCONTINUED | OUTPATIENT
Start: 2020-03-15 | End: 2020-03-21 | Stop reason: HOSPADM

## 2020-03-15 RX ADMIN — HYDRALAZINE HYDROCHLORIDE 25 MG: 25 TABLET ORAL at 20:43

## 2020-03-15 RX ADMIN — CYCLOSPORINE 50 MG: 25 CAPSULE ORAL at 20:44

## 2020-03-15 RX ADMIN — MYCOPHENOLATE MOFETIL 1000 MG: 250 CAPSULE ORAL at 20:45

## 2020-03-15 RX ADMIN — LOSARTAN POTASSIUM 25 MG: 25 TABLET, FILM COATED ORAL at 17:45

## 2020-03-15 RX ADMIN — HYDRALAZINE HYDROCHLORIDE 25 MG: 25 TABLET ORAL at 17:46

## 2020-03-15 RX ADMIN — CEFTRIAXONE 1 G: 1 INJECTION, POWDER, FOR SOLUTION INTRAMUSCULAR; INTRAVENOUS at 14:04

## 2020-03-15 ASSESSMENT — ACTIVITIES OF DAILY LIVING (ADL): ADLS_ACUITY_SCORE: 13

## 2020-03-15 ASSESSMENT — ENCOUNTER SYMPTOMS
COUGH: 0
MUSCULOSKELETAL NEGATIVE: 1
PSYCHIATRIC NEGATIVE: 1
FEVER: 0
DIARRHEA: 1
VOMITING: 0
ABDOMINAL PAIN: 0

## 2020-03-15 NOTE — ED TRIAGE NOTES
Pt presents from home with daughter. Pt states since yesterday began having diarrhea, decreased urination, nausea, and abd pain. No emesis or fevers. Pt has hx kidney txp. Pt also c/o feeling Puffy for past couple months.

## 2020-03-15 NOTE — H&P
[facility], [place]    History and Physical - Hospitalist Service, Gold Night       Date of Admission:  3/15/2020    Assessment & Plan   Joshua Lala is a 54 year old Hmong speaking woman admitted on 3/15/2020. She has a history of renal transplant in 2007, Henoch-Schonlein purpura, hypercalcemia, hypomagnesemia and hyperparathyroidism who presents with edema, hypertension and loose stools.    1) Hypertension, peripheral edema - This is the patient's primary concern.  Seen with identical complaints of bloating and hypertension on 1/28 and started on norvasc which was DC'd by her PCP on 2/17 due to complaints of peripheral edema and switched to hydralazine.  Family reports systolic pressures are ~190 at home.  - Per review of the medications she had with her, she appears to be on hydralazine 25 mg TID alone, contrary to the records in our system.  Will add losartan back on at 25 mg Qday.  Patient needs close follow up and communication with PCP as she works outside of our system and the patient is mostly obtaining her medications via her PCP.  - Oral lasix 40 mg x1  - If hypertensive overnight, would consider clonidine 0.1 mg orally.  Patient averse to IV agents as she believes this worsened her bloating.  Given prior compliance issues and patient difficulty with medication management would not send her out on clonidine.    2) Loose stools - Multiple hourly loose stools last night and this morning.  No recent antibiotics.  No fevers.  Only mild abdominal cramping.  - C diff screen, enteric panel pending    3) Question of UTI - UA positive for infection but accounts of symptoms have varied.  - Continue ceftriaxone started in the ED.    4) History of renal transplant, CKD - Transplanted 2007, baseline Cr now ~1.6.  - Continue home cellcept, gengraf and bactrim.       Diet: Regular  DVT Prophylaxis: Pneumatic Compression Devices  Staples Catheter: not present  Code Status: Full    Disposition Plan    Expected discharge: 2 - 3 days, recommended to prior living arrangement once diagnosis established, diarrhea resolved, hypertension improved.  Entered: MELANY Montes CNP 03/15/2020, 3:42 PM     The patient's care was discussed with the Attending Physician, Dr. Ramos.    MELANY Montes CNP  [facility], [place]  Pager: [phone]  Please see sticky note for cross cover information  ______________________________________________________________________    Chief Complaint   Bloating, hypertension    History is obtained from the patient    History of Present Illness   Joshua Lala is a 54 year old Hmong speaking woman admitted on 3/15/2020. She has a history of renal transplant in 2007, Henoch-Schonlein purpura, hypercalcemia, hypomagnesemia and hyperparathyroidism who presents with edema, hypertension and loose stools.    The patient is mung speaking and I spoke to her via her daughter acting as an .    The patient reports her main concern is bloating and peripheral edema.  She was seen with very similar complaints on 1/28 of this year.  At that time she was treated with some IV blood pressure medications and discharged home on Norvasc.  She was seen by her primary care provider in mid February where she also complained of ongoing bloating and peripheral edema, and her Norvasc was stopped.  She was switched to hydralazine.  Family reports that her systolic blood pressures usually range between 180 and 190 at home.    She does not feel that her symptoms have improved.  She appears to have been prescribed some as needed Lasix but took this only briefly and did not refill the prescription.  It is not entirely clear that she understood the purpose of this medication.    Last night she also developed some loose stools.  She had several stools with 1 stool per hour and further stools this morning.  She is not particularly concerned with this.  She denies any recent  antibiotics.  She denies any fevers or chills.  She does have some mild abdominal pain.    Review of Systems    The 10 point Review of Systems is negative other than noted in the HPI or here.     Past Medical History    I have reviewed this patient's medical history and updated it with pertinent information if needed.   Past Medical History:   Diagnosis Date     Dehydration      Henoch-Schonlein purpura (H)      Hypercalcemia      Hypomagnesaemia      Renal disease      Tertiary hyperparathyroidism (H)        Past Surgical History   I have reviewed this patient's surgical history and updated it with pertinent information if needed.  Past Surgical History:   Procedure Laterality Date     CHOLECYSTECTOMY       renal transplant  1998, 2007     TRANSPLANT      Kidney 2008       Social History   I have reviewed this patient's social history and updated it with pertinent information if needed.  Social History     Tobacco Use     Smoking status: Never Smoker     Smokeless tobacco: Never Used   Substance Use Topics     Alcohol use: No     Drug use: No       Family History   I have reviewed this patient's family history and updated it with pertinent information if needed.   History reviewed. No pertinent family history.    Prior to Admission Medications   Prior to Admission Medications   Prescriptions Last Dose Informant Patient Reported? Taking?   CELLCEPT (BRAND) 250 MG capsule Unknown at Unknown time  No No   Sig: TAKE FOUR CAPSULES BY MOUTH TWICE A DAY (DAW1)   CINACALCET HCL PO Unknown at Unknown time  Yes No   Diphenhyd-HC-Nystatin-Tetracyc (FIRST-HARINDER MOUTHWASH) SUSP Unknown at Unknown time  No No   Sig: Swish and swallow 5-10 mLs in mouth every 6 hours as needed   Patient not taking: Reported on 2/19/2019   GENGRAF (BRAND) 25 MG CAPSULE Unknown at Unknown time  Yes No   Sig: Take 50 mg by mouth   Ipratropium-Albuterol (COMBIVENT RESPIMAT)  MCG/ACT inhaler Unknown at Unknown time  No No   Sig: Inhale 1 puff  into the lungs 4 times daily   Patient not taking: Reported on 6/14/2019   NEORAL (BRAND) 25 MG capsule Unknown at Unknown time  No No   Sig: Take 1 capsule (25 mg) by mouth 2 times daily   OMEPRAZOLE PO Unknown at Unknown time  Yes No   Sig: omeprazole 20 mg capsule,delayed release   albuterol (PROAIR HFA) 108 (90 Base) MCG/ACT inhaler Unknown at Unknown time  No No   Sig: Inhale 2 puffs into the lungs every 4 hours as needed for shortness of breath / dyspnea   Patient not taking: Reported on 6/14/2019   amLODIPine (NORVASC) 5 MG tablet Unknown at Unknown time  No No   Sig: Take 2 tablets (10 mg) by mouth daily   Patient not taking: Reported on 6/14/2019   amLODIPine (NORVASC) 5 MG tablet   No No   Sig: Take 2 tablets (10 mg) by mouth daily   calcium carbonate (TUMS) 500 MG chewable tablet Unknown at Unknown time  No No   Sig: Take 1 tablet (500 mg) by mouth 2 times daily as needed for heartburn   cinacalcet (SENSIPAR) 30 MG tablet Unknown at Unknown time  Yes No   Sig: cinacalcet 30 mg tablet   furosemide (LASIX) 20 MG tablet Unknown at Unknown time  No No   Sig: Take 1 tablet (20 mg) by mouth daily   guaiFENesin (MUCINEX) 600 MG 12 hr tablet Unknown at Unknown time  Yes No   Sig: Take 600 mg by mouth as needed   hydrochlorothiazide (HYDRODIURIL) 25 MG tablet Unknown at Unknown time  Yes No   losartan (COZAAR) 25 MG tablet Unknown at Unknown time  No No   Sig: Take 1 tablet (25 mg) by mouth daily   omeprazole (PRILOSEC) 20 MG DR capsule Unknown at Unknown time  No No   Sig: Take 1 capsule (20 mg) by mouth every morning (before breakfast)   sucralfate (CARAFATE) 1 GM/10ML suspension Unknown at Unknown time  No No   Sig: Take 10 mLs (1 g) by mouth 4 times daily   sulfamethoxazole-trimethoprim (BACTRIM/SEPTRA) 400-80 MG tablet Unknown at Unknown time  No No   Sig: Take 1 tablet by mouth daily   vitamin D3 (CHOLECALCIFEROL) 1000 units (25 mcg) tablet Unknown at Unknown time  No No   Sig: Take 1 tablet (1,000 Units) by  mouth daily   Patient not taking: Reported on 6/14/2019      Facility-Administered Medications: None     Allergies   Allergies   Allergen Reactions     Contrast Dye Shortness Of Breath     Other reaction(s): Angioedema       Physical Exam   Vital Signs: Temp: 97.8  F (36.6  C) Temp src: Oral BP: (!) 196/96 Pulse: 72   Resp: 18 SpO2: 99 % O2 Device: None (Room air)    Weight: 169 lbs 5.01 oz    Physical Exam   Constitutional:   Well nourished, well developed, resting comfortably   Head: Normocephalic and atraumatic.   Eyes: Conjunctivae are normal. Pupils are equal, round, and reactive to light.  Pharynx has no erythema or exudate, mucous membranes are moist  Neck:   No adenopathy, no bony tenderness  Cardiovascular: 4/6 systolic murmur, RRR.  Pulmonary/Chest: Clear to auscultation bilaterally, with no wheezes or retractions. No respiratory distress.  GI: Soft with good bowel sounds.  Non-tender, non-distended, with no guarding, no rebound, no peritoneal signs.   Back:  No bony or CVA tenderness   Musculoskeletal:  No edema or clubbing   Skin: Skin is warm and dry. No rash noted.   Neurological: Alert and oriented to person, place, and time. Nonfocal exam  Psychiatric:  Normal mood and affect.      Data   Data reviewed today: I reviewed all medications, new labs and imaging results over the last 24 hours. I personally reviewed her labs and imaging from today as well as past clinic and ED visit notes.

## 2020-03-15 NOTE — ED PROVIDER NOTES
"      [place] EMERGENCY DEPARTMENT ([facility])  March 15, 2020    History     Chief Complaint   Patient presents with     Diarrhea     The history is provided by the patient, a relative and medical records. The history is limited by a language barrier. A  was used (in person , daughter).     Joshua Lala is a 54 year old [place]ian female who has a medical history significant for S/P kidney transplant, immunosuppression, dehydration, Henoch-Schonlein purpura, hypercalcemia, hypomagnesemia, and tertiary hyperparathyroidism presenting to the ED today with her daughter for diarrhea and difficulty urinating. The daughter translated for the patient. The patient states that her diarrhea started last night around 7pm, with reoccurrence about every hour. She added that she had 1 episode of diarrhea this morning before coming in. The patient stated that she has not been able to urinate, but has been drinking lots of water and feels swollen and edematous to the touch around her abdomen and calves/ankles. The patient also stated that the last time she urinated was this morning with her episode of diarrhea. In addition, the patient states her BM's \"burn like fire\" upon defecation. Patient denies any fever, abdominal pain, vomiting, cough, or a history of heart problems.  Further history obtained with an in- person  is that the patient has a history of ulcers and gets somewhat short of breath with eating.  She states she has been puffy all over since she received IV medications for hypertension on February 26.  She  believes the puffiness is worsened since that time.        I have reviewed the Medications, Allergies, Past Medical and Surgical History, and Social History in the [employer] system.  PAST MEDICAL HISTORY:   Past Medical History:   Diagnosis Date     Dehydration      Henoch-Schonlein purpura (H)      Hypercalcemia      Hypomagnesaemia      Renal disease      Tertiary " hyperparathyroidism (H)        PAST SURGICAL HISTORY:   Past Surgical History:   Procedure Laterality Date     CHOLECYSTECTOMY       renal transplant  1998, 2007     TRANSPLANT      Kidney 2008       FAMILY HISTORY: History reviewed. No pertinent family history.    SOCIAL HISTORY:   Social History     Tobacco Use     Smoking status: Never Smoker     Smokeless tobacco: Never Used   Substance Use Topics     Alcohol use: No       Patient's Medications   New Prescriptions    No medications on file   Previous Medications    ALBUTEROL (PROAIR HFA) 108 (90 BASE) MCG/ACT INHALER    Inhale 2 puffs into the lungs every 4 hours as needed for shortness of breath / dyspnea    AMLODIPINE (NORVASC) 5 MG TABLET    Take 2 tablets (10 mg) by mouth daily    AMLODIPINE (NORVASC) 5 MG TABLET    Take 2 tablets (10 mg) by mouth daily    CALCIUM CARBONATE (TUMS) 500 MG CHEWABLE TABLET    Take 1 tablet (500 mg) by mouth 2 times daily as needed for heartburn    CELLCEPT (BRAND) 250 MG CAPSULE    TAKE FOUR CAPSULES BY MOUTH TWICE A DAY (DAW1)    CINACALCET (SENSIPAR) 30 MG TABLET    cinacalcet 30 mg tablet    CINACALCET HCL PO        DIPHENHYD-HC-NYSTATIN-TETRACYC ([facility] SUSP    Swish and swallow 5-10 mLs in mouth every 6 hours as needed    FUROSEMIDE (LASIX) 20 MG TABLET    Take 1 tablet (20 mg) by mouth daily    GENGRAF (BRAND) 25 MG CAPSULE    Take 50 mg by mouth    GUAIFENESIN (MUCINEX) 600 MG 12 HR TABLET    Take 600 mg by mouth as needed    HYDROCHLOROTHIAZIDE (HYDRODIURIL) 25 MG TABLET        IPRATROPIUM-ALBUTEROL (COMBIVENT RESPIMAT)  MCG/ACT INHALER    Inhale 1 puff into the lungs 4 times daily    LOSARTAN (COZAAR) 25 MG TABLET    Take 1 tablet (25 mg) by mouth daily    NEORAL (BRAND) 25 MG CAPSULE    Take 1 capsule (25 mg) by mouth 2 times daily    OMEPRAZOLE (PRILOSEC) 20 MG DR CAPSULE    Take 1 capsule (20 mg) by mouth every morning (before breakfast)    OMEPRAZOLE PO    omeprazole 20 mg capsule,delayed  release    SUCRALFATE (CARAFATE) 1 GM/10ML SUSPENSION    Take 10 mLs (1 g) by mouth 4 times daily    SULFAMETHOXAZOLE-TRIMETHOPRIM (BACTRIM/SEPTRA) 400-80 MG TABLET    Take 1 tablet by mouth daily    VITAMIN D3 (CHOLECALCIFEROL) 1000 UNITS (25 MCG) TABLET    Take 1 tablet (1,000 Units) by mouth daily   Modified Medications    No medications on file   Discontinued Medications    No medications on file          Allergies   Allergen Reactions     Contrast Dye Shortness Of Breath     Other reaction(s): Angioedema        Review of Systems   Constitutional: Negative for fever.   Respiratory: Negative for cough.    Cardiovascular: Positive for chest pain.   Gastrointestinal: Positive for diarrhea. Negative for abdominal pain and vomiting.   Genitourinary: Positive for decreased urine volume.   Musculoskeletal: Negative.    Skin:        Swollen skin   Psychiatric/Behavioral: Negative.    All other systems reviewed and are negative.      Physical Exam   BP: (!) 176/83  Pulse: 82  Temp: 97.8  F (36.6  C)  Resp: 18  Weight: 76.8 kg (169 lb 5 oz)  SpO2: 95 %      Physical Exam  Physical Exam   Constitutional:   well nourished, well developed, appears comfortable and older than stated age  HENT:   Head: Normocephalic and atraumatic.   Eyes: Conjunctivae are normal. Pupils are equal, round, and reactive to light. eyelids are puffy, face appears puffy  pharynx has no erythema or exudate, mucous membranes are moist  Neck:   no adenopathy, no bony tenderness  Cardiovascular: regular rate and rhythm without murmurs or gallops  Pulmonary/Chest: bibasilar crackles, with no wheezes or retractions. No respiratory distress.  GI: Soft with good bowel sounds. Tender over suprapubic area,  non-distended, with no guarding, no rebound, no peritoneal signs.  Full body anasarca,  skin is edematous especially over the suprapubic area  Back:  No bony or CVA tenderness   Musculoskeletal:  2-3 mm pitting edema BLE  Skin: Skin is warm and dry. No  rash noted.   Neurological: alert and oriented to person, place, and time. Nonfocal exam  Psychiatric:  normal mood and affect.   ED Course   11:17 AM  The patient was seen and examined by Abril Mullen MD in Room ED35.        Procedures             EKG Interpretation:      Interpreted by Abril Mullen MD  Time reviewed: 1200 noon  Symptoms at time of EKG: see hpi   Rhythm: normal sinus   Rate: Normal  Axis: Normal  Ectopy: none  Conduction: normal  ST Segments/ T Waves: Poor R wave progression  Q Waves: none  Comparison to prior: 12/28/2017:  NSR rate 67 bpm     Clinical Impression: Normal sinus rhythm, rate of 70 bpm with poor R wave progression and no acute ischemic changes            Results for orders placed or performed during the hospital encounter of 03/15/20   XR Chest 2 Views     Status: None (Preliminary result)    Impression    IMPRESSION:Right greater than left tiny pleural effusions. Unchanged  cardiac enlargement.    Findings discussed with senior resident Jarad Bonilla MD.   CBC with platelets differential     Status: Abnormal   Result Value Ref Range    WBC 3.2 (L) 4.0 - 11.0 10e9/L    RBC Count 3.34 (L) 3.8 - 5.2 10e12/L    Hemoglobin 9.1 (L) 11.7 - 15.7 g/dL    Hematocrit 30.3 (L) 35.0 - 47.0 %    MCV 91 78 - 100 fl    MCH 27.2 26.5 - 33.0 pg    MCHC 30.0 (L) 31.5 - 36.5 g/dL    RDW 13.2 10.0 - 15.0 %    Platelet Count 167 150 - 450 10e9/L    Diff Method Manual Differential     % Neutrophils 71.7 %    % Lymphocytes 15.0 %    % Monocytes 10.6 %    % Eosinophils 1.8 %    % Basophils 0.9 %    Absolute Neutrophil 2.3 1.6 - 8.3 10e9/L    Absolute Lymphocytes 0.5 (L) 0.8 - 5.3 10e9/L    Absolute Monocytes 0.3 0.0 - 1.3 10e9/L    Absolute Eosinophils 0.1 0.0 - 0.7 10e9/L    Absolute Basophils 0.0 0.0 - 0.2 10e9/L    Reactive Lymphs Present     Platelet Estimate Confirming automated cell count    Comprehensive metabolic panel     Status: Abnormal   Result Value Ref Range    Sodium 134 133 - 144 mmol/L     Potassium 4.7 3.4 - 5.3 mmol/L    Chloride 108 94 - 109 mmol/L    Carbon Dioxide 19 (L) 20 - 32 mmol/L    Anion Gap 7 3 - 14 mmol/L    Glucose 112 (H) 70 - 99 mg/dL    Urea Nitrogen 14 7 - 30 mg/dL    Creatinine 2.06 (H) 0.52 - 1.04 mg/dL    GFR Estimate 27 (L) >60 mL/min/[1.73_m2]    GFR Estimate If Black 31 (L) >60 mL/min/[1.73_m2]    Calcium 9.0 8.5 - 10.1 mg/dL    Bilirubin Total 0.8 0.2 - 1.3 mg/dL    Albumin 3.3 (L) 3.4 - 5.0 g/dL    Protein Total 7.1 6.8 - 8.8 g/dL    Alkaline Phosphatase 119 40 - 150 U/L    ALT 11 0 - 50 U/L    AST 28 0 - 45 U/L   Lipase     Status: None   Result Value Ref Range    Lipase 346 73 - 393 U/L   Lactic acid whole blood     Status: None   Result Value Ref Range    Lactic Acid 1.0 0.7 - 2.0 mmol/L   UA reflex to Microscopic and Culture     Status: Abnormal    Specimen: Urine Midstream; Midstream Urine   Result Value Ref Range    Color Urine Light Yellow     Appearance Urine Clear     Glucose Urine Negative NEG^Negative mg/dL    Bilirubin Urine Negative NEG^Negative    Ketones Urine Negative NEG^Negative mg/dL    Specific Gravity Urine 1.003 1.003 - 1.035    Blood Urine Negative NEG^Negative    pH Urine 6.0 5.0 - 7.0 pH    Protein Albumin Urine 30 (A) NEG^Negative mg/dL    Urobilinogen mg/dL Normal 0.0 - 2.0 mg/dL    Nitrite Urine Negative NEG^Negative    Leukocyte Esterase Urine Large (A) NEG^Negative    Source Midstream Urine     RBC Urine 5 (H) 0 - 2 /HPF    WBC Urine 20 (H) 0 - 5 /HPF    Squamous Epithelial /HPF Urine 1 0 - 1 /HPF    Transitional Epi <1 0 - 1 /HPF   Nt probnp inpatient     Status: Abnormal   Result Value Ref Range    N-Terminal Pro BNP Inpatient 4,216 (H) 0 - 900 pg/mL   Troponin I     Status: None   Result Value Ref Range    Troponin I ES <0.015 0.000 - 0.045 ug/L   EKG 12-lead, tracing only     Status: None (Preliminary result)   Result Value Ref Range    Interpretation ECG Click View Image link to view waveform and result                       Results for  orders placed or performed during the hospital encounter of 03/15/20 (from the past 24 hour(s))   CBC with platelets differential   Result Value Ref Range    WBC 3.2 (L) 4.0 - 11.0 10e9/L    RBC Count 3.34 (L) 3.8 - 5.2 10e12/L    Hemoglobin 9.1 (L) 11.7 - 15.7 g/dL    Hematocrit 30.3 (L) 35.0 - 47.0 %    MCV 91 78 - 100 fl    MCH 27.2 26.5 - 33.0 pg    MCHC 30.0 (L) 31.5 - 36.5 g/dL    RDW 13.2 10.0 - 15.0 %    Platelet Count 167 150 - 450 10e9/L    Diff Method Manual Differential     % Neutrophils 71.7 %    % Lymphocytes 15.0 %    % Monocytes 10.6 %    % Eosinophils 1.8 %    % Basophils 0.9 %    Absolute Neutrophil 2.3 1.6 - 8.3 10e9/L    Absolute Lymphocytes 0.5 (L) 0.8 - 5.3 10e9/L    Absolute Monocytes 0.3 0.0 - 1.3 10e9/L    Absolute Eosinophils 0.1 0.0 - 0.7 10e9/L    Absolute Basophils 0.0 0.0 - 0.2 10e9/L    Reactive Lymphs Present     Platelet Estimate Confirming automated cell count    Comprehensive metabolic panel   Result Value Ref Range    Sodium 134 133 - 144 mmol/L    Potassium 4.7 3.4 - 5.3 mmol/L    Chloride 108 94 - 109 mmol/L    Carbon Dioxide 19 (L) 20 - 32 mmol/L    Anion Gap 7 3 - 14 mmol/L    Glucose 112 (H) 70 - 99 mg/dL    Urea Nitrogen 14 7 - 30 mg/dL    Creatinine 2.06 (H) 0.52 - 1.04 mg/dL    GFR Estimate 27 (L) >60 mL/min/[1.73_m2]    GFR Estimate If Black 31 (L) >60 mL/min/[1.73_m2]    Calcium 9.0 8.5 - 10.1 mg/dL    Bilirubin Total 0.8 0.2 - 1.3 mg/dL    Albumin 3.3 (L) 3.4 - 5.0 g/dL    Protein Total 7.1 6.8 - 8.8 g/dL    Alkaline Phosphatase 119 40 - 150 U/L    ALT 11 0 - 50 U/L    AST 28 0 - 45 U/L   Lipase   Result Value Ref Range    Lipase 346 73 - 393 U/L   Lactic acid whole blood   Result Value Ref Range    Lactic Acid 1.0 0.7 - 2.0 mmol/L   Nt probnp inpatient   Result Value Ref Range    N-Terminal Pro BNP Inpatient 4,216 (H) 0 - 900 pg/mL   Troponin I   Result Value Ref Range    Troponin I ES <0.015 0.000 - 0.045 ug/L   EKG 12-lead, tracing only   Result Value Ref Range     Interpretation ECG Click View Image link to view waveform and result    UA reflex to Microscopic and Culture    Specimen: Urine Midstream; Midstream Urine   Result Value Ref Range    Color Urine Light Yellow     Appearance Urine Clear     Glucose Urine Negative NEG^Negative mg/dL    Bilirubin Urine Negative NEG^Negative    Ketones Urine Negative NEG^Negative mg/dL    Specific Gravity Urine 1.003 1.003 - 1.035    Blood Urine Negative NEG^Negative    pH Urine 6.0 5.0 - 7.0 pH    Protein Albumin Urine 30 (A) NEG^Negative mg/dL    Urobilinogen mg/dL Normal 0.0 - 2.0 mg/dL    Nitrite Urine Negative NEG^Negative    Leukocyte Esterase Urine Large (A) NEG^Negative    Source Midstream Urine     RBC Urine 5 (H) 0 - 2 /HPF    WBC Urine 20 (H) 0 - 5 /HPF    Squamous Epithelial /HPF Urine 1 0 - 1 /HPF    Transitional Epi <1 0 - 1 /HPF   XR Chest 2 Views    Impression    IMPRESSION:Right greater than left tiny pleural effusions. Unchanged  cardiac enlargement.    Findings discussed with senior resident Jarad Bonilla MD.     Medications   cefTRIAXone (ROCEPHIN) 1 g vial to attach to  mL bag for ADULTS or NS 50 mL bag for PEDS (has no administration in time range)             Assessments & Plan (with Medical Decision Making)         I have reviewed the nursing notes.  Emergency Department course:  The patient was seen and examined at 1105 am.   Bladder scan shows 272 ml of urine.   EKG shows Normal sinus rhythm, rate of 70 bpm with poor R wave progression and no acute ischemic changes.    Laboratory studies are notable for anemia, with a hemoglobin of 9.1, and leukopenia, with a WBC of 3.2.  BNP is elevated at 4216.  Troponin I is less than 0.015.  Comprehensive metabolic panel shows chronic kidney disease with a creatinine of 2.06 and a GFR of 27, consistent with prior results.  Lactate is normal at 1.0.  I ordered enteric bacteria and virus panel by KAZ stool as well as C. difficile panel.  These results are pending at the  time of this dictation.   UA shows proteinuria and large leukocyte Estrace with 20 WBCs.  Urine culture is pending.    Chest x-ray shows IMPRESSION:Right greater than left tiny pleural effusions. Unchanged  cardiac enlargement.    I treated the patient with ceftriaxone IV for presumed UTI.    The patient is a 54-year-old female status post kidney transplant, who presents with repeated episodes of diarrhea as well as difficulty urinating.  Laboratory studies suggest that she has a UTI as well as proteinuria.  She has anasarca, even to the point of eyelid swelling.  BNP is elevated at 4200, suggesting some element of heart failure.  She will be admitted to the medicine service for further evaluation and treatment.  I spoke with Dr. Hollis of Medicine regarding admission.       I have reviewed the findings, diagnosis, plan and need for follow up with the patient.    New Prescriptions    No medications on file       Final diagnoses:   Diarrhea, unspecified type   Urinary tract infection with hematuria, site unspecified   Anasarca   I, Kenney Gaytan, am serving as a trained medical scribe to document services personally performed by Abril uMllen MD, based on the provider's statements to me.     IAbril MD, was physically present and have reviewed and verified the accuracy of this note documented by Kenney Gaytan.  This note was created in part by the use of Dragon voice recognition dictation system. Inadvertent grammatical errors and typographical errors may still exist.  Abril Mullen MD      3/15/2020   [facility], [facility] EMERGENCY DEPARTMENT     Abril Mullen MD  03/15/20 [id number]

## 2020-03-15 NOTE — ED NOTES
[facility], [place]   ED Nurse to Floor Handoff     Joshua Lala is a 54 year old female who speaks Hmong and lives with family members,  in a home  They arrived in the ED by car from home    ED Chief Complaint: Diarrhea    ED Dx;   Final diagnoses:   Diarrhea, unspecified type   Urinary tract infection with hematuria, site unspecified   Anasarca         Needed?: Yes    Allergies:   Allergies   Allergen Reactions     Contrast Dye Shortness Of Breath     Other reaction(s): Angioedema   .  Past Medical Hx:   Past Medical History:   Diagnosis Date     Dehydration      Henoch-Schonlein purpura (H)      Hypercalcemia      Hypomagnesaemia      Renal disease      Tertiary hyperparathyroidism (H)       Baseline Mental status: WDL  Current Mental Status changes: at basesline    Infection present or suspected this encounter: yes urinary  Sepsis suspected: No  Isolation type: Enteric     Activity level - Baseline/Home:  Independent  Activity Level - Current:   Stand with Assist    Bariatric equipment needed?: No    In the ED these meds were given:   Medications   cefTRIAXone (ROCEPHIN) 1 g vial to attach to  mL bag for ADULTS or NS 50 mL bag for PEDS (1 g Intravenous New Bag 3/15/20 1404)       Drips running?  No maintenance IVF, but IV antibiotic given x1    Home pump  Yes - old AV fistula in left arm    Current LDAs  Peripheral IV 03/15/20 Right (Active)   Site Assessment WDL 03/15/20 1235   Line Status Saline locked 03/15/20 1235   Phlebitis Scale 0-->no symptoms 03/15/20 1235   Infiltration Scale 0 03/15/20 1235   Number of days: 0       Hemodialysis Vascular Access Arteriovenous fistula Superior Arm (Active)   Number of days:        Labs results:   Labs Ordered and Resulted from Time of ED Arrival Up to the Time of Departure from the ED   CBC WITH PLATELETS DIFFERENTIAL - Abnormal; Notable for the following components:       Result Value    WBC 3.2 (*)     RBC Count 3.34 (*)      Hemoglobin 9.1 (*)     Hematocrit 30.3 (*)     MCHC 30.0 (*)     Absolute Lymphocytes 0.5 (*)     All other components within normal limits   COMPREHENSIVE METABOLIC PANEL - Abnormal; Notable for the following components:    Carbon Dioxide 19 (*)     Glucose 112 (*)     Creatinine 2.06 (*)     GFR Estimate 27 (*)     GFR Estimate If Black 31 (*)     Albumin 3.3 (*)     All other components within normal limits   UA MACROSCOPIC WITH REFLEX TO MICRO AND CULTURE - Abnormal; Notable for the following components:    Protein Albumin Urine 30 (*)     Leukocyte Esterase Urine Large (*)     RBC Urine 5 (*)     WBC Urine 20 (*)     All other components within normal limits   NT PROBNP INPATIENT - Abnormal; Notable for the following components:    N-Terminal Pro BNP Inpatient 4,216 (*)     All other components within normal limits   LIPASE   LACTIC ACID WHOLE BLOOD   TROPONIN I   BLADDER SCAN   URINE CULTURE AEROBIC BACTERIAL   ENTERIC BACTERIA AND VIRUS PANEL BY [facility] STOOL   CLOSTRIDIUM DIFFICILE TOXIN B   BLOOD CULTURE       Imaging Studies:   Recent Results (from the past 24 hour(s))   XR Chest 2 Views    Impression    IMPRESSION:Right greater than left tiny pleural effusions. Unchanged  cardiac enlargement.    Findings discussed with senior resident Jarad Bonilla MD.       Recent vital signs:   BP (!) 174/86   Pulse 74   Temp 97.8  F (36.6  C) (Oral)   Resp 18   Wt 76.8 kg (169 lb 5 oz)   SpO2 98%   BMI 33.07 kg/m      Noreen Coma Scale Score: 15 (03/15/20 1120)       Cardiac Rhythm: Normal Sinus  Pt needs tele? No  Skin/wound Issues: None    Code Status: Full Code    Pain control: pt had none    Nausea control: pt had none    Abnormal labs/tests/findings requiring intervention: See Epic    Family present during ED course? Yes - daughter at bedside interpreting   Family Comments/Social Situation comments: Patient pleasant and cooperative.     Tasks needing completion: None    Rosalinda Crawford, RN    [phone] [facility]  ED

## 2020-03-16 ENCOUNTER — APPOINTMENT (OUTPATIENT)
Dept: ULTRASOUND IMAGING | Facility: CLINIC | Age: 55
DRG: 307 | End: 2020-03-16
Attending: PHYSICIAN ASSISTANT
Payer: MEDICARE

## 2020-03-16 ENCOUNTER — OFFICE VISIT (OUTPATIENT)
Dept: INTERPRETER SERVICES | Facility: CLINIC | Age: 55
End: 2020-03-16
Payer: MEDICARE

## 2020-03-16 ENCOUNTER — APPOINTMENT (OUTPATIENT)
Dept: ULTRASOUND IMAGING | Facility: CLINIC | Age: 55
DRG: 307 | End: 2020-03-16
Attending: INTERNAL MEDICINE
Payer: MEDICARE

## 2020-03-16 ENCOUNTER — APPOINTMENT (OUTPATIENT)
Dept: CARDIOLOGY | Facility: CLINIC | Age: 55
DRG: 307 | End: 2020-03-16
Attending: PHYSICIAN ASSISTANT
Payer: MEDICARE

## 2020-03-16 DIAGNOSIS — E83.52 HYPERCALCEMIA DUE TO HYPERTHYROIDISM: ICD-10-CM

## 2020-03-16 DIAGNOSIS — Z94.0 KIDNEY TRANSPLANTED: ICD-10-CM

## 2020-03-16 DIAGNOSIS — E21.5 PARATHYROID RELATED HYPERCALCEMIA (H): ICD-10-CM

## 2020-03-16 DIAGNOSIS — E05.90 HYPERCALCEMIA DUE TO HYPERTHYROIDISM: ICD-10-CM

## 2020-03-16 DIAGNOSIS — Z79.60 LONG-TERM USE OF IMMUNOSUPPRESSANT MEDICATION: ICD-10-CM

## 2020-03-16 DIAGNOSIS — Z94.0 KIDNEY TRANSPLANT RECIPIENT: Primary | ICD-10-CM

## 2020-03-16 DIAGNOSIS — N25.81 SECONDARY HYPERPARATHYROIDISM OF RENAL ORIGIN (H): ICD-10-CM

## 2020-03-16 DIAGNOSIS — Z94.0 KIDNEY REPLACED BY TRANSPLANT: ICD-10-CM

## 2020-03-16 DIAGNOSIS — E83.52 PARATHYROID RELATED HYPERCALCEMIA (H): ICD-10-CM

## 2020-03-16 LAB
ANION GAP SERPL CALCULATED.3IONS-SCNC: 6 MMOL/L (ref 3–14)
BACTERIA SPEC CULT: NORMAL
BUN SERPL-MCNC: 14 MG/DL (ref 7–30)
CALCIUM SERPL-MCNC: 9.1 MG/DL (ref 8.5–10.1)
CHLORIDE SERPL-SCNC: 104 MMOL/L (ref 94–109)
CO2 SERPL-SCNC: 21 MMOL/L (ref 20–32)
CREAT SERPL-MCNC: 2.11 MG/DL (ref 0.52–1.04)
ERYTHROCYTE [DISTWIDTH] IN BLOOD BY AUTOMATED COUNT: 13.4 % (ref 10–15)
GFR SERPL CREATININE-BSD FRML MDRD: 26 ML/MIN/{1.73_M2}
GLUCOSE SERPL-MCNC: 86 MG/DL (ref 70–99)
HCT VFR BLD AUTO: 30.5 % (ref 35–47)
HGB BLD-MCNC: 8.7 G/DL (ref 11.7–15.7)
INTERPRETATION ECG - MUSE: NORMAL
Lab: NORMAL
MCH RBC QN AUTO: 26.7 PG (ref 26.5–33)
MCHC RBC AUTO-ENTMCNC: 28.5 G/DL (ref 31.5–36.5)
MCV RBC AUTO: 94 FL (ref 78–100)
PLATELET # BLD AUTO: 157 10E9/L (ref 150–450)
POTASSIUM SERPL-SCNC: 4.1 MMOL/L (ref 3.4–5.3)
RBC # BLD AUTO: 3.26 10E12/L (ref 3.8–5.2)
SODIUM SERPL-SCNC: 132 MMOL/L (ref 133–144)
SPECIMEN SOURCE: NORMAL
WBC # BLD AUTO: 3.3 10E9/L (ref 4–11)

## 2020-03-16 PROCEDURE — 76776 US EXAM K TRANSPL W/DOPPLER: CPT

## 2020-03-16 PROCEDURE — 84999 UNLISTED CHEMISTRY PROCEDURE: CPT | Performed by: PHYSICIAN ASSISTANT

## 2020-03-16 PROCEDURE — 25000131 ZZH RX MED GY IP 250 OP 636 PS 637: Mod: GY | Performed by: PHYSICIAN ASSISTANT

## 2020-03-16 PROCEDURE — 87799 DETECT AGENT NOS DNA QUANT: CPT | Performed by: INTERNAL MEDICINE

## 2020-03-16 PROCEDURE — 36415 COLL VENOUS BLD VENIPUNCTURE: CPT | Performed by: INTERNAL MEDICINE

## 2020-03-16 PROCEDURE — 25000131 ZZH RX MED GY IP 250 OP 636 PS 637: Mod: GY | Performed by: NURSE PRACTITIONER

## 2020-03-16 PROCEDURE — 86335 IMMUNFIX E-PHORSIS/URINE/CSF: CPT | Performed by: PHYSICIAN ASSISTANT

## 2020-03-16 PROCEDURE — 84156 ASSAY OF PROTEIN URINE: CPT | Performed by: PHYSICIAN ASSISTANT

## 2020-03-16 PROCEDURE — 25000128 H RX IP 250 OP 636: Performed by: INTERNAL MEDICINE

## 2020-03-16 PROCEDURE — 93306 TTE W/DOPPLER COMPLETE: CPT | Mod: 26 | Performed by: INTERNAL MEDICINE

## 2020-03-16 PROCEDURE — 83883 ASSAY NEPHELOMETRY NOT SPEC: CPT | Performed by: PHYSICIAN ASSISTANT

## 2020-03-16 PROCEDURE — 36415 COLL VENOUS BLD VENIPUNCTURE: CPT | Performed by: NURSE PRACTITIONER

## 2020-03-16 PROCEDURE — 90682 RIV4 VACC RECOMBINANT DNA IM: CPT | Performed by: INTERNAL MEDICINE

## 2020-03-16 PROCEDURE — 99207 ZZC APP CREDIT; MD BILLING SHARED VISIT: CPT | Performed by: PHYSICIAN ASSISTANT

## 2020-03-16 PROCEDURE — 93306 TTE W/DOPPLER COMPLETE: CPT

## 2020-03-16 PROCEDURE — 93971 EXTREMITY STUDY: CPT | Mod: LT

## 2020-03-16 PROCEDURE — 83520 IMMUNOASSAY QUANT NOS NONAB: CPT | Performed by: PHYSICIAN ASSISTANT

## 2020-03-16 PROCEDURE — 25000128 H RX IP 250 OP 636: Performed by: PHYSICIAN ASSISTANT

## 2020-03-16 PROCEDURE — 85027 COMPLETE CBC AUTOMATED: CPT | Performed by: NURSE PRACTITIONER

## 2020-03-16 PROCEDURE — 25000128 H RX IP 250 OP 636: Performed by: NURSE PRACTITIONER

## 2020-03-16 PROCEDURE — 99233 SBSQ HOSP IP/OBS HIGH 50: CPT | Performed by: INTERNAL MEDICINE

## 2020-03-16 PROCEDURE — 12000001 ZZH R&B MED SURG/OB UMMC

## 2020-03-16 PROCEDURE — 25000132 ZZH RX MED GY IP 250 OP 250 PS 637: Mod: GY | Performed by: NURSE PRACTITIONER

## 2020-03-16 PROCEDURE — T1013 SIGN LANG/ORAL INTERPRETER: HCPCS | Mod: U3

## 2020-03-16 PROCEDURE — 80048 BASIC METABOLIC PNL TOTAL CA: CPT | Performed by: NURSE PRACTITIONER

## 2020-03-16 RX ORDER — FUROSEMIDE 10 MG/ML
40 INJECTION INTRAMUSCULAR; INTRAVENOUS ONCE
Status: COMPLETED | OUTPATIENT
Start: 2020-03-16 | End: 2020-03-16

## 2020-03-16 RX ORDER — CYCLOSPORINE 25 MG/1
25 CAPSULE, LIQUID FILLED ORAL 2 TIMES DAILY
COMMUNITY
End: 2020-05-22

## 2020-03-16 RX ORDER — AMLODIPINE BESYLATE 5 MG/1
10 TABLET ORAL DAILY
Status: ON HOLD | COMMUNITY
End: 2020-03-21

## 2020-03-16 RX ORDER — CYCLOSPORINE 25 MG/1
25 CAPSULE, LIQUID FILLED ORAL
Status: DISCONTINUED | OUTPATIENT
Start: 2020-03-16 | End: 2020-03-21 | Stop reason: HOSPADM

## 2020-03-16 RX ORDER — CALCIUM CARBONATE 500 MG/1
1 TABLET, CHEWABLE ORAL 2 TIMES DAILY
COMMUNITY

## 2020-03-16 RX ORDER — CINACALCET 30 MG/1
30 TABLET, FILM COATED ORAL DAILY
Qty: 30 TABLET | Refills: 11 | Status: SHIPPED | OUTPATIENT
Start: 2020-03-16 | End: 2020-03-17

## 2020-03-16 RX ORDER — PANTOPRAZOLE SODIUM 40 MG/1
40 TABLET, DELAYED RELEASE ORAL
Status: DISCONTINUED | OUTPATIENT
Start: 2020-03-17 | End: 2020-03-16

## 2020-03-16 RX ORDER — SULFAMETHOXAZOLE AND TRIMETHOPRIM 400; 80 MG/1; MG/1
1 TABLET ORAL DAILY
Qty: 30 TABLET | Refills: 11 | Status: SHIPPED | OUTPATIENT
Start: 2020-03-16 | End: 2021-03-09

## 2020-03-16 RX ORDER — MYCOPHENOLATE MOFETIL 250 MG/1
1000 CAPSULE ORAL 2 TIMES DAILY
Qty: 240 CAPSULE | Refills: 11 | Status: SHIPPED | OUTPATIENT
Start: 2020-03-16 | End: 2020-03-21

## 2020-03-16 RX ORDER — FUROSEMIDE 20 MG
20 TABLET ORAL DAILY PRN
Status: ON HOLD | COMMUNITY
End: 2020-03-21

## 2020-03-16 RX ADMIN — HYDRALAZINE HYDROCHLORIDE 25 MG: 25 TABLET ORAL at 14:57

## 2020-03-16 RX ADMIN — CYCLOSPORINE 50 MG: 25 CAPSULE ORAL at 08:27

## 2020-03-16 RX ADMIN — FUROSEMIDE 40 MG: 10 INJECTION, SOLUTION INTRAMUSCULAR; INTRAVENOUS at 14:57

## 2020-03-16 RX ADMIN — CEFTRIAXONE 1 G: 1 INJECTION, POWDER, FOR SOLUTION INTRAMUSCULAR; INTRAVENOUS at 14:57

## 2020-03-16 RX ADMIN — CINACALCET HYDROCHLORIDE 30 MG: 30 TABLET, COATED ORAL at 08:27

## 2020-03-16 RX ADMIN — SULFAMETHOXAZOLE AND TRIMETHOPRIM 1 TABLET: 400; 80 TABLET ORAL at 08:27

## 2020-03-16 RX ADMIN — LOSARTAN POTASSIUM 25 MG: 25 TABLET, FILM COATED ORAL at 08:27

## 2020-03-16 RX ADMIN — MYCOPHENOLATE MOFETIL 1000 MG: 250 CAPSULE ORAL at 08:27

## 2020-03-16 RX ADMIN — OMEPRAZOLE 20 MG: 20 CAPSULE, DELAYED RELEASE ORAL at 08:27

## 2020-03-16 RX ADMIN — HYDRALAZINE HYDROCHLORIDE 25 MG: 25 TABLET ORAL at 08:27

## 2020-03-16 RX ADMIN — HYDRALAZINE HYDROCHLORIDE 25 MG: 25 TABLET ORAL at 20:23

## 2020-03-16 RX ADMIN — MYCOPHENOLATE MOFETIL 1000 MG: 250 CAPSULE ORAL at 20:23

## 2020-03-16 RX ADMIN — CYCLOSPORINE 25 MG: 25 CAPSULE, LIQUID FILLED ORAL at 21:43

## 2020-03-16 RX ADMIN — INFLUENZA A VIRUS A/BRISBANE/02/2018 (H1N1) RECOMBINANT HEMAGGLUTININ ANTIGEN, INFLUENZA A VIRUS A/KANSAS/14/2017 (H3N2) RECOMBINANT HEMAGGLUTININ ANTIGEN, INFLUENZA B VIRUS B/PHUKET/3073/2013 RECOMBINANT HEMAGGLUTININ ANTIGEN, AND INFLUENZA B VIRUS B/MARYLAND/15/2016 RECOMBINANT HEMAGGLUTININ ANTIGEN 0.5 ML: 45; 45; 45; 45 INJECTION INTRAMUSCULAR at 21:54

## 2020-03-16 ASSESSMENT — ACTIVITIES OF DAILY LIVING (ADL)
ADLS_ACUITY_SCORE: 13

## 2020-03-16 NOTE — PLAN OF CARE
Pt A&O. VSS except for hypertension. Hmong speaking understands some English. Up SBA/Ind. Daughter was here early Evening but went home. Pt came in with diarrhea and anasarca. + for UTI and - for enteric infections. Still having diarrhea. Will continue to monitor and follow POC.

## 2020-03-16 NOTE — PROVIDER NOTIFICATION
Paged gold cross cover: Does pt need to be on enteric? There is order for it but I can't find a reason for it. Thanks

## 2020-03-16 NOTE — PHARMACY-ADMISSION MEDICATION HISTORY
Admission medication history interview status for the 3/15/2020 admission is complete. See Epic admission navigator for allergy information, pharmacy, prior to admission medications and immunization status.     Medication history interview sources:  [facility], [facility], [facility] ([phone]), Care Everywhere    Changes made to PTA medication list (reason)  Added: Neoral (BRAND), furosemide, amlodipine  Deleted: Gengraf (BRAND)  Changed: Added directions to cinacalcet    Additional medication history information (including reliability of information, actions taken by pharmacist):  -Please note that Neoral and Gengraf are NOT  Interchangeable.  -Patient last picked up a 30-day supply of amlodipine on 1/31/20 -- no longer taking 2/2 edema.  -The prescription did not list any guidance for when the patient should be taking her PRN furosemide per the pharmacy. It is unclear how the patient determine she needs a dose.    Prior to Admission medications    Medication Sig Last Dose Taking? Auth Provider          calcium carbonate (TUMS) 500 MG chewable tablet Take 1 chew tab by mouth 2 times daily  Yes Unknown, Entered By History   CELLCEPT (BRAND) 250 MG capsule TAKE FOUR CAPSULES BY MOUTH TWICE A DAY (DAW1)  Yes Akil Bah MD   cinacalcet (SENSIPAR) 30 MG tablet Take 15 mg by mouth every other day   Yes Reported, Patient   furosemide (LASIX) 20 MG tablet Take 20 mg by mouth daily as needed  Yes Unknown, Entered By History   hydrALAZINE (APRESOLINE) 25 MG tablet Take 25 mg by mouth 3 times daily  Yes Reported, Patient   NEORAL (BRAND) 25 MG capsule Take 25 mg by mouth 2 times daily DAW1  Yes Unknown, Entered By History   omeprazole (PRILOSEC) 20 MG DR capsule Take 1 capsule (20 mg) by mouth every morning (before breakfast)  Yes Yuridia Rios PA-C   sulfamethoxazole-trimethoprim (BACTRIM/SEPTRA) 400-80 MG tablet Take 1 tablet by mouth daily  Yes Akil Bah  MD Antonio     Medication history completed by:   Batool Guerra, PharmD  PGY-1 Pharmacy Resident

## 2020-03-16 NOTE — PLAN OF CARE
Patient up independent in room.  Patient given iv lasix and hat placed in toilet to save urine. Patient had ultra sound.  in room and doctor paged.  Patient has fair appeitte.

## 2020-03-16 NOTE — CONSULTS
Nephrology Initial Consult  March 16, 2020      Joshua Lala MRN:[id number] YOB: 1965  Date of Admission:3/15/2020  Primary care provider: Bandar Romero  Requesting physician: Wade Jurado MD    ASSESSMENT AND RECOMMENDATIONS:   Joshua Lala is a 54 year old woman S/P DDKT X 2 adm 2/26 with edema, HBP and diarrhea.    # Allograft function: S/P DDKT 1998 and 2007.  BL Cr initially 1.3 - 1.6 with gradual increase to 2.0 over past 6 months. IS is CSA and MMF 1 g bid.  UA 30 prot, 20 WBCs. However prot/Cr ratios over past year have been up to 3 g/g and first brooke to >1.5 g/g in 2016. No serum immunofixation was sent.  She denies voiding symptoms  When seen in clinic 6/2019  no biopsy was recommended b/o lack of therapeutic options.  Pt was to switch to tac from CSA but never made this change.    - check CSA level tomorrow   - prot/Cr ratio   - check UC (pending)   - serum and urine immunofixation    - serum free light chain ratio ( not urine)   - will consider biopsy depending on results of above    # Immunosuppression: on CSA and MMF 1 g bid   - decrease MMF to 750 bid   - check CSA level     # HBP:  Recent renal US shows no CYN.  Amlodipine may have contributed to edema.  Pt is currently on hydralazine (which can also cause edema) as well as losartan   - continue losartan   - diurese if diarrhea resolves    - would prefer BB to hydral if needed     # Edema: patient has documented 5 kg weight increase since Jan.  Denies SOB, orthopnea or CP. BNP is high.  Echo pending. Hx PE remotely.  Recent CT does not show cirrhosis   - agree with echo   - begin lasix 20 bid tomorrow if diarrhea resolves     #Diarrhea: not clear if this is actually a significant problem since pt says it is resolving now and was only present 12 hours.  MOREAU so far neg   - monitor diarrhea   - check CMV PCR    # Anemia: hb drifting down.  Most likely anemia from CKD but may also have a component of marrow suppression from MMF   - check iron  "stores   - check EPO level    # Hx hyperparathyroidism: pt is on cinecalcet.  Diffuse vascular calcifications on CT.  NO recent PTH levels   - check PTH, phos and vit D levels    # HX EBV positivity. Low level but should be monitored per protocol   - check EBV PCR    Recommendations were communicated to primary team via phone      Charley Rodriguez MD   [phone]    REASON FOR CONSULT: edema in kidney transplant recipient    HISTORY OF PRESENT ILLNESS:  Admitting provider and nursing notes reviewed.  Pt seen with .  Joshua Lala is a 54 year old woman S/P DDKT X 2 adm 2/26 with edema, HBP and diarrhea.    Pt is c/o 18 hours of diarrhea, with stool every hour.  No abdominal pain.  Chronic GERD symptoms unchanged recently. Stool is liquid not maroon or bloody. No fevers or systemic c/o.  Not dizzy.  She is concerned about \"puffiness\" and has been evaluated for that complaint in ER 1/28.   Wt is now 76.8 kg.    Pt was seen in ER 1/26/2020 with one month hx edema.  Complete MOREAU included abd CT that showed trace ascites but no cirrhosis. CXR: stable cardiomegaly.  BP was high so pt was started on amlodipine. She returned two days later with the same concerns. /82.  Wt 71.7. Transplant US normal.  UA unchanged. No med changes were made.     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   BL Cr 1.3 - 1.6 until 2018 brooke to 1.8 range.  Seen in transplant clinic 6/2019 and no biopsy was recommended.  Cr now 2.0 - 2.1.  UA has had intermittent proteinuria. Current IS is CSA 25 bid and MMF 1 g bid.  Pt was to switch to tac in 6/2019 but never did.    No SOB, orthopnea, PND.  No hx MI but hx PE remotely (2001)     Reviewed with patient on 03/16/2020     Past Medical History:   Diagnosis Date     EBV (Silvestre-Barr virus) viremia 2015     GERD (gastroesophageal reflux disease) 2008     HSP (Henoch Schonlein purpura) (H) 1996     Hyperparathyroidism (H)      PE " (pulmonary thromboembolism) (H) 2001    coumadin 2001 - 2007     Tertiary hyperparathyroidism (H)        Past Surgical History:   Procedure Laterality Date     CHOLECYSTECTOMY       TRANSPLANT KIDNEY RECIPIENT  DONOR      failed 2003      TRANSPLANT KIDNEY RECIPIENT  DONOR      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        MEDICATIONS:  PTA Meds  Prior to Admission medications    Medication Sig Last Dose Taking? Auth Provider   hydrALAZINE (APRESOLINE) 25 MG tablet Take 25 mg by mouth 3 times daily  Yes Reported, Patient   calcium carbonate (TUMS) 500 MG chewable tablet Take 1 tablet (500 mg) by mouth 2 times daily as needed for heartburn Unknown at Unknown time  Yuridia Rios PA-C   CELLCEPT (BRAND) 250 MG capsule TAKE FOUR CAPSULES BY MOUTH TWICE A DAY (DAW1) Unknown at Unknown time  Akil Bah MD   cinacalcet (SENSIPAR) 30 MG tablet cinacalcet 30 mg tablet Unknown at Unknown time  Reported, Patient   GENGRAF (BRAND) 25 MG CAPSULE Take 50 mg by mouth Unknown at Unknown time  Reported, Patient   losartan (COZAAR) 25 MG tablet Take 1 tablet (25 mg) by mouth daily Unknown at Unknown time  Apollo Huggins MD   omeprazole (PRILOSEC) 20 MG DR capsule Take 1 capsule (20 mg) by mouth every morning (before breakfast) Unknown at Unknown time  Yuridia Rios PA-C   sulfamethoxazole-trimethoprim (BACTRIM/SEPTRA) 400-80 MG tablet Take 1 tablet by mouth daily Unknown at Unknown time  Akil Bah MD      Current Meds    cefTRIAXone  1 g Intravenous Q24H     cinacalcet  30 mg Oral Daily     cycloSPORINE modified  50 mg Oral BID IS     furosemide  40 mg Intravenous Once     hydrALAZINE  25 mg Oral TID     influenza vaccine adult (product based on age)  0.5 mL Intramuscular Prior to discharge     losartan  25 mg Oral Daily     mycophenolate  1,000 mg Oral BID IS     omeprazole  20 mg Oral QAM AC     [START ON 3/17/2020] pantoprazole  40 mg Oral QAM AC      sodium chloride (PF)  3 mL Intracatheter Q8H     sulfamethoxazole-trimethoprim  1 tablet Oral Once per day on      Infusion Meds      ALLERGIES:    Allergies   Allergen Reactions     Contrast Dye Shortness Of Breath     Other reaction(s): Angioedema       REVIEW OF SYSTEMS:  A comprehensive of systems was negative except as noted above.    SOCIAL HISTORY:   Social History     Socioeconomic History     Marital status:      Spouse name: Not on file     Number of children: Not on file     Years of education: Not on file     Highest education level: Not on file   Occupational History     Not on file   Social Needs     Financial resource strain: Not on file     Food insecurity     Worry: Not on file     Inability: Not on file     Transportation needs     Medical: Not on file     Non-medical: Not on file   Tobacco Use     Smoking status: Never Smoker     Smokeless tobacco: Never Used   Substance and Sexual Activity     Alcohol use: No     Drug use: No     Sexual activity: Not on file     Comment: ERINN- need    Lifestyle     Physical activity     Days per week: Not on file     Minutes per session: Not on file     Stress: Not on file   Relationships     Social connections     Talks on phone: Not on file     Gets together: Not on file     Attends [employer] service: Not on file     Active member of club or organization: Not on file     Attends meetings of clubs or organizations: Not on file     Relationship status: Not on file     Intimate partner violence     Fear of current or ex partner: Not on file     Emotionally abused: Not on file     Physically abused: Not on file     Forced sexual activity: Not on file   Other Topics Concern     Not on file   Social History Narrative     Not on file     Reviewed with patient     accompanies Joshua Lala in hospital room    FAMILY MEDICAL HISTORY:   No hx renal disease.    Reviewed with patient      PHYSICAL EXAM:   Temp  Av  F (36.1  C)  Min:  95.7  F (35.4  C)  Max: 97.8  F (36.6  C)      Pulse  Av.4  Min: 72  Max: 97 Resp  Av.5  Min: 18  Max: 24  SpO2  Av.9 %  Min: 91 %  Max: 100 %       /48 (BP Location: Right arm)   Pulse 80   Temp 95.7  F (35.4  C) (Oral)   Resp 24   Wt 76.8 kg (169 lb 5 oz)   SpO2 91%   BMI 33.07 kg/m     Date 20 0700 - 20 0659   Shift 5911-0987 8909-9794 7217-9658 24 Hour Total   INTAKE   P.O. 500   500   Shift Total(mL/kg) 500(6.51)   500(6.51)   OUTPUT   Urine 200   200   Shift Total(mL/kg) 200(2.6)   200(2.6)   Weight (kg) 76.8 76.8 76.8 76.8      Admit Weight: 76.8 kg (169 lb 5 oz)     GENERAL APPEARANCE: no distress,   awake  EYES: no scleral icterus, pupils equal  Endo: no goiter, no moon facies  Lymphatics: no cervical or supraclavicular LAD  Pulmonary: lungs clear to auscultation with equal breath sounds bilaterally  CV: regular rhythm, normal rate, no rub. 3/6 GISSEL likely transmitted bruit from fistula   - JVD no   - Edema one plus  GI: soft, nontender, normal bowel sounds.  Distended abd possible ascites  MS: no evidence of inflammation in joints, no muscle tenderness.  Aneurysmal LUE fistula  : no sutherland  SKIN: no rash, warm, dry, no cyanosis  NEURO: face symmetric,oriented    LABS:   CMP  Recent Labs   Lab 20  0525 03/15/20  1127   * 134   POTASSIUM 4.1 4.7   CHLORIDE 104 108   CO2 21 19*   ANIONGAP 6 7   GLC 86 112*   BUN 14 14   CR 2.11* 2.06*   GFRESTIMATED 26* 27*   GFRESTBLACK 30* 31*   SHAVON 9.1 9.0   PROTTOTAL  --  7.1   ALBUMIN  --  3.3*   BILITOTAL  --  0.8   ALKPHOS  --  119   AST  --  28   ALT  --  11     CBC  Recent Labs   Lab 20  0525 03/15/20  1127   HGB 8.7* 9.1*   WBC 3.3* 3.2*   RBC 3.26* 3.34*   HCT 30.5* 30.3*   MCV 94 91   MCH 26.7 27.2   MCHC 28.5* 30.0*   RDW 13.4 13.2    167     INRNo lab results found in last 7 days.  ABGNo lab results found in last 7 days.   URINE STUDIES  Recent Labs   Lab Test 03/15/20  1252 20  2258  06/16/19  1039 06/01/19  0700   COLOR Light Yellow Light Yellow Light Yellow Straw   APPEARANCE Clear Clear Clear Clear   URINEGLC Negative Negative Negative Negative   URINEBILI Negative Negative Negative Negative   URINEKETONE Negative Negative Negative Negative   SG 1.003 1.002* 1.004 1.004   UBLD Negative Negative Negative Small*   URINEPH 6.0 6.0 6.0 5.0   PROTEIN 30* 30* 100* 100*   NITRITE Negative Negative Negative Negative   LEUKEST Large* Moderate* Trace* Trace*   RBCU 5* 0 0 3*   WBCU 20* 4 1 2     Recent Labs   Lab Test 06/01/19  0700 02/16/19  0737 02/02/18  0742 11/17/17  0739 04/28/17  0843 10/28/16  0913 04/29/16  1145 06/12/15  1011 01/23/15  0700 02/05/13  0715 10/18/12  1247 04/02/12  1510   UTPG 3.89* 3.92* 2.31* 1.74* 1.86* 1.84* 0.49* 0.73* 0.77* 0.63* 0.43* 0.57*     PTH  Recent Labs   Lab Test 04/29/16  1043 02/05/16  1039 10/27/15  0555 03/26/15  1734 12/16/13  0636 10/01/13  0718 04/02/13  0700 03/01/13  0918 07/19/12  0649   PTHI 124* 140* 218* 251* 166* 223* 182* 217* 171*     IRON STUDIES  No lab results found.    IMAGING:  Abd CT 1/ 2020  IMPRESSION:  1. No subcutaneous edema and diffuse skin thickening in the anterior  abdominal wall, which may represent edema or infection.  2. Slightly increased size of a mildly enlarged lymph node anterior to  the IVC, likely reactive.  3. Trace new ascites.  4. Unremarkable noncontrast CT appearance of the right lower quadrant  renal transplant. Atrophic left lower quadrant renal transplant with  extensive calcifications compatible with graft failure. Atrophic  native kidneys with nonobstructive nephrolithiasis.   5. Stable cardiomegaly.  6. Evidence of mild portosystemic shunting, unchanged.  7. Stable right lower lobe calcifications and pleural calcifications,  chronic and presumably postinfectious.  : no cirrhosis,    Charley Rodriguez MD

## 2020-03-16 NOTE — PROGRESS NOTES
Patient did not have hat in toilet reminded patient with interperter that she needs to save urine

## 2020-03-16 NOTE — PROGRESS NOTES
[facility], [facility] Progress Note - Hospitalist Service, [place]       Date of Admission:  3/15/2020  Assessment & Plan   Joshua Lala is a 54 year old Hmong speaking woman admitted on 3/15/2020. She has a history of renal transplant in 2007 2/2 IgA nephropathy, Henoch-Schonlein purpura, hypercalcemia, hypomagnesemia and hyperparathyroidism who presents with edema, hypertension and loose stools.    # Hypertension   Hypertensive on admission. Recent renal US shows no CYN. PTA on hydralazine. Was also to be on losartan, however not taking. Previously on amlodipine started 1/28, discontinued 2/17 by PCP 2/2 peripheral edema. Family notes hypertensive at home with 's.   - Continue PTA hydralazine    - Resume losartan     # Peripheral edema   Primary concern for patient. Peripheral edema present X 1-2 months per reports started on amlodipine 1/28 which was discontinued by PCP 2/17 2/2 peripheral edema, transitioned to hydralazine. Denies history of heart disease, last Echo 4/2019 with normal LV function EF 60-65%, normal RV function, ascending aorta 4 cm. EKG without ischemic changes. LLE US negative for DVT. BNP elevated in ED. Trop wnl.   - Repeat Echo   - IV lasix 40 mg today, reassess in AM for response     # Diarrhea   Loose stools X 1 day on admission. Patient reports diarrhea has resolved. C diff and enteric panel negative.   - Continue to monitor    - Check CMV PCR per nephrology     # Abnormal UA    UA with positive Leuk est, WBC. Denies urinary symptoms. Started on ceftriaxone in the ED. UCx <10,000 colonies/mL mixed urogenital nahid.   - Stop ceftriaxone     # Hx of renal transplant, 2007  # CKD   Due to IgA nephropathy. Baseline creatinine about 1.6. Cr 2.06 on admission. UA with 30 protein, 20 WBC. Hx of EBV positivity.    - Tx nephrology consulted    - Continue immunosupression with Neoral and Cellcept    - Check CSA level in AM    - Protein/creatinine ratio   -  Serum and urine immunofixation per nephrology   - Serum free light chain ratio per nephrology   - Continue ppx Bactrim   - Daily BMP   - Check EBV PCR     # Hx hyperparthyroidism  PTA on Cinecalcet. Diffuse vascular calcification on CT.   - Check PTH, phos, vitamin D level     # Anemia  Hgb baseline 10-11, has drifted down to 9 on admission. Likely anemia of CKD. No signs of active bleeding.    - Iron studies   - EPO level      # GERD   - Continue PTA omeprazole     Diet: Combination Diet Regular Diet Adult    DVT Prophylaxis: Ambulate every shift  Staples Catheter: not present  Code Status: Full Code      Disposition Plan   Expected discharge: 1-2 days, recommended to prior living arrangement once blood pressure less than 160/90, workup complete, renal function stable.   Entered: HUMERA Simmons 03/16/2020, 10:12 AM       The patient's care was discussed with the Attending Physician, Dr. Jurado, Bedside Nurse, Patient and Patient's Family.    HUMERA Simmons  Hospitalist Service, [address]  Pager: [phone]  Please see sticky note for cross cover information  ______________________________________________________________________    Interval History   Reports persistent bilateral peripheral edema L>R. Notes acid reflux, worsened with laying flat. Reports diarrhea has resolved today, no BM today. Denies orthopnea, PND. Denies chest pain, fevers, chills, nausea, or vomiting.     Data reviewed today: I reviewed all medications, new labs and imaging results over the last 24 hours. I personally reviewed no images or EKG's today.    Physical Exam   Vital Signs: Temp: 95.7  F (35.4  C) Temp src: Oral BP: 120/48 Pulse: 80   Resp: 24 SpO2: 91 % O2 Device: None (Room air)    Weight: 169 lbs 5.01 oz  GENERAL: Alert and oriented x 3. NAD.   HEENT: Anicteric sclera. PERRL. Mucous membranes moist and without lesions.   CV: RRR. S1, S2. ? Murmur vs bruit from  fistula.  RESPIRATORY: Effort normal on RA. Lungs with minimal crackles at left base, otherwise CTAB with no wheezing.   GI: Abdomen soft, obese and non distended, bowel sounds present. No tenderness, rebound, guarding.   MUSCULOSKELETAL: No joint swelling or tenderness. Moves all extremities.   NEUROLOGICAL: No focal deficits.   EXTREMITIES: 2+ bilateral lower extremity peripheral edema. Intact bilateral pedal pulses.   SKIN: No jaundice. No rashes. Large fistula of left upper extremity.       Data   Recent Labs   Lab 03/16/20  0525 03/15/20  1127   WBC 3.3* 3.2*   HGB 8.7* 9.1*   MCV 94 91    167   * 134   POTASSIUM 4.1 4.7   CHLORIDE 104 108   CO2 21 19*   BUN 14 14   CR 2.11* 2.06*   ANIONGAP 6 7   SHAVON 9.1 9.0   GLC 86 112*   ALBUMIN  --  3.3*   PROTTOTAL  --  7.1   BILITOTAL  --  0.8   ALKPHOS  --  119   ALT  --  11   AST  --  28   LIPASE  --  346   TROPI  --  <0.015     Recent Results (from the past 24 hour(s))   US Lower Extremity Venous Duplex Left    Narrative    EXAMINATION: TEMPORARY  3/16/2020 2:18 PM      CLINICAL HISTORY: Swelling left leg more than right    COMPARISON: Venous ultrasound 1/1/2017      PROCEDURE COMMENTS: Ultrasound was performed of the deep venous system  of the left lower extremity using grayscale, color, and spectral  Doppler.    FINDINGS:  The common femoral, greater saphenous origin, femoral, popliteal, and  deep calf veins are visualized and are patent. Venous waveforms are  normal. There is normal response to compression. No visible echogenic  thrombus.      Impression    IMPRESSION:.  No deep vein thrombosis in the left lower extremity.    I have personally reviewed the examination and initial interpretation  and I agree with the findings.    AYDEN BENITEZ MD     Medications       cefTRIAXone  1 g Intravenous Q24H     cinacalcet  30 mg Oral Daily     cycloSPORINE modified  25 mg Oral BID IS     hydrALAZINE  25 mg Oral TID     influenza vaccine adult (product  based on age)  0.5 mL Intramuscular Prior to discharge     losartan  25 mg Oral Daily     mycophenolate  1,000 mg Oral BID IS     omeprazole  20 mg Oral QAM AC     sodium chloride (PF)  3 mL Intracatheter Q8H     sulfamethoxazole-trimethoprim  1 tablet Oral Once per day on Mon Wed Fri

## 2020-03-17 ENCOUNTER — OFFICE VISIT (OUTPATIENT)
Dept: INTERPRETER SERVICES | Facility: CLINIC | Age: 55
End: 2020-03-17
Payer: MEDICARE

## 2020-03-17 LAB
ANION GAP SERPL CALCULATED.3IONS-SCNC: 8 MMOL/L (ref 3–14)
BUN SERPL-MCNC: 15 MG/DL (ref 7–30)
CALCIUM SERPL-MCNC: 8.5 MG/DL (ref 8.5–10.1)
CHLORIDE SERPL-SCNC: 101 MMOL/L (ref 94–109)
CMV DNA SPEC NAA+PROBE-ACNC: <137 [IU]/ML
CMV DNA SPEC NAA+PROBE-LOG#: <2.1 {LOG_IU}/ML
CO2 SERPL-SCNC: 20 MMOL/L (ref 20–32)
CREAT SERPL-MCNC: 2.28 MG/DL (ref 0.52–1.04)
CYCLOSPORINE BLD LC/MS/MS-MCNC: 75 UG/L (ref 50–400)
EBV DNA # SPEC NAA+PROBE: <500 {COPIES}/ML
EBV DNA SPEC NAA+PROBE-LOG#: <2.7 {LOG_COPIES}/ML
ERYTHROCYTE [DISTWIDTH] IN BLOOD BY AUTOMATED COUNT: 13.1 % (ref 10–15)
GFR SERPL CREATININE-BSD FRML MDRD: 24 ML/MIN/{1.73_M2}
GLUCOSE SERPL-MCNC: 94 MG/DL (ref 70–99)
HCT VFR BLD AUTO: 29.6 % (ref 35–47)
HGB BLD-MCNC: 8.7 G/DL (ref 11.7–15.7)
IGA SERPL-MCNC: 274 MG/DL (ref 84–499)
IGG SERPL-MCNC: 1281 MG/DL (ref 610–1616)
IGM SERPL-MCNC: 68 MG/DL (ref 35–242)
IRON SATN MFR SERPL: 23 % (ref 15–46)
IRON SERPL-MCNC: 54 UG/DL (ref 35–180)
KAPPA LC UR-MCNC: 6.01 MG/DL (ref 0.33–1.94)
KAPPA LC/LAMBDA SER: 1.29 {RATIO} (ref 0.26–1.65)
LAMBDA LC SERPL-MCNC: 4.67 MG/DL (ref 0.57–2.63)
MCH RBC QN AUTO: 26.9 PG (ref 26.5–33)
MCHC RBC AUTO-ENTMCNC: 29.4 G/DL (ref 31.5–36.5)
MCV RBC AUTO: 91 FL (ref 78–100)
PHOSPHATE SERPL-MCNC: 3.6 MG/DL (ref 2.5–4.5)
PLATELET # BLD AUTO: 160 10E9/L (ref 150–450)
POTASSIUM SERPL-SCNC: 3.9 MMOL/L (ref 3.4–5.3)
PROT PATTERN SERPL IFE-IMP: NORMAL
PTH-INTACT SERPL-MCNC: 291 PG/ML (ref 18–80)
RBC # BLD AUTO: 3.24 10E12/L (ref 3.8–5.2)
SODIUM SERPL-SCNC: 130 MMOL/L (ref 133–144)
SPECIMEN SOURCE: ABNORMAL
TIBC SERPL-MCNC: 240 UG/DL (ref 240–430)
TME LAST DOSE: NORMAL H
WBC # BLD AUTO: 3.4 10E9/L (ref 4–11)

## 2020-03-17 PROCEDURE — 83883 ASSAY NEPHELOMETRY NOT SPEC: CPT | Performed by: INTERNAL MEDICINE

## 2020-03-17 PROCEDURE — 25000132 ZZH RX MED GY IP 250 OP 250 PS 637: Mod: GY | Performed by: PHYSICIAN ASSISTANT

## 2020-03-17 PROCEDURE — 40000141 ZZH STATISTIC PERIPHERAL IV START W/O US GUIDANCE

## 2020-03-17 PROCEDURE — 25000131 ZZH RX MED GY IP 250 OP 636 PS 637: Mod: GY | Performed by: PHYSICIAN ASSISTANT

## 2020-03-17 PROCEDURE — 82784 ASSAY IGA/IGD/IGG/IGM EACH: CPT | Performed by: INTERNAL MEDICINE

## 2020-03-17 PROCEDURE — 86335 IMMUNFIX E-PHORSIS/URINE/CSF: CPT | Performed by: PHYSICIAN ASSISTANT

## 2020-03-17 PROCEDURE — 82668 ASSAY OF ERYTHROPOIETIN: CPT | Performed by: INTERNAL MEDICINE

## 2020-03-17 PROCEDURE — 80048 BASIC METABOLIC PNL TOTAL CA: CPT | Performed by: INTERNAL MEDICINE

## 2020-03-17 PROCEDURE — 25000131 ZZH RX MED GY IP 250 OP 636 PS 637: Mod: GY | Performed by: NURSE PRACTITIONER

## 2020-03-17 PROCEDURE — T1013 SIGN LANG/ORAL INTERPRETER: HCPCS | Mod: U3

## 2020-03-17 PROCEDURE — 40000893 ZZH STATISTIC PT IP EVAL DEFER

## 2020-03-17 PROCEDURE — 85027 COMPLETE CBC AUTOMATED: CPT | Performed by: INTERNAL MEDICINE

## 2020-03-17 PROCEDURE — 83550 IRON BINDING TEST: CPT | Performed by: INTERNAL MEDICINE

## 2020-03-17 PROCEDURE — 25000132 ZZH RX MED GY IP 250 OP 250 PS 637: Mod: GY | Performed by: NURSE PRACTITIONER

## 2020-03-17 PROCEDURE — 12000001 ZZH R&B MED SURG/OB UMMC

## 2020-03-17 PROCEDURE — 80158 DRUG ASSAY CYCLOSPORINE: CPT | Performed by: INTERNAL MEDICINE

## 2020-03-17 PROCEDURE — 84100 ASSAY OF PHOSPHORUS: CPT | Performed by: INTERNAL MEDICINE

## 2020-03-17 PROCEDURE — 86334 IMMUNOFIX E-PHORESIS SERUM: CPT | Performed by: INTERNAL MEDICINE

## 2020-03-17 PROCEDURE — 36415 COLL VENOUS BLD VENIPUNCTURE: CPT | Performed by: INTERNAL MEDICINE

## 2020-03-17 PROCEDURE — 25000128 H RX IP 250 OP 636: Performed by: PHYSICIAN ASSISTANT

## 2020-03-17 PROCEDURE — 83970 ASSAY OF PARATHORMONE: CPT | Performed by: INTERNAL MEDICINE

## 2020-03-17 PROCEDURE — 99233 SBSQ HOSP IP/OBS HIGH 50: CPT | Performed by: PEDIATRICS

## 2020-03-17 PROCEDURE — 83540 ASSAY OF IRON: CPT | Performed by: INTERNAL MEDICINE

## 2020-03-17 PROCEDURE — 99207 ZZC APP CREDIT; MD BILLING SHARED VISIT: CPT | Performed by: PHYSICIAN ASSISTANT

## 2020-03-17 RX ORDER — FUROSEMIDE 10 MG/ML
80 INJECTION INTRAMUSCULAR; INTRAVENOUS ONCE
Status: COMPLETED | OUTPATIENT
Start: 2020-03-17 | End: 2020-03-17

## 2020-03-17 RX ORDER — CALCITRIOL 0.25 UG/1
0.25 CAPSULE, LIQUID FILLED ORAL DAILY
Status: DISCONTINUED | OUTPATIENT
Start: 2020-03-17 | End: 2020-03-21 | Stop reason: HOSPADM

## 2020-03-17 RX ORDER — CINACALCET 30 MG/1
15 TABLET, FILM COATED ORAL EVERY OTHER DAY
Status: ON HOLD | COMMUNITY
End: 2020-03-21

## 2020-03-17 RX ORDER — SULFAMETHOXAZOLE AND TRIMETHOPRIM 400; 80 MG/1; MG/1
1 TABLET ORAL DAILY
Status: DISCONTINUED | OUTPATIENT
Start: 2020-03-17 | End: 2020-03-21 | Stop reason: HOSPADM

## 2020-03-17 RX ADMIN — DARBEPOETIN ALFA 25 MCG: 25 INJECTION, SOLUTION INTRAVENOUS; SUBCUTANEOUS at 16:37

## 2020-03-17 RX ADMIN — LOSARTAN POTASSIUM 25 MG: 25 TABLET, FILM COATED ORAL at 08:52

## 2020-03-17 RX ADMIN — HYDRALAZINE HYDROCHLORIDE 25 MG: 25 TABLET ORAL at 08:53

## 2020-03-17 RX ADMIN — OMEPRAZOLE 20 MG: 20 CAPSULE, DELAYED RELEASE ORAL at 08:53

## 2020-03-17 RX ADMIN — FUROSEMIDE 80 MG: 10 INJECTION, SOLUTION INTRAVENOUS at 18:13

## 2020-03-17 RX ADMIN — SULFAMETHOXAZOLE AND TRIMETHOPRIM 1 TABLET: 400; 80 TABLET ORAL at 12:16

## 2020-03-17 RX ADMIN — MYCOPHENOLATE MOFETIL 1000 MG: 250 CAPSULE ORAL at 08:53

## 2020-03-17 RX ADMIN — CALCITRIOL 0.25 MCG: 0.25 CAPSULE ORAL at 16:36

## 2020-03-17 RX ADMIN — CYCLOSPORINE 25 MG: 25 CAPSULE, LIQUID FILLED ORAL at 21:40

## 2020-03-17 RX ADMIN — CINACALCET HYDROCHLORIDE 30 MG: 30 TABLET, COATED ORAL at 08:53

## 2020-03-17 RX ADMIN — MYCOPHENOLATE MOFETIL 1000 MG: 250 CAPSULE ORAL at 21:40

## 2020-03-17 RX ADMIN — CHLOROTHIAZIDE 500 MG: 250 TABLET ORAL at 17:29

## 2020-03-17 RX ADMIN — FUROSEMIDE 80 MG: 10 INJECTION, SOLUTION INTRAVENOUS at 13:41

## 2020-03-17 RX ADMIN — CYCLOSPORINE 25 MG: 25 CAPSULE, LIQUID FILLED ORAL at 08:52

## 2020-03-17 ASSESSMENT — ACTIVITIES OF DAILY LIVING (ADL)
ADLS_ACUITY_SCORE: 13

## 2020-03-17 NOTE — PLAN OF CARE
VSS. Patient slept between cares. Pt c/o  stomach discomfort, but declined medication.  + bowel sounds. Up Independently in room. Encouraged patient to use calll  light with going to the bathroom. Hmong speaking but bryson to communicate and understand some English.  LAILAE PIV s/l.  Left old fistula..   Lung sound with crackles left base. BLE 2+ edema. Plan: Hourly rounding complete, call light within reach. Continue to monitor and follow POC.  Notify MD of changes.

## 2020-03-17 NOTE — PROGRESS NOTES
Nephrology Progress Note  03/17/2020       Joshua Lala is a 54 year old woman S/P DDKT X 2 adm 2/26 with edema, HBP and diarrhea.    Interval History :   Nursing and provider notes from last 24 hours reviewed.  In the last 24 hours Joshua Lala has had resolution of diarrhea.  Weight is up one kg.  Not SOB, no fevers.     Assessment & Recommendations:   # Allograft function: S/P DDKT 1998 and 2007.  BL Cr initially 1.3 - 1.6 with gradual increase to 2.0 over past 6 months. IS is CSA and MMF 1 g bid.  UA 30 prot, 20 WBCs. However prot/Cr ratios over past year have been up to 3 g/g and first brooke to >1.5 g/g in 2016. No serum immunofixation was sent.  She denies voiding symptoms.  When seen in clinic 6/2019  no biopsy was recommended b/o lack of therapeutic options.  Pt was to switch to tac from CSA but never made this change.                 - CSA level is 75 (in target range)                - prot/Cr pending                - check UC (pending)                - serum and urine immunofixation pending                - serum free light chain ratio is normal                - will consider biopsy depending on results of above.  Can be OP procedure     # Immunosuppression: on CSA and MMF 1 g bid                - decrease MMF to 750 bid                - CSA is at goal (50 - 75)     # HBP:  Good BP control in hospital. Recent renal US shows no CYN.  Amlodipine may have contributed to edema.       - stop hydralazine                - agree with losartan                -start lasix 20 bid and titrate up to increase UOP                - would prefer BB to hydral if needed      # Edema: patient has documented 5 kg weight increase since Jan.  Denies SOB, orthopnea or CP. BNP is high.  Echo shows worsening aortic valve stenosis and high pulm pressures  Hx PE remotely.  Recent CT does not show cirrhosis                - cardiology consult for aortic stenosis and pulm hypertension        #Diarrhea: resolved     # Anemia: hb drifting  down.  Most likely anemia from CKD but may also have a component of marrow suppression from MMF                - iron stores replete                - begin aranesp 25 mcg sc q 2 weeks and enroll in anemia clinic (I will enroll her)     # Hx hyperparathyroidism: pt is on subtherapeutic dose of cinecalcet.  Diffuse vascular calcifications on CT. PTH is now 291.  This may contribute to valve calcification   - stop cinecalcet b/o borderline hypocalcemia   - start calcitriol 0.25 q d   - CKD clinic monitoring.  I sent Mariama Ramirez a note to arrange for this pt to be seen in CKD   clinic when possible     # HX EBV positivity. Low level but should be monitored per protocol                - EBV PCR pending     Recommendations were communicated to primary team via phone        Charley Rodriguez MD   [phone]      Review of Systems:   I reviewed the following systems:  GI: fair appetite. no nausea or vomiting or diarrhea.   Neuro:  no confusion  Constitutional:  no fever or chills  CV: no dyspnea or edema.  no chest pain.    Physical Exam:   I/O last 3 completed shifts:  In: 1000 [P.O.:1000]  Out: 900 [Urine:900]   /50 (BP Location: Right arm)   Pulse 79   Temp 96.6  F (35.9  C) (Oral)   Resp 18   Wt 77.4 kg (170 lb 11.2 oz)   SpO2 95%   BMI 33.34 kg/m       GENERAL APPEARANCE: alert in no distress  EYES:  no scleral icterus, pupils equal  HENT: mouth without ulcers or lesions  PULM: lungs clear to auscultation bilaterally, equal air movement, no clubbing  CV: regular rhythm, normal rate, no rub 3/6 holosystolic murmur, transmission from LUE fistula     -JVD no     -edema nonpitting   GI: soft, nontender, not distended, bowel sounds are present  INTEGUMENT: no cyanosis, no rash  NEURO:  no asterixis   Access aneurysmal LUE fistula    Labs:   All labs reviewed by me  Electrolytes/Renal -   Recent Labs   Lab Test 03/17/20  0509 03/16/20  0525 03/15/20  1127  11/07/18  1914  01/29/18  1901  04/28/17  0830   02/27/17  0302   * 132* 134   < > 134   < > 140   < > 138   < > 131*   POTASSIUM 3.9 4.1 4.7   < > 4.1   < > 4.6   < > 4.9   < > 4.2   CHLORIDE 101 104 108   < > 103   < > 111*   < > 107   < > 99   CO2 20 21 19*   < > 22   < > 20   < > 23   < > 20   BUN 15 14 14   < > 16   < > 14   < > 14   < > 14   CR 2.28* 2.11* 2.06*   < > 1.56*   < > 1.48*   < > 1.67*   < > 1.60*   GLC 94 86 112*   < > 158*   < > 92   < > 89   < > 325*   SHAVON 8.5 9.1 9.0   < > 9.4   < > 9.6   < > 9.7   < > 9.7   MAG  --   --   --   --  1.9  --  1.7  --   --   --  1.6   PHOS 3.6  --   --   --   --   --  2.8  --  2.8  --   --     < > = values in this interval not displayed.       CBC -   Recent Labs   Lab Test 03/17/20  0509 03/16/20  0525 03/15/20  1127   WBC 3.4* 3.3* 3.2*   HGB 8.7* 8.7* 9.1*    157 167       LFTs -   Recent Labs   Lab Test 03/15/20  1127 01/26/20  1231 06/16/19  1039   ALKPHOS 119 126 124   BILITOTAL 0.8 0.7 0.7   ALT 11 11 18   AST 28 31 39   PROTTOTAL 7.1 7.2 7.8   ALBUMIN 3.3* 3.3* 3.6       Iron Panel -   Recent Labs   Lab Test 03/17/20  0509   IRON 54   IRONSAT 23         Imaging:    Echo 3/16 Interpretation Summary  Global and regional left ventricular function is normal with an EF of 60-65%.  Moderate right ventricular dilation with normal global systolic function.  Mild to moderate aortic insufficiency and mild aortic stenosis. The mean  gradient across the aortic valve is20 mmHg. The peak aortic velocity is 3  m/sec.  Mild to moderate tricuspid insufficiency is present.  Moderate pulmonary hypertension. Estimated pulmonary artery systolic pressure  is 40 mmHg plus right atrial pressure.  IVC diameter >2.1 cm collapsing <50% with sniff suggests a high RA pressure  estimated at 15 mmHg or greater.  Ascending aorta 3.8 cm, indexed 2.2cm/m2.     This study was compared with the study from 4/3/19. The right ventricle is  dilated and the regurgitant valve lesions have slightly worsened. The  estimated PA and RA  pressures have increased.    Current Medications:    [START ON 3/19/2020] cinacalcet  15 mg Oral Every Other Day     cycloSPORINE modified  25 mg Oral BID IS     hydrALAZINE  25 mg Oral TID     losartan  25 mg Oral Daily     mycophenolate  1,000 mg Oral BID IS     omeprazole  20 mg Oral QAM AC     sodium chloride (PF)  3 mL Intracatheter Q8H     sulfamethoxazole-trimethoprim  1 tablet Oral Daily       Charley Rodriguez MD

## 2020-03-17 NOTE — PLAN OF CARE
/50 (BP Location: Right arm)   Pulse 79   Temp 96.6  F (35.9  C) (Oral)   Resp 18   Wt 77.4 kg (170 lb 11.2 oz)   SpO2 95%   BMI 33.34 kg/m   Alert/ox3. Pt slept between cares. Denies wanting any pain medication nor sob.  Pt's spouse at bedside overnight.  COLIN s/l. Spouse reported his concern that patient received IVF at previous hospital and this is why she has such fluid overload.  Up independent in room. Regular diet. Hat in toilet for monitoring output.  Plan:   Hourly rounding complete, call light within reach.  Continue to monitor and follow crp.   Notify MD of derrick.

## 2020-03-17 NOTE — PLAN OF CARE
A&Ox4. VSS. Pt denies pain/nausea. Pt up indep in room but calls for help with IV while going to BR. Hat in place to collect urine, urine sample needed. Pt had ECHO and renal US today. Gave flu shot IM this shift. Spouse in room. Continue to monitor.

## 2020-03-17 NOTE — PLAN OF CARE
OT-5b: Cx/defer- Per PT, pt has no acute OT needs at this time, will complete OT orders, PT to follow

## 2020-03-17 NOTE — PLAN OF CARE
PT 5A: Defer    Discharge Planner PT   Patient plan for discharge: Home  Current status: PT orders acknowledged and appreciated. Pt ambulating and transferring independently. Pt without needs or concerns for physical therapy. Pt will be safe to return home when medically appropriate. Will complete orders at this time. Thank you for your referral.   Barriers to return to prior living situation: medical status  Recommendations for discharge: Home  Rationale for recommendations: See above.        Entered by: Kate Winchester 03/17/2020 8:41 AM

## 2020-03-17 NOTE — PROGRESS NOTES
[facility], [facility] Progress Note - Hospitalist Service, [place]       Date of Admission:  3/15/2020  Assessment & Plan   Joshua Lala is a 54 year old Hmong speaking woman admitted on 3/15/2020. She has a history of renal transplant in 2007 2/2 IgA nephropathy, Henoch-Schonlein purpura, hypercalcemia, hypomagnesemia and hyperparathyroidism who presents with edema, hypertension and loose stools.     #Hypertension   Hypertensive on admission. Tx US with no CYN. PTA on hydralazine. Was also to be on losartan, however not taking. Previously on amlodipine started 1/28, discontinued 2/17 by PCP 2/2 peripheral edema. Family notes hypertensive at home with 's.   - Continue to avoid amlodipine   - Stop hydralazine as can also cause edema   - Continue losartan (discussed with renal)   - Would favor BB if additional blood pressure control needed      #Peripheral edema   Primary concern for patient. Peripheral edema present X 1-2 months per reports started on amlodipine 1/28 which was discontinued by PCP 2/17 2/2 peripheral edema, transitioned to hydralazine. Denies history of heart disease, last Echo 4/2019 with normal LV function EF 60-65%, normal RV function, ascending aorta 4 cm. EKG without ischemic changes. LLE US negative for DVT. BNP elevated in ED. Trop wnl. Repeat Echo with worsening right ventricle dilation and increase in PA and RA pressures as below. Given IV lasix 80 mg today (minimal response to 40 mg IV yesterday) with minimal response. Discussed with cardiology, recommended sequential nephron blockade.   - Per cardiology recommendation, chlorothiazide 500 mg PO followed by furosemide 80 mg IV    - Monitor I and O    - Daily weights     #Concern for RV dysfunction   Echo 3/15 with moderate right ventricular dilation with normal global systolic function, moderate pulmonary hypertension, pulmonary artery systolic pressure 40 mmHg plus right atrial pressure, sniff  suggests high RA pressure 15 mmHg or greater. Right ventricle dilation and regurgitant valve lesions worsened, PA and RA pressures increased since prior Echo 2019.   - Discussed with cardiology, recommended sequential nephron blockade with chlorothiazide and furosemide as above   - Will need referral for cardiology on discharge      #Diarrhea   Loose stools X 1 day on admission. Patient reports diarrhea has resolved. C diff and enteric panel negative. CMV quant <137.  - Continue to monitor       #Abnormal UA    UA with positive Leuk est, WBC. Denies urinary symptoms. Started on ceftriaxone in the ED. UCx <10,000 colonies/mL mixed urogenital nahid.   - Stop ceftriaxone      #Hx of renal transplant, 2007  #CKD   Due to IgA nephropathy. Baseline creatinine about 1.6. Cr 2.06 on admission. UA with 30 protein, 20 WBC. Hx of EBV positivity. CSA level 75 (goal 50-75). Serum free light chain ratio normal.   - Tx nephrology consulted    - Continue immunosupression with Neoral and Cellcept    - Serum and urine immunofixation per nephrology pending   - Continue ppx Bactrim   - Daily BMP   - Check EBV PCR      #Hx hyperparthyroidism  PTA on Cinecalcet. Diffuse vascular calcification on CT. Given borderline hypocalcemia and , discontinue cinecalcet per nephrology and initiate calcitriol. Phos wnl.   - calcitriol 0.25 q d    - Stop cinecalcet      #Anemia  Hgb baseline 10-11, has drifted down to 9 on admission. Likely anemia of CKD. No signs of active bleeding. Iron studies wnl.   - Aranesp 25 mcg every 2 weeks per nephrology    - Follow up epo level      #GERD   - Continue PTA omeprazole     Diet: Combination Diet Regular Diet Adult    DVT Prophylaxis: Ambulate every shift  Staples Catheter: not present  Code Status: Full Code      Disposition Plan   Expected discharge: Tomorrow, recommended to prior living arrangement once renal function improved and cardiology consultation completed.  Entered: HUMERA Simmons  "03/17/2020, 12:45 PM       The patient's care was discussed with the Attending Physician, Dr. Pedraza, Bedside Nurse, Patient and nephrology Consultant.    HUMERA Simmons  Hospitalist Service, [address], [place]  Pager: [phone]  Please see sticky note for cross cover information  ______________________________________________________________________    Interval History   Patient reports persistent lower extremity edema, left > right. Diarrhea resolved. Reports decrease in oral intake due to no access of \"my normal food.\"     Denies chest pain, SOB, nausea, vomiting, cough, abdominal pain, weakness, numbness.     Data reviewed today: I reviewed all medications, new labs and imaging results over the last 24 hours.     Physical Exam   Vital Signs: Temp: 96.6  F (35.9  C) Temp src: Oral BP: 119/50 Pulse: 79   Resp: 18 SpO2: 95 % O2 Device: None (Room air)    Weight: 170 lbs 11.2 oz  GENERAL: Alert and oriented x 3. NAD.   HEENT: Anicteric sclera. PERRL. Mucous membranes moist and without lesions.   CV: RRR. S1, S2. ? Murmur vs bruit from fistula.  RESPIRATORY: Effort normal on RA. Lungs with minimal crackles at left base, otherwise CTAB with no wheezing.   GI: Abdomen soft, obese and non distended, bowel sounds present. No tenderness, rebound, guarding.   MUSCULOSKELETAL: No joint swelling or tenderness. Moves all extremities.   NEUROLOGICAL: No focal deficits.   EXTREMITIES: 2+ left lower extremity peripheral edema, 1+ right lower extremity peripheral edema. Intact bilateral pedal pulses.   SKIN: No jaundice. No rashes. Large fistula of left upper extremity.     Data   Recent Labs   Lab 03/17/20  0509 03/16/20  0525 03/15/20  1127   WBC 3.4* 3.3* 3.2*   HGB 8.7* 8.7* 9.1*   MCV 91 94 91    157 167   * 132* 134   POTASSIUM 3.9 4.1 4.7   CHLORIDE 101 104 108   CO2 20 21 19*   BUN 15 14 14   CR 2.28* 2.11* 2.06*   ANIONGAP 8 6 7   SHAVON 8.5 9.1 9.0   GLC 94 86 112* "   ALBUMIN  --   --  3.3*   PROTTOTAL  --   --  7.1   BILITOTAL  --   --  0.8   ALKPHOS  --   --  119   ALT  --   --  11   AST  --   --  28   LIPASE  --   --  346   TROPI  --   --  <0.015     Recent Results (from the past 24 hour(s))   Echo Complete    Narrative    [id number]  [id number]  [id number]  [id number]^LEONA^CHARLOTTE           [facility],[place]  Echocardiography Laboratory  [address] [place] [address]     Name: ALIN ARCHIBALD  MRN: [id number]  : 1965  Study Date: 2020 03:58 PM  Age: 54 yrs  Gender: Female  Patient Location: [facility]  Reason For Study: Cardiomegaly  Ordering Physician: CHARLOTTE DELGADILLO  Performed By: MEEK Perry     BSA: 1.7 m2  Height: 60 in  Weight: 169 lb  HR: 73  BP: 120/48 mmHg  _____________________________________________________________________________  __     _____________________________________________________________________________  __        Interpretation Summary  Global and regional left ventricular function is normal with an EF of 60-65%.  Moderate right ventricular dilation with normal global systolic function.  Mild to moderate aortic insufficiency and mild aortic stenosis. The mean  gradient across the aortic valve is20 mmHg. The peak aortic velocity is 3  m/sec.  Mild to moderate tricuspid insufficiency is present.  Moderate pulmonary hypertension. Estimated pulmonary artery systolic pressure  is 40 mmHg plus right atrial pressure.  IVC diameter >2.1 cm collapsing <50% with sniff suggests a high RA pressure  estimated at 15 mmHg or greater.  Ascending aorta 3.8 cm, indexed 2.2cm/m2.     This study was compared with the study from 4/3/19. The right ventricle is  dilated and the regurgitant valve lesions have slightly worsened. The  estimated PA and RA pressures have increased.  _____________________________________________________________________________  __        Left Ventricle  Global and regional left ventricular function is  normal with an EF of 60-65%.  Left ventricular size is normal. Mild to moderate concentric wall thickening  consistent with left ventricular hypertrophy is present. Grade II or moderate  diastolic dysfunction. No regional wall motion abnormalities are seen.     Right Ventricle  Global right ventricular function is normal. Moderate right ventricular  dilation is present.     Atria  Severe left atrial enlargement is present. Moderate right atrial enlargement  is present.     Mitral Valve  Mild mitral annular calcification is present. Mild mitral insufficiency is  present.        Aortic Valve  Mild to moderate aortic insufficiency is present. Mild aortic stenosis is  present. The mean gradient across the aortic valve is20 mmHg. The peak aortic  velocity is 3 m/sec.     Tricuspid Valve  Mild to moderate tricuspid insufficiency is present. Estimated pulmonary  artery systolic pressure is 40 mmHg plus right atrial pressure.     Pulmonic Valve  The valve leaflets are not well visualized. Trace pulmonic insufficiency is  present.     Vessels  The aorta root is normal. Ascending aorta 3.8 cm. Dilation of the inferior  vena cava is present with abnormal respiratory variation in diameter. IVC  diameter >2.1 cm collapsing <50% with sniff suggests a high RA pressure  estimated at 15 mmHg or greater.     Pericardium  No pericardial effusion is present.        Compared to Previous Study  This study was compared with the study from 4/3/19 .  _____________________________________________________________________________  __  MMode/2D Measurements & Calculations     RVDd: 4.1 cm  IVSd: 1.2 cm  LVIDd: 4.4 cm  LVIDs: 3.1 cm  LVPWd: 1.3 cm  FS: 29.9 %  LV mass(C)d: 208.1 grams  LV mass(C)dI: 119.8 grams/m2  asc Aorta Diam: 3.8 cm  LVOT diam: 1.6 cm  LVOT area: 2.0 cm2  LA Volume Index (BP): 48.3 ml/m2  RWT: 0.60  TAPSE: 2.8 cm           Doppler Measurements & Calculations  MV E max michael: 148.9 cm/sec  MV A max michael: 82.9 cm/sec  MV E/A:  1.8  MV dec slope: 806.9 cm/sec2  MV dec time: 0.18 sec  Ao V2 max: 316.0 cm/sec  Ao max P.0 mmHg  Ao V2 mean: 198.8 cm/sec  Ao mean P.2 mmHg  Ao V2 VTI: 59.1 cm  CELENA(I,D): 1.4 cm2  CELENA(V,D): 1.2 cm2  AI P1/2t: 372.2 msec  LV V1 max P.0 mmHg  LV V1 max: 187.0 cm/sec  LV V1 VTI: 40.7 cm  SV(LVOT): 81.5 ml  SI(LVOT): 46.9 ml/m2  TV max P.9 mmHg  PA acc time: 0.09 sec  TR max freddy: 335.2 cm/sec  TR max P.9 mmHg  AV Freddy Ratio (DI): 0.59  CELENA Index (cm2/m2): 0.79  E/E' av.3  Lateral E/e': 13.7  Medial E/e': 20.8        _____________________________________________________________________________  __        Report approved by: Agata Iglesias 2020 08:12 PM      US Renal Transplant    Narrative    EXAMINATION: US RENAL TRANSPLANT  3/16/2020 6:12 PM      CLINICAL HISTORY: Hx of kidney tx, HTN    COMPARISON: 2020      FINDINGS:   There is a right lower quadrant renal transplant which measures 11.4  cm, previously 11.2 cm. The transplant kidney demonstrates normal  echogenicity. There is no peritransplant fluid collection. There is no  urinary tract dilation.    The urinary bladder is decompressed.    The arcuate artery resistive indices are 0.79 (upper), 0.78 (mid), and  0.69 (lower).   The renal artery anastomosis peak systolic velocity is 117. There are  no abnormal waveforms in the renal artery.   The renal vein is patent.   The artery and vein are patent above and below the anastomosis.    Iliac artery above the anastomosis: 110 cm/s  Iliac artery below the anastomosis: 94 cm/s      Impression    IMPRESSION:  1. Normal renal transplant ultrasound.  2. Borderline elevated arcuate artery resistive indices, otherwise a  normal doppler evaluation of the renal transplant.    I have personally reviewed the examination and initial interpretation  and I agree with the findings.    JEREMY DING MD     Medications       calcitRIOL  0.25 mcg Oral Daily     cycloSPORINE modified   25 mg Oral BID IS     darbepoetin alpha non-ESRD  25 mcg Subcutaneous Q14 Days     losartan  25 mg Oral Daily     mycophenolate  1,000 mg Oral BID IS     omeprazole  20 mg Oral QAM AC     sodium chloride (PF)  3 mL Intracatheter Q8H     sulfamethoxazole-trimethoprim  1 tablet Oral Daily

## 2020-03-17 NOTE — PLAN OF CARE
Patient independent in room. New iv inserted due to old one infiltrated. Patient had husbnad bring food from home  and interputer ordered her lunch dinner and breakfast. Patient on I/o did not put out much urine this afternoon and now received lasix iv. Vss. Fistula in her upper left arm.

## 2020-03-18 LAB
ANION GAP SERPL CALCULATED.3IONS-SCNC: 9 MMOL/L (ref 3–14)
BUN SERPL-MCNC: 17 MG/DL (ref 7–30)
CALCIUM SERPL-MCNC: 8.9 MG/DL (ref 8.5–10.1)
CHLORIDE SERPL-SCNC: 95 MMOL/L (ref 94–109)
CO2 SERPL-SCNC: 21 MMOL/L (ref 20–32)
CREAT SERPL-MCNC: 2.32 MG/DL (ref 0.52–1.04)
CREAT UR-MCNC: 32 MG/DL
EPO SERPL-ACNC: 11 MU/ML (ref 4–27)
ERYTHROCYTE [DISTWIDTH] IN BLOOD BY AUTOMATED COUNT: 13.1 % (ref 10–15)
GFR SERPL CREATININE-BSD FRML MDRD: 23 ML/MIN/{1.73_M2}
GLUCOSE SERPL-MCNC: 87 MG/DL (ref 70–99)
HCT VFR BLD AUTO: 28.2 % (ref 35–47)
HGB BLD-MCNC: 8.5 G/DL (ref 11.7–15.7)
LACTATE BLD-SCNC: 0.7 MMOL/L (ref 0.7–2)
MCH RBC QN AUTO: 27.1 PG (ref 26.5–33)
MCHC RBC AUTO-ENTMCNC: 30.1 G/DL (ref 31.5–36.5)
MCV RBC AUTO: 90 FL (ref 78–100)
PLATELET # BLD AUTO: 153 10E9/L (ref 150–450)
POTASSIUM SERPL-SCNC: 3.6 MMOL/L (ref 3.4–5.3)
PROT ELPH PNL UR ELPH: NORMAL
PROT UR-MCNC: 0.43 G/L
PROT/CREAT 24H UR: 1.35 G/G CR (ref 0–0.2)
RBC # BLD AUTO: 3.14 10E12/L (ref 3.8–5.2)
SODIUM SERPL-SCNC: 123 MMOL/L (ref 133–144)
SODIUM SERPL-SCNC: 125 MMOL/L (ref 133–144)
SODIUM UR-SCNC: 42 MMOL/L
WBC # BLD AUTO: 2.8 10E9/L (ref 4–11)

## 2020-03-18 PROCEDURE — 36415 COLL VENOUS BLD VENIPUNCTURE: CPT | Performed by: PEDIATRICS

## 2020-03-18 PROCEDURE — 25000132 ZZH RX MED GY IP 250 OP 250 PS 637: Mod: GY | Performed by: PHYSICIAN ASSISTANT

## 2020-03-18 PROCEDURE — 84295 ASSAY OF SERUM SODIUM: CPT | Performed by: PHYSICIAN ASSISTANT

## 2020-03-18 PROCEDURE — 12000001 ZZH R&B MED SURG/OB UMMC

## 2020-03-18 PROCEDURE — 84156 ASSAY OF PROTEIN URINE: CPT | Performed by: INTERNAL MEDICINE

## 2020-03-18 PROCEDURE — 80048 BASIC METABOLIC PNL TOTAL CA: CPT | Performed by: PHYSICIAN ASSISTANT

## 2020-03-18 PROCEDURE — 36415 COLL VENOUS BLD VENIPUNCTURE: CPT | Performed by: PHYSICIAN ASSISTANT

## 2020-03-18 PROCEDURE — 25000131 ZZH RX MED GY IP 250 OP 636 PS 637: Mod: GY | Performed by: NURSE PRACTITIONER

## 2020-03-18 PROCEDURE — 25800030 ZZH RX IP 258 OP 636: Performed by: PHYSICIAN ASSISTANT

## 2020-03-18 PROCEDURE — 25000132 ZZH RX MED GY IP 250 OP 250 PS 637: Mod: GY | Performed by: NURSE PRACTITIONER

## 2020-03-18 PROCEDURE — 83605 ASSAY OF LACTIC ACID: CPT | Performed by: PEDIATRICS

## 2020-03-18 PROCEDURE — 84300 ASSAY OF URINE SODIUM: CPT | Performed by: INTERNAL MEDICINE

## 2020-03-18 PROCEDURE — 99207 ZZC APP CREDIT; MD BILLING SHARED VISIT: CPT | Performed by: PHYSICIAN ASSISTANT

## 2020-03-18 PROCEDURE — 25000131 ZZH RX MED GY IP 250 OP 636 PS 637: Mod: GY | Performed by: PHYSICIAN ASSISTANT

## 2020-03-18 PROCEDURE — 99233 SBSQ HOSP IP/OBS HIGH 50: CPT | Performed by: PEDIATRICS

## 2020-03-18 PROCEDURE — 85027 COMPLETE CBC AUTOMATED: CPT | Performed by: PHYSICIAN ASSISTANT

## 2020-03-18 RX ORDER — MAGNESIUM SULFATE HEPTAHYDRATE 40 MG/ML
4 INJECTION, SOLUTION INTRAVENOUS EVERY 4 HOURS PRN
Status: DISCONTINUED | OUTPATIENT
Start: 2020-03-18 | End: 2020-03-21 | Stop reason: HOSPADM

## 2020-03-18 RX ORDER — POTASSIUM CHLORIDE 7.45 MG/ML
10 INJECTION INTRAVENOUS
Status: DISCONTINUED | OUTPATIENT
Start: 2020-03-18 | End: 2020-03-21 | Stop reason: HOSPADM

## 2020-03-18 RX ORDER — POTASSIUM CL/LIDO/0.9 % NACL 10MEQ/0.1L
10 INTRAVENOUS SOLUTION, PIGGYBACK (ML) INTRAVENOUS
Status: DISCONTINUED | OUTPATIENT
Start: 2020-03-18 | End: 2020-03-21 | Stop reason: HOSPADM

## 2020-03-18 RX ORDER — POTASSIUM CHLORIDE 29.8 MG/ML
20 INJECTION INTRAVENOUS
Status: DISCONTINUED | OUTPATIENT
Start: 2020-03-18 | End: 2020-03-21 | Stop reason: HOSPADM

## 2020-03-18 RX ORDER — SODIUM CHLORIDE 9 MG/ML
INJECTION, SOLUTION INTRAVENOUS CONTINUOUS
Status: ACTIVE | OUTPATIENT
Start: 2020-03-18 | End: 2020-03-18

## 2020-03-18 RX ORDER — POTASSIUM CHLORIDE 1.5 G/1.58G
20-40 POWDER, FOR SOLUTION ORAL
Status: DISCONTINUED | OUTPATIENT
Start: 2020-03-18 | End: 2020-03-21 | Stop reason: HOSPADM

## 2020-03-18 RX ORDER — POTASSIUM CHLORIDE 750 MG/1
20-40 TABLET, EXTENDED RELEASE ORAL
Status: DISCONTINUED | OUTPATIENT
Start: 2020-03-18 | End: 2020-03-21 | Stop reason: HOSPADM

## 2020-03-18 RX ADMIN — LOSARTAN POTASSIUM 25 MG: 25 TABLET, FILM COATED ORAL at 09:29

## 2020-03-18 RX ADMIN — CYCLOSPORINE 25 MG: 25 CAPSULE, LIQUID FILLED ORAL at 21:03

## 2020-03-18 RX ADMIN — MYCOPHENOLATE MOFETIL 1000 MG: 250 CAPSULE ORAL at 21:03

## 2020-03-18 RX ADMIN — CYCLOSPORINE 25 MG: 25 CAPSULE, LIQUID FILLED ORAL at 09:29

## 2020-03-18 RX ADMIN — CALCITRIOL 0.25 MCG: 0.25 CAPSULE ORAL at 09:28

## 2020-03-18 RX ADMIN — SODIUM CHLORIDE: 9 INJECTION, SOLUTION INTRAVENOUS at 16:08

## 2020-03-18 RX ADMIN — OMEPRAZOLE 20 MG: 20 CAPSULE, DELAYED RELEASE ORAL at 09:29

## 2020-03-18 RX ADMIN — MYCOPHENOLATE MOFETIL 1000 MG: 250 CAPSULE ORAL at 09:28

## 2020-03-18 RX ADMIN — SULFAMETHOXAZOLE AND TRIMETHOPRIM 1 TABLET: 400; 80 TABLET ORAL at 09:28

## 2020-03-18 ASSESSMENT — ACTIVITIES OF DAILY LIVING (ADL)
ADLS_ACUITY_SCORE: 13

## 2020-03-18 NOTE — PROGRESS NOTES
[facility], [facility] Progress Note - Hospitalist Service, Gold 4       Date of Admission:  3/15/2020  Assessment & Plan   Joshua Lala is a 54 year old Hmong speaking woman admitted on 3/15/2020. She has a history of renal transplant in 2007 2/2 IgA nephropathy, Henoch-Schonlein purpura, hypercalcemia, hypomagnesemia and hyperparathyroidism who presents with edema, hypertension and loose stools.     # Hypertension   Hypertensive on admission. Tx US with no CYN. PTA on hydralazine. Was also to be on losartan, however not taking. Previously on amlodipine started 1/28, discontinued 2/17 by PCP 2/2 peripheral edema. Family notes hypertensive at home with 's. Hydralazine discontinued 3/17 given possibly causing edema. Blood pressures moderately controlled.   - Continue to avoid amlodipine and hydralazine     - Continue losartan   - Would favor BB if additional blood pressure control needed      # Peripheral edema   Primary concern for patient. Peripheral edema present X 1-2 months per reports started on amlodipine 1/28 which was discontinued by PCP 2/17 2/2 peripheral edema, transitioned to hydralazine. Denies history of heart disease, last Echo 4/2019 with normal LV function EF 60-65%, normal RV function, ascending aorta 4 cm. EKG without ischemic changes. LLE US negative for DVT. BNP elevated in ED. Trop wnl. No orthopnea, dyspnea or SOB. Repeat Echo with worsening right ventricle dilation and increase in PA and RA pressures as below. Discussed with cardiology, recommended sequential nephron blockade, s/p chlorthalidone 500 mg PO followed by IV furosemide 80 mg 3/17 with diuresis. Weight slightly down today. Peripheral edema improving.   - Hold diuretics today given hyponatremia as below   - Monitor I and O    - Daily weights     # Hyponatremia   Na down trended to 125 today, K 3.6. Likely from thiazide diuretics.   - Repeat Na 123   - Hold diuretics    - IV normal saline at  50 ml/hr X 4 hours   - Monitor Na every 4 hours until improving    - Replace potassium       # Concern for RV dysfunction   # Aortic stenosis   Echo 3/15 with moderate right ventricular dilation with normal global systolic function, moderate pulmonary hypertension, pulmonary artery systolic pressure 40 mmHg plus right atrial pressure, sniff suggests high RA pressure 15 mmHg or greater. Right ventricle dilation and regurgitant valve lesions worsened, PA and RA pressures increased since prior Echo 2019. Moderate aortic stenosis.   - Discussed with cardiology, recommended sequential nephron blockade with chlorothiazide and furosemide as above    - Will plan to start lasix 80 mg PO BID once Na increasing    - Consult for [facility] clinic   - Educated patient to monitor weights daily on discharge and record   - Will need referral for cardiology on discharge for evaluation of pulmonary HTN and aortic stenosis      # Hx of renal transplant, 2007  # CKD   Due to IgA nephropathy. Baseline creatinine about 1.6. Cr 2.06 on admission. UA with 30 protein, 20 WBC. Hx of EBV positivity. EBV quant <500. CSA level 75 (goal 50-75). Serum free light chain ratio normal. Immunofixation normal.   - Tx nephrology consulted    - Continue immunosupression with Neoral and Cellcept    - Continue ppx Bactrim   - Daily BMP      # Diarrhea, resolved    Loose stools X 1 day on admission. Patient reports diarrhea has resolved. C diff and enteric panel negative. CMV quant <137. EBV <500  - Continue to monitor       # Abnormal UA    UA with positive Leuk est, WBC. Denies urinary symptoms. Started on ceftriaxone in the ED. UCx <10,000 colonies/mL mixed urogenital nahid.   - Stop ceftriaxone      # Hx hyperparthyroidism  PTA on Cinecalcet. Diffuse vascular calcification on CT. Given borderline hypocalcemia and , discontinue cinecalcet per nephrology and initiate calcitriol. Phos wnl.   - calcitriol 0.25 q d    - Stop cinecalcet      # Anemia  Hgb  baseline 10-11, has drifted down to 9 on admission. Likely anemia of CKD. No signs of active bleeding. Iron studies wnl. Epo level 11.   - Aranesp 25 mcg every 2 weeks per nephrology    - Referral for anemia clinic on discharge      # GERD   - Continue PTA omeprazole     Diet: Combination Diet Regular Diet Adult    DVT Prophylaxis: Ambulate every shift  Staples Catheter: not present  Code Status: Full Code      Disposition Plan   Expected discharge: Tomorrow, recommended to prior living arrangement once sodium improved, renal function improved.  Entered: HUMERA Simmons 03/18/2020, 3:20 PM       The patient's care was discussed with the Attending Physician, Dr. Pedraza, Bedside Nurse, Patient and nephrology Consultant.    HUMERA Simmons  Hospitalist Service, [address], [place]  Pager: [phone]  Please see sticky note for cross cover information  ______________________________________________________________________    Interval History   Patient reports increased urine yesterday. Mild improvement in lower extremity edema today. Notes breathing is improved, however on further clarification notes she was having stomach pain not dyspnea or SOB. Denies chest pain, fevers, chills, nausea, vomiting, abdominal pain, dysuria. Last BM today, normal.     Data reviewed today: I reviewed all medications, new labs and imaging results over the last 24 hours.     Physical Exam   Vital Signs: Temp: 96.6  F (35.9  C) Temp src: Oral BP: (!) 156/75 Pulse: 67   Resp: 16 SpO2: 99 % O2 Device: None (Room air)    Weight: 168 lbs 11.2 oz  GENERAL: Alert and oriented x 3. NAD.   HEENT: Anicteric sclera. PERRL. Mucous membranes moist and without lesions.   CV: RRR. S1, S2. ? Murmur vs bruit from fistula.  RESPIRATORY: Effort normal on RA. Lungs with minimal crackles at left base, otherwise CTAB with no wheezing.   GI: Abdomen soft, obese and non distended, bowel sounds present. No tenderness,  rebound, guarding.   MUSCULOSKELETAL: No joint swelling or tenderness. Moves all extremities.   NEUROLOGICAL: No focal deficits.   EXTREMITIES: 1+ left lower extremity peripheral edema, 1+ right lower extremity peripheral edema. Intact bilateral pedal pulses.   SKIN: No jaundice. No rashes. Large fistula of left upper extremity.        Data   Recent Labs   Lab 03/18/20  1310 03/18/20  0501 03/17/20  0509 03/16/20  0525 03/15/20  1127   WBC  --  2.8* 3.4* 3.3* 3.2*   HGB  --  8.5* 8.7* 8.7* 9.1*   MCV  --  90 91 94 91   PLT  --  153 160 157 167   * 125* 130* 132* 134   POTASSIUM  --  3.6 3.9 4.1 4.7   CHLORIDE  --  95 101 104 108   CO2  --  21 20 21 19*   BUN  --  17 15 14 14   CR  --  2.32* 2.28* 2.11* 2.06*   ANIONGAP  --  9 8 6 7   SHAVON  --  8.9 8.5 9.1 9.0   GLC  --  87 94 86 112*   ALBUMIN  --   --   --   --  3.3*   PROTTOTAL  --   --   --   --  7.1   BILITOTAL  --   --   --   --  0.8   ALKPHOS  --   --   --   --  119   ALT  --   --   --   --  11   AST  --   --   --   --  28   LIPASE  --   --   --   --  346   TROPI  --   --   --   --  <0.015     Medications     sodium chloride 50 mL/hr at 03/18/20 1608       calcitRIOL  0.25 mcg Oral Daily     cycloSPORINE modified  25 mg Oral BID IS     darbepoetin alpha non-ESRD  25 mcg Subcutaneous Q14 Days     losartan  25 mg Oral Daily     mycophenolate  1,000 mg Oral BID IS     omeprazole  20 mg Oral QAM AC     sodium chloride (PF)  3 mL Intracatheter Q8H     sulfamethoxazole-trimethoprim  1 tablet Oral Daily

## 2020-03-18 NOTE — PROGRESS NOTES
Nephrology Progress Note  03/18/2020       Joshua Lala is a 54 year old woman S/P DDKT X 2 adm 2/26 with edema, HBP and diarrhea.    Interval History :   Nursing and provider notes from last 24 hours reviewed.  In the last 24 hours Joshua Lala has had no further diarrhea. She received 2 doses of IV lasix but is not on a diuretic currently b/o sodium drop to 125. Not SOB, not febrile. Weight down 1 kg.       Assessment & Recommendations:   # Allograft function: S/P DDKT 1998 and 2007.  BL Cr initially 1.3 - 1.6 with gradual increase to 2.0 over past 6 months. IS is CSA and MMF 1 g bid.  UA 30 prot, 20 WBCs. Prot/Cr  >1.5 g/g in 2016 and was 2.9 in 7/2019.  When seen in clinic 6/2019  no biopsy was recommended b/o lack of therapeutic options.  Pt was to switch to tac from CSA but never made this change.                 - CSA level is 75 (in target range)                - prot/Cr pending                - serum and urine immunofixation normal              -consider biopsy as OP     # Immunosuppression: on CSA and MMF 1 g bid                - MMF decreased to 750 bid                - CSA is at goal (50 - 75)     # HBP:  Good BP control in hospital. Recent renal US shows no CYN.  Amlodipine may have contributed to edema.                   - agree with losartan                   # Edema: patient has documented 5 kg weight increase since Jan.  Denies SOB, orthopnea or CP. BNP is high.  Echo shows worsening aortic valve stenosis and high pulm pressures  Hx PE remotely.  Recent CT does not show cirrhosis. Pt received IV diuretic yesterday but none today.  She is asymptomatic so she can be managed as OP once she is in [facility] but she needs cards consult here or clinic appt.         -start lasix 80 po bid       - avoid thiazides     - would use oral rather than IV meds so she can be discharged    - enroll in [facility] clinic to monitor diuresis                - cardiology clinic for aortic stenosis and pulm hypertension         #Diarrhea: resolved    #Hyponatremia:probably from thiazide plus K depletion.    -  Would check urine Na and urine osm   - replace K     # Anemia: hb drifting down.  Most likely anemia from CKD but may also have a component of marrow suppression from MMF                - on aranesp with 25 mcg given yesterday and will be followed on anemia clinic     # Hx hyperparathyroidism:  Diffuse vascular calcifications on CT. PTH is now 291.  This may contribute to valve calcification   - on calcitriol .25 and will monitor as OP     # HX EBV positivity. Low level but should be monitored per protocol                - EBV PCR low pos.  Can monitor as OP    #CMV pos: low level so can be monitored.  MMF dose decreased.  She is asymptomatic   - repeat CMV PCR in 2 weeks (transplant clinic)     Recommendations were communicated to primary team via phone        Charley Rodriguez MD   [phone]      Review of Systems:   I reviewed the following systems:  GI: fair appetite. no nausea or vomiting or diarrhea.   Neuro:  no confusion  Constitutional:  no fever or chills  CV: no dyspnea or edema.  no chest pain.    Physical Exam:   I/O last 3 completed shifts:  In: 800 [P.O.:800]  Out: 3000 [Urine:3000]   /55 (BP Location: Right arm)   Pulse 70   Temp 97  F (36.1  C) (Oral)   Resp 20   Wt 76.5 kg (168 lb 11.2 oz)   SpO2 98%   BMI 32.95 kg/m       GENERAL APPEARANCE: alert in no distress  EYES:  no scleral icterus, pupils equal  HENT: mouth without ulcers or lesions  PULM: lungs clear to auscultation bilaterally, equal air movement, no clubbing  CV: regular rhythm, normal rate, no rub 3/6 holosystolic murmur, transmission from LUE fistula     -JVD no     -edema nonpitting   GI: soft, nontender, not distended, bowel sounds are present  INTEGUMENT: no cyanosis, no rash  NEURO:  no asterixis   Access aneurysmal LUE fistula    Labs:   All labs reviewed by me  Electrolytes/Renal -   Recent Labs   Lab Test 03/18/20  2474  03/17/20  0509 03/16/20  0525  11/07/18  1914  01/29/18  1901  04/28/17  0830  02/27/17  0302   * 130* 132*   < > 134   < > 140   < > 138   < > 131*   POTASSIUM 3.6 3.9 4.1   < > 4.1   < > 4.6   < > 4.9   < > 4.2   CHLORIDE 95 101 104   < > 103   < > 111*   < > 107   < > 99   CO2 21 20 21   < > 22   < > 20   < > 23   < > 20   BUN 17 15 14   < > 16   < > 14   < > 14   < > 14   CR 2.32* 2.28* 2.11*   < > 1.56*   < > 1.48*   < > 1.67*   < > 1.60*   GLC 87 94 86   < > 158*   < > 92   < > 89   < > 325*   SHAVON 8.9 8.5 9.1   < > 9.4   < > 9.6   < > 9.7   < > 9.7   MAG  --   --   --   --  1.9  --  1.7  --   --   --  1.6   PHOS  --  3.6  --   --   --   --  2.8  --  2.8  --   --     < > = values in this interval not displayed.       CBC -   Recent Labs   Lab Test 03/18/20  0501 03/17/20  0509 03/16/20  0525   WBC 2.8* 3.4* 3.3*   HGB 8.5* 8.7* 8.7*    160 157       LFTs -   Recent Labs   Lab Test 03/15/20  1127 01/26/20  1231 06/16/19  1039   ALKPHOS 119 126 124   BILITOTAL 0.8 0.7 0.7   ALT 11 11 18   AST 28 31 39   PROTTOTAL 7.1 7.2 7.8   ALBUMIN 3.3* 3.3* 3.6       Iron Panel -   Recent Labs   Lab Test 03/17/20  0509   IRON 54   IRONSAT 23         Imaging:    Echo 3/16 Interpretation Summary  Global and regional left ventricular function is normal with an EF of 60-65%.  Moderate right ventricular dilation with normal global systolic function.  Mild to moderate aortic insufficiency and mild aortic stenosis. The mean  gradient across the aortic valve is20 mmHg. The peak aortic velocity is 3  m/sec.  Mild to moderate tricuspid insufficiency is present.  Moderate pulmonary hypertension. Estimated pulmonary artery systolic pressure  is 40 mmHg plus right atrial pressure.  IVC diameter >2.1 cm collapsing <50% with sniff suggests a high RA pressure  estimated at 15 mmHg or greater.  Ascending aorta 3.8 cm, indexed 2.2cm/m2.     This study was compared with the study from 4/3/19. The right ventricle is  dilated and the  regurgitant valve lesions have slightly worsened. The  estimated PA and RA pressures have increased.    Current Medications:    calcitRIOL  0.25 mcg Oral Daily     cycloSPORINE modified  25 mg Oral BID IS     darbepoetin alpha non-ESRD  25 mcg Subcutaneous Q14 Days     losartan  25 mg Oral Daily     mycophenolate  1,000 mg Oral BID IS     omeprazole  20 mg Oral QAM AC     sodium chloride (PF)  3 mL Intracatheter Q8H     sulfamethoxazole-trimethoprim  1 tablet Oral Daily       Charley Rodriguez MD

## 2020-03-18 NOTE — PLAN OF CARE
Patient asekd with interputur pad how her breathing is and patient states her breathing is fine but her stomach hurts at times when she eats something she should not. Tried to write out instructions in hmong about her weight daily but patient unable to read hmong. Interputur pad used during rounds and patient states she understands. Patient also complains about the food at the hospital and is not to her liking. Her  has been dropping off food for her with staff going out to get it from him. Patient sodium low and orders just put in to start normal saline by iv. Sodium level to be checked every four hours. Patient input and output put in the computer

## 2020-03-18 NOTE — PLAN OF CARE
Shift:  [id number]  Situation: 54 year old female admitted on 3-15-20 with edema,HTN, and loose stools.  PMH:  Renal transplant  2007  secondary to IgA nephropathy, Henoch-Schonlein purpura, hypomagnesemia, hypercalcemia, and hyperparathyroidism  VS:/55 (BP Location: Right arm)   Pulse 70   Temp 97  F (36.1  C) (Oral)   Resp 20   Wt 77.4 kg (170 lb 9.6 oz)   SpO2 98%   BMI 33.32 kg/m    Pain: Denies, Patient slept  between cares  Neuro: Alert/oriented x4. Calm, pleasant  Cardiac: Hypertensive, tachycardic.  2+ VENKATA edema  amd 1+ RLE edema. Large LUE fistula  Respiratory: Non-labored respirations on RA. O2?sats?WDL.  Occasional position changes while sleeping sitting in the tripod position  GI/:  After receiving diuretics yesterday, patient did  catch up on diuresing with 1150 ml urine output between 1502-4437.  No BM this shift.   Diet: Regular, daughter brought meal for patient  Lines/drains: RUE PIV?Site WDL., TKO in between infusions  Activity:  Independent with ambulation  in room  Labs/imaging: Na 125, INR 2.32, Hgb 8.5, GFR 23, WBC 2.8, lactic 0.7  Events of shift:  Patient slept between cares.  Patient triggered SIRS protocol with am VS.  (tachypnea &  WBC  2.8)     Plan of care:  Hourly rounding complete, call light within reach.  Continue to monitor closely and notify Provider of changes.  Anticipate discharge soon when medically stable.

## 2020-03-18 NOTE — PLAN OF CARE
A&Ox4. VSS. Pt denies pain/nausea. Pt up indep in room but calls for help with IV while going to BR. Hat in place to collect urine. Pt oliguric, gave lasix and thiazide diuretic. Daughter delivered food from home. Fistula present in ULE. Continue to monitor.

## 2020-03-19 ENCOUNTER — APPOINTMENT (OUTPATIENT)
Dept: ULTRASOUND IMAGING | Facility: CLINIC | Age: 55
DRG: 307 | End: 2020-03-19
Attending: PHYSICIAN ASSISTANT
Payer: MEDICARE

## 2020-03-19 LAB
ANION GAP SERPL CALCULATED.3IONS-SCNC: 10 MMOL/L (ref 3–14)
BUN SERPL-MCNC: 18 MG/DL (ref 7–30)
CALCIUM SERPL-MCNC: 8.8 MG/DL (ref 8.5–10.1)
CHLORIDE SERPL-SCNC: 93 MMOL/L (ref 94–109)
CO2 SERPL-SCNC: 22 MMOL/L (ref 20–32)
CREAT SERPL-MCNC: 2.34 MG/DL (ref 0.52–1.04)
ERYTHROCYTE [DISTWIDTH] IN BLOOD BY AUTOMATED COUNT: 12.7 % (ref 10–15)
GFR SERPL CREATININE-BSD FRML MDRD: 23 ML/MIN/{1.73_M2}
GLUCOSE SERPL-MCNC: 91 MG/DL (ref 70–99)
HCT VFR BLD AUTO: 28.8 % (ref 35–47)
HGB BLD-MCNC: 8.9 G/DL (ref 11.7–15.7)
LACTATE BLD-SCNC: 0.4 MMOL/L (ref 0.7–2)
MAGNESIUM SERPL-MCNC: 1.6 MG/DL (ref 1.6–2.3)
MCH RBC QN AUTO: 27.2 PG (ref 26.5–33)
MCHC RBC AUTO-ENTMCNC: 30.9 G/DL (ref 31.5–36.5)
MCV RBC AUTO: 88 FL (ref 78–100)
OSMOLALITY SERPL: 259 MMOL/KG (ref 275–295)
OSMOLALITY UR: 130 MMOL/KG (ref 100–1200)
PLATELET # BLD AUTO: 162 10E9/L (ref 150–450)
POTASSIUM SERPL-SCNC: 3.9 MMOL/L (ref 3.4–5.3)
RBC # BLD AUTO: 3.27 10E12/L (ref 3.8–5.2)
SODIUM SERPL-SCNC: 122 MMOL/L (ref 133–144)
SODIUM SERPL-SCNC: 123 MMOL/L (ref 133–144)
SODIUM SERPL-SCNC: 124 MMOL/L (ref 133–144)
SODIUM SERPL-SCNC: 125 MMOL/L (ref 133–144)
SODIUM SERPL-SCNC: 125 MMOL/L (ref 133–144)
SODIUM UR-SCNC: 15 MMOL/L
WBC # BLD AUTO: 3.2 10E9/L (ref 4–11)

## 2020-03-19 PROCEDURE — 84300 ASSAY OF URINE SODIUM: CPT | Performed by: PHYSICIAN ASSISTANT

## 2020-03-19 PROCEDURE — 76705 ECHO EXAM OF ABDOMEN: CPT

## 2020-03-19 PROCEDURE — 36415 COLL VENOUS BLD VENIPUNCTURE: CPT | Performed by: PHYSICIAN ASSISTANT

## 2020-03-19 PROCEDURE — 25000132 ZZH RX MED GY IP 250 OP 250 PS 637: Mod: GY | Performed by: NURSE PRACTITIONER

## 2020-03-19 PROCEDURE — 25000132 ZZH RX MED GY IP 250 OP 250 PS 637: Mod: GY | Performed by: PHYSICIAN ASSISTANT

## 2020-03-19 PROCEDURE — 99207 ZZC APP CREDIT; MD BILLING SHARED VISIT: CPT | Performed by: PHYSICIAN ASSISTANT

## 2020-03-19 PROCEDURE — 25000131 ZZH RX MED GY IP 250 OP 636 PS 637: Mod: GY | Performed by: PHYSICIAN ASSISTANT

## 2020-03-19 PROCEDURE — 25000125 ZZHC RX 250: Performed by: HOSPITALIST

## 2020-03-19 PROCEDURE — 12000001 ZZH R&B MED SURG/OB UMMC

## 2020-03-19 PROCEDURE — 83735 ASSAY OF MAGNESIUM: CPT | Performed by: PHYSICIAN ASSISTANT

## 2020-03-19 PROCEDURE — 83935 ASSAY OF URINE OSMOLALITY: CPT | Performed by: PHYSICIAN ASSISTANT

## 2020-03-19 PROCEDURE — 83605 ASSAY OF LACTIC ACID: CPT | Performed by: PEDIATRICS

## 2020-03-19 PROCEDURE — 25000131 ZZH RX MED GY IP 250 OP 636 PS 637: Mod: GY | Performed by: NURSE PRACTITIONER

## 2020-03-19 PROCEDURE — 85027 COMPLETE CBC AUTOMATED: CPT | Performed by: PHYSICIAN ASSISTANT

## 2020-03-19 PROCEDURE — 36415 COLL VENOUS BLD VENIPUNCTURE: CPT | Performed by: PEDIATRICS

## 2020-03-19 PROCEDURE — 84295 ASSAY OF SERUM SODIUM: CPT | Performed by: PHYSICIAN ASSISTANT

## 2020-03-19 PROCEDURE — 99233 SBSQ HOSP IP/OBS HIGH 50: CPT | Performed by: PEDIATRICS

## 2020-03-19 PROCEDURE — 25000132 ZZH RX MED GY IP 250 OP 250 PS 637: Mod: GY | Performed by: HOSPITALIST

## 2020-03-19 PROCEDURE — 83930 ASSAY OF BLOOD OSMOLALITY: CPT | Performed by: PHYSICIAN ASSISTANT

## 2020-03-19 PROCEDURE — 80048 BASIC METABOLIC PNL TOTAL CA: CPT | Performed by: PHYSICIAN ASSISTANT

## 2020-03-19 RX ORDER — BUMETANIDE 1 MG/1
3 TABLET ORAL
Status: DISCONTINUED | OUTPATIENT
Start: 2020-03-19 | End: 2020-03-21 | Stop reason: HOSPADM

## 2020-03-19 RX ADMIN — BUMETANIDE 3 MG: 1 TABLET ORAL at 14:25

## 2020-03-19 RX ADMIN — CYCLOSPORINE 25 MG: 25 CAPSULE, LIQUID FILLED ORAL at 21:40

## 2020-03-19 RX ADMIN — LIDOCAINE HYDROCHLORIDE 30 ML: 20 SOLUTION ORAL; TOPICAL at 03:15

## 2020-03-19 RX ADMIN — CALCITRIOL 0.25 MCG: 0.25 CAPSULE ORAL at 08:43

## 2020-03-19 RX ADMIN — LOSARTAN POTASSIUM 25 MG: 25 TABLET, FILM COATED ORAL at 08:43

## 2020-03-19 RX ADMIN — OMEPRAZOLE 20 MG: 20 CAPSULE, DELAYED RELEASE ORAL at 16:01

## 2020-03-19 RX ADMIN — OMEPRAZOLE 20 MG: 20 CAPSULE, DELAYED RELEASE ORAL at 08:43

## 2020-03-19 RX ADMIN — MYCOPHENOLATE MOFETIL 1000 MG: 250 CAPSULE ORAL at 21:40

## 2020-03-19 RX ADMIN — MYCOPHENOLATE MOFETIL 1000 MG: 250 CAPSULE ORAL at 08:43

## 2020-03-19 RX ADMIN — CYCLOSPORINE 25 MG: 25 CAPSULE, LIQUID FILLED ORAL at 08:43

## 2020-03-19 RX ADMIN — SULFAMETHOXAZOLE AND TRIMETHOPRIM 1 TABLET: 400; 80 TABLET ORAL at 08:43

## 2020-03-19 ASSESSMENT — ACTIVITIES OF DAILY LIVING (ADL)
ADLS_ACUITY_SCORE: 13

## 2020-03-19 NOTE — PLAN OF CARE
Situation: 54 year old female admitted on 3-15-20 with edema, HTN, and loose stools. PMH includes; renal transplant 2007 secondary to IA nephropathy, Henoch-Schonleinpurpur, hypomagnesemia, hypercalcemia, and hyperparathyroidism,   VS: /48 (BP Location: Right arm)   Pulse 65   Temp 97.3  F (36.3  C) (Oral)   Resp 24   Wt 78.3 kg (172 lb 9.6 oz)   SpO2 96%   BMI 33.71 kg/m    Pain: Patient c/o heartburn. Provider called and patient given 1x dose magic mouth   Neuro: Alert/oriented x4, Calm, pleasant   Cardiac: Hypertensive, tachycardic. 2+ VENKATA edema and 1+ edema, LUE fistula   Respiratory: Dyspnea and tachypnea, Occasional changes when sleeping in the tripod   GI/: Urine output 800cc .No BM this shift. Pt endorses   Diet: Regular,   Lines/drains: RUE PIV, Site WDL,   Activity: Independent in room with ambulation   Lab/imaging : Na 125, WBC 3.2, Creatinine 2.34, Hgb 8.9  Events of shift: Patient c/o of

## 2020-03-19 NOTE — PLAN OF CARE
RN assumed cares at 0700, Pt alert and oriented, VS stable throughout the shift.  IPad  used for rounds.  Urine sample sent, and bumex given.  Pt tolerating regular diet.  Pt able to void three times this shift. Sodium continues to be low this shift. No reports of pain this shift.  Probable discharge tomorrow if sodium is improving or WDL.  No acute incidents this shift.

## 2020-03-19 NOTE — PROGRESS NOTES
Nephrology Progress Note  03/19/2020       Joshua Lala is a 54 year old woman S/P DDKT X 2 adm 2/26 with edema, HBP and diarrhea.    Interval History :   Nursing and provider notes from last 24 hours reviewed.  In the last 24 hours Joshua Lala has had no further diarrhea. She does c/o heartburn, which she says is chronic.  She is most concerned about her increasing abdominal girth.  She was seen ON for SOB.  She developed hyponatremia yesterday.  She says she has been drinking a lot of fluid.  Weight when seen in ER 1/26 for swelling was was 71 kg and now she is up to 78 kg. At that time her CT showed a normal liver with trace ascites and one node that had increase din size.     Assessment & Recommendations:   # Allograft function: S/P DDKT 1998 and 2007.  BL Cr initially 1.3 - 1.6 with gradual increase to 2.0 over past 6 months. IS is CSA and MMF 1 g bid.  UA 30 prot, 20 WBCs. Prot/Cr  >1.5 g/g in 2016 and was 2.9 in 7/2019.  When seen in clinic 6/2019  no biopsy was recommended b/o lack of therapeutic options.  Pt was to switch to tac from CSA but never made this change.                 - prot/Cr 1.3 g/g (improved)                - serum and urine immunofixation normal                -consider biopsy as OP     # Immunosuppression: on CSA and MMF 1 g bid                - MMF decreased to 750 bid                - CSA is at goal (50 - 75)     #Hyponatremia:probably from thiazide plus K depletion. Interestingly, she developed this in April but her urine osm was never over 300, indicating that a major component was high fluid intake. Urine NA  43 yesterday but might have thiazide on board so will repeat   -  urine Na and urine osm   - fluid restrict to 1 L daily    # HBP:  Good BP control in hospital. Recent renal US shows no CYN.       - agree with losartan                   # Edema: patient has documented 5 kg weight increase since Jan.  Denies SOB, orthopnea or CP. BNP is high.  Echo shows worsening aortic valve  stenosis and high pulm pressures  Hx PE remotely.  Recent CT 1/26 does not show cirrhosis. Pt received IV diuretic yesterday but none today.  She is asymptomatic so she can be managed as OP once she is in [facility] but she needs cards consult here or clinic appt.         -start bumex 3 bid po      - avoid thiazides     - would use oral rather than IV meds so she can be discharged    - enrolled in [facility] to monitor diuresis                - cardiology clinic for aortic stenosis and pulm hypertension    # Increased abdominal girth:  This is her main concern.       - rec abd CT to R/o ascites, Follow-up Evan CT with one enlarging LN      #Diarrhea: resolved       # Anemia: hb drifting down.  Most likely anemia from CKD but may also have a component of marrow suppression from MMF                - on aranesp with 25 mcg given yesterday and will be followed on anemia clinic     # Hx hyperparathyroidism:  Diffuse vascular calcifications on CT. PTH is now 291.  This may contribute to valve calcification   - on calcitriol .25 and will monitor as OP     # HX EBV positivity. Low level but should be monitored per protocol                - EBV PCR low pos.  Can monitor as OP    #CMV pos: low level so can be monitored.  MMF dose decreased.  She is asymptomatic   - repeat CMV PCR in 2 weeks (transplant clinic)     Recommendations were communicated to primary team in person        Charley Rodriguez MD   [phone]      Review of Systems:   I reviewed the following systems:  GI: fair appetite. no nausea or vomiting or diarrhea.   Neuro:  no confusion  Constitutional:  no fever or chills  CV: no dyspnea or edema.  no chest pain.    Physical Exam:   I/O last 3 completed shifts:  In: 1680 [P.O.:1480; I.V.:200]  Out: 1600 [Urine:1600]   /55 (BP Location: Right arm)   Pulse 68   Temp 97.4  F (36.3  C) (Oral)   Resp 22   Wt 78.3 kg (172 lb 9.6 oz)   SpO2 94%   BMI 33.71 kg/m       GENERAL APPEARANCE: alert in no  distress  EYES:  no scleral icterus, pupils equal  HENT: mouth without ulcers or lesions  PULM: lungs clear to auscultation bilaterally, equal air movement, no clubbing  CV: regular rhythm, normal rate, no rub 3/6 holosystolic murmur, transmission from LUE fistula     -JVD no     - pitting edema  GI: soft, nontender, more distended, bowel sounds are present  INTEGUMENT: no cyanosis, no rash  NEURO:  no asterixis   Access aneurysmal LUE fistula    Labs:   All labs reviewed by me  Electrolytes/Renal -   Recent Labs   Lab Test 03/19/20  1003 03/19/20  0527 03/19/20  0145  03/18/20  0501 03/17/20  0509  11/07/18  1914  01/29/18  1901  04/28/17  0830   * 125* 123*   < > 125* 130*   < > 134   < > 140   < > 138   POTASSIUM  --  3.9  --   --  3.6 3.9   < > 4.1   < > 4.6   < > 4.9   CHLORIDE  --  93*  --   --  95 101   < > 103   < > 111*   < > 107   CO2  --  22  --   --  21 20   < > 22   < > 20   < > 23   BUN  --  18  --   --  17 15   < > 16   < > 14   < > 14   CR  --  2.34*  --   --  2.32* 2.28*   < > 1.56*   < > 1.48*   < > 1.67*   GLC  --  91  --   --  87 94   < > 158*   < > 92   < > 89   SHAVON  --  8.8  --   --  8.9 8.5   < > 9.4   < > 9.6   < > 9.7   MAG  --  1.6  --   --   --   --   --  1.9  --  1.7  --   --    PHOS  --   --   --   --   --  3.6  --   --   --  2.8  --  2.8    < > = values in this interval not displayed.       CBC -   Recent Labs   Lab Test 03/19/20  0527 03/18/20  0501 03/17/20  0509   WBC 3.2* 2.8* 3.4*   HGB 8.9* 8.5* 8.7*    153 160       LFTs -   Recent Labs   Lab Test 03/15/20  1127 01/26/20  1231 06/16/19  1039   ALKPHOS 119 126 124   BILITOTAL 0.8 0.7 0.7   ALT 11 11 18   AST 28 31 39   PROTTOTAL 7.1 7.2 7.8   ALBUMIN 3.3* 3.3* 3.6       Iron Panel -   Recent Labs   Lab Test 03/17/20  0509   IRON 54   IRONSAT 23         Imaging:    Echo 3/16 Interpretation Summary  Global and regional left ventricular function is normal with an EF of 60-65%.  Moderate right ventricular dilation with  normal global systolic function.  Mild to moderate aortic insufficiency and mild aortic stenosis. The mean  gradient across the aortic valve is20 mmHg. The peak aortic velocity is 3  m/sec.  Mild to moderate tricuspid insufficiency is present.  Moderate pulmonary hypertension. Estimated pulmonary artery systolic pressure  is 40 mmHg plus right atrial pressure.  IVC diameter >2.1 cm collapsing <50% with sniff suggests a high RA pressure  estimated at 15 mmHg or greater.  Ascending aorta 3.8 cm, indexed 2.2cm/m2.     This study was compared with the study from 4/3/19. The right ventricle is  dilated and the regurgitant valve lesions have slightly worsened. The  estimated PA and RA pressures have increased.    Current Medications:    bumetanide  3 mg Oral BID     calcitRIOL  0.25 mcg Oral Daily     cycloSPORINE modified  25 mg Oral BID IS     darbepoetin alpha non-ESRD  25 mcg Subcutaneous Q14 Days     losartan  25 mg Oral Daily     mycophenolate  1,000 mg Oral BID IS     omeprazole  20 mg Oral BID AC     sodium chloride (PF)  3 mL Intracatheter Q8H     sulfamethoxazole-trimethoprim  1 tablet Oral Daily       Charley Rodriguez MD   [phone]

## 2020-03-19 NOTE — PROGRESS NOTES
[facility], [facility] Progress Note - Hospitalist Service, Gold 4       Date of Admission:  3/15/2020  Assessment & Plan   Joshua Lala is a 54 year old Hmong speaking woman admitted on 3/15/2020. She has a history of renal transplant in 2007 2/2 IgA nephropathy, Henoch-Schonlein purpura, hypercalcemia, hypomagnesemia and hyperparathyroidism who presents with edema, hypertension and loose stools.     #Hyponatremia   Na down trended to 125 following chlorothiazide 3/17 with persistently low since. Per chart review, hx of symptomatic hyponatremic 4/2019 after starting thiazide diuretic, with slow improvement 2% IV NaCl at that time. Urine Na 4. Trial of NaCl  ml yesterday with no improvement. Likely from thiazide diuretics with volume overload, possible component of SIADH. Remains asymptomatic. Most recent Na 122.   - Monitor Na every 6 hours   - Start budesonide 3 mg BID, discussed with nephrology.     - Fluid restrict to 1000ml   - Repeat urine Na, urine osmol, serum osmol    - Hold thiazide diuretics   - Hold on hypertonic Na or enteral Na given asymptomatic     #Hypertension   Hypertensive on admission. Transplant US with no CYN. PTA on hydralazine. Was also to be on losartan, however was not taking. Previously on amlodipine started 1/28, discontinued 2/17 by PCP 2/2 peripheral edema. Family notes hypertensive at home with 's. Hydralazine discontinued 3/17 given possibly causing edema. Blood pressures at goal.    - Continue to avoid amlodipine and hydralazine     - Continue losartan   - Would favor BB if additional blood pressure control needed      #Peripheral edema   Primary concern for patient. Peripheral edema present X 1-2 months per reports started on amlodipine 1/28 which was discontinued by PCP 2/17 2/2 peripheral edema, transitioned to hydralazine. Denies history of heart disease. EKG without ischemic changes. LLE US negative for DVT. BNP elevated in ED.  Trop wnl. No orthopnea, dyspnea or SOB. Echo with worsening right ventricle dilation and increase in PA and RA pressures as compared to echo 4/2019. Discussed with cardiology, recommended sequential nephron blockade, s/p chlorthalidone 500 mg PO followed by IV furosemide 80 mg 3/17 with diuresis and minimal improvement in edema, resultant hyponatremia as above.   - Resume diuresis with budesonide today as above   - Monitor I and O    - Daily weights      #Concern for RV dysfunction   #Aortic stenosis   Echo 3/15 with moderate right ventricular dilation with normal global systolic function, moderate pulmonary hypertension, pulmonary artery systolic pressure 40 mmHg plus right atrial pressure, sniff suggests high RA pressure 15 mmHg or greater. Right ventricle dilation and regurgitant valve lesions worsened, PA and RA pressures increased since prior Echo 2019. Moderate aortic stenosis.   - Discussed with cardiology, recommended sequential nephron blockade with chlorothiazide and furosemide as above    - Diuretics as above   - Consult for [facility]   - Educated patient to monitor weights daily on discharge and record   - Will need referral for cardiology on discharge for evaluation of pulmonary HTN and aortic stenosis       #Hx of renal transplant, 2007  #CKD   Due to IgA nephropathy. Baseline creatinine about 1.6. Cr 2.06 on admission with up trend to 2.3. UA with 30 protein, 20 WBC. Hx of EBV positivity. EBV quant <500. CSA level 75 (goal 50-75). Serum free light chain ratio normal. Immunofixation normal.   - Tx nephrology consulted    - Continue immunosupression with Neoral and Cellcept    - Continue ppx Bactrim   - Daily BMP     #GERD   Not well controlled per patient report. Symptomatic for several months. PTA on omeprazole.   - Increase omeprazole 20 mg to BID    - GI cocktail PRN      #Hx hyperparthyroidism  PTA on Cinecalcet. Diffuse vascular calcification on CT. Given borderline hypocalcemia and ,  discontinue cinecalcet per nephrology and initiate calcitriol. Phos wnl.   - calcitriol 0.25 q d    - Stop cinecalcet      #Anemia  Hgb baseline 10-11, has drifted down to 9 on admission. Likely anemia of CKD. No signs of active bleeding. Iron studies wnl. Epo level 11.   - Aranesp 25 mcg every 2 weeks per nephrology    - Referral for anemia clinic on discharge     Resolved hospital problems  # Diarrhea: Loose stools X 1 day on admission. Patient reports diarrhea has resolved. C diff and enteric panel negative. CMV quant <137. EBV <500.     # Abnormal UA: UA with positive Leuk est, WBC. No urinary symptoms. Started on ceftriaxone in the ED. UCx <10,000 colonies/mL mixed urogenital nahid. Abx stopped.     Diet: Combination Diet Regular Diet Adult  Fluid restriction 1000 ML FLUID    DVT Prophylaxis: Ambulate every shift  Staples Catheter: not present  Code Status: Full Code      Disposition Plan   Expected discharge: 1-2 days, recommended to prior living arrangement once Na >130, edema imrpoved. .  Entered: HUMERA Simmons 03/19/2020, 2:01 PM       The patient's care was discussed with the Attending Physician, Dr. Pedraza, Bedside Nurse, Care Coordinator/, Patient and nephrology, pharmacy Consultant.    HUMERA Simmons  Hospitalist Service, [address], [place]  Pager: [phone]  Please see sticky note for cross cover information  ______________________________________________________________________    Interval History   Patient reports worsening edema again today. Notes she has not been able to urinate today. Recorded output today 800ml. Na remains low. Denies symptoms of confusion, headache, balance issues, dizziness She reports her appetite is poor and she does not like the food choices in the hospital. Denies fevers, chills, nausea, vomiting, abdominal pain, pain or burning with urination, numbness.     Per reports, heartburn and SOB overnight. Improved  with GI cocktail. No hypoxia.     Data reviewed today: I reviewed all medications, new labs and imaging results over the last 24 hours.    Physical Exam   Vital Signs: Temp: 96.8  F (36  C) Temp src: Oral BP: 124/58 Pulse: 73   Resp: 24 SpO2: 95 % O2 Device: None (Room air)    Weight: 172 lbs 9.6 oz  GENERAL: Alert and oriented x 3. NAD.   HEENT: Anicteric sclera. Mucous membranes moist and without lesions.   CV: RRR. S1, S2. ? Murmur vs bruit from fistula.  RESPIRATORY: Effort normal on RA. Lungs with minimal crackles at left base, otherwise CTAB with no wheezing.   GI: Abdomen soft, obese and non distended, bowel sounds present. No tenderness, rebound, guarding.   MUSCULOSKELETAL: No joint swelling or tenderness. Moves all extremities.   NEUROLOGICAL: No focal deficits.   EXTREMITIES: 2+ left lower extremity peripheral edema, 1+ right lower extremity peripheral edema. Intact bilateral pedal pulses.   SKIN: No jaundice. No rashes. Large fistula of left upper extremity.   Data   Recent Labs   Lab 03/19/20  1003 03/19/20  0527 03/19/20  0145  03/18/20  0501 03/17/20  0509  03/15/20  1127   WBC  --  3.2*  --   --  2.8* 3.4*   < > 3.2*   HGB  --  8.9*  --   --  8.5* 8.7*   < > 9.1*   MCV  --  88  --   --  90 91   < > 91   PLT  --  162  --   --  153 160   < > 167   * 125* 123*   < > 125* 130*   < > 134   POTASSIUM  --  3.9  --   --  3.6 3.9   < > 4.7   CHLORIDE  --  93*  --   --  95 101   < > 108   CO2  --  22  --   --  21 20   < > 19*   BUN  --  18  --   --  17 15   < > 14   CR  --  2.34*  --   --  2.32* 2.28*   < > 2.06*   ANIONGAP  --  10  --   --  9 8   < > 7   SHAVON  --  8.8  --   --  8.9 8.5   < > 9.0   GLC  --  91  --   --  87 94   < > 112*   ALBUMIN  --   --   --   --   --   --   --  3.3*   PROTTOTAL  --   --   --   --   --   --   --  7.1   BILITOTAL  --   --   --   --   --   --   --  0.8   ALKPHOS  --   --   --   --   --   --   --  119   ALT  --   --   --   --   --   --   --  11   AST  --   --   --   --    --   --   --  28   LIPASE  --   --   --   --   --   --   --  346   TROPI  --   --   --   --   --   --   --  <0.015    < > = values in this interval not displayed.     Medications       bumetanide  3 mg Oral BID     calcitRIOL  0.25 mcg Oral Daily     cycloSPORINE modified  25 mg Oral BID IS     darbepoetin alpha non-ESRD  25 mcg Subcutaneous Q14 Days     losartan  25 mg Oral Daily     mycophenolate  1,000 mg Oral BID IS     omeprazole  20 mg Oral BID AC     sodium chloride (PF)  3 mL Intracatheter Q8H     sulfamethoxazole-trimethoprim  1 tablet Oral Daily

## 2020-03-19 NOTE — PLAN OF CARE
Assumed Care: 1500 - 2330  Admission/Status: Bloating/Diarrhea    VS: BP (!) 158/69 (BP Location: Right arm)   Pulse 74   Temp 97.2  F (36.2  C) (Oral)   Resp 20   Wt 78.3 kg (172 lb 9.6 oz)   SpO2 97%   BMI 33.71 kg/m    Neuro: AOx4.  Pupils PERRLA.  Cardiac: Rate and rhythm normal.  No abnormal sounds heard.  Respiratory: Lungs clear and equal bilaterally.  Following 250 mL NS infusion, slight left lower crackles which have since resolved.  GI/: Patient reports difficulty urinating although feeling needed.  Bladder scan less than 100 mL.  No pain on urination.  Nutrition: Appetite adequate.  Difficulty supplying patient preferences.  Reports upset stomach with many foods.  IV/Drains: Right PIV saline locked.  Flushed well with blood return.  Activity: Up to bathroom ad jennifer.  Independent.  Pain: Occasional wincing.  When questioned with  the patient reports no pain.  Skin: Warm, dry and elastic.  Labs: Wdl ex. Sodium.  Fluid restriction in place.    Plan of Care: Continue to assess for urinary retention and pain.

## 2020-03-19 NOTE — PROVIDER NOTIFICATION
"Provider pager #[phone] notified w/ the following message:  \" Pt states she is SOB, sats 95-99% on RA, RR 30, labored breathing. Would neb be appropriate? Thanks \"  Will continue to monitor & follow POC.    MD came to assess the pt.   "

## 2020-03-20 LAB
ALBUMIN UR QL: NORMAL
ANION GAP SERPL CALCULATED.3IONS-SCNC: 6 MMOL/L (ref 3–14)
BUN SERPL-MCNC: 20 MG/DL (ref 7–30)
CALCIUM SERPL-MCNC: 9 MG/DL (ref 8.5–10.1)
CHLORIDE SERPL-SCNC: 95 MMOL/L (ref 94–109)
CO2 SERPL-SCNC: 24 MMOL/L (ref 20–32)
COLLECT DURATION TIME SPEC: NORMAL H
CREAT SERPL-MCNC: 2.21 MG/DL (ref 0.52–1.04)
ERYTHROCYTE [DISTWIDTH] IN BLOOD BY AUTOMATED COUNT: 12.8 % (ref 10–15)
GFR SERPL CREATININE-BSD FRML MDRD: 24 ML/MIN/{1.73_M2}
GLUCOSE SERPL-MCNC: 88 MG/DL (ref 70–99)
HCT VFR BLD AUTO: 27.7 % (ref 35–47)
HGB BLD-MCNC: 8.4 G/DL (ref 11.7–15.7)
LAB SCANNED RESULT: ABNORMAL
LACTATE BLD-SCNC: 0.5 MMOL/L (ref 0.7–2)
MCH RBC QN AUTO: 26.8 PG (ref 26.5–33)
MCHC RBC AUTO-ENTMCNC: 30.3 G/DL (ref 31.5–36.5)
MCV RBC AUTO: 88 FL (ref 78–100)
PLATELET # BLD AUTO: 154 10E9/L (ref 150–450)
POTASSIUM SERPL-SCNC: 3.7 MMOL/L (ref 3.4–5.3)
RBC # BLD AUTO: 3.14 10E12/L (ref 3.8–5.2)
SODIUM SERPL-SCNC: 124 MMOL/L (ref 133–144)
SODIUM SERPL-SCNC: 125 MMOL/L (ref 133–144)
SODIUM SERPL-SCNC: 125 MMOL/L (ref 133–144)
SODIUM SERPL-SCNC: 126 MMOL/L (ref 133–144)
SPECIMEN VOL ?TM UR: NORMAL ML
WBC # BLD AUTO: 2.7 10E9/L (ref 4–11)

## 2020-03-20 PROCEDURE — 25000131 ZZH RX MED GY IP 250 OP 636 PS 637: Mod: GY | Performed by: PHYSICIAN ASSISTANT

## 2020-03-20 PROCEDURE — 12000001 ZZH R&B MED SURG/OB UMMC

## 2020-03-20 PROCEDURE — 25000131 ZZH RX MED GY IP 250 OP 636 PS 637: Mod: GY | Performed by: INTERNAL MEDICINE

## 2020-03-20 PROCEDURE — 36415 COLL VENOUS BLD VENIPUNCTURE: CPT | Performed by: INTERNAL MEDICINE

## 2020-03-20 PROCEDURE — 36415 COLL VENOUS BLD VENIPUNCTURE: CPT | Performed by: PHYSICIAN ASSISTANT

## 2020-03-20 PROCEDURE — 84295 ASSAY OF SERUM SODIUM: CPT | Performed by: PHYSICIAN ASSISTANT

## 2020-03-20 PROCEDURE — 99232 SBSQ HOSP IP/OBS MODERATE 35: CPT | Performed by: PEDIATRICS

## 2020-03-20 PROCEDURE — 25000131 ZZH RX MED GY IP 250 OP 636 PS 637: Mod: GY | Performed by: NURSE PRACTITIONER

## 2020-03-20 PROCEDURE — 25000132 ZZH RX MED GY IP 250 OP 250 PS 637: Mod: GY | Performed by: PHYSICIAN ASSISTANT

## 2020-03-20 PROCEDURE — 25000132 ZZH RX MED GY IP 250 OP 250 PS 637: Mod: GY | Performed by: NURSE PRACTITIONER

## 2020-03-20 PROCEDURE — 83605 ASSAY OF LACTIC ACID: CPT | Performed by: PEDIATRICS

## 2020-03-20 PROCEDURE — 80048 BASIC METABOLIC PNL TOTAL CA: CPT | Performed by: PHYSICIAN ASSISTANT

## 2020-03-20 PROCEDURE — 99207 ZZC APP CREDIT; MD BILLING SHARED VISIT: CPT | Performed by: PHYSICIAN ASSISTANT

## 2020-03-20 PROCEDURE — 36415 COLL VENOUS BLD VENIPUNCTURE: CPT | Performed by: PEDIATRICS

## 2020-03-20 PROCEDURE — 85027 COMPLETE CBC AUTOMATED: CPT | Performed by: PHYSICIAN ASSISTANT

## 2020-03-20 RX ORDER — MYCOPHENOLATE MOFETIL 250 MG/1
750 CAPSULE ORAL
Status: DISCONTINUED | OUTPATIENT
Start: 2020-03-20 | End: 2020-03-21 | Stop reason: HOSPADM

## 2020-03-20 RX ADMIN — LOSARTAN POTASSIUM 25 MG: 25 TABLET, FILM COATED ORAL at 08:45

## 2020-03-20 RX ADMIN — OMEPRAZOLE 20 MG: 20 CAPSULE, DELAYED RELEASE ORAL at 08:46

## 2020-03-20 RX ADMIN — BUMETANIDE 3 MG: 1 TABLET ORAL at 08:46

## 2020-03-20 RX ADMIN — MYCOPHENOLATE MOFETIL 1000 MG: 250 CAPSULE ORAL at 08:45

## 2020-03-20 RX ADMIN — OMEPRAZOLE 20 MG: 20 CAPSULE, DELAYED RELEASE ORAL at 16:20

## 2020-03-20 RX ADMIN — ACETAMINOPHEN 650 MG: 325 TABLET, FILM COATED ORAL at 21:36

## 2020-03-20 RX ADMIN — CALCITRIOL 0.25 MCG: 0.25 CAPSULE ORAL at 08:46

## 2020-03-20 RX ADMIN — MYCOPHENOLATE MOFETIL 750 MG: 250 CAPSULE ORAL at 21:37

## 2020-03-20 RX ADMIN — SULFAMETHOXAZOLE AND TRIMETHOPRIM 1 TABLET: 400; 80 TABLET ORAL at 08:46

## 2020-03-20 RX ADMIN — BUMETANIDE 3 MG: 1 TABLET ORAL at 14:27

## 2020-03-20 RX ADMIN — CYCLOSPORINE 25 MG: 25 CAPSULE, LIQUID FILLED ORAL at 08:46

## 2020-03-20 RX ADMIN — CYCLOSPORINE 25 MG: 25 CAPSULE, LIQUID FILLED ORAL at 21:37

## 2020-03-20 ASSESSMENT — ACTIVITIES OF DAILY LIVING (ADL)
ADLS_ACUITY_SCORE: 13

## 2020-03-20 NOTE — PLAN OF CARE
Assumed Care: 1500 - 2330  Admission/Status: Bloating/Diarrhea     VS: BP (!) 158/69 (BP Location: Right arm)   Pulse 74   Temp 97.2  F (36.2  C) (Oral)   Resp 20   Wt 78.3 kg (172 lb 9.6 oz)   SpO2 97%   BMI 33.71 kg/m    Neuro: AOx4.  Pupils PERRLA.  Cardiac: Rate and rhythm normal.  No abnormal sounds heard.  Respiratory: Lungs clear and equal bilaterally, coarse in lower lobes.  GI/: Regular urination.  Stool during AM shift.  Nutrition: Appetite improved with food from home.  IV/Drains: Right PIV saline locked.  Flushed well with blood return.  Activity: Up to bathroom ad jennifer.  Independent.  Pain: Occasional wincing.  When questioned with  the patient reports no pain.  Skin: Warm, dry and elastic.  Labs: Wdl ex. Sodium.  1L fluid restriction in place.     Plan of Care: Continue to assess for urinary retention and pain.

## 2020-03-20 NOTE — PROGRESS NOTES
[facility], [facility] Progress Note - Hospitalist Service, Gold 4       Date of Admission:  3/15/2020  Assessment & Plan   Joshua Lala is a 54 year old Hmong speaking woman admitted on 3/15/2020. She has a history of renal transplant in 2007 2/2 IgA nephropathy, Henoch-Schonlein purpura, hypercalcemia, hypomagnesemia and hyperparathyroidism who presents with edema, hypertension and loose stools.     # Hyponatremia   Na down trended to 125 following chlorothiazide 3/17 with persistently low since. Per chart review, hx of symptomatic hyponatremic 4/2019 after starting thiazide diuretic, with slow improvement 2% IV NaCl at that time. Urine Na 42, repeat 15. Urine osmol 130. Trial of NaCl  ml 3/18 with no improvement. Likely from thiazide diuretics with volume overload, less likely component of SIADH. Remains asymptomatic. Most recent Na 125.   - Monitor Na every 6 hours   - Continue budesonide 3 mg BID    - Fluid restrict to 1000ml   - Hold thiazide diuretics   - Hold on hypertonic Na or enteral Na given asymptomatic      #Hypertension   Hypertensive on admission. Transplant US with no CYN. PTA on hydralazine. Was also to be on losartan, however was not taking. Previously on amlodipine started 1/28, discontinued 2/17 by PCP 2/2 peripheral edema. Family notes hypertensive at home with 's. Hydralazine discontinued 3/17 given possibly causing edema. Blood pressures at goal.    - Continue to avoid amlodipine and hydralazine     - Continue losartan   - Would favor BB if additional blood pressure control needed      #Peripheral edema   #Small amount of ascites   Primary concern for patient. Peripheral edema present X 1-2 months per reports started on amlodipine 1/28 which was discontinued by PCP 2/17 2/2 peripheral edema, transitioned to hydralazine. Denies history of heart disease. EKG without ischemic changes. LLE US negative for DVT. BNP elevated in ED. Trop wnl. No  orthopnea, dyspnea or SOB. Echo with worsening right ventricle dilation and increase in PA and RA pressures as compared to echo 4/2019. Discussed with cardiology, recommended sequential nephron blockade, s/p chlorthalidone 500 mg PO followed by IV furosemide 80 mg 3/17 with diuresis and minimal improvement in edema, resultant hyponatremia as above. US abdomen with small amount of ascites in right upper quadrant superior to gallbladder, also seen on CT abdomen 1/26, reviewed imagines, does not appear to be amendable to sample collection. Suspect ascites from right heart dysfunction as below, unable to rule out other causes at this time. No evidence of cirrhosis on CT 1/26/20.   - Resume diuresis with budesonide as above   - Monitor I and O    - Daily weights       #Concern for RV dysfunction   #Aortic stenosis   Echo 3/15 with moderate right ventricular dilation with normal global systolic function, moderate pulmonary hypertension, pulmonary artery systolic pressure 40 mmHg plus right atrial pressure, sniff suggests high RA pressure 15 mmHg or greater. Right ventricle dilation and regurgitant valve lesions worsened, PA and RA pressures increased since prior Echo 2019. Moderate aortic stenosis.   - Discussed with cardiology, recommended sequential nephron blockade with chlorothiazide and furosemide as above    - Diuretics as above   - Consult for [facility] clinic   - Educated patient to monitor weights daily on discharge and record   - Will need referral for cardiology on discharge for evaluation of pulmonary HTN and aortic stenosis       #Hx of renal transplant, 2007  #CKD   Due to IgA nephropathy. Baseline creatinine about 1.6. Cr 2.06 on admission with up trend to 2.3. UA with 30 protein, 20 WBC. Hx of EBV positivity. EBV quant <500. CSA level 75 (goal 50-75). Serum free light chain ratio normal. Immunofixation normal.   - Tx nephrology consulted    - Continue immunosupression with Neoral and Cellcept    - Continue ppx  Bactrim   - Daily BMP      #GERD   Not well controlled per patient report. Symptomatic for several months. PTA on omeprazole.   - Increased omeprazole 20 mg to BID    - GI cocktail PRN      #Hx hyperparthyroidism  PTA on Cinecalcet. Diffuse vascular calcification on CT. Given borderline hypocalcemia and , discontinue cinecalcet per nephrology and initiate calcitriol. Phos wnl.   - calcitriol 0.25 q d    - Stop cinecalcet      #Anemia  Hgb baseline 10-11, has drifted down to 9 on admission. Likely anemia of CKD. No signs of active bleeding. Iron studies wnl. Epo level 11.   - Aranesp 25 mcg every 2 weeks per nephrology    - Referral for anemia clinic on discharge      Resolved hospital problems  # Diarrhea: Loose stools X 1 day on admission. Patient reports diarrhea has resolved. C diff and enteric panel negative. CMV quant <137. EBV <500.     # Abnormal UA: UA with positive Leuk est, WBC. No urinary symptoms. Started on ceftriaxone in the ED. UCx <10,000 colonies/mL mixed urogenital nahid. Abx stopped.        Diet: Combination Diet Regular Diet Adult  Fluid restriction 1000 ML FLUID    DVT Prophylaxis: Ambulate every shift  Staples Catheter: not present  Code Status: Full Code      Disposition Plan   Expected discharge: 1-2 days, recommended to prior living arrangement once Na >130 and edema improved.  Entered: HUMERA Simmons 03/20/2020, 2:01 PM       The patient's care was discussed with the Attending Physician, Dr. Pedraza, Bedside Nurse, Care Coordinator/, Patient.    HUMERA Simmons  Hospitalist Service, [address]  Pager: [phone]  Please see sticky note for cross cover information  ______________________________________________________________________    Interval History   Patient reports improvement in edema today after starting bumex. Notes increased urine output. Na remains low. Denies symptoms of confusion, headache, balance  issues, dizziness. Expresses she would like to go home. Denies fevers, chills, nausea, vomiting, abdominal pain, pain or burning with urination, numbness.    Data reviewed today: I reviewed all medications, new labs and imaging results over the last 24 hours.     Physical Exam   Vital Signs: Temp: 96.2  F (35.7  C) Temp src: Oral BP: (!) 142/65 Pulse: 76   Resp: 18 SpO2: 97 % O2 Device: None (Room air)    Weight: 165 lbs 9.6 oz  GENERAL: Alert and oriented. NAD.   HEENT: Anicteric sclera. Mucous membranes moist and without lesions.   CV: RRR. S1, S2. ? Murmur vs bruit from fistula.  RESPIRATORY: Effort normal on RA. Lungs with minimal crackles at left base, otherwise CTAB with no wheezing.   GI: Abdomen soft, obese and non distended, bowel sounds present. No tenderness, rebound, guarding.   MUSCULOSKELETAL: No joint swelling or tenderness. Moves all extremities.   NEUROLOGICAL: No focal deficits.   EXTREMITIES: 1+ left lower extremity peripheral edema, trace right lower extremity peripheral edema. Intact bilateral pedal pulses.   SKIN: No jaundice. No rashes. Large fistula of left upper extremity.     Data   Recent Labs   Lab 03/20/20  1012 03/20/20  0422 03/19/20  2156  03/19/20  0527  03/18/20  0501  03/15/20  1127   WBC  --  2.7*  --   --  3.2*  --  2.8*   < > 3.2*   HGB  --  8.4*  --   --  8.9*  --  8.5*   < > 9.1*   MCV  --  88  --   --  88  --  90   < > 91   PLT  --  154  --   --  162  --  153   < > 167   * 125* 125*   < > 125*   < > 125*   < > 134   POTASSIUM  --  3.7  --   --  3.9  --  3.6   < > 4.7   CHLORIDE  --  95  --   --  93*  --  95   < > 108   CO2  --  24  --   --  22  --  21   < > 19*   BUN  --  20  --   --  18  --  17   < > 14   CR  --  2.21*  --   --  2.34*  --  2.32*   < > 2.06*   ANIONGAP  --  6  --   --  10  --  9   < > 7   SHAVON  --  9.0  --   --  8.8  --  8.9   < > 9.0   GLC  --  88  --   --  91  --  87   < > 112*   ALBUMIN  --   --   --   --   --   --   --   --  3.3*   PROTTOTAL  --   --    --   --   --   --   --   --  7.1   BILITOTAL  --   --   --   --   --   --   --   --  0.8   ALKPHOS  --   --   --   --   --   --   --   --  119   ALT  --   --   --   --   --   --   --   --  11   AST  --   --   --   --   --   --   --   --  28   LIPASE  --   --   --   --   --   --   --   --  346   TROPI  --   --   --   --   --   --   --   --  <0.015    < > = values in this interval not displayed.     Recent Results (from the past 24 hour(s))   US Abdomen Limited    Narrative    US ABDOMEN LIMITED  3/19/2020 9:20 PM    History:  abdominal distention, assess for ascites.     Technique: Grayscale ultrasound of abdomen, limited    Comparison: None    Findings:   Targeted ultrasound of 4 quadrants demonstrates a small amount of  fluid in the right upper quadrant superior to the gallbladder.      Impression    IMPRESSION:  Small amount of ascites in the right upper quadrant superior to the  gallbladder.     I have personally reviewed the examination and initial interpretation  and I agree with the findings.    KRISTY ASTORGA MD     Medications       bumetanide  3 mg Oral BID     calcitRIOL  0.25 mcg Oral Daily     cycloSPORINE modified  25 mg Oral BID IS     darbepoetin alpha non-ESRD  25 mcg Subcutaneous Q14 Days     losartan  25 mg Oral Daily     mycophenolate  1,000 mg Oral BID IS     omeprazole  20 mg Oral BID AC     sodium chloride (PF)  3 mL Intracatheter Q8H     sulfamethoxazole-trimethoprim  1 tablet Oral Daily

## 2020-03-20 NOTE — PROGRESS NOTES
Nephrology Progress Note  03/20/2020       Joshua Lala is a 54 year old woman S/P DDKT X 2 adm 2/26 with edema, HBP and diarrhea.    Interval History :   Nursing and provider notes from last 24 hours reviewed.   Joshua Lala has had no diarrhea since admission. She does c/o heartburn, which she says is chronic.  She is walking in the halls. She is following fluid restriction. Weight is down 2 kg from admission.  Not SOB    Assessment & Recommendations:   # Allograft function: S/P DDKT 1998 and 2007.  BL Cr initially 1.3 - 1.6 with gradual increase to 2.0 over past 6 months. IS is CSA and MMF 1 g bid.  UA 30 prot, 20 WBCs. Prot/Cr  >1.5 g/g in 2016 and was 2.9 in 7/2019.  When seen in clinic 6/2019  no biopsy was recommended b/o lack of therapeutic options.  Pt was to switch to tac from CSA but never made this change.                 - prot/Cr 1.3 g/g (improved)                - serum and urine immunofixation normal                -consider biopsy as OP     # Immunosuppression: on CSA and MMF 1 g bid.  CSA level 75                - MMF decreased to 750 bid                - CSA is at goal (50 - 75)     #Hyponatremia: Up to 126 today.  Likely from thiazide use in patient with CHF/ right heart failure.  Interestingly, she developed this in April but her urine osm was never over 300, indicating that a major component was high fluid intake. Urine NA 43 on 3/18 with thiazide on board, 15 today.  This is C/W low perfusion state (CHF)   - continue fluid restriction      # HBP:  Good BP control in hospital. Recent renal US shows no CYN.       - agree with losartan                   # Edema: patient has documented 5 kg weight increase since Jan.  Denies SOB, orthopnea or CP. BNP is high.  Echo shows worsening aortic valve stenosis and high pulm pressures  Hx PE remotely.  Recent CT 1/26 does not show cirrhosis.  She is asymptomatic so she can be managed as OP once she is in [facility] but she needs cards consult here or clinic  appt.       -on bumex 3 bid      - avoid thiazides     - enrolled in [facility] to monitor diuresis                - cardiology clinic for aortic stenosis and pulm hypertension    # Increased abdominal girth:  This is her main concern.       - minimal ascites on US     #Diarrhea: resolved     # Anemia: hb drifting down.  Most likely anemia from CKD but may also have a component of marrow suppression from MMF                - on aranesp with 25 mcg given yesterday and will be followed on anemia clinic     # Hx hyperparathyroidism:  Diffuse vascular calcifications on CT. PTH is now 291.  This may contribute to valve calcification   - on calcitriol .25 and will monitor as OP     # HX EBV positivity. Low level but should be monitored per protocol                - EBV PCR low pos.  Can monitor as OP    #CMV pos: low level so can be monitored.  MMF dose decreased.  She is asymptomatic   - repeat CMV PCR in 2 weeks (transplant clinic)     Recommendations were communicated to primary team in person        Charley Rodriguez MD   [phone]      Review of Systems:   I reviewed the following systems:  GI: fair appetite. no nausea or vomiting or diarrhea.   Neuro:  no confusion  Constitutional:  no fever or chills  CV: no dyspnea or edema.  no chest pain.    Physical Exam:   I/O last 3 completed shifts:  In: -   Out: 1150 [Urine:1150]   BP (!) 140/68 (BP Location: Right arm)   Pulse 70   Temp 95  F (35  C) (Oral)   Resp 18   Wt 75.1 kg (165 lb 9.6 oz)   SpO2 96%   BMI 32.34 kg/m       GENERAL APPEARANCE: alert in no distress  EYES:  no scleral icterus, pupils equal  HENT: mouth without ulcers or lesions  PULM: lungs clear to auscultation bilaterally, equal air movement, no clubbing  CV: regular rhythm, normal rate, no rub 3/6 holosystolic murmur, transmission from LUE fistula     -JVD no     - pitting edema  GI: soft, nontender, more distended, bowel sounds are present  INTEGUMENT: no cyanosis, no rash  NEURO:  no  asterixis   Access aneurysmal LUE fistula    Labs:   All labs reviewed by me  Electrolytes/Renal -   Recent Labs   Lab Test 03/20/20  1351 03/20/20  1012 03/20/20  0422  03/19/20  0527  03/18/20  0501 03/17/20  0509  11/07/18  1914  01/29/18  1901  04/28/17  0830   * 125* 125*   < > 125*   < > 125* 130*   < > 134   < > 140   < > 138   POTASSIUM  --   --  3.7  --  3.9  --  3.6 3.9   < > 4.1   < > 4.6   < > 4.9   CHLORIDE  --   --  95  --  93*  --  95 101   < > 103   < > 111*   < > 107   CO2  --   --  24  --  22  --  21 20   < > 22   < > 20   < > 23   BUN  --   --  20  --  18  --  17 15   < > 16   < > 14   < > 14   CR  --   --  2.21*  --  2.34*  --  2.32* 2.28*   < > 1.56*   < > 1.48*   < > 1.67*   GLC  --   --  88  --  91  --  87 94   < > 158*   < > 92   < > 89   SHAVON  --   --  9.0  --  8.8  --  8.9 8.5   < > 9.4   < > 9.6   < > 9.7   MAG  --   --   --   --  1.6  --   --   --   --  1.9  --  1.7  --   --    PHOS  --   --   --   --   --   --   --  3.6  --   --   --  2.8  --  2.8    < > = values in this interval not displayed.       CBC -   Recent Labs   Lab Test 03/20/20  0422 03/19/20  0527 03/18/20  0501   WBC 2.7* 3.2* 2.8*   HGB 8.4* 8.9* 8.5*    162 153       LFTs -   Recent Labs   Lab Test 03/15/20  1127 01/26/20  1231 06/16/19  1039   ALKPHOS 119 126 124   BILITOTAL 0.8 0.7 0.7   ALT 11 11 18   AST 28 31 39   PROTTOTAL 7.1 7.2 7.8   ALBUMIN 3.3* 3.3* 3.6       Iron Panel -   Recent Labs   Lab Test 03/17/20  0509   IRON 54   IRONSAT 23         Imaging:    Echo 3/16 Interpretation Summary  Global and regional left ventricular function is normal with an EF of 60-65%.  Moderate right ventricular dilation with normal global systolic function.  Mild to moderate aortic insufficiency and mild aortic stenosis. The mean  gradient across the aortic valve is20 mmHg. The peak aortic velocity is 3  m/sec.  Mild to moderate tricuspid insufficiency is present.  Moderate pulmonary hypertension. Estimated pulmonary  artery systolic pressure  is 40 mmHg plus right atrial pressure.  IVC diameter >2.1 cm collapsing <50% with sniff suggests a high RA pressure  estimated at 15 mmHg or greater.  Ascending aorta 3.8 cm, indexed 2.2cm/m2.     This study was compared with the study from 4/3/19. The right ventricle is  dilated and the regurgitant valve lesions have slightly worsened. The  estimated PA and RA pressures have increased.    Current Medications:    bumetanide  3 mg Oral BID     calcitRIOL  0.25 mcg Oral Daily     cycloSPORINE modified  25 mg Oral BID IS     darbepoetin alpha non-ESRD  25 mcg Subcutaneous Q14 Days     losartan  25 mg Oral Daily     mycophenolate  1,000 mg Oral BID IS     omeprazole  20 mg Oral BID AC     sodium chloride (PF)  3 mL Intracatheter Q8H     sulfamethoxazole-trimethoprim  1 tablet Oral Daily       Charley Rodriguez MD   [phone]

## 2020-03-20 NOTE — PLAN OF CARE
Situation: 54 year old female admitted on 3-15-20 with edema, HTN, and loose stools. PMH includes; renal transplant 2007 secondary to IA nephropathy, Henoch-Schonleinpurpur, hypomagnesemia, hypercalcemia, and hyperparathyroidism,   VS: BP (!) 142/65 (BP Location: Right arm)   Pulse 76   Temp 96.2  F (35.7  C) (Oral)   Resp 18   Wt 75.1 kg (165 lb 9.6 oz)   SpO2 97%   BMI 32.34 kg/m     Pain: Denies  Neuro: Alert/oriented x4, Calm, pleasant   Cardiac: Hypertensive, tachycardic. 2+ VENKATA edema and 1+ edema, LUE fistula   Respiratory: Dyspnea and tachypnea, Occasional changes when sleeping in the tripod   GI/: Urine output cc .No BM this shift. Pt endorses   Diet: Regular,   Lines/drains: RUE PIV, Site WDL,   Activity: Independent in room with ambulation   Lab/imaging : Na 125, WBC 3.2, Creatinine 2.34, Hgb 8.9  Events of shift: Patient c/o of

## 2020-03-20 NOTE — PLAN OF CARE
Patient remains A&O, pleasant and up ad jennifer. Patient is Hmong speaking able to use some english to communicate, ipad  utilized. Sodium remains low at 125, per team would like to see trend up before discharge home. Patient compliant with 1L FR.

## 2020-03-21 VITALS
HEART RATE: 73 BPM | BODY MASS INDEX: 32.11 KG/M2 | WEIGHT: 164.4 LBS | RESPIRATION RATE: 20 BRPM | DIASTOLIC BLOOD PRESSURE: 77 MMHG | SYSTOLIC BLOOD PRESSURE: 158 MMHG | TEMPERATURE: 96.8 F | OXYGEN SATURATION: 97 %

## 2020-03-21 LAB
ANION GAP SERPL CALCULATED.3IONS-SCNC: 5 MMOL/L (ref 3–14)
BACTERIA SPEC CULT: NO GROWTH
BUN SERPL-MCNC: 20 MG/DL (ref 7–30)
CALCIUM SERPL-MCNC: 9.3 MG/DL (ref 8.5–10.1)
CHLORIDE SERPL-SCNC: 94 MMOL/L (ref 94–109)
CO2 SERPL-SCNC: 28 MMOL/L (ref 20–32)
CREAT SERPL-MCNC: 2.07 MG/DL (ref 0.52–1.04)
ERYTHROCYTE [DISTWIDTH] IN BLOOD BY AUTOMATED COUNT: 12.7 % (ref 10–15)
GFR SERPL CREATININE-BSD FRML MDRD: 26 ML/MIN/{1.73_M2}
GLUCOSE SERPL-MCNC: 90 MG/DL (ref 70–99)
HCT VFR BLD AUTO: 28 % (ref 35–47)
HGB BLD-MCNC: 8.7 G/DL (ref 11.7–15.7)
MCH RBC QN AUTO: 26.9 PG (ref 26.5–33)
MCHC RBC AUTO-ENTMCNC: 31.1 G/DL (ref 31.5–36.5)
MCV RBC AUTO: 86 FL (ref 78–100)
PLATELET # BLD AUTO: 171 10E9/L (ref 150–450)
POTASSIUM SERPL-SCNC: 3.6 MMOL/L (ref 3.4–5.3)
RBC # BLD AUTO: 3.24 10E12/L (ref 3.8–5.2)
SODIUM SERPL-SCNC: 126 MMOL/L (ref 133–144)
SODIUM SERPL-SCNC: 126 MMOL/L (ref 133–144)
SPECIMEN SOURCE: NORMAL
WBC # BLD AUTO: 3.2 10E9/L (ref 4–11)

## 2020-03-21 PROCEDURE — 25000131 ZZH RX MED GY IP 250 OP 636 PS 637: Mod: GY | Performed by: PHYSICIAN ASSISTANT

## 2020-03-21 PROCEDURE — 25000132 ZZH RX MED GY IP 250 OP 250 PS 637: Mod: GY | Performed by: NURSE PRACTITIONER

## 2020-03-21 PROCEDURE — 25000131 ZZH RX MED GY IP 250 OP 636 PS 637: Mod: GY | Performed by: INTERNAL MEDICINE

## 2020-03-21 PROCEDURE — 36415 COLL VENOUS BLD VENIPUNCTURE: CPT | Performed by: PHYSICIAN ASSISTANT

## 2020-03-21 PROCEDURE — 85027 COMPLETE CBC AUTOMATED: CPT | Performed by: PHYSICIAN ASSISTANT

## 2020-03-21 PROCEDURE — 25000132 ZZH RX MED GY IP 250 OP 250 PS 637: Mod: GY | Performed by: PHYSICIAN ASSISTANT

## 2020-03-21 PROCEDURE — 80048 BASIC METABOLIC PNL TOTAL CA: CPT | Performed by: PHYSICIAN ASSISTANT

## 2020-03-21 PROCEDURE — 99239 HOSP IP/OBS DSCHRG MGMT >30: CPT | Performed by: HOSPITALIST

## 2020-03-21 PROCEDURE — 99207 ZZC CDG-CODE INCORRECT PER BILLING BASED ON TIME: CPT | Performed by: HOSPITALIST

## 2020-03-21 PROCEDURE — 84295 ASSAY OF SERUM SODIUM: CPT | Performed by: PHYSICIAN ASSISTANT

## 2020-03-21 RX ORDER — CALCITRIOL 0.25 UG/1
0.25 CAPSULE, LIQUID FILLED ORAL DAILY
Qty: 30 CAPSULE | Refills: 0 | Status: SHIPPED | OUTPATIENT
Start: 2020-03-22 | End: 2021-09-17

## 2020-03-21 RX ORDER — BUMETANIDE 1 MG/1
3 TABLET ORAL
Qty: 180 TABLET | Refills: 0 | Status: SHIPPED | OUTPATIENT
Start: 2020-03-21 | End: 2020-04-15

## 2020-03-21 RX ORDER — LOSARTAN POTASSIUM 25 MG/1
25 TABLET ORAL DAILY
Qty: 90 TABLET | Refills: 0 | Status: ON HOLD | OUTPATIENT
Start: 2020-03-21 | End: 2021-12-28

## 2020-03-21 RX ORDER — MYCOPHENOLATE MOFETIL 250 MG/1
750 CAPSULE ORAL 2 TIMES DAILY
Qty: 240 CAPSULE | Refills: 0 | Status: SHIPPED | OUTPATIENT
Start: 2020-03-21 | End: 2020-05-18

## 2020-03-21 RX ADMIN — CALCITRIOL 0.25 MCG: 0.25 CAPSULE ORAL at 08:10

## 2020-03-21 RX ADMIN — CYCLOSPORINE 25 MG: 25 CAPSULE, LIQUID FILLED ORAL at 09:40

## 2020-03-21 RX ADMIN — BUMETANIDE 3 MG: 1 TABLET ORAL at 11:58

## 2020-03-21 RX ADMIN — MYCOPHENOLATE MOFETIL 750 MG: 250 CAPSULE ORAL at 09:40

## 2020-03-21 RX ADMIN — LOSARTAN POTASSIUM 25 MG: 25 TABLET, FILM COATED ORAL at 08:10

## 2020-03-21 RX ADMIN — OMEPRAZOLE 20 MG: 20 CAPSULE, DELAYED RELEASE ORAL at 08:10

## 2020-03-21 RX ADMIN — SULFAMETHOXAZOLE AND TRIMETHOPRIM 1 TABLET: 400; 80 TABLET ORAL at 08:10

## 2020-03-21 ASSESSMENT — ACTIVITIES OF DAILY LIVING (ADL)
ADLS_ACUITY_SCORE: 13

## 2020-03-21 NOTE — PROGRESS NOTES
Pt left unit [id number] via wheelchair with transport staff. Day RN reviewed discharge instructions with pt. PIV previously removed. Pt to  scripts in discharge pharmacy. Family picking up pt.

## 2020-03-21 NOTE — PLAN OF CARE
D: Review of discharge done earlier with patient and . Discharge form signed and paperwork given to patient to give to her daughter. Transport to bring patient to discharge pharmacy and front door for ride home. IV out.

## 2020-03-21 NOTE — PLAN OF CARE
Patient continued to have bilateral lower leg and low back spasms during the beginning of this shift. This had started on evening shift and the patient's pain was treated with APAP. Patient noted to be asleep and appeared comfortable around 0300. During hourly rounding patient was found to be sitting up in bed. Pain resolved. Used  tablet to understand what patient was trying to tell me. The  said 'I am starving and want 2 pieces of white bread.' The bread was provided. Mobility is independent. Continue with current plan of nursing care, and update MD with concerns as needed.

## 2020-03-21 NOTE — PLAN OF CARE
Assumed Care: 1500 - 2330  Admission/Status: Bloating/Diarrhea     VS: BP (!) 140/68 (BP Location: Right arm)   Pulse 70   Temp 95  F (35  C) (Oral)   Resp 18   Wt 74.6 kg (164 lb 6.4 oz)   SpO2 96%   BMI 32.11 kg/m    Neuro: AOx4.  Pupils PERRLA.  Cardiac: Rate and rhythm normal.  No abnormal sounds heard.  Respiratory: Lungs clear and equal bilaterally, coarse in lower lobes.  GI/: Regular urination.  Nutrition: Appetite improved with food from home.  Discussed with  need to restrict fluid.  Patient is not compliant with fluid restriction.  Food from home contained high fluids.  IV/Drains: Right PIV saline locked.  Flushed well with blood return.  Activity: Up to bathroom ad jennifer.  Independent.  Pain: Patient reported new onset of general pain and especially tenderness.   Acetaminophen given with no relief.  Heat pad applied to lower back with some relief.  Skin: Warm, dry and elastic.  Labs: Wdl ex. Sodium.  1L fluid restriction in place.     Plan of Care: Continue to reassess pain.  Readdress in am with morning team.  Monitor fluid restriction and Na level.

## 2020-03-21 NOTE — DISCHARGE SUMMARY
[facility], [place]  Hospitalist Discharge Summary       Date of Admission:  3/15/2020  Date of Discharge:  3/21/2020  3:30 PM  Discharging Provider: HUMERA Simmons  Discharge Team: Hospitalist Service, Gold 4    Discharge Diagnoses     Peripheral edema     Right heart dysfunction     Hyponatremia     Hypertension     Aortic stenosis     Hx of renal transplant     CKD     GERD    Hyperparathyroidism     Anemia    Abnormal UA     Diarrhea     Follow-ups Needed After Discharge   Follow-up Appointments     Adult [facility] Follow-up and recommended labs and tests      Follow up with primary care provider, TAY BREWER, within 7 days for   hospital follow- up.  The following labs/tests are recommended: BMP, CBC.      Follow up with cardiology, as soon as possible regarding new diagnosis of   right heart dysfunction. No follow up labs or test are needed.   Follow up with [facility] clinic for diuretics management.   Follow up with anemia clinic, as soon as possible regarding diagnosis of   anemia.     Appointments on [place] and/or [facility] (with [facility] or [facility]   provider or service). Call [phone] if you haven't heard regarding   these appointments within 7 days of discharge.         Follow Up and recommended labs and tests      Follow up with primary care provider, TAY BREWER, within 7 days for   hospital follow- up.  The following labs/tests are recommended: BMP, CBC.               Unresulted Labs Ordered in the Past 30 Days of this Admission     Date and Time Order Name Status Description    3/20/2020 1251 CMV DNA quantification In process       These results will be followed up by nephrology.     Discharge Disposition   Discharged to home  Condition at discharge: Stable    Hospital Course   Joshua Lala is a 54 year old ong speaking woman admitted on 3/15/2020. She has a history of renal transplant in 2007 2/2 IgA nephropathy, Henoch-Schonlein purpura, hypercalcemia,  hypomagnesemia and hyperparathyroidism who presents with edema, hypertension and loose stools.    #Peripheral edema   #Small amount of ascites   Primary concern for patient. Peripheral edema present X 1-2 months per reports started on amlodipine 1/28 which was discontinued by PCP 2/17 2/2 peripheral edema, transitioned to hydralazine. Denies history of heart disease. EKG without ischemic changes. LLE US negative for DVT. BNP elevated in ED. Trop wnl. No orthopnea, dyspnea or SOB. Echo with worsening right ventricle dilation and increase in PA and RA pressures as compared to echo 4/2019. Discussed with cardiology, recommended sequential nephron blockade, s/p chlorthalidone 500 mg PO followed by IV furosemide 80 mg 3/17 with diuresis and minimal improvement in edema, resultant hyponatremia as below. US abdomen with small amount of ascites in right upper quadrant superior to gallbladder, also seen on CT abdomen 1/26, reviewed imagines, did not appear to be amendable to sample collection. Suspect ascites from right heart dysfunction as below, unable to rule out other causes at this time. No evidence of cirrhosis on CT 1/26/20.   - Resume diuresis with budesonide as below    - Daily weights and record   - Cardiology referral as below      # Hyponatremia   Na down trended to 125 following chlorothiazide 3/17 with persistently low since. Per chart review, hx of symptomatic hyponatremic 4/2019 after starting thiazide diuretic, with slow improvement 2% IV NaCl at that time. Urine Na 42 while chlorothiazide onboard, repeat 15. Urine osmol 130. Trial of NaCl  ml 3/18 with no improvement. Likely from thiazide diuretics with volume overload, less likely component of SIADH. Remains asymptomatic. Started on budesonide 3 mg BID. Na on discharge 126.   - Repeat Na in 3-4 days with PCP   - Continue budesonide 3 mg BID    - Fluid restrict to 1000ml     # Hypertension   Hypertensive on admission. Transplant US with no CYN. PTA on  hydralazine. Was also to be on losartan, however was not taking. Previously on amlodipine started 1/28, discontinued 2/17 by PCP 2/2 peripheral edema. Family notes hypertensive at home with 's. Hydralazine discontinued 3/17 given possibly causing edema. Blood pressures at goal.    - Continue to avoid amlodipine and hydralazine     - Continue losartan 25 mg daily   - Would favor BB if additional blood pressure control needed      # Concern for RV dysfunction   # Aortic stenosis   Echo 3/15 with moderate right ventricular dilation with normal global systolic function, moderate pulmonary hypertension, pulmonary artery systolic pressure 40 mmHg plus right atrial pressure, sniff suggests high RA pressure 15 mmHg or greater. Right ventricle dilation and regurgitant valve lesions worsened, PA and RA pressures increased since prior Echo 2019. Moderate aortic stenosis. Discussed with cardiology, recommended sequential nephron blockade with chlorothiazide and furosemide as above    - Diuretics as above   - Consult for [facility] clinic on discharge   - Educated patient to monitor weights daily on discharge and record   - Cardiology referral placed on discharge for evaluation of pulmonary HTN and aortic stenosis      # Diarrhea: Loose stools X 1 day on admission. Diarrhea resolved day 1 of hospitalization. C diff and enteric panel negative. CMV quant <137. EBV <500. CMV DNA quant pending.   - Nephrology to follow CMV DNA quant     # Hx of renal transplant, 2007  # CKD   Due to IgA nephropathy. Baseline creatinine about 1.6. Cr 2.06 on admission with up trend to 2.3. UA with 30 protein, 20 WBC. Hx of EBV positivity. EBV quant <500. CSA level 75 (goal 50-75). Serum free light chain ratio normal. Immunofixation normal. Cr on discharge 2.07.   - Continue immunosupression with Neoral (decreased to 750 mg per day) and Cellcept    - Continue ppx Bactrim      # GERD   Not well controlled per patient report. Symptomatic for several  months. PTA on omeprazole. Increased omeprazole 20 mg to BID.       # Hx hyperparthyroidism  PTA on Cinecalcet. Diffuse vascular calcification on CT. Given borderline hypocalcemia and , discontinue cinecalcet per nephrology and initiate calcitriol. Phos wnl.   - calcitriol 0.25 q d    - Stop cinecalcet      # Anemia  Hgb baseline 10-11, has drifted down to 9 on admission. Likely anemia of CKD. No signs of active bleeding. Iron studies wnl. Epo level 11.   - Aranesp 25 mcg every 2 weeks per nephrology    - Referral for anemia clinic on discharge      # Abnormal UA: UA with positive Leuk est, WBC. No urinary symptoms. Started on ceftriaxone in the ED. UCx <10,000 colonies/mL mixed urogenital nahid. Abx stopped.     Consultations This Hospital Stay   NEPHROLOGY KIDNEY/PANCREAS TRANSPLANT ADULT IP CONSULT  PHARMACY IP CONSULT  MEDICATION HISTORY IP PHARMACY CONSULT  PHYSICAL THERAPY ADULT IP CONSULT  OCCUPATIONAL THERAPY ADULT IP CONSULT  VASCULAR ACCESS CARE ADULT IP CONSULT  CARDIOLOGY HEART FAILURE (HF) IP CONSULT  CARDIOLOGY GENERAL ADULT IP CONSULT  [facility]IN[facility] EVALUATION IP CONSULT    Code Status   Full Code    Time Spent on this Encounter   I, HUMERA Simmons, personally saw the patient today and spent greater than 30 minutes discharging this patient.       HUMERA Simmons  [facility], [place]  ______________________________________________________________________    Physical Exam   Vital Signs: Temp: 96.8  F (36  C) Temp src: Oral BP: 137/59 Pulse: 73   Resp: 20 SpO2: 96 % O2 Device: None (Room air)    Weight: 164 lbs 6.4 oz  GENERAL: Alert and oriented. NAD.   HEENT: Anicteric sclera. Mucous membranes moist and without lesions.   CV: RRR. S1, S2. ? Murmur vs bruit from fistula.  RESPIRATORY: Effort normal on RA. Lungs with minimal crackles at left base, otherwise CTAB with no wheezing.   GI: Abdomen soft, obese and non distended, bowel sounds present. No tenderness,  rebound, guarding.   MUSCULOSKELETAL: No joint swelling or tenderness. Moves all extremities.   NEUROLOGICAL: No focal deficits.   EXTREMITIES: 1+ left lower extremity peripheral edema, trace right lower extremity peripheral edema. Intact bilateral pedal pulses.   SKIN: No jaundice. No rashes. Large fistula of left upper extremity.     Primary Care Physician   TAY BREWER    Discharge Orders      Cardiology Eval Adult Referral      Follow-Up with [facility]      ANEMIA REFERRAL (PharmD)      Reason for your hospital stay    Dear Joshua Lala    Your were hospitalized at [facility] with swelling of your lower extremities and treated with diuretics for right heart dysfunction.  Over your hospitalization your symptoms improved and today you are ready to be discharged to home.  If you continue diuretics medications you should continue to improve but if you develop fever, shortness of breath, light headedness, chest pain or worsening swelling please seek medical attention.    We are suggesting the following medication changes:  - Continuation of Losartan 25 mg daily    - Stop hydralazine and amlodipine   - Stop cinacalcet and start calcitriol instead    - Aranesp dosing per anemia clinic     Please get the following tests done:  - Repeat labs with BMP and CBC within 3-4 days     Please set up an appointment with:  - Follow up with your Primary Care Provider as noon as possible  - Follow up with Nephrology as directed    - Follow up with [facility] clinic for diuretic management   - You have been referred to Cardiology clinic, please call [phone] to make an appointment if you have not received a call from then within 7 days   - You have been referred to Anemia clinic, please call [phone] to make an appointment if you have not received a call from then within 7 days     It was a pleasure meeting with you today. Thank you for allowing me and my team the privilege of caring for you today. You are  the reason we are here, and I truly hope we provided you with the excellent service you deserve. Please let us know if there is anything else we can do for you so that we can be sure you are leaving completely satisfied with your care experience.    Take care!  Mildred James PA-C  Hospitalist Service     Adult [facility] Follow-up and recommended labs and tests    Follow up with primary care provider, TAY BREWER, within 7 days for hospital follow- up.  The following labs/tests are recommended: BMP, CBC.    Follow up with cardiology, as soon as possible regarding new diagnosis of right heart dysfunction. No follow up labs or test are needed.   Follow up with [facility] clinic for diuretics management.   Follow up with anemia clinic, as soon as possible regarding diagnosis of anemia.     Appointments on [place] and/or [facility] (with [facility] or [facility] provider or service). Call [phone] if you haven't heard regarding these appointments within 7 days of discharge.     Follow Up and recommended labs and tests    Follow up with primary care provider, TAY BREWER, within 7 days for hospital follow- up.  The following labs/tests are recommended: BMP, CBC.     Monitor and record    weight every day, record and bring to follow up appointments with nephrology, cardiology and primary care.       Significant Results and Procedures   Most Recent 3 CBC's:  Recent Labs   Lab Test 03/21/20  0358 03/20/20  0422 03/19/20  0527   WBC 3.2* 2.7* 3.2*   HGB 8.7* 8.4* 8.9*   MCV 86 88 88    154 162     Most Recent 3 BMP's:  Recent Labs   Lab Test 03/21/20  1042 03/21/20  0358 03/20/20  2213  03/20/20  0422  03/19/20  0527   * 126* 124*   < > 125*   < > 125*   POTASSIUM  --  3.6  --   --  3.7  --  3.9   CHLORIDE  --  94  --   --  95  --  93*   CO2  --  28  --   --  24  --  22   BUN  --  20  --   --  20  --  18   CR  --  2.07*  --   --  2.21*  --  2.34*   ANIONGAP  --  5  --   --  6  --  10   SHAVON  --  9.3  --   --  9.0  --   8.8   GLC  --  90  --   --  88  --  91    < > = values in this interval not displayed.     Most Recent 2 LFT's:  Recent Labs   Lab Test 03/15/20  1127 01/26/20  1231   AST 28 31   ALT 11 11   ALKPHOS 119 126   BILITOTAL 0.8 0.7   ,   Results for orders placed or performed during the hospital encounter of 03/15/20   XR Chest 2 Views    Narrative    EXAM: XR CHEST 2 VW  3/15/2020 1:00 PM      HISTORY: anasarca, kidney transplant    COMPARISON: Chest x-ray 4/7/2019.    FINDINGS: AP and lateral radiographs of the chest. Tiny right greater  than  left pleural effusions. No pneumothorax. Right greater than left  perihilar opacities, not significantly changed. Unchanged enlargement  of the cardiomediastinal silhouette. Postsurgical clips overlying the  right upper quadrant.. Trachea is midline, heart size is normal. No  focal pulmonary opacity, pneumothorax, or pleural effusion. No acute  osseous or abdominal abnormality.      Impression    IMPRESSION:Right greater than left tiny pleural effusions. Unchanged  cardiac enlargement and pulmonary vascular congestion.    Findings discussed with senior resident Jarad Bonilla MD.    I have personally reviewed the examination and initial interpretation  and I agree with the findings.    JORGE ERVIN MD   US Lower Extremity Venous Duplex Left    Narrative    EXAMINATION: TEMPORARY  3/16/2020 2:18 PM      CLINICAL HISTORY: Swelling left leg more than right    COMPARISON: Venous ultrasound 1/1/2017      PROCEDURE COMMENTS: Ultrasound was performed of the deep venous system  of the left lower extremity using grayscale, color, and spectral  Doppler.    FINDINGS:  The common femoral, greater saphenous origin, femoral, popliteal, and  deep calf veins are visualized and are patent. Venous waveforms are  normal. There is normal response to compression. No visible echogenic  thrombus.      Impression    IMPRESSION:.  No deep vein thrombosis in the left lower extremity.    I have  personally reviewed the examination and initial interpretation  and I agree with the findings.    AYDEN BENITEZ MD   US Renal Transplant    Narrative    EXAMINATION: US RENAL TRANSPLANT  3/16/2020 6:12 PM      CLINICAL HISTORY: Hx of kidney tx, HTN    COMPARISON: 1/28/2020      FINDINGS:   There is a right lower quadrant renal transplant which measures 11.4  cm, previously 11.2 cm. The transplant kidney demonstrates normal  echogenicity. There is no peritransplant fluid collection. There is no  urinary tract dilation.    The urinary bladder is decompressed.    The arcuate artery resistive indices are 0.79 (upper), 0.78 (mid), and  0.69 (lower).   The renal artery anastomosis peak systolic velocity is 117. There are  no abnormal waveforms in the renal artery.   The renal vein is patent.   The artery and vein are patent above and below the anastomosis.    Iliac artery above the anastomosis: 110 cm/s  Iliac artery below the anastomosis: 94 cm/s      Impression    IMPRESSION:  1. Normal renal transplant ultrasound.  2. Borderline elevated arcuate artery resistive indices, otherwise a  normal doppler evaluation of the renal transplant.    I have personally reviewed the examination and initial interpretation  and I agree with the findings.    JEREMY DING MD   US Abdomen Limited    Narrative    US ABDOMEN LIMITED  3/19/2020 9:20 PM    History:  abdominal distention, assess for ascites.     Technique: Grayscale ultrasound of abdomen, limited    Comparison: None    Findings:   Targeted ultrasound of 4 quadrants demonstrates a small amount of  fluid in the right upper quadrant superior to the gallbladder.      Impression    IMPRESSION:  Small amount of ascites in the right upper quadrant superior to the  gallbladder.     I have personally reviewed the examination and initial interpretation  and I agree with the findings.    KRISTY ASTORGA MD   Echo Complete    Narrative     [id number]  [id number]  [id number]  [id number]^LEONA^CHARLOTTE           [facility],[place]  Echocardiography Laboratory  [address] [place] [id number]     Name: ALIN ARCHIBALD  MRN: [id number]  : 1965  Study Date: 2020 03:58 PM  Age: 54 yrs  Gender: Female  Patient Location: [facility]  Reason For Study: Cardiomegaly  Ordering Physician: CHARLOTTE DELGADILLO  Performed By: MEEK Perry     BSA: 1.7 m2  Height: 60 in  Weight: 169 lb  HR: 73  BP: 120/48 mmHg  _____________________________________________________________________________  __     _____________________________________________________________________________  __        Interpretation Summary  Global and regional left ventricular function is normal with an EF of 60-65%.  Moderate right ventricular dilation with normal global systolic function.  Mild to moderate aortic insufficiency and mild aortic stenosis. The mean  gradient across the aortic valve is20 mmHg. The peak aortic velocity is 3  m/sec.  Mild to moderate tricuspid insufficiency is present.  Moderate pulmonary hypertension. Estimated pulmonary artery systolic pressure  is 40 mmHg plus right atrial pressure.  IVC diameter >2.1 cm collapsing <50% with sniff suggests a high RA pressure  estimated at 15 mmHg or greater.  Ascending aorta 3.8 cm, indexed 2.2cm/m2.     This study was compared with the study from 4/3/19. The right ventricle is  dilated and the regurgitant valve lesions have slightly worsened. The  estimated PA and RA pressures have increased.  _____________________________________________________________________________  __        Left Ventricle  Global and regional left ventricular function is normal with an EF of 60-65%.  Left ventricular size is normal. Mild to moderate concentric wall thickening  consistent with left ventricular hypertrophy is present. Grade II or moderate  diastolic dysfunction. No regional wall motion abnormalities are seen.     Right  Ventricle  Global right ventricular function is normal. Moderate right ventricular  dilation is present.     Atria  Severe left atrial enlargement is present. Moderate right atrial enlargement  is present.     Mitral Valve  Mild mitral annular calcification is present. Mild mitral insufficiency is  present.        Aortic Valve  Mild to moderate aortic insufficiency is present. Mild aortic stenosis is  present. The mean gradient across the aortic valve is20 mmHg. The peak aortic  velocity is 3 m/sec.     Tricuspid Valve  Mild to moderate tricuspid insufficiency is present. Estimated pulmonary  artery systolic pressure is 40 mmHg plus right atrial pressure.     Pulmonic Valve  The valve leaflets are not well visualized. Trace pulmonic insufficiency is  present.     Vessels  The aorta root is normal. Ascending aorta 3.8 cm. Dilation of the inferior  vena cava is present with abnormal respiratory variation in diameter. IVC  diameter >2.1 cm collapsing <50% with sniff suggests a high RA pressure  estimated at 15 mmHg or greater.     Pericardium  No pericardial effusion is present.        Compared to Previous Study  This study was compared with the study from 4/3/19 .  _____________________________________________________________________________  __  MMode/2D Measurements & Calculations     RVDd: 4.1 cm  IVSd: 1.2 cm  LVIDd: 4.4 cm  LVIDs: 3.1 cm  LVPWd: 1.3 cm  FS: 29.9 %  LV mass(C)d: 208.1 grams  LV mass(C)dI: 119.8 grams/m2  asc Aorta Diam: 3.8 cm  LVOT diam: 1.6 cm  LVOT area: 2.0 cm2  LA Volume Index (BP): 48.3 ml/m2  RWT: 0.60  TAPSE: 2.8 cm           Doppler Measurements & Calculations  MV E max michael: 148.9 cm/sec  MV A max michael: 82.9 cm/sec  MV E/A: 1.8  MV dec slope: 806.9 cm/sec2  MV dec time: 0.18 sec  Ao V2 max: 316.0 cm/sec  Ao max P.0 mmHg  Ao V2 mean: 198.8 cm/sec  Ao mean P.2 mmHg  Ao V2 VTI: 59.1 cm  CELENA(I,D): 1.4 cm2  CELENA(V,D): 1.2 cm2  AI P1/2t: 372.2 msec  LV V1 max P.0 mmHg  LV V1 max:  187.0 cm/sec  LV V1 VTI: 40.7 cm  SV(LVOT): 81.5 ml  SI(LVOT): 46.9 ml/m2  TV max P.9 mmHg  PA acc time: 0.09 sec  TR max freddy: 335.2 cm/sec  TR max P.9 mmHg  AV Freddy Ratio (DI): 0.59  CELENA Index (cm2/m2): 0.79  E/E' av.3  Lateral E/e': 13.7  Medial E/e': 20.8        _____________________________________________________________________________  __        Report approved by: Agata Iglesias 2020 08:12 PM            Discharge Medications   Current Discharge Medication List      START taking these medications    Details   bumetanide (BUMEX) 1 MG tablet Take 3 tablets (3 mg) by mouth 2 times daily  Qty: 180 tablet, Refills: 0    Associated Diagnoses: Peripheral edema      calcitRIOL (ROCALTROL) 0.25 MCG capsule Take 1 capsule (0.25 mcg) by mouth daily  Qty: 30 capsule, Refills: 0    Associated Diagnoses: S/P kidney transplant      darbepoetin deana-polysorbate (ARANESP) 25 MCG/0.42ML injection Inject 0.42 mLs (25 mcg) Subcutaneous every 14 days Dosed with anemia clinic.  Qty:      Associated Diagnoses: Anemia due to chronic kidney disease, unspecified CKD stage         CONTINUE these medications which have CHANGED    Details   CELLCEPT (BRAND) 250 MG capsule Take 3 capsules (750 mg) by mouth 2 times daily  Qty: 240 capsule, Refills: 0    Associated Diagnoses: Kidney transplant recipient; Long-term use of immunosuppressant medication      losartan (COZAAR) 25 MG tablet Take 1 tablet (25 mg) by mouth daily  Qty: 90 tablet, Refills: 0    Associated Diagnoses: Benign essential hypertension      omeprazole (PRILOSEC) 20 MG DR capsule Take 1 capsule (20 mg) by mouth 2 times daily  Qty: 60 capsule, Refills: 0    Associated Diagnoses: Gastroesophageal reflux disease with esophagitis         CONTINUE these medications which have NOT CHANGED    Details   calcium carbonate (TUMS) 500 MG chewable tablet Take 1 chew tab by mouth 2 times daily      NEORAL (BRAND) 25 MG capsule Take 25 mg by mouth 2 times daily  DAW1      sulfamethoxazole-trimethoprim (BACTRIM) 400-80 MG tablet Take 1 tablet by mouth daily  Qty: 30 tablet, Refills: 11    Associated Diagnoses: Kidney transplant recipient; Long-term use of immunosuppressant medication         STOP taking these medications       amLODIPine (NORVASC) 5 MG tablet Comments:   Reason for Stopping:         cinacalcet (SENSIPAR) 30 MG tablet Comments:   Reason for Stopping:         furosemide (LASIX) 20 MG tablet Comments:   Reason for Stopping:         hydrALAZINE (APRESOLINE) 25 MG tablet Comments:   Reason for Stopping:             Allergies   Allergies   Allergen Reactions     Contrast Dye Shortness Of Breath     Other reaction(s): Angioedema

## 2020-03-21 NOTE — PROVIDER NOTIFICATION
"\"Patient states new complaint of general pain and especially tenderness.  Declining Tylenol d/t kidney transplant.  anything else we can do for her?\"    Provider recommended tylenol as safe and to use other supportive aids.  Provider assured the pain would be passed onto the day time team for consideration with the patient's other comorbidities.    Provider: Basilia Naranjo [id number]  "

## 2020-03-21 NOTE — PROGRESS NOTES
Nephrology Progress Note  03/21/2020       Joshua Lala is a 54 year old woman S/P DDKT X 2 adm 2/26 with edema, HBP and diarrhea.    Assessment & Recommendations:   # Allograft function: S/P DDKT 1998 and 2007.  BL Cr initially 1.3 - 1.6 with gradual increase to 2.0 over past 6 months.  Stable to improved kidney function with creatinine ~ 2.1 today.  IS is CSA and MMF 1 g bid.  UA 30 prot, 20 WBCs. Prot/Cr  >1.5 g/g in 2016 and was 2.9 in 7/2019.  When seen in clinic 6/2019  no biopsy was recommended b/o lack of therapeutic options.  Pt was to switch to tac from CSA but never made this change.                 - prot/Cr 1.3 g/g (improved)                - serum and urine immunofixation normal                - Consider biopsy as OP     # Immunosuppression: on CSA and MMF 1 g bid.  CSA level 75                - MMF decreased to 750 bid                - CSA is at goal (50 - 75)     # Hyponatremia: Stable serum sodium at 126 today.  Likely from thiazide use in patient with CHF/ right heart failure.  Interestingly, she developed this in April but her urine osm was never over 300, indicating that a major component was high fluid intake. Urine NA 43 on 3/18 with thiazide on board, 15 today.  This is C/W low perfusion state (CHF)   - continue fluid restriction      # Hypertension:  Good BP control in hospital. Recent renal US shows no CYN.       - Agree with losartan                   # Edema: patient has documented 5 kg weight increase since Jan.  Denies SOB, orthopnea or CP. BNP is high.  Echo shows worsening aortic valve stenosis and high pulm pressures  Hx PE remotely.  Recent CT 1/26 does not show cirrhosis.  She is asymptomatic so she can be managed as OP once she is in [facility] but she needs cards consult here or clinic appt.   - on bumex 3 bid   - avoid thiazides   - enrolled in [facility] to monitor diuresis              - Cardiology clinic for aortic stenosis and pulm hypertension    # Increased abdominal  girth:  This is her main concern.       - minimal ascites on US     # Diarrhea: resolved     # Anemia: Stable hemoglobin.  Most likely anemia from CKD but may also have a component of marrow suppression from MMF                - on Aranesp with 25 mcg given yesterday and will be followed on anemia clinic     # Hx hyperparathyroidism:  Diffuse vascular calcifications on CT. PTH is now 291.  This may contribute to valve calcification   - on calcitriol .25 and will monitor as OP     # HX EBV positivity: Low level but should be monitored per protocol                - EBV PCR low pos.  Can monitor as OP    # CMV pos: low level so can be monitored.  MMF dose decreased.  She is asymptomatic   - repeat CMV PCR in 2 weeks (transplant clinic)     Recommendations were communicated to primary team via this note.        Interval History :   Ms. Lala's kidney function is stable with creatinine ~ 2.1.  Stable to improved serum sodium at 126.  Patient feels well and is anxious to go home.  No chest pain or shortness of breath.  No nausea, vomiting or further diarrhea.  No fever, sweats or chills.  She does note a little swelling in her feet.    Review of Systems:   4 point ROS was obtained and negative, except as noted in the Interval History.    Physical Exam:   I/O last 3 completed shifts:  In: 1600 [P.O.:1600]  Out: 550 [Urine:550]   /59 (BP Location: Right arm)   Pulse 73   Temp 96.8  F (36  C) (Oral)   Resp 20   Wt 74.6 kg (164 lb 6.4 oz)   SpO2 96%   BMI 32.11 kg/m       GENERAL APPEARANCE: alert in no distress  HENT: mouth without ulcers or lesions  PULM: lungs clear to auscultation bilaterally, equal air movement  CV: regular rhythm, normal rate, no rub 3/6 holosystolic murmur, transmission from LUE fistula    - trace LE edema bilaterally  GI: soft, nontender, more distended, bowel sounds are present  INTEGUMENT: no rash  TX KIDNEY: normal  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    Labs:   All  labs reviewed by me  Electrolytes/Renal -   Recent Labs   Lab Test 03/21/20  0358 03/20/20  2213 03/20/20  1351  03/20/20  0422  03/19/20  0527  03/17/20  0509  11/07/18  1914  01/29/18  1901  04/28/17  0830   * 124* 126*   < > 125*   < > 125*   < > 130*   < > 134   < > 140   < > 138   POTASSIUM 3.6  --   --   --  3.7  --  3.9   < > 3.9   < > 4.1   < > 4.6   < > 4.9   CHLORIDE 94  --   --   --  95  --  93*   < > 101   < > 103   < > 111*   < > 107   CO2 28  --   --   --  24  --  22   < > 20   < > 22   < > 20   < > 23   BUN 20  --   --   --  20  --  18   < > 15   < > 16   < > 14   < > 14   CR 2.07*  --   --   --  2.21*  --  2.34*   < > 2.28*   < > 1.56*   < > 1.48*   < > 1.67*   GLC 90  --   --   --  88  --  91   < > 94   < > 158*   < > 92   < > 89   SHAVON 9.3  --   --   --  9.0  --  8.8   < > 8.5   < > 9.4   < > 9.6   < > 9.7   MAG  --   --   --   --   --   --  1.6  --   --   --  1.9  --  1.7  --   --    PHOS  --   --   --   --   --   --   --   --  3.6  --   --   --  2.8  --  2.8    < > = values in this interval not displayed.       CBC -   Recent Labs   Lab Test 03/21/20 0358 03/20/20 0422 03/19/20  0527   WBC 3.2* 2.7* 3.2*   HGB 8.7* 8.4* 8.9*    154 162       LFTs -   Recent Labs   Lab Test 03/15/20  1127 01/26/20  1231 06/16/19  1039   ALKPHOS 119 126 124   BILITOTAL 0.8 0.7 0.7   ALT 11 11 18   AST 28 31 39   PROTTOTAL 7.1 7.2 7.8   ALBUMIN 3.3* 3.3* 3.6       Iron Panel -   Recent Labs   Lab Test 03/17/20  0509   IRON 54   IRONSAT 23         Imaging:    Echo 3/16 Interpretation Summary  Global and regional left ventricular function is normal with an EF of 60-65%.  Moderate right ventricular dilation with normal global systolic function.  Mild to moderate aortic insufficiency and mild aortic stenosis. The mean  gradient across the aortic valve is20 mmHg. The peak aortic velocity is 3  m/sec.  Mild to moderate tricuspid insufficiency is present.  Moderate pulmonary hypertension. Estimated pulmonary  artery systolic pressure  is 40 mmHg plus right atrial pressure.  IVC diameter >2.1 cm collapsing <50% with sniff suggests a high RA pressure  estimated at 15 mmHg or greater.  Ascending aorta 3.8 cm, indexed 2.2cm/m2.     This study was compared with the study from 4/3/19. The right ventricle is  dilated and the regurgitant valve lesions have slightly worsened. The  estimated PA and RA pressures have increased.    Current Medications:    bumetanide  3 mg Oral BID     calcitRIOL  0.25 mcg Oral Daily     cycloSPORINE modified  25 mg Oral BID IS     darbepoetin alpha non-ESRD  25 mcg Subcutaneous Q14 Days     losartan  25 mg Oral Daily     mycophenolate  750 mg Oral BID IS     omeprazole  20 mg Oral BID AC     sodium chloride (PF)  3 mL Intracatheter Q8H     sulfamethoxazole-trimethoprim  1 tablet Oral Daily       Akil Bah MD   [phone]

## 2020-03-22 ENCOUNTER — PATIENT OUTREACH (OUTPATIENT)
Dept: CARE COORDINATION | Facility: CLINIC | Age: 55
End: 2020-03-22

## 2020-03-23 ENCOUNTER — APPOINTMENT (OUTPATIENT)
Dept: INTERPRETER SERVICES | Facility: CLINIC | Age: 55
End: 2020-03-23
Payer: MEDICARE

## 2020-03-23 ENCOUNTER — DOCUMENTATION ONLY (OUTPATIENT)
Dept: TRANSPLANT | Facility: CLINIC | Age: 55
End: 2020-03-23

## 2020-03-23 NOTE — PROGRESS NOTES
[facility]: Post-Discharge Note  SITUATION                                                      Admission:    Admission Date: 03/15/20   Reason for Admission: Peripheral edema  Discharge:   Discharge Date: 03/21/20  Discharge Diagnosis: Peripheral edema  Discharge Service: Hospitalist    BACKGROUND                                                      Joshua Lala is a 54 year old ong speaking woman admitted on 3/15/2020. She has a history of renal transplant in 2007 2/2 IgA nephropathy, Henoch-Schonlein purpura, hypercalcemia, hypomagnesemia and hyperparathyroidism who presents with edema, hypertension and loose stools.    ASSESSMENT      Discharge Assessment  Patient reports symptoms are: Improved  Does the patient have all of their medications?: Yes  Does patient know what their new medications are for?: Yes  Does patient have a follow-up appointment scheduled?: No  Does patient have any other questions or concerns?: No    Post-op  Did the patient have surgery or a procedure: No  Fever: No  Chills: No  Eating & Drinking: eating and drinking without complaints/concerns  PO Intake: regular diet  Bowel Function: normal  Urinary Status: voiding without complaint/concerns    PLAN                                                      Outpatient Plan:      Follow up with primary care provider, TAY BREWER, within 7 days for hospital follow- up.  The following labs/tests are recommended: BMP, CBC.    Follow up with cardiology, as soon as possible regarding new diagnosis of right heart dysfunction. No follow up labs or test are needed.   Follow up with [facility] for diuretics management.   Follow up with anemia clinic, as soon as possible regarding diagnosis of anemia.      No future appointments.        Evelina Sharma, [facility]

## 2020-03-23 NOTE — PROGRESS NOTES
Per Dr. Rodriguez's note:    Joshua Lala 53 yo F S/P DDKT 2007 adm with diarrhea X 24 hours and TOSIN.  Echo: increased AS/AI and pulm hypertension. Developed hyponatremia when she was put on chlorthalidone so she ended up here for way too long.  U Na 15 suggests cardiac etiol plus high fluid inake. Hx of hyponatremia in April attributed to thiazide.  WBC 2.7 and CMV borderline so I had asked them to decrease MMF to 750 bid but never happened so I just ordered that. I ordered a recheck of CMV tomorrow in case she gets lost out there in the virtual clinic system.  BL Cr had been 1.7 range, now up to 2.1. Nephrotic range prot for 3 years!  Now down to 1.3 g/g on losartan.  She was seen in June 2019 and team decided no biopsy.  She should leave tomorrow.  I set her up in anemia clinic and [facility].  Also transplant clinic.     Should get repeat lab in 2 weeks with CMV PCR, renal panel and CBC.  Prob does not need to be seen since she is going to be followed for volume by [facility].  She needs OP pulm hypertension clinic consult.  I am pessimistic that this was arranged.  I really wanted inpt consult but they would not do it. Thanks V

## 2020-03-24 ENCOUNTER — TELEPHONE (OUTPATIENT)
Dept: CARDIOLOGY | Facility: CLINIC | Age: 55
End: 2020-03-24

## 2020-03-24 NOTE — TELEPHONE ENCOUNTER
[facility] Call Center    Phone Message    May a detailed message be left on voicemail: yes     Reason for Call: Appointment Intake    Referring Provider Name: Dr. Charley Rodriguez  Diagnosis and/or Symptoms: Pulmonary Hypertension    Per staff message from [facility], referral/records in system    Action Taken: Message routed to:  [facility] Cardiology Clinic    Travel Screening: Not Applicable

## 2020-03-25 ENCOUNTER — TELEPHONE (OUTPATIENT)
Dept: TRANSPLANT | Facility: CLINIC | Age: 55
End: 2020-03-25

## 2020-03-25 ENCOUNTER — HOSPITAL ENCOUNTER (EMERGENCY)
Facility: CLINIC | Age: 55
Discharge: HOME | End: 2020-03-25
Attending: EMERGENCY MEDICINE | Admitting: EMERGENCY MEDICINE
Payer: MEDICARE

## 2020-03-25 ENCOUNTER — NURSE TRIAGE (OUTPATIENT)
Dept: NURSING | Facility: CLINIC | Age: 55
End: 2020-03-25

## 2020-03-25 VITALS
TEMPERATURE: 97.6 F | SYSTOLIC BLOOD PRESSURE: 186 MMHG | OXYGEN SATURATION: 97 % | WEIGHT: 156 LBS | RESPIRATION RATE: 20 BRPM | HEIGHT: 60 IN | HEART RATE: 71 BPM | BODY MASS INDEX: 30.63 KG/M2 | DIASTOLIC BLOOD PRESSURE: 94 MMHG

## 2020-03-25 DIAGNOSIS — R34 DECREASED URINE OUTPUT: ICD-10-CM

## 2020-03-25 DIAGNOSIS — E87.1 HYPONATREMIA: ICD-10-CM

## 2020-03-25 DIAGNOSIS — N18.9 CHRONIC KIDNEY DISEASE, UNSPECIFIED CKD STAGE: ICD-10-CM

## 2020-03-25 LAB
ALBUMIN SERPL-MCNC: 3.5 G/DL (ref 3.4–5)
ALBUMIN UR-MCNC: 30 MG/DL
ALP SERPL-CCNC: 125 U/L (ref 40–150)
ALT SERPL W P-5'-P-CCNC: 13 U/L (ref 0–50)
ANION GAP SERPL CALCULATED.3IONS-SCNC: 6 MMOL/L (ref 3–14)
APPEARANCE UR: CLEAR
AST SERPL W P-5'-P-CCNC: 24 U/L (ref 0–45)
BASOPHILS # BLD AUTO: 0 10E9/L (ref 0–0.2)
BASOPHILS NFR BLD AUTO: 0.9 %
BILIRUB SERPL-MCNC: 0.6 MG/DL (ref 0.2–1.3)
BILIRUB UR QL STRIP: NEGATIVE
BUN SERPL-MCNC: 19 MG/DL (ref 7–30)
CALCIUM SERPL-MCNC: 9 MG/DL (ref 8.5–10.1)
CHLORIDE SERPL-SCNC: 92 MMOL/L (ref 94–109)
CO2 SERPL-SCNC: 27 MMOL/L (ref 20–32)
COLOR UR AUTO: ABNORMAL
CREAT SERPL-MCNC: 2.16 MG/DL (ref 0.52–1.04)
DIFFERENTIAL METHOD BLD: ABNORMAL
EOSINOPHIL # BLD AUTO: 0.1 10E9/L (ref 0–0.7)
EOSINOPHIL NFR BLD AUTO: 2.1 %
ERYTHROCYTE [DISTWIDTH] IN BLOOD BY AUTOMATED COUNT: 12.9 % (ref 10–15)
GFR SERPL CREATININE-BSD FRML MDRD: 25 ML/MIN/{1.73_M2}
GLUCOSE SERPL-MCNC: 162 MG/DL (ref 70–99)
GLUCOSE UR STRIP-MCNC: NEGATIVE MG/DL
HCT VFR BLD AUTO: 31.6 % (ref 35–47)
HGB BLD-MCNC: 9.7 G/DL (ref 11.7–15.7)
HGB UR QL STRIP: NEGATIVE
IMM GRANULOCYTES # BLD: 0 10E9/L (ref 0–0.4)
IMM GRANULOCYTES NFR BLD: 0.3 %
INTERPRETATION ECG - MUSE: NORMAL
KETONES UR STRIP-MCNC: NEGATIVE MG/DL
LEUKOCYTE ESTERASE UR QL STRIP: ABNORMAL
LYMPHOCYTES # BLD AUTO: 0.7 10E9/L (ref 0.8–5.3)
LYMPHOCYTES NFR BLD AUTO: 19.1 %
MCH RBC QN AUTO: 27.3 PG (ref 26.5–33)
MCHC RBC AUTO-ENTMCNC: 30.7 G/DL (ref 31.5–36.5)
MCV RBC AUTO: 89 FL (ref 78–100)
MONOCYTES # BLD AUTO: 0.5 10E9/L (ref 0–1.3)
MONOCYTES NFR BLD AUTO: 14.1 %
NEUTROPHILS # BLD AUTO: 2.2 10E9/L (ref 1.6–8.3)
NEUTROPHILS NFR BLD AUTO: 63.5 %
NITRATE UR QL: NEGATIVE
NRBC # BLD AUTO: 0 10*3/UL
NRBC BLD AUTO-RTO: 0 /100
NT-PROBNP SERPL-MCNC: 3052 PG/ML (ref 0–900)
PH UR STRIP: 6 PH (ref 5–7)
PLATELET # BLD AUTO: 188 10E9/L (ref 150–450)
POTASSIUM SERPL-SCNC: 3.6 MMOL/L (ref 3.4–5.3)
PROT SERPL-MCNC: 7.4 G/DL (ref 6.8–8.8)
RBC # BLD AUTO: 3.55 10E12/L (ref 3.8–5.2)
RBC #/AREA URNS AUTO: <1 /HPF (ref 0–2)
SODIUM SERPL-SCNC: 125 MMOL/L (ref 133–144)
SOURCE: ABNORMAL
SP GR UR STRIP: 1 (ref 1–1.03)
SQUAMOUS #/AREA URNS AUTO: 1 /HPF (ref 0–1)
T4 FREE SERPL-MCNC: 0.96 NG/DL (ref 0.76–1.46)
TRANS CELLS #/AREA URNS HPF: <1 /HPF (ref 0–1)
TROPONIN I SERPL-MCNC: 0.02 UG/L (ref 0–0.04)
TSH SERPL DL<=0.005 MIU/L-ACNC: 4.67 MU/L (ref 0.4–4)
UROBILINOGEN UR STRIP-MCNC: NORMAL MG/DL (ref 0–2)
WBC # BLD AUTO: 3.4 10E9/L (ref 4–11)
WBC #/AREA URNS AUTO: 5 /HPF (ref 0–5)

## 2020-03-25 PROCEDURE — 93010 ELECTROCARDIOGRAM REPORT: CPT | Mod: Z6 | Performed by: EMERGENCY MEDICINE

## 2020-03-25 PROCEDURE — 84484 ASSAY OF TROPONIN QUANT: CPT | Performed by: EMERGENCY MEDICINE

## 2020-03-25 PROCEDURE — 51798 US URINE CAPACITY MEASURE: CPT | Performed by: EMERGENCY MEDICINE

## 2020-03-25 PROCEDURE — 99285 EMERGENCY DEPT VISIT HI MDM: CPT | Mod: 25 | Performed by: EMERGENCY MEDICINE

## 2020-03-25 PROCEDURE — 85025 COMPLETE CBC W/AUTO DIFF WBC: CPT | Performed by: EMERGENCY MEDICINE

## 2020-03-25 PROCEDURE — 83880 ASSAY OF NATRIURETIC PEPTIDE: CPT | Performed by: EMERGENCY MEDICINE

## 2020-03-25 PROCEDURE — 84443 ASSAY THYROID STIM HORMONE: CPT | Performed by: EMERGENCY MEDICINE

## 2020-03-25 PROCEDURE — 99284 EMERGENCY DEPT VISIT MOD MDM: CPT | Mod: 25 | Performed by: EMERGENCY MEDICINE

## 2020-03-25 PROCEDURE — 84439 ASSAY OF FREE THYROXINE: CPT | Performed by: EMERGENCY MEDICINE

## 2020-03-25 PROCEDURE — 80053 COMPREHEN METABOLIC PANEL: CPT | Performed by: EMERGENCY MEDICINE

## 2020-03-25 PROCEDURE — 93005 ELECTROCARDIOGRAM TRACING: CPT | Performed by: EMERGENCY MEDICINE

## 2020-03-25 PROCEDURE — 81001 URINALYSIS AUTO W/SCOPE: CPT | Performed by: EMERGENCY MEDICINE

## 2020-03-25 ASSESSMENT — MIFFLIN-ST. JEOR: SCORE: 1229.11

## 2020-03-25 NOTE — TELEPHONE ENCOUNTER
Patient Call: Transplant Illness  Daughter Nichelle called;  Is concerned her mom is not feeling well.  Please call Joshua Lala with [facility]      Transplanted organ? kidney  Illness: Other Not feeling well per daughter

## 2020-03-25 NOTE — ED PROVIDER NOTES
History   No chief complaint on file.    CHRISTOS Lala is a 54 year old female with a past medical history of kidney transplant (first transplant in , with chronic rejection, failed in , second transplant 2007), hypercalcemia, hypertension, hyponatremia, GERD, hyperparathyroidism, leukopenia, PE, EBV who presents to the emergency department with a chief complaint of low urine output.  The patient called her physician who directed her to come to the emergency department.  The patient was recently admitted to the hospital for heart failure and hyponatremia, discharged on 3/21/2020 on Bumex 3 mg twice daily after renal transplant.  Her sodium was 126 on the day of discharge.  The patient had no urine output last night through the day today.  However, she states she did urinate at 5 PM.  Denies dysuria or hematuria.  She states that she will be able to give a sample of urine now.  The patient has associated symptoms of dizziness and generalized weakness, even at rest.  The patient sees Dr. Bah.  The patient denies fevers, cough, abdominal pain, flank pain, worsening leg swelling, or other symptoms. She states that she has been taking her Bumex as directed,     History was obtained with the aid of a SERPs  via iPad    I have reviewed the Medications, Allergies, Past Medical and Surgical History, and Social History in the [facility] system.    Past Medical History:   Diagnosis Date     EBV (Silvestre-Barr virus) viremia      GERD (gastroesophageal reflux disease) 2008     HSP (Henoch Schonlein purpura) (H)      Hyperparathyroidism (H)      Leukopenia 2016     PE (pulmonary thromboembolism) (H)     coumadin  -      Tertiary hyperparathyroidism (H)      Past Surgical History:   Procedure Laterality Date     CHOLECYSTECTOMY       TRANSPLANT KIDNEY RECIPIENT  DONOR      failed 2003      TRANSPLANT KIDNEY RECIPIENT  DONOR      DDKT B cell pos crossmatch treated  with PLEX     No current facility-administered medications for this encounter.      Current Outpatient Medications   Medication     bumetanide (BUMEX) 1 MG tablet     calcitRIOL (ROCALTROL) 0.25 MCG capsule     calcium carbonate (TUMS) 500 MG chewable tablet     CELLCEPT (BRAND) 250 MG capsule     [START ON 3/31/2020] darbepoetin deana-polysorbate (ARANESP) 25 MCG/0.42ML injection     losartan (COZAAR) 25 MG tablet     NEORAL (BRAND) 25 MG capsule     omeprazole (PRILOSEC) 20 MG DR capsule     sulfamethoxazole-trimethoprim (BACTRIM) 400-80 MG tablet     Allergies   Allergen Reactions     Contrast Dye Shortness Of Breath     Other reaction(s): Angioedema     Past medical history, past surgical history, medications, and allergies were reviewed with the patient. Additional pertinent items: None    Social History     Socioeconomic History     Marital status:      Spouse name: Not on file     Number of children: Not on file     Years of education: Not on file     Highest education level: Not on file   Occupational History     Not on file   Social Needs     Financial resource strain: Not on file     Food insecurity     Worry: Not on file     Inability: Not on file     Transportation needs     Medical: Not on file     Non-medical: Not on file   Tobacco Use     Smoking status: Never Smoker     Smokeless tobacco: Never Used   Substance and Sexual Activity     Alcohol use: No     Drug use: No     Sexual activity: Not on file     Comment: ERINN- need    Lifestyle     Physical activity     Days per week: Not on file     Minutes per session: Not on file     Stress: Not on file   Relationships     Social connections     Talks on phone: Not on file     Gets together: Not on file     Attends Yarsanism service: Not on file     Active member of club or organization: Not on file     Attends meetings of clubs or organizations: Not on file     Relationship status: Not on file     Intimate partner violence     Fear of  current or ex partner: Not on file     Emotionally abused: Not on file     Physically abused: Not on file     Forced sexual activity: Not on file   Other Topics Concern     Not on file   Social History Narrative     Not on file     Social history was reviewed with the patient. Additional pertinent items: None    Review of Systems  General: No fevers or chills  Skin: No rash or diaphoresis  Eyes: No eye redness or discharge  Ears/Nose/Throat: No rhinorrhea or nasal congestion  Respiratory: No cough or SOB  Cardiovascular: No chest pain or palpitations  Gastrointestinal: No nausea, vomiting, or diarrhea  Genitourinary: No urinary frequency, hematuria, or dysuria; positive for decreased UOP  Musculoskeletal: No arthralgias or myalgias  Neurologic: No numbness or weakness  Hematologic/Lymphatic/Immunologic: No leg swelling, no easy bruising/bleeding  Endocrine: No polyuria/polydypsia    A complete review of systems was performed with pertinent positives and negatives noted in the HPI, and all other systems negative.    Physical Exam   BP: (!) 181/81  Heart Rate: 81  Temp: 97.6  F (36.4  C)  Resp: 20  Height: 152.4 cm (5')  SpO2: 92 %      General: Well nourished, well developed, NAD  HEENT: EOMI, anicteric. NCAT, MMM  Neck: no jugular venous distension, supple, nl ROM  Cardiac: Regular rate and rhythm, extremities well perfused, peripheral pulses intact  Pulm: NLB, airway patent  Abd: Soft, nontender, nondistended.  No masses palpated.  No CVA TTP  Skin: Warm and dry to the touch.  No rash  Extremities: No LE edema, no cyanosis, w/w/p  Neuro: Alert and oriented x 4, no facial droop, CN II-XII intact, moving all 4 extremities, sensation intact throughout, no nystagmus, PERRL, EOMI.      ED Course        Procedures             EKG Interpretation:      Interpreted by Gina Lind MD  Time reviewed: 1928  Symptoms at time of EKG: none    Rhythm: normal sinus   Rate: normal  Axis: normal  Ectopy: none  Conduction:  normal  ST Segments/ T Waves: No ST-T wave changes  Q Waves: none  Comparison to prior: Unchanged from 3/15/20    Clinical Impression: normal EKG                        Labs Ordered and Resulted from Time of ED Arrival Up to the Time of Departure from the ED   CBC WITH PLATELETS DIFFERENTIAL - Abnormal; Notable for the following components:       Result Value    WBC 3.4 (*)     RBC Count 3.55 (*)     Hemoglobin 9.7 (*)     Hematocrit 31.6 (*)     MCHC 30.7 (*)     Absolute Lymphocytes 0.7 (*)     All other components within normal limits   COMPREHENSIVE METABOLIC PANEL - Abnormal; Notable for the following components:    Sodium 125 (*)     Chloride 92 (*)     Glucose 162 (*)     Creatinine 2.16 (*)     GFR Estimate 25 (*)     GFR Estimate If Black 29 (*)     All other components within normal limits   NT PROBNP INPATIENT - Abnormal; Notable for the following components:    N-Terminal Pro BNP Inpatient 3,052 (*)     All other components within normal limits   TSH WITH FREE T4 REFLEX - Abnormal; Notable for the following components:    TSH 4.67 (*)     All other components within normal limits   UA MACROSCOPIC WITH REFLEX TO MICRO AND CULTURE - Abnormal; Notable for the following components:    Protein Albumin Urine 30 (*)     Leukocyte Esterase Urine Small (*)     All other components within normal limits   TROPONIN I   T4 FREE   T4 FREE   BLADDER SCAN   PERIPHERAL IV CATHETER            Results for orders placed or performed during the hospital encounter of 03/25/20 (from the past 24 hour(s))   EKG 12-lead, tracing only   Result Value Ref Range    Interpretation ECG Click View Image link to view waveform and result    CBC with platelets differential   Result Value Ref Range    WBC 3.4 (L) 4.0 - 11.0 10e9/L    RBC Count 3.55 (L) 3.8 - 5.2 10e12/L    Hemoglobin 9.7 (L) 11.7 - 15.7 g/dL    Hematocrit 31.6 (L) 35.0 - 47.0 %    MCV 89 78 - 100 fl    MCH 27.3 26.5 - 33.0 pg    MCHC 30.7 (L) 31.5 - 36.5 g/dL    RDW 12.9  10.0 - 15.0 %    Platelet Count 188 150 - 450 10e9/L    Diff Method Automated Method     % Neutrophils 63.5 %    % Lymphocytes 19.1 %    % Monocytes 14.1 %    % Eosinophils 2.1 %    % Basophils 0.9 %    % Immature Granulocytes 0.3 %    Nucleated RBCs 0 0 /100    Absolute Neutrophil 2.2 1.6 - 8.3 10e9/L    Absolute Lymphocytes 0.7 (L) 0.8 - 5.3 10e9/L    Absolute Monocytes 0.5 0.0 - 1.3 10e9/L    Absolute Eosinophils 0.1 0.0 - 0.7 10e9/L    Absolute Basophils 0.0 0.0 - 0.2 10e9/L    Abs Immature Granulocytes 0.0 0 - 0.4 10e9/L    Absolute Nucleated RBC 0.0    Comprehensive metabolic panel   Result Value Ref Range    Sodium 125 (L) 133 - 144 mmol/L    Potassium 3.6 3.4 - 5.3 mmol/L    Chloride 92 (L) 94 - 109 mmol/L    Carbon Dioxide 27 20 - 32 mmol/L    Anion Gap 6 3 - 14 mmol/L    Glucose 162 (H) 70 - 99 mg/dL    Urea Nitrogen 19 7 - 30 mg/dL    Creatinine 2.16 (H) 0.52 - 1.04 mg/dL    GFR Estimate 25 (L) >60 mL/min/[1.73_m2]    GFR Estimate If Black 29 (L) >60 mL/min/[1.73_m2]    Calcium 9.0 8.5 - 10.1 mg/dL    Bilirubin Total 0.6 0.2 - 1.3 mg/dL    Albumin 3.5 3.4 - 5.0 g/dL    Protein Total 7.4 6.8 - 8.8 g/dL    Alkaline Phosphatase 125 40 - 150 U/L    ALT 13 0 - 50 U/L    AST 24 0 - 45 U/L   Troponin I   Result Value Ref Range    Troponin I ES 0.016 0.000 - 0.045 ug/L   Nt probnp inpatient (BNP)   Result Value Ref Range    N-Terminal Pro BNP Inpatient 3,052 (H) 0 - 900 pg/mL   TSH with free T4 reflex   Result Value Ref Range    TSH 4.67 (H) 0.40 - 4.00 mU/L   T4 free   Result Value Ref Range    T4 Free 0.96 0.76 - 1.46 ng/dL   UA reflex to Microscopic and Culture    Specimen: Urine Midstream; Midstream Urine   Result Value Ref Range    Color Urine Light Yellow     Appearance Urine Clear     Glucose Urine Negative NEG^Negative mg/dL    Bilirubin Urine Negative NEG^Negative    Ketones Urine Negative NEG^Negative mg/dL    Specific Gravity Urine 1.004 1.003 - 1.035    Blood Urine Negative NEG^Negative    pH Urine  6.0 5.0 - 7.0 pH    Protein Albumin Urine 30 (A) NEG^Negative mg/dL    Urobilinogen mg/dL Normal 0.0 - 2.0 mg/dL    Nitrite Urine Negative NEG^Negative    Leukocyte Esterase Urine Small (A) NEG^Negative    Source Midstream Urine     RBC Urine <1 0 - 2 /HPF    WBC Urine 5 0 - 5 /HPF    Squamous Epithelial /HPF Urine 1 0 - 1 /HPF    Transitional Epi <1 0 - 1 /HPF       Labs, vital signs, and imaging studies were reviewed by me.    Medications - No data to display    Assessments & Plan (with Medical Decision Making)   Joshua Lala is a 54 year old female who presents with decreased urine output.     Labs are stable from previous values.      I have reviewed the nursing notes.    I have reviewed the findings, diagnosis, plan and need for follow up with the patient.    2100 Pt was d/w nephrology, they have reviewed pt's labs and recommend discharge home. POt to continue Bumex and f/u with nephrology outpatient.     Patient to be discharged home. Advised to follow up with nephrology within 1 week. To return to ER immediately with any new/worsening symptoms. Plan of care discussed with patient who expresses understanding and agrees with plan of care.      New Prescriptions    No medications on file       Final diagnoses:   Decreased urine output   Hyponatremia   Chronic kidney disease, unspecified CKD stage       3/25/2020   [facility], [place], EMERGENCY DEPARTMENT     Gina Lind MD  03/26/20 [id number]

## 2020-03-25 NOTE — TELEPHONE ENCOUNTER
CALL BACK:  Returned called from patient's daughter, Nichelle.  - her mother has no urine output  Called patient for assessment  - discharged from [facility] on 3/21/20 after treatment for heart failure and hyponatremia  - sodium 126 on day of discharge  - sent home on Bumex 3 mg BID  - unable to urinate overnight or through the day today  - feels dizzy and weak when sitting, worse when standing up  - status post kidney transplant 11/28/2007    PLAN: reviewed with Dr. Bah  Recommend patient come to ED now for assessment and treatment  - called patient who verbalized understanding and agreement with plan  - called ED triage nurse with report

## 2020-03-25 NOTE — ED TRIAGE NOTES
"Pt c/o decrease in urine over the past month. Hx of kidney transplant in 2007. Pt also c/o of dizziness. Pt states she has not been able to eat much d/t \"I swell when I drink and eat.\"     Pt is Hmong speaking.  used for triage.   "

## 2020-03-25 NOTE — TELEPHONE ENCOUNTER
[facility] - Transplant center -   Dc'd on bumex 3 mg BID post renal transplant  No urine last night through day today, weak, even when sitting. [address] interpretor - arrival around 1830.    Called ED and gave them heads up.    Teresa Travis RN on 3/25/2020 at 5:53 PM      Reason for Disposition    Health Information question, no triage required and triager able to answer question    Additional Information    Negative: [1] Caller is not with the adult (patient) AND [2] reporting urgent symptoms    Negative: Lab result questions    Negative: Medication questions    Negative: Caller can't be reached by phone    Negative: Caller has already spoken to PCP or another triager    Negative: RN needs further essential information from caller in order to complete triage    Negative: Requesting regular office appointment    Negative: [1] Caller requesting NON-URGENT health information AND [2] PCP's office is the best resource    Protocols used: INFORMATION ONLY CALL-A-

## 2020-03-25 NOTE — ED AVS SNAPSHOT
[facility], [place], Emergency Department  [address] [facility] [address]  Phone:  [phone]                                    Joshua Lala   MRN: [id number]    Department:  [facility], Emergency Department   Date of Visit:  3/25/2020           After Visit Summary Signature Page    I have received my discharge instructions, and my questions have been answered. I have discussed any challenges I see with this plan with the nurse or doctor.    ..........................................................................................................................................  Patient/Patient Representative Signature      ..........................................................................................................................................  Patient Representative Print Name and Relationship to Patient    ..................................................               ................................................  Date                                   Time    ..........................................................................................................................................  Reviewed by Signature/Title    ...................................................              ..............................................  Date                                               Time          22EPIC Rev 08/18

## 2020-03-26 ENCOUNTER — TELEPHONE (OUTPATIENT)
Dept: TRANSPLANT | Facility: CLINIC | Age: 55
End: 2020-03-26

## 2020-03-26 NOTE — TELEPHONE ENCOUNTER
Contacted pt's daughter, Nichelle, per notes. Scheduled telephone visit on April 15. Nichelle stated that the pt is supposed to have labs drawn. Stated that when Dr. Jim talks w/ the pt's mother, he will determine if the labs are urgent. Josenitza stated she will let the pt know.    No further follow-up completed and encounter closed.    Chantal Lala  Clinic   Pulmonary Hypertension  [facility]  (P) [phone]

## 2020-03-26 NOTE — TELEPHONE ENCOUNTER
Call placed to patient with [facility] . Patient did not answer and voicemail has not been set up. Then spoke with daughter, Nichelle.   Let her know she should receive a call regarding a follow up appt with transplant nephrologist from schedulers. If she does not hear anything regarding this appointment she should call schedulers at [phone]. Informed her that Joshua should get labs within the next couple weeks as well. Verbalized understanding.   __________________________________________________________  RE: patient seen in ED   Received: Today   Message Contents   Bernice Ye RN Spong, Richard Steven, MD    Cc: Janice Belcher RN Dr. Spong,     Do we have permission to overbook for this appointment in the next couple weeks?     Bernice    Previous Messages     ----- Message -----   From: Akil Bah MD   Sent: 3/26/2020  10:30 AM CDT   To: Carmelo Long RN, Janice Belcher RN   Subject: FW: patient seen in ED                           Please see below.     Let's get her a follow up visit (telephone call) in the next couple of weeks.   ----- Message -----   From: Fili Durant MD   Sent: 3/25/2020   9:08 PM CDT   To: Akil Bah MD, Pepe Leary MD   Subject: patient seen in ED                               Pepe Fischer   I got called from [facility] ED re Ms Lala who presented to ED with report of decreased voiding (reportedly no voiding for a day).  In the ED she voided twice.  Her labs are stable (including Cr, and Na).  No other symptoms.     It seemed that the report of no urine output x 1 day may have been a miscommunication (?)   I concurred with ED MD that there was no indication for admission.   I do not see a follow up appointment for her with Transplant nephrology.   Can you all make sure she has follow up  with labs?   Thanks a lot.   Fili Durant MD   Division of Renal Disease and Hypertension   Pager: [phone]   March 25,  2020   9:11 PM

## 2020-03-26 NOTE — DISCHARGE INSTRUCTIONS
TODAY'S VISIT:  You were seen today for decreased urine output  -   - If you had any labs or imaging/radiology tests performed today, you should also discuss these tests with your usual provider.     FOLLOW-UP:  Please make an appointment to follow up with:  - Your Primary Care Provider. If you do not have a PCP, please call the Primary Care Center (phone: [phone] for an appointment  - your nephrologist, Dr. Bah    - Have your provider review the results from today's visit with you again to make sure no further follow-up or additional testing is needed based on those results.     PRESCRIPTIONS / MEDICATIONS:  - continue your Bumex as directed    RETURN TO THE EMERGENCY DEPARTMENT  Return to the Emergency Department at any time for any new or worsening symptoms or any concerns.

## 2020-03-27 NOTE — TELEPHONE ENCOUNTER
Spoke with the patient and confirmed Nephrology phone visit appointments on 4/21/20.  .      Addendum:  RE: Transplant Nephrology Telephone appt in next couple weeks   Received: 3 days ago   Message Contents   Kaveh Francois, Bernice Camacho, PEPPER               Actually there was a sooner apt on 4/16 Pakou was ok with me changing the date.    Previous Messages

## 2020-04-07 DIAGNOSIS — Z94.0 KIDNEY REPLACED BY TRANSPLANT: Primary | ICD-10-CM

## 2020-04-07 DIAGNOSIS — D64.9 ANEMIA: ICD-10-CM

## 2020-04-08 ENCOUNTER — TELEPHONE (OUTPATIENT)
Dept: PHARMACY | Facility: CLINIC | Age: 55
End: 2020-04-08

## 2020-04-08 DIAGNOSIS — N18.4 ANEMIA OF CHRONIC RENAL FAILURE, STAGE 4 (SEVERE) (H): ICD-10-CM

## 2020-04-08 DIAGNOSIS — D63.1 ANEMIA OF CHRONIC RENAL FAILURE, STAGE 4 (SEVERE) (H): ICD-10-CM

## 2020-04-08 DIAGNOSIS — N18.4 CKD (CHRONIC KIDNEY DISEASE) STAGE 4, GFR 15-29 ML/MIN (H): Primary | ICD-10-CM

## 2020-04-08 NOTE — TELEPHONE ENCOUNTER
Anemia Management Note - Enrollment  SUBJECTIVE/OBJECTIVE:    Referred by Dr. Akil Bah on 2020  Primary Diagnosis: Anemia in Chronic Kidney Disease (N18.4, D63.1)     Secondary Diagnosis:  Chronic Kidney Disease, Stage 4 (N18.4)   Kidney Tx: 2007 & 1998  Hgb goal range:  9-10  Epo/Darbo: Aranesp 25mcg every 14 days for Hgb <10 (per discharge orders) * Need to send Rx  Iron regimen:  TBD  Labs : 2021  Recent EDMUND use, transfusion, IV iron: *Aranesp 25mcg x 1 while admitted  RX/TX plans : TBD  No history of stroke, MI and blood clots or cancers    *Pt does not speak English, please call pts daughter - Paknitza (Pack - oo) at [phone].    Contact:  No Boxes checked on Consent to Communicate.     Anemia Latest Ref Rng & Units 3/16/2020 3/17/2020 3/18/2020 3/19/2020 3/20/2020 3/21/2020 3/25/2020   Hemoglobin 11.7 - 15.7 g/dL 8.7(L) 8.7(L) 8.5(L) 8.9(L) 8.4(L) 8.7(L) 9.7(L)   TSAT 15 - 46 % - 23 - - - - -       BP Readings from Last 3 Encounters:   20 (!) 186/94   20 (!) 158/77   20 (!) 182/82     Wt Readings from Last 2 Encounters:   20 70.8 kg (156 lb)   20 74.6 kg (164 lb 6.4 oz)     Current Outpatient Medications   Medication Sig Dispense Refill     bumetanide (BUMEX) 1 MG tablet Take 3 tablets (3 mg) by mouth 2 times daily 180 tablet 0     calcitRIOL (ROCALTROL) 0.25 MCG capsule Take 1 capsule (0.25 mcg) by mouth daily 30 capsule 0     calcium carbonate (TUMS) 500 MG chewable tablet Take 1 chew tab by mouth 2 times daily       CELLCEPT (BRAND) 250 MG capsule Take 3 capsules (750 mg) by mouth 2 times daily 240 capsule 0     darbepoetin deana-polysorbate (ARANESP) 25 MCG/0.42ML injection Inject 0.42 mLs (25 mcg) Subcutaneous every 14 days Dosed with anemia clinic.       losartan (COZAAR) 25 MG tablet Take 1 tablet (25 mg) by mouth daily 90 tablet 0     NEORAL (BRAND) 25 MG capsule Take 25 mg by mouth 2 times daily DAW1       omeprazole (PRILOSEC) 20 MG  DR capsule Take 1 capsule (20 mg) by mouth 2 times daily 60 capsule 0     sulfamethoxazole-trimethoprim (BACTRIM) 400-80 MG tablet Take 1 tablet by mouth daily 30 tablet 11     ASSESSMENT:  Hgb At goal   Ferritin: Due for labs  TSat: Due for labs  Iron regimen recommended: TBD  Recommended EDMUND regimen to initiate when Hgb < 9  Blood Pressure: Hx of HTN    PLAN:  1. Nichelle called today for enrollment in Anemia Management Service. LVM  4/13/20;  Spoke with Nichelle re Anemia Services  2. Discussed:  anemia overview, monitoring service and goal hemoglobin range and rationale and risks of EDMUND blood clots, stroke and increase in blood pressure  3. Dose location: TBD  4. Labs: TBD  5. Pharmacy: TBD      Aranesp 25mcg given on 3/21/2020. LVM for Nichelle.  Need to send Rx to Pharmacy    4/13/20: Per Nichelle, Joshua is feeling pretty good right now, she just psoke with her this morning.  Labs are on hold d/t COVID-19.  Will follow up again in 2 weeks.  No EDMUND therapy ordered at this time.       Next call date:  4/27/20  Call to check in.     Chelle Sapp RN   Anemia Services  [facility] Services  [address] [place] [address]   [email or web address]   Office : [phone]  Fax: [phone]

## 2020-04-15 ENCOUNTER — VIRTUAL VISIT (OUTPATIENT)
Dept: CARDIOLOGY | Facility: CLINIC | Age: 55
End: 2020-04-15
Attending: INTERNAL MEDICINE
Payer: MEDICARE

## 2020-04-15 VITALS
SYSTOLIC BLOOD PRESSURE: 150 MMHG | HEIGHT: 60 IN | BODY MASS INDEX: 29.51 KG/M2 | DIASTOLIC BLOOD PRESSURE: 92 MMHG | WEIGHT: 150.3 LBS

## 2020-04-15 DIAGNOSIS — I27.20 PULMONARY HYPERTENSION (H): Primary | ICD-10-CM

## 2020-04-15 ASSESSMENT — MIFFLIN-ST. JEOR: SCORE: 1203.26

## 2020-04-16 ENCOUNTER — TELEPHONE (OUTPATIENT)
Dept: TRANSPLANT | Facility: CLINIC | Age: 55
End: 2020-04-16

## 2020-04-16 ENCOUNTER — VIRTUAL VISIT (OUTPATIENT)
Dept: NEPHROLOGY | Facility: CLINIC | Age: 55
End: 2020-04-16
Attending: INTERNAL MEDICINE
Payer: MEDICARE

## 2020-04-16 DIAGNOSIS — Z48.298 AFTERCARE FOLLOWING ORGAN TRANSPLANT: Primary | ICD-10-CM

## 2020-04-16 DIAGNOSIS — Z94.0 KIDNEY TRANSPLANTED: Primary | ICD-10-CM

## 2020-04-16 NOTE — TELEPHONE ENCOUNTER
Per Dr. Johnston patient needs repeat labs to follow-up on low sodium levles.    Plan:  Call Joshua and have her get a full set of transplant labs in the coming week per Dr. Johnston's recommendation.    LPN Task:  Please call Joshua with the above plan.

## 2020-04-16 NOTE — LETTER
PHYSICIAN ORDERS      DATE & TIME ISSUED: 2020 5:10 PM  PATIENT NAME: Joshua Lala   : 1965     [facility] MR# [if applicable]: [id number]     DIAGNOSIS:  Kidney transplant   ICD-10 CODE: Z94.0     Please complete the following labs in one week  Cyclosporine Level   CBC  BMP    Any questions please call: [phone] option 5    Please fax results to [phone].      Moe Johnston

## 2020-04-16 NOTE — TELEPHONE ENCOUNTER
Per Dr. Jonhston patient needs repeat labs to follow-up on low sodium levels.     Plan:  Call Joshua and have her get a full set of transplant labs in the coming week per Dr. Johnston's recommendation.     LPN Task:  Please call Joshua with the above plan.

## 2020-04-20 ENCOUNTER — TELEPHONE (OUTPATIENT)
Dept: TRANSPLANT | Facility: CLINIC | Age: 55
End: 2020-04-20

## 2020-04-20 NOTE — TELEPHONE ENCOUNTER
Patient Call: Voicemail  Date/Time: 4/20/20 @ 11:12 AM  Reason for call: Patients daughter called regarding making a lab appt. Please reach out to the patient to verify if a lab appt is wanted by the provider.    Outcome:  Returned a call to Joshua's daughter, Nichelle. Explained to her that Dr. Johnston would like to see a set of labs drawn this week due continued hyponatremia seen on Joshua's last set of labs. Nichelle stated that they do have an appointment for Friday morning that they will keep.

## 2020-04-21 ENCOUNTER — APPOINTMENT (OUTPATIENT)
Dept: NEPHROLOGY | Facility: CLINIC | Age: 55
End: 2020-04-21
Attending: INTERNAL MEDICINE
Payer: MEDICARE

## 2020-04-24 DIAGNOSIS — N18.4 ANEMIA OF CHRONIC RENAL FAILURE, STAGE 4 (SEVERE) (H): ICD-10-CM

## 2020-04-24 DIAGNOSIS — D63.1 ANEMIA OF CHRONIC RENAL FAILURE, STAGE 4 (SEVERE) (H): ICD-10-CM

## 2020-04-24 DIAGNOSIS — N18.4 CKD (CHRONIC KIDNEY DISEASE) STAGE 4, GFR 15-29 ML/MIN (H): ICD-10-CM

## 2020-04-24 DIAGNOSIS — Z94.0 KIDNEY TRANSPLANTED: ICD-10-CM

## 2020-04-24 LAB
ANION GAP SERPL CALCULATED.3IONS-SCNC: 4 MMOL/L (ref 3–14)
BUN SERPL-MCNC: 18 MG/DL (ref 7–30)
CALCIUM SERPL-MCNC: 9.2 MG/DL (ref 8.5–10.1)
CHLORIDE SERPL-SCNC: 106 MMOL/L (ref 94–109)
CO2 SERPL-SCNC: 25 MMOL/L (ref 20–32)
CREAT SERPL-MCNC: 2.08 MG/DL (ref 0.52–1.04)
CYCLOSPORINE BLD LC/MS/MS-MCNC: 63 UG/L (ref 50–400)
ERYTHROCYTE [DISTWIDTH] IN BLOOD BY AUTOMATED COUNT: 13 % (ref 10–15)
FERRITIN SERPL-MCNC: 299 NG/ML (ref 8–252)
GFR SERPL CREATININE-BSD FRML MDRD: 26 ML/MIN/{1.73_M2}
GLUCOSE SERPL-MCNC: 89 MG/DL (ref 70–99)
HCT VFR BLD AUTO: 34.5 % (ref 35–47)
HGB BLD-MCNC: 10.4 G/DL (ref 11.7–15.7)
IRON SATN MFR SERPL: 21 % (ref 15–46)
IRON SERPL-MCNC: 61 UG/DL (ref 35–180)
MCH RBC QN AUTO: 27 PG (ref 26.5–33)
MCHC RBC AUTO-ENTMCNC: 30.1 G/DL (ref 31.5–36.5)
MCV RBC AUTO: 90 FL (ref 78–100)
PLATELET # BLD AUTO: 153 10E9/L (ref 150–450)
POTASSIUM SERPL-SCNC: 4.3 MMOL/L (ref 3.4–5.3)
RBC # BLD AUTO: 3.85 10E12/L (ref 3.8–5.2)
SODIUM SERPL-SCNC: 135 MMOL/L (ref 133–144)
TIBC SERPL-MCNC: 286 UG/DL (ref 240–430)
TME LAST DOSE: NORMAL H
WBC # BLD AUTO: 3.5 10E9/L (ref 4–11)

## 2020-04-24 PROCEDURE — 80158 DRUG ASSAY CYCLOSPORINE: CPT | Performed by: INTERNAL MEDICINE

## 2020-04-27 ENCOUNTER — TELEPHONE (OUTPATIENT)
Dept: PHARMACY | Facility: CLINIC | Age: 55
End: 2020-04-27

## 2020-04-27 NOTE — TELEPHONE ENCOUNTER
Anemia Management Note  SUBJECTIVE/OBJECTIVE:  Referred by Dr. Akil Bah on 2020  Primary Diagnosis: Anemia in Chronic Kidney Disease (N18.4, D63.1)     Secondary Diagnosis:  Chronic Kidney Disease, Stage 4 (N18.4)   Kidney Tx: 2007 & 1998  Hgb goal range:  9-10  Epo/Darbo: Aranesp 25mcg every 14 days for Hgb <10 (per discharge orders) * Need to send Rx  Iron regimen:  TBD  Labs : 2021  Recent EDMUND use, transfusion, IV iron: *Aranesp 25mcg x 1 while admitted  RX/TX plans : TBD  No history of stroke, MI and blood clots or cancers     *Pt does not speak English, please call pts daughter - Nichelle (Pack - oo) at [phone].     Contact:            No Boxes checked on Consent to Communicate.     Anemia Latest Ref Rng & Units 3/17/2020 3/18/2020 3/19/2020 3/20/2020 3/21/2020 3/25/2020 2020   Hemoglobin 11.7 - 15.7 g/dL 8.7(L) 8.5(L) 8.9(L) 8.4(L) 8.7(L) 9.7(L) 10.4(L)   TSAT 15 - 46 % 23 - - - - - 21   Ferritin 8 - 252 ng/mL - - - - - - 299(H)     BP Readings from Last 3 Encounters:   04/15/20 (!) 150/92   20 (!) 186/94   20 (!) 158/77     Wt Readings from Last 2 Encounters:   04/15/20 68 kg (150 lb)   04/15/20 68.2 kg (150 lb 4.8 oz)           ASSESSMENT:  Hgb:at goal - continue to monitor. No Rx was sent.   TSat: not at goal of >30% Ferritin: At goal (>100ng/mL)    PLAN:  RTC for Hgb in 2 week(s) and Check iron studies in 4 week(s)    Orders needed to be renewed (for next follow-up date) in EPIC: None    Iron labs due:  4 weeks    Plan discussed with:   No call made. Lab review  Plan provided by:  merle    NEXT FOLLOW-UP DATE:  2020    Chelle Sapp RN   Anemia Services  [facility] Services  [address] [place] [address]   [email or web address]   Office : [phone]  Fax: [phone]

## 2020-04-28 ENCOUNTER — TELEPHONE (OUTPATIENT)
Dept: TRANSPLANT | Facility: CLINIC | Age: 55
End: 2020-04-28

## 2020-04-28 NOTE — TELEPHONE ENCOUNTER
Per Dr. Johnstno, new creatinine baseline is around 2.    From: Moe Johnston MD   Sent: 4/24/2020  11:25 PM CDT   To: Arleen Mayes RN     Being followed by core clinic   New baseline ~ 2   ----- Message -----   From: Arleen Mayes RN   Sent: 4/24/2020  12:24 PM CDT   To: Moe Johnston MD     Creatinine sent to provider for review, visit note 4/15/2020 incomplete without known creatinine baseline.

## 2020-05-05 LAB
ALT SERPL-CCNC: 5 LU/L (ref 0–32)
AST SERPL-CCNC: 23 LU/L (ref 0–40)
CHOLESTEROL (EXTERNAL): 183 MG/DL (ref 100–199)
CREATININE (EXTERNAL): 2.15 MG/DL (ref 0.57–1)
GFR ESTIMATED (EXTERNAL): 25 ML/MIN/1.73
GFR ESTIMATED (IF AFRICAN AMERICAN) (EXTERNAL): 29 ML/MIN/1.73
GLUCOSE (EXTERNAL): 104 MG/DL (ref 65–99)
HDLC SERPL-MCNC: 70 MG/DL
LDL CHOLESTEROL CALCULATED (EXTERNAL): 90 MG/DL (ref 0–99)
POTASSIUM (EXTERNAL): 5 MMOL/L (ref 3.5–5.2)
TRIGLYCERIDES (EXTERNAL): 113 MG/DL (ref 0–149)
TSH SERPL-ACNC: 4.9 ULU/ML (ref 0.45–4.5)

## 2020-05-15 ENCOUNTER — TELEPHONE (OUTPATIENT)
Dept: TRANSPLANT | Facility: CLINIC | Age: 55
End: 2020-05-15

## 2020-05-15 ENCOUNTER — HOSPITAL ENCOUNTER (EMERGENCY)
Facility: CLINIC | Age: 55
Discharge: HOME | End: 2020-05-15
Attending: INTERNAL MEDICINE | Admitting: INTERNAL MEDICINE
Payer: MEDICARE

## 2020-05-15 VITALS
OXYGEN SATURATION: 99 % | RESPIRATION RATE: 12 BRPM | BODY MASS INDEX: 30.04 KG/M2 | TEMPERATURE: 98.3 F | DIASTOLIC BLOOD PRESSURE: 91 MMHG | HEIGHT: 60 IN | HEART RATE: 90 BPM | SYSTOLIC BLOOD PRESSURE: 182 MMHG | WEIGHT: 153 LBS

## 2020-05-15 DIAGNOSIS — N39.0 URINARY TRACT INFECTION IN FEMALE: ICD-10-CM

## 2020-05-15 DIAGNOSIS — Z94.0 KIDNEY REPLACED BY TRANSPLANT: ICD-10-CM

## 2020-05-15 LAB
ALBUMIN SERPL-MCNC: 3.6 G/DL (ref 3.4–5)
ALBUMIN UR-MCNC: 100 MG/DL
ALBUMIN UR-MCNC: NORMAL MG/DL
ALP SERPL-CCNC: 169 U/L (ref 40–150)
ALT SERPL W P-5'-P-CCNC: 12 U/L (ref 0–50)
ANION GAP SERPL CALCULATED.3IONS-SCNC: 10 MMOL/L (ref 3–14)
APPEARANCE UR: CLEAR
APPEARANCE UR: NORMAL
AST SERPL W P-5'-P-CCNC: 27 U/L (ref 0–45)
BASOPHILS # BLD AUTO: 0 10E9/L (ref 0–0.2)
BASOPHILS NFR BLD AUTO: 0.4 %
BILIRUB SERPL-MCNC: 0.8 MG/DL (ref 0.2–1.3)
BILIRUB UR QL STRIP: NEGATIVE
BILIRUB UR QL STRIP: NORMAL
BUN SERPL-MCNC: 22 MG/DL (ref 7–30)
CALCIUM SERPL-MCNC: 8.2 MG/DL (ref 8.5–10.1)
CHLORIDE SERPL-SCNC: 98 MMOL/L (ref 94–109)
CO2 SERPL-SCNC: 18 MMOL/L (ref 20–32)
COLOR UR AUTO: ABNORMAL
COLOR UR AUTO: NORMAL
CREAT SERPL-MCNC: 2.12 MG/DL (ref 0.52–1.04)
CRP SERPL-MCNC: <2.9 MG/L (ref 0–8)
DIFFERENTIAL METHOD BLD: ABNORMAL
EOSINOPHIL # BLD AUTO: 0.1 10E9/L (ref 0–0.7)
EOSINOPHIL NFR BLD AUTO: 1 %
ERYTHROCYTE [DISTWIDTH] IN BLOOD BY AUTOMATED COUNT: 13.2 % (ref 10–15)
GFR SERPL CREATININE-BSD FRML MDRD: 26 ML/MIN/{1.73_M2}
GLUCOSE SERPL-MCNC: 95 MG/DL (ref 70–99)
GLUCOSE UR STRIP-MCNC: NEGATIVE MG/DL
GLUCOSE UR STRIP-MCNC: NORMAL MG/DL
HCT VFR BLD AUTO: 32.8 % (ref 35–47)
HGB BLD-MCNC: 10 G/DL (ref 11.7–15.7)
HGB UR QL STRIP: ABNORMAL
HGB UR QL STRIP: NORMAL
IMM GRANULOCYTES # BLD: 0 10E9/L (ref 0–0.4)
IMM GRANULOCYTES NFR BLD: 0.4 %
KETONES UR STRIP-MCNC: NEGATIVE MG/DL
KETONES UR STRIP-MCNC: NORMAL MG/DL
LEUKOCYTE ESTERASE UR QL STRIP: ABNORMAL
LEUKOCYTE ESTERASE UR QL STRIP: NORMAL
LYMPHOCYTES # BLD AUTO: 0.8 10E9/L (ref 0.8–5.3)
LYMPHOCYTES NFR BLD AUTO: 16.2 %
MCH RBC QN AUTO: 26.7 PG (ref 26.5–33)
MCHC RBC AUTO-ENTMCNC: 30.5 G/DL (ref 31.5–36.5)
MCV RBC AUTO: 88 FL (ref 78–100)
MONOCYTES # BLD AUTO: 0.6 10E9/L (ref 0–1.3)
MONOCYTES NFR BLD AUTO: 10.8 %
NEUTROPHILS # BLD AUTO: 3.6 10E9/L (ref 1.6–8.3)
NEUTROPHILS NFR BLD AUTO: 71.2 %
NITRATE UR QL: NEGATIVE
NITRATE UR QL: NORMAL
NRBC # BLD AUTO: 0 10*3/UL
NRBC BLD AUTO-RTO: 0 /100
PH UR STRIP: 6 PH (ref 5–7)
PH UR STRIP: NORMAL PH (ref 5–7)
PLATELET # BLD AUTO: 171 10E9/L (ref 150–450)
POTASSIUM SERPL-SCNC: 4.6 MMOL/L (ref 3.4–5.3)
PROT SERPL-MCNC: 7.8 G/DL (ref 6.8–8.8)
RBC # BLD AUTO: 3.74 10E12/L (ref 3.8–5.2)
RBC #/AREA URNS AUTO: 1 /HPF (ref 0–2)
RBC #/AREA URNS AUTO: NORMAL /HPF (ref 0–2)
SODIUM SERPL-SCNC: 127 MMOL/L (ref 133–144)
SOURCE: ABNORMAL
SOURCE: NORMAL
SP GR UR STRIP: 1 (ref 1–1.03)
SP GR UR STRIP: NORMAL (ref 1–1.03)
SQUAMOUS #/AREA URNS AUTO: <1 /HPF (ref 0–1)
TRANS CELLS #/AREA URNS HPF: <1 /HPF (ref 0–1)
UROBILINOGEN UR STRIP-MCNC: NORMAL MG/DL (ref 0–2)
UROBILINOGEN UR STRIP-MCNC: NORMAL MG/DL (ref 0–2)
WBC # BLD AUTO: 5.1 10E9/L (ref 4–11)
WBC #/AREA URNS AUTO: 15 /HPF (ref 0–5)
WBC #/AREA URNS AUTO: NORMAL /HPF

## 2020-05-15 PROCEDURE — 40000556 ZZH STATISTIC PERIPHERAL IV START W US GUIDANCE

## 2020-05-15 PROCEDURE — 80053 COMPREHEN METABOLIC PANEL: CPT | Performed by: INTERNAL MEDICINE

## 2020-05-15 PROCEDURE — 85025 COMPLETE CBC W/AUTO DIFF WBC: CPT | Performed by: INTERNAL MEDICINE

## 2020-05-15 PROCEDURE — 86140 C-REACTIVE PROTEIN: CPT | Performed by: INTERNAL MEDICINE

## 2020-05-15 PROCEDURE — 87086 URINE CULTURE/COLONY COUNT: CPT | Performed by: INTERNAL MEDICINE

## 2020-05-15 PROCEDURE — 81001 URINALYSIS AUTO W/SCOPE: CPT | Performed by: EMERGENCY MEDICINE

## 2020-05-15 PROCEDURE — 99283 EMERGENCY DEPT VISIT LOW MDM: CPT

## 2020-05-15 PROCEDURE — 25000132 ZZH RX MED GY IP 250 OP 250 PS 637: Mod: GY | Performed by: INTERNAL MEDICINE

## 2020-05-15 PROCEDURE — 81001 URINALYSIS AUTO W/SCOPE: CPT | Performed by: INTERNAL MEDICINE

## 2020-05-15 PROCEDURE — 99284 EMERGENCY DEPT VISIT MOD MDM: CPT | Mod: Z6 | Performed by: INTERNAL MEDICINE

## 2020-05-15 RX ORDER — CEFDINIR 300 MG/1
300 CAPSULE ORAL ONCE
Status: COMPLETED | OUTPATIENT
Start: 2020-05-15 | End: 2020-05-15

## 2020-05-15 RX ORDER — CEFDINIR 300 MG/1
300 CAPSULE ORAL DAILY
Qty: 10 CAPSULE | Refills: 0 | Status: SHIPPED | OUTPATIENT
Start: 2020-05-15 | End: 2020-07-07

## 2020-05-15 RX ADMIN — CEFDINIR 300 MG: 300 CAPSULE ORAL at 21:36

## 2020-05-15 ASSESSMENT — ENCOUNTER SYMPTOMS
NAUSEA: 0
LIGHT-HEADEDNESS: 0
FLANK PAIN: 0
BACK PAIN: 0
CONFUSION: 0
DYSURIA: 1
FEVER: 0
WEAKNESS: 0
COUGH: 0
HEADACHES: 0
NUMBNESS: 0
ABDOMINAL PAIN: 0
DIARRHEA: 0
SHORTNESS OF BREATH: 0
CHILLS: 0
ADENOPATHY: 0
WHEEZING: 0
VOMITING: 0

## 2020-05-15 ASSESSMENT — MIFFLIN-ST. JEOR: SCORE: 1215.5

## 2020-05-15 NOTE — ED AVS SNAPSHOT
[facility], [place], Emergency Department  [address] [facility] [address]  Phone:  [phone]                                    Joshua Lala   MRN: [id number]    Department:  [facility], Emergency Department   Date of Visit:  5/15/2020           After Visit Summary Signature Page    I have received my discharge instructions, and my questions have been answered. I have discussed any challenges I see with this plan with the nurse or doctor.    ..........................................................................................................................................  Patient/Patient Representative Signature      ..........................................................................................................................................  Patient Representative Print Name and Relationship to Patient    ..................................................               ................................................  Date                                   Time    ..........................................................................................................................................  Reviewed by Signature/Title    ...................................................              ..............................................  Date                                               Time          22EPIC Rev 08/18

## 2020-05-15 NOTE — ED PROVIDER NOTES
ED Provider Note  [facility]      History     Chief Complaint   Patient presents with     UTI     HPI  Joshua Lala is a 54 year old female who presents with voiding difficulty for the past 2 days. She has a history of renal transplant, CKD, hyperparathyroidism, past HSP, EBV viremia, past PE and HTN. She has no fever, chills, sweats, nausea, vomiting abdominal pain. She has dysuria for 1 day. She has no pain in the RLQ over the transplant or other abdominal pain. She has no cough or chest pain. She has no leg pain or swelling.    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     cefdinir (OMNICEF) 300 MG capsule  CELLCEPT (BRAND) 250 MG capsule  losartan (COZAAR) 25 MG tablet  NEORAL (BRAND) 25 MG capsule  sulfamethoxazole-trimethoprim (BACTRIM) 400-80 MG tablet  calcitRIOL (ROCALTROL) 0.25 MCG capsule  calcium carbonate (TUMS) 500 MG chewable tablet  carvedilol (COREG) 3.125 MG tablet  darbepoetin deana-polysorbate (ARANESP) 25 MCG/0.42ML injection  omeprazole (PRILOSEC) 20 MG DR capsule      Allergies   Allergen Reactions     Contrast Dye Shortness Of Breath     Other reaction(s): Angioedema     Amlodipine Other (See Comments)         Social History  Social History     Tobacco Use     Smoking status: Never Smoker     Smokeless tobacco: Never Used   Substance Use Topics     Alcohol use: No     Drug use: No          Review of Systems   Constitutional:  Negative for chills and fever.   HENT: Negative for congestion.    Eyes: Negative for visual disturbance.   Respiratory: Negative for cough, shortness of breath and wheezing.    Cardiovascular: Negative for chest pain.   Gastrointestinal: Negative for abdominal pain, diarrhea, nausea and vomiting.   Genitourinary: Positive for dysuria. Negative for flank pain.   Musculoskeletal: Negative for back pain.   Skin: Negative for rash.   Neurological: Negative for weakness, light-headedness, numbness and headaches.   Hematological: Negative for adenopathy.   Psychiatric/Behavioral: Negative for confusion.         Physical Exam   BP: (!) 180/81  Pulse: 79  Temp: 98.3  F (36.8  C)  Resp: 12  Height: 152.4 cm (5')  Weight: 69.4 kg (153 lb)  SpO2: 93 %  Physical Exam  Vitals signs and nursing note reviewed.   Constitutional:       Appearance: Normal appearance.   HENT:      Head: Normocephalic and atraumatic.      Right Ear: External ear normal.      Left Ear: External ear normal.      Nose: Nose normal.      Mouth/Throat:      Mouth: Mucous membranes are moist.   Eyes:      General: No scleral icterus.     Extraocular Movements: Extraocular movements intact.      Pupils: Pupils are equal, round, and reactive to light.   Neck:      Musculoskeletal: Normal range of motion and neck supple.   Cardiovascular:      Rate and Rhythm: Normal rate and regular rhythm.      Heart sounds: S1 normal and S2 normal. Murmur present. Systolic murmur present with a grade of 3/6.   Pulmonary:      Effort: Pulmonary effort is normal. No respiratory distress.      Breath sounds: Normal breath sounds. No wheezing or rales.   Abdominal:      Palpations: Abdomen is soft.      Tenderness: There is no abdominal tenderness.   Musculoskeletal:      Right lower leg: No edema.      Left lower leg: No edema.   Skin:     General: Skin is warm and dry.   Neurological:      General: No focal deficit present.      Mental Status: She is alert. Mental status is  at baseline.      Cranial Nerves: No cranial nerve deficit.   Psychiatric:         Mood and Affect: Mood normal.         Behavior: Behavior normal.         ED Course      Procedures            Labs/Imaging    Results for orders placed or performed during the hospital encounter of 05/15/20 (from the past 24 hour(s))   UA reflex to Microscopic and Culture    Specimen: Urine clean catch; Midstream Urine   Result Value Ref Range    Color Urine Light Yellow     Appearance Urine Clear     Glucose Urine Negative NEG^Negative mg/dL    Bilirubin Urine Negative NEG^Negative    Ketones Urine Negative NEG^Negative mg/dL    Specific Gravity Urine 1.004 1.003 - 1.035    Blood Urine Trace (A) NEG^Negative    pH Urine 6.0 5.0 - 7.0 pH    Protein Albumin Urine 100 (A) NEG^Negative mg/dL    Urobilinogen mg/dL Normal 0.0 - 2.0 mg/dL    Nitrite Urine Negative NEG^Negative    Leukocyte Esterase Urine Large (A) NEG^Negative    Source Midstream Urine     RBC Urine 1 0 - 2 /HPF    WBC Urine 15 (H) 0 - 5 /HPF    Squamous Epithelial /HPF Urine <1 0 - 1 /HPF    Transitional Epi <1 0 - 1 /HPF   UA with Microscopic   Result Value Ref Range    Color Urine Canceled, Test credited     Appearance Urine Canceled, Test credited     Glucose Urine Canceled, Test credited NEG^Negative mg/dL    Bilirubin Urine Canceled, Test credited NEG^Negative    Ketones Urine Canceled, Test credited NEG^Negative mg/dL    Specific Gravity Urine Canceled, Test credited 1.003 - 1.035    Blood Urine Canceled, Test credited NEG^Negative    pH Urine Canceled, Test credited 5.0 - 7.0 pH    Protein Albumin Urine Canceled, Test credited NEG^Negative mg/dL    Urobilinogen mg/dL Canceled, Test credited 0.0 - 2.0 mg/dL    Nitrite Urine Canceled, Test credited NEG^Negative    Leukocyte Esterase Urine Canceled, Test credited NEG^Negative    Source Canceled, Test credited     WBC Urine Canceled, Test credited OTO5^0 - 5 /HPF    RBC Urine Canceled, Test credited 0 - 2 /HPF   CBC  with platelets differential   Result Value Ref Range    WBC 5.1 4.0 - 11.0 10e9/L    RBC Count 3.74 (L) 3.8 - 5.2 10e12/L    Hemoglobin 10.0 (L) 11.7 - 15.7 g/dL    Hematocrit 32.8 (L) 35.0 - 47.0 %    MCV 88 78 - 100 fl    MCH 26.7 26.5 - 33.0 pg    MCHC 30.5 (L) 31.5 - 36.5 g/dL    RDW 13.2 10.0 - 15.0 %    Platelet Count 171 150 - 450 10e9/L    Diff Method Automated Method     % Neutrophils 71.2 %    % Lymphocytes 16.2 %    % Monocytes 10.8 %    % Eosinophils 1.0 %    % Basophils 0.4 %    % Immature Granulocytes 0.4 %    Nucleated RBCs 0 0 /100    Absolute Neutrophil 3.6 1.6 - 8.3 10e9/L    Absolute Lymphocytes 0.8 0.8 - 5.3 10e9/L    Absolute Monocytes 0.6 0.0 - 1.3 10e9/L    Absolute Eosinophils 0.1 0.0 - 0.7 10e9/L    Absolute Basophils 0.0 0.0 - 0.2 10e9/L    Abs Immature Granulocytes 0.0 0 - 0.4 10e9/L    Absolute Nucleated RBC 0.0    Comprehensive metabolic panel   Result Value Ref Range    Sodium 127 (L) 133 - 144 mmol/L    Potassium 4.6 3.4 - 5.3 mmol/L    Chloride 98 94 - 109 mmol/L    Carbon Dioxide 18 (L) 20 - 32 mmol/L    Anion Gap 10 3 - 14 mmol/L    Glucose 95 70 - 99 mg/dL    Urea Nitrogen 22 7 - 30 mg/dL    Creatinine 2.12 (H) 0.52 - 1.04 mg/dL    GFR Estimate 26 (L) >60 mL/min/[1.73_m2]    GFR Estimate If Black 30 (L) >60 mL/min/[1.73_m2]    Calcium 8.2 (L) 8.5 - 10.1 mg/dL    Bilirubin Total 0.8 0.2 - 1.3 mg/dL    Albumin 3.6 3.4 - 5.0 g/dL    Protein Total 7.8 6.8 - 8.8 g/dL    Alkaline Phosphatase 169 (H) 40 - 150 U/L    ALT 12 0 - 50 U/L    AST 27 0 - 45 U/L   CRP inflammation   Result Value Ref Range    CRP Inflammation <2.9 0.0 - 8.0 mg/L       Medications   cefdinir (OMNICEF) capsule 300 mg (has no administration in time range)        Assessments & Plan (with Medical Decision Making)   Impression:  Middle aged female with history of kidney transplant, past HSP and past EBV viremia presents with 1 day of dysuria and urgency. She has no abdominal pain or tenderness on exam. She is afebrile.  She has mild hyponatremia and mildly low bicarbonate. She has mild elevation of ALK. She has no abdominal tenderness in the RUQ or RLQ. Urine has positive leukocyte esterase and 15 WBC, negative nitrites. UC is pending. Creatinine is stable at 2.1. She is afebrile. Other than elevated blood pressure, vital signs are stable. She will be empirically treated for mildly symptomatic UTI with cefdinir pending culture.    I have reviewed the nursing notes. I have reviewed the findings, diagnosis, plan and need for follow up with the patient.    New Prescriptions    CEFDINIR (OMNICEF) 300 MG CAPSULE    Take 1 capsule (300 mg) by mouth daily for 10 days       Final diagnoses:   Urinary tract infection in female   Kidney replaced by transplant       --  BEENA GARBER MD   Emergency Medicine   [facility], [place], EMERGENCY DEPARTMENT  5/15/2020     Beena Garber MD  05/15/20 2122

## 2020-05-15 NOTE — TELEPHONE ENCOUNTER
Received a phone call from patients daughter.  Joshua has not had urine output since last night.  She reports feeling the urge to urinate but is unable to.  She has been drinking a good amount of water, but unable to void like normal, she only has dribbles.  She denies abdominal/back pain, fevers, light headed/dizziness.  We discussed the need to be evaluated due to the inability to urinate.  Joshua will go in to the ED for evaluation.

## 2020-05-16 LAB
BACTERIA SPEC CULT: NORMAL
SPECIMEN SOURCE: NORMAL

## 2020-05-16 NOTE — DISCHARGE INSTRUCTIONS
Cefdinir (antibiotic) 300 mg once daily for 10 days.  Call your transplant coordinator Monday.  Follow up with your regular doctor in 7-10 days.  Return for fever, abdomen pain, new problems.

## 2020-05-17 LAB
BACTERIA SPEC CULT: NORMAL
Lab: NORMAL
SPECIMEN SOURCE: NORMAL

## 2020-05-18 DIAGNOSIS — Z94.0 KIDNEY TRANSPLANT RECIPIENT: Primary | ICD-10-CM

## 2020-05-18 DIAGNOSIS — Z79.60 LONG-TERM USE OF IMMUNOSUPPRESSANT MEDICATION: ICD-10-CM

## 2020-05-18 RX ORDER — MYCOPHENOLATE MOFETIL 250 MG/1
750 CAPSULE ORAL 2 TIMES DAILY
Qty: 180 CAPSULE | Refills: 11 | Status: SHIPPED | OUTPATIENT
Start: 2020-05-18 | End: 2021-05-11

## 2020-05-20 ENCOUNTER — TELEPHONE (OUTPATIENT)
Dept: PHARMACY | Facility: CLINIC | Age: 55
End: 2020-05-20

## 2020-05-20 NOTE — TELEPHONE ENCOUNTER
Anemia Management Note  SUBJECTIVE/OBJECTIVE:  Referred by Dr. Akil Bah on 2020  Primary Diagnosis: Anemia in Chronic Kidney Disease (N18.4, D63.1)     Secondary Diagnosis:  Chronic Kidney Disease, Stage 4 (N18.4)   Kidney Tx: 2007 & 1998  Hgb goal range:  9-10  Epo/Darbo: TBD;  *Aranesp 25mcg x 1 while admitted  Iron regimen:  TBD  Labs : 2021  Recent EDMUND use, transfusion, IV iron: *Aranesp 25mcg x 1 while admitted  RX/TX plans : TBD  No history of stroke, MI and blood clots or cancers     *Pt does not speak English, please call pts daughter - Nichelle (Lia - oo) at [phone].     Contact:            No Boxes checked on Consent to Communicate.     Anemia Latest Ref Rng & Units 3/18/2020 3/19/2020 3/20/2020 3/21/2020 3/25/2020 2020 5/15/2020   Hemoglobin 11.7 - 15.7 g/dL 8.5(L) 8.9(L) 8.4(L) 8.7(L) 9.7(L) 10.4(L) 10.0(L)   TSAT 15 - 46 % - - - - - 21 -   Ferritin 8 - 252 ng/mL - - - - - 299(H) -     BP Readings from Last 3 Encounters:   05/15/20 (!) 182/91   04/15/20 (!) 150/92   20 (!) 186/94     Wt Readings from Last 2 Encounters:   05/15/20 69.4 kg (153 lb)   04/15/20 68 kg (150 lb)           ASSESSMENT:  Hgb:at goal - continue to monitor  TSat: not at goal of >30% Ferritin: At goal (>100ng/mL)    PLAN:  RTC for Hgb in 2 week(s) and Check iron studies in 2 week(s)    Orders needed to be renewed (for next follow-up date) in EPIC: None    Iron labs due:  2020    Plan discussed with:  No call made. Lab review  Plan provided by:  merle    NEXT FOLLOW-UP DATE:  2020    Chelle Sapp RN   Anemia Services  [address] [address]   [email or web address]   Office : [phone]  Fax: [phone]

## 2020-05-22 DIAGNOSIS — Z94.0 KIDNEY TRANSPLANTED: Primary | ICD-10-CM

## 2020-05-22 RX ORDER — CYCLOSPORINE 25 MG/1
25 CAPSULE, LIQUID FILLED ORAL 2 TIMES DAILY
Qty: 60 CAPSULE | Refills: 11 | Status: SHIPPED | OUTPATIENT
Start: 2020-05-22 | End: 2020-12-15

## 2020-06-15 NOTE — TELEPHONE ENCOUNTER
Appt scheduled for 7/7/20.     Chelle Sapp RN   Anemia Services  [address] [address]   [email or web address]   Office : [phone]  Fax: [phone]

## 2020-07-07 ENCOUNTER — VIRTUAL VISIT (OUTPATIENT)
Dept: NEPHROLOGY | Facility: CLINIC | Age: 55
End: 2020-07-07
Attending: INTERNAL MEDICINE
Payer: MEDICARE

## 2020-07-07 VITALS — DIASTOLIC BLOOD PRESSURE: 76 MMHG | HEART RATE: 62 BPM | SYSTOLIC BLOOD PRESSURE: 131 MMHG

## 2020-07-07 DIAGNOSIS — I15.1 HTN, KIDNEY TRANSPLANT RELATED: Primary | ICD-10-CM

## 2020-07-07 DIAGNOSIS — Z48.298 AFTERCARE FOLLOWING ORGAN TRANSPLANT: ICD-10-CM

## 2020-07-07 DIAGNOSIS — Z94.0 HTN, KIDNEY TRANSPLANT RELATED: Primary | ICD-10-CM

## 2020-07-07 DIAGNOSIS — E55.9 VITAMIN D DEFICIENCY: ICD-10-CM

## 2020-07-07 DIAGNOSIS — Z94.0 KIDNEY REPLACED BY TRANSPLANT: ICD-10-CM

## 2020-07-07 DIAGNOSIS — E87.1 HYPONATREMIA: ICD-10-CM

## 2020-07-07 PROBLEM — R60.0 PERIPHERAL EDEMA: Status: RESOLVED | Noted: 2020-03-15 | Resolved: 2020-07-07

## 2020-07-07 RX ORDER — HYDRALAZINE HYDROCHLORIDE 25 MG/1
25 TABLET, FILM COATED ORAL 3 TIMES DAILY
Qty: 270 TABLET | Refills: 3 | Status: SHIPPED | OUTPATIENT
Start: 2020-07-07 | End: 2022-01-21

## 2020-07-07 ASSESSMENT — PAIN SCALES - GENERAL: PAINLEVEL: NO PAIN (0)

## 2020-07-07 NOTE — LETTER
7/7/2020      RE: Joshua Lala  [address]  [place] [address]       Joshua Lala is a 54 year old female who is being evaluated via a billable telephone visit.          Phone call duration: 13 minutes    Akil Bah MD      CHRONIC TRANSPLANT NEPHROLOGY VISIT    Assessment & Plan   # DDKT: Stable creatinine over the last 6 months in the ~ 2.0-2.3 range, but this is up from her previous baseline closer to ~ 1.3-1.6.  The increased creatinine is in the setting of heart failure and appears to be related to hemodynamic issues.  Patient has not had a kidney transplant biopsy.   - Baseline Cr ~ 2.0-2.3   - Proteinuria: Moderate (1-3 grams)   - Date DSA Last Checked: Jun/2019      Latest DSA: Moderate DSA (2395-5209 mfi) to DR51    - BK Viremia: No   - Kidney Tx Biopsy: No    # Immunosuppression: Cyclosporine (goal ) and Mycophenolate mofetil (dose 750 mg every 12 hours)   - Changes: No    # Infection Prophylaxis:   - PJP: Sulfa/TMP (Bactrim)    # Hypertension: Borderline control;  Goal BP: < 130/80   - Changes: Yes - Will start hydralazine 25 mg tid.  Patient has had issues with volume overload, but reportedly no edema now.  She has an allergy to amlodipine, so would avoid that choice.  Also bradycardia, so will hold off on increasing beta blockade.  With her variable creatinine and hemodynamic changes, I am concerned with increasing her ARB at this time, although that may be a good option in the future.    # Anemia in Chronic Renal Disease: Hgb: Stable      EDMUND: Yes   - Iron studies: Low iron saturation    # Mineral Bone Disorder:   - Secondary renal hyperparathyroidism; PTH level: Mildly elevated (151-300 pg/ml)        On treatment: Calcitriol  - Vitamin D; level: Low        On supplement: No; Recommend starting cholecalciferol 50 mcg daily.  - Calcium; level: Low        On supplement: Yes    # Electrolytes:   - Potassium; level: Normal        On supplement: No  - Bicarbonate; level: Low        On  supplement: No; Would repeat labs and if remains low, consider starting sodium bicarbonate supplement    # HFpEF: Appears to be doing better with reportedly no edema at present.  Patient follows with Cardiology.  Will work on improving hypertensive management.    # Pulmonary HTN: Was moderate on last cardiac echo 3/2020.  Patient is followed by Cardiology.    # Hyponatremia: Stable, low serum sodium level mostly in the ~ 125-130 range over the last 12-16 months.  Per her history, she appears to be euvolemic.  Previous work up suggested likely reset osmostat, poor oral intake and/or heart failure as the etiology with relatively low urine sodium (< 40 mmol on most occasions) and low to near low urine osmolality (< 130 mostly with last checks).  Patient had unremarkable thyroid studies and serum cortisol.   - Recommend avoiding any contributing medications, such as thiazide diuretic or antidepressant/antipsychotic, if possible.  Minimize free water and manage heart failure.    # GERD: Controlled on just prn use of PPI.    # Skin Cancer Risk:    - Discussed sun protection and recommend regular follow up with Dermatology.    # Medical Compliance: Yes     # COVID-19 Virus Review: Discussed COVID-19 virus and the potential medical risks.  Reviewed preventative health recommendations, which includes washing hands for 20 seconds, avoid touching your face, and social distancing.  Asked patient to inform the transplant center if they are exposed or diagnosed with this virus.    # Transplant History:  Etiology of Kidney Failure: IgA nephropathy  Tx: DDKT  Transplant: 11/28/2007 (Kidney), 8/8/1998 (Kidney)  Donor Type: Donation after Brain Death Donor Class: Standard Criteria Donor  Significant changes in immunosuppression: None  Significant transplant-related complications: None    Transplant Office Phone Number: [phone]    Assessment and plan was discussed with the patient and she voiced her understanding and  agreement.    Return visit: Return in about 6 months (around 1/7/2021).    Akil Bah MD    Chief Complaint   Ms. Lala is a 54 year old here for kidney transplant and immunosuppression management.    History of Present Illness    Ms. Lala reports feeling good overall with some medical complaints.  Since last clinic visit, patient reports no hospitalizations or new medical complaints and has been doing well overall.  Her energy level is okay and stable, but not quite normal.  She has been active, although really gets minimal exercise.  Denies any chest pain or shortness of breath with exertion.  No leg swelling.  Appetite is okay to slightly decreased and she maybe lost a few pounds, but not sure if that was water weight.  No nausea, vomiting or diarrhea.  No heartburn symptoms and she takes PPI only as needed.  No fever, sweats or chills.    Recent Hospitalizations:  [x] No [] Yes    New Medical Issues: [x] No [] Yes    Decreased energy: [] No [x] Yes Okay   Chest pain or SOB with exertion:  [x] No [] Yes    Appetite change or weight change: [] No [x] Yes Slightly lower appetite   Nausea, vomiting or diarrhea:  [x] No [] Yes    Fever, sweats or chills: [x] No [] Yes    Leg swelling: [x] No [] Yes      Home BP: 130/70s    Review of Systems   A comprehensive review of systems was obtained and negative, except as noted in the HPI or PMH.    Problem List   Patient Active Problem List   Diagnosis     Kidney replaced by transplant     Parathyroid related hypercalcemia (H)     Encounter for long-term current use of medication     CKD (chronic kidney disease) stage 3, GFR 30-59 ml/min (H)     Acute pharyngitis     Weakness     HTN, kidney transplant related     Chest pain at rest     Chest pain, non-cardiac     Cough     Hyponatremia     Aftercare following organ transplant     Vitamin D deficiency       Social History   Social History     Tobacco Use     Smoking status: Never Smoker     Smokeless tobacco: Never  Used   Substance Use Topics     Alcohol use: No     Drug use: No       Allergies   Allergies   Allergen Reactions     Contrast Dye Shortness Of Breath     Other reaction(s): Angioedema     Amlodipine Other (See Comments)       Medications   Current Outpatient Medications   Medication Sig     calcitRIOL (ROCALTROL) 0.25 MCG capsule Take 1 capsule (0.25 mcg) by mouth daily     calcium carbonate (TUMS) 500 MG chewable tablet Take 1 chew tab by mouth 2 times daily     carvedilol (COREG) 3.125 MG tablet Take 1 tablet (3.125 mg) by mouth 2 times daily (with meals)     CELLCEPT (BRAND) 250 MG capsule Take 3 capsules (750 mg) by mouth 2 times daily     darbepoetin deana-polysorbate (ARANESP) 25 MCG/0.42ML injection Inject 0.42 mLs (25 mcg) Subcutaneous every 14 days Dosed with anemia clinic.     hydrALAZINE (APRESOLINE) 25 MG tablet Take 1 tablet (25 mg) by mouth 3 times daily     losartan (COZAAR) 25 MG tablet Take 1 tablet (25 mg) by mouth daily     NEORAL (BRAND) 25 MG capsule Take 1 capsule (25 mg) by mouth 2 times daily DAW1     omeprazole (PRILOSEC) 20 MG DR capsule Take 1 capsule (20 mg) by mouth 2 times daily     sulfamethoxazole-trimethoprim (BACTRIM) 400-80 MG tablet Take 1 tablet by mouth daily     No current facility-administered medications for this visit.      Medications Discontinued During This Encounter   Medication Reason     cefdinir (OMNICEF) 300 MG capsule        Physical Exam   Vital Signs: /76   Pulse 62     Physical exam was deferred for this telemedicine visit.      Data     Renal Latest Ref Rng & Units 5/15/2020 4/24/2020 3/25/2020   Na 133 - 144 mmol/L 127(L) 135 125(L)   K 3.4 - 5.3 mmol/L 4.6 4.3 3.6   Cl 94 - 109 mmol/L 98 106 92(L)   CO2 20 - 32 mmol/L 18(L) 25 27   BUN 7 - 30 mg/dL 22 18 19   Cr 0.52 - 1.04 mg/dL 2.12(H) 2.08(H) 2.16(H)   Glucose 70 - 99 mg/dL 95 89 162(H)   Ca  8.5 - 10.1 mg/dL 8.2(L) 9.2 9.0   Mg 1.6 - 2.3 mg/dL - - -     Bone Health Latest Ref Rng & Units 3/17/2020  4/3/2019 1/29/2018   Phos 2.5 - 4.5 mg/dL 3.6 - 2.8   PTHi 18 - 80 pg/mL 291(H) - -   Vit D Def 20 - 75 ug/L - 18(L) -     Heme Latest Ref Rng & Units 5/15/2020 4/24/2020 3/25/2020   WBC 4.0 - 11.0 10e9/L 5.1 3.5(L) 3.4(L)   Hgb 11.7 - 15.7 g/dL 10.0(L) 10.4(L) 9.7(L)   Plt 150 - 450 10e9/L 171 153 188   ABSOLUTE NEUTROPHIL 1.6 - 8.3 10e9/L 3.6 - 2.2   ABSOLUTE LYMPHOCYTES 0.8 - 5.3 10e9/L 0.8 - 0.7(L)   ABSOLUTE MONOCYTES 0.0 - 1.3 10e9/L 0.6 - 0.5   ABSOLUTE EOSINOPHILS 0.0 - 0.7 10e9/L 0.1 - 0.1   ABSOLUTE BASOPHILS 0.0 - 0.2 10e9/L 0.0 - 0.0   ABS IMMATURE GRANULOCYTES 0 - 0.4 10e9/L 0.0 - 0.0   ABSOLUTE NUCLEATED RBC - 0.0 - 0.0     Liver Latest Ref Rng & Units 5/15/2020 3/25/2020 3/15/2020   AP 40 - 150 U/L 169(H) 125 119   TBili 0.2 - 1.3 mg/dL 0.8 0.6 0.8   DBili 0.0 - 0.2 mg/dL - - -   ALT 0 - 50 U/L 12 13 11   AST 0 - 45 U/L 27 24 28   Tot Protein 6.8 - 8.8 g/dL 7.8 7.4 7.1   Albumin 3.4 - 5.0 g/dL 3.6 3.5 3.3(L)     Pancreas Latest Ref Rng & Units 3/15/2020 1/26/2020 2/27/2017   Amylase 30 - 110 U/L - - -   Lipase 73 - 393 U/L 346 295 326     Iron studies Latest Ref Rng & Units 4/24/2020 3/17/2020   Iron 35 - 180 ug/dL 61 54   Iron sat 15 - 46 % 21 23   Ferritin 8 - 252 ng/mL 299(H) -     [facility] Txp Virology Latest Ref Rng & Units 3/20/2020 3/16/2020 9/28/2019   CMV IgG EU/mL - - -   CVM DNA Quant - Plasma Plasma Plasma, EDTA anticoagulant   CMV QUANT IU/ML CMVND:CMV DNA Not Detected [IU]/mL CMV DNA Not Detected <137(A) CMV DNA Not Detected   LOG IU/ML OF CMVQNT <2.1 [Log:IU]/mL Not Calculated <2.1 Not Calculated   BK Spec - - - -   BK Res BKNEG:BK Virus DNA Not Detected copies/mL - - -   BK Log <2.7 Log copies/mL - - -   EBV IgG - - - -   EBV DNA COPIES/ML EBVNEG:EBV DNA Not Detected [Copies]/mL - <500(A) <500(A)   EBV DNA LOG OF COPIES <2.7 [Log:copies]/mL - <2.7 <2.7   Hep B Surf - - - -   HIV 1&2 NEG - - -        Recent Labs   Lab Test 09/21/19  0810   DOSTAC Not Provided   TACROL <3.0*     Recent Labs    Lab Test 10/27/15  0555 11/27/15  0710 12/09/15  0550   DOSMPA Not Provided 9P  11/26 Not Provided   MPACID 0.94* 0.71* 2.02   MPAG 190.4* 169.8* 191.8*     Akil Bah MD

## 2020-07-07 NOTE — LETTER
"7/7/2020       RE: Joshua Lala  [address]  [place] [address]     Dear Colleague,    Thank you for referring your patient, Joshua Lala, to the [facility] at [facility]. Please see a copy of my visit note below.    Joshua Lala is a 54 year old female who is being evaluated via a billable telephone visit.      The patient has been notified of following:     \"This telephone visit will be conducted via a call between you and your physician/provider. We have found that certain health care needs can be provided without the need for a physical exam.  This service lets us provide the care you need with a short phone conversation.  If a prescription is necessary we can send it directly to your pharmacy.  If lab work is needed we can place an order for that and you can then stop by our lab to have the test done at a later time.    Telephone visits are billed at different rates depending on your insurance coverage. During this emergency period, for some insurers they may be billed the same as an in-person visit.  Please reach out to your insurance provider with any questions.    If during the course of the call the physician/provider feels a telephone visit is not appropriate, you will not be charged for this service.\"    Patient has given verbal consent for Telephone visit?  Yes    What phone number would you like to be contacted at? [phone]    How would you like to obtain your AVS? Mail a copy    Phone call duration: 13 minutes    Akil Bah MD      CHRONIC TRANSPLANT NEPHROLOGY VISIT    Assessment & Plan   # DDKT: Stable creatinine over the last 6 months in the ~ 2.0-2.3 range, but this is up from her previous baseline closer to ~ 1.3-1.6.  The increased creatinine is in the setting of heart failure and appears to be related to hemodynamic issues.  Patient has not had a kidney transplant biopsy.   - Baseline Cr ~ 2.0-2.3   - Proteinuria: Moderate (1-3 grams)   - " Date DSA Last Checked: Jun/2019      Latest DSA: Moderate DSA (2195-4183 mfi) to DR51    - BK Viremia: No   - Kidney Tx Biopsy: No    # Immunosuppression: Cyclosporine (goal ) and Mycophenolate mofetil (dose 750 mg every 12 hours)   - Changes: No    # Infection Prophylaxis:   - PJP: Sulfa/TMP (Bactrim)    # Hypertension: Borderline control;  Goal BP: < 130/80   - Changes: Yes - Will start hydralazine 25 mg tid.  Patient has had issues with volume overload, but reportedly no edema now.  She has an allergy to amlodipine, so would avoid that choice.  Also bradycardia, so will hold off on increasing beta blockade.  With her variable creatinine and hemodynamic changes, I am concerned with increasing her ARB at this time, although that may be a good option in the future.    # Anemia in Chronic Renal Disease: Hgb: Stable      EDMUND: Yes   - Iron studies: Low iron saturation    # Mineral Bone Disorder:   - Secondary renal hyperparathyroidism; PTH level: Mildly elevated (151-300 pg/ml)        On treatment: Calcitriol  - Vitamin D; level: Low        On supplement: No; Recommend starting cholecalciferol 50 mcg daily.  - Calcium; level: Low        On supplement: Yes    # Electrolytes:   - Potassium; level: Normal        On supplement: No  - Bicarbonate; level: Low        On supplement: No; Would repeat labs and if remains low, consider starting sodium bicarbonate supplement    # HFpEF: Appears to be doing better with reportedly no edema at present.  Patient follows with Cardiology.  Will work on improving hypertensive management.    # Pulmonary HTN: Was moderate on last cardiac echo 3/2020.  Patient is followed by Cardiology.    # Hyponatremia: Stable, low serum sodium level mostly in the ~ 125-130 range over the last 12-16 months.  Per her history, she appears to be euvolemic.  Previous work up suggested likely reset osmostat, poor oral intake and/or heart failure as the etiology with relatively low urine sodium (< 40 mmol  on most occasions) and low to near low urine osmolality (< 130 mostly with last checks).  Patient had unremarkable thyroid studies and serum cortisol.   - Recommend avoiding any contributing medications, such as thiazide diuretic or antidepressant/antipsychotic, if possible.  Minimize free water and manage heart failure.    # GERD: Controlled on just prn use of PPI.    # Skin Cancer Risk:    - Discussed sun protection and recommend regular follow up with Dermatology.    # Medical Compliance: Yes     # COVID-19 Virus Review: Discussed COVID-19 virus and the potential medical risks.  Reviewed preventative health recommendations, which includes washing hands for 20 seconds, avoid touching your face, and social distancing.  Asked patient to inform the transplant center if they are exposed or diagnosed with this virus.    # Transplant History:  Etiology of Kidney Failure: IgA nephropathy  Tx: DDKT  Transplant: 11/28/2007 (Kidney), 8/8/1998 (Kidney)  Donor Type: Donation after Brain Death Donor Class: Standard Criteria Donor  Significant changes in immunosuppression: None  Significant transplant-related complications: None    Transplant Office Phone Number: [phone]    Assessment and plan was discussed with the patient and she voiced her understanding and agreement.    Return visit: Return in about 6 months (around 1/7/2021).    Akil Bah MD    Chief Complaint   Ms. Lala is a 54 year old here for kidney transplant and immunosuppression management.    History of Present Illness    Ms. Lala reports feeling good overall with some medical complaints.  Since last clinic visit, patient reports no hospitalizations or new medical complaints and has been doing well overall.  Her energy level is okay and stable, but not quite normal.  She has been active, although really gets minimal exercise.  Denies any chest pain or shortness of breath with exertion.  No leg swelling.  Appetite is okay to slightly decreased and  she maybe lost a few pounds, but not sure if that was water weight.  No nausea, vomiting or diarrhea.  No heartburn symptoms and she takes PPI only as needed.  No fever, sweats or chills.    Recent Hospitalizations:  [x] No [] Yes    New Medical Issues: [x] No [] Yes    Decreased energy: [] No [x] Yes Okay   Chest pain or SOB with exertion:  [x] No [] Yes    Appetite change or weight change: [] No [x] Yes Slightly lower appetite   Nausea, vomiting or diarrhea:  [x] No [] Yes    Fever, sweats or chills: [x] No [] Yes    Leg swelling: [x] No [] Yes      Home BP: 130/70s    Review of Systems   A comprehensive review of systems was obtained and negative, except as noted in the HPI or PMH.    Problem List   Patient Active Problem List   Diagnosis     Kidney replaced by transplant     Parathyroid related hypercalcemia (H)     Encounter for long-term current use of medication     CKD (chronic kidney disease) stage 3, GFR 30-59 ml/min (H)     Acute pharyngitis     Weakness     HTN, kidney transplant related     Chest pain at rest     Chest pain, non-cardiac     Cough     Hyponatremia     Aftercare following organ transplant     Vitamin D deficiency       Social History   Social History     Tobacco Use     Smoking status: Never Smoker     Smokeless tobacco: Never Used   Substance Use Topics     Alcohol use: No     Drug use: No       Allergies   Allergies   Allergen Reactions     Contrast Dye Shortness Of Breath     Other reaction(s): Angioedema     Amlodipine Other (See Comments)       Medications   Current Outpatient Medications   Medication Sig     calcitRIOL (ROCALTROL) 0.25 MCG capsule Take 1 capsule (0.25 mcg) by mouth daily     calcium carbonate (TUMS) 500 MG chewable tablet Take 1 chew tab by mouth 2 times daily     carvedilol (COREG) 3.125 MG tablet Take 1 tablet (3.125 mg) by mouth 2 times daily (with meals)     CELLCEPT (BRAND) 250 MG capsule Take 3 capsules (750 mg) by mouth 2 times daily     darbepoetin  deana-polysorbate (ARANESP) 25 MCG/0.42ML injection Inject 0.42 mLs (25 mcg) Subcutaneous every 14 days Dosed with anemia clinic.     hydrALAZINE (APRESOLINE) 25 MG tablet Take 1 tablet (25 mg) by mouth 3 times daily     losartan (COZAAR) 25 MG tablet Take 1 tablet (25 mg) by mouth daily     NEORAL (BRAND) 25 MG capsule Take 1 capsule (25 mg) by mouth 2 times daily DAW1     omeprazole (PRILOSEC) 20 MG DR capsule Take 1 capsule (20 mg) by mouth 2 times daily     sulfamethoxazole-trimethoprim (BACTRIM) 400-80 MG tablet Take 1 tablet by mouth daily     No current facility-administered medications for this visit.      Medications Discontinued During This Encounter   Medication Reason     cefdinir (OMNICEF) 300 MG capsule        Physical Exam   Vital Signs: /76   Pulse 62     Physical exam was deferred for this telemedicine visit.      Data     Renal Latest Ref Rng & Units 5/15/2020 4/24/2020 3/25/2020   Na 133 - 144 mmol/L 127(L) 135 125(L)   K 3.4 - 5.3 mmol/L 4.6 4.3 3.6   Cl 94 - 109 mmol/L 98 106 92(L)   CO2 20 - 32 mmol/L 18(L) 25 27   BUN 7 - 30 mg/dL 22 18 19   Cr 0.52 - 1.04 mg/dL 2.12(H) 2.08(H) 2.16(H)   Glucose 70 - 99 mg/dL 95 89 162(H)   Ca  8.5 - 10.1 mg/dL 8.2(L) 9.2 9.0   Mg 1.6 - 2.3 mg/dL - - -     Bone Health Latest Ref Rng & Units 3/17/2020 4/3/2019 1/29/2018   Phos 2.5 - 4.5 mg/dL 3.6 - 2.8   PTHi 18 - 80 pg/mL 291(H) - -   Vit D Def 20 - 75 ug/L - 18(L) -     Heme Latest Ref Rng & Units 5/15/2020 4/24/2020 3/25/2020   WBC 4.0 - 11.0 10e9/L 5.1 3.5(L) 3.4(L)   Hgb 11.7 - 15.7 g/dL 10.0(L) 10.4(L) 9.7(L)   Plt 150 - 450 10e9/L 171 153 188   ABSOLUTE NEUTROPHIL 1.6 - 8.3 10e9/L 3.6 - 2.2   ABSOLUTE LYMPHOCYTES 0.8 - 5.3 10e9/L 0.8 - 0.7(L)   ABSOLUTE MONOCYTES 0.0 - 1.3 10e9/L 0.6 - 0.5   ABSOLUTE EOSINOPHILS 0.0 - 0.7 10e9/L 0.1 - 0.1   ABSOLUTE BASOPHILS 0.0 - 0.2 10e9/L 0.0 - 0.0   ABS IMMATURE GRANULOCYTES 0 - 0.4 10e9/L 0.0 - 0.0   ABSOLUTE NUCLEATED RBC - 0.0 - 0.0     Liver Latest Ref Rng  & Units 5/15/2020 3/25/2020 3/15/2020   AP 40 - 150 U/L 169(H) 125 119   TBili 0.2 - 1.3 mg/dL 0.8 0.6 0.8   DBili 0.0 - 0.2 mg/dL - - -   ALT 0 - 50 U/L 12 13 11   AST 0 - 45 U/L 27 24 28   Tot Protein 6.8 - 8.8 g/dL 7.8 7.4 7.1   Albumin 3.4 - 5.0 g/dL 3.6 3.5 3.3(L)     Pancreas Latest Ref Rng & Units 3/15/2020 1/26/2020 2/27/2017   Amylase 30 - 110 U/L - - -   Lipase 73 - 393 U/L 346 295 326     Iron studies Latest Ref Rng & Units 4/24/2020 3/17/2020   Iron 35 - 180 ug/dL 61 54   Iron sat 15 - 46 % 21 23   Ferritin 8 - 252 ng/mL 299(H) -     [facility] Txp Virology Latest Ref Rng & Units 3/20/2020 3/16/2020 9/28/2019   CMV IgG EU/mL - - -   CVM DNA Quant - Plasma Plasma Plasma, EDTA anticoagulant   CMV QUANT IU/ML CMVND:CMV DNA Not Detected [IU]/mL CMV DNA Not Detected <137(A) CMV DNA Not Detected   LOG IU/ML OF CMVQNT <2.1 [Log:IU]/mL Not Calculated <2.1 Not Calculated   BK Spec - - - -   BK Res BKNEG:BK Virus DNA Not Detected copies/mL - - -   BK Log <2.7 Log copies/mL - - -   EBV IgG - - - -   EBV DNA COPIES/ML EBVNEG:EBV DNA Not Detected [Copies]/mL - <500(A) <500(A)   EBV DNA LOG OF COPIES <2.7 [Log:copies]/mL - <2.7 <2.7   Hep B Surf - - - -   HIV 1&2 NEG - - -        Recent Labs   Lab Test 09/21/19  0810   DOSTAC Not Provided   TACROL <3.0*     Recent Labs   Lab Test 10/27/15  0555 11/27/15  0710 12/09/15  0550   DOSMPA Not Provided 9P  11/26 Not Provided   MPACID 0.94* 0.71* 2.02   MPAG 190.4* 169.8* 191.8*       Again, thank you for allowing me to participate in the care of your patient.      Sincerely,    Kidney/Pancreas Recipient

## 2020-07-07 NOTE — PROGRESS NOTES
"Joshua Lala is a 54 year old female who is being evaluated via a billable telephone visit.      The patient has been notified of following:     \"This telephone visit will be conducted via a call between you and your physician/provider. We have found that certain health care needs can be provided without the need for a physical exam.  This service lets us provide the care you need with a short phone conversation.  If a prescription is necessary we can send it directly to your pharmacy.  If lab work is needed we can place an order for that and you can then stop by our lab to have the test done at a later time.    Telephone visits are billed at different rates depending on your insurance coverage. During this emergency period, for some insurers they may be billed the same as an in-person visit.  Please reach out to your insurance provider with any questions.    If during the course of the call the physician/provider feels a telephone visit is not appropriate, you will not be charged for this service.\"    Patient has given verbal consent for Telephone visit?  Yes    What phone number would you like to be contacted at? [phone]    How would you like to obtain your AVS? Mail a copy    Phone call duration: 13 minutes    Akil Bah MD      CHRONIC TRANSPLANT NEPHROLOGY VISIT    Assessment & Plan   # DDKT: Stable creatinine over the last 6 months in the ~ 2.0-2.3 range, but this is up from her previous baseline closer to ~ 1.3-1.6.  The increased creatinine is in the setting of heart failure and appears to be related to hemodynamic issues.  Patient has not had a kidney transplant biopsy.   - Baseline Cr ~ 2.0-2.3   - Proteinuria: Moderate (1-3 grams)   - Date DSA Last Checked: Jun/2019      Latest DSA: Moderate DSA (5058-1429 mfi) to DR51    - BK Viremia: No   - Kidney Tx Biopsy: No    # Immunosuppression: Cyclosporine (goal ) and Mycophenolate mofetil (dose 750 mg every 12 hours)   - Changes: No    # " Infection Prophylaxis:   - PJP: Sulfa/TMP (Bactrim)    # Hypertension: Borderline control;  Goal BP: < 130/80   - Changes: Yes - Will start hydralazine 25 mg tid.  Patient has had issues with volume overload, but reportedly no edema now.  She has an allergy to amlodipine, so would avoid that choice.  Also bradycardia, so will hold off on increasing beta blockade.  With her variable creatinine and hemodynamic changes, I am concerned with increasing her ARB at this time, although that may be a good option in the future.    # Anemia in Chronic Renal Disease: Hgb: Stable      EDMUND: Yes   - Iron studies: Low iron saturation    # Mineral Bone Disorder:   - Secondary renal hyperparathyroidism; PTH level: Mildly elevated (151-300 pg/ml)        On treatment: Calcitriol  - Vitamin D; level: Low        On supplement: No; Recommend starting cholecalciferol 50 mcg daily.  - Calcium; level: Low        On supplement: Yes    # Electrolytes:   - Potassium; level: Normal        On supplement: No  - Bicarbonate; level: Low        On supplement: No; Would repeat labs and if remains low, consider starting sodium bicarbonate supplement    # HFpEF: Appears to be doing better with reportedly no edema at present.  Patient follows with Cardiology.  Will work on improving hypertensive management.    # Pulmonary HTN: Was moderate on last cardiac echo 3/2020.  Patient is followed by Cardiology.    # Hyponatremia: Stable, low serum sodium level mostly in the ~ 125-130 range over the last 12-16 months.  Per her history, she appears to be euvolemic.  Previous work up suggested likely reset osmostat, poor oral intake and/or heart failure as the etiology with relatively low urine sodium (< 40 mmol on most occasions) and low to near low urine osmolality (< 130 mostly with last checks).  Patient had unremarkable thyroid studies and serum cortisol.   - Recommend avoiding any contributing medications, such as thiazide diuretic or  antidepressant/antipsychotic, if possible.  Minimize free water and manage heart failure.    # GERD: Controlled on just prn use of PPI.    # Skin Cancer Risk:    - Discussed sun protection and recommend regular follow up with Dermatology.    # Medical Compliance: Yes     # COVID-19 Virus Review: Discussed COVID-19 virus and the potential medical risks.  Reviewed preventative health recommendations, which includes washing hands for 20 seconds, avoid touching your face, and social distancing.  Asked patient to inform the transplant center if they are exposed or diagnosed with this virus.    # Transplant History:  Etiology of Kidney Failure: IgA nephropathy  Tx: DDKT  Transplant: 11/28/2007 (Kidney), 8/8/1998 (Kidney)  Donor Type: Donation after Brain Death Donor Class: Standard Criteria Donor  Significant changes in immunosuppression: None  Significant transplant-related complications: None    Transplant Office Phone Number: [phone]    Assessment and plan was discussed with the patient and she voiced her understanding and agreement.    Return visit: Return in about 6 months (around 1/7/2021).    Akil Bah MD    Chief Complaint   Ms. Lala is a 54 year old here for kidney transplant and immunosuppression management.    History of Present Illness    Ms. Lala reports feeling good overall with some medical complaints.  Since last clinic visit, patient reports no hospitalizations or new medical complaints and has been doing well overall.  Her energy level is okay and stable, but not quite normal.  She has been active, although really gets minimal exercise.  Denies any chest pain or shortness of breath with exertion.  No leg swelling.  Appetite is okay to slightly decreased and she maybe lost a few pounds, but not sure if that was water weight.  No nausea, vomiting or diarrhea.  No heartburn symptoms and she takes PPI only as needed.  No fever, sweats or chills.    Recent Hospitalizations:  [x] No [] Yes     New Medical Issues: [x] No [] Yes    Decreased energy: [] No [x] Yes Okay   Chest pain or SOB with exertion:  [x] No [] Yes    Appetite change or weight change: [] No [x] Yes Slightly lower appetite   Nausea, vomiting or diarrhea:  [x] No [] Yes    Fever, sweats or chills: [x] No [] Yes    Leg swelling: [x] No [] Yes      Home BP: 130/70s    Review of Systems   A comprehensive review of systems was obtained and negative, except as noted in the HPI or PMH.    Problem List   Patient Active Problem List   Diagnosis     Kidney replaced by transplant     Parathyroid related hypercalcemia (H)     Encounter for long-term current use of medication     CKD (chronic kidney disease) stage 3, GFR 30-59 ml/min (H)     Acute pharyngitis     Weakness     HTN, kidney transplant related     Chest pain at rest     Chest pain, non-cardiac     Cough     Hyponatremia     Aftercare following organ transplant     Vitamin D deficiency       Social History   Social History     Tobacco Use     Smoking status: Never Smoker     Smokeless tobacco: Never Used   Substance Use Topics     Alcohol use: No     Drug use: No       Allergies   Allergies   Allergen Reactions     Contrast Dye Shortness Of Breath     Other reaction(s): Angioedema     Amlodipine Other (See Comments)       Medications   Current Outpatient Medications   Medication Sig     calcitRIOL (ROCALTROL) 0.25 MCG capsule Take 1 capsule (0.25 mcg) by mouth daily     calcium carbonate (TUMS) 500 MG chewable tablet Take 1 chew tab by mouth 2 times daily     carvedilol (COREG) 3.125 MG tablet Take 1 tablet (3.125 mg) by mouth 2 times daily (with meals)     CELLCEPT (BRAND) 250 MG capsule Take 3 capsules (750 mg) by mouth 2 times daily     darbepoetin deana-polysorbate (ARANESP) 25 MCG/0.42ML injection Inject 0.42 mLs (25 mcg) Subcutaneous every 14 days Dosed with anemia clinic.     hydrALAZINE (APRESOLINE) 25 MG tablet Take 1 tablet (25 mg) by mouth 3 times daily     losartan (COZAAR) 25  MG tablet Take 1 tablet (25 mg) by mouth daily     NEORAL (BRAND) 25 MG capsule Take 1 capsule (25 mg) by mouth 2 times daily DAW1     omeprazole (PRILOSEC) 20 MG DR capsule Take 1 capsule (20 mg) by mouth 2 times daily     sulfamethoxazole-trimethoprim (BACTRIM) 400-80 MG tablet Take 1 tablet by mouth daily     No current facility-administered medications for this visit.      Medications Discontinued During This Encounter   Medication Reason     cefdinir (OMNICEF) 300 MG capsule        Physical Exam   Vital Signs: /76   Pulse 62     Physical exam was deferred for this telemedicine visit.      Data     Renal Latest Ref Rng & Units 5/15/2020 4/24/2020 3/25/2020   Na 133 - 144 mmol/L 127(L) 135 125(L)   K 3.4 - 5.3 mmol/L 4.6 4.3 3.6   Cl 94 - 109 mmol/L 98 106 92(L)   CO2 20 - 32 mmol/L 18(L) 25 27   BUN 7 - 30 mg/dL 22 18 19   Cr 0.52 - 1.04 mg/dL 2.12(H) 2.08(H) 2.16(H)   Glucose 70 - 99 mg/dL 95 89 162(H)   Ca  8.5 - 10.1 mg/dL 8.2(L) 9.2 9.0   Mg 1.6 - 2.3 mg/dL - - -     Bone Health Latest Ref Rng & Units 3/17/2020 4/3/2019 1/29/2018   Phos 2.5 - 4.5 mg/dL 3.6 - 2.8   PTHi 18 - 80 pg/mL 291(H) - -   Vit D Def 20 - 75 ug/L - 18(L) -     Heme Latest Ref Rng & Units 5/15/2020 4/24/2020 3/25/2020   WBC 4.0 - 11.0 10e9/L 5.1 3.5(L) 3.4(L)   Hgb 11.7 - 15.7 g/dL 10.0(L) 10.4(L) 9.7(L)   Plt 150 - 450 10e9/L 171 153 188   ABSOLUTE NEUTROPHIL 1.6 - 8.3 10e9/L 3.6 - 2.2   ABSOLUTE LYMPHOCYTES 0.8 - 5.3 10e9/L 0.8 - 0.7(L)   ABSOLUTE MONOCYTES 0.0 - 1.3 10e9/L 0.6 - 0.5   ABSOLUTE EOSINOPHILS 0.0 - 0.7 10e9/L 0.1 - 0.1   ABSOLUTE BASOPHILS 0.0 - 0.2 10e9/L 0.0 - 0.0   ABS IMMATURE GRANULOCYTES 0 - 0.4 10e9/L 0.0 - 0.0   ABSOLUTE NUCLEATED RBC - 0.0 - 0.0     Liver Latest Ref Rng & Units 5/15/2020 3/25/2020 3/15/2020   AP 40 - 150 U/L 169(H) 125 119   TBili 0.2 - 1.3 mg/dL 0.8 0.6 0.8   DBili 0.0 - 0.2 mg/dL - - -   ALT 0 - 50 U/L 12 13 11   AST 0 - 45 U/L 27 24 28   Tot Protein 6.8 - 8.8 g/dL 7.8 7.4 7.1   Albumin  3.4 - 5.0 g/dL 3.6 3.5 3.3(L)     Pancreas Latest Ref Rng & Units 3/15/2020 1/26/2020 2/27/2017   Amylase 30 - 110 U/L - - -   Lipase 73 - 393 U/L 346 295 326     Iron studies Latest Ref Rng & Units 4/24/2020 3/17/2020   Iron 35 - 180 ug/dL 61 54   Iron sat 15 - 46 % 21 23   Ferritin 8 - 252 ng/mL 299(H) -     [facility] Txp Virology Latest Ref Rng & Units 3/20/2020 3/16/2020 9/28/2019   CMV IgG EU/mL - - -   CVM DNA Quant - Plasma Plasma Plasma, EDTA anticoagulant   CMV QUANT IU/ML CMVND:CMV DNA Not Detected [IU]/mL CMV DNA Not Detected <137(A) CMV DNA Not Detected   LOG IU/ML OF CMVQNT <2.1 [Log:IU]/mL Not Calculated <2.1 Not Calculated   BK Spec - - - -   BK Res BKNEG:BK Virus DNA Not Detected copies/mL - - -   BK Log <2.7 Log copies/mL - - -   EBV IgG - - - -   EBV DNA COPIES/ML EBVNEG:EBV DNA Not Detected [Copies]/mL - <500(A) <500(A)   EBV DNA LOG OF COPIES <2.7 [Log:copies]/mL - <2.7 <2.7   Hep B Surf - - - -   HIV 1&2 NEG - - -        Recent Labs   Lab Test 09/21/19  0810   DOSTAC Not Provided   TACROL <3.0*     Recent Labs   Lab Test 10/27/15  0555 11/27/15  0710 12/09/15  0550   DOSMPA Not Provided 9P  11/26 Not Provided   MPACID 0.94* 0.71* 2.02   MPAG 190.4* 169.8* 191.8*

## 2020-07-08 ENCOUNTER — TELEPHONE (OUTPATIENT)
Dept: PHARMACY | Facility: CLINIC | Age: 55
End: 2020-07-08

## 2020-07-08 NOTE — TELEPHONE ENCOUNTER
Follow-up with anemia management service:    Pt was just seen by Dr. Bah yesterday via virtual visit, and notes state that hemoglobin has been stable.     Anemia Latest Ref Rng & Units 3/18/2020 3/19/2020 3/20/2020 3/21/2020 3/25/2020 4/24/2020 5/15/2020   Hemoglobin 11.7 - 15.7 g/dL 8.5(L) 8.9(L) 8.4(L) 8.7(L) 9.7(L) 10.4(L) 10.0(L)   TSAT 15 - 46 % - - - - - 21 -   Ferritin 8 - 252 ng/mL - - - - - 299(H) -       Orders needed to be renewed (for next follow-up date) in EPIC: None     Follow-up call date: 08/04/2020    Carrie Leopold, RN   Anemia Services  [address]  [address]  [email or web address]  Office: [phone]  Fax [phone]

## 2020-07-09 ENCOUNTER — TELEPHONE (OUTPATIENT)
Dept: TRANSPLANT | Facility: CLINIC | Age: 55
End: 2020-07-09

## 2020-07-09 DIAGNOSIS — N25.81 SECONDARY HYPERPARATHYROIDISM OF RENAL ORIGIN (H): ICD-10-CM

## 2020-07-09 DIAGNOSIS — Z94.0 KIDNEY TRANSPLANTED: Primary | ICD-10-CM

## 2020-07-09 NOTE — LETTER
[facility]  Room 2-200  [facility],  [address]  [address]  Tel [phone]  Toll Free [phone]                OUTPATIENT LABORATORY TEST ORDER    Patient Name: Joshua Lala  Transplant Date: 11/28/2007 (Kidney), 8/8/1998 (Kidney)  YOB: 1965  Issue Date: July 9, 2020   [facility] MR:  [id number]  Exp. Date (1 year after date issued)      Diagnoses: Kidney Transplant (ICD-9  V42.0)   Long term use of medications (ICD-9  V58.69)     Lab results to be available on the same day drawn.   Patient should release information to the [facility].  Please fax to the Transplant Center at [phone].    Every 3 months  ?Hemogram and Platelet  ?Basic Metabolic Panel (Sodium, Potassium, Chloride, CO2, Creatinine, Urea Nitrogen, Glucose, Calcium)          ? Cyclosporine drug level (12 hour trough indicating time of last dose taken)                  Every 6 Months Due:           ?Urine for protein/creatinine           HIV, HBsAb, HBcAb, HCV  If you have any questions, please call The [facility] at [phone] or [phone].    Please fax labs to [phone]    Moe Johnston

## 2020-07-09 NOTE — TELEPHONE ENCOUNTER
Akil Bah MD Hasebroock, Rebecca, PEPPER               Recommend starting cholecalciferol 50 mcg daily for vitamin D deficiency.     Please repeat transplant labs, as well as vitamin D level.     If bicarbonate level low, would look to start sodium bicarbonate.      Outcome:  Called Joshua with a velma . No answer. Left a message with the  asking Joshua to have a full set of transplant labs drawn this week. Also, to call the transplant office back to discuss starting Cholecalciferol and to find out which pharmacy she would like it sent to.  Lab orders sent to [facility] and ordered in [facility] (unsure which lab she uses).

## 2020-07-09 NOTE — LETTER
PHYSICIAN ORDERS      DATE & TIME ISSUED: 2020 9:10 AM  PATIENT NAME: Joshua Lala   : 1965     [facility] MR# [if applicable]: [id number]     DIAGNOSIS:  Kidney transplant   ICD-10 CODE: Z94.0     Please complete the following lab with the next set of transplant labs:   -Vitamin D level     Any questions please call: [phone] option 5    Please fax results to [phone].      Moe Johnston

## 2020-07-10 ENCOUNTER — TELEPHONE (OUTPATIENT)
Dept: TRANSPLANT | Facility: CLINIC | Age: 55
End: 2020-07-10

## 2020-07-10 DIAGNOSIS — Z94.0 KIDNEY TRANSPLANTED: Primary | ICD-10-CM

## 2020-07-10 RX ORDER — VITAMIN B COMPLEX
50 TABLET ORAL DAILY
Qty: 180 TABLET | Refills: 3 | Status: SHIPPED | OUTPATIENT
Start: 2020-07-10 | End: 2021-07-06

## 2020-07-10 NOTE — TELEPHONE ENCOUNTER
Second call regarding the following:    Akil Bah MD Hasebroock, Rebecca, PEPPER                Recommend starting cholecalciferol 50 mcg daily for vitamin D deficiency.     Please repeat transplant labs, as well as vitamin D level.     If bicarbonate level low, would look to start sodium bicarbonate.      Plan:  Call Joshua with a [employer] :  Request that a full set of transplant labs with a vitamin D level be drawn in the next 1-2 weeks (labs have been ordered).  Let her know that a prescription for Cholecalciferol 50 mcg daily has been sent to [facility]. She should contact them to set up a delivery.    Outcome:  Called Joshua with a [employer] , no answer. Unable to leave a voice mail at this time.    LPN Task:  Please call Joshua again later this afternoon with above plan.  Thanks!

## 2020-07-11 NOTE — TELEPHONE ENCOUNTER
Call placed to patient daughter Nichelle. She v\u that patient should complete a full set of transplant labs with vit D level in 1-2 weeks and begin  Cholecalciferol 50 mcg daily.

## 2020-08-04 ENCOUNTER — TELEPHONE (OUTPATIENT)
Dept: PHARMACY | Facility: CLINIC | Age: 55
End: 2020-08-04

## 2020-08-04 NOTE — TELEPHONE ENCOUNTER
Follow-up with anemia management service:    Spoke with Nichelle. She will call and schedule lab appt.     Anemia Latest Ref Rng & Units 3/18/2020 3/19/2020 3/20/2020 3/21/2020 3/25/2020 4/24/2020 5/15/2020   Hemoglobin 11.7 - 15.7 g/dL 8.5(L) 8.9(L) 8.4(L) 8.7(L) 9.7(L) 10.4(L) 10.0(L)   TSAT 15 - 46 % - - - - - 21 -   Ferritin 8 - 252 ng/mL - - - - - 299(H) -       Orders needed to be renewed (for next follow-up date) in EPIC: None       Follow-up call date: 8/11/2020  Have Labs been drawn/scheduled?    Chelle Sapp RN   Anemia Services  [address] [place] [address]   [email or web address]   Office : [phone]  Fax: [phone]

## 2020-08-10 ENCOUNTER — TELEPHONE (OUTPATIENT)
Dept: TRANSPLANT | Facility: CLINIC | Age: 55
End: 2020-08-10

## 2020-08-10 DIAGNOSIS — D63.1 ANEMIA OF CHRONIC RENAL FAILURE, STAGE 4 (SEVERE) (H): ICD-10-CM

## 2020-08-10 DIAGNOSIS — Z94.0 KIDNEY TRANSPLANTED: ICD-10-CM

## 2020-08-10 DIAGNOSIS — N25.81 SECONDARY HYPERPARATHYROIDISM OF RENAL ORIGIN (H): ICD-10-CM

## 2020-08-10 DIAGNOSIS — N18.4 ANEMIA OF CHRONIC RENAL FAILURE, STAGE 4 (SEVERE) (H): ICD-10-CM

## 2020-08-10 DIAGNOSIS — N18.4 CKD (CHRONIC KIDNEY DISEASE) STAGE 4, GFR 15-29 ML/MIN (H): ICD-10-CM

## 2020-08-10 LAB
ANION GAP SERPL CALCULATED.3IONS-SCNC: 6 MMOL/L (ref 3–14)
BUN SERPL-MCNC: 14 MG/DL (ref 7–30)
CALCIUM SERPL-MCNC: 8 MG/DL (ref 8.5–10.1)
CHLORIDE SERPL-SCNC: 109 MMOL/L (ref 94–109)
CO2 SERPL-SCNC: 20 MMOL/L (ref 20–32)
CREAT SERPL-MCNC: 2.2 MG/DL (ref 0.52–1.04)
CYCLOSPORINE BLD LC/MS/MS-MCNC: 77 UG/L (ref 50–400)
DEPRECATED CALCIDIOL+CALCIFEROL SERPL-MC: 22 UG/L (ref 20–75)
ERYTHROCYTE [DISTWIDTH] IN BLOOD BY AUTOMATED COUNT: 13.4 % (ref 10–15)
FERRITIN SERPL-MCNC: 364 NG/ML (ref 8–252)
GFR SERPL CREATININE-BSD FRML MDRD: 25 ML/MIN/{1.73_M2}
GLUCOSE SERPL-MCNC: 83 MG/DL (ref 70–99)
HCT VFR BLD AUTO: 31.5 % (ref 35–47)
HGB BLD-MCNC: 9.7 G/DL (ref 11.7–15.7)
IRON SATN MFR SERPL: 37 % (ref 15–46)
IRON SERPL-MCNC: 89 UG/DL (ref 35–180)
MCH RBC QN AUTO: 27.1 PG (ref 26.5–33)
MCHC RBC AUTO-ENTMCNC: 30.8 G/DL (ref 31.5–36.5)
MCV RBC AUTO: 88 FL (ref 78–100)
PLATELET # BLD AUTO: 162 10E9/L (ref 150–450)
POTASSIUM SERPL-SCNC: 4.1 MMOL/L (ref 3.4–5.3)
RBC # BLD AUTO: 3.58 10E12/L (ref 3.8–5.2)
SODIUM SERPL-SCNC: 135 MMOL/L (ref 133–144)
TIBC SERPL-MCNC: 242 UG/DL (ref 240–430)
TME LAST DOSE: NORMAL H
WBC # BLD AUTO: 3.4 10E9/L (ref 4–11)

## 2020-08-10 PROCEDURE — 82306 VITAMIN D 25 HYDROXY: CPT | Performed by: INTERNAL MEDICINE

## 2020-08-10 PROCEDURE — 80158 DRUG ASSAY CYCLOSPORINE: CPT | Performed by: INTERNAL MEDICINE

## 2020-08-10 NOTE — TELEPHONE ENCOUNTER
Left message for patient regarding:  Calcium low at 8.0  Creatinine 2.20-baseline     Ensure she is taking her calcium supplement as ordered (calcium carbonate (TUMS) 500 mg two times daily).  Encourage increased intake of dietary calcium

## 2020-08-10 NOTE — TELEPHONE ENCOUNTER
Issue:  Calcium low at 8.0  Creatinine 2.20-baseline    Plan:  Call Joshua Lala:  Ensure she is taking her calcium supplement as ordered (calcium carbonate (TUMS) 500 mg two times daily).  Encourage increased intake of dietary calcium.    LPN Task:  Please call with above plan.  Thanks!

## 2020-08-11 ENCOUNTER — TELEPHONE (OUTPATIENT)
Dept: PHARMACY | Facility: CLINIC | Age: 55
End: 2020-08-11

## 2020-08-11 NOTE — TELEPHONE ENCOUNTER
Anemia Management Note  SUBJECTIVE/OBJECTIVE:  Referred by Dr. Akil Bah on 2020  Primary Diagnosis: Anemia in Chronic Kidney Disease (N18.4, D63.1)     Secondary Diagnosis:  Chronic Kidney Disease, Stage 4 (N18.4)   Kidney Tx: 2007 & 1998  Hgb goal range:  9-10  Epo/Darbo: TBD;  *Aranesp 25mcg x 1 while admitted  Iron regimen:  TBD  Labs : 2021  Recent EDMUND use, transfusion, IV iron: *Aranesp 25mcg x 1 while admitted  RX/TX plans : TBD  No history of stroke, MI and blood clots or cancers     *Pt does not speak English, please call pts daughter - Nichelle (Lia - oo) at [phone].     Contact:            No Boxes checked on Consent to Communicate.    Anemia Latest Ref Rng & Units 3/19/2020 3/20/2020 3/21/2020 3/25/2020 2020 5/15/2020 8/10/2020   Hemoglobin 11.7 - 15.7 g/dL 8.9(L) 8.4(L) 8.7(L) 9.7(L) 10.4(L) 10.0(L) 9.7(L)   TSAT 15 - 46 % - - - - 21 - 37   Ferritin 8 - 252 ng/mL - - - - 299(H) - 364(H)     BP Readings from Last 3 Encounters:   20 131/76   05/15/20 (!) 182/91   04/15/20 (!) 150/92     Wt Readings from Last 2 Encounters:   05/15/20 69.4 kg (153 lb)   04/15/20 68 kg (150 lb)           ASSESSMENT:  Hgb:at goal - continue to monitor  TSat: at goal >30% Ferritin: At goal (>100ng/mL)    PLAN:  RTC for Hgb in 4 week(s) and Check iron studies in 4 week(s)    Orders needed to be renewed (for next follow-up date) in EPIC: None    Iron labs due:  4 weeks    Plan discussed with:  No call today, lab review  Plan provided by:  merle    NEXT FOLLOW-UP DATE:  9/15/2020    Chelle Sapp RN   Anemia Services  [place] [id number]   [email or web address]   Office : [phone]  Fax: [phone]

## 2020-09-15 ENCOUNTER — TELEPHONE (OUTPATIENT)
Dept: PHARMACY | Facility: CLINIC | Age: 55
End: 2020-09-15

## 2020-09-17 DIAGNOSIS — N18.4 CKD (CHRONIC KIDNEY DISEASE) STAGE 4, GFR 15-29 ML/MIN (H): ICD-10-CM

## 2020-09-17 DIAGNOSIS — D63.1 ANEMIA OF CHRONIC RENAL FAILURE, STAGE 4 (SEVERE) (H): ICD-10-CM

## 2020-09-17 DIAGNOSIS — N18.4 ANEMIA OF CHRONIC RENAL FAILURE, STAGE 4 (SEVERE) (H): ICD-10-CM

## 2020-09-17 LAB
FERRITIN SERPL-MCNC: 374 NG/ML (ref 8–252)
HCT VFR BLD AUTO: 33 % (ref 35–47)
HGB BLD-MCNC: 10.1 G/DL (ref 11.7–15.7)
IRON SATN MFR SERPL: 36 % (ref 15–46)
IRON SERPL-MCNC: 88 UG/DL (ref 35–180)
TIBC SERPL-MCNC: 248 UG/DL (ref 240–430)

## 2020-09-30 ENCOUNTER — TELEPHONE (OUTPATIENT)
Dept: PHARMACY | Facility: CLINIC | Age: 55
End: 2020-09-30

## 2020-09-30 NOTE — TELEPHONE ENCOUNTER
Anemia Management Note  SUBJECTIVE/OBJECTIVE:  Referred by Dr. Akil Bah on 2020  Primary Diagnosis: Anemia in Chronic Kidney Disease (N18.4, D63.1)     Secondary Diagnosis:  Chronic Kidney Disease, Stage 4 (N18.4)   Kidney Tx: 2007 & 1998  Hgb goal range:  9-10  Epo/Darbo: TBD;  *Aranesp 25mcg x 1 while admitted  Iron regimen:  TBD  Labs : 2021  Recent EDMUND use, transfusion, IV iron: *Aranesp 25mcg x 1 while admitted  RX/TX plans : TBD  No history of stroke, MI and blood clots or cancers     *Pt does not speak English, please call pts daughter - Nichelle (Lia - oo) at [phone].     Contact:            No Boxes checked on Consent to Communicate.    Anemia Latest Ref Rng & Units 3/20/2020 3/21/2020 3/25/2020 2020 5/15/2020 8/10/2020 2020   Hemoglobin 11.7 - 15.7 g/dL 8.4(L) 8.7(L) 9.7(L) 10.4(L) 10.0(L) 9.7(L) 10.1(L)   TSAT 15 - 46 % - - - 21 - 37 36   Ferritin 8 - 252 ng/mL - - - 299(H) - 364(H) 374(H)     BP Readings from Last 3 Encounters:   20 131/76   05/15/20 (!) 182/91   04/15/20 (!) 150/92     Wt Readings from Last 2 Encounters:   05/15/20 69.4 kg (153 lb)   04/15/20 68 kg (150 lb)           ASSESSMENT:  Hgb:at goal - continue to monitor  TSat: at goal >30% Ferritin: At goal (>100ng/mL)    PLAN:  RTC for Hgb in 2-4 week(s) and Check iron studies in 2-4 week(s)    Orders needed to be renewed (for next follow-up date) in EPIC: None    Iron labs due:  10/15/2020    Plan discussed with:  No call, lab review  Plan provided by:  merle    NEXT FOLLOW-UP DATE:  10/19/2020    Chelle Sapp RN   Anemia Services  [address] [address]   [email or web address]   Office : [phone]  Fax: [phone]

## 2020-10-19 ENCOUNTER — TELEPHONE (OUTPATIENT)
Dept: PHARMACY | Facility: CLINIC | Age: 55
End: 2020-10-19

## 2020-10-29 ENCOUNTER — TELEPHONE (OUTPATIENT)
Dept: PHARMACY | Facility: CLINIC | Age: 55
End: 2020-10-29

## 2020-10-29 DIAGNOSIS — D63.1 ANEMIA OF CHRONIC RENAL FAILURE, STAGE 4 (SEVERE) (H): ICD-10-CM

## 2020-10-29 DIAGNOSIS — N18.4 ANEMIA OF CHRONIC RENAL FAILURE, STAGE 4 (SEVERE) (H): ICD-10-CM

## 2020-10-29 DIAGNOSIS — N18.4 CKD (CHRONIC KIDNEY DISEASE) STAGE 4, GFR 15-29 ML/MIN (H): ICD-10-CM

## 2020-10-29 LAB
FERRITIN SERPL-MCNC: 352 NG/ML (ref 8–252)
HCT VFR BLD AUTO: 33.6 % (ref 35–47)
HGB BLD-MCNC: 10.2 G/DL (ref 11.7–15.7)
IRON SATN MFR SERPL: 35 % (ref 15–46)
IRON SERPL-MCNC: 88 UG/DL (ref 35–180)
TIBC SERPL-MCNC: 251 UG/DL (ref 240–430)

## 2020-10-29 PROCEDURE — 82728 ASSAY OF FERRITIN: CPT | Performed by: PATHOLOGY

## 2020-10-29 PROCEDURE — 83540 ASSAY OF IRON: CPT | Performed by: PATHOLOGY

## 2020-10-29 PROCEDURE — 85014 HEMATOCRIT: CPT | Performed by: PATHOLOGY

## 2020-10-29 PROCEDURE — 85018 HEMOGLOBIN: CPT | Performed by: PATHOLOGY

## 2020-10-29 PROCEDURE — 83550 IRON BINDING TEST: CPT | Performed by: PATHOLOGY

## 2020-10-29 PROCEDURE — 36415 COLL VENOUS BLD VENIPUNCTURE: CPT | Performed by: PATHOLOGY

## 2020-10-29 NOTE — TELEPHONE ENCOUNTER
Anemia Management Note  SUBJECTIVE/OBJECTIVE:  Referred by Dr. Akil Bah on 2020  Primary Diagnosis: Anemia in Chronic Kidney Disease (N18.4, D63.1)     Secondary Diagnosis:  Chronic Kidney Disease, Stage 4 (N18.4)   Kidney Tx: 2007 & 1998  Hgb goal range:  9-10  Epo/Darbo: TBD;  *Aranesp 25mcg x 1 while admitted  Iron regimen:  TBD  Labs : 2021  Recent EDMUND use, transfusion, IV iron: *Aranesp 25mcg x 1 while admitted  RX/TX plans : TBD  No history of stroke, MI and blood clots or cancers     *Pt does not speak English, please call pts daughter - Nichelle (Lia - oo) at [phone].     Contact:            No Boxes checked on Consent to Communicate.    Anemia Latest Ref Rng & Units 3/21/2020 3/25/2020 2020 5/15/2020 8/10/2020 2020 10/29/2020   Hemoglobin 11.7 - 15.7 g/dL 8.7(L) 9.7(L) 10.4(L) 10.0(L) 9.7(L) 10.1(L) 10.2(L)   TSAT 15 - 46 % - - 21 - 37 36 35   Ferritin 8 - 252 ng/mL - - 299(H) - 364(H) 374(H) 352(H)     BP Readings from Last 3 Encounters:   20 131/76   05/15/20 (!) 182/91   04/15/20 (!) 150/92     Wt Readings from Last 2 Encounters:   05/15/20 69.4 kg (153 lb)   04/15/20 68 kg (150 lb)           ASSESSMENT:  Hgb:at goal - continue to monitor  TSat: at goal >30% Ferritin: At goal (>100ng/mL)    PLAN:  RTC for Hgb in 4 week(s) and Check iron studies in 4 week(s)    Orders needed to be renewed (for next follow-up date) in EPIC: None    Iron labs due:  4 weeks    Plan discussed with:  No call, Lab review  Plan provided by:  merle    NEXT FOLLOW-UP DATE:  12/3/20    Chelle Sapp RN   Anemia Services  [address] [address]   [email or web address]   Office : [phone]  Fax: [phone]

## 2020-12-03 ENCOUNTER — TELEPHONE (OUTPATIENT)
Dept: PHARMACY | Facility: CLINIC | Age: 55
End: 2020-12-03

## 2020-12-03 NOTE — TELEPHONE ENCOUNTER
Follow-up with anemia management service:    Labs were not drawn in Nov.  Anemia Labs have been stable. Has follow up appt in Jan.  Will follow up with pt at that time.     Anemia Latest Ref Rng & Units 3/21/2020 3/25/2020 4/24/2020 5/15/2020 8/10/2020 9/17/2020 10/29/2020   Hemoglobin 11.7 - 15.7 g/dL 8.7(L) 9.7(L) 10.4(L) 10.0(L) 9.7(L) 10.1(L) 10.2(L)   TSAT 15 - 46 % - - 21 - 37 36 35   Ferritin 8 - 252 ng/mL - - 299(H) - 364(H) 374(H) 352(H)         Follow-up call date: 1/6/2021    Chelle Sapp RN   Anemia Services  [address] [place] [address]   [email or web address]   Office : [phone]  Fax: [phone]

## 2020-12-08 ENCOUNTER — TELEPHONE (OUTPATIENT)
Dept: TRANSPLANT | Facility: CLINIC | Age: 55
End: 2020-12-08

## 2020-12-08 DIAGNOSIS — Z94.83 PANCREAS TRANSPLANTED (H): Primary | ICD-10-CM

## 2020-12-08 DIAGNOSIS — R53.83 FATIGUE: ICD-10-CM

## 2020-12-08 DIAGNOSIS — Z94.0 KIDNEY TRANSPLANTED: ICD-10-CM

## 2020-12-08 NOTE — TELEPHONE ENCOUNTER
Returned call to [facility].  Her mother has complaints of increased fatigue lately.  She denies any other symptoms.    No fevers, night sweats, weight loss, cough, cold like symptoms, n/v/d, or dysuria.   Reivewed with Dr. Johnston.  Will obtain a full set of transplant labs including  EBV, CMV, TSH, UA/UC and COVID-19.   She is scheduled for a lab appt. On 12/11.  Reminded not to take cyclosporine prior to lab draw.

## 2020-12-08 NOTE — TELEPHONE ENCOUNTER
Patient Call: Transplant Illness  Daughter  Nichelle reports mom not feeling well easily tires  Just not herself     Transplanted organ? kidney  Illness: Other Feeling very tired

## 2020-12-11 DIAGNOSIS — R53.83 FATIGUE: ICD-10-CM

## 2020-12-11 DIAGNOSIS — Z94.0 KIDNEY TRANSPLANTED: ICD-10-CM

## 2020-12-11 LAB
ALBUMIN UR-MCNC: 100 MG/DL
ANION GAP SERPL CALCULATED.3IONS-SCNC: 7 MMOL/L (ref 3–14)
APPEARANCE UR: CLEAR
BILIRUB UR QL STRIP: NEGATIVE
BUN SERPL-MCNC: 11 MG/DL (ref 7–30)
CALCIUM SERPL-MCNC: 8.2 MG/DL (ref 8.5–10.1)
CHLORIDE SERPL-SCNC: 107 MMOL/L (ref 94–109)
CO2 SERPL-SCNC: 20 MMOL/L (ref 20–32)
COLOR UR AUTO: ABNORMAL
CREAT SERPL-MCNC: 2.08 MG/DL (ref 0.52–1.04)
CYCLOSPORINE BLD LC/MS/MS-MCNC: 59 UG/L (ref 50–400)
ERYTHROCYTE [DISTWIDTH] IN BLOOD BY AUTOMATED COUNT: 12.7 % (ref 10–15)
GFR SERPL CREATININE-BSD FRML MDRD: 26 ML/MIN/{1.73_M2}
GLUCOSE SERPL-MCNC: 112 MG/DL (ref 70–99)
GLUCOSE UR STRIP-MCNC: NEGATIVE MG/DL
HCT VFR BLD AUTO: 28.8 % (ref 35–47)
HGB BLD-MCNC: 9.4 G/DL (ref 11.7–15.7)
HGB UR QL STRIP: NEGATIVE
KETONES UR STRIP-MCNC: NEGATIVE MG/DL
LEUKOCYTE ESTERASE UR QL STRIP: NEGATIVE
MCH RBC QN AUTO: 28.2 PG (ref 26.5–33)
MCHC RBC AUTO-ENTMCNC: 32.6 G/DL (ref 31.5–36.5)
MCV RBC AUTO: 87 FL (ref 78–100)
MUCOUS THREADS #/AREA URNS LPF: PRESENT /LPF
NITRATE UR QL: NEGATIVE
PH UR STRIP: 6 PH (ref 5–7)
PLATELET # BLD AUTO: 155 10E9/L (ref 150–450)
POTASSIUM SERPL-SCNC: 3.8 MMOL/L (ref 3.4–5.3)
RBC # BLD AUTO: 3.33 10E12/L (ref 3.8–5.2)
RBC #/AREA URNS AUTO: <1 /HPF (ref 0–2)
SARS-COV-2 RNA SPEC QL NAA+PROBE: NOT DETECTED
SODIUM SERPL-SCNC: 134 MMOL/L (ref 133–144)
SOURCE: ABNORMAL
SP GR UR STRIP: 1 (ref 1–1.03)
SPECIMEN SOURCE: NORMAL
TME LAST DOSE: 2100 H
TSH SERPL DL<=0.005 MIU/L-ACNC: 5.49 MU/L (ref 0.4–4)
UROBILINOGEN UR STRIP-MCNC: 0 MG/DL (ref 0–2)
WBC # BLD AUTO: 1.9 10E9/L (ref 4–11)
WBC #/AREA URNS AUTO: 2 /HPF (ref 0–5)

## 2020-12-11 PROCEDURE — 84443 ASSAY THYROID STIM HORMONE: CPT | Performed by: PATHOLOGY

## 2020-12-11 PROCEDURE — 85027 COMPLETE CBC AUTOMATED: CPT | Performed by: PATHOLOGY

## 2020-12-11 PROCEDURE — 36415 COLL VENOUS BLD VENIPUNCTURE: CPT | Performed by: PATHOLOGY

## 2020-12-11 PROCEDURE — 81001 URINALYSIS AUTO W/SCOPE: CPT | Performed by: PATHOLOGY

## 2020-12-11 PROCEDURE — 80158 DRUG ASSAY CYCLOSPORINE: CPT | Performed by: PATHOLOGY

## 2020-12-11 PROCEDURE — 80048 BASIC METABOLIC PNL TOTAL CA: CPT | Performed by: PATHOLOGY

## 2020-12-11 PROCEDURE — U0003 INFECTIOUS AGENT DETECTION BY NUCLEIC ACID (DNA OR RNA); SEVERE ACUTE RESPIRATORY SYNDROME CORONAVIRUS 2 (SARS-COV-2) (CORONAVIRUS DISEASE [COVID-19]), AMPLIFIED PROBE TECHNIQUE, MAKING USE OF HIGH THROUGHPUT TECHNOLOGIES AS DESCRIBED BY CMS-2020-01-R: HCPCS | Performed by: PATHOLOGY

## 2020-12-11 PROCEDURE — 87799 DETECT AGENT NOS DNA QUANT: CPT | Performed by: PATHOLOGY

## 2020-12-14 LAB
EBV DNA # SPEC NAA+PROBE: <500 {COPIES}/ML
EBV DNA SPEC NAA+PROBE-LOG#: <2.7 {LOG_COPIES}/ML

## 2020-12-15 ENCOUNTER — TELEPHONE (OUTPATIENT)
Dept: TRANSPLANT | Facility: CLINIC | Age: 55
End: 2020-12-15

## 2020-12-15 DIAGNOSIS — Z94.0 KIDNEY TRANSPLANTED: ICD-10-CM

## 2020-12-15 RX ORDER — CYCLOSPORINE 25 MG/1
CAPSULE, LIQUID FILLED ORAL
Qty: 90 CAPSULE | Refills: 11 | Status: ON HOLD | OUTPATIENT
Start: 2020-12-15 | End: 2021-12-28

## 2020-12-15 NOTE — TELEPHONE ENCOUNTER
ISSUE:  CSA 59, goal    Work up for generalized fatigue negative     PLAN:  Call Nichelle and confirm a 12 hour trough and current cyclosporine dose of 25 mg twice daily   Any recent illness, medication changes, or missed doses?  If an accurate trough and no missed doses, recommend increasing dose to 25 mg AM and 50 mg PM.   Repeat level 1 week after dose change  Reviewed with Dr. Johnston.  Recommendation to follow up with PCP for further evaluation of fatigue     OUTCOME:  Called and spoke with Nichelle.  She confirmed a 12 hour level and current dose.   She denies any medication changes or missed doses.   She verbalized understanding to increase dose to 25 AM and 50 PM and repeat level next week.   Rx updated, lab order placed.    We also discussed following up with PCP for further workup.  Nichelle verbalized understanding.

## 2020-12-29 ENCOUNTER — TELEPHONE (OUTPATIENT)
Dept: TRANSPLANT | Facility: CLINIC | Age: 55
End: 2020-12-29

## 2020-12-29 NOTE — TELEPHONE ENCOUNTER
Needs to see her pcp about the TSH may be she is dealing with subclinical hypo-thyroid Dr Johnston   Task   Please call  Joshua Lala or send message to follow up with PCP

## 2020-12-29 NOTE — TELEPHONE ENCOUNTER
Spoke to patients daughter Nichelle who verbalizes understanding that patient should schedule visit with PCP regarding recent TSH levels.

## 2021-01-06 ENCOUNTER — TELEPHONE (OUTPATIENT)
Dept: PHARMACY | Facility: CLINIC | Age: 56
End: 2021-01-06

## 2021-01-06 NOTE — TELEPHONE ENCOUNTER
Anemia Management Note  SUBJECTIVE/OBJECTIVE:  Referred by Dr. Akil Bah on 2020  Primary Diagnosis: Anemia in Chronic Kidney Disease (N18.4, D63.1)     Secondary Diagnosis:  Chronic Kidney Disease, Stage 4 (N18.4)   Kidney Tx: 2007 & 1998  Hgb goal range:  9-10  Epo/Darbo: TBD;  *Aranesp 25mcg x 1 while admitted  Iron regimen:  TBD  Labs : 2021  Recent EDMUND use, transfusion, IV iron: *Aranesp 25mcg x 1 while admitted  RX/TX plans : TBD  No history of stroke, MI and blood clots or cancers     *Pt does not speak English, please call pts daughter - Nichelle (Lia - oo) at [phone].     Contact:            No Boxes checked on Consent to Communicate.    Anemia Latest Ref Rng & Units 3/25/2020 2020 5/15/2020 8/10/2020 2020 10/29/2020 2020   Hemoglobin 11.7 - 15.7 g/dL 9.7(L) 10.4(L) 10.0(L) 9.7(L) 10.1(L) 10.2(L) 9.4(L)   TSAT 15 - 46 % - 21 - 37 36 35 -   Ferritin 8 - 252 ng/mL - 299(H) - 364(H) 374(H) 352(H) -     BP Readings from Last 3 Encounters:   20 131/76   05/15/20 (!) 182/91   04/15/20 (!) 150/92     Wt Readings from Last 2 Encounters:   05/15/20 69.4 kg (153 lb)   04/15/20 68 kg (150 lb)           ASSESSMENT:  Hgb:at goal - continue to monitor  TSat: at goal >30% Ferritin: At goal (>100ng/mL)    PLAN:  RTC for Hgb in 4 week(s) and Check iron studies in 4 week(s)    Orders needed to be renewed (for next follow-up date) in EPIC: None    Iron labs due:  2021    Plan discussed with:  No call, chart review  Plan provided by:  merle    NEXT FOLLOW-UP DATE:  2021    Chelle Sapp RN   [address] [address]   [email or web address]   Office : [phone]  Fax: [phone]

## 2021-01-07 ENCOUNTER — TELEPHONE (OUTPATIENT)
Dept: TRANSPLANT | Facility: CLINIC | Age: 56
End: 2021-01-07

## 2021-01-07 DIAGNOSIS — Z48.298 AFTERCARE FOLLOWING ORGAN TRANSPLANT: Primary | ICD-10-CM

## 2021-01-07 DIAGNOSIS — Z94.0 KIDNEY TRANSPLANTED: ICD-10-CM

## 2021-01-07 RX ORDER — SODIUM BICARBONATE 650 MG/1
650 TABLET ORAL 2 TIMES DAILY
Qty: 180 TABLET | Refills: 3 | Status: SHIPPED | OUTPATIENT
Start: 2021-01-07 | End: 2021-09-17

## 2021-01-07 NOTE — TELEPHONE ENCOUNTER
Call placed to patient daughter Nichelle. She note that the appointment has been r\sd and v\u of all other instructions.

## 2021-01-07 NOTE — TELEPHONE ENCOUNTER
Akil Bah MD Hasebroock, Rebecca, RN             Saira,     Patient was a No Show for her clinic visit today.  She never answered her phone after multiple attempts.     With reviewing her stuff, I would recommend starting sodium bicarbonate 650 mg bid.     Kidney function is stable, but new leukopenia.  I see CMV is negative.     Would recheck labs and reschedule appointment.     Niraj      PLAN  Call Daughter Nichelle,  Let her know scheduling will be reaching out to reschedule nephrology appointment.  Advise her to have Joshua start sodium bicarbonate supplement 650 mg twice a day as prescribed by Dr. Bah. Prescription has been sent to [facility].  Joshua is due for a repeat Cyclosporine level after a dose increase in December. Have her do a full set of labs in the next week.     LPN Task:  Please call with above plan. Lab orders have been updated.  Thanks!

## 2021-01-15 ENCOUNTER — TELEPHONE (OUTPATIENT)
Dept: TRANSPLANT | Facility: CLINIC | Age: 56
End: 2021-01-15

## 2021-01-15 DIAGNOSIS — N18.4 CKD (CHRONIC KIDNEY DISEASE) STAGE 4, GFR 15-29 ML/MIN (H): ICD-10-CM

## 2021-01-15 DIAGNOSIS — Z48.298 AFTERCARE FOLLOWING ORGAN TRANSPLANT: ICD-10-CM

## 2021-01-15 DIAGNOSIS — D63.1 ANEMIA OF CHRONIC RENAL FAILURE, STAGE 4 (SEVERE) (H): ICD-10-CM

## 2021-01-15 DIAGNOSIS — Z94.0 KIDNEY TRANSPLANTED: ICD-10-CM

## 2021-01-15 DIAGNOSIS — N18.4 ANEMIA OF CHRONIC RENAL FAILURE, STAGE 4 (SEVERE) (H): ICD-10-CM

## 2021-01-15 LAB
ANION GAP SERPL CALCULATED.3IONS-SCNC: 4 MMOL/L (ref 3–14)
BUN SERPL-MCNC: 14 MG/DL (ref 7–30)
CALCIUM SERPL-MCNC: 7.7 MG/DL (ref 8.5–10.1)
CHLORIDE SERPL-SCNC: 106 MMOL/L (ref 94–109)
CO2 SERPL-SCNC: 23 MMOL/L (ref 20–32)
CREAT SERPL-MCNC: 2.09 MG/DL (ref 0.52–1.04)
CREAT UR-MCNC: 21 MG/DL
CYCLOSPORINE BLD LC/MS/MS-MCNC: 106 UG/L (ref 50–400)
ERYTHROCYTE [DISTWIDTH] IN BLOOD BY AUTOMATED COUNT: 13 % (ref 10–15)
FERRITIN SERPL-MCNC: 396 NG/ML (ref 8–252)
GFR SERPL CREATININE-BSD FRML MDRD: 26 ML/MIN/{1.73_M2}
GLUCOSE SERPL-MCNC: 84 MG/DL (ref 70–99)
HCT VFR BLD AUTO: 29.7 % (ref 35–47)
HGB BLD-MCNC: 9.3 G/DL (ref 11.7–15.7)
IRON SATN MFR SERPL: 43 % (ref 15–46)
IRON SERPL-MCNC: 96 UG/DL (ref 35–180)
MCH RBC QN AUTO: 27.2 PG (ref 26.5–33)
MCHC RBC AUTO-ENTMCNC: 31.3 G/DL (ref 31.5–36.5)
MCV RBC AUTO: 87 FL (ref 78–100)
PLATELET # BLD AUTO: 155 10E9/L (ref 150–450)
POTASSIUM SERPL-SCNC: 4.4 MMOL/L (ref 3.4–5.3)
PROT UR-MCNC: 0.79 G/L
PROT/CREAT 24H UR: 3.69 G/G CR (ref 0–0.2)
RBC # BLD AUTO: 3.42 10E12/L (ref 3.8–5.2)
SODIUM SERPL-SCNC: 132 MMOL/L (ref 133–144)
TIBC SERPL-MCNC: 225 UG/DL (ref 240–430)
TME LAST DOSE: NORMAL H
WBC # BLD AUTO: 3.9 10E9/L (ref 4–11)

## 2021-01-15 PROCEDURE — 36415 COLL VENOUS BLD VENIPUNCTURE: CPT | Performed by: PATHOLOGY

## 2021-01-15 PROCEDURE — 80048 BASIC METABOLIC PNL TOTAL CA: CPT | Performed by: PATHOLOGY

## 2021-01-15 PROCEDURE — 80158 DRUG ASSAY CYCLOSPORINE: CPT | Performed by: PATHOLOGY

## 2021-01-15 PROCEDURE — 83550 IRON BINDING TEST: CPT | Performed by: PATHOLOGY

## 2021-01-15 PROCEDURE — 83540 ASSAY OF IRON: CPT | Performed by: PATHOLOGY

## 2021-01-15 PROCEDURE — 82728 ASSAY OF FERRITIN: CPT | Performed by: PATHOLOGY

## 2021-01-15 PROCEDURE — 84156 ASSAY OF PROTEIN URINE: CPT | Performed by: PATHOLOGY

## 2021-01-15 PROCEDURE — 85027 COMPLETE CBC AUTOMATED: CPT | Performed by: PATHOLOGY

## 2021-01-15 NOTE — TELEPHONE ENCOUNTER
ISSUE:  Calcium 7.7    PLAN:  Call and confirm she is taking Calcitriol, TUMS, and Vitamin D as prescribed     OUTCOME:  Called and spoke with patients daughter.  She states she thinks she had missed a dose or 2 of the calcium.   She will make sure her mother is taking daily and repeat labs in a few weeks.

## 2021-01-18 ENCOUNTER — TELEPHONE (OUTPATIENT)
Dept: PHARMACY | Facility: CLINIC | Age: 56
End: 2021-01-18

## 2021-01-18 NOTE — TELEPHONE ENCOUNTER
Anemia Management Note  SUBJECTIVE/OBJECTIVE:  Referred by Dr. Akil Bah on 2020  Primary Diagnosis: Anemia in Chronic Kidney Disease (N18.4, D63.1)     Secondary Diagnosis:  Chronic Kidney Disease, Stage 4 (N18.4)   Kidney Tx: 2007 & 1998  Hgb goal range:  9-10  Epo/Darbo: TBD;  *Aranesp 25mcg x 1 while admitted  Iron regimen:  TBD  Labs : 2021  Recent EDMUND use, transfusion, IV iron: *Aranesp 25mcg x 1 while admitted  RX/TX plans : TBD  No history of stroke, MI and blood clots or cancers     *Pt does not speak English, please call pts daughter - Nichelle (Lia - oo) at [phone].     Contact:            No Boxes checked on Consent to Communicate.    Anemia Latest Ref Rng & Units 2020 5/15/2020 8/10/2020 2020 10/29/2020 2020 1/15/2021   Hemoglobin 11.7 - 15.7 g/dL 10.4(L) 10.0(L) 9.7(L) 10.1(L) 10.2(L) 9.4(L) 9.3(L)   TSAT 15 - 46 % 21 - 37 36 35 - 43   Ferritin 8 - 252 ng/mL 299(H) - 364(H) 374(H) 352(H) - 396(H)     BP Readings from Last 3 Encounters:   20 131/76   05/15/20 (!) 182/91   04/15/20 (!) 150/92     Wt Readings from Last 2 Encounters:   05/15/20 69.4 kg (153 lb)   04/15/20 68 kg (150 lb)           ASSESSMENT:  Hgb:at goal - continue to monitor  TSat: at goal >30% Ferritin: At goal (>100ng/mL)    PLAN:  RTC for Hgb in 2 week(s)    Orders needed to be renewed (for next follow-up date) in EPIC: None    Iron labs due:  2021    Plan discussed with:  No call, Lab reivew  Plan provided by:  merle    NEXT FOLLOW-UP DATE:  21    Chelle Sapp RN   Anemia Services  [address] [place] [address]   [email or web address]   Office : [phone]  Fax: [phone]

## 2021-01-21 NOTE — ED TRIAGE NOTES
West Leonardo MD         Please call patient to notify of normal CT head results and no signs of masses or new stroke.       Message left to call back.    Pt arrived in triage with concerns of dysuria since yesterday. Pt says she started taking Tacrolimus on Friday for her kidney transplant and she now has pain while urinating. Pt had her kidney transplant 11 years ago. MURPHY Fish speaking, daughter is with pt.

## 2021-02-01 ENCOUNTER — TELEPHONE (OUTPATIENT)
Dept: PHARMACY | Facility: CLINIC | Age: 56
End: 2021-02-01

## 2021-02-01 NOTE — TELEPHONE ENCOUNTER
Follow-up with anemia management service:    Labs have not been drawn since 1/15/21. Hgb has been >9 since 3/25/20.  Has not ever received EDMUND through Anemia Services.     Will follow up again in 2 weeks.     Anemia Latest Ref Rng & Units 4/24/2020 5/15/2020 8/10/2020 9/17/2020 10/29/2020 12/11/2020 1/15/2021   Hemoglobin 11.7 - 15.7 g/dL 10.4(L) 10.0(L) 9.7(L) 10.1(L) 10.2(L) 9.4(L) 9.3(L)   TSAT 15 - 46 % 21 - 37 36 35 - 43   Ferritin 8 - 252 ng/mL 299(H) - 364(H) 374(H) 352(H) - 396(H)       Follow-up call date: 2/15/21    Chelle Sapp RN   Anemia Services  [address] [address]   [email or web address]   Office : [phone]  Fax: [phone]

## 2021-02-22 ENCOUNTER — TELEPHONE (OUTPATIENT)
Dept: PHARMACY | Facility: CLINIC | Age: 56
End: 2021-02-22

## 2021-02-22 NOTE — TELEPHONE ENCOUNTER
Follow-up with anemia management service:    Spoke with Nereida.  She will call and schedule Anemia Labs for this week sometime.     Anemia Latest Ref Rng & Units 2020 5/15/2020 8/10/2020 2020 10/29/2020 2020 1/15/2021   Hemoglobin 11.7 - 15.7 g/dL 10.4(L) 10.0(L) 9.7(L) 10.1(L) 10.2(L) 9.4(L) 9.3(L)   TSAT 15 - 46 % 21 - 37 36 35 - 43   Ferritin 8 - 252 ng/mL 299(H) - 364(H) 374(H) 352(H) - 396(H)       Orders needed to be renewed (for next follow-up date) in EPIC: None   Med order expires:NA   Lab orders : 21    Follow-up call date: 3/1/21    Chelle Sapp RN   Anemia Services  [address] [address]   [email or web address]   Office : [phone]  Fax: [phone]

## 2021-02-26 ENCOUNTER — DOCUMENTATION ONLY (OUTPATIENT)
Dept: CARE COORDINATION | Facility: CLINIC | Age: 56
End: 2021-02-26

## 2021-03-08 ENCOUNTER — TELEPHONE (OUTPATIENT)
Dept: PHARMACY | Facility: CLINIC | Age: 56
End: 2021-03-08

## 2021-03-08 NOTE — TELEPHONE ENCOUNTER
Follow-up with anemia management service:    Nereida has not scheduled labs at this time.  Has Telephone visit 3/29/21 with Dr. Connor,. Labs have not been drawn since Mid Jan.     Anemia Latest Ref Rng & Units 4/24/2020 5/15/2020 8/10/2020 9/17/2020 10/29/2020 12/11/2020 1/15/2021   Hemoglobin 11.7 - 15.7 g/dL 10.4(L) 10.0(L) 9.7(L) 10.1(L) 10.2(L) 9.4(L) 9.3(L)   TSAT 15 - 46 % 21 - 37 36 35 - 43   Ferritin 8 - 252 ng/mL 299(H) - 364(H) 374(H) 352(H) - 396(H)         Follow-up call date: 3/22/21  Were labs drawn?    Chelle Sapp RN   Anemia Services  [address] [address]   [email or web address]   Office : [phone]  Fax: [phone]

## 2021-03-09 DIAGNOSIS — Z94.0 KIDNEY TRANSPLANT RECIPIENT: Primary | ICD-10-CM

## 2021-03-09 DIAGNOSIS — Z79.60 LONG-TERM USE OF IMMUNOSUPPRESSANT MEDICATION: ICD-10-CM

## 2021-03-09 RX ORDER — CINACALCET 30 MG/1
30 TABLET, FILM COATED ORAL DAILY
Qty: 30 TABLET | Refills: 11 | Status: SHIPPED | OUTPATIENT
Start: 2021-03-09 | End: 2021-09-17

## 2021-03-09 RX ORDER — SULFAMETHOXAZOLE AND TRIMETHOPRIM 400; 80 MG/1; MG/1
1 TABLET ORAL DAILY
Qty: 30 TABLET | Refills: 11 | Status: SHIPPED | OUTPATIENT
Start: 2021-03-09 | End: 2021-09-17

## 2021-03-12 ENCOUNTER — TELEPHONE (OUTPATIENT)
Dept: TRANSPLANT | Facility: CLINIC | Age: 56
End: 2021-03-12

## 2021-03-12 DIAGNOSIS — Z94.0 KIDNEY TRANSPLANTED: ICD-10-CM

## 2021-03-12 DIAGNOSIS — N18.4 CKD (CHRONIC KIDNEY DISEASE) STAGE 4, GFR 15-29 ML/MIN (H): ICD-10-CM

## 2021-03-12 DIAGNOSIS — D63.1 ANEMIA OF CHRONIC RENAL FAILURE, STAGE 4 (SEVERE) (H): ICD-10-CM

## 2021-03-12 DIAGNOSIS — Z48.298 AFTERCARE FOLLOWING ORGAN TRANSPLANT: ICD-10-CM

## 2021-03-12 DIAGNOSIS — N18.4 ANEMIA OF CHRONIC RENAL FAILURE, STAGE 4 (SEVERE) (H): ICD-10-CM

## 2021-03-12 LAB
ANION GAP SERPL CALCULATED.3IONS-SCNC: 7 MMOL/L (ref 3–14)
BUN SERPL-MCNC: 16 MG/DL (ref 7–30)
CALCIUM SERPL-MCNC: 7.5 MG/DL (ref 8.5–10.1)
CHLORIDE SERPL-SCNC: 112 MMOL/L (ref 94–109)
CO2 SERPL-SCNC: 20 MMOL/L (ref 20–32)
CREAT SERPL-MCNC: 2.3 MG/DL (ref 0.52–1.04)
CYCLOSPORINE BLD LC/MS/MS-MCNC: 104 UG/L (ref 50–400)
ERYTHROCYTE [DISTWIDTH] IN BLOOD BY AUTOMATED COUNT: 13.2 % (ref 10–15)
FERRITIN SERPL-MCNC: 401 NG/ML (ref 8–252)
GFR SERPL CREATININE-BSD FRML MDRD: 23 ML/MIN/{1.73_M2}
GLUCOSE SERPL-MCNC: 84 MG/DL (ref 70–99)
HCT VFR BLD AUTO: 28.4 % (ref 35–47)
HGB BLD-MCNC: 8.7 G/DL (ref 11.7–15.7)
IRON SATN MFR SERPL: 34 % (ref 15–46)
IRON SERPL-MCNC: 83 UG/DL (ref 35–180)
MCH RBC QN AUTO: 27.9 PG (ref 26.5–33)
MCHC RBC AUTO-ENTMCNC: 30.6 G/DL (ref 31.5–36.5)
MCV RBC AUTO: 91 FL (ref 78–100)
PLATELET # BLD AUTO: 149 10E9/L (ref 150–450)
POTASSIUM SERPL-SCNC: 4.3 MMOL/L (ref 3.4–5.3)
RBC # BLD AUTO: 3.12 10E12/L (ref 3.8–5.2)
SODIUM SERPL-SCNC: 140 MMOL/L (ref 133–144)
TIBC SERPL-MCNC: 245 UG/DL (ref 240–430)
TME LAST DOSE: 2000 H
WBC # BLD AUTO: 3.3 10E9/L (ref 4–11)

## 2021-03-12 PROCEDURE — 80048 BASIC METABOLIC PNL TOTAL CA: CPT | Performed by: PATHOLOGY

## 2021-03-12 PROCEDURE — 36415 COLL VENOUS BLD VENIPUNCTURE: CPT | Performed by: PATHOLOGY

## 2021-03-12 PROCEDURE — 83540 ASSAY OF IRON: CPT | Performed by: PATHOLOGY

## 2021-03-12 PROCEDURE — 82728 ASSAY OF FERRITIN: CPT | Performed by: PATHOLOGY

## 2021-03-12 PROCEDURE — 85027 COMPLETE CBC AUTOMATED: CPT | Performed by: PATHOLOGY

## 2021-03-12 PROCEDURE — 83550 IRON BINDING TEST: CPT | Performed by: PATHOLOGY

## 2021-03-12 PROCEDURE — 80158 DRUG ASSAY CYCLOSPORINE: CPT | Performed by: INTERNAL MEDICINE

## 2021-03-12 NOTE — TELEPHONE ENCOUNTER
Call returned to patient daughter. Confirm patient taking her vitamin d supplement h\e not taking tums. She v\u to ensure that patient is taking tums daily.

## 2021-03-12 NOTE — TELEPHONE ENCOUNTER
Call placed to patient daughter Nichelle. No answer. Voice message left requested a return call to confirm calcium and vitamin d supplement doses

## 2021-03-12 NOTE — TELEPHONE ENCOUNTER
ISSUE:  Calcium 7.5 (chronically low)    PLAN:  Call and confirm she is taking her calcium carbonate and vitamin d3 supplements as prescribed     LPN TASK:  Call with above instructions

## 2021-03-22 ENCOUNTER — TELEPHONE (OUTPATIENT)
Dept: PHARMACY | Facility: CLINIC | Age: 56
End: 2021-03-22

## 2021-03-22 DIAGNOSIS — D63.1 ANEMIA OF CHRONIC RENAL FAILURE, STAGE 4 (SEVERE) (H): ICD-10-CM

## 2021-03-22 DIAGNOSIS — N18.4 CKD (CHRONIC KIDNEY DISEASE) STAGE 4, GFR 15-29 ML/MIN (H): Primary | ICD-10-CM

## 2021-03-22 DIAGNOSIS — N18.4 ANEMIA OF CHRONIC RENAL FAILURE, STAGE 4 (SEVERE) (H): ICD-10-CM

## 2021-03-22 NOTE — TELEPHONE ENCOUNTER
Anemia Management Note  SUBJECTIVE/OBJECTIVE:  Referred by Dr. Akil Bah on 2020  Primary Diagnosis: Anemia in Chronic Kidney Disease (N18.4, D63.1)     Secondary Diagnosis:  Chronic Kidney Disease, Stage 4 (N18.4)   Kidney Tx: 2007 & 1998  Hgb goal range:  9-10  Epo/Darbo: TBD;  *Aranesp 25mcg x 1 while admitted  Iron regimen:  TBD  Labs : 2022  Recent EDMUND use, transfusion, IV iron: *Aranesp 25mcg x 1 while admitted  RX/TX plans : TBD  No history of stroke, MI and blood clots or cancers     *Pt does not speak English, please call pts daughter - Nichelle (Pack - oo) at [phone].     Contact:            No Boxes checked on Consent to Communicate.    Anemia Latest Ref Rng & Units 5/15/2020 8/10/2020 2020 10/29/2020 2020 1/15/2021 3/12/2021   Hemoglobin 11.7 - 15.7 g/dL 10.0(L) 9.7(L) 10.1(L) 10.2(L) 9.4(L) 9.3(L) 8.7(L)   TSAT 15 - 46 % - 37 36 35 - 43 34   Ferritin 8 - 252 ng/mL - 364(H) 374(H) 352(H) - 396(H) 401(H)     BP Readings from Last 3 Encounters:   20 131/76   05/15/20 (!) 182/91   04/15/20 (!) 150/92     Wt Readings from Last 2 Encounters:   05/15/20 69.4 kg (153 lb)   04/15/20 68 kg (150 lb)         ASSESSMENT:  Hgb:Not at goal/Initiation of therapy and Known  TSat: at goal >30% Ferritin: At goal (>100ng/mL)    PLAN:  Joshua needs to start Aranesp.  Spoke with Nichlele. They want to wait to start EDMUND until Joshua meets with Dr. Connor towards the end of the month.     Orders needed to be renewed (for next follow-up date) in EPIC: None    Iron labs due:  12 weeks    Plan discussed with:  Nichelle  Plan provided by:  merle    NEXT FOLLOW-UP DATE:  3/30/21    Chelle Sapp RN   [facility]  [address] [address]   [email or web address]   Office : [phone]  Fax: [phone]

## 2021-03-29 ENCOUNTER — VIRTUAL VISIT (OUTPATIENT)
Dept: NEPHROLOGY | Facility: CLINIC | Age: 56
End: 2021-03-29
Attending: INTERNAL MEDICINE
Payer: MEDICARE

## 2021-03-29 DIAGNOSIS — I15.1 HTN, KIDNEY TRANSPLANT RELATED: ICD-10-CM

## 2021-03-29 DIAGNOSIS — R80.1 PERSISTENT PROTEINURIA: ICD-10-CM

## 2021-03-29 DIAGNOSIS — Z94.0 KIDNEY REPLACED BY TRANSPLANT: Primary | ICD-10-CM

## 2021-03-29 DIAGNOSIS — Z94.0 HTN, KIDNEY TRANSPLANT RELATED: ICD-10-CM

## 2021-03-29 DIAGNOSIS — D84.9 IMMUNOSUPPRESSION (H): ICD-10-CM

## 2021-03-29 PROCEDURE — 99442 PR PHYSICIAN TELEPHONE EVALUATION 11-20 MIN: CPT | Mod: 95 | Performed by: INTERNAL MEDICINE

## 2021-03-29 NOTE — PROGRESS NOTES
Joshua is a 55 year old who is being evaluated via a billable telephone visit.      What phone number would you like to be contacted at? [phone]  How would you like to obtain your AVS? Melvin  Phone call duration: 16 minutes    CHRONIC TRANSPLANT NEPHROLOGY VISIT    Assessment & Plan   # DDKT: Stable creatinine over the past year in the ~ 2.0-2.3 range.  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  Patient has not had a kidney transplant biopsy.   - Baseline Cr ~ 2.0-2.3   - Proteinuria: Nephrotic range (>3 grams), neg SPEP in 3/2020   - Date DSA Last Checked: Jun/2019      Latest DSA: Moderate DSA (1901-1021 mfi) to DR51    - BK Viremia: No   - Kidney Tx Biopsy: No    # Immunosuppression: Cyclosporine (goal ) and Mycophenolate mofetil (dose 750 mg every 12 hours)    -Continue with intensive monitoring of immunosuppression for efficacy and toxicity   - Changes: No    # Infection Prophylaxis:   - PJP: Sulfa/TMP (Bactrim)    # Hypertension: Controlled;  Goal BP: < 130/80   - Changes: Not at this time. Continue on coreg 3.125mg bid, hydralazine 25mg TID, losartan 25mg daily. If Scr decreases closer to 3 would stop hydral and increase losartan    # Anemia in Chronic Renal Disease: Hgb: Stable      EDMUND: Yes   - Iron studies: Low iron saturation    # Mineral Bone Disorder:   - Secondary renal hyperparathyroidism; PTH level: Mildly elevated (151-300 pg/ml)        On treatment: Calcitriol 0.25mcg daily and sensipar 30mg daily. Recheck PTH. Ideally would like to try to increase calcitriol and stop sensipar  - Vitamin D; level: Low        On supplement: Yes  - Calcium; level: Low        On supplement: Yes, on tums 500mg bid. If calcium continues to be low, increase tums to 1g BID    # Electrolytes:   - Potassium; level: Normal        On supplement: No  - Bicarbonate;  level: Low normal        On supplement: Yes, sodium bicarb 650mg bid. Increase if bicarb remains <20    # HFpEF: Appears to be doing better with reportedly no edema at present.  Patient follows with Cardiology.  Will work on improving hypertensive management.    # Pulmonary HTN: Was moderate on last cardiac echo 3/2020.  Patient is followed by Cardiology.    # Hyponatremia: Stable, now 140. Per her history, she appears to be euvolemic.  Previous work up suggested likely reset osmostat, poor oral intake and/or heart failure as the etiology with relatively low urine sodium (< 40 mmol on most occasions) and low to near low urine osmolality (< 130 mostly with last checks).  Patient had unremarkable thyroid studies and serum cortisol.    # GERD: Controlled on just prn use of PPI.    # Skin Cancer Risk:    - Discussed sun protection and recommend regular follow up with Dermatology.    # Medical Compliance: Yes     # COVID-19 Virus Review: Discussed COVID-19 virus and the potential medical risks.  Reviewed preventative health recommendations, which includes washing hands for 20 seconds, avoid touching your face, and social distancing.  Asked patient to inform the transplant center if they are exposed or diagnosed with this virus.    # COVID Vaccine Completed: No, had 1st dose on 3/24/21    # Transplant History:  Etiology of Kidney Failure: IgA nephropathy  Tx: DDKT  Transplant: 11/28/2007 (Kidney), 8/8/1998 (Kidney)  Donor Type: Donation after Brain Death Donor Class: Standard Criteria Donor  Significant changes in immunosuppression: None  Significant transplant-related complications: None    Transplant Office Phone Number: [phone]    Assessment and plan was discussed with the patient and she voiced her understanding and agreement.    Return visit: Return in about 6 months (around 9/29/2021).     Theodore Connor MD    Chief Complaint   Ms. Lala is a 55 year old here for kidney transplant and immunosuppression  management.    History of Present Illness    Ms. Lala reports feeling good overall. The patient states that she is compliant with her medication. She denies tetany, numbness, tingling, N/V/D, fever, chills, weakness.     Home BP: 120s/70s      Problem List   Patient Active Problem List   Diagnosis     Kidney replaced by transplant     Parathyroid related hypercalcemia (H)     Encounter for long-term current use of medication     CKD (chronic kidney disease) stage 3, GFR 30-59 ml/min     Acute pharyngitis     Weakness     HTN, kidney transplant related     Chest pain at rest     Chest pain, non-cardiac     Cough     Hyponatremia     Aftercare following organ transplant     Vitamin D deficiency       Social History   Social History     Tobacco Use     Smoking status: Never Smoker     Smokeless tobacco: Never Used   Substance Use Topics     Alcohol use: No     Drug use: No       Allergies   Allergies   Allergen Reactions     Contrast Dye Shortness Of Breath     Other reaction(s): Angioedema     Amlodipine Other (See Comments)       Medications   Current Outpatient Medications   Medication Sig     calcitRIOL (ROCALTROL) 0.25 MCG capsule Take 1 capsule (0.25 mcg) by mouth daily     calcium carbonate (TUMS) 500 MG chewable tablet Take 1 chew tab by mouth 2 times daily     carvedilol (COREG) 3.125 MG tablet Take 1 tablet (3.125 mg) by mouth 2 times daily (with meals)     CELLCEPT (BRAND) 250 MG capsule Take 3 capsules (750 mg) by mouth 2 times daily     cinacalcet (SENSIPAR) 30 MG tablet Take 1 tablet (30 mg) by mouth daily     darbepoetin deana-polysorbate (ARANESP) 25 MCG/0.42ML injection Inject 0.42 mLs (25 mcg) Subcutaneous every 14 days Dosed with anemia clinic.     hydrALAZINE (APRESOLINE) 25 MG tablet Take 1 tablet (25 mg) by mouth 3 times daily     losartan (COZAAR) 25 MG tablet Take 1 tablet (25 mg) by mouth daily     NEORAL (BRAND) 25 MG capsule Take 1 capsule (25 mg) by mouth every morning and 2 capsules (50 mg)  by mouth every evening     omeprazole (PRILOSEC) 20 MG DR capsule Take 1 capsule (20 mg) by mouth 2 times daily     sodium bicarbonate 650 MG tablet Take 1 tablet (650 mg) by mouth 2 times daily     sulfamethoxazole-trimethoprim (BACTRIM) 400-80 MG tablet Take 1 tablet by mouth daily     Vitamin D3 (CHOLECALCIFEROL) 25 mcg (1000 units) tablet Take 2 tablets (50 mcg) by mouth daily     No current facility-administered medications for this visit.      There are no discontinued medications.    Physical Exam   Vital Signs: There were no vitals taken for this visit.    Physical exam was deferred for this telemedicine visit.      Data     Renal Latest Ref Rng & Units 3/12/2021 1/15/2021 12/11/2020   Na 133 - 144 mmol/L 140 132(L) 134   K 3.4 - 5.3 mmol/L 4.3 4.4 3.8   Cl 94 - 109 mmol/L 112(H) 106 107   CO2 20 - 32 mmol/L 20 23 20   BUN 7 - 30 mg/dL 16 14 11   Cr 0.52 - 1.04 mg/dL 2.30(H) 2.09(H) 2.08(H)   Glucose 70 - 99 mg/dL 84 84 112(H)   Ca  8.5 - 10.1 mg/dL 7.5(L) 7.7(L) 8.2(L)   Mg 1.6 - 2.3 mg/dL - - -     Bone Health Latest Ref Rng & Units 8/10/2020 3/17/2020 4/3/2019   Phos 2.5 - 4.5 mg/dL - 3.6 -   PTHi 18 - 80 pg/mL - 291(H) -   Vit D Def 20 - 75 ug/L 22 - 18(L)     Heme Latest Ref Rng & Units 3/12/2021 1/15/2021 12/11/2020   WBC 4.0 - 11.0 10e9/L 3.3(L) 3.9(L) 1.9(L)   Hgb 11.7 - 15.7 g/dL 8.7(L) 9.3(L) 9.4(L)   Plt 150 - 450 10e9/L 149(L) 155 155   ABSOLUTE NEUTROPHIL 1.6 - 8.3 10e9/L - - -   ABSOLUTE LYMPHOCYTES 0.8 - 5.3 10e9/L - - -   ABSOLUTE MONOCYTES 0.0 - 1.3 10e9/L - - -   ABSOLUTE EOSINOPHILS 0.0 - 0.7 10e9/L - - -   ABSOLUTE BASOPHILS 0.0 - 0.2 10e9/L - - -   ABS IMMATURE GRANULOCYTES 0 - 0.4 10e9/L - - -   ABSOLUTE NUCLEATED RBC - - - -     Liver Latest Ref Rng & Units 5/15/2020 3/25/2020 3/15/2020   AP 40 - 150 U/L 169(H) 125 119   TBili 0.2 - 1.3 mg/dL 0.8 0.6 0.8   DBili 0.0 - 0.2 mg/dL - - -   ALT 0 - 50 U/L 12 13 11   AST 0 - 45 U/L 27 24 28   Tot Protein 6.8 - 8.8 g/dL 7.8 7.4 7.1   Albumin  3.4 - 5.0 g/dL 3.6 3.5 3.3(L)     Pancreas Latest Ref Rng & Units 3/15/2020 1/26/2020 2/27/2017   Amylase 30 - 110 U/L - - -   Lipase 73 - 393 U/L 346 295 326     Iron studies Latest Ref Rng & Units 3/12/2021 1/15/2021 10/29/2020   Iron 35 - 180 ug/dL 83 96 88   Iron sat 15 - 46 % 34 43 35   Ferritin 8 - 252 ng/mL 401(H) 396(H) 352(H)     [facility] Txp Virology Latest Ref Rng & Units 12/11/2020 3/20/2020 3/16/2020   CMV IgG EU/mL - - -   CVM DNA Quant - Plasma, EDTA anticoagulant Plasma Plasma   CMV QUANT IU/ML CMVND:CMV DNA Not Detected [IU]/mL CMV DNA Not Detected CMV DNA Not Detected <137(A)   LOG IU/ML OF CMVQNT <2.1 [Log:IU]/mL Not Calculated Not Calculated <2.1   BK Spec - - - -   BK Res BKNEG:BK Virus DNA Not Detected copies/mL - - -   BK Log <2.7 Log copies/mL - - -   EBV IgG - - - -   EBV DNA COPIES/ML EBVNEG:EBV DNA Not Detected [Copies]/mL <500(A) - <500(A)   EBV DNA LOG OF COPIES <2.7 [Log:copies]/mL <2.7 - <2.7   Hep B Surf - - - -   HIV 1&2 NEG - - -        Recent Labs   Lab Test 09/21/19  0810   DOSTAC Not Provided   TACROL <3.0*     Recent Labs   Lab Test 10/27/15  0555 11/27/15  0710 12/09/15  0550   DOSMPA Not Provided 9P  11/26 Not Provided   MPACID 0.94* 0.71* 2.02   MPAG 190.4* 169.8* 191.8*

## 2021-03-29 NOTE — LETTER
PHYSICIAN ORDERS      DATE & TIME ISSUED: 2021 4:33 PM  PATIENT NAME: Joshua Lala   : 1965     [facility] MR# [if applicable]: [id number]     DIAGNOSIS:  Kidney transplant/Aftercare following organ transplant  ICD-10 CODE: Z94.0/Z48.298     Please collect the following labs with next set of routine transplant labs:   -Ionized Calcium   -PTH       Any questions please call: [phone]    PLEASE FAX ALL RESULTS -[phone].      Theodore Connor MD

## 2021-03-29 NOTE — LETTER
3/29/2021       RE: Joshua Lala  [address]  [place] [address]     Dear Colleague,    Thank you for referring your patient, Joshua Lala, to the [facility] [place] at [facility]. Please see a copy of my visit note below.    Joshua is a 55 year old who is being evaluated via a billable telephone visit.      What phone number would you like to be contacted at? [phone]  How would you like to obtain your AVS? Melvin  Phone call duration: 16 minutes    CHRONIC TRANSPLANT NEPHROLOGY VISIT    Assessment & Plan   # DDKT: Stable creatinine over the past year in the ~ 2.0-2.3 range.  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  Patient has not had a kidney transplant biopsy.   - Baseline Cr ~ 2.0-2.3   - Proteinuria: Nephrotic range (>3 grams), neg SPEP in 3/2020   - Date DSA Last Checked: Jun/2019      Latest DSA: Moderate DSA (2088-1049 mfi) to DR51    - BK Viremia: No   - Kidney Tx Biopsy: No    # Immunosuppression: Cyclosporine (goal ) and Mycophenolate mofetil (dose 750 mg every 12 hours)    -Continue with intensive monitoring of immunosuppression for efficacy and toxicity   - Changes: No    # Infection Prophylaxis:   - PJP: Sulfa/TMP (Bactrim)    # Hypertension: Controlled;  Goal BP: < 130/80   - Changes: Not at this time. Continue on coreg 3.125mg bid, hydralazine 25mg TID, losartan 25mg daily. If Scr decreases closer to 3 would stop hydral and increase losartan    # Anemia in Chronic Renal Disease: Hgb: Stable      EDMUND: Yes   - Iron studies: Low iron saturation    # Mineral Bone Disorder:   - Secondary renal hyperparathyroidism; PTH level: Mildly elevated (151-300 pg/ml)        On treatment: Calcitriol 0.25mcg daily and sensipar 30mg daily. Recheck PTH. Ideally would like to try to  increase calcitriol and stop sensipar  - Vitamin D; level: Low        On supplement: Yes  - Calcium; level: Low        On supplement: Yes, on tums 500mg bid. If calcium continues to be low, increase tums to 1g BID    # Electrolytes:   - Potassium; level: Normal        On supplement: No  - Bicarbonate; level: Low normal        On supplement: Yes, sodium bicarb 650mg bid. Increase if bicarb remains <20    # HFpEF: Appears to be doing better with reportedly no edema at present.  Patient follows with Cardiology.  Will work on improving hypertensive management.    # Pulmonary HTN: Was moderate on last cardiac echo 3/2020.  Patient is followed by Cardiology.    # Hyponatremia: Stable, now 140. Per her history, she appears to be euvolemic.  Previous work up suggested likely reset osmostat, poor oral intake and/or heart failure as the etiology with relatively low urine sodium (< 40 mmol on most occasions) and low to near low urine osmolality (< 130 mostly with last checks).  Patient had unremarkable thyroid studies and serum cortisol.    # GERD: Controlled on just prn use of PPI.    # Skin Cancer Risk:    - Discussed sun protection and recommend regular follow up with Dermatology.    # Medical Compliance: Yes     # COVID-19 Virus Review: Discussed COVID-19 virus and the potential medical risks.  Reviewed preventative health recommendations, which includes washing hands for 20 seconds, avoid touching your face, and social distancing.  Asked patient to inform the transplant center if they are exposed or diagnosed with this virus.    # COVID Vaccine Completed: No, had 1st dose on 3/24/21    # Transplant History:  Etiology of Kidney Failure: IgA nephropathy  Tx: DDKT  Transplant: 11/28/2007 (Kidney), 8/8/1998 (Kidney)  Donor Type: Donation after Brain Death Donor Class: Standard Criteria Donor  Significant changes in immunosuppression: None  Significant transplant-related complications: None    Transplant Office Phone Number:  [phone]    Assessment and plan was discussed with the patient and she voiced her understanding and agreement.    Return visit: Return in about 6 months (around 9/29/2021).     Theodore Connor MD    Chief Complaint   Ms. Lala is a 55 year old here for kidney transplant and immunosuppression management.    History of Present Illness    Ms. Lala reports feeling good overall. The patient states that she is compliant with her medication. She denies tetany, numbness, tingling, N/V/D, fever, chills, weakness.     Home BP: 120s/70s      Problem List   Patient Active Problem List   Diagnosis     Kidney replaced by transplant     Parathyroid related hypercalcemia (H)     Encounter for long-term current use of medication     CKD (chronic kidney disease) stage 3, GFR 30-59 ml/min     Acute pharyngitis     Weakness     HTN, kidney transplant related     Chest pain at rest     Chest pain, non-cardiac     Cough     Hyponatremia     Aftercare following organ transplant     Vitamin D deficiency       Social History   Social History     Tobacco Use     Smoking status: Never Smoker     Smokeless tobacco: Never Used   Substance Use Topics     Alcohol use: No     Drug use: No       Allergies   Allergies   Allergen Reactions     Contrast Dye Shortness Of Breath     Other reaction(s): Angioedema     Amlodipine Other (See Comments)       Medications   Current Outpatient Medications   Medication Sig     calcitRIOL (ROCALTROL) 0.25 MCG capsule Take 1 capsule (0.25 mcg) by mouth daily     calcium carbonate (TUMS) 500 MG chewable tablet Take 1 chew tab by mouth 2 times daily     carvedilol (COREG) 3.125 MG tablet Take 1 tablet (3.125 mg) by mouth 2 times daily (with meals)     CELLCEPT (BRAND) 250 MG capsule Take 3 capsules (750 mg) by mouth 2 times daily     cinacalcet (SENSIPAR) 30 MG tablet Take 1 tablet (30 mg) by mouth daily     darbepoetin deana-polysorbate (ARANESP) 25 MCG/0.42ML injection Inject 0.42 mLs (25 mcg) Subcutaneous every  14 days Dosed with anemia clinic.     hydrALAZINE (APRESOLINE) 25 MG tablet Take 1 tablet (25 mg) by mouth 3 times daily     losartan (COZAAR) 25 MG tablet Take 1 tablet (25 mg) by mouth daily     NEORAL (BRAND) 25 MG capsule Take 1 capsule (25 mg) by mouth every morning and 2 capsules (50 mg) by mouth every evening     omeprazole (PRILOSEC) 20 MG DR capsule Take 1 capsule (20 mg) by mouth 2 times daily     sodium bicarbonate 650 MG tablet Take 1 tablet (650 mg) by mouth 2 times daily     sulfamethoxazole-trimethoprim (BACTRIM) 400-80 MG tablet Take 1 tablet by mouth daily     Vitamin D3 (CHOLECALCIFEROL) 25 mcg (1000 units) tablet Take 2 tablets (50 mcg) by mouth daily     No current facility-administered medications for this visit.      There are no discontinued medications.    Physical Exam   Vital Signs: There were no vitals taken for this visit.    Physical exam was deferred for this telemedicine visit.      Data     Renal Latest Ref Rng & Units 3/12/2021 1/15/2021 12/11/2020   Na 133 - 144 mmol/L 140 132(L) 134   K 3.4 - 5.3 mmol/L 4.3 4.4 3.8   Cl 94 - 109 mmol/L 112(H) 106 107   CO2 20 - 32 mmol/L 20 23 20   BUN 7 - 30 mg/dL 16 14 11   Cr 0.52 - 1.04 mg/dL 2.30(H) 2.09(H) 2.08(H)   Glucose 70 - 99 mg/dL 84 84 112(H)   Ca  8.5 - 10.1 mg/dL 7.5(L) 7.7(L) 8.2(L)   Mg 1.6 - 2.3 mg/dL - - -     Bone Health Latest Ref Rng & Units 8/10/2020 3/17/2020 4/3/2019   Phos 2.5 - 4.5 mg/dL - 3.6 -   PTHi 18 - 80 pg/mL - 291(H) -   Vit D Def 20 - 75 ug/L 22 - 18(L)     Heme Latest Ref Rng & Units 3/12/2021 1/15/2021 12/11/2020   WBC 4.0 - 11.0 10e9/L 3.3(L) 3.9(L) 1.9(L)   Hgb 11.7 - 15.7 g/dL 8.7(L) 9.3(L) 9.4(L)   Plt 150 - 450 10e9/L 149(L) 155 155   ABSOLUTE NEUTROPHIL 1.6 - 8.3 10e9/L - - -   ABSOLUTE LYMPHOCYTES 0.8 - 5.3 10e9/L - - -   ABSOLUTE MONOCYTES 0.0 - 1.3 10e9/L - - -   ABSOLUTE EOSINOPHILS 0.0 - 0.7 10e9/L - - -   ABSOLUTE BASOPHILS 0.0 - 0.2 10e9/L - - -   ABS IMMATURE GRANULOCYTES 0 - 0.4 10e9/L - - -    ABSOLUTE NUCLEATED RBC - - - -     Liver Latest Ref Rng & Units 5/15/2020 3/25/2020 3/15/2020   AP 40 - 150 U/L 169(H) 125 119   TBili 0.2 - 1.3 mg/dL 0.8 0.6 0.8   DBili 0.0 - 0.2 mg/dL - - -   ALT 0 - 50 U/L 12 13 11   AST 0 - 45 U/L 27 24 28   Tot Protein 6.8 - 8.8 g/dL 7.8 7.4 7.1   Albumin 3.4 - 5.0 g/dL 3.6 3.5 3.3(L)     Pancreas Latest Ref Rng & Units 3/15/2020 1/26/2020 2/27/2017   Amylase 30 - 110 U/L - - -   Lipase 73 - 393 U/L 346 295 326     Iron studies Latest Ref Rng & Units 3/12/2021 1/15/2021 10/29/2020   Iron 35 - 180 ug/dL 83 96 88   Iron sat 15 - 46 % 34 43 35   Ferritin 8 - 252 ng/mL 401(H) 396(H) 352(H)     [facility] Txp Virology Latest Ref Rng & Units 12/11/2020 3/20/2020 3/16/2020   CMV IgG EU/mL - - -   CVM DNA Quant - Plasma, EDTA anticoagulant Plasma Plasma   CMV QUANT IU/ML CMVND:CMV DNA Not Detected [IU]/mL CMV DNA Not Detected CMV DNA Not Detected <137(A)   LOG IU/ML OF CMVQNT <2.1 [Log:IU]/mL Not Calculated Not Calculated <2.1   BK Spec - - - -   BK Res BKNEG:BK Virus DNA Not Detected copies/mL - - -   BK Log <2.7 Log copies/mL - - -   EBV IgG - - - -   EBV DNA COPIES/ML EBVNEG:EBV DNA Not Detected [Copies]/mL <500(A) - <500(A)   EBV DNA LOG OF COPIES <2.7 [Log:copies]/mL <2.7 - <2.7   Hep B Surf - - - -   HIV 1&2 NEG - - -        Recent Labs   Lab Test 09/21/19  0810   DOSTAC Not Provided   TACROL <3.0*     Recent Labs   Lab Test 10/27/15  0555 11/27/15  0710 12/09/15  0550   DOSMPA Not Provided 9P  11/26 Not Provided   MPACID 0.94* 0.71* 2.02   MPAG 190.4* 169.8* 191.8*       Again, thank you for allowing me to participate in the care of your patient.      Sincerely,    Theodore Connor MD

## 2021-03-29 NOTE — PROGRESS NOTES
Theodore Connor MD Hasebroock, Rebecca, RN             Sorry, please get ionized calcium and PTH with next labs. I ideally want to get her off of sensipar which can drop her calcium level and would increase calcitriol. Thanks      Theodore Connor MD Hasebroock, Rebecca, PEPPER             If Scr goes back down closer to 2 I would like to stop hydral and increase losartan for protineuria     OUTCOME  Lab orders sent.

## 2021-03-30 ENCOUNTER — TELEPHONE (OUTPATIENT)
Dept: PHARMACY | Facility: CLINIC | Age: 56
End: 2021-03-30

## 2021-03-30 NOTE — TELEPHONE ENCOUNTER
Follow-up with anemia management service:    Spoke with Nichelle. Explained the Aranesp  and how it works to Nichelle. She will talk to her mom bout starting Aranesp injections and call me back. Joshua has declined Aranesp in the past.     Anemia Latest Ref Rng & Units 5/15/2020 8/10/2020 9/17/2020 10/29/2020 12/11/2020 1/15/2021 3/12/2021   Hemoglobin 11.7 - 15.7 g/dL 10.0(L) 9.7(L) 10.1(L) 10.2(L) 9.4(L) 9.3(L) 8.7(L)   TSAT 15 - 46 % - 37 36 35 - 43 34   Ferritin 8 - 252 ng/mL - 364(H) 374(H) 352(H) - 396(H) 401(H)           Follow-up call date: 4/6/21    Chelle Sapp RN   Anemia Services  [address] [address]   [email or web address]   Office : [phone]  Fax: [phone]

## 2021-03-30 NOTE — TELEPHONE ENCOUNTER
Nichelle called back.  Her mom wants to hold off on EDMUND, she is feeling better.  Labs will be drawn again in 1-2 weeks.     Chelle Sapp RN   Anemia Services  [address] [address]   [email or web address]   Office : [phone]  Fax: [phone]

## 2021-04-21 ENCOUNTER — TELEPHONE (OUTPATIENT)
Dept: PHARMACY | Facility: CLINIC | Age: 56
End: 2021-04-21

## 2021-04-21 NOTE — TELEPHONE ENCOUNTER
Follow-up with anemia management service:    Per conversation on 3/30/21 with daughter, Joshua wants to hold off on EDMUND.      Spoke with Nichelle. Joshua is due for labs.  Nichelle will call and schedule a lab appt.     Anemia Latest Ref Rng & Units 5/15/2020 8/10/2020 9/17/2020 10/29/2020 12/11/2020 1/15/2021 3/12/2021   Hemoglobin 11.7 - 15.7 g/dL 10.0(L) 9.7(L) 10.1(L) 10.2(L) 9.4(L) 9.3(L) 8.7(L)   TSAT 15 - 46 % - 37 36 35 - 43 34   Ferritin 8 - 252 ng/mL - 364(H) 374(H) 352(H) - 396(H) 401(H)         Follow-up call date: 5/5/2021    Chelle Sapp RN   Anemia Services  [address] [address]   [email or web address]   Office : [phone]  Fax: [phone]

## 2021-04-23 DIAGNOSIS — N18.4 CKD (CHRONIC KIDNEY DISEASE) STAGE 4, GFR 15-29 ML/MIN (H): ICD-10-CM

## 2021-04-23 DIAGNOSIS — N18.4 ANEMIA OF CHRONIC RENAL FAILURE, STAGE 4 (SEVERE) (H): ICD-10-CM

## 2021-04-23 DIAGNOSIS — D63.1 ANEMIA OF CHRONIC RENAL FAILURE, STAGE 4 (SEVERE) (H): ICD-10-CM

## 2021-04-23 LAB
FERRITIN SERPL-MCNC: 361 NG/ML (ref 8–252)
HCT VFR BLD AUTO: 28.1 % (ref 35–47)
HGB BLD-MCNC: 8.7 G/DL (ref 11.7–15.7)
IRON SATN MFR SERPL: 18 % (ref 15–46)
IRON SERPL-MCNC: 45 UG/DL (ref 35–180)
TIBC SERPL-MCNC: 252 UG/DL (ref 240–430)

## 2021-04-23 PROCEDURE — 82728 ASSAY OF FERRITIN: CPT | Performed by: PATHOLOGY

## 2021-04-23 PROCEDURE — 85018 HEMOGLOBIN: CPT | Performed by: PATHOLOGY

## 2021-04-23 PROCEDURE — 83550 IRON BINDING TEST: CPT | Performed by: PATHOLOGY

## 2021-04-23 PROCEDURE — 83540 ASSAY OF IRON: CPT | Performed by: PATHOLOGY

## 2021-04-23 PROCEDURE — 36415 COLL VENOUS BLD VENIPUNCTURE: CPT | Performed by: PATHOLOGY

## 2021-04-23 PROCEDURE — 85014 HEMATOCRIT: CPT | Performed by: PATHOLOGY

## 2021-05-05 ENCOUNTER — TELEPHONE (OUTPATIENT)
Dept: PHARMACY | Facility: CLINIC | Age: 56
End: 2021-05-05

## 2021-05-05 NOTE — TELEPHONE ENCOUNTER
Follow-up with anemia management service:    LVM for Abhilash to discuss Aranesp     Anemia Latest Ref Rng & Units 8/10/2020 9/17/2020 10/29/2020 12/11/2020 1/15/2021 3/12/2021 4/23/2021   Hemoglobin 11.7 - 15.7 g/dL 9.7(L) 10.1(L) 10.2(L) 9.4(L) 9.3(L) 8.7(L) 8.7(L)   TSAT 15 - 46 % 37 36 35 - 43 34 18   Ferritin 8 - 252 ng/mL 364(H) 374(H) 352(H) - 396(H) 401(H) 361(H)           Follow-up call date: 5/12/21    Chelle Sapp RN   Anemia Services  [address] [address]   [email or web address]   Office : [phone]  Fax: [phone]

## 2021-05-11 DIAGNOSIS — Z79.60 LONG-TERM USE OF IMMUNOSUPPRESSANT MEDICATION: ICD-10-CM

## 2021-05-11 DIAGNOSIS — Z94.0 KIDNEY TRANSPLANT RECIPIENT: Primary | ICD-10-CM

## 2021-05-11 RX ORDER — MYCOPHENOLATE MOFETIL 250 MG/1
750 CAPSULE ORAL 2 TIMES DAILY
Qty: 180 CAPSULE | Refills: 11 | Status: SHIPPED | OUTPATIENT
Start: 2021-05-11 | End: 2022-02-23

## 2021-05-12 ENCOUNTER — TELEPHONE (OUTPATIENT)
Dept: PHARMACY | Facility: CLINIC | Age: 56
End: 2021-05-12

## 2021-05-12 NOTE — TELEPHONE ENCOUNTER
Follow-up with anemia management service:    Spoke with Nichelle, she forgot to talk to her mom about the Aranesp.      She will talk to her again and call me back.  Per Nichelle, labs will be drawn again in 2 weeks or so.    After talking to Nichelle for a bit, she would like for me to call her mom with a [employer] .     Joshua, [place] no [place] [facility] [place].  She wants to see if her Family [facility] in [place] is able to give the Aranesp/Retacrit.  She has no nursing at home and she and or family are not able to give the injection.     Called [facility] off of [address]; [phone]. They are not able to give the EDMUND injections at the clinic.         Anemia Latest Ref Rng & Units 8/10/2020 9/17/2020 10/29/2020 12/11/2020 1/15/2021 3/12/2021 4/23/2021   Hemoglobin 11.7 - 15.7 g/dL 9.7(L) 10.1(L) 10.2(L) 9.4(L) 9.3(L) 8.7(L) 8.7(L)   TSAT 15 - 46 % 37 36 35 - 43 34 18   Ferritin 8 - 252 ng/mL 364(H) 374(H) 352(H) - 396(H) 401(H) 361(H)           Follow-up call date: 5/12/21  Call Joshua back with an update.     Chelle Sapp RN   Anemia Services  [address] [address]   [email or web address]   Office : [phone]  Fax: [phone]

## 2021-05-13 NOTE — TELEPHONE ENCOUNTER
YINA for Joshua to discuss plan re Thalia.     Chelle Sapp RN   Anemia Services  [address] [address]   [email or web address]   Office : [phone]  Fax: [phone]

## 2021-05-19 ENCOUNTER — TELEPHONE (OUTPATIENT)
Dept: PHARMACY | Facility: CLINIC | Age: 56
End: 2021-05-19

## 2021-05-19 NOTE — TELEPHONE ENCOUNTER
Follow-up with anemia management service:    LVM for Joshua with Polina . Her family clinic is unable to administer the Aranesp.     If Joshua wants to start Aranesp she needs to go to a [facility].   They decline to give give it at home.     Anemia Latest Ref Rng & Units 8/10/2020 9/17/2020 10/29/2020 12/11/2020 1/15/2021 3/12/2021 4/23/2021   Hemoglobin 11.7 - 15.7 g/dL 9.7(L) 10.1(L) 10.2(L) 9.4(L) 9.3(L) 8.7(L) 8.7(L)   TSAT 15 - 46 % 37 36 35 - 43 34 18   Ferritin 8 - 252 ng/mL 364(H) 374(H) 352(H) - 396(H) 401(H) 361(H)           Follow-up call date: 5/26/21    Chelle Sapp RN   Anemia Services  [address] [address]   [email or web address]   Office : [phone]  Fax: [phone]

## 2021-05-26 ENCOUNTER — TELEPHONE (OUTPATIENT)
Dept: PHARMACY | Facility: CLINIC | Age: 56
End: 2021-05-26

## 2021-05-26 NOTE — TELEPHONE ENCOUNTER
Follow-up with anemia management service:    Joshua has not returned called re starting EDMUND. She does not want to dose at home, and states that she can't get a ride to the clinic.     Anemia Latest Ref Rng & Units 8/10/2020 9/17/2020 10/29/2020 12/11/2020 1/15/2021 3/12/2021 4/23/2021   Hemoglobin 11.7 - 15.7 g/dL 9.7(L) 10.1(L) 10.2(L) 9.4(L) 9.3(L) 8.7(L) 8.7(L)   TSAT 15 - 46 % 37 36 35 - 43 34 18   Ferritin 8 - 252 ng/mL 364(H) 374(H) 352(H) - 396(H) 401(H) 361(H)           Follow-up call date: 6/16/21    Chelle Sapp RN   Anemia Services  [address] [address]   [email or web address]   Office : [phone]  Fax: [phone]

## 2021-05-29 ENCOUNTER — RECORDS - HEALTHEAST (OUTPATIENT)
Dept: ADMINISTRATIVE | Facility: CLINIC | Age: 56
End: 2021-05-29

## 2021-06-09 ENCOUNTER — TELEPHONE (OUTPATIENT)
Dept: TRANSPLANT | Facility: CLINIC | Age: 56
End: 2021-06-09

## 2021-06-09 DIAGNOSIS — Z94.0 KIDNEY REPLACED BY TRANSPLANT: ICD-10-CM

## 2021-06-09 DIAGNOSIS — Z79.899 ENCOUNTER FOR LONG-TERM CURRENT USE OF MEDICATION: ICD-10-CM

## 2021-06-09 DIAGNOSIS — Z48.298 AFTERCARE FOLLOWING ORGAN TRANSPLANT: Primary | ICD-10-CM

## 2021-06-09 NOTE — TELEPHONE ENCOUNTER
ISSUE:  Due for transplant labs    PLAN:  Please call and ask that patient get a full set of transplant labs drawn in the next 1-2 weeks    LPN TASK:  Call with above instructions

## 2021-06-09 NOTE — TELEPHONE ENCOUNTER
Call placed to patient daughter Nichelle. She v\u to ensure that her mother completes transplant labs in 1-2 weeks. Order sent

## 2021-06-29 ENCOUNTER — TELEPHONE (OUTPATIENT)
Dept: PHARMACY | Facility: CLINIC | Age: 56
End: 2021-06-29

## 2021-06-29 NOTE — TELEPHONE ENCOUNTER
Follow-up with anemia management service:    Labs have not been drawn. Joshua has not returned Anemia Calls.     Anemia Latest Ref Rng & Units 8/10/2020 9/17/2020 10/29/2020 12/11/2020 1/15/2021 3/12/2021 4/23/2021   Hemoglobin 11.7 - 15.7 g/dL 9.7(L) 10.1(L) 10.2(L) 9.4(L) 9.3(L) 8.7(L) 8.7(L)   TSAT 15 - 46 % 37 36 35 - 43 34 18   Ferritin 8 - 252 ng/mL 364(H) 374(H) 352(H) - 396(H) 401(H) 361(H)           Follow-up call date: 7/27/21    Chelle Sapp RN   Anemia Services  [address] [address]   [email or web address]   Office : [phone]  Fax: [phone]

## 2021-07-06 DIAGNOSIS — Z94.0 KIDNEY TRANSPLANTED: ICD-10-CM

## 2021-07-06 RX ORDER — CHOLECALCIFEROL (VITAMIN D3) 25 MCG
TABLET ORAL
Qty: 180 TABLET | Refills: 3 | Status: SHIPPED | OUTPATIENT
Start: 2021-07-06

## 2021-07-13 ENCOUNTER — RECORDS - HEALTHEAST (OUTPATIENT)
Dept: ADMINISTRATIVE | Facility: CLINIC | Age: 56
End: 2021-07-13

## 2021-07-27 ENCOUNTER — TELEPHONE (OUTPATIENT)
Dept: PHARMACY | Facility: CLINIC | Age: 56
End: 2021-07-27

## 2021-07-27 NOTE — TELEPHONE ENCOUNTER
Follow-up with anemia management service:    LVM for Joshua Steward has not had labs drawn since April 2021.  She is due to have her labs drawn.           Follow-up call date: 8/10/21    Chelle Sapp RN   Anemia Services  [address] [address]   [email or web address]   Office : [phone]  Fax: [phone]

## 2021-08-03 ENCOUNTER — TELEPHONE (OUTPATIENT)
Dept: PHARMACY | Facility: CLINIC | Age: 56
End: 2021-08-03

## 2021-08-03 NOTE — TELEPHONE ENCOUNTER
Follow-up with anemia management service:    Nichelle called. She is having a hard time reaching the lab to schedule an appt. Verified that she is calling the correct ph # and she was. She will keep trying to get through.   I did mention to Nichelle that she can take her mom to any [place] for labs.           Chelle Sapp RN   Anemia Services  [address] [address]   [email or web address]   Office : [phone]  Fax: [phone]

## 2021-09-03 ENCOUNTER — TELEPHONE (OUTPATIENT)
Dept: TRANSPLANT | Facility: CLINIC | Age: 56
End: 2021-09-03

## 2021-09-03 ENCOUNTER — LAB (OUTPATIENT)
Dept: LAB | Facility: CLINIC | Age: 56
End: 2021-09-03
Payer: MEDICARE

## 2021-09-03 DIAGNOSIS — Z79.899 ENCOUNTER FOR LONG-TERM CURRENT USE OF MEDICATION: ICD-10-CM

## 2021-09-03 DIAGNOSIS — N18.4 ANEMIA OF CHRONIC RENAL FAILURE, STAGE 4 (SEVERE) (H): ICD-10-CM

## 2021-09-03 DIAGNOSIS — N18.4 CKD (CHRONIC KIDNEY DISEASE) STAGE 4, GFR 15-29 ML/MIN (H): ICD-10-CM

## 2021-09-03 DIAGNOSIS — D63.1 ANEMIA OF CHRONIC RENAL FAILURE, STAGE 4 (SEVERE) (H): ICD-10-CM

## 2021-09-03 DIAGNOSIS — Z94.0 KIDNEY REPLACED BY TRANSPLANT: ICD-10-CM

## 2021-09-03 DIAGNOSIS — Z48.298 AFTERCARE FOLLOWING ORGAN TRANSPLANT: ICD-10-CM

## 2021-09-03 LAB
ANION GAP SERPL CALCULATED.3IONS-SCNC: 7 MMOL/L (ref 3–14)
BUN SERPL-MCNC: 24 MG/DL (ref 7–30)
CALCIUM SERPL-MCNC: 7.5 MG/DL (ref 8.5–10.1)
CHLORIDE BLD-SCNC: 109 MMOL/L (ref 94–109)
CO2 SERPL-SCNC: 19 MMOL/L (ref 20–32)
CREAT SERPL-MCNC: 2.6 MG/DL (ref 0.52–1.04)
CREAT UR-MCNC: 25 MG/DL
CYCLOSPORINE BLD LC/MS/MS-MCNC: 104 UG/L (ref 50–400)
ERYTHROCYTE [DISTWIDTH] IN BLOOD BY AUTOMATED COUNT: 13.5 % (ref 10–15)
FERRITIN SERPL-MCNC: 332 NG/ML (ref 8–252)
GFR SERPL CREATININE-BSD FRML MDRD: 20 ML/MIN/1.73M2
GLUCOSE BLD-MCNC: 87 MG/DL (ref 70–99)
HCT VFR BLD AUTO: 26.5 % (ref 35–47)
HGB BLD-MCNC: 8.6 G/DL (ref 11.7–15.7)
IRON SATN MFR SERPL: 33 % (ref 15–46)
IRON SERPL-MCNC: 84 UG/DL (ref 35–180)
MCH RBC QN AUTO: 28.4 PG (ref 26.5–33)
MCHC RBC AUTO-ENTMCNC: 32.5 G/DL (ref 31.5–36.5)
MCV RBC AUTO: 88 FL (ref 78–100)
PLATELET # BLD AUTO: 130 10E3/UL (ref 150–450)
POTASSIUM BLD-SCNC: 5 MMOL/L (ref 3.4–5.3)
PROT UR-MCNC: 1.08 G/L
PROT/CREAT 24H UR: 4.32 G/G CR (ref 0–0.2)
RBC # BLD AUTO: 3.03 10E6/UL (ref 3.8–5.2)
SODIUM SERPL-SCNC: 135 MMOL/L (ref 133–144)
TIBC SERPL-MCNC: 252 UG/DL (ref 240–430)
TME LAST DOSE: NORMAL H
TME LAST DOSE: NORMAL H
WBC # BLD AUTO: 3.3 10E3/UL (ref 4–11)

## 2021-09-03 PROCEDURE — 85027 COMPLETE CBC AUTOMATED: CPT | Performed by: PATHOLOGY

## 2021-09-03 PROCEDURE — 36415 COLL VENOUS BLD VENIPUNCTURE: CPT | Performed by: PATHOLOGY

## 2021-09-03 PROCEDURE — 83550 IRON BINDING TEST: CPT | Performed by: PATHOLOGY

## 2021-09-03 PROCEDURE — 80048 BASIC METABOLIC PNL TOTAL CA: CPT | Performed by: PATHOLOGY

## 2021-09-03 PROCEDURE — 82728 ASSAY OF FERRITIN: CPT | Performed by: PATHOLOGY

## 2021-09-03 PROCEDURE — 80158 DRUG ASSAY CYCLOSPORINE: CPT | Performed by: INTERNAL MEDICINE

## 2021-09-03 PROCEDURE — 84156 ASSAY OF PROTEIN URINE: CPT | Performed by: PATHOLOGY

## 2021-09-03 NOTE — LETTER
PHYSICIAN ORDERS      DATE & TIME ISSUED: September 3, 2021 2:24 PM  PATIENT NAME: Joshua Lala   : 1965     [facility] MR# [if applicable]: [id number]     DIAGNOSIS:  Kidney transplant   ICD-10 CODE: Z94.0      Please repeat the following level in one week  BMP    Any questions please call: [phone] option 5    Please fax results to [phone].      Moe Johnston

## 2021-09-03 NOTE — TELEPHONE ENCOUNTER
ISSUE:  Creatinine elevated 2.6  Calcium 7.5    PLAN:  Call and assess hydration status.  How much water is he drinking per day?  Any recent illness, diarrhea, s/s of a UTI, or medication changes?  Any increased intake of alcohol or caffeine?  Confirm she is taking Calcitriol 0.25 mcg daily and calcium carbonate 500 mg twice daily as prescribed   Recommend increasing hydration and repeating labs within 1 week.    LPN TASK:  Call with above instructions  Update lab orders

## 2021-09-03 NOTE — TELEPHONE ENCOUNTER
Call placed to patient mel Steward. She denies any recent illness, diarrhea or medication changes. Note that her stomach ache from time to time. Confirm patient taking her calcium carbonate h\e not the calcitriol. She v\u to ensure that patient take all medications as ordered; improve her hydration and repeat labs in one week. Order sent

## 2021-09-15 ENCOUNTER — TELEPHONE (OUTPATIENT)
Dept: PHARMACY | Facility: CLINIC | Age: 56
End: 2021-09-15

## 2021-09-15 NOTE — TELEPHONE ENCOUNTER
Anemia Management Note  SUBJECTIVE/OBJECTIVE:  Referred by Dr. Akil Bah on 2020  Primary Diagnosis: Anemia in Chronic Kidney Disease (N18.4, D63.1)     Secondary Diagnosis:  Chronic Kidney Disease, Stage 4 (N18.4)   Kidney Tx: 2007 & 1998  Hgb goal range:  9-10  Epo/Darbo: TBD;  *Aranesp 25mcg x 1 while admitted  *She does not want to dose at home, and states that she can't get a ride to the clinic.   Iron regimen:  TBD  Labs : 2022  Recent EDMUND use, transfusion, IV iron: *Aranesp 25mcg x 1 while admitted  RX/TX plans : TBD  No history of stroke, MI and blood clots or cancers     *Pt does not speak English, please call pts daughter - Nichelle (Pack - oo) at [phone].     Contact:            No Boxes checked on Consent to Communicate.    Anemia Latest Ref Rng & Units 9/3/2021   Hemoglobin 11.7 - 15.7 g/dL 8.6(L)   TSAT 15 - 46 % 33   Ferritin 8 - 252 ng/mL 332(H)     BP Readings from Last 3 Encounters:   20 131/76   05/15/20 (!) 182/91   04/15/20 (!) 150/92     Wt Readings from Last 2 Encounters:   05/15/20 153 lb (69.4 kg)   04/15/20 150 lb (68 kg)         ASSESSMENT:  Hgb:Not at goal/Non-compliance   TSat: at goal >30% Ferritin: At goal (>100ng/mL)    PLAN:  Per previous conversation with Joshua; She does not want to dose at home, and states that she can't get a ride to the clinic.  Joshua was a no show for her appt with Dr. Connor on 21.        NEXT FOLLOW-UP DATE:  21    Chelle Sapp RN   Anemia Services  [address] [address]   [email or web address]   Office : [phone]  Fax: [phone]

## 2021-09-17 DIAGNOSIS — Z94.0 KIDNEY TRANSPLANTED: ICD-10-CM

## 2021-09-17 DIAGNOSIS — Z94.0 KIDNEY TRANSPLANT RECIPIENT: ICD-10-CM

## 2021-09-17 DIAGNOSIS — Z48.298 AFTERCARE FOLLOWING ORGAN TRANSPLANT: ICD-10-CM

## 2021-09-17 DIAGNOSIS — Z94.0 S/P KIDNEY TRANSPLANT: ICD-10-CM

## 2021-09-17 DIAGNOSIS — Z79.60 LONG-TERM USE OF IMMUNOSUPPRESSANT MEDICATION: ICD-10-CM

## 2021-09-17 NOTE — TELEPHONE ENCOUNTER
LPN TASK:  Please call with the following medication changes  Stop sensipar  Increase calcitriol to 0.5 mcg/day   Decrease Bactrim from daily to 3 times per week   Increase sodium bicarb to 1300 mg BID    Let her know that scheduling will be calling her to reschedule the appointment with transplant nephrology that she missed.      Theodore Connor MD Hasebroock, Rebecca, RN  Please reschedule in a few months. Not urgent but please make the following changes: stop sensipar, increase calcitriol to 0.5mcg daily, decrease bactrim to 3x/wk, increase bicarb to 1300mg bid     Theodore Novak

## 2021-09-20 RX ORDER — SULFAMETHOXAZOLE AND TRIMETHOPRIM 400; 80 MG/1; MG/1
1 TABLET ORAL
Qty: 13 TABLET | Refills: 11 | Status: SHIPPED | OUTPATIENT
Start: 2021-09-20

## 2021-09-20 RX ORDER — CALCITRIOL 0.25 UG/1
0.5 CAPSULE, LIQUID FILLED ORAL DAILY
Qty: 60 CAPSULE | Refills: 3 | Status: SHIPPED | OUTPATIENT
Start: 2021-09-20 | End: 2022-02-01

## 2021-09-20 RX ORDER — CINACALCET 30 MG/1
30 TABLET, FILM COATED ORAL DAILY
Qty: 30 TABLET | Refills: 11
Start: 2021-09-20 | End: 2022-01-03

## 2021-09-20 RX ORDER — SODIUM BICARBONATE 650 MG/1
1300 TABLET ORAL 2 TIMES DAILY
Qty: 360 TABLET | Refills: 3 | Status: ON HOLD | OUTPATIENT
Start: 2021-09-20 | End: 2021-12-27

## 2021-09-21 ENCOUNTER — TRANSFERRED RECORDS (OUTPATIENT)
Dept: HEALTH INFORMATION MANAGEMENT | Facility: CLINIC | Age: 56
End: 2021-09-21
Payer: MEDICARE

## 2021-09-21 LAB
ALT SERPL-CCNC: 6 LU/L (ref 0–32)
AST SERPL-CCNC: 24 LU/L (ref 0–40)
CREATININE (EXTERNAL): 2.48 MG/DL (ref 0.57–1)
GFR ESTIMATED (EXTERNAL): 21 ML/MIN/1.73
GFR ESTIMATED (IF AFRICAN AMERICAN) (EXTERNAL): 24 ML/MIN/1.73
GLUCOSE (EXTERNAL): 93 MG/DL (ref 65–99)
POTASSIUM (EXTERNAL): 4.6 MMOL/L (ref 3.5–5.2)

## 2021-09-25 ENCOUNTER — HOSPITAL ENCOUNTER (INPATIENT)
Facility: CLINIC | Age: 56
LOS: 1 days | Discharge: HOME | DRG: 935 | End: 2021-09-26
Attending: EMERGENCY MEDICINE | Admitting: HOSPITALIST
Payer: MEDICARE

## 2021-09-25 ENCOUNTER — APPOINTMENT (OUTPATIENT)
Dept: CT IMAGING | Facility: CLINIC | Age: 56
DRG: 935 | End: 2021-09-25
Attending: EMERGENCY MEDICINE
Payer: MEDICARE

## 2021-09-25 DIAGNOSIS — Z11.52 ENCOUNTER FOR SCREENING LABORATORY TESTING FOR SEVERE ACUTE RESPIRATORY SYNDROME CORONAVIRUS 2 (SARS-COV-2): ICD-10-CM

## 2021-09-25 DIAGNOSIS — L03.311 ABDOMINAL WALL CELLULITIS: ICD-10-CM

## 2021-09-25 DIAGNOSIS — Z23 NEED FOR PROPHYLACTIC VACCINATION AND INOCULATION AGAINST CHOLERA ALONE: ICD-10-CM

## 2021-09-25 LAB
ALBUMIN SERPL-MCNC: 2.9 G/DL (ref 3.4–5)
ALBUMIN UR-MCNC: 100 MG/DL
ALP SERPL-CCNC: 146 U/L (ref 40–150)
ALT SERPL W P-5'-P-CCNC: 13 U/L (ref 0–50)
ANION GAP SERPL CALCULATED.3IONS-SCNC: 8 MMOL/L (ref 3–14)
APPEARANCE UR: CLEAR
AST SERPL W P-5'-P-CCNC: 35 U/L (ref 0–45)
BACTERIA #/AREA URNS HPF: ABNORMAL /HPF
BASOPHILS # BLD AUTO: 0 10E3/UL (ref 0–0.2)
BASOPHILS NFR BLD AUTO: 0 %
BILIRUB SERPL-MCNC: 1.1 MG/DL (ref 0.2–1.3)
BILIRUB UR QL STRIP: NEGATIVE
BUN SERPL-MCNC: 19 MG/DL (ref 7–30)
CALCIUM SERPL-MCNC: 7.2 MG/DL (ref 8.5–10.1)
CHLORIDE BLD-SCNC: 105 MMOL/L (ref 94–109)
CO2 SERPL-SCNC: 19 MMOL/L (ref 20–32)
COLOR UR AUTO: ABNORMAL
CREAT SERPL-MCNC: 2.54 MG/DL (ref 0.52–1.04)
EOSINOPHIL # BLD AUTO: 0 10E3/UL (ref 0–0.7)
EOSINOPHIL NFR BLD AUTO: 1 %
ERYTHROCYTE [DISTWIDTH] IN BLOOD BY AUTOMATED COUNT: 13.6 % (ref 10–15)
GFR SERPL CREATININE-BSD FRML MDRD: 21 ML/MIN/1.73M2
GLUCOSE BLD-MCNC: 90 MG/DL (ref 70–99)
GLUCOSE UR STRIP-MCNC: NEGATIVE MG/DL
HCT VFR BLD AUTO: 26.8 % (ref 35–47)
HGB BLD-MCNC: 8.4 G/DL (ref 11.7–15.7)
HGB UR QL STRIP: ABNORMAL
IMM GRANULOCYTES # BLD: 0 10E3/UL
IMM GRANULOCYTES NFR BLD: 0 %
KETONES UR STRIP-MCNC: NEGATIVE MG/DL
LEUKOCYTE ESTERASE UR QL STRIP: NEGATIVE
LYMPHOCYTES # BLD AUTO: 0.6 10E3/UL (ref 0.8–5.3)
LYMPHOCYTES NFR BLD AUTO: 11 %
MCH RBC QN AUTO: 27.8 PG (ref 26.5–33)
MCHC RBC AUTO-ENTMCNC: 31.3 G/DL (ref 31.5–36.5)
MCV RBC AUTO: 89 FL (ref 78–100)
MONOCYTES # BLD AUTO: 0.8 10E3/UL (ref 0–1.3)
MONOCYTES NFR BLD AUTO: 14 %
NEUTROPHILS # BLD AUTO: 4.3 10E3/UL (ref 1.6–8.3)
NEUTROPHILS NFR BLD AUTO: 74 %
NITRATE UR QL: NEGATIVE
NRBC # BLD AUTO: 0 10E3/UL
NRBC BLD AUTO-RTO: 0 /100
PH UR STRIP: 6 [PH] (ref 5–7)
PLATELET # BLD AUTO: 149 10E3/UL (ref 150–450)
POTASSIUM BLD-SCNC: 4.6 MMOL/L (ref 3.4–5.3)
PROT SERPL-MCNC: 7.4 G/DL (ref 6.8–8.8)
RBC # BLD AUTO: 3.02 10E6/UL (ref 3.8–5.2)
RBC URINE: 0 /HPF
SODIUM SERPL-SCNC: 132 MMOL/L (ref 133–144)
SP GR UR STRIP: 1 (ref 1–1.03)
SQUAMOUS EPITHELIAL: <1 /HPF
UROBILINOGEN UR STRIP-MCNC: NORMAL MG/DL
WBC # BLD AUTO: 5.7 10E3/UL (ref 4–11)
WBC URINE: <1 /HPF

## 2021-09-25 PROCEDURE — 80053 COMPREHEN METABOLIC PANEL: CPT | Performed by: EMERGENCY MEDICINE

## 2021-09-25 PROCEDURE — 36415 COLL VENOUS BLD VENIPUNCTURE: CPT | Performed by: EMERGENCY MEDICINE

## 2021-09-25 PROCEDURE — G1004 CDSM NDSC: HCPCS

## 2021-09-25 PROCEDURE — C9803 HOPD COVID-19 SPEC COLLECT: HCPCS

## 2021-09-25 PROCEDURE — 81001 URINALYSIS AUTO W/SCOPE: CPT | Performed by: EMERGENCY MEDICINE

## 2021-09-25 PROCEDURE — 99285 EMERGENCY DEPT VISIT HI MDM: CPT | Mod: 25

## 2021-09-25 PROCEDURE — 96365 THER/PROPH/DIAG IV INF INIT: CPT

## 2021-09-25 PROCEDURE — 99285 EMERGENCY DEPT VISIT HI MDM: CPT | Performed by: EMERGENCY MEDICINE

## 2021-09-25 PROCEDURE — 96375 TX/PRO/DX INJ NEW DRUG ADDON: CPT

## 2021-09-25 PROCEDURE — 74176 CT ABD & PELVIS W/O CONTRAST: CPT | Mod: 26 | Performed by: RADIOLOGY

## 2021-09-25 PROCEDURE — G1004 CDSM NDSC: HCPCS | Performed by: RADIOLOGY

## 2021-09-25 PROCEDURE — 85025 COMPLETE CBC W/AUTO DIFF WBC: CPT | Performed by: EMERGENCY MEDICINE

## 2021-09-25 ASSESSMENT — ENCOUNTER SYMPTOMS
NAUSEA: 0
DYSURIA: 0
NECK STIFFNESS: 0
CHILLS: 0
HEADACHES: 0
BACK PAIN: 0
DIFFICULTY URINATING: 1
SHORTNESS OF BREATH: 0
VOMITING: 0
ABDOMINAL DISTENTION: 1
NECK PAIN: 0
FEVER: 0
COUGH: 0

## 2021-09-25 ASSESSMENT — MIFFLIN-ST. JEOR: SCORE: 1133.4

## 2021-09-25 NOTE — ED TRIAGE NOTES
Patient reports urinary frequency, discomfort with urination, and urinating in small amounts. Symptoms started two weeks ago.

## 2021-09-26 VITALS
SYSTOLIC BLOOD PRESSURE: 150 MMHG | HEART RATE: 73 BPM | BODY MASS INDEX: 31.36 KG/M2 | OXYGEN SATURATION: 98 % | WEIGHT: 139.4 LBS | DIASTOLIC BLOOD PRESSURE: 77 MMHG | RESPIRATION RATE: 16 BRPM | HEIGHT: 56 IN | TEMPERATURE: 97.8 F

## 2021-09-26 PROBLEM — L03.311 ABDOMINAL WALL CELLULITIS: Status: ACTIVE | Noted: 2021-09-26

## 2021-09-26 LAB — SARS-COV-2 RNA RESP QL NAA+PROBE: NEGATIVE

## 2021-09-26 PROCEDURE — 99221 1ST HOSP IP/OBS SF/LOW 40: CPT | Performed by: INTERNAL MEDICINE

## 2021-09-26 PROCEDURE — 99207 PR APP CREDIT; MD BILLING SHARED VISIT: CPT | Performed by: HOSPITALIST

## 2021-09-26 PROCEDURE — 120N000011 HC R&B TRANSPLANT UMMC

## 2021-09-26 PROCEDURE — 250N000011 HC RX IP 250 OP 636: Performed by: HOSPITALIST

## 2021-09-26 PROCEDURE — 250N000011 HC RX IP 250 OP 636: Performed by: EMERGENCY MEDICINE

## 2021-09-26 PROCEDURE — U0003 INFECTIOUS AGENT DETECTION BY NUCLEIC ACID (DNA OR RNA); SEVERE ACUTE RESPIRATORY SYNDROME CORONAVIRUS 2 (SARS-COV-2) (CORONAVIRUS DISEASE [COVID-19]), AMPLIFIED PROBE TECHNIQUE, MAKING USE OF HIGH THROUGHPUT TECHNOLOGIES AS DESCRIBED BY CMS-2020-01-R: HCPCS | Performed by: EMERGENCY MEDICINE

## 2021-09-26 PROCEDURE — 99239 HOSP IP/OBS DSCHRG MGMT >30: CPT | Performed by: INTERNAL MEDICINE

## 2021-09-26 PROCEDURE — 90471 IMMUNIZATION ADMIN: CPT | Performed by: EMERGENCY MEDICINE

## 2021-09-26 PROCEDURE — 250N000013 HC RX MED GY IP 250 OP 250 PS 637: Performed by: HOSPITALIST

## 2021-09-26 PROCEDURE — 250N000012 HC RX MED GY IP 250 OP 636 PS 637: Performed by: HOSPITALIST

## 2021-09-26 PROCEDURE — 90715 TDAP VACCINE 7 YRS/> IM: CPT | Performed by: EMERGENCY MEDICINE

## 2021-09-26 PROCEDURE — 96365 THER/PROPH/DIAG IV INF INIT: CPT

## 2021-09-26 RX ORDER — ACETAMINOPHEN 650 MG/1
650 SUPPOSITORY RECTAL EVERY 6 HOURS PRN
Status: DISCONTINUED | OUTPATIENT
Start: 2021-09-26 | End: 2021-09-26 | Stop reason: HOSPADM

## 2021-09-26 RX ORDER — CEPHALEXIN 500 MG/1
500 CAPSULE ORAL EVERY 12 HOURS
Qty: 12 CAPSULE | Refills: 0 | Status: SHIPPED | OUTPATIENT
Start: 2021-09-26 | End: 2021-10-02

## 2021-09-26 RX ORDER — CINACALCET 30 MG/1
30 TABLET, FILM COATED ORAL DAILY
Status: DISCONTINUED | OUTPATIENT
Start: 2021-09-26 | End: 2021-09-26 | Stop reason: HOSPADM

## 2021-09-26 RX ORDER — LOSARTAN POTASSIUM 25 MG/1
25 TABLET ORAL DAILY
Status: DISCONTINUED | OUTPATIENT
Start: 2021-09-26 | End: 2021-09-26 | Stop reason: HOSPADM

## 2021-09-26 RX ORDER — SULFAMETHOXAZOLE AND TRIMETHOPRIM 400; 80 MG/1; MG/1
1 TABLET ORAL
Status: DISCONTINUED | OUTPATIENT
Start: 2021-09-27 | End: 2021-09-26 | Stop reason: HOSPADM

## 2021-09-26 RX ORDER — CYCLOSPORINE 25 MG/1
25 CAPSULE, LIQUID FILLED ORAL DAILY
Status: DISCONTINUED | OUTPATIENT
Start: 2021-09-26 | End: 2021-09-26 | Stop reason: HOSPADM

## 2021-09-26 RX ORDER — GINSENG 100 MG
CAPSULE ORAL 2 TIMES DAILY
Status: DISCONTINUED | OUTPATIENT
Start: 2021-09-26 | End: 2021-09-26 | Stop reason: HOSPADM

## 2021-09-26 RX ORDER — CALCITRIOL 0.25 UG/1
0.5 CAPSULE, LIQUID FILLED ORAL DAILY
Status: DISCONTINUED | OUTPATIENT
Start: 2021-09-26 | End: 2021-09-26 | Stop reason: HOSPADM

## 2021-09-26 RX ORDER — FUROSEMIDE 10 MG/ML
20 INJECTION INTRAMUSCULAR; INTRAVENOUS ONCE
Status: COMPLETED | OUTPATIENT
Start: 2021-09-26 | End: 2021-09-26

## 2021-09-26 RX ORDER — PANTOPRAZOLE SODIUM 20 MG/1
40 TABLET, DELAYED RELEASE ORAL 2 TIMES DAILY
Status: DISCONTINUED | OUTPATIENT
Start: 2021-09-26 | End: 2021-09-26 | Stop reason: HOSPADM

## 2021-09-26 RX ORDER — CYCLOSPORINE 25 MG/1
50 CAPSULE, LIQUID FILLED ORAL EVERY 24 HOURS
Status: DISCONTINUED | OUTPATIENT
Start: 2021-09-26 | End: 2021-09-26

## 2021-09-26 RX ORDER — CYCLOSPORINE 25 MG/1
25 CAPSULE, LIQUID FILLED ORAL DAILY
Status: DISCONTINUED | OUTPATIENT
Start: 2021-09-26 | End: 2021-09-26

## 2021-09-26 RX ORDER — ACETAMINOPHEN 325 MG/1
650 TABLET ORAL EVERY 6 HOURS PRN
Status: DISCONTINUED | OUTPATIENT
Start: 2021-09-26 | End: 2021-09-26 | Stop reason: HOSPADM

## 2021-09-26 RX ORDER — MYCOPHENOLATE MOFETIL 250 MG/1
750 CAPSULE ORAL 2 TIMES DAILY
Status: DISCONTINUED | OUTPATIENT
Start: 2021-09-26 | End: 2021-09-26 | Stop reason: HOSPADM

## 2021-09-26 RX ORDER — CARVEDILOL 3.12 MG/1
3.12 TABLET ORAL ONCE
Status: COMPLETED | OUTPATIENT
Start: 2021-09-26 | End: 2021-09-26

## 2021-09-26 RX ORDER — CEPHALEXIN 500 MG/1
500 CAPSULE ORAL EVERY 12 HOURS SCHEDULED
Status: DISCONTINUED | OUTPATIENT
Start: 2021-09-26 | End: 2021-09-26 | Stop reason: HOSPADM

## 2021-09-26 RX ORDER — HEPARIN SODIUM 5000 [USP'U]/.5ML
5000 INJECTION, SOLUTION INTRAVENOUS; SUBCUTANEOUS EVERY 12 HOURS
Status: DISCONTINUED | OUTPATIENT
Start: 2021-09-26 | End: 2021-09-26 | Stop reason: HOSPADM

## 2021-09-26 RX ORDER — SODIUM BICARBONATE 650 MG/1
1300 TABLET ORAL 2 TIMES DAILY
Status: DISCONTINUED | OUTPATIENT
Start: 2021-09-26 | End: 2021-09-26 | Stop reason: HOSPADM

## 2021-09-26 RX ORDER — DOCUSATE SODIUM 100 MG/1
100 CAPSULE, LIQUID FILLED ORAL 2 TIMES DAILY
Status: DISCONTINUED | OUTPATIENT
Start: 2021-09-26 | End: 2021-09-26 | Stop reason: HOSPADM

## 2021-09-26 RX ORDER — VITAMIN B COMPLEX
50 TABLET ORAL DAILY
Status: DISCONTINUED | OUTPATIENT
Start: 2021-09-26 | End: 2021-09-26 | Stop reason: HOSPADM

## 2021-09-26 RX ORDER — CYCLOSPORINE 25 MG/1
50 CAPSULE, LIQUID FILLED ORAL DAILY
Status: DISCONTINUED | OUTPATIENT
Start: 2021-09-26 | End: 2021-09-26 | Stop reason: HOSPADM

## 2021-09-26 RX ORDER — CALCIUM CARBONATE 500 MG/1
500 TABLET, CHEWABLE ORAL 2 TIMES DAILY
Status: DISCONTINUED | OUTPATIENT
Start: 2021-09-26 | End: 2021-09-26 | Stop reason: HOSPADM

## 2021-09-26 RX ORDER — HYDRALAZINE HYDROCHLORIDE 25 MG/1
25 TABLET, FILM COATED ORAL 3 TIMES DAILY
Status: DISCONTINUED | OUTPATIENT
Start: 2021-09-26 | End: 2021-09-26 | Stop reason: HOSPADM

## 2021-09-26 RX ORDER — LIDOCAINE 40 MG/G
CREAM TOPICAL
Status: DISCONTINUED | OUTPATIENT
Start: 2021-09-26 | End: 2021-09-26 | Stop reason: HOSPADM

## 2021-09-26 RX ORDER — CARVEDILOL 3.12 MG/1
3.12 TABLET ORAL 2 TIMES DAILY WITH MEALS
Status: DISCONTINUED | OUTPATIENT
Start: 2021-09-26 | End: 2021-09-26 | Stop reason: HOSPADM

## 2021-09-26 RX ORDER — CEFAZOLIN SODIUM 1 G/3ML
1 INJECTION, POWDER, FOR SOLUTION INTRAMUSCULAR; INTRAVENOUS ONCE
Status: COMPLETED | OUTPATIENT
Start: 2021-09-26 | End: 2021-09-26

## 2021-09-26 RX ADMIN — SODIUM BICARBONATE 1300 MG: 650 TABLET ORAL at 08:45

## 2021-09-26 RX ADMIN — Medication 50 MCG: at 08:45

## 2021-09-26 RX ADMIN — HYDRALAZINE HYDROCHLORIDE 25 MG: 25 TABLET, FILM COATED ORAL at 08:45

## 2021-09-26 RX ADMIN — PANTOPRAZOLE SODIUM 40 MG: 20 TABLET, DELAYED RELEASE ORAL at 08:45

## 2021-09-26 RX ADMIN — CARVEDILOL 3.12 MG: 3.12 TABLET, FILM COATED ORAL at 04:21

## 2021-09-26 RX ADMIN — LOSARTAN POTASSIUM 25 MG: 25 TABLET, FILM COATED ORAL at 08:46

## 2021-09-26 RX ADMIN — CEFAZOLIN 1 G: 1 INJECTION, POWDER, FOR SOLUTION INTRAMUSCULAR; INTRAVENOUS at 00:18

## 2021-09-26 RX ADMIN — HEPARIN SODIUM 5000 UNITS: 5000 INJECTION, SOLUTION INTRAVENOUS; SUBCUTANEOUS at 08:55

## 2021-09-26 RX ADMIN — CLOSTRIDIUM TETANI TOXOID ANTIGEN (FORMALDEHYDE INACTIVATED), CORYNEBACTERIUM DIPHTHERIAE TOXOID ANTIGEN (FORMALDEHYDE INACTIVATED), BORDETELLA PERTUSSIS TOXOID ANTIGEN (GLUTARALDEHYDE INACTIVATED), BORDETELLA PERTUSSIS FILAMENTOUS HEMAGGLUTININ ANTIGEN (FORMALDEHYDE INACTIVATED), BORDETELLA PERTUSSIS PERTACTIN ANTIGEN, AND BORDETELLA PERTUSSIS FIMBRIAE 2/3 ANTIGEN 0.5 ML: 5; 2; 2.5; 5; 3; 5 INJECTION, SUSPENSION INTRAMUSCULAR at 00:20

## 2021-09-26 RX ADMIN — CINACALCET HYDROCHLORIDE 30 MG: 30 TABLET, FILM COATED ORAL at 08:46

## 2021-09-26 RX ADMIN — CALCIUM CARBONATE (ANTACID) CHEW TAB 500 MG 500 MG: 500 CHEW TAB at 08:45

## 2021-09-26 RX ADMIN — CEPHALEXIN 500 MG: 500 CAPSULE ORAL at 08:45

## 2021-09-26 RX ADMIN — MYCOPHENOLATE MOFETIL 750 MG: 250 CAPSULE ORAL at 08:46

## 2021-09-26 RX ADMIN — CYCLOSPORINE 25 MG: 25 CAPSULE, LIQUID FILLED ORAL at 08:46

## 2021-09-26 RX ADMIN — CALCITRIOL 0.5 MCG: 0.5 CAPSULE, LIQUID FILLED ORAL at 08:46

## 2021-09-26 RX ADMIN — FUROSEMIDE 20 MG: 10 INJECTION, SOLUTION INTRAVENOUS at 03:26

## 2021-09-26 ASSESSMENT — ACTIVITIES OF DAILY LIVING (ADL)
CONCENTRATING,_REMEMBERING_OR_MAKING_DECISIONS_DIFFICULTY: NO
WEAR_GLASSES_OR_BLIND: NO
PATIENT_/_FAMILY_COMMUNICATION_STYLE: SPOKEN LANGUAGE (ENGLISH OR BILINGUAL)
DIFFICULTY_EATING/SWALLOWING: NO
ADLS_ACUITY_SCORE: 5
ADLS_ACUITY_SCORE: 9
DOING_ERRANDS_INDEPENDENTLY_DIFFICULTY: NO
ADLS_ACUITY_SCORE: 9
HEARING_DIFFICULTY_OR_DEAF: NO
DRESSING/BATHING_DIFFICULTY: NO
DIFFICULTY_COMMUNICATING: NO
TOILETING_ISSUES: NO
INTERPRETER_SERVICES_OFFERED_TO_THE_PATIENT: YES
FALL_HISTORY_WITHIN_LAST_SIX_MONTHS: NO
WALKING_OR_CLIMBING_STAIRS_DIFFICULTY: NO

## 2021-09-26 ASSESSMENT — MIFFLIN-ST. JEOR: SCORE: 1085.31

## 2021-09-26 ASSESSMENT — ENCOUNTER SYMPTOMS
WEAKNESS: 0
WOUND: 1
NUMBNESS: 0
LIGHT-HEADEDNESS: 0

## 2021-09-26 NOTE — PROGRESS NOTES
Patient tx to 7A from ED at 0630. Pt was settled and oriented to room. Report given to oncoming nurse.

## 2021-09-26 NOTE — PROGRESS NOTES
Admitted/transferred from: ED  2 RN full   skin assessment completed by Jerman Lam, RN and Jerman Joshua, RN.  Skin assessment finding: issues found dry/peeling skin on R shin. Burn on abdomen below navel   Interventions/actions: skin interventions dressing change orders per team     Will continue to monitor.
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
## 2021-09-26 NOTE — PLAN OF CARE
DISCHARGE:  Patient with orders to discharge to home.     Discharge instructions, medications & follow ups reviewed with patient and patients daughter. Copy of discharge summary given to patient and patients daughter. PIV removed.    Patient in stable condition. AVSS. Patient and daughter had no further questions regarding discharge instructions and medications. Patient transferred out by foot & left with daughter..
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
## 2021-09-26 NOTE — H&P
[facility]    History and Physical - Hospitalist Service, [facility] Overnight Admission       Date of Admission:  9/25/2021    Assessment & Plan      Joshua Lala is a 55 year old female admitted on 9/25/2021. She has a history of IgA nephropathy s/p DDKT x2 last 2007 c/b chronic antibody rejection. Other notable PMH includes echocardiographic evidence of pHTN, chronic anemia and electrolyte disturbances in the setting of CKDIII. Presented with two weeks of difficulty urinating and found to have ventral abdominal burn wound.    # Subacute difficulty urinating, present on admission, improved  - per pt improved almost to normal just since being in ED, however with hx suggestive of pHTN (last TTE 3/2020) with poor renal function and decreasing UOP while fluid up on exam with visible tachypnea, suspect pt may benefit from mild additional diuresis while continuing her home renal regimen as below. Notably UA was without pyruia and no TOSIN is less suggestive of infection or acute rejection given timeline  *order x1 20mg IV lasix given appears naive though might need more given CKD    # Ventral abdominal wall burn 2/2 heating blanket leading to non-purulent cellulitis present on admission, treating  - may have contributed to sensation of difficulty urinating due to pain  * c/w bacitracin ointment under dry gauze BID  * s/p IV cefazolin in ED, c/w PO 500mg BID x 5 days    #  IgA nephropathy s/p DDKT x2 last 2007 with chronic antibody rejection and CKD III with AOCD, Baseline Cr 1.3 to 1.6, monitoring  - Nephrologist: Dr. Akilah Johnston  - plan and med list per 6/14/2019 note continued inpatient   *stopped cyclosporine then and switched to tacrolimus, however per chart search this made patient feel bad so appears was switched back to cyclosporine shortly after, continue 25m morning and 50mg PM   *c/w MMF   *c/w bactrim PPX MWF   *c/w parathyroid axis and pH balancing home meds         "  Diet:   2g sodium restriction  DVT Prophylaxis: Heparin SQ  Staples Catheter: Not present  Central Lines: None  Code Status:   FULL       # Hyponatremia: Na = 132 mmol/L (Ref range: 133 - 144 mmol/L) on admission, will monitor as appropriate           Disposition Plan   Expected discharge: recommended to prior living arrangement once antibiotic plan established.     The patient's care was discussed with the Patient's Family.    Alessandro Adams MD  [facility]  Securely message with the Vocera Web Console (learn more here)  Text page via [employer] Paging/Directory  Please see sign in/sign out for up to date coverage information    ______________________________________________________________________    Chief Complaint   Difficulty urinating    History is obtained from the patient    History of Present Illness   Joshua Lala is a 55 year old female who has a history of IgA nephropathy s/p DDKT x2 last 2007 c/b chronic antibody rejection. Other notable PMH includes echocardiographic evidence of pHTN, chronic anemia and electrolyte disturbances in the setting of CKDIII.    Presented to ED along with daughter complaining of subacute worsening of urine output. [employer]  used for encounter. Reports 2 weeks ago without inciting event felt gradual increase in difficulty passing urine, associated with increasing abdominal fullness and distention. Also associated with \"some\" increased edema in her shins, which has come and gone in the past. Says that she is not having any shortness of breath, cough, fevers, chills, or diarrhea or nausea. Denies painful sensation with urination. Denies orthopnea, sleeps flat with one pillow for comfort. Says that since being in the ED she has been urinating more easily and prefers to go home as soon as possible. Lives at home with her daughter and son-in-law.    Says been taking all medications as prescribed. Says the takes \"medications for kidney " "transplant\" but cannot elaborate on specific drugs or doses.    In ED CT a/p performed showing very mild inflammatory stranding of graft kidney, and notable edema and skin thickening of the ventral pelvic wall with more generalized body wall edema.    Pt reports that used electric blanket this week for abdominal pain which she reveals caused a burn on her skin. Used a cotton patch on the wound which was adhered to it and soaked off and replaced with topical ointment and bandage in the ED.      Review of Systems    10 point review of systems negative except described otherwise in HPI      Past Medical History    I have reviewed this patient's medical history and updated it with pertinent information if needed.   Past Medical History:   Diagnosis Date     EBV (Silvestre-Barr virus) viremia      GERD (gastroesophageal reflux disease)      HSP (Henoch Schonlein purpura) (H)      Hyperparathyroidism (H)      Leukopenia 2016     PE (pulmonary thromboembolism) (H) 2001    coumadin  -      Tertiary hyperparathyroidism (H)        Past Surgical History   I have reviewed this patient's surgical history and updated it with pertinent information if needed.  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  Social History     Tobacco Use     Smoking status: Never Smoker     Smokeless tobacco: Never Used   Substance Use Topics     Alcohol use: No     Drug use: No       Family History   No significant family history, including no history of: kidney disease    Prior to Admission Medications   Prior to Admission Medications   Prescriptions Last Dose Informant Patient Reported? Taking?   CELLCEPT (BRAND) " 250 MG capsule   No No   Sig: Take 3 capsules (750 mg) by mouth 2 times daily   NEORAL (BRAND) 25 MG capsule   No No   Sig: Take 1 capsule (25 mg) by mouth every morning and 2 capsules (50 mg) by mouth every evening   VITAMIN D3 25 MCG (1000 UT) tablet   No No   Sig: TAKE TWO TABLETS BY MOUTH ONCE DAILY   calcitRIOL (ROCALTROL) 0.25 MCG capsule   No No   Sig: Take 2 capsules (0.5 mcg) by mouth daily   calcium carbonate (TUMS) 500 MG chewable tablet   Yes No   Sig: Take 1 chew tab by mouth 2 times daily   carvedilol (COREG) 3.125 MG tablet   No No   Sig: Take 1 tablet (3.125 mg) by mouth 2 times daily (with meals)   cinacalcet (SENSIPAR) 30 MG tablet   No No   Sig: Take 1 tablet (30 mg) by mouth daily HOLD   darbepoetin deana-polysorbate (ARANESP) 25 MCG/0.42ML injection   No No   Sig: Inject 0.42 mLs (25 mcg) Subcutaneous every 14 days Dosed with anemia clinic.   hydrALAZINE (APRESOLINE) 25 MG tablet   No No   Sig: Take 1 tablet (25 mg) by mouth 3 times daily   losartan (COZAAR) 25 MG tablet   No No   Sig: Take 1 tablet (25 mg) by mouth daily   omeprazole (PRILOSEC) 20 MG DR capsule   No No   Sig: Take 1 capsule (20 mg) by mouth 2 times daily   sodium bicarbonate 650 MG tablet   No No   Sig: Take 2 tablets (1,300 mg) by mouth 2 times daily   sulfamethoxazole-trimethoprim (BACTRIM) 400-80 MG tablet   No No   Sig: Take 1 tablet by mouth Every Mon, Wed, Fri Morning      Facility-Administered Medications: None     Allergies   Allergies   Allergen Reactions     Contrast Dye Shortness Of Breath     Other reaction(s): Angioedema     Amlodipine Other (See Comments)       Physical Exam   Vital Signs: Temp: 98.6  F (37  C) Temp src: Oral BP: (!) 176/93 Pulse: 93   Resp: 16 SpO2: 95 %      Weight: 150 lbs 0 oz    Gen: nad though breathing somewhat faster visibly than expected  HEENT: eomi, pupils round and symmetric  Neck: no goiter  CV: RRR with regular premature beats, II/VI systolic murmur, JVD bounding to angle of  jaw  Resp: CTAB  Extremities: LUE w/ tortuous flowing graft  Ab: soft, mildly tender ventrally w/ midline burn would sloughed outer layer and surrounding erythema and induration extending a few cm in each direction, bandages  Ext: 1+ pitting jaci  Neuro: alert, appropriate  Psych: euthymic  Skin: no bruising      Data   Data reviewed today: I reviewed all medications, new labs and imaging results over the last 24 hours. I personally reviewed    3/15/2020 TTE:  *EF 60-65%  Mild/mod aortic insufficiency and mild aortic stenosis  Mod pHTN est PASP 40      Recent Labs   Lab 09/25/21  1631   WBC 5.7   HGB 8.4*   MCV 89   *   *   POTASSIUM 4.6   CHLORIDE 105   CO2 19*   BUN 19   CR 2.54*   ANIONGAP 8   SHAVON 7.2*   GLC 90   ALBUMIN 2.9*   PROTTOTAL 7.4   BILITOTAL 1.1   ALKPHOS 146   ALT 13   AST 35

## 2021-09-26 NOTE — PHARMACY-ADMISSION MEDICATION HISTORY
Admission Medication History Completed by Pharmacy    See [facility] Admission Navigator for allergy information, preferred outpatient pharmacy, prior to admission medications and immunization status.     Medication History Sources:     EMR review, outpatient pharmacy fill history ([facility] in [place], [place]; [facility])    Patient speaks Hmong and is a poor historian. She didn't bring in her pill bottles    Changes made to PTA medication list (reason):    Added: None    Deleted: None    Changed:   o Losartan 25 -> 50 mg daily (last filled 50 mg tablets on 21 with instructions 50 mg daily)  o Omeprazole 20 mg bid -> daily  o Patient was supposed to HOLD Sensipar per nephrology note on 21    Additional Information:    No recent fills on her carvedilol or hydralazine and the prescriptions listed on the medication list are from > 1 year ago (thus would be ). Unclear if still taking these medications.     Was regularly filling levothyroxine 25 mcg daily since at least 2021; however, last fill was 21 so may have run out versus has been told to stop. No mention of this medication in any of our notes at [facility] (written by patient's PCP at [facility]). Did not add to our listed as possibly not taking anymore.     Supposed to get her Aranesp injection on 21 but patient was a no-show for her appointment.     Prior to Admission medications    Medication Sig Last Dose Taking? Auth Provider   calcitRIOL (ROCALTROL) 0.25 MCG capsule Take 2 capsules (0.5 mcg) by mouth daily  Yes Theodore Connor MD   calcium carbonate (TUMS) 500 MG chewable tablet Take 1 chew tab by mouth 2 times daily  Yes Unknown, Entered By History   CELLCEPT (BRAND) 250 MG capsule Take 3 capsules (750 mg) by mouth 2 times daily  Yes Akil Bah MD   darbepoetin deana-polysorbate (ARANESP) 25 MCG/0.42ML injection Inject 0.42 mLs (25 mcg) Subcutaneous every 14 days  Dosed with anemia clinic. More than a month at Unknown time Yes Mildred Arce PA   losartan (COZAAR) 25 MG tablet Take 1 tablet (25 mg) by mouth daily  Patient taking differently: Take 50 mg by mouth daily   Yes Mildred Arce PA   NEORAL (BRAND) 25 MG capsule Take 1 capsule (25 mg) by mouth every morning and 2 capsules (50 mg) by mouth every evening  Yes Moe Johnston MD   omeprazole (PRILOSEC) 20 MG DR capsule Take 1 capsule (20 mg) by mouth 2 times daily  Patient taking differently: Take 20 mg by mouth daily   Yes Mildred Arce PA   sodium bicarbonate 650 MG tablet Take 2 tablets (1,300 mg) by mouth 2 times daily  Yes Theodore Connor MD   sulfamethoxazole-trimethoprim (BACTRIM) 400-80 MG tablet Take 1 tablet by mouth Every Mon, Wed, Fri Morning  Yes Theodore Connor MD   VITAMIN D3 25 MCG (1000 UT) tablet TAKE TWO TABLETS BY MOUTH ONCE DAILY  Yes Akil Bah MD   carvedilol (COREG) 3.125 MG tablet Take 1 tablet (3.125 mg) by mouth 2 times daily (with meals)   Moe Johnston MD   cinacalcet (SENSIPAR) 30 MG tablet Take 1 tablet (30 mg) by mouth daily HOLD  Patient not taking: Reported on 9/26/2021 Not Taking at Unknown time  Theodore Connor MD   hydrALAZINE (APRESOLINE) 25 MG tablet Take 1 tablet (25 mg) by mouth 3 times daily   Akil Bah MD       Date completed: 09/26/21    Medication history completed by: MANAN DOWNS, PharmD, BCPS, BCCP
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
## 2021-09-26 NOTE — CONSULTS
[facility]  Transplant Nephrology Consult  Date of Admission:  9/25/2021  Today's Date: 09/26/2021  Requesting physician: Merlene Meza DO    Recommendations:  - repeat UA/UC as outpatient  -Recommend kidney transplant ultrasound as outpatient  Okay to discharge     Assessment & Plan   # DDKT: Trend up   - Baseline Creatinine: ~ 2.0-2.3 range.  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   - Proteinuria: Nephrotic range (>3 grams)   - Date DSA Last Checked: Jun/2019      Latest DSA: Yes   - BK Viremia:NO   - Kidney Tx Biopsy: No    # Immunosuppression: Cyclosporine (goal ) Mycophenolate mofetil (goal  1-3.5)              - Changes: no    # Infection Prophylaxis:   - PJP: Sulfa/TMP (Bactrim)    # Hypertension: Borderline control;  Goal BP: < 140/90   - Volume status: Euvolemic    - Changes: Not at this time    # Anemia in Chronic Renal Disease: Hgb: Stable      EDMUND: No   - Iron studies: Normal iron, high ferrtin    # Mineral Bone Disorder:   - Secondary renal hyperparathyroidism; PTH level: Not checked recently        On treatment: Calcitriol  - Vitamin D; level: Not checked recently        On supplement: Yes  - Calcium; level: Low        On supplement: Yes  - Phosphorus; level: Not checked recently        On supplement: No    # Electrolytes:   - Potassium; level: Normal        On supplement: No  - Magnesium; level: Not checked recently        On supplement: No  - Bicarbonate; level: Low        On supplement: Yes  - Sodium:  Low      # GERD: Controlled on just prn use of PPI.     #UTI symptoms:  On keflex.  Received one dose of IV abx in ED.  Symptoms resolved.  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    Seen and discussed with Dr. Ashley Rogers NP  Pager: [phone]    Physician Attestation   I, Frank Ramirez, saw and evaluated Joshua Lala as part of a shared visit.  I have reviewed and discussed with the advanced practice provider their history, physical and plan.    I personally reviewed the vital signs, medications, labs and imaging.    My key history or physical exam findings:   Ms. Lala is doing well. Plans to discharge today which is fine as she is feeling back to baseline. Recommend repeat UA/UC and renal ultrasound as an outpatient in addition to regular clinic follow up.       Key management decisions made by me: Disposition, follow up     Frank Ramirez  Date of Service (when I saw the patient): 21       REASON FOR CONSULT   Kidney transplant    History of Present Illness   Joshua Lala is a 55 year old female with a past medical history of a  donor kidney transplant x2 the last was in .  Patient states that for the last 2 weeks she has had difficulty urinating.  She notes that she had some burning when she urinated.  She presented to the ED last night with her daughter and had a unremarkable UA after receiving 1 dose of antibiotics.  Patient is currently on Keflex for a cellulitis that was noted.  Patient states that her symptoms have resolved.    She denies any fevers, chills, nausea or vomiting.  She denies any diarrhea but notes that she is fatigued and states that she did not sleep last night.    Review of Systems    The 10 point Review of Systems is negative other than noted in the HPI or here.     Past Medical History    I have reviewed this patient's medical history and updated it with pertinent information  if needed.   Past Medical History:   Diagnosis Date     EBV (Silvestre-Barr virus) viremia      GERD (gastroesophageal reflux disease)      HSP (Henoch Schonlein purpura) (H)      Hyperparathyroidism (H)      Leukopenia 2016     PE (pulmonary thromboembolism) (H)     coumadin  -      Tertiary hyperparathyroidism (H)        Past Surgical History   I have reviewed this patient's surgical history and updated it with pertinent information if needed.  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   No family history on file.    Social History   I have reviewed this patient's social history and updated it with pertinent information if needed. Joshua Lala  reports that she has never smoked. She has never used smokeless tobacco. She reports that she does not drink alcohol and does not use drugs.    Allergies   Allergies   Allergen Reactions     Contrast Dye Shortness Of Breath     Other reaction(s): Angioedema     Amlodipine Other (See Comments)     Prior to Admission Medications     calcitRIOL  0.5 mcg Oral Daily     calcium carbonate  500 mg Oral BID     carvedilol  3.125 mg Oral BID w/meals     cephALEXin  500 mg Oral Q12H BOZENA     cinacalcet  30 mg Oral Daily     cycloSPORINE modified  25 mg Oral Daily     cycloSPORINE modified  50 mg Oral Daily     docusate sodium  100 mg Oral BID     heparin ANTICOAGULANT  5,000 Units Subcutaneous Q12H     hydrALAZINE  25 mg Oral TID     losartan  25 mg Oral Daily     mycophenolate  750 mg Oral BID     pantoprazole  40 mg Oral BID     sodium bicarbonate  1,300 mg Oral BID     sodium chloride (PF)  3 mL Intracatheter Q8H     [START ON 2021]  "sulfamethoxazole-trimethoprim  1 tablet Oral Q  AM     Vitamin D3  50 mcg Oral Daily         Physical Exam   Temp  Av.9  F (37.2  C)  Min: 98.6  F (37  C)  Max: 99.2  F (37.3  C)      Pulse  Av.1  Min: 83  Max: 98 Resp  Av  Min: 16  Max: 16  SpO2  Av.3 %  Min: 95 %  Max: 99 %     BP (!) 145/89 (BP Location: Right arm)   Pulse 89   Temp 99.2  F (37.3  C) (Oral)   Resp 16   Ht 1.422 m (4' 8\")   Wt 63.2 kg (139 lb 6.4 oz)   SpO2 96%   BMI 31.25 kg/m      Admit Weight: 68 kg (150 lb)     GENERAL APPEARANCE: alert and no distress  HENT: mouth without ulcers or lesions  LYMPHATICS: no cervical or supraclavicular nodes  RESP: lungs clear to auscultation - no rales, rhonchi or wheezes  CV: regular rhythm, normal rate, no rub, no murmur  EDEMA: no LE edema bilaterally  ABDOMEN: soft, nondistended, nontender, bowel sounds normal  MS: extremities normal - no gross deformities noted, no evidence of inflammation in joints, no muscle tenderness  SKIN: no rash    Data   CMP  Recent Labs   Lab 21  1631   *   POTASSIUM 4.6   CHLORIDE 105   CO2 19*   ANIONGAP 8   GLC 90   BUN 19   CR 2.54*   GFRESTIMATED 21*   SHAVON 7.2*   PROTTOTAL 7.4   ALBUMIN 2.9*   BILITOTAL 1.1   ALKPHOS 146   AST 35   ALT 13     CBC  Recent Labs   Lab 21  1631   HGB 8.4*   WBC 5.7   RBC 3.02*   HCT 26.8*   MCV 89   MCH 27.8   MCHC 31.3*   RDW 13.6   *     INRNo lab results found in last 7 days.  ABGNo lab results found in last 7 days.   Urine Studies  Recent Labs   Lab Test 21  1637 20  0734 05/15/20  1920 20  193   COLOR Straw Straw Light Yellow  Canceled, Test credited Light Yellow   APPEARANCE Clear Clear Clear  Canceled, Test credited Clear   URINEGLC Negative Negative Negative  Canceled, Test credited Negative   URINEBILI Negative Negative Negative  Canceled, Test credited Negative   URINEKETONE Negative Negative Negative  Canceled, Test credited Negative   SG 1.003 1.003 " 1.004  Canceled, Test credited 1.004   UBLD Trace* Negative Trace*  Canceled, Test credited Negative   URINEPH 6.0 6.0 6.0  Canceled, Test credited 6.0   PROTEIN 100 * 100* 100*  Canceled, Test credited 30*   NITRITE Negative Negative Negative  Canceled, Test credited Negative   LEUKEST Negative Negative Large*  Canceled, Test credited Small*   RBCU 0 <1 1  Canceled, Test credited <1   WBCU <1 2 15*  Canceled, Test credited 5     Recent Labs   Lab Test 09/03/21  0630 01/15/21  0732 03/18/20  1330 06/01/19  0700 02/16/19  0737 02/02/18  0742 11/17/17  0739 04/28/17  0843 10/28/16  0913 04/29/16  1145 06/12/15  1011 01/23/15  0700   UTPG 4.32* 3.69* 1.35* 3.89* 3.92* 2.31* 1.74* 1.86* 1.84* 0.49* 0.73* 0.77*     PTH  Recent Labs   Lab Test 03/17/20  0509 04/29/16  1043 02/05/16  1039 10/27/15  0555 03/26/15  1734 12/16/13  0636 10/01/13  0718   PTHI 291* 124* 140* 218* 251* 166* 223*     Iron Studies  Recent Labs   Lab Test 09/03/21  0625 04/23/21  0716 03/12/21  0757 01/15/21  0725 10/29/20  0900 09/17/20  0758 08/10/20  0840 04/24/20  0800 03/17/20  0509   IRON 84 45 83 96 88 88 89 61 54    252 245 225* 251 248 242 286 240   IRONSAT 33 18 34 43 35 36 37 21 23   ANASTASIIA 332* 361* 401* 396* 352* 374* 364* 299*  --        IMAGING:  All imaging studies reviewed by me.

## 2021-09-29 ENCOUNTER — TELEPHONE (OUTPATIENT)
Dept: PHARMACY | Facility: CLINIC | Age: 56
End: 2021-09-29
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
## 2021-09-29 NOTE — TELEPHONE ENCOUNTER
Referred by Dr. Akil Bah on 04/07/2020    Unsuccessful reaching Joshua and/or her daughter for enrollment into Anemia Services after several attempts.      Closing Anemia Services.     Anemia Latest Ref Rng & Units 9/3/2021 9/25/2021   Hemoglobin 11.7 - 15.7 g/dL 8.6(L) 8.4(L)   TSAT 15 - 46 % 33 -   Ferritin 8 - 252 ng/mL 332(H) -       Anemia labs discontinued, therapy plans cancelled, removed from active patient list, and referring provider notified.      Chelle Sapp RN   Anemia Services  [address] [address]   [email or web address]   Office : [phone]  Fax: [phone]     Unknown

## 2021-10-04 ENCOUNTER — TELEPHONE (OUTPATIENT)
Dept: TRANSPLANT | Facility: CLINIC | Age: 56
End: 2021-10-04

## 2021-10-04 NOTE — TELEPHONE ENCOUNTER
Spoke with the patient daughter and confirmed nephrologoy appointments on 10/19/21.  Informed patient an itinerary can be accessed on [employer].

## 2021-10-12 ENCOUNTER — TRANSFERRED RECORDS (OUTPATIENT)
Dept: HEALTH INFORMATION MANAGEMENT | Facility: CLINIC | Age: 56
End: 2021-10-12
Payer: MEDICARE

## 2021-12-16 ENCOUNTER — APPOINTMENT (OUTPATIENT)
Dept: GENERAL RADIOLOGY | Facility: CLINIC | Age: 56
DRG: 698 | End: 2021-12-16
Attending: EMERGENCY MEDICINE
Payer: MEDICARE

## 2021-12-16 ENCOUNTER — APPOINTMENT (OUTPATIENT)
Dept: ULTRASOUND IMAGING | Facility: CLINIC | Age: 56
DRG: 698 | End: 2021-12-16
Attending: EMERGENCY MEDICINE
Payer: MEDICARE

## 2021-12-16 ENCOUNTER — HOSPITAL ENCOUNTER (INPATIENT)
Facility: CLINIC | Age: 56
LOS: 12 days | Discharge: HOME | DRG: 698 | End: 2021-12-28
Attending: EMERGENCY MEDICINE | Admitting: INTERNAL MEDICINE
Payer: MEDICARE

## 2021-12-16 DIAGNOSIS — Z94.0 KIDNEY TRANSPLANTED: ICD-10-CM

## 2021-12-16 DIAGNOSIS — I50.32 CHRONIC HEART FAILURE WITH PRESERVED EJECTION FRACTION (H): Chronic | ICD-10-CM

## 2021-12-16 DIAGNOSIS — R10.84 ABDOMINAL PAIN, GENERALIZED: Primary | ICD-10-CM

## 2021-12-16 DIAGNOSIS — T86.11 KIDNEY TRANSPLANT REJECTION: ICD-10-CM

## 2021-12-16 DIAGNOSIS — I48.0 PAROXYSMAL ATRIAL FIBRILLATION (H): ICD-10-CM

## 2021-12-16 DIAGNOSIS — N02.B9 IGA NEPHROPATHY: Chronic | ICD-10-CM

## 2021-12-16 DIAGNOSIS — Z94.0 KIDNEY REPLACED BY TRANSPLANT: Chronic | ICD-10-CM

## 2021-12-16 DIAGNOSIS — R34 OLIGURIA: ICD-10-CM

## 2021-12-16 DIAGNOSIS — N18.4 CKD (CHRONIC KIDNEY DISEASE) STAGE 4, GFR 15-29 ML/MIN (H): Chronic | ICD-10-CM

## 2021-12-16 DIAGNOSIS — R79.89 ELEVATED BRAIN NATRIURETIC PEPTIDE (BNP) LEVEL: ICD-10-CM

## 2021-12-16 DIAGNOSIS — R34 DECREASED URINE OUTPUT: ICD-10-CM

## 2021-12-16 DIAGNOSIS — K21.00 GASTROESOPHAGEAL REFLUX DISEASE WITH ESOPHAGITIS: ICD-10-CM

## 2021-12-16 PROBLEM — Z28.9 COVID-19 VACCINE SERIES NOT COMPLETED: Status: ACTIVE | Noted: 2021-12-16

## 2021-12-16 PROBLEM — D63.1 ANEMIA OF RENAL DISEASE: Status: ACTIVE | Noted: 2021-12-16

## 2021-12-16 PROBLEM — Z28.311 COVID-19 VACCINE SERIES NOT COMPLETED: Status: ACTIVE | Noted: 2021-12-16

## 2021-12-16 PROBLEM — N18.9 ANEMIA OF RENAL DISEASE: Status: ACTIVE | Noted: 2021-12-16

## 2021-12-16 PROBLEM — I16.0 HYPERTENSIVE URGENCY: Status: ACTIVE | Noted: 2021-12-16

## 2021-12-16 PROBLEM — R52 PAIN AGGRAVATED BY EATING OR DRINKING: Status: ACTIVE | Noted: 2021-12-16

## 2021-12-16 PROBLEM — R11.10 POSTPRANDIAL VOMITING: Status: ACTIVE | Noted: 2021-12-16

## 2021-12-16 PROBLEM — I15.1 HTN, KIDNEY TRANSPLANT RELATED: Chronic | Status: ACTIVE | Noted: 2017-02-27

## 2021-12-16 PROBLEM — D84.9 IMMUNOSUPPRESSED STATUS (H): Chronic | Status: ACTIVE | Noted: 2021-12-16

## 2021-12-16 PROBLEM — D84.9 IMMUNOSUPPRESSED STATUS (H): Status: ACTIVE | Noted: 2021-12-16

## 2021-12-16 LAB
ALBUMIN SERPL-MCNC: 2.8 G/DL (ref 3.4–5)
ALBUMIN UR-MCNC: 200 MG/DL
ALP SERPL-CCNC: 162 U/L (ref 40–150)
ALT SERPL W P-5'-P-CCNC: 14 U/L (ref 0–50)
ANION GAP SERPL CALCULATED.3IONS-SCNC: 8 MMOL/L (ref 3–14)
APPEARANCE UR: CLEAR
AST SERPL W P-5'-P-CCNC: 36 U/L (ref 0–45)
BASOPHILS # BLD AUTO: 0 10E3/UL (ref 0–0.2)
BASOPHILS NFR BLD AUTO: 1 %
BILIRUB SERPL-MCNC: 0.7 MG/DL (ref 0.2–1.3)
BILIRUB UR QL STRIP: NEGATIVE
BUN SERPL-MCNC: 20 MG/DL (ref 7–30)
CALCIUM SERPL-MCNC: 7.1 MG/DL (ref 8.5–10.1)
CHLORIDE BLD-SCNC: 108 MMOL/L (ref 94–109)
CO2 SERPL-SCNC: 20 MMOL/L (ref 20–32)
COLOR UR AUTO: ABNORMAL
CREAT SERPL-MCNC: 2.93 MG/DL (ref 0.52–1.04)
EOSINOPHIL # BLD AUTO: 0.1 10E3/UL (ref 0–0.7)
EOSINOPHIL NFR BLD AUTO: 1 %
ERYTHROCYTE [DISTWIDTH] IN BLOOD BY AUTOMATED COUNT: 14.1 % (ref 10–15)
GFR SERPL CREATININE-BSD FRML MDRD: 17 ML/MIN/1.73M2
GLUCOSE BLD-MCNC: 136 MG/DL (ref 70–99)
GLUCOSE UR STRIP-MCNC: 30 MG/DL
HCT VFR BLD AUTO: 32.3 % (ref 35–47)
HGB BLD-MCNC: 9.9 G/DL (ref 11.7–15.7)
HGB UR QL STRIP: ABNORMAL
HOLD SPECIMEN: NORMAL
HOLD SPECIMEN: NORMAL
IMM GRANULOCYTES # BLD: 0 10E3/UL
IMM GRANULOCYTES NFR BLD: 1 %
KETONES UR STRIP-MCNC: NEGATIVE MG/DL
LACTATE SERPL-SCNC: 0.6 MMOL/L (ref 0.7–2)
LEUKOCYTE ESTERASE UR QL STRIP: ABNORMAL
LIPASE SERPL-CCNC: 536 U/L (ref 73–393)
LYMPHOCYTES # BLD AUTO: 0.8 10E3/UL (ref 0.8–5.3)
LYMPHOCYTES NFR BLD AUTO: 21 %
MCH RBC QN AUTO: 27.7 PG (ref 26.5–33)
MCHC RBC AUTO-ENTMCNC: 30.7 G/DL (ref 31.5–36.5)
MCV RBC AUTO: 91 FL (ref 78–100)
MONOCYTES # BLD AUTO: 0.5 10E3/UL (ref 0–1.3)
MONOCYTES NFR BLD AUTO: 13 %
NEUTROPHILS # BLD AUTO: 2.5 10E3/UL (ref 1.6–8.3)
NEUTROPHILS NFR BLD AUTO: 63 %
NITRATE UR QL: NEGATIVE
NRBC # BLD AUTO: 0 10E3/UL
NRBC BLD AUTO-RTO: 0 /100
NT-PROBNP SERPL-MCNC: 9657 PG/ML (ref 0–900)
PH UR STRIP: 6 [PH] (ref 5–7)
PLATELET # BLD AUTO: 148 10E3/UL (ref 150–450)
POTASSIUM BLD-SCNC: 3.8 MMOL/L (ref 3.4–5.3)
PROT SERPL-MCNC: 7.2 G/DL (ref 6.8–8.8)
RBC # BLD AUTO: 3.57 10E6/UL (ref 3.8–5.2)
RBC URINE: 1 /HPF
SARS-COV-2 RNA RESP QL NAA+PROBE: NEGATIVE
SODIUM SERPL-SCNC: 136 MMOL/L (ref 133–144)
SP GR UR STRIP: 1 (ref 1–1.03)
SQUAMOUS EPITHELIAL: <1 /HPF
T4 FREE SERPL-MCNC: 0.87 NG/DL (ref 0.76–1.46)
TOTAL PROTEIN SERUM FOR ELP: 6.7 G/DL (ref 6.8–8.8)
TROPONIN I SERPL HS-MCNC: 37 NG/L
TSH SERPL DL<=0.005 MIU/L-ACNC: 4.4 MU/L (ref 0.4–4)
UROBILINOGEN UR STRIP-MCNC: NORMAL MG/DL
WBC # BLD AUTO: 3.9 10E3/UL (ref 4–11)
WBC URINE: 5 /HPF

## 2021-12-16 PROCEDURE — 87799 DETECT AGENT NOS DNA QUANT: CPT | Performed by: NURSE PRACTITIONER

## 2021-12-16 PROCEDURE — 36415 COLL VENOUS BLD VENIPUNCTURE: CPT | Performed by: EMERGENCY MEDICINE

## 2021-12-16 PROCEDURE — 99285 EMERGENCY DEPT VISIT HI MDM: CPT | Mod: 25 | Performed by: EMERGENCY MEDICINE

## 2021-12-16 PROCEDURE — 84484 ASSAY OF TROPONIN QUANT: CPT | Performed by: EMERGENCY MEDICINE

## 2021-12-16 PROCEDURE — 76776 US EXAM K TRANSPL W/DOPPLER: CPT | Mod: 26 | Performed by: RADIOLOGY

## 2021-12-16 PROCEDURE — 80053 COMPREHEN METABOLIC PANEL: CPT | Performed by: EMERGENCY MEDICINE

## 2021-12-16 PROCEDURE — 84156 ASSAY OF PROTEIN URINE: CPT | Performed by: NURSE PRACTITIONER

## 2021-12-16 PROCEDURE — 71046 X-RAY EXAM CHEST 2 VIEWS: CPT

## 2021-12-16 PROCEDURE — 96374 THER/PROPH/DIAG INJ IV PUSH: CPT

## 2021-12-16 PROCEDURE — 71046 X-RAY EXAM CHEST 2 VIEWS: CPT | Mod: 26 | Performed by: RADIOLOGY

## 2021-12-16 PROCEDURE — 36415 COLL VENOUS BLD VENIPUNCTURE: CPT | Performed by: NURSE PRACTITIONER

## 2021-12-16 PROCEDURE — 81001 URINALYSIS AUTO W/SCOPE: CPT | Performed by: EMERGENCY MEDICINE

## 2021-12-16 PROCEDURE — 250N000013 HC RX MED GY IP 250 OP 250 PS 637: Performed by: EMERGENCY MEDICINE

## 2021-12-16 PROCEDURE — 84443 ASSAY THYROID STIM HORMONE: CPT | Performed by: EMERGENCY MEDICINE

## 2021-12-16 PROCEDURE — 83690 ASSAY OF LIPASE: CPT | Performed by: EMERGENCY MEDICINE

## 2021-12-16 PROCEDURE — 85025 COMPLETE CBC W/AUTO DIFF WBC: CPT | Performed by: EMERGENCY MEDICINE

## 2021-12-16 PROCEDURE — 84155 ASSAY OF PROTEIN SERUM: CPT | Performed by: NURSE PRACTITIONER

## 2021-12-16 PROCEDURE — 84439 ASSAY OF FREE THYROXINE: CPT | Performed by: EMERGENCY MEDICINE

## 2021-12-16 PROCEDURE — 99223 1ST HOSP IP/OBS HIGH 75: CPT | Mod: AI | Performed by: INTERNAL MEDICINE

## 2021-12-16 PROCEDURE — 250N000011 HC RX IP 250 OP 636: Performed by: EMERGENCY MEDICINE

## 2021-12-16 PROCEDURE — 87799 DETECT AGENT NOS DNA QUANT: CPT | Performed by: EMERGENCY MEDICINE

## 2021-12-16 PROCEDURE — 76776 US EXAM K TRANSPL W/DOPPLER: CPT

## 2021-12-16 PROCEDURE — 120N000011 HC R&B TRANSPLANT UMMC

## 2021-12-16 PROCEDURE — 93005 ELECTROCARDIOGRAM TRACING: CPT

## 2021-12-16 PROCEDURE — U0005 INFEC AGEN DETEC AMPLI PROBE: HCPCS | Performed by: EMERGENCY MEDICINE

## 2021-12-16 PROCEDURE — 83880 ASSAY OF NATRIURETIC PEPTIDE: CPT | Performed by: EMERGENCY MEDICINE

## 2021-12-16 PROCEDURE — C9803 HOPD COVID-19 SPEC COLLECT: HCPCS

## 2021-12-16 PROCEDURE — 99285 EMERGENCY DEPT VISIT HI MDM: CPT | Mod: 25

## 2021-12-16 PROCEDURE — 83605 ASSAY OF LACTIC ACID: CPT | Performed by: UROLOGY

## 2021-12-16 PROCEDURE — 93010 ELECTROCARDIOGRAM REPORT: CPT | Performed by: EMERGENCY MEDICINE

## 2021-12-16 PROCEDURE — 84165 PROTEIN E-PHORESIS SERUM: CPT | Mod: TC | Performed by: STUDENT IN AN ORGANIZED HEALTH CARE EDUCATION/TRAINING PROGRAM

## 2021-12-16 PROCEDURE — 84166 PROTEIN E-PHORESIS/URINE/CSF: CPT | Mod: TC | Performed by: NURSE PRACTITIONER

## 2021-12-16 PROCEDURE — 36415 COLL VENOUS BLD VENIPUNCTURE: CPT | Performed by: UROLOGY

## 2021-12-16 RX ORDER — CARVEDILOL 3.12 MG/1
3.12 TABLET ORAL ONCE
Status: COMPLETED | OUTPATIENT
Start: 2021-12-16 | End: 2021-12-16

## 2021-12-16 RX ORDER — FUROSEMIDE 10 MG/ML
40 INJECTION INTRAMUSCULAR; INTRAVENOUS ONCE
Status: COMPLETED | OUTPATIENT
Start: 2021-12-16 | End: 2021-12-16

## 2021-12-16 RX ORDER — LOSARTAN POTASSIUM 25 MG/1
25 TABLET ORAL ONCE
Status: DISCONTINUED | OUTPATIENT
Start: 2021-12-16 | End: 2021-12-17

## 2021-12-16 RX ORDER — ACETAMINOPHEN 325 MG/1
975 TABLET ORAL EVERY 6 HOURS PRN
Status: DISCONTINUED | OUTPATIENT
Start: 2021-12-16 | End: 2021-12-28 | Stop reason: HOSPADM

## 2021-12-16 RX ORDER — ONDANSETRON 2 MG/ML
4 INJECTION INTRAMUSCULAR; INTRAVENOUS EVERY 6 HOURS PRN
Status: DISCONTINUED | OUTPATIENT
Start: 2021-12-16 | End: 2021-12-28 | Stop reason: HOSPADM

## 2021-12-16 RX ORDER — HYDRALAZINE HYDROCHLORIDE 25 MG/1
25 TABLET, FILM COATED ORAL 3 TIMES DAILY
Status: DISCONTINUED | OUTPATIENT
Start: 2021-12-17 | End: 2021-12-17

## 2021-12-16 RX ORDER — LOSARTAN POTASSIUM 50 MG/1
50 TABLET ORAL DAILY
Status: DISCONTINUED | OUTPATIENT
Start: 2021-12-17 | End: 2021-12-28 | Stop reason: HOSPADM

## 2021-12-16 RX ORDER — CALCIUM CARBONATE 500 MG/1
500 TABLET, CHEWABLE ORAL 2 TIMES DAILY
Status: DISCONTINUED | OUTPATIENT
Start: 2021-12-16 | End: 2021-12-28 | Stop reason: HOSPADM

## 2021-12-16 RX ORDER — CYCLOSPORINE 25 MG/1
25 CAPSULE, LIQUID FILLED ORAL EVERY MORNING
Status: DISCONTINUED | OUTPATIENT
Start: 2021-12-17 | End: 2021-12-28 | Stop reason: HOSPADM

## 2021-12-16 RX ORDER — PANTOPRAZOLE SODIUM 40 MG/1
40 TABLET, DELAYED RELEASE ORAL 2 TIMES DAILY
Status: DISCONTINUED | OUTPATIENT
Start: 2021-12-16 | End: 2021-12-28 | Stop reason: HOSPADM

## 2021-12-16 RX ORDER — HEPARIN SODIUM 5000 [USP'U]/.5ML
5000 INJECTION, SOLUTION INTRAVENOUS; SUBCUTANEOUS 2 TIMES DAILY
Status: DISCONTINUED | OUTPATIENT
Start: 2021-12-16 | End: 2021-12-25

## 2021-12-16 RX ORDER — LIDOCAINE 40 MG/G
CREAM TOPICAL
Status: DISCONTINUED | OUTPATIENT
Start: 2021-12-16 | End: 2021-12-28 | Stop reason: HOSPADM

## 2021-12-16 RX ORDER — AMOXICILLIN 250 MG
1 CAPSULE ORAL 2 TIMES DAILY PRN
Status: DISCONTINUED | OUTPATIENT
Start: 2021-12-16 | End: 2021-12-28 | Stop reason: HOSPADM

## 2021-12-16 RX ORDER — HYDRALAZINE HYDROCHLORIDE 25 MG/1
25 TABLET, FILM COATED ORAL ONCE
Status: COMPLETED | OUTPATIENT
Start: 2021-12-16 | End: 2021-12-16

## 2021-12-16 RX ORDER — SODIUM BICARBONATE 650 MG/1
1300 TABLET ORAL 2 TIMES DAILY
Status: DISCONTINUED | OUTPATIENT
Start: 2021-12-16 | End: 2021-12-19

## 2021-12-16 RX ORDER — LABETALOL HYDROCHLORIDE 5 MG/ML
10 INJECTION, SOLUTION INTRAVENOUS EVERY 6 HOURS PRN
Status: DISCONTINUED | OUTPATIENT
Start: 2021-12-16 | End: 2021-12-28 | Stop reason: HOSPADM

## 2021-12-16 RX ORDER — CYCLOSPORINE 25 MG/1
50 CAPSULE, LIQUID FILLED ORAL EVERY EVENING
Status: DISCONTINUED | OUTPATIENT
Start: 2021-12-16 | End: 2021-12-20

## 2021-12-16 RX ORDER — CALCITRIOL 0.25 UG/1
0.5 CAPSULE, LIQUID FILLED ORAL DAILY
Status: DISCONTINUED | OUTPATIENT
Start: 2021-12-17 | End: 2021-12-28 | Stop reason: HOSPADM

## 2021-12-16 RX ORDER — SULFAMETHOXAZOLE AND TRIMETHOPRIM 400; 80 MG/1; MG/1
1 TABLET ORAL
Status: DISCONTINUED | OUTPATIENT
Start: 2021-12-17 | End: 2021-12-28 | Stop reason: HOSPADM

## 2021-12-16 RX ORDER — ONDANSETRON 4 MG/1
4 TABLET, ORALLY DISINTEGRATING ORAL EVERY 6 HOURS PRN
Status: DISCONTINUED | OUTPATIENT
Start: 2021-12-16 | End: 2021-12-28 | Stop reason: HOSPADM

## 2021-12-16 RX ORDER — NITROGLYCERIN 0.4 MG/1
0.4 TABLET SUBLINGUAL EVERY 5 MIN PRN
Status: DISCONTINUED | OUTPATIENT
Start: 2021-12-16 | End: 2021-12-28 | Stop reason: HOSPADM

## 2021-12-16 RX ORDER — AMOXICILLIN 250 MG
2 CAPSULE ORAL 2 TIMES DAILY PRN
Status: DISCONTINUED | OUTPATIENT
Start: 2021-12-16 | End: 2021-12-28 | Stop reason: HOSPADM

## 2021-12-16 RX ORDER — CARVEDILOL 3.12 MG/1
3.12 TABLET ORAL 2 TIMES DAILY WITH MEALS
Status: DISCONTINUED | OUTPATIENT
Start: 2021-12-17 | End: 2021-12-28 | Stop reason: HOSPADM

## 2021-12-16 RX ORDER — MYCOPHENOLATE MOFETIL 250 MG/1
750 CAPSULE ORAL 2 TIMES DAILY
Status: DISCONTINUED | OUTPATIENT
Start: 2021-12-16 | End: 2021-12-28 | Stop reason: HOSPADM

## 2021-12-16 RX ORDER — SUCRALFATE 1 G/1
1 TABLET ORAL
Status: DISCONTINUED | OUTPATIENT
Start: 2021-12-16 | End: 2021-12-20

## 2021-12-16 RX ADMIN — HYDRALAZINE HYDROCHLORIDE 25 MG: 25 TABLET, FILM COATED ORAL at 20:30

## 2021-12-16 RX ADMIN — CARVEDILOL 3.12 MG: 3.12 TABLET, FILM COATED ORAL at 20:30

## 2021-12-16 RX ADMIN — FUROSEMIDE 40 MG: 10 INJECTION, SOLUTION INTRAVENOUS at 19:50

## 2021-12-16 RX ADMIN — NITROGLYCERIN 0.4 MG: 0.4 TABLET SUBLINGUAL at 19:50

## 2021-12-16 ASSESSMENT — ENCOUNTER SYMPTOMS
SHORTNESS OF BREATH: 1
SPEECH DIFFICULTY: 0
DIARRHEA: 0
ABDOMINAL PAIN: 1
CHEST TIGHTNESS: 0
SORE THROAT: 0
VOMITING: 1
COUGH: 0
PALPITATIONS: 0
VOICE CHANGE: 0
CONFUSION: 0
CONSTIPATION: 0
VOMITING: 0
FREQUENCY: 0
HEMATURIA: 0
FLANK PAIN: 0
WHEEZING: 0
FEVER: 0
ARTHRALGIAS: 0
FATIGUE: 1
HEADACHES: 0
WEAKNESS: 1
BACK PAIN: 0
APPETITE CHANGE: 1
DYSURIA: 0
LIGHT-HEADEDNESS: 0
NAUSEA: 0
DIFFICULTY URINATING: 1
MYALGIAS: 0
ACTIVITY CHANGE: 1

## 2021-12-16 ASSESSMENT — ACTIVITIES OF DAILY LIVING (ADL)
ADLS_ACUITY_SCORE: 12
ADLS_ACUITY_SCORE: 11

## 2021-12-16 ASSESSMENT — MIFFLIN-ST. JEOR: SCORE: 1176.03

## 2021-12-16 NOTE — LETTER
[facility] [address] [address]  [phone]    FACSIMILE TRANSMITTAL SHEET    TO: [facility]  [address] [place] [address]  Phone: [phone]  Fax: [phone]    _____URGENT _____REVIEW ONLY _____PLEASE COMMENT____PLEASE REPLY    NOTES/COMMENTS: Attached please find discharge orders/summary for Lala Joshuaharjit Barnes, RN BSN, PHN, ACM-RN  7A RN Care Coordinator  Phone: [phone]  Pager [phone]  To contact the weekend RNCC, Page: [phone]    12/28/2021 2:41 PM                                      IF YOU DID NOT RECEIVE THE CORRECT NUMBER OF PAGES OR THE FAX DID NOT COME THROUGH CLEARLY, PLEASE CALL THE SENDER     CONFIDENTIALITY STATEMENT: Confidential information that may accompany this transmission contains protected health information under state and federal law and is legally privileged. This information is intended only for the use of the individual or entity named above and may be used only for carrying out treatment, payment or other healthcare operations. The recipient or person responsible for delivering this information is prohibited by law from disclosing this information without proper authorization to any other party, unless required to do so by law or regulation. If you are not the intended recipient, you are hereby notified that any review, dissemination, distribution, or copying of this message is strictly prohibited. If you have received this communication in error, please destroy the materials and contact us immediately by calling the number listed above. No response indicates that the information was received by the appropriate authorized party

## 2021-12-17 ENCOUNTER — APPOINTMENT (OUTPATIENT)
Dept: CARDIOLOGY | Facility: CLINIC | Age: 56
DRG: 698 | End: 2021-12-17
Attending: NURSE PRACTITIONER
Payer: MEDICARE

## 2021-12-17 ENCOUNTER — APPOINTMENT (OUTPATIENT)
Dept: INTERPRETER SERVICES | Facility: CLINIC | Age: 56
DRG: 698 | End: 2021-12-17
Payer: MEDICARE

## 2021-12-17 ENCOUNTER — APPOINTMENT (OUTPATIENT)
Dept: PHYSICAL THERAPY | Facility: CLINIC | Age: 56
DRG: 698 | End: 2021-12-17
Attending: NURSE PRACTITIONER
Payer: MEDICARE

## 2021-12-17 LAB
ALBUMIN MFR UR ELPH: 75.6 %
ALBUMIN SERPL ELPH-MCNC: 3.3 G/DL (ref 3.7–5.1)
ALBUMIN SERPL-MCNC: 2.2 G/DL (ref 3.4–5)
ALP SERPL-CCNC: 133 U/L (ref 40–150)
ALPHA1 GLOB MFR UR ELPH: 3.9 %
ALPHA1 GLOB SERPL ELPH-MCNC: 0.4 G/DL (ref 0.2–0.4)
ALPHA2 GLOB MFR UR ELPH: 3.1 %
ALPHA2 GLOB SERPL ELPH-MCNC: 0.7 G/DL (ref 0.5–0.9)
ALT SERPL W P-5'-P-CCNC: 11 U/L (ref 0–50)
ANION GAP SERPL CALCULATED.3IONS-SCNC: 10 MMOL/L (ref 3–14)
AST SERPL W P-5'-P-CCNC: 31 U/L (ref 0–45)
ATRIAL RATE - MUSE: 96 BPM
B-GLOBULIN MFR UR ELPH: 4.7 %
B-GLOBULIN SERPL ELPH-MCNC: 0.7 G/DL (ref 0.6–1)
BASOPHILS # BLD AUTO: 0 10E3/UL (ref 0–0.2)
BASOPHILS NFR BLD AUTO: 1 %
BILIRUB SERPL-MCNC: 0.8 MG/DL (ref 0.2–1.3)
BKV DNA # SPEC NAA+PROBE: NOT DETECTED COPIES/ML
BUN SERPL-MCNC: 20 MG/DL (ref 7–30)
CALCIUM SERPL-MCNC: 7.3 MG/DL (ref 8.5–10.1)
CHLORIDE BLD-SCNC: 110 MMOL/L (ref 94–109)
CMV DNA SPEC NAA+PROBE-ACNC: NOT DETECTED IU/ML
CO2 SERPL-SCNC: 18 MMOL/L (ref 20–32)
CREAT SERPL-MCNC: 3.08 MG/DL (ref 0.52–1.04)
CREAT UR-MCNC: 34 MG/DL
DIASTOLIC BLOOD PRESSURE - MUSE: NORMAL MMHG
EOSINOPHIL # BLD AUTO: 0.1 10E3/UL (ref 0–0.7)
EOSINOPHIL NFR BLD AUTO: 3 %
ERYTHROCYTE [DISTWIDTH] IN BLOOD BY AUTOMATED COUNT: 13.9 % (ref 10–15)
FERRITIN SERPL-MCNC: 317 NG/ML (ref 8–252)
GAMMA GLOB MFR UR ELPH: 12.7 %
GAMMA GLOB SERPL ELPH-MCNC: 1.6 G/DL (ref 0.7–1.6)
GFR SERPL CREATININE-BSD FRML MDRD: 16 ML/MIN/1.73M2
GLUCOSE BLD-MCNC: 86 MG/DL (ref 70–99)
HBA1C MFR BLD: 5 % (ref 0–5.6)
HCT VFR BLD AUTO: 28 % (ref 35–47)
HGB BLD-MCNC: 8.7 G/DL (ref 11.7–15.7)
IMM GRANULOCYTES # BLD: 0 10E3/UL
IMM GRANULOCYTES NFR BLD: 0 %
INTERPRETATION ECG - MUSE: NORMAL
IRON SATN MFR SERPL: 17 % (ref 15–46)
IRON SERPL-MCNC: 58 UG/DL (ref 35–180)
LVEF ECHO: NORMAL
LYMPHOCYTES # BLD AUTO: 0.7 10E3/UL (ref 0.8–5.3)
LYMPHOCYTES NFR BLD AUTO: 25 %
M PROTEIN MFR UR ELPH: 0 %
M PROTEIN SERPL ELPH-MCNC: 0 G/DL
MCH RBC QN AUTO: 27.9 PG (ref 26.5–33)
MCHC RBC AUTO-ENTMCNC: 31.1 G/DL (ref 31.5–36.5)
MCV RBC AUTO: 90 FL (ref 78–100)
MONOCYTES # BLD AUTO: 0.5 10E3/UL (ref 0–1.3)
MONOCYTES NFR BLD AUTO: 19 %
NEUTROPHILS # BLD AUTO: 1.5 10E3/UL (ref 1.6–8.3)
NEUTROPHILS NFR BLD AUTO: 52 %
NRBC # BLD AUTO: 0 10E3/UL
NRBC BLD AUTO-RTO: 0 /100
P AXIS - MUSE: 18 DEGREES
PLATELET # BLD AUTO: 125 10E3/UL (ref 150–450)
POTASSIUM BLD-SCNC: 3.8 MMOL/L (ref 3.4–5.3)
PR INTERVAL - MUSE: 200 MS
PROT PATTERN SERPL ELPH-IMP: ABNORMAL
PROT PATTERN UR ELPH-IMP: ABNORMAL
PROT SERPL-MCNC: 6.2 G/DL (ref 6.8–8.8)
PROT UR-MCNC: 2.33 G/L
PROT/CREAT 24H UR: 6.85 G/G CR (ref 0–0.2)
PTH-INTACT SERPL-MCNC: 173 PG/ML (ref 18–80)
QRS DURATION - MUSE: 96 MS
QT - MUSE: 384 MS
QTC - MUSE: 485 MS
R AXIS - MUSE: 55 DEGREES
RBC # BLD AUTO: 3.12 10E6/UL (ref 3.8–5.2)
SODIUM SERPL-SCNC: 138 MMOL/L (ref 133–144)
SYSTOLIC BLOOD PRESSURE - MUSE: NORMAL MMHG
T AXIS - MUSE: -6 DEGREES
TIBC SERPL-MCNC: 340 UG/DL (ref 240–430)
VENTRICULAR RATE- MUSE: 96 BPM
WBC # BLD AUTO: 2.8 10E3/UL (ref 4–11)

## 2021-12-17 PROCEDURE — 85025 COMPLETE CBC W/AUTO DIFF WBC: CPT | Performed by: NURSE PRACTITIONER

## 2021-12-17 PROCEDURE — 93306 TTE W/DOPPLER COMPLETE: CPT | Mod: 26 | Performed by: INTERNAL MEDICINE

## 2021-12-17 PROCEDURE — 83883 ASSAY NEPHELOMETRY NOT SPEC: CPT | Performed by: STUDENT IN AN ORGANIZED HEALTH CARE EDUCATION/TRAINING PROGRAM

## 2021-12-17 PROCEDURE — 250N000011 HC RX IP 250 OP 636: Performed by: NURSE PRACTITIONER

## 2021-12-17 PROCEDURE — 84166 PROTEIN E-PHORESIS/URINE/CSF: CPT | Mod: 26 | Performed by: STUDENT IN AN ORGANIZED HEALTH CARE EDUCATION/TRAINING PROGRAM

## 2021-12-17 PROCEDURE — 83036 HEMOGLOBIN GLYCOSYLATED A1C: CPT | Performed by: STUDENT IN AN ORGANIZED HEALTH CARE EDUCATION/TRAINING PROGRAM

## 2021-12-17 PROCEDURE — 86160 COMPLEMENT ANTIGEN: CPT | Performed by: STUDENT IN AN ORGANIZED HEALTH CARE EDUCATION/TRAINING PROGRAM

## 2021-12-17 PROCEDURE — 250N000012 HC RX MED GY IP 250 OP 636 PS 637: Performed by: NURSE PRACTITIONER

## 2021-12-17 PROCEDURE — 120N000011 HC R&B TRANSPLANT UMMC

## 2021-12-17 PROCEDURE — 250N000013 HC RX MED GY IP 250 OP 250 PS 637: Performed by: NURSE PRACTITIONER

## 2021-12-17 PROCEDURE — 83970 ASSAY OF PARATHORMONE: CPT | Performed by: STUDENT IN AN ORGANIZED HEALTH CARE EDUCATION/TRAINING PROGRAM

## 2021-12-17 PROCEDURE — 86038 ANTINUCLEAR ANTIBODIES: CPT | Performed by: INTERNAL MEDICINE

## 2021-12-17 PROCEDURE — 93306 TTE W/DOPPLER COMPLETE: CPT

## 2021-12-17 PROCEDURE — 86334 IMMUNOFIX E-PHORESIS SERUM: CPT | Mod: TC | Performed by: STUDENT IN AN ORGANIZED HEALTH CARE EDUCATION/TRAINING PROGRAM

## 2021-12-17 PROCEDURE — 86160 COMPLEMENT ANTIGEN: CPT | Performed by: INTERNAL MEDICINE

## 2021-12-17 PROCEDURE — 99222 1ST HOSP IP/OBS MODERATE 55: CPT | Performed by: INTERNAL MEDICINE

## 2021-12-17 PROCEDURE — 83550 IRON BINDING TEST: CPT | Performed by: STUDENT IN AN ORGANIZED HEALTH CARE EDUCATION/TRAINING PROGRAM

## 2021-12-17 PROCEDURE — 86038 ANTINUCLEAR ANTIBODIES: CPT | Performed by: STUDENT IN AN ORGANIZED HEALTH CARE EDUCATION/TRAINING PROGRAM

## 2021-12-17 PROCEDURE — 99222 1ST HOSP IP/OBS MODERATE 55: CPT | Mod: GC | Performed by: INTERNAL MEDICINE

## 2021-12-17 PROCEDURE — 250N000011 HC RX IP 250 OP 636: Performed by: STUDENT IN AN ORGANIZED HEALTH CARE EDUCATION/TRAINING PROGRAM

## 2021-12-17 PROCEDURE — 36415 COLL VENOUS BLD VENIPUNCTURE: CPT | Performed by: STUDENT IN AN ORGANIZED HEALTH CARE EDUCATION/TRAINING PROGRAM

## 2021-12-17 PROCEDURE — 97110 THERAPEUTIC EXERCISES: CPT | Mod: GP

## 2021-12-17 PROCEDURE — 36415 COLL VENOUS BLD VENIPUNCTURE: CPT | Performed by: NURSE PRACTITIONER

## 2021-12-17 PROCEDURE — 250N000013 HC RX MED GY IP 250 OP 250 PS 637: Performed by: STUDENT IN AN ORGANIZED HEALTH CARE EDUCATION/TRAINING PROGRAM

## 2021-12-17 PROCEDURE — 82728 ASSAY OF FERRITIN: CPT | Performed by: STUDENT IN AN ORGANIZED HEALTH CARE EDUCATION/TRAINING PROGRAM

## 2021-12-17 PROCEDURE — 80053 COMPREHEN METABOLIC PANEL: CPT | Performed by: NURSE PRACTITIONER

## 2021-12-17 PROCEDURE — 99233 SBSQ HOSP IP/OBS HIGH 50: CPT | Performed by: STUDENT IN AN ORGANIZED HEALTH CARE EDUCATION/TRAINING PROGRAM

## 2021-12-17 PROCEDURE — 84165 PROTEIN E-PHORESIS SERUM: CPT | Mod: 26 | Performed by: STUDENT IN AN ORGANIZED HEALTH CARE EDUCATION/TRAINING PROGRAM

## 2021-12-17 PROCEDURE — 97161 PT EVAL LOW COMPLEX 20 MIN: CPT | Mod: GP

## 2021-12-17 PROCEDURE — 87799 DETECT AGENT NOS DNA QUANT: CPT | Performed by: INTERNAL MEDICINE

## 2021-12-17 RX ORDER — FUROSEMIDE 10 MG/ML
80 INJECTION INTRAMUSCULAR; INTRAVENOUS 2 TIMES DAILY
Status: DISCONTINUED | OUTPATIENT
Start: 2021-12-17 | End: 2021-12-19

## 2021-12-17 RX ORDER — HYDRALAZINE HYDROCHLORIDE 25 MG/1
25 TABLET, FILM COATED ORAL 3 TIMES DAILY
Status: DISCONTINUED | OUTPATIENT
Start: 2021-12-17 | End: 2021-12-28 | Stop reason: HOSPADM

## 2021-12-17 RX ORDER — FUROSEMIDE 10 MG/ML
40 INJECTION INTRAMUSCULAR; INTRAVENOUS ONCE
Status: COMPLETED | OUTPATIENT
Start: 2021-12-17 | End: 2021-12-17

## 2021-12-17 RX ADMIN — CALCIUM CARBONATE 500 MG: 500 TABLET, CHEWABLE ORAL at 09:32

## 2021-12-17 RX ADMIN — MYCOPHENOLATE MOFETIL 750 MG: 250 CAPSULE ORAL at 20:07

## 2021-12-17 RX ADMIN — HEPARIN SODIUM 5000 UNITS: 5000 INJECTION, SOLUTION INTRAVENOUS; SUBCUTANEOUS at 09:34

## 2021-12-17 RX ADMIN — FUROSEMIDE 80 MG: 10 INJECTION, SOLUTION INTRAVENOUS at 14:48

## 2021-12-17 RX ADMIN — SUCRALFATE 1 G: 1 TABLET ORAL at 09:31

## 2021-12-17 RX ADMIN — CALCIUM CARBONATE 500 MG: 500 TABLET, CHEWABLE ORAL at 20:09

## 2021-12-17 RX ADMIN — FUROSEMIDE 40 MG: 10 INJECTION, SOLUTION INTRAVENOUS at 13:01

## 2021-12-17 RX ADMIN — HYDRALAZINE HYDROCHLORIDE 25 MG: 25 TABLET, FILM COATED ORAL at 09:33

## 2021-12-17 RX ADMIN — SODIUM BICARBONATE 1300 MG: 650 TABLET ORAL at 20:07

## 2021-12-17 RX ADMIN — SUCRALFATE 1 G: 1 TABLET ORAL at 16:11

## 2021-12-17 RX ADMIN — PANTOPRAZOLE SODIUM 40 MG: 40 TABLET, DELAYED RELEASE ORAL at 09:34

## 2021-12-17 RX ADMIN — CYCLOSPORINE 50 MG: 25 CAPSULE, LIQUID FILLED ORAL at 17:51

## 2021-12-17 RX ADMIN — HYDRALAZINE HYDROCHLORIDE 25 MG: 25 TABLET, FILM COATED ORAL at 20:07

## 2021-12-17 RX ADMIN — SULFAMETHOXAZOLE AND TRIMETHOPRIM 1 TABLET: 400; 80 TABLET ORAL at 09:25

## 2021-12-17 RX ADMIN — ACETAMINOPHEN 975 MG: 325 TABLET, FILM COATED ORAL at 16:14

## 2021-12-17 RX ADMIN — CARVEDILOL 3.12 MG: 3.12 TABLET, FILM COATED ORAL at 17:51

## 2021-12-17 RX ADMIN — SUCRALFATE 1 G: 1 TABLET ORAL at 13:01

## 2021-12-17 RX ADMIN — SUCRALFATE 1 G: 1 TABLET ORAL at 22:04

## 2021-12-17 RX ADMIN — CALCITRIOL CAPSULES 0.25 MCG 0.5 MCG: 0.25 CAPSULE ORAL at 09:33

## 2021-12-17 RX ADMIN — PANTOPRAZOLE SODIUM 40 MG: 40 TABLET, DELAYED RELEASE ORAL at 20:07

## 2021-12-17 RX ADMIN — CYCLOSPORINE 25 MG: 25 CAPSULE, LIQUID FILLED ORAL at 09:23

## 2021-12-17 RX ADMIN — MYCOPHENOLATE MOFETIL 750 MG: 250 CAPSULE ORAL at 09:24

## 2021-12-17 RX ADMIN — CARVEDILOL 3.12 MG: 3.12 TABLET, FILM COATED ORAL at 09:33

## 2021-12-17 RX ADMIN — HEPARIN SODIUM 5000 UNITS: 5000 INJECTION, SOLUTION INTRAVENOUS; SUBCUTANEOUS at 20:09

## 2021-12-17 RX ADMIN — HYDRALAZINE HYDROCHLORIDE 25 MG: 25 TABLET, FILM COATED ORAL at 14:44

## 2021-12-17 RX ADMIN — SODIUM BICARBONATE 1300 MG: 650 TABLET ORAL at 09:24

## 2021-12-17 ASSESSMENT — ACTIVITIES OF DAILY LIVING (ADL)
ADLS_ACUITY_SCORE: 11
ADLS_ACUITY_SCORE: 9
ADLS_ACUITY_SCORE: 11
ADLS_ACUITY_SCORE: 9
ADLS_ACUITY_SCORE: 9
ADLS_ACUITY_SCORE: 11
ADLS_ACUITY_SCORE: 9
ADLS_ACUITY_SCORE: 9
ADLS_ACUITY_SCORE: 11
ADLS_ACUITY_SCORE: 9
ADLS_ACUITY_SCORE: 11
ADLS_ACUITY_SCORE: 9
DEPENDENT_IADLS:: INDEPENDENT
ADLS_ACUITY_SCORE: 9
ADLS_ACUITY_SCORE: 11
ADLS_ACUITY_SCORE: 11
ADLS_ACUITY_SCORE: 9
ADLS_ACUITY_SCORE: 11
ADLS_ACUITY_SCORE: 9

## 2021-12-17 ASSESSMENT — MIFFLIN-ST. JEOR: SCORE: 1137.93

## 2021-12-17 NOTE — PROGRESS NOTES
"SPIRITUAL HEALTH SERVICES  [facility]  Unit: [address]    Referral Source: Admission request     Illness Narrative: Not discussed.    Pt responded to the question, \"In what way might I be of support for you today?\" Pt responded; \"Prayer for healing.\"  Pt shared that they are [employer].    Prayer was shared with pt.    Pt did not respond with any other needs when the pt was asked.  Made pt was made aware that she can request additional spiritual support while she remains in the hospital.    Plan: Will follow pt while she remains on my unit.         Duane F Bauer  Chaplain Resident  Pager number: [phone]  * [facility] remains available 24/7 for emergent requests/referrals, either by having the switchboard page the on-call  or by entering an ASAP/STAT consult in Epic (this will also page the on-call ).*   "

## 2021-12-17 NOTE — ED PROVIDER NOTES
[place] EMERGENCY DEPARTMENT ([facility])  12/16/21 ED 25      History   No chief complaint on file.    The history is provided by medical records and the patient. The history is limited by a language barrier. A  was used (daughter).     Joshua Lala is a 55 year old female who has a PMH significant for CKD stage III, renal transplant (first transplant in 1998, with chronic rejection, failed in 2003, second transplant 11/28/2007),  PE, hypertension, hypercalcemia, hyponatremia, hyperparathyroidism, leukopenia, Henoch Schonlein purpura, and EBV viremia who presents with fatigue, swelling, and decreased urination. Patient is accompanied by her daughter today. Patient reports difficulty urinating and decreased frequency of urination since yesterday. She has measured her urine with a small cup when she has the urge to urinate, and has only seen less than half the cup filled each time. She denies dysuria, hematuria, back pain, headaches, nausea , vomiting, or fever. Patient also reports swelling in the upper and lower extremities since October which has worsened in the last 1-2 weeks. This is accompanied by 10 lb of weight gain (130 lb to 140 lb) and shortness of breath in supine position. She had medication for the fluid retention, but it was stopped 1 year ago due to her low sodium levels. Patient is taking her anti rejection medication as prescribed. She also has mild, intermittent, epigastric abdominal pain and lower chest pain associated with 20 year old ulcers. She is currently taking omeprazole and Tums for the ulcers. She also notes shortness of breath with each wave of abdominal pain. Patient also has a high blood pressure at baseline and has taken 25 mg of hydralazine with no improvement since September. Her primary provider increased her dosage to 50 mg, but she had negative side effects and was lowered back to 25 mg (her current dosage).          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    Allergies   Allergen Reactions     Contrast Dye Shortness Of Breath     Other reaction(s): Angioedema     Amlodipine Other (See Comments)     Family History  History reviewed. No pertinent family history.  Social History   Social History     Tobacco Use     Smoking status: Never Smoker     Smokeless tobacco: Never Used   Substance Use Topics     Alcohol use: No     Drug use: No      Past medical history, past surgical history, medications, allergies, family history, and social history were reviewed with the patient. No additional pertinent items.       Review of Systems   Constitutional: Negative for fever.   Respiratory: Positive for shortness of breath (with abdominal pain). Negative for cough.    Cardiovascular: Positive for chest pain and leg swelling.   Gastrointestinal: Positive for abdominal pain (epigastric). Negative for nausea and vomiting.   Genitourinary: Positive for decreased urine volume and difficulty urinating. Negative for dysuria, flank pain, frequency and hematuria.        Positive for swelling/fluid retention   Musculoskeletal: Negative for back pain.   Neurological: Negative for headaches.   All other systems reviewed and are negative.    A complete review of systems was performed with pertinent positives and negatives noted in the HPI, and all other systems negative.    Physical Exam    BP: (!) 194/88  Pulse: 88  Temp: 99.1  F (37.3  C)  Resp: 16  Height: 152.4 cm (5')  Weight: 66 kg (145 lb 6.4 oz)  SpO2: 98 %  Physical Exam  Vitals and nursing note reviewed.   Constitutional:       General: She is not in acute distress.     Appearance: She is well-developed. She is not toxic-appearing or diaphoretic.   HENT:      Head: Normocephalic and atraumatic.      Nose: Nose normal.   Eyes:      General: No scleral icterus.     Conjunctiva/sclera: Conjunctivae normal.   Neck:      Vascular: JVD present.   Cardiovascular:      Rate and Rhythm: Normal rate.      Comments: anasarca  Pulmonary:      Effort: Pulmonary effort is normal. No respiratory distress.      Breath sounds: No stridor.      Comments: Dyspneic when positioned supine  Abdominal:      General: There is no distension.      Palpations: Abdomen is soft.      Tenderness: There is abdominal tenderness in the epigastric area. There is no guarding or rebound.   Musculoskeletal:         General: No tenderness, deformity or signs of injury. Normal range of motion.      Cervical back: Normal range of motion and neck supple. No rigidity.      Right lower leg: Edema present.      Left lower leg: Edema present.   Skin:     General: Skin is warm and dry.      Coloration: Skin is not jaundiced or pale.      Findings: No erythema or rash.   Neurological:      General: No focal deficit present.      Mental Status: She is alert and oriented to person, place, and time.   Psychiatric:         Mood and Affect: Mood normal.         Behavior: Behavior normal.         Thought Content: Thought content normal.         ED Course     7:20 PM  The patient was seen and examined by Grace Guo MD in Room ED25.    Procedures            EKG Interpretation:      Interpreted by Grace Guo MD  Time reviewed: 1958  Symptoms at time of EKG: swelling   Rhythm: normal sinus   Rate: Normal  Axis: Normal  Ectopy: premature atrial contraction  Conduction: normal  ST Segments/ T  Waves: T wave inversion Inferior  Q Waves: nonspecific  Comparison to prior: PACs new    Clinical Impression: no acute changes and non-specific EKG                          Results for orders placed or performed during the hospital encounter of 12/16/21   US Renal Transplant     Status: None    Narrative    EXAMINATION: US RENAL TRANSPLANT,  12/16/2021 7:11 PM     COMPARISON: None.    HISTORY: kidney failure    TECHNIQUE:  Grey-scale, color Doppler and spectral flow analysis.    FINDINGS:  The transplant kidney is located right lower quadrant, and measures  10.7 cm. Parenchyma is of normal thickness and echogenicity. No focal  lesions. No hydronephrosis. No perinephric fluid collection.    Renal artery flow:   66 cm/s cm/sec peak systolic at hilum.  82 cm/s peak systolic at anastomosis.  Arcuate artery resistive indices (upper to lower): 0.72, 0.56, 0.53    Renal Vein Flow:  43 cm/sat hilum.   37 cm/s at anastomosis.    Iliac artery flow:  130 cm second peak systolic above anastomosis.  39 cm peak systolic below anastomosis.    Iliac vein flow:  Patent above and below the anastomosis.      Impression    IMPRESSION: Normal transplant kidney ultrasound and Doppler evaluation  of the transplant vasculature.    I have personally reviewed the examination and initial interpretation  and I agree with the findings.    JORGE ERVIN MD         SYSTEM ID:  [id number]   XR Chest 2 Views     Status: None    Narrative    EXAM: XR CHEST 2 VW 12/16/2021 9:04 PM    HISTORY: eval pulmonary edema.    COMPARISON: Radiograph of the chest dated 3/15/2020.    TECHNIQUE: Upright frontal and lateral views of the chest.    FINDINGS: Trachea is midline. Stable cardiomegaly with pulmonary  vascular congestion. Aortic arch atherosclerosis. Stable right greater  than left perihilar opacities. Right basilar pulmonary opacity. Stable  small right greater than left pleural effusions. No acute osseous  abnormality. Visualized portion of the upper  abdomen is within normal  limits.      Impression    IMPRESSION:   1. Stable small right greater than left pleural effusions.  2. Right basilar opacity, likely atelectasis.  3. Stable cardiomegaly with pulmonary vascular congestion.  4. Pulmonary artery enlargement. Correlate for pulmonary hypertension.    I have personally reviewed the examination and initial interpretation  and I agree with the findings.    JORGE ERVIN MD         SYSTEM ID:  [id number]   Comprehensive metabolic panel     Status: Abnormal   Result Value Ref Range    Sodium 136 133 - 144 mmol/L    Potassium 3.8 3.4 - 5.3 mmol/L    Chloride 108 94 - 109 mmol/L    Carbon Dioxide (CO2) 20 20 - 32 mmol/L    Anion Gap 8 3 - 14 mmol/L    Urea Nitrogen 20 7 - 30 mg/dL    Creatinine 2.93 (H) 0.52 - 1.04 mg/dL    Calcium 7.1 (L) 8.5 - 10.1 mg/dL    Glucose 136 (H) 70 - 99 mg/dL    Alkaline Phosphatase 162 (H) 40 - 150 U/L    AST 36 0 - 45 U/L    ALT 14 0 - 50 U/L    Protein Total 7.2 6.8 - 8.8 g/dL    Albumin 2.8 (L) 3.4 - 5.0 g/dL    Bilirubin Total 0.7 0.2 - 1.3 mg/dL    GFR Estimate 17 (L) >60 mL/min/1.73m2   UA with Microscopic reflex to Culture     Status: Abnormal    Specimen: Urine, Midstream   Result Value Ref Range    Color Urine Light Yellow Colorless, Straw, Light Yellow, Yellow    Appearance Urine Clear Clear    Glucose Urine 30  (A) Negative mg/dL    Bilirubin Urine Negative Negative    Ketones Urine Negative Negative mg/dL    Specific Gravity Urine 1.005 1.003 - 1.035    Blood Urine Small (A) Negative    pH Urine 6.0 5.0 - 7.0    Protein Albumin Urine 200  (A) Negative mg/dL    Urobilinogen Urine Normal Normal, 2.0 mg/dL    Nitrite Urine Negative Negative    Leukocyte Esterase Urine Trace (A) Negative    RBC Urine 1 <=2 /HPF    WBC Urine 5 <=5 /HPF    Squamous Epithelials Urine <1 <=1 /HPF    Narrative    Urine Culture not indicated   Asymptomatic COVID-19 Virus (Coronavirus) by PCR Nasopharyngeal     Status: Normal    Specimen:  Nasopharyngeal; Swab   Result Value Ref Range    SARS CoV2 PCR Negative Negative, Testing sent to reference lab. Results will be returned via unsolicited result    Narrative    Testing was performed using the Xpert Xpress SARS-CoV-2 Assay on the  Cepheid Gene-Xpert Instrument Systems. Additional information about  this Emergency Use Authorization (EUA) assay can be found via the Lab  Guide. This test should be ordered for the detection of SARS-CoV-2 in  individuals who meet SARS-CoV-2 clinical and/or epidemiological  criteria. Test performance is unknown in asymptomatic patients. This  test is for in vitro diagnostic use under the FDA EUA for  laboratories certified under CLIA to perform high complexity testing.  This test has not been FDA cleared or approved. A negative result  does not rule out the presence of PCR inhibitors in the specimen or  target RNA in concentration below the limit of detection for the  assay. The possibility of a false negative should be considered if  the patient's recent exposure or clinical presentation suggests  COVID-19. This test was validated by the [facility]. This laboratory is certified under  the Clinical Laboratory Improvement Amendments of 1988 (CLIA-88) as  qualified to perform high complexity laboratory testing.     CBC with platelets and differential     Status: Abnormal   Result Value Ref Range    WBC Count 3.9 (L) 4.0 - 11.0 10e3/uL    RBC Count 3.57 (L) 3.80 - 5.20 10e6/uL    Hemoglobin 9.9 (L) 11.7 - 15.7 g/dL    Hematocrit 32.3 (L) 35.0 - 47.0 %    MCV 91 78 - 100 fL    MCH 27.7 26.5 - 33.0 pg    MCHC 30.7 (L) 31.5 - 36.5 g/dL    RDW 14.1 10.0 - 15.0 %    Platelet Count 148 (L) 150 - 450 10e3/uL    % Neutrophils 63 %    % Lymphocytes 21 %    % Monocytes 13 %    % Eosinophils 1 %    % Basophils 1 %    % Immature Granulocytes 1 %    NRBCs per 100 WBC 0 <1 /100    Absolute Neutrophils 2.5 1.6 - 8.3 10e3/uL    Absolute  Lymphocytes 0.8 0.8 - 5.3 10e3/uL    Absolute Monocytes 0.5 0.0 - 1.3 10e3/uL    Absolute Eosinophils 0.1 0.0 - 0.7 10e3/uL    Absolute Basophils 0.0 0.0 - 0.2 10e3/uL    Absolute Immature Granulocytes 0.0 <=0.4 10e3/uL    Absolute NRBCs 0.0 10e3/uL   BNP     Status: Abnormal   Result Value Ref Range    N terminal Pro BNP Inpatient 9,657 (H) 0 - 900 pg/mL   Extra Blue Top Tube     Status: None   Result Value Ref Range    Hold Specimen JIC    Extra Red Top Tube     Status: None   Result Value Ref Range    Hold Specimen JIC    TSH with free T4 reflex     Status: Abnormal   Result Value Ref Range    TSH 4.40 (H) 0.40 - 4.00 mU/L   Troponin I     Status: Normal   Result Value Ref Range    Troponin I High Sensitivity 37 <54 ng/L   Lipase     Status: Abnormal   Result Value Ref Range    Lipase 536 (H) 73 - 393 U/L   T4 free     Status: Normal   Result Value Ref Range    Free T4 0.87 0.76 - 1.46 ng/dL   Total Protein, Serum for ELP     Status: Abnormal   Result Value Ref Range    Total Protein Serum for ELP 6.7 (L) 6.8 - 8.8 g/dL   EKG 12-lead, tracing only     Status: None (Preliminary result)   Result Value Ref Range    Systolic Blood Pressure  mmHg    Diastolic Blood Pressure  mmHg    Ventricular Rate 96 BPM    Atrial Rate 96 BPM    CT Interval 200 ms    QRS Duration 96 ms     ms    QTc 485 ms    P Axis 18 degrees    R AXIS 55 degrees    T Axis -6 degrees    Interpretation ECG       Sinus rhythm with Premature atrial complexes  Abnormal QRS-T angle, consider primary T wave abnormality  Prolonged QT  Abnormal ECG     CBC with platelets differential     Status: Abnormal    Narrative    The following orders were created for panel order CBC with platelets differential.  Procedure                               Abnormality         Status                     ---------                               -----------         ------                     CBC with platelets and d...[[id number]]  Abnormal            Final result                  Please view results for these tests on the individual orders.   Extra Tube     Status: None    Narrative    The following orders were created for panel order Extra Tube.  Procedure                               Abnormality         Status                     ---------                               -----------         ------                     Extra Blue Top Tube[[id number]]                              Final result               Extra Red Top Tube[[id number]]                               Final result                 Please view results for these tests on the individual orders.   Protein electrophoresis     Status: Abnormal (In process)    Narrative    The following orders were created for panel order Protein electrophoresis.  Procedure                               Abnormality         Status                     ---------                               -----------         ------                     Total Protein, Serum for...[[id number]]  Abnormal            Final result               Protein Electrophoresis,...[[id number]]                      In process                   Please view results for these tests on the individual orders.     Medications   nitroGLYcerin (NITROSTAT) sublingual tablet 0.4 mg (0.4 mg Sublingual Given 12/16/21 1950)   mycophenolate (CELLCEPT BRAND) capsule 750 mg (has no administration in time range)   hydrALAZINE (APRESOLINE) tablet 25 mg (has no administration in time range)   pantoprazole (PROTONIX) EC tablet 40 mg (has no administration in time range)   sodium bicarbonate tablet 1,300 mg (has no administration in time range)   sulfamethoxazole-trimethoprim (BACTRIM) 400-80 MG per tablet 1 tablet (has no administration in time range)   cycloSPORINE modified (NEORAL BRAND) capsule 25 mg (has no administration in time range)   cycloSPORINE modified (NEORAL BRAND) capsule 50 mg (has no administration in time range)   carvedilol (COREG) tablet 3.125 mg (has no administration in time  range)   calcitRIOL (ROCALTROL) capsule 0.5 mcg (has no administration in time range)   calcium carbonate (TUMS) chewable tablet 500 mg (has no administration in time range)   lidocaine 1 % 0.1-1 mL (has no administration in time range)   lidocaine (LMX4) cream (has no administration in time range)   sodium chloride (PF) 0.9% PF flush 3 mL (has no administration in time range)   sodium chloride (PF) 0.9% PF flush 3 mL (has no administration in time range)   melatonin tablet 1 mg (has no administration in time range)   heparin ANTICOAGULANT injection 5,000 Units (has no administration in time range)   acetaminophen (TYLENOL) tablet 975 mg (has no administration in time range)   senna-docusate (SENOKOT-S/PERICOLACE) 8.6-50 MG per tablet 1 tablet (has no administration in time range)     Or   senna-docusate (SENOKOT-S/PERICOLACE) 8.6-50 MG per tablet 2 tablet (has no administration in time range)   ondansetron (ZOFRAN-ODT) ODT tab 4 mg (has no administration in time range)     Or   ondansetron (ZOFRAN) injection 4 mg (has no administration in time range)   sucralfate (CARAFATE) tablet 1 g (has no administration in time range)   labetalol (NORMODYNE/TRANDATE) injection 10 mg (has no administration in time range)   losartan (COZAAR) tablet 25 mg (has no administration in time range)   losartan (COZAAR) tablet 50 mg (has no administration in time range)   furosemide (LASIX) injection 40 mg (40 mg Intravenous Given 12/16/21 1950)   hydrALAZINE (APRESOLINE) tablet 25 mg (25 mg Oral Given 12/16/21 2030)   carvedilol (COREG) tablet 3.125 mg (3.125 mg Oral Given 12/16/21 2030)         Wt Readings from Last 4 Encounters:   12/16/21 66 kg (145 lb 6.4 oz)   09/26/21 63.2 kg (139 lb 6.4 oz)   05/15/20 69.4 kg (153 lb)   04/15/20 68 kg (150 lb)       Assessments & Plan (with Medical Decision Making)   Joshua Lala is a 55 year old female who has a PMH significant for CKD stage III, renal transplant (first transplant in 1998, with  chronic rejection, failed in 2003, second transplant 11/28/2007),  PE, hypertension, hypercalcemia, hyponatremia, hyperparathyroidism, leukopenia, Henoch Schonlein purpura, and EBV viremia who presents with fatigue, swelling, and decreased urination.    Ddx: anasarca, oliguria/anuria, transplant rejection, TOSIN on CKD, volume overload, Pulmonary edema, CHF, HTN urgency/emergency, gastritis, peptic ulcer disease, pancreatitis, progressive EBV infection, thyroid hormone abnormality    History limited by inability to obtain a [facility] [place]  Patient's family member is serving as  currently.  Patient denies orthopnea or dyspnea/chest pain.  However, when I lay her supine for exam she does report dyspnea and has marked JVD.  Her BNP is elevated approximately 3 times above her baseline elevation.  She has anasarca on exam and hypertension refractory to her home blood pressure medications.  She is currently not on Lasix.  We will give 40 mg IV Lasix and her evening blood pressure medicines including hydralazine and Coreg.  We will also give sublingual nitroglycerin to treat hypertension and symptoms of CHF/pulmonary edema.  Will obtain chest x-ray to evaluate for pulmonary edema.  Creatinine elevated to 2.93 from recent baseline of 2.54.  Electrolytes normal.  Liver enzymes normal.  Urinalysis with 200 albumin but no evidence of infection.  Hemoglobin 9.9, white count 3.9, platelets 148.  These findings are essentially stable from patient's baseline.  Transplant ultrasound within normal limits.    Chest x-ray shows stable small right greater than left pleural effusions.  Right basilar opacity likely atelectasis, stable cardiomegaly with pulmonary vascular congestion.  Pulmonary artery enlargement. Troponin 37.  EKG without acute ischemic changes.  TSH 4.40.  Free T4 normal.  Lipase 536.  Patient did have epigastric pain.  Liver enzymes and bilirubin were normal.  We will continue to monitor.    Discussed with  transplant surgery who agreed with admission to medicine and consult to transplant nephrology.  Consulted transplant nephrology who agreed with giving Lasix.  They recommended keeping patient's blood pressure under 180 and treating HTN with lasix and patient's home blood pressure medications. no additional recommendations at this time.  Handoff provided to triage hospitalist.    I have reviewed the nursing notes. I have reviewed the findings, diagnosis, plan and need for follow up with the patient.    Current Discharge Medication List          Final diagnoses:   Decreased urine output     I, Berta Jarquin, am serving as a trained medical scribe to document services personally performed by Grace Guo MD based on the provider's statements to me on December 16, 2021.  This document has been checked and approved by the attending provider.    I, Grace Guo MD, was physically present and have reviewed and verified the accuracy of this note documented by Berta Jarquin medical scribe.        --  Grace Guo MD  [facility] EMERGENCY DEPARTMENT  12/16/2021     Grace Guo MD  12/16/21 1318

## 2021-12-17 NOTE — H&P
[facility]    History and Physical - Hospitalist Service, Gold Night  Date of Admission:  12/16/2021    Assessment & Plan      Joshua Lala is a 55 year old female admitted on 12/16/2021. 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 She presented to [facility] ED on 12/16/21 with complaints of fatigue, edema, and oliguria. She is admitted to the internal medicine service with transplant nephrology consulting for further evaluation and management.    #IgA nephropathy s/p DDKT x 2  #oliguria   #chronic immunosuppression  First transplant 1998 which failed in 2003. Second transplant 2007, now with chronic antibody rejection, CKD IV. Last nephrology visit in chart 03/29/21. Missed an appointment in Sept 2021. Baseline creatinine 2 - 2.20 as of beginning of 2021. More recently, Cr in the 2.5 - 2.6 range. Cr 2.93 on admit. UA on admit with proteinuria, small blood, and trace LE, not convincing for infection.    -consult to transplant nephrology    -obtain urine protein w/ creat ratio, UPEP    -EBV quant, CMV quant, BK virus quant, SPEP    -continue sodium bicarb 1300mg BID    -continue calcitriol     -continue PTA immunosuppression: cyclosporine 25mg AM and 50mg PM; MMF 750mg BID    -continue sulfamethoxazole-trimethoprim 400-80mg on MWF for prophylaxis    -cyclosporine level in AM      #hypertensive urgency  No current concern for hypertensive emergency/end-organ damage.     -treat HTN with PTA medications: hydralazine 25mg TID, carvedilol 3.125mg BID, losartan 50mg daily    -PRN labetalol for SBP >170 and/or DBP>100    #HFpEF  #elevated BNP  #pulmonary HTN  Pulmonary vascular congestion on CXR, tachypneic on exam. Previous echocardiogram with HFpEF and c/f pulmonary HTN. Presentation with elevated BNP >9,000, higher than in previous hospitalizations.      -40mg lasix given in ED    -strict I/O    -weigh patient every day    -repeat echo in AM    #prandial pain  #postprandial vomiting   Patient and daughter report this is an ongoing problem since 04/2021. Patient is only able to eat rice and chicken broth recently. She has anorexia which she associates with pain. Every meal is associated with pain. Known hx gastric ulcers. Also has postprandial vomiting. Unclear cause.    -start sucralfate 1g TID before meals and nightly    -continue PTA PPI    -H pylori stool antigen testing    -GI consultation    -nutrition consultation    #COVID-19 vaccine series not completed  Patient has received 2 doses of vaccine so far. This does not represent full vaccination in an immunocompromised patient. She requires a third, full dose. We can offer this after ruling out any active viral infection.    -ensure 3rd full dose offered to patient prior to leaving the hospital    Chronic, stable medical problems:  #anemia of chronic disease: stable, monitor. Outpatient darbepoetin deana, held.  #leukopenia: stable, monitor     Diet:   renal  DVT Prophylaxis: Heparin SQ  Staples Catheter: Not present  Central Lines: None  Code Status:   FULL    Disposition Plan   Expected Discharge: 12/21/2021   Anticipated discharge location:  Awaiting care coordination huddle     The patient's care was discussed with the Attending Physician, Dr. Manuel, patient, patient's daughter, and bedside RN.     MELANY [facility]  Securely message with the Vocera Web Console (learn more here)  Text page via [facility] Paging/Directory  ______________________________________________________________________    Chief Complaint   Fatigue, swelling, decreased urine, pain and vomiting with eating    History is obtained from the patient, electronic health record, and ED physician.    History of Present Illness   Joshua Lala is a 55 year old female who has past medical  history as listed above and below who presented from home with family for concerns of fatigue, swelling, decreased urine output, and pain and vomiting with eating.     Patient presented to [facility] ED accompanied by her daughter with concerns of fatigue, swelling, and decreased urine output. She states that she is urinating, but that the amount is small. She is concerned about increased swelling in her arms, legs, and abdomen. In addition, she is feeling more fatigued. She states that the swelling has worsened, particularly over the past week. On exam, she has peripheral edema. She seems fluid-volume overloaded. She is tachypneic with respirations 30/min and shallow.    She also states that her appetite is decreased. She and her daughter are both very concerned about her nutrition status. They state that the patient has only been eating rice and broth. Whenever she eats, she has abdominal pain which is intolerable. She often vomits when she tries to eat. She stats that drinking Ensure helps her swelling and helps her to feel better. However, lately she has not been able to drink her Ensure very frequently. Overall, it seems that she has very poor PO intake. She states that she is able to keep medicines down without vomiting. It is primarily food that is troublesome. She mentions a history of ulcers.     Workup for her multiple complaints is so far unrevealing for specific cause. Will continue to explore GI symptoms and causes for her worsening renal function as discussed in plan above.    Review of Systems    Review of Systems   Constitutional: Positive for activity change, appetite change and fatigue. Negative for fever.   HENT: Negative for congestion, sore throat and voice change.    Eyes: Negative for visual disturbance.   Respiratory: Positive for shortness of breath. Negative for chest tightness and wheezing.    Cardiovascular: Positive for leg swelling. Negative for chest pain and palpitations.    Gastrointestinal: Positive for abdominal pain and vomiting. Negative for constipation and diarrhea.   Genitourinary: Positive for decreased urine volume. Negative for dysuria and flank pain.   Musculoskeletal: Negative for arthralgias and myalgias.   Allergic/Immunologic: Positive for immunocompromised state.   Neurological: Positive for weakness. Negative for syncope, speech difficulty, light-headedness and headaches.   Psychiatric/Behavioral: Negative for confusion.       Past Medical History    I have reviewed this patient's medical history and updated it with pertinent information if needed.   Past Medical History:   Diagnosis Date     EBV (Silvestre-Barr virus) viremia      GERD (gastroesophageal reflux disease)      HSP (Henoch Schonlein purpura) (H)      Hyperparathyroidism (H)      Leukopenia 2016     PE (pulmonary thromboembolism) (H)     coumadin  -      Tertiary hyperparathyroidism (H)        Past Surgical History   I have reviewed this patient's surgical history and updated it with pertinent information if needed.  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  Social History     Tobacco Use     Smoking status: Never Smoker     Smokeless tobacco: Never Used   Substance Use Topics     Alcohol use: No     Drug use: No       Family History   Unable to obtain due to: patient unsure.    Prior to Admission Medications   Prior to Admission Medications   Prescriptions Last Dose Informant Patient Reported? Taking?   CELLCEPT (BRAND) 250 MG capsule   No No   Sig: Take 3 capsules (750 mg) by mouth 2 times daily   NEORAL (BRAND) 25 MG capsule   No No   Sig: Take 1 capsule (25 mg)  by mouth every morning and 2 capsules (50 mg) by mouth every evening   VITAMIN D3 25 MCG (1000 UT) tablet   No No   Sig: TAKE TWO TABLETS BY MOUTH ONCE DAILY   bacitracin-neomycin-polymyxin (NEOSPORIN) 400-5-5000 external ointment   No No   Sig: Apply topically 2 times daily   calcitRIOL (ROCALTROL) 0.25 MCG capsule   No No   Sig: Take 2 capsules (0.5 mcg) by mouth daily   calcium carbonate (TUMS) 500 MG chewable tablet   Yes No   Sig: Take 1 chew tab by mouth 2 times daily   carvedilol (COREG) 3.125 MG tablet   No No   Sig: Take 1 tablet (3.125 mg) by mouth 2 times daily (with meals)   cinacalcet (SENSIPAR) 30 MG tablet   No No   Sig: Take 1 tablet (30 mg) by mouth daily HOLD   Patient not taking: Reported on 9/26/2021   darbepoetin deana-polysorbate (ARANESP) 25 MCG/0.42ML injection   No No   Sig: Inject 0.42 mLs (25 mcg) Subcutaneous every 14 days Dosed with anemia clinic.   hydrALAZINE (APRESOLINE) 25 MG tablet   No No   Sig: Take 1 tablet (25 mg) by mouth 3 times daily   losartan (COZAAR) 25 MG tablet   No No   Sig: Take 1 tablet (25 mg) by mouth daily   Patient taking differently: Take 50 mg by mouth daily    omeprazole (PRILOSEC) 20 MG DR capsule   No No   Sig: Take 1 capsule (20 mg) by mouth 2 times daily   Patient taking differently: Take 20 mg by mouth daily    sodium bicarbonate 650 MG tablet   No No   Sig: Take 2 tablets (1,300 mg) by mouth 2 times daily   sulfamethoxazole-trimethoprim (BACTRIM) 400-80 MG tablet   No No   Sig: Take 1 tablet by mouth Every Mon, Wed, Fri Morning      Facility-Administered Medications: None     Allergies   Allergies   Allergen Reactions     Contrast Dye Shortness Of Breath     Other reaction(s): Angioedema     Amlodipine Other (See Comments)     Physical Exam   Vital Signs: Temp: 98.4  F (36.9  C) Temp src: Oral BP: (!) 175/95 Pulse: 84   Resp: 16 SpO2: 97 % O2 Device: None (Room air)    Weight: 145 lbs 6.4 oz    Physical Exam  Vitals and nursing note reviewed.    Constitutional:       General: She is not in acute distress.     Appearance: She is ill-appearing. She is not toxic-appearing.   HENT:      Head: Normocephalic and atraumatic.      Nose: No congestion or rhinorrhea.      Mouth/Throat:      Mouth: Mucous membranes are moist.      Pharynx: Oropharynx is clear. No oropharyngeal exudate or posterior oropharyngeal erythema.   Eyes:      General: No scleral icterus.     Pupils: Pupils are equal, round, and reactive to light.   Cardiovascular:      Rate and Rhythm: Normal rate. Rhythm irregular.      Pulses: Normal pulses.      Heart sounds: Murmur heard.    Systolic murmur is present.       Arteriovenous access: left arteriovenous access is present.  Pulmonary:      Effort: Tachypnea present.      Breath sounds: Decreased air movement present.   Abdominal:      General: There is no distension.      Palpations: Abdomen is soft. There is no mass.      Tenderness: There is no abdominal tenderness. There is no guarding.   Musculoskeletal:         General: Swelling present. No tenderness.      Right lower leg: Edema present.      Left lower leg: Edema present.   Neurological:      Mental Status: She is alert.         Data   Data reviewed today: I reviewed all medications, new labs and imaging results over the last 24 hours. I personally reviewed the EKG tracing showing SR with PACs and the chest x-ray image(s) showing small R>L pleural effusion, pulmonary vascular congestion, and enlargement of the pulmonary artery.    Recent Labs   Lab 12/16/21  1821   WBC 3.9*   HGB 9.9*   MCV 91   *      POTASSIUM 3.8   CHLORIDE 108   CO2 20   BUN 20   CR 2.93*   ANIONGAP 8   SHAVON 7.1*   *   ALBUMIN 2.8*   PROTTOTAL 7.2   BILITOTAL 0.7   ALKPHOS 162*   ALT 14   AST 36   LIPASE 536*

## 2021-12-17 NOTE — PROGRESS NOTES
12/17/21 1324   Quick Adds   Type of Visit Initial PT Evaluation       Present yes   Language Hmong   Living Environment   People in home child(dorinda), adult   Current Living Arrangements house   Home Accessibility stairs to enter home;stairs within home   Number of Stairs, Main Entrance 4   Stair Railings, Main Entrance railings safe and in good condition   Number of Stairs, Within Home, Primary   (pt does not remember amount of stairs)   Stair Railings, Within Home, Primary railings safe and in good condition   Transportation Anticipated family or friend will provide   Living Environment Comments Pt reports living in two level home with her daughter. Pt reports her bedroom is upstairs and she has a tub shower. Pt does not use any DME or AD at baseline.  Daughter is available to assist pt as needed.    Self-Care   Usual Activity Tolerance good   Current Activity Tolerance fair   Regular Exercise No   Equipment Currently Used at Home none   Activity/Exercise/Self-Care Comment PLOF ind with ADL's and mobility without use of AD.   Disability/Function   Hearing Difficulty or Deaf no   Wear Glasses or Blind yes   Vision Management glasses   Concentrating, Remembering or Making Decisions Difficulty no   Difficulty Communicating no   Difficulty Eating/Swallowing no   Walking or Climbing Stairs Difficulty no   Dressing/Bathing Difficulty no   Toileting issues no   Doing Errands Independently Difficulty (such as shopping) yes   Errands Management daughter assists   Fall history within last six months no   Change in Functional Status Since Onset of Current Illness/Injury yes   General Information   Onset of Illness/Injury or Date of Surgery 12/16/21   Referring Physician Nhi Padilla APRN CNP   Patient/Family Therapy Goals Statement (PT) to return home   Pertinent History of Current Problem (include personal factors and/or comorbidities that impact the POC) Per chart: Joshua Lala is a 55 year  old female admitted on 12/16/2021. 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 She presented to [facility] ED on 12/16/21 with complaints of fatigue, edema, and oliguria. She is admitted to the internal medicine service with transplant nephrology consulting for further evaluation and management.   Existing Precautions/Restrictions fall   General Observations Activity: Up with Assist   Cognition   Orientation Status (Cognition) oriented x 4   Affect/Mental Status (Cognition) WFL   Follows Commands (Cognition) WFL   Pain Assessment   Patient Currently in Pain No   Integumentary/Edema   Integumentary/Edema no deficits were identifed   Posture    Posture Forward head position;Protracted shoulders   Range of Motion (ROM)   ROM Comment B LE grossly WFL   Strength   Strength Comments B LE grossly 4/5. General weakness/decreased endurance noted   Bed Mobility   Comment (Bed Mobility) Sup <> sit with mod I. HOB elevated   Transfers   Transfer Safety Comments Pt performs sit to stand transfers with no AD, with SBA   Gait/Stairs (Locomotion)   [place] Level (Gait) supervision;contact guard   Assistive Device (Gait)   (no AD)   Comment (Gait/Stairs) Pt ambulates 20ft for evaluation, with no AD and SBA-CGA. Pt demos slowed gait [place], and flexed forward posture. No LOB   Balance   Balance Comments Good seated and standing balance. Does not require use of AD with ambulation.   Sensory Examination   Sensory Perception patient reports no sensory changes   Clinical Impression   Criteria for Skilled Therapeutic Intervention yes, treatment indicated   PT Diagnosis (PT) impaired functional mobility   Influenced by the following impairments weakness, decreased activity tolerance   Functional limitations due to impairments community distance ambulation   Clinical Presentation Stable/Uncomplicated    Clinical Presentation Rationale clinical judgement   Clinical Decision Making (Complexity) low complexity   Therapy Frequency (PT) 3x/week   Predicted Duration of Therapy Intervention (days/wks) 1 week   Planned Therapy Interventions (PT) balance training;gait training;home exercise program;neuromuscular re-education;stair training;strengthening;transfer training;progressive activity/exercise   Risk & Benefits of therapy have been explained evaluation/treatment results reviewed;care plan/treatment goals reviewed   Clinical Impression Comments Pt presents near functional mobility baseline, though does demonstrate decreased activity tolerance and generalized weakness. Pt to benefit from skilled inpatient PT in order to progress activity tolerance and return to PLOF.    PT Discharge Planning    PT Discharge Recommendation (DC Rec) home with assist   PT Rationale for DC Rec Pt mobilizing well. Anticipate safe discharge to home with daughter support, once medically cleared.    PT Brief overview of current status  SBA-CGA with gait belt. Ambulate 3-4 times per day.    Total Evaluation Time   Total Evaluation Time (Minutes) 15

## 2021-12-17 NOTE — CONSULTS
Gastroenterology Consultation  Joshua Lala [id number] 55 year old 1965 12/17/2021        Date of Admission: 12/16/2021  Requesting physician: Yuliana Martinez, *       Reason for Consultation:   Post prandial vomiting.   History is obtained from the patient         Assessment and Plan:   Joshua Lala is a 55 year old female with kidney transplant x 2 (1998, 2007) 2/2 IgA nephropathy, EBV viremia (2015), GERD, HSP (1996), Leukopenia, PE (2001), tertiary hyperparathyroidism. She presented to [facility] ED on 12/16/21 with complaints of fatigue, edema, and oliguria also endorsing postprandial vomiting and post prandial pain since 4/2021.     # Post prandial epigastric burning pain  # GERD  # S/p kidney transplant  Presentation most suspicious for worsening of her GERD symptoms. Currently without red flag such as dysphagia, outlet obstruction, odynophagia, anemia. Other ddx considered but less likely are mesenteric ischemia (not vasculopathic), abd wall pain and gastroparesis (nausea/vomiting not a predominant features, not DM, no chronic opioid), CMV esophagitis (on MMF). Malignancy is always a consideration but the patient lack other risk factors such as ETOH, smoking, family history and the weight loss is probably more related to the change in her diet.            Recommendations:   -- Increase PPI to omeprazole 40mg BID for 6-8 weeks  -- Continue with carafate BID  -- Other medical therapy can be considered: Maalox prn, add famotidine 40mg QDAY.   -- No indication for EGD now.   -- F/u with H pylori (PPI may interfere with the test)  -- Emphasize lifestyle mod such as reducing caffeine, no late meals etc  -- If symptoms persist despite the above intervention, the patient will need a repeat EGD to reassess her disease and rule out malignancy but this can be done as an outpatient.     It has been a pleasure to participate in the care of this patient.  Patient discussed with GI staff, Dr. Kingsley.  Please do not  hesitate to contact the GI service with any questions or concerns.     Tim Mendieta MD  Gastroenterology Fellow  [facility]  Text page [phone]           HPI:   Joshua Lala is a 55 year old female with kidney transplant x 2 (1998, 2007) 2/2 IgA nephropathy, EBV viremia (2015), GERD, HSP (1996), Leukopenia, PE (2001), tertiary hyperparathyroidism. She presented to [facility] ED on 12/16/21 with complaints of fatigue, edema, and oliguria also endorsing postprandial vomiting and post prandial pain since 4/2021.     Per patient, she has been having pain post eating for a long time since many years ago and taking omeprazole 20mg with sucrafate BID. Every time she eats, she would have burning epigastric abd pain lasting up to 1 hour after meal. In the past few 4 months, has noted a worsening of her symptoms. The characteristic of the pain is the same but worse in severity especially with harder food such as chicken or steak so she has been eating mostly soup with soft rice. She has been on PPI BID in the past but unclear how long ago that was but at that time her symptom was better. She never had pain without eating and only had nausea/vomiting once a month at most. Chart review showed she lost ~30lb in the past year. Pt denied NSAID use/ETOH/smoking/history of esophageal cancer/bleeding symptoms such as melena. Last EGD in 2008 for epigastric abd pain showed no abnormality. Relevant medications include MMF, Bactrim, PPI, sucrafate.     In the ED, VSS. Labs notable for TOSIN with Cr of 2.9. BNP 9000. CXR showed cardiomegaly. Last CT A/P in 9/2021 showed mild volume overload. Pt was restarted on her home PPI and sucrafate. CBC with stable Hgb at 10.          Past Medical History:   Reviewed and edited as appropriate  Past Medical History:   Diagnosis Date     EBV (Silvestre-Barr virus) viremia 2015     GERD (gastroesophageal reflux disease) 2008     HSP ([place]lein purpura) (H) 1996     Hyperparathyroidism (H)       Leukopenia 2016     PE (pulmonary thromboembolism) (H) 2001    coumadin  - 2007     Tertiary hyperparathyroidism (H)             Past Surgical History:   Reviewed and edited as appropriate   Past Surgical History:   Procedure Laterality Date     CHOLECYSTECTOMY       TRANSPLANT KIDNEY RECIPIENT  DONOR      failed 2003      TRANSPLANT KIDNEY RECIPIENT  DONOR      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            Medications:     Current Facility-Administered Medications   Medication     acetaminophen (TYLENOL) tablet 975 mg     calcitRIOL (ROCALTROL) capsule 0.5 mcg     calcium carbonate (TUMS) chewable tablet 500 mg     carvedilol (COREG) tablet 3.125 mg     cycloSPORINE modified (NEORAL BRAND) capsule 25 mg     cycloSPORINE modified (NEORAL BRAND) capsule 50 mg     furosemide (LASIX) injection 40 mg     heparin ANTICOAGULANT injection 5,000 Units     hydrALAZINE (APRESOLINE) tablet 25 mg     labetalol (NORMODYNE/TRANDATE) injection 10 mg     lidocaine (LMX4) cream     lidocaine 1 % 0.1-1 mL     [Held by provider] losartan (COZAAR) tablet 50 mg     melatonin tablet 1 mg     mycophenolate (CELLCEPT BRAND) capsule 750 mg     nitroGLYcerin (NITROSTAT) sublingual tablet 0.4 mg     ondansetron (ZOFRAN-ODT) ODT tab 4 mg    Or     ondansetron (ZOFRAN) injection 4 mg     pantoprazole (PROTONIX) EC tablet 40 mg     senna-docusate (SENOKOT-S/PERICOLACE) 8.6-50 MG per tablet 1 tablet    Or     senna-docusate (SENOKOT-S/PERICOLACE) 8.6-50 MG per tablet 2 tablet     sodium bicarbonate tablet 1,300 mg     sodium chloride (PF) 0.9% PF flush 3 mL     sodium chloride (PF) 0.9% PF flush 3 mL     sucralfate (CARAFATE) tablet 1 g     sulfamethoxazole-trimethoprim (BACTRIM) 400-80 MG per tablet 1 tablet            Allergies      Allergies   Allergen Reactions     Contrast Dye Shortness Of Breath     Other reaction(s): Angioedema     Amlodipine Other (See Comments)            Social History:   Reviewed  and edited as appropriate  Social History     Socioeconomic History     Marital status:      Spouse name: Not on file     Number of children: Not on file     Years of education: Not on file     Highest education level: Not on file   Occupational History     Not on file   Tobacco Use     Smoking status: Never Smoker     Smokeless tobacco: Never Used   Substance and Sexual Activity     Alcohol use: No     Drug use: No     Sexual activity: Not on file     Comment: ERINN- need    Other Topics Concern     Not on file   Social History Narrative     Not on file     Social Determinants of Health     Financial Resource Strain: Not on file   Food Insecurity: Not on file   Transportation Needs: Not on file   Physical Activity: Not on file   Stress: Not on file   Social Connections: Not on file   Intimate Partner Violence: Not on file   Housing Stability: Not on file           Family History:   Reviewed and edited as appropriate  History reviewed. No pertinent family history.   No known history of colorectal cancer, liver disease, or inflammatory bowel disease.         Review of Systems:   A complete 10 point review of systems was obtained.  Please see the HPI for pertinent positives and negatives.  All other systems were reviewed and were found to be negative.          Physical Exam:   VS:  BP (!) 155/82 (BP Location: Right arm)   Pulse 69   Temp 98  F (36.7  C) (Oral)   Resp 18   Ht 1.524 m (5')   Wt 62.1 kg (137 lb)   SpO2 97%   BMI 26.76 kg/m      Wt:   Wt Readings from Last 2 Encounters:   12/17/21 62.1 kg (137 lb)   09/26/21 63.2 kg (139 lb 6.4 oz)      Constitutional: NAD  Eyes: Conjunctiva anicteric  HEENT: Normal oropharynx without ulcers or exudate, mucus membranes moist  CV: No Jimmy edema  Respiratory: Breathing comfortably on ambient air  Abd:  Nondistended, +bs, no hepatosplenomegaly, nontender, no rebound or guarding, mild ttp in epigastric.    Skin: warm, perfused, no jaundice  Neuro: A&O x  3, No asterixis         Laboratory:   BMP  Recent Labs   Lab 12/17/21  0618 12/16/21  1821    136   POTASSIUM 3.8 3.8   CHLORIDE 110* 108   SHAVON  --  7.1*   CO2  --  20   BUN  --  20   CR  --  2.93*   GLC  --  136*     CBC  Recent Labs   Lab 12/17/21 0618 12/16/21  1821   WBC 2.8* 3.9*   RBC 3.12* 3.57*   HGB 8.7* 9.9*   HCT 28.0* 32.3*   MCV 90 91   MCH 27.9 27.7   MCHC 31.1* 30.7*   RDW 13.9 14.1   * 148*     INRNo lab results found in last 7 days.  Liver Enzymes  Recent Labs   Lab 12/16/21  1821   ALKPHOS 162*   AST 36   ALT 14   BILITOTAL 0.7   PROTTOTAL 7.2   ALBUMIN 2.8*      PANC  Recent Labs   Lab 12/16/21  1821   LIPASE 536*       Imaging/Procedures/Studies:

## 2021-12-17 NOTE — PROGRESS NOTES
"CLINICAL NUTRITION SERVICES - ASSESSMENT NOTE     Nutrition Prescription    RECOMMENDATIONS FOR MDs/PROVIDERS TO ORDER:  Check phosphorus level.  Recommend diet liberalization to \"Regular\" or \"2 gm Sodium\".      Consider calorie counts if patient is expected to be here for >3 days and will not be frequently NPO for procedures.  Suspect intake is inadequate at this time, however could improve with hospital interventions.     Malnutrition Status:    Moderate malnutrition in the context of acute on chronic illness    Recommendations already ordered by Registered Dietitian (RD):  Oral supplements if patient requests    Future/Additional Recommendations:  Encourage bland/soft foods for improved tolerance.      REASON FOR ASSESSMENT  Joshua Lala is a/an 55 year old female assessed by the dietitian for Provider Order - \"malnutrition\"    NUTRITION HISTORY  Nutrition history obtained from patient with use of Ipad .  Patient reports several issues which are making oral intake difficult, including abdominal pain associated with eating, taste changes, gagging/vomiting with PO intake (~2-3x/week).  She has severely limited her oral intake to broth and 2 original Ensure daily (which she takes to keep her swelling down). She also seems to tolerate oatmeal (which is what she is ordering while here in the hospital).  Suspect she is consuming <700 kcal/day and 18-20 grams protein daily (~50% of assessed needs).  She reports these issues have been occurring over the last 3 months.     Home medications: Vit D3 2000 international unit(s)    CURRENT NUTRITION ORDERS  Diet: Renal  Intake/Tolerance: Eating oatmeal.     LABS  K+ 3.8 (normal)  No recent phosphorus  Lipase 536 (elevated)    MEDICATIONS  Carafate QID  Cyclosporine  Protonix 40 mg BID  Cellcept     ANTHROPOMETRICS  Height: 152.4 cm (5' 0\")  Most Recent Weight: 62.1 kg (137 lb)    IBW: 45.5 kg  BMI: Overweight BMI 25-29.9  Weight History: Swelling in UE/LE since October, " "worsened in the last week.  Patient appears to have lost ~13# (9%) within the last 8 months.  Given current fluid retention, likely that weight loss could be higher than reflected in chart review.   Wt Readings from Last 15 Encounters:   12/17/21 62.1 kg (137 lb)   09/26/21 63.2 kg (139 lb 6.4 oz)   05/15/20 69.4 kg (153 lb)   04/15/20 68 kg (150 lb)   04/15/20 68.2 kg (150 lb 4.8 oz)   03/25/20 70.8 kg (156 lb)   03/20/20 74.6 kg (164 lb 6.4 oz)   01/28/20 71.7 kg (158 lb)   01/26/20 71.9 kg (158 lb 8 oz)   06/16/19 68 kg (150 lb)   06/14/19 71.6 kg (157 lb 14.4 oz)   04/07/19 73.2 kg (161 lb 6.4 oz)   03/28/19 71.6 kg (157 lb 12.8 oz)   03/12/19 73 kg (160 lb 14.4 oz)   02/19/19 72 kg (158 lb 12.8 oz)   Dosing Weight: 50 kg (adj wt based on IBW of 45.5 kg and actual wt of 62.1 kg)    ASSESSED NUTRITION NEEDS  Estimated Energy Needs: 4782-5848 kcals/day (25 - 30 kcals/kg)  Justification: Maintenance  Estimated Protein Needs: 40-50 grams protein/day (0.8 - 1 grams of pro/kg)  Justification: CKD IV  Estimated Fluid Needs: per MD    MALNUTRITION  % Intake: </=50% for >/= 1 month (severe)  % Weight Loss: Difficult to assess d/t fluid retention.   Subcutaneous Fat Loss: Thoracic/intercostal:  mild  Muscle Loss: Thoracic region (clavicle, acromium bone, deltoid, trapezius, pectoral):  mild  Fluid Accumulation/Edema: Mild  Malnutrition Diagnosis: Moderate malnutrition in the context of acute on chronic illness    NUTRITION DIAGNOSIS  Inadequate oral intake related to pain, vomiting, taste changes as evidenced by patient likely consuming 50% of assessed needs over the last 3 months.     INTERVENTIONS  Implementation  Nutrition Education: Provided education on \"Coping with Nausea/Vomiting\", \"Peptic Ulcer Nutrition Therapy\" and Renal diet menu.  Will re-visit as able/needed after GI work up.     Medical food supplement therapy - patient didn't feel like she could tolerate these well now, ordered them PRN for patient to " request when she feels she can tolerate.     Goals  Patient to consume % of nutritionally adequate meal trays TID, or the equivalent with supplements/snacks.     Monitoring/Evaluation  Progress toward goals will be monitored and evaluated per protocol.    Renu Torres MS, RD, LD, CCTD  7A/Obs units, pager [phone]

## 2021-12-17 NOTE — UTILIZATION REVIEW
"  Admission Status; Secondary Review Determination     Admission Date: 12/16/2021  6:01 PM      Under the authority of the Utilization Management Committee, the utilization review process indicated a secondary review on the above patient.  The review outcome is based on review of the medical records, discussions with staff, and applying clinical experience noted on the date of the review.        (x)      Inpatient Status Appropriate - This patient's medical care is consistent with medical management for inpatient care and reasonable inpatient medical practice.      () Observation Status Appropriate - This patient does not meet hospital inpatient criteria and is placed in observation status. If this patient's primary payer is Medicare and was admitted as an inpatient, Condition Code 44 should be used and patient status changed to \"observation\".   () Admission Status NOT Appropriate - This patient's medical care is not consistent with medical management for Inpatient or Observation Status.          RATIONALE FOR DETERMINATION   Joshua Lala is a 55 year old female admitted on 12/16/2021. 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  She presented to the emergency room with fatigue, edema, and oliguria.  Found to have acute kidney injury on her chronic kidney disease.  She is admitted to the hospital for further evaluation and treatment.  She does need to be seen in consultation by transplant service.  She is expected to require a greater than 2 midnight stay at the hospital.  Due to this patient's very complex past medical history, acute kidney injury on chronic kidney disease, and expectation of a greater than 2 midnight stay at the hospital, it is appropriate to have her admitted to the hospital as an inpatient.      The severity of illness, intensity of service provided, expected LOS " and risk for adverse outcome make the care complex, high risk and appropriate for hospital admission.        The information on this document is developed by the utilization review team in order for the business office to ensure compliance.  This only denotes the appropriateness of proper admission status and does not reflect the quality of care rendered.         The definitions of Inpatient Status and Observation Status used in making the determination above are those provided in the CMS Coverage Manual, Chapter 1 and Chapter 6, section 70.4.      Sincerely,     William Mendiola D.O.  Utilization Review/ Case Management  [facility].

## 2021-12-17 NOTE — PHARMACY-ADMISSION MEDICATION HISTORY
Admission Medication History Completed by Pharmacy    See [facility] Admission Navigator for allergy information, preferred outpatient pharmacy, prior to admission medications and immunization status.     Medication History Sources:     Surescript fill history    Patient interview with ong     Call to patient's daughterNichelle    Changes made to PTA medication list (reason):    Added: None    Deleted: None    Changed:   o Marked as not taking per daughter's report: carvedilol, hydralazine, cinacalcet    Additional Information:    Attempted to perform reconciliation with an  but patient reports she doesn't know her medications and her daughter manages them. Spoke with daughter who recognized some medication names but could not confirm doses of medications. Outpatient clinic medication lists are also not up to date to infer correct dosing/active orders.    Of note, the patient has fill history for sucralfate 1 g tabs (#180 for 30 days) filled on 12/2/21, but the daughter said she didn't recognize that medication - did not add to medication list at this time.    Prior to Admission medications    Medication Sig Last Dose Taking? Auth Provider   calcitRIOL (ROCALTROL) 0.25 MCG capsule Take 2 capsules (0.5 mcg) by mouth daily  Yes Theodore Connor MD   calcium carbonate (TUMS) 500 MG chewable tablet Take 1 chew tab by mouth 2 times daily  Yes Unknown, Entered By History   CELLCEPT (BRAND) 250 MG capsule Take 3 capsules (750 mg) by mouth 2 times daily  Yes Akil Bah MD   losartan (COZAAR) 25 MG tablet Take 1 tablet (25 mg) by mouth daily  Patient taking differently: Take 50 mg by mouth daily   Yes Mildred Arce PA   NEORAL (BRAND) 25 MG capsule Take 1 capsule (25 mg) by mouth every morning and 2 capsules (50 mg) by mouth every evening  Yes Moe Johnston MD   omeprazole (PRILOSEC) 20 MG DR capsule Take 1 capsule (20 mg) by mouth 2 times daily  Patient taking differently: Take 20 mg by  mouth daily   Yes Mildred Arce PA   sodium bicarbonate 650 MG tablet Take 2 tablets (1,300 mg) by mouth 2 times daily  Yes Theodore Connor MD   sulfamethoxazole-trimethoprim (BACTRIM) 400-80 MG tablet Take 1 tablet by mouth Every Mon, Wed, Fri Morning  Yes Theodore Connor MD   VITAMIN D3 25 MCG (1000 UT) tablet TAKE TWO TABLETS BY MOUTH ONCE DAILY  Yes Akil Bah MD   bacitracin-neomycin-polymyxin (NEOSPORIN) 400-5-5000 external ointment Apply topically 2 times daily   Merlene Meza DO   carvedilol (COREG) 3.125 MG tablet Take 1 tablet (3.125 mg) by mouth 2 times daily (with meals)  Patient not taking: Reported on 12/17/2021 Not Taking at Unknown time  Moe Johnston MD   cinacalcet (SENSIPAR) 30 MG tablet Take 1 tablet (30 mg) by mouth daily HOLD  Patient not taking: Reported on 9/26/2021   Theodore Connor MD   darbepoetin deana-polysorbate (ARANESP) 25 MCG/0.42ML injection Inject 0.42 mLs (25 mcg) Subcutaneous every 14 days Dosed with anemia clinic.   Mildred Arce PA   hydrALAZINE (APRESOLINE) 25 MG tablet Take 1 tablet (25 mg) by mouth 3 times daily  Patient not taking: Reported on 12/17/2021 Not Taking at Unknown time  Akil Bah MD       Date completed: 12/17/21    Medication history completed by: CHRISTINA BROWN [facility]

## 2021-12-17 NOTE — CONSULTS
Care Management Initial Consult    General Information  Assessment completed with: Patient,Care Team Member,Spouse or significant other,VM-chart review,    Type of CM/SW Visit: Initial Assessment    Primary Care Provider verified and updated as needed: Yes   Readmission within the last 30 days:        Reason for Consult: care coordination/care conference,discharge planning (elevated readmission risk score)  Advance Care Planning:            Communication Assessment  Patient's communication style: spoken language (non-English) (daugter)    Hearing Difficulty or Deaf: no   Wear Glasses or Blind: yes    Cognitive  Cognitive/Neuro/Behavioral: WDL        Orientation: oriented x 4             Living Environment:   People in home: child(dorinda), adult   (Nichelle)  Current living Arrangements: house      Able to return to prior arrangements: yes       Family/Social Support:  Care provided by: self,child(dorinda)  Provides care for: no one     Children          Description of Support System: Supportive,Involved         Current Resources:   Patient receiving home care services: No     Community Resources: None  Equipment currently used at home: none  Supplies currently used at home: None    Employment/Financial:  Employment Status:          Financial Concerns: No concerns identified           Lifestyle & Psychosocial Needs:  Social Determinants of Health     Tobacco Use: Low Risk      Smoking Tobacco Use: Never Smoker     Smokeless Tobacco Use: Never Used   Alcohol Use: Not on file   Financial Resource Strain: Not on file   Food Insecurity: Not on file   Transportation Needs: Not on file   Physical Activity: Not on file   Stress: Not on file   Social Connections: Not on file   Intimate Partner Violence: Not on file   Depression: Not at risk     PHQ-2 Score: 2   Housing Stability: Not on file       Functional Status:  Prior to admission patient needed assistance:   Dependent ADLs:: Independent (Per pt her daughter Nichelle helps to  set up her medications)  Dependent IADLs:: Independent       Mental Health Status:  Mental Health Status: No Current Concerns       Chemical Dependency Status:                Values/Beliefs:  Spiritual, Cultural Beliefs, Buddhist Practices, Values that affect care:    Description of Beliefs that Will Affect Care:  (Pts Tenriism is shaminism)            Additional Information:  Pt admitted with complaints of fatigue, edema, oliguria and vomiting.  Pt with complex medical history significant for kidney transplant x 2 (1998, 2007) 2/2 IgA nephropathy, EBV viremia (2015), GERD, HSP (1996), Leukopenia, PE (2001), tertiary hyperparathyroidism. Pt with elevated readmission risk score. GI and Nutrition Consulted  PT consulted    Met with pt with the aide of  ipad.   Introduced RNCC role.  Pt notes concern with tolerating her diet at home.   Pt notes her daughter assists with medication management/set up.  Pt states she normally independent with all her own care needs.  Pt notes no other concerns ore questions.         RNCC/SW will continue to monitor.     Brittaney Barnes RN BSN, PHN, ACM-RN  7A RN Care Coordinator  Phone: [phone]  Pager [phone]  To contact the weekend RNCC, Page: [phone]    12/17/2021 1:43 PM

## 2021-12-17 NOTE — ED TRIAGE NOTES
"Pt comes in with fatigue, swelling in legs and stomach and decreased urination since yesterday. Also, pt has \"felt like she might fall over\" for about 5 months. Hx kidney transplant.  "

## 2021-12-17 NOTE — CONSULTS
[facility]  Transplant Nephrology Consult  Date of Admission:  12/16/2021  Today's Date: 12/17/2021  Requesting physician: Jarrett Mullen MD    Recommendations:  - Start lasix 80mg IV q12h, goal net negative 2L   -Obtain DSA (ordered)  -Obtain EBV PCR blood (ordered)  -Obtain iron studies  -Agree with echo  -hold losartan  -continue hydralazine 25mg PO q8h and follow BP with diuresis  -Obtain ionized calcium and PTH (ordered)  -Obtain SPEP, UPEP, C3, C4, JEREMY, immunofixation of both, A1c, K/L, anti PLA2R   -Fasting lipid panel (nephrotic syndrome)      Assessment & Plan   # DDKT: Trend up   - Baseline Creatinine: ~ 2-2.3, but labs have been infrequent   - Proteinuria: Nephrotic range (>3 grams), now 6g/g    - Date DSA Last Checked: Jun/2019      Latest DSA: Moderate DSA ([id number] [id number]) to DR51 , recheck now   - BK Viremia: No   - Kidney Tx Biopsy: No   -Unclear if patient has TOSIN 2/2 cardiorenal syndrome vs transplant glomerulopathy (known DSA) or both. Recommend diuresis with lasix 80mg IV q12h for goal net negative 2L/day.    -Hold losartan with TOSIN     # Immunosuppression: Cyclosporine (goal ) and Mycophenolate mofetil (dose 750 mg every 12 hours)   - Changes: Not at this time. CsA level tmrw (ordered)    # Infection Prophylaxis:   - PJP: Sulfa/TMP (Bactrim) MWF    # Hypertension: Inadequate control;  Goal BP: < 130/80   - Volume status: Moderately hypervolemic     - Changes: Yes - give lasix 80mg IV q12h for goal net negative 2L. 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 -Obtain SPEP, UPEP, C3, C4, JEREMY, immunofixation of both, A1c, K/L, anti PLA2R    -Obtain fasting lipid panel    # Anemia in Chronic Renal Disease: Hgb: Trend down      EDMUND: No, was previously on EDMUND but  stopped because she wasn't picking up the phone   - Iron studies: Not checked recently. Check now. Can give iron or EDMUND or both    # Mineral Bone Disorder:   - Secondary renal hyperparathyroidism; PTH level: Mildly elevated (151-300 pg/ml)        On treatment: Calcitriol  - Vitamin D; level: Normal        On supplement: No  - Calcium; level: Normal        On supplement: Yes  - Phosphorus; level: Normal        On supplement: No   -Continue calcitriol 0.5mcg daily and tums 500mg bid. Check PTH and ionized calcium    # Electrolytes:   - Potassium; level: Normal        On supplement: No  - Magnesium; level: Not checked recently, but was normal last check        On supplement: No  - Bicarbonate; level: Low        On supplement: Yes, continue bicarb 1300mg bid. Follow up w/ diuresis    # HFpEF:    -Decompensated clinically. Echo stable on 12/17/21 with elevated PASP and dilated IVC, grade II diastolic dysfunction     # Pulmonary HTN: echo 12/17/21 w/ PASP 39mmHg + RA pressure. Likely worsened by volume overload    # Dyspepsia:   -Has been taking PPI but has pain with solid food. Recommend GI consult and likely EGD.    -Agree with sucralfate and H. Pylori stool antigen    # Unintentional weight loss:   -Likely 2/2 her not eating but will obtain EBV PCR    -CMV sent and pending       # Transplant History:  Etiology of Kidney Failure: IgA nephropathy  Tx: DDKT  Transplant: 11/28/2007 (Kidney), 8/8/1998 (Kidney)  Significant changes in immunosuppression: None  Significant transplant-related complications: DSA    Recommendations were communicated to the primary team verbally.    Theodore Connor MD  Pager: [phone]    REASON FOR CONSULT   Kidney transplant, immunosuppression, proteinuria, volume overload    History of Present Illness   Joshua Lala is a 55 year old female w/ PMH of DDKT #2 in 11/2007 with baseline Scr 2-2.3, infrequent follow up, HFpEF with pulmonary edema, DSA to DR53, presenting with shortness of breath, LE edema,  pain with eating. The patient states that since 2021 she has noted increased edema in her legs and shortness of breath with minimal exertion. She also has noted unintentional weight loss but has gained some weight back with water weight and edema. She states that she has pain with eating since 2021 and as such she is only currently eating broth and rice. She says that solid pain hurts. She denies pain with swallowing or food getting stuck. She denies chest pain, she denies nausea or vomiting. She states that she has been getting increasing SOB w/ laying down and has had to increase the amount of pillows she sleeps on. She has not been on any diuretics.     The patient hadn't had labs performed since 2021 and missed her appointment with me at that time. The patient has also noted decreasing UOP. She denies dysuria or retention, just that the frequency and quantity of urine has decreased. She denies dysuria.     Review of Systems    The 10 point Review of Systems is negative other than noted in the HPI or here.    Past Medical History    I have reviewed this patient's medical history and updated it with pertinent information if needed.   Past Medical History:   Diagnosis Date     EBV (Silvestre-Barr virus) viremia      GERD (gastroesophageal reflux disease)      HSP (Henoch Schonlein purpura) (H)      Hyperparathyroidism (H)      Leukopenia 2016     PE (pulmonary thromboembolism) (H)     coumadin  -      Tertiary hyperparathyroidism (H)        Past Surgical History   I have reviewed this patient's surgical history and updated it with pertinent information if needed.  Past Surgical History:   Procedure Laterality Date     CHOLECYSTECTOMY       TRANSPLANT KIDNEY RECIPIENT  DONOR      failed 2003      TRANSPLANT KIDNEY RECIPIENT  DONOR      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       Family History   I have reviewed this patient's family history and updated it  with pertinent information if needed.   History reviewed. No pertinent family history. No family history of kidney disease    Social History   I have reviewed this patient's social history and updated it with pertinent information if needed. Joshua Lala  reports that she has never smoked. She has never used smokeless tobacco. She reports that she does not drink alcohol and does not use drugs.    Allergies   Allergies   Allergen Reactions     Contrast Dye Shortness Of Breath     Other reaction(s): Angioedema     Amlodipine Other (See Comments)     Prior to Admission Medications     calcitRIOL  0.5 mcg Oral Daily     calcium carbonate  500 mg Oral BID     carvedilol  3.125 mg Oral BID w/meals     cycloSPORINE modified  25 mg Oral QAM     cycloSPORINE modified  50 mg Oral QPM     furosemide  40 mg Intravenous Once     heparin ANTICOAGULANT  5,000 Units Subcutaneous BID     hydrALAZINE  25 mg Oral TID     [Held by provider] losartan  50 mg Oral Daily     mycophenolate  750 mg Oral BID     pantoprazole  40 mg Oral BID     sodium bicarbonate  1,300 mg Oral BID     sodium chloride (PF)  3 mL Intracatheter Q8H     sucralfate  1 g Oral 4x Daily AC & HS     sulfamethoxazole-trimethoprim  1 tablet Oral Q Mon Wed Fri AM         Physical Exam   Temp  Av.4  F (36.9  C)  Min: 97.8  F (36.6  C)  Max: 99.1  F (37.3  C)      Pulse  Av.2  Min: 69  Max: 105 Resp  Av.9  Min: 16  Max: 18  SpO2  Av.7 %  Min: 91 %  Max: 99 %     BP (!) 155/82 (BP Location: Right arm)   Pulse 69   Temp 98  F (36.7  C) (Oral)   Resp 18   Ht 1.524 m (5')   Wt 62.1 kg (137 lb)   SpO2 97%   BMI 26.76 kg/m      Admit Weight: 66 kg (145 lb 6.4 oz)     GENERAL APPEARANCE: alert and no distress  HENT: mouth without ulcers or lesions  LYMPHATICS: no cervical or supraclavicular nodes  RESP: rales bilateral bases  CV: normal rhythm, systolic murmur heard throughout the precordium radiating to carotids  EDEMA: 1+ LE edema bilaterally  ABDOMEN:  soft, nondistended, nontender, bowel sounds normal  MS: extremities normal - no gross deformities noted, no evidence of inflammation in joints, no muscle tenderness  SKIN: no rash  NEURO: normal strength and tone, sensory exam grossly normal, mentation intact and speech normal  PSYCH: mentation appears normal and affect normal/bright  TX KIDNEY: normal  DIALYSIS ACCESS:  LUE AV fistula w/ good thrill and bruit    Data   CMP  Recent Labs   Lab 12/17/21  0618 12/16/21  1821    136   POTASSIUM 3.8 3.8   CHLORIDE 110* 108   CO2 18* 20   ANIONGAP 10 8   GLC 86 136*   BUN 20 20   CR 3.08* 2.93*   GFRESTIMATED 16* 17*   SHAVON 7.3* 7.1*   PROTTOTAL 6.2* 7.2   ALBUMIN 2.2* 2.8*   BILITOTAL 0.8 0.7   ALKPHOS 133 162*   AST 31 36   ALT 11 14     CBC  Recent Labs   Lab 12/17/21  0618 12/16/21  1821   HGB 8.7* 9.9*   WBC 2.8* 3.9*   RBC 3.12* 3.57*   HCT 28.0* 32.3*   MCV 90 91   MCH 27.9 27.7   MCHC 31.1* 30.7*   RDW 13.9 14.1   * 148*     INRNo lab results found in last 7 days.  ABGNo lab results found in last 7 days.   Urine Studies  Recent Labs   Lab Test 12/16/21  1810 09/25/21  1637 12/11/20  0734 05/15/20  1920   COLOR Light Yellow Straw Straw Light Yellow  Canceled, Test credited   APPEARANCE Clear Clear Clear Clear  Canceled, Test credited   URINEGLC 30 * Negative Negative Negative  Canceled, Test credited   URINEBILI Negative Negative Negative Negative  Canceled, Test credited   URINEKETONE Negative Negative Negative Negative  Canceled, Test credited   SG 1.005 1.003 1.003 1.004  Canceled, Test credited   UBLD Small* Trace* Negative Trace*  Canceled, Test credited   URINEPH 6.0 6.0 6.0 6.0  Canceled, Test credited   PROTEIN 200 * 100 * 100* 100*  Canceled, Test credited   NITRITE Negative Negative Negative Negative  Canceled, Test credited   LEUKEST Trace* Negative Negative Large*  Canceled, Test credited   RBCU 1 0 <1 1  Canceled, Test credited   WBCU 5 <1 2 15*  Canceled, Test credited      Recent Labs   Lab Test 12/16/21  1810 09/03/21  0630 01/15/21  0732 03/18/20  1330 06/01/19  0700 02/16/19  0737 02/02/18  0742 11/17/17  0739 04/28/17  0843 10/28/16  0913 04/29/16  1145 06/12/15  1011 01/23/15  0700   UTPG 6.85* 4.32* 3.69* 1.35* 3.89* 3.92* 2.31* 1.74* 1.86* 1.84* 0.49* 0.73* 0.77*     PTH  Recent Labs   Lab Test 03/17/20  0509 04/29/16  1043 02/05/16  1039 10/27/15  0555 03/26/15  1734 12/16/13  0636 10/01/13  0718   PTHI 291* 124* 140* 218* 251* 166* 223*     Iron Studies  Recent Labs   Lab Test 09/03/21  0625 04/23/21  0716 03/12/21  0757 01/15/21  0725 10/29/20  0900 09/17/20  0758 08/10/20  0840 04/24/20  0800 03/17/20  0509   IRON 84 45 83 96 88 88 89 61 54    252 245 225* 251 248 242 286 240   IRONSAT 33 18 34 43 35 36 37 21 23   ANASTASIIA 332* 361* 401* 396* 352* 374* 364* 299*  --        IMAGING:  All imaging studies reviewed by me.

## 2021-12-17 NOTE — PLAN OF CARE
Pt admitted from the ED at approximately 2200. Is alert and oriented. Daughter at bedside to help with history as pt cannot speak english however, she can understand some english. Abdomen distended at this time and daughter states current distention is her baseline. Lungs clear on all four quadrants. Abdominal sounds audible. Dialysis shunt on Left upper arm with bruit and drill noted. Slight edema noted on bilateral lower extremities but non tender. Hearing and speech clear. Pt is able to ambulate to and from the bathroom. Clear yellow color urine noted as soon as she is admitted. Pt refused all PM medication stating that she will take them in the morning. Encouraged her to take her blood thinner but she refused. Pt triggered for Sepsis protocol. All labs and vitals done per protocol. No distress noted at this time. Pt denies pain. Will continue to monitor and address any abnormal findings.

## 2021-12-17 NOTE — PROGRESS NOTES
[facility]    Medicine Progress Note - Hospitalist Service, Gold 9       Date of Admission:  12/16/2021    Assessment & Plan              Joshua Lala is a 55 year old female admitted on 12/16/2021. 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 She presented to [facility] ED on 12/16/21 with complaints of fatigue, edema, and oliguria. She is admitted to the internal medicine service with transplant nephrology consulting for further evaluation and management.    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 Second transplant 2007, now with chronic antibody rejection, CKD IV. Last nephrology visit in chart 03/29/21. Missed an appointment in Sept 2021.  - Baseline creatinine 2 - 2.20 as of beginning of 2021. More recently, Cr in the 2.5 - 2.6 range. Cr 2.93 on admit. UA on admit with proteinuria, small blood, and trace LE  -DDx( cardiorenal vs medication vs nephrotic syndrome)  Plan:  > Transplant nephrology consultation  >DSA pending    -SPEP, UPEP, serum and urine immunofixation    -EBV quant, CMV quant, BK virus quant,    -continue sodium bicarb 1300mg BID    -continue calcitriol     -continue PTA immunosuppression: cyclosporine 25mg AM and 50mg PM; MMF 750mg BID    -continue sulfamethoxazole-trimethoprim 400-80mg on  MWF for prophylaxis    -cyclosporine level and dosing per transplant nephrology      #hypertensive urgency  No current concern for hypertensive emergency/end-organ damage.   Plan:  >Hold losartan  >Continue coreg, hydralazine    -PRN labetalol for SBP >170 and/or DBP>100    #Acute decompensated HFpEF  #pulmonary HTN  Pulmonary vascular congestion on CXR, tachypneic on exam in ER on 12/16  -. Previous echocardiogram with HFpEF and c/f pulmonary HTN. Presentation with elevated BNP >9,000, higher than in previous hospitalizations.   -ECHO(12/17) - EF 60%, dilated IVC, PA pressure 39  Plan:  >IV lasix 80 mg BID (goal net negative1-2 L)    -40mg lasix given in ED    -strict I/O    -weigh patient daily    #prandial pain  #postprandial vomiting   Patient and daughter report this is an ongoing problem since 04/2021. Patient is only able to eat rice and chicken broth recently.   -She has anorexia which she associates with pain.  -Known hx gastric ulcers.  -GI consulted (12/17)  Plan:  >sucralfate 1g BID  >Pepcid 40 mg qday    -H pylori stool antigen testing    -GI consultation    -nutrition consultation    #COVID-19 vaccine series not completed  Patient has received 2 doses of vaccine so far. This does not represent full vaccination in an immunocompromised patient. She requires a third, full dose. We can offer this after ruling out any active viral infection.    -ensure 3rd full dose offered to patient prior to leaving the hospital    Chronic, stable medical problems:  #anemia of chronic renal disease: stable, monitor. Outpatient darbepoetin deana, held.  #leukopenia: stable, monitor  #Mineral bone disorder - PTH, ionized calcium. Currently on calitriol and calcium    Malnutrition:    - Level of malnutrition: Moderate   - Based on: mild (or greater) muscle loss, mild (or greater) fluid retention         Diet: Combination Diet Renal Diet    DVT Prophylaxis: Heparin SQ  Staples Catheter: Not present  Central Lines: None  Code  "Status: Full Code      Disposition Plan   Expected Discharge: 12/21/2021     Anticipated discharge location: home    Delays:            The patient's care was discussed with the Care Coordinator/, Patient and Transplant nephrology Consultant.    Jarrett Mullen MD  Hospitalist Service, [address]  Securely message with the Vocera Web Console (learn more here)  Text page via [employer] Paging/Directory    Please see sign in/sign out for up to date coverage information    Clinically Significant Risk Factors Present on Admission                   ______________________________________________________________________    Interval History   This morning the patient denies orthopnea. She has some shortness of breath. She reports nausea and vomiting over the past 3-4 months. It is associated with meals. She reports she can no longer eat \"American food\" I.e. high fat including dairy, meat. She states nausea does not occur as much when she doesn't eat. She has been having bowel movements. She would like us to do what we can to save her kidney function.     Data reviewed today: I reviewed all medications, new labs and imaging results over the last 24 hours. I personally reviewed the ECHO image(s) showing dilated IVC, pulmonary hypertension.    Physical Exam   Vital Signs: Temp: 98  F (36.7  C) Temp src: Oral BP: (!) 155/82 Pulse: 69   Resp: 18 SpO2: 97 % O2 Device: None (Room air)    Weight: 137 lbs 0 oz  Constitutional: alert, oriented, sitting comfortably in bed  Eyes: no icterus  ENT: JVD, supple  Respiratory: crackles in bilateral lower lung fields, no wheezing, no respiratory distress  Cardiovascular: normal s1, s2, normal rate and rhythm, no murmur  GI: soft, non-tender, non-distended, bowel sounds present  Skin: no rash  Musculoskeletal: 1+ pitting edema in the lower extremities     Data   Recent Labs   Lab 12/17/21  0618 12/16/21  1821   WBC 2.8* 3.9* "   HGB 8.7* 9.9*   MCV 90 91   * 148*    136   POTASSIUM 3.8 3.8   CHLORIDE 110* 108   CO2 18* 20   BUN 20 20   CR 3.08* 2.93*   ANIONGAP 10 8   SHAVON 7.3* 7.1*   GLC 86 136*   ALBUMIN 2.2* 2.8*   PROTTOTAL 6.2* 7.2   BILITOTAL 0.8 0.7   ALKPHOS 133 162*   ALT 11 14   AST 31 36   LIPASE  --  536*

## 2021-12-18 LAB
ALBUMIN SERPL-MCNC: 1.9 G/DL (ref 3.4–5)
ALP SERPL-CCNC: 142 U/L (ref 40–150)
ALT SERPL W P-5'-P-CCNC: 11 U/L (ref 0–50)
ANION GAP SERPL CALCULATED.3IONS-SCNC: 11 MMOL/L (ref 3–14)
AST SERPL W P-5'-P-CCNC: 29 U/L (ref 0–45)
BILIRUB DIRECT SERPL-MCNC: 0.3 MG/DL (ref 0–0.2)
BILIRUB SERPL-MCNC: 0.7 MG/DL (ref 0.2–1.3)
BUN SERPL-MCNC: 24 MG/DL (ref 7–30)
CA-I BLD-MCNC: 3.6 MG/DL (ref 4.4–5.2)
CALCIUM SERPL-MCNC: 7 MG/DL (ref 8.5–10.1)
CHLORIDE BLD-SCNC: 103 MMOL/L (ref 94–109)
CHOLEST SERPL-MCNC: 152 MG/DL
CO2 SERPL-SCNC: 18 MMOL/L (ref 20–32)
CREAT SERPL-MCNC: 2.99 MG/DL (ref 0.52–1.04)
CRYPTOC AG SPEC QL: NEGATIVE
CYCLOSPORINE BLD LC/MS/MS-MCNC: 109 UG/L (ref 50–400)
EBV DNA COPIES/ML, INSTRUMENT: 7307 COPIES/ML
EBV DNA SPEC NAA+PROBE-LOG#: 3.9 {LOG_COPIES}/ML
ERYTHROCYTE [DISTWIDTH] IN BLOOD BY AUTOMATED COUNT: 13.9 % (ref 10–15)
GFR SERPL CREATININE-BSD FRML MDRD: 17 ML/MIN/1.73M2
GLUCOSE BLD-MCNC: 83 MG/DL (ref 70–99)
HCT VFR BLD AUTO: 28.7 % (ref 35–47)
HDLC SERPL-MCNC: 56 MG/DL
HGB BLD-MCNC: 8.4 G/DL (ref 11.7–15.7)
LDLC SERPL CALC-MCNC: 84 MG/DL
Lab: NORMAL
MAGNESIUM SERPL-MCNC: 1.4 MG/DL (ref 1.6–2.3)
MCH RBC QN AUTO: 27.1 PG (ref 26.5–33)
MCHC RBC AUTO-ENTMCNC: 29.3 G/DL (ref 31.5–36.5)
MCV RBC AUTO: 93 FL (ref 78–100)
NONHDLC SERPL-MCNC: 96 MG/DL
OSMOLALITY SERPL: 277 MMOL/KG (ref 275–295)
PATH INTERP SPEC-IMP: NORMAL
PERFORMING LABORATORY: NORMAL
PHOSPHATE SERPL-MCNC: 4.5 MG/DL (ref 2.5–4.5)
PLATELET # BLD AUTO: 122 10E3/UL (ref 150–450)
POTASSIUM BLD-SCNC: 3.5 MMOL/L (ref 3.4–5.3)
PROT SERPL-MCNC: 5.7 G/DL (ref 6.8–8.8)
RBC # BLD AUTO: 3.1 10E6/UL (ref 3.8–5.2)
SODIUM SERPL-SCNC: 132 MMOL/L (ref 133–144)
SODIUM UR-SCNC: 50 MMOL/L
SPECIMEN STATUS: NORMAL
TEST NAME: NORMAL
TME LAST DOSE: NORMAL H
TME LAST DOSE: NORMAL H
TRIGL SERPL-MCNC: 61 MG/DL
WBC # BLD AUTO: 3.3 10E3/UL (ref 4–11)

## 2021-12-18 PROCEDURE — 84300 ASSAY OF URINE SODIUM: CPT | Performed by: STUDENT IN AN ORGANIZED HEALTH CARE EDUCATION/TRAINING PROGRAM

## 2021-12-18 PROCEDURE — 84443 ASSAY THYROID STIM HORMONE: CPT | Performed by: INTERNAL MEDICINE

## 2021-12-18 PROCEDURE — 84999 UNLISTED CHEMISTRY PROCEDURE: CPT | Performed by: STUDENT IN AN ORGANIZED HEALTH CARE EDUCATION/TRAINING PROGRAM

## 2021-12-18 PROCEDURE — 120N000011 HC R&B TRANSPLANT UMMC

## 2021-12-18 PROCEDURE — 250N000012 HC RX MED GY IP 250 OP 636 PS 637: Performed by: NURSE PRACTITIONER

## 2021-12-18 PROCEDURE — 36415 COLL VENOUS BLD VENIPUNCTURE: CPT | Performed by: INTERNAL MEDICINE

## 2021-12-18 PROCEDURE — 80061 LIPID PANEL: CPT | Performed by: STUDENT IN AN ORGANIZED HEALTH CARE EDUCATION/TRAINING PROGRAM

## 2021-12-18 PROCEDURE — 250N000009 HC RX 250: Performed by: STUDENT IN AN ORGANIZED HEALTH CARE EDUCATION/TRAINING PROGRAM

## 2021-12-18 PROCEDURE — 87305 ASPERGILLUS AG IA: CPT | Performed by: INTERNAL MEDICINE

## 2021-12-18 PROCEDURE — 250N000013 HC RX MED GY IP 250 OP 250 PS 637: Performed by: STUDENT IN AN ORGANIZED HEALTH CARE EDUCATION/TRAINING PROGRAM

## 2021-12-18 PROCEDURE — 82310 ASSAY OF CALCIUM: CPT | Performed by: STUDENT IN AN ORGANIZED HEALTH CARE EDUCATION/TRAINING PROGRAM

## 2021-12-18 PROCEDURE — 80158 DRUG ASSAY CYCLOSPORINE: CPT | Performed by: INTERNAL MEDICINE

## 2021-12-18 PROCEDURE — 86255 FLUORESCENT ANTIBODY SCREEN: CPT | Performed by: STUDENT IN AN ORGANIZED HEALTH CARE EDUCATION/TRAINING PROGRAM

## 2021-12-18 PROCEDURE — 83735 ASSAY OF MAGNESIUM: CPT | Performed by: STUDENT IN AN ORGANIZED HEALTH CARE EDUCATION/TRAINING PROGRAM

## 2021-12-18 PROCEDURE — 84100 ASSAY OF PHOSPHORUS: CPT | Performed by: STUDENT IN AN ORGANIZED HEALTH CARE EDUCATION/TRAINING PROGRAM

## 2021-12-18 PROCEDURE — 250N000011 HC RX IP 250 OP 636: Performed by: STUDENT IN AN ORGANIZED HEALTH CARE EDUCATION/TRAINING PROGRAM

## 2021-12-18 PROCEDURE — 250N000011 HC RX IP 250 OP 636: Performed by: NURSE PRACTITIONER

## 2021-12-18 PROCEDURE — 87449 NOS EACH ORGANISM AG IA: CPT | Performed by: INTERNAL MEDICINE

## 2021-12-18 PROCEDURE — 87899 AGENT NOS ASSAY W/OPTIC: CPT | Performed by: INTERNAL MEDICINE

## 2021-12-18 PROCEDURE — 82248 BILIRUBIN DIRECT: CPT | Performed by: STUDENT IN AN ORGANIZED HEALTH CARE EDUCATION/TRAINING PROGRAM

## 2021-12-18 PROCEDURE — 83520 IMMUNOASSAY QUANT NOS NONAB: CPT | Performed by: STUDENT IN AN ORGANIZED HEALTH CARE EDUCATION/TRAINING PROGRAM

## 2021-12-18 PROCEDURE — 85027 COMPLETE CBC AUTOMATED: CPT | Performed by: STUDENT IN AN ORGANIZED HEALTH CARE EDUCATION/TRAINING PROGRAM

## 2021-12-18 PROCEDURE — 83930 ASSAY OF BLOOD OSMOLALITY: CPT | Performed by: STUDENT IN AN ORGANIZED HEALTH CARE EDUCATION/TRAINING PROGRAM

## 2021-12-18 PROCEDURE — 250N000013 HC RX MED GY IP 250 OP 250 PS 637: Performed by: NURSE PRACTITIONER

## 2021-12-18 PROCEDURE — 82330 ASSAY OF CALCIUM: CPT | Performed by: STUDENT IN AN ORGANIZED HEALTH CARE EDUCATION/TRAINING PROGRAM

## 2021-12-18 PROCEDURE — 86833 HLA CLASS II HIGH DEFIN QUAL: CPT | Performed by: INTERNAL MEDICINE

## 2021-12-18 PROCEDURE — 99233 SBSQ HOSP IP/OBS HIGH 50: CPT | Performed by: STUDENT IN AN ORGANIZED HEALTH CARE EDUCATION/TRAINING PROGRAM

## 2021-12-18 PROCEDURE — 86832 HLA CLASS I HIGH DEFIN QUAL: CPT | Performed by: INTERNAL MEDICINE

## 2021-12-18 RX ORDER — MAGNESIUM SULFATE HEPTAHYDRATE 40 MG/ML
2 INJECTION, SOLUTION INTRAVENOUS
Status: COMPLETED | OUTPATIENT
Start: 2021-12-18 | End: 2021-12-18

## 2021-12-18 RX ADMIN — SUCRALFATE 1 G: 1 TABLET ORAL at 16:22

## 2021-12-18 RX ADMIN — PANTOPRAZOLE SODIUM 40 MG: 40 TABLET, DELAYED RELEASE ORAL at 08:02

## 2021-12-18 RX ADMIN — SODIUM BICARBONATE 1300 MG: 650 TABLET ORAL at 08:01

## 2021-12-18 RX ADMIN — MAGNESIUM SULFATE IN WATER 2 G: 40 INJECTION, SOLUTION INTRAVENOUS at 11:13

## 2021-12-18 RX ADMIN — FUROSEMIDE 80 MG: 10 INJECTION, SOLUTION INTRAVENOUS at 15:41

## 2021-12-18 RX ADMIN — CALCIUM CARBONATE 500 MG: 500 TABLET, CHEWABLE ORAL at 19:01

## 2021-12-18 RX ADMIN — CARVEDILOL 3.12 MG: 3.12 TABLET, FILM COATED ORAL at 18:56

## 2021-12-18 RX ADMIN — SODIUM BICARBONATE 1300 MG: 650 TABLET ORAL at 19:01

## 2021-12-18 RX ADMIN — HEPARIN SODIUM 5000 UNITS: 5000 INJECTION, SOLUTION INTRAVENOUS; SUBCUTANEOUS at 19:55

## 2021-12-18 RX ADMIN — MYCOPHENOLATE MOFETIL 750 MG: 250 CAPSULE ORAL at 19:01

## 2021-12-18 RX ADMIN — SUCRALFATE 1 G: 1 TABLET ORAL at 08:01

## 2021-12-18 RX ADMIN — HEPARIN SODIUM 5000 UNITS: 5000 INJECTION, SOLUTION INTRAVENOUS; SUBCUTANEOUS at 09:31

## 2021-12-18 RX ADMIN — CALCIUM CARBONATE 500 MG: 500 TABLET, CHEWABLE ORAL at 08:02

## 2021-12-18 RX ADMIN — HYDRALAZINE HYDROCHLORIDE 25 MG: 25 TABLET, FILM COATED ORAL at 19:54

## 2021-12-18 RX ADMIN — FUROSEMIDE 80 MG: 10 INJECTION, SOLUTION INTRAVENOUS at 09:16

## 2021-12-18 RX ADMIN — ACETAMINOPHEN 975 MG: 325 TABLET, FILM COATED ORAL at 10:08

## 2021-12-18 RX ADMIN — PANTOPRAZOLE SODIUM 40 MG: 40 TABLET, DELAYED RELEASE ORAL at 19:01

## 2021-12-18 RX ADMIN — HYDRALAZINE HYDROCHLORIDE 25 MG: 25 TABLET, FILM COATED ORAL at 08:00

## 2021-12-18 RX ADMIN — CARVEDILOL 3.12 MG: 3.12 TABLET, FILM COATED ORAL at 08:00

## 2021-12-18 RX ADMIN — SUCRALFATE 1 G: 1 TABLET ORAL at 12:27

## 2021-12-18 RX ADMIN — CYCLOSPORINE 50 MG: 25 CAPSULE, LIQUID FILLED ORAL at 18:57

## 2021-12-18 RX ADMIN — HYDRALAZINE HYDROCHLORIDE 25 MG: 25 TABLET, FILM COATED ORAL at 15:40

## 2021-12-18 RX ADMIN — MAGNESIUM SULFATE IN WATER 2 G: 40 INJECTION, SOLUTION INTRAVENOUS at 09:56

## 2021-12-18 RX ADMIN — CYCLOSPORINE 25 MG: 25 CAPSULE, LIQUID FILLED ORAL at 07:59

## 2021-12-18 RX ADMIN — CALCITRIOL CAPSULES 0.25 MCG 0.5 MCG: 0.25 CAPSULE ORAL at 08:01

## 2021-12-18 RX ADMIN — MYCOPHENOLATE MOFETIL 750 MG: 250 CAPSULE ORAL at 07:58

## 2021-12-18 RX ADMIN — LIDOCAINE HYDROCHLORIDE 30 ML: 20 SOLUTION ORAL; TOPICAL at 15:30

## 2021-12-18 ASSESSMENT — ACTIVITIES OF DAILY LIVING (ADL)
ADLS_ACUITY_SCORE: 9

## 2021-12-18 ASSESSMENT — MIFFLIN-ST. JEOR: SCORE: 1142.46

## 2021-12-18 NOTE — PLAN OF CARE
[id number]. Hypertensive, OVSS. Denies pain or nausea. Renal diet. PIV SL. UAL in room. Continue with plan of care and notify team with any changes.

## 2021-12-18 NOTE — PLAN OF CARE
BP (!) 154/81 (BP Location: Right arm)   Pulse 77   Temp 98.2  F (36.8  C) (Oral)   Resp 18   Ht 1.524 m (5')   Wt 62.1 kg (137 lb)   SpO2 95%   BMI 26.76 kg/m      Shift: 7a-730p  VS: HTN on RA, afebrile,   Neuro: Aox4, CMS intact  BG: Hgb A1C done and came back at 5.0  Labs: Had echo today with no significant changes from previous  Respiratory: inspiratory wheeze noted in DORIS, DUEÑAS  Pain/Nausea/PRN: C/O headache pain when BP was high, PRN tylenol given with lasix and coreg and relief was reported  Diet: Renal diet  LDA: PIV SL  GI/: Continent of B&B, voided urine today multiple times but flushed and refused hat, No BM this shift  Skin: WDL  Mobility: UAL  Plan: Continue to monitor food intake, abd pain for discharge     Will give report to oncoming nurse. Pt left in stable condition, care relinquished at this time.

## 2021-12-18 NOTE — PROGRESS NOTES
[facility]  Transplant Nephrology progress note  Date of Admission:  12/16/2021  Today's Date: 12/18/2021  Requesting physician: Jarrett Mullen MD    Recommendations:  Continue Lasix 80 mg IV BID   Continue trending sodium q4 hours  Follow results of work up for nephrotic range proteinuria   Appreciate GI eval   Continue to hold losartan    Assessment & Plan   # DDKT and TOSIN: Trend up did not creatinine creatinine down.   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    -Hold losartan with TOSIN               -Continue Lasix 80 mg IV twice daily ,urine output monitoring, daily weight.              -Follow work-up for nephrotic range proteinuria    #Hyposmolar Hyponatremia   -Suspect multifactorial with contributions from hypervolemia as well as ADH driving pain   -IV Lasix 80mg 2x day   -Pending trend, may need tolvaptan vs urea, etc.   -Sodium [id number]     # Immunosuppression: Cyclosporine (goal ) and Mycophenolate mofetil (dose 750 mg every 12 hours)   - Changes: Not at this time. 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 -Obtain SPEP, UPEP, C3, C4, JEREMY, immunofixation of both, A1c, K/L, anti PLA2R    -Obtain fasting lipid panel    # Anemia in Chronic  Renal Disease: Hgb: Trend down      EDMUND: No, was previously on EDMUND but stopped because she wasn't picking up the phone.  With current nephrotic range proteinuria and hypoalbuminemia, we will refrain from considering starting EDMUND until we complete nephrotic range proteinuria work-up, clean sugars there is no increased risk for clotting with EDMUND use in the setting of hypoalbuminemia   - Iron studies: Not checked recently. Check now. Can give iron or EDMUND or both    # Mineral Bone Disorder:   - Secondary renal hyperparathyroidism; PTH level: Mildly elevated (151-300 pg/ml)        On treatment: Calcitriol  - Vitamin D; level: Normal        On supplement: No  - Calcium; level: Normal        On supplement: Yes  - Phosphorus; level: Normal        On supplement: No   -Continue calcitriol 0.5mcg daily and tums 500mg bid. Check PTH and ionized calcium    # Electrolytes:   - Potassium; level: Normal        On supplement: No  - Magnesium; level: Low        On supplement: No.  Been replaced by primary team  - Bicarbonate; level: Low        On supplement: Yes, continue bicarb 1300mg bid. Follow up w/ diuresis    # HFpEF:    -Decompensated clinically. Echo stable on 12/17/21 with elevated PASP and dilated IVC, grade II diastolic dysfunction     # Pulmonary HTN: echo 12/17/21 w/ PASP 39mmHg + RA pressure. Likely worsened by volume overload    # Dyspepsia:   -Today patient denies any dysphagia regular diet aphasia.  Oral exam is unremarkable.              - Appreciate GI eval.  Advised PPI 40 mg twice daily for 6 to 8 weeks, Carafate twice daily, and Pepcid 40 mg daily.  Follow-up H. Pylori stool antigen.  Follow-up with GI as outpatient if symptoms persist    # Unintentional weight loss:   -Likely 2/2 her not eating.  EBV PCR 7K (12/16/2021)   -CMV negative (12/16/2021)              -Referral appetite does not improve, continues to have weight loss will need pan CT scan evaluation and GI evaluation with EGD and colonoscopy.  We will  also wait for nephrotic range proteinuria work-up results                   # Transplant History:  Etiology of Kidney Failure: IgA nephropathy  Tx: DDKT  Transplant: 11/28/2007 (Kidney), 8/8/1998 (Kidney)  Significant changes in immunosuppression: None  Significant transplant-related complications: DSA    Recommendations were communicated to the primary team via this note.    Patient seen and discussed with Dr Ashley Herrera MD, [facility]  Transplant Nephrology Fellow   [facility]   Pager [phone]    Daughter by bedside, assisted with translation    Physician Attestation   I, Frank Ramirez MD, personally examined and evaluated this patient.  I discussed the patient with the resident/fellow and care team, and agree with the assessment and plan of care as documented in the note on 12/19/21.      I personally reviewed vital signs, medications, labs and imaging.    Frank Ramirez MD  Date of Service (when I saw the patient): 12/19/21      Frank Ramirez MD  Transplant Nephrology   Pager: [phone]      INTERVAL HISTORY  Patient's feeling fatigued, no dysphagia odynophagia.  No oral ulcers.  Has vague abdominal discomfort.  No nausea or vomiting.  No diarrhea.  No difficulty voiding urine.  No chest pain or shortness of breath.  No night sweats.      Review of Systems    The 4 point Review of Systems is negative other than noted in the HPI or here.    Past Medical History    I have reviewed this patient's medical history and updated it with pertinent information if needed.   Past Medical History:   Diagnosis Date     EBV (Silvestre-Barr virus) viremia 2015     GERD (gastroesophageal reflux disease) 2008     HSP (Henoch Schonlein purpura) (H) 1996     Hyperparathyroidism (H)      Leukopenia 2016     PE (pulmonary thromboembolism) (H) 2001    coumadin 2001 - 2007     Tertiary hyperparathyroidism (H)        Past Surgical History   I have reviewed this patient's surgical history and  updated it with pertinent information if needed.  Past Surgical History:   Procedure Laterality Date     CHOLECYSTECTOMY       TRANSPLANT KIDNEY RECIPIENT  DONOR      failed 2003      TRANSPLANT KIDNEY RECIPIENT  DONOR      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       Family History   I have reviewed this patient's family history and updated it with pertinent information if needed.   History reviewed. No pertinent family history. No family history of kidney disease    Social History   I have reviewed this patient's social history and updated it with pertinent information if needed. Joshua Lala  reports that she has never smoked. She has never used smokeless tobacco. She reports that she does not drink alcohol and does not use drugs.    Allergies   Allergies   Allergen Reactions     Contrast Dye Shortness Of Breath     Other reaction(s): Angioedema     Amlodipine Other (See Comments)     Prior to Admission Medications     calcitRIOL  0.5 mcg Oral Daily     calcium carbonate  500 mg Oral BID     carvedilol  3.125 mg Oral BID w/meals     cycloSPORINE modified  25 mg Oral QAM     cycloSPORINE modified  50 mg Oral QPM     furosemide  80 mg Intravenous BID     heparin ANTICOAGULANT  5,000 Units Subcutaneous BID     hydrALAZINE  25 mg Oral TID     lidocaine viscous 2% 15 mL and maalox/mylanta w/ simethicone 15 mL (GI COCKTAIL)  30 mL Oral Once     [Held by provider] losartan  50 mg Oral Daily     mycophenolate  750 mg Oral BID     pantoprazole  40 mg Oral BID     sodium bicarbonate  1,300 mg Oral BID     sodium chloride (PF)  3 mL Intracatheter Q8H     sucralfate  1 g Oral 4x Daily AC & HS     sulfamethoxazole-trimethoprim  1 tablet Oral Q Mon Wed Fri AM         Physical Exam   Temp  Av.4  F (36.9  C)  Min: 97.8  F (36.6  C)  Max: 99.1  F (37.3  C)      Pulse  Av.2  Min: 69  Max: 105 Resp  Av.9  Min: 16  Max: 18  SpO2  Av.7 %  Min: 91 %  Max: 99 %     /67 (BP Location:  Right arm)   Pulse 88   Temp 97.8  F (36.6  C) (Oral)   Resp 18   Ht 1.524 m (5')   Wt 62.6 kg (138 lb)   SpO2 99%   BMI 26.95 kg/m      Admit Weight: 66 kg (145 lb 6.4 oz)     GENERAL APPEARANCE: alert and no distress  HENT: mouth without ulcers or lesions  LYMPHATICS: no cervical or supraclavicular nodes  RESP: rales bilateral bases  CV: normal rhythm, systolic murmur heard throughout the precordium radiating to carotids  EDEMA: 1+ LE edema bilaterally  ABDOMEN: soft, nondistended, nontender, bowel sounds normal  MS: extremities normal - no gross deformities noted, no evidence of inflammation in joints, no muscle tenderness  SKIN: no rash  NEURO: normal strength and tone, sensory exam grossly normal, mentation intact and speech normal  PSYCH: mentation appears normal and affect normal/bright  TX KIDNEY: normal  DIALYSIS ACCESS:  LUE AV fistula w/ good thrill and bruit    Data   CMP  Recent Labs   Lab 12/18/21  0636 12/17/21  0618 12/16/21  1821   * 138 136   POTASSIUM 3.5 3.8 3.8   CHLORIDE 103 110* 108   CO2 18* 18* 20   ANIONGAP 11 10 8   GLC 83 86 136*   BUN 24 20 20   CR 2.99* 3.08* 2.93*   GFRESTIMATED 17* 16* 17*   SHAVON 7.0* 7.3* 7.1*   MAG 1.4*  --   --    PHOS 4.5  --   --    PROTTOTAL 5.7* 6.2* 7.2   ALBUMIN 1.9* 2.2* 2.8*   BILITOTAL 0.7 0.8 0.7   ALKPHOS 142 133 162*   AST 29 31 36   ALT 11 11 14     CBC  Recent Labs   Lab 12/18/21  0636 12/17/21  0618 12/16/21  1821   HGB 8.4* 8.7* 9.9*   WBC 3.3* 2.8* 3.9*   RBC 3.10* 3.12* 3.57*   HCT 28.7* 28.0* 32.3*   MCV 93 90 91   MCH 27.1 27.9 27.7   MCHC 29.3* 31.1* 30.7*   RDW 13.9 13.9 14.1   * 125* 148*     INRNo lab results found in last 7 days.  ABGNo lab results found in last 7 days.   Urine Studies  Recent Labs   Lab Test 12/16/21  1810 09/25/21  1637 12/11/20  0734 05/15/20  1920   COLOR Light Yellow Straw Straw Light Yellow  Canceled, Test credited   APPEARANCE Clear Clear Clear Clear  Canceled, Test credited   URINEGLC 30 *  Negative Negative Negative  Canceled, Test credited   URINEBILI Negative Negative Negative Negative  Canceled, Test credited   URINEKETONE Negative Negative Negative Negative  Canceled, Test credited   SG 1.005 1.003 1.003 1.004  Canceled, Test credited   UBLD Small* Trace* Negative Trace*  Canceled, Test credited   URINEPH 6.0 6.0 6.0 6.0  Canceled, Test credited   PROTEIN 200 * 100 * 100* 100*  Canceled, Test credited   NITRITE Negative Negative Negative Negative  Canceled, Test credited   LEUKEST Trace* Negative Negative Large*  Canceled, Test credited   RBCU 1 0 <1 1  Canceled, Test credited   WBCU 5 <1 2 15*  Canceled, Test credited     Recent Labs   Lab Test 12/16/21  1810 09/03/21  0630 01/15/21  0732 03/18/20  1330 06/01/19  0700 02/16/19  0737 02/02/18  0742 11/17/17  0739 04/28/17  0843 10/28/16  0913 04/29/16  1145 06/12/15  1011 01/23/15  0700   UTPG 6.85* 4.32* 3.69* 1.35* 3.89* 3.92* 2.31* 1.74* 1.86* 1.84* 0.49* 0.73* 0.77*     PTH  Recent Labs   Lab Test 12/17/21  1616 03/17/20  0509 04/29/16  1043 02/05/16  1039 10/27/15  0555 03/26/15  1734 12/16/13  0636   PTHI 173* 291* 124* 140* 218* 251* 166*     Iron Studies  Recent Labs   Lab Test 12/17/21  1443 12/17/21  0618 09/03/21  0625 04/23/21  0716 03/12/21  0757 01/15/21  0725 10/29/20  0900 09/17/20  0758 08/10/20  0840 04/24/20  0800 03/17/20  0509   IRON 58  --  84 45 83 96 88 88 89 61 54     --  252 252 245 225* 251 248 242 286 240   IRONSAT 17  --  33 18 34 43 35 36 37 21 23   ANASTASIIA  --  317* 332* 361* 401* 396* 352* 374* 364* 299*  --        IMAGING:  All imaging studies reviewed by me.

## 2021-12-18 NOTE — PROGRESS NOTES
[facility]    Medicine Progress Note - Hospitalist Service, Gold 9       Date of Admission:  12/16/2021    Assessment & Plan                Joshua Lala is a 55 year old female admitted on 12/16/2021. 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 She presented to [facility] ED on 12/16/21 with complaints of fatigue, edema, and oliguria. She is admitted to the internal medicine service with transplant nephrology consulting for further evaluation and management.    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 Second transplant 2007, now with chronic antibody rejection, CKD IV. Last nephrology visit in chart 03/29/21. Missed an appointment in Sept 2021.  - Baseline creatinine 2 - 2.20 as of beginning of 2021. More recently, Cr in the 2.5 - 2.6 range. Cr 2.93 on admit. UA on admit with proteinuria, small blood, and trace LE  -EBV (7300) elevated. SPEP and UPEP with suggestion of hypoalbuminema and albuminuria possibly indicate glomerular damage  -DDx( cardiorenal vs medication vs nephrotic syndrome)  Plan:  > Transplant nephrology consultation  >DSA pending    -Kappa, lambda lightchain, PLA2 R AB pending    -continue PTA immunosuppression: cyclosporine 25mg AM and 50mg PM; MMF 750mg BID    -continue sulfamethoxazole-trimethoprim 400-80mg on MWF for prophylaxis    -cyclosporine level and dosing per transplant nephrology      #hypertensive urgency  No current concern for hypertensive emergency/end-organ damage.   Plan:  >Hold losartan  >Continue coreg, hydralazine    -PRN labetalol for SBP >170 and/or  DBP>100    #Acute decompensated HFpEF  #pulmonary HTN  Pulmonary vascular congestion on CXR, tachypneic on exam in ER on 12/16  -. Previous echocardiogram with HFpEF and c/f pulmonary HTN. Presentation with elevated BNP >9,000, higher than in previous hospitalizations.   -ECHO(12/17) - EF 60%, dilated IVC, PA pressure 39  Plan:  >IV lasix 80 mg BID (goal net negative1-2 L)    -strict I/O    -weigh patient daily    #prandial pain  #postprandial vomiting   Patient and daughter report this is an ongoing problem since 04/2021. Patient is only able to eat rice and chicken broth recently.   -She has anorexia which she associates with pain.  -Known hx gastric ulcers.  -GI consulted (12/17)  Plan:  >sucralfate 1g BID  >PPI    -H pylori stool antigen testing    -GI consultation    -nutrition consultation    Hyponatremia   -132(12/18) - nl serum osm, elevated urine sodum  Plan:  >Continue IV diuresis    #COVID-19 vaccine series not completed  Patient has received 2 doses of vaccine so far. This does not represent full vaccination in an immunocompromised patient. She requires a third, full dose. We can offer this after ruling out any active viral infection.    -ensure 3rd full dose offered to patient prior to leaving the hospital    Chronic, stable medical problems:  #anemia of chronic renal disease: stable, monitor. Outpatient darbepoetin deana, held.  #leukopenia: stable, monitor   #Mineral bone disorder - elevated PTH, low ionized calcium.  Currently on calitriol and calcium  #NAGMA - secondary to renal disease - continue sodium bicarb    Malnutrition:    - Level of malnutrition: Moderate   - Based on: mild (or greater) muscle loss, mild (or greater) fluid retention         Diet: Combination Diet Renal Diet  Snacks/Supplements Adult: Other; allow pt to order supplements if requested; With Meals    DVT Prophylaxis: Heparin SQ  Staples Catheter: Not present  Central Lines: None  Code Status: Full Code      Disposition Plan    Expected Discharge: 12/20/2021     Anticipated discharge location: home with family    Delays:            The patient's care was discussed with the Care Coordinator/, Patient and Transplant nephrology Consultant.    Jarrett Mullen MD  Hospitalist Service, [address]  Securely message with the Vocera Web Console (learn more here)  Text page via [facility] Paging/Directory    Please see sign in/sign out for up to date coverage information    Clinically Significant Risk Factors Present on Admission            # Non-Severe Malnutrition, POA: based on Weight loss;Subcutaneous fat loss;Muscle loss;Fluid retention (12/17/21 1200)     ______________________________________________________________________    Interval History   No acute events overnight. Patient states she understands plan to eventually do scope for stomach pain and ulcers if no improvement is seen on her current meds. She denies chest pain or shortness of breath. She does not notice any change in swelling or her legs. She denies fever or chills.     Data reviewed today: I reviewed all medications, new labs and imaging results over the last 24 hours. I personally reviewed no images or EKG's today.    Physical Exam   Vital Signs: Temp: 98.2  F (36.8  C) Temp src: Oral BP: 130/68 Pulse: 88   Resp: 18 SpO2: 95 % O2 Device: None (Room air)    Weight: 138 lbs 0 oz  Constitutional: alert, oriented, standing comfortably at bedside  Eyes: no icterus  ENT: JVD+, supple  Respiratory: crackles in bilateral lower lung fields, no wheezing, no respiratory distress  Cardiovascular: normal s1, s2, normal rate and rhythm, systolic murmur 1/6  GI: soft, non-tender, non-distended, bowel sounds present  Skin: no rash  Ext: left upper extremity av fistula with palpable thrill and audible bruiit  Musculoskeletal: trace pitting edema    Data   Recent Labs   Lab 12/17/21  0618 12/16/21  1821   WBC 2.8* 3.9*   HGB  8.7* 9.9*   MCV 90 91   * 148*    136   POTASSIUM 3.8 3.8   CHLORIDE 110* 108   CO2 18* 20   BUN 20 20   CR 3.08* 2.93*   ANIONGAP 10 8   SHAVON 7.3* 7.1*   GLC 86 136*   ALBUMIN 2.2* 2.8*   PROTTOTAL 6.2* 7.2   BILITOTAL 0.8 0.7   ALKPHOS 133 162*   ALT 11 14   AST 31 36   LIPASE  --  536*

## 2021-12-19 ENCOUNTER — APPOINTMENT (OUTPATIENT)
Dept: CT IMAGING | Facility: CLINIC | Age: 56
DRG: 698 | End: 2021-12-19
Attending: STUDENT IN AN ORGANIZED HEALTH CARE EDUCATION/TRAINING PROGRAM
Payer: MEDICARE

## 2021-12-19 LAB
ALBUMIN SERPL-MCNC: 2.1 G/DL (ref 3.4–5)
ALP SERPL-CCNC: 144 U/L (ref 40–150)
ALT SERPL W P-5'-P-CCNC: 11 U/L (ref 0–50)
ANION GAP SERPL CALCULATED.3IONS-SCNC: 13 MMOL/L (ref 3–14)
AST SERPL W P-5'-P-CCNC: 31 U/L (ref 0–45)
BILIRUB DIRECT SERPL-MCNC: 0.4 MG/DL (ref 0–0.2)
BILIRUB SERPL-MCNC: 0.7 MG/DL (ref 0.2–1.3)
BUN SERPL-MCNC: 29 MG/DL (ref 7–30)
CALCIUM SERPL-MCNC: 7.1 MG/DL (ref 8.5–10.1)
CHLORIDE BLD-SCNC: 93 MMOL/L (ref 94–109)
CO2 SERPL-SCNC: 19 MMOL/L (ref 20–32)
CREAT SERPL-MCNC: 3.17 MG/DL (ref 0.52–1.04)
ERYTHROCYTE [DISTWIDTH] IN BLOOD BY AUTOMATED COUNT: 13.4 % (ref 10–15)
GFR SERPL CREATININE-BSD FRML MDRD: 16 ML/MIN/1.73M2
GLUCOSE BLD-MCNC: 134 MG/DL (ref 70–99)
HCT VFR BLD AUTO: 27.3 % (ref 35–47)
HGB BLD-MCNC: 8.7 G/DL (ref 11.7–15.7)
LIPASE SERPL-CCNC: 537 U/L (ref 73–393)
MAGNESIUM SERPL-MCNC: 2.1 MG/DL (ref 1.6–2.3)
MCH RBC QN AUTO: 27.5 PG (ref 26.5–33)
MCHC RBC AUTO-ENTMCNC: 31.9 G/DL (ref 31.5–36.5)
MCV RBC AUTO: 86 FL (ref 78–100)
OSMOLALITY SERPL: 275 MMOL/KG (ref 275–295)
OSMOLALITY UR: 285 MMOL/KG (ref 100–1200)
PLATELET # BLD AUTO: 132 10E3/UL (ref 150–450)
POTASSIUM BLD-SCNC: 3.1 MMOL/L (ref 3.4–5.3)
PROT SERPL-MCNC: 5.8 G/DL (ref 6.8–8.8)
RBC # BLD AUTO: 3.16 10E6/UL (ref 3.8–5.2)
SODIUM SERPL-SCNC: 122 MMOL/L (ref 133–144)
SODIUM SERPL-SCNC: 122 MMOL/L (ref 133–144)
SODIUM SERPL-SCNC: 123 MMOL/L (ref 133–144)
SODIUM SERPL-SCNC: 125 MMOL/L (ref 133–144)
SODIUM UR-SCNC: 40 MMOL/L
TROPONIN I SERPL HS-MCNC: 28 NG/L
TSH SERPL DL<=0.005 MIU/L-ACNC: 0.69 MU/L (ref 0.4–4)
VIT B12 SERPL-MCNC: 605 PG/ML (ref 193–986)
WBC # BLD AUTO: 4 10E3/UL (ref 4–11)

## 2021-12-19 PROCEDURE — 83690 ASSAY OF LIPASE: CPT | Performed by: STUDENT IN AN ORGANIZED HEALTH CARE EDUCATION/TRAINING PROGRAM

## 2021-12-19 PROCEDURE — 99233 SBSQ HOSP IP/OBS HIGH 50: CPT | Mod: GC | Performed by: INTERNAL MEDICINE

## 2021-12-19 PROCEDURE — G1004 CDSM NDSC: HCPCS

## 2021-12-19 PROCEDURE — 80053 COMPREHEN METABOLIC PANEL: CPT | Performed by: STUDENT IN AN ORGANIZED HEALTH CARE EDUCATION/TRAINING PROGRAM

## 2021-12-19 PROCEDURE — G1004 CDSM NDSC: HCPCS | Performed by: RADIOLOGY

## 2021-12-19 PROCEDURE — 82607 VITAMIN B-12: CPT | Performed by: STUDENT IN AN ORGANIZED HEALTH CARE EDUCATION/TRAINING PROGRAM

## 2021-12-19 PROCEDURE — 83735 ASSAY OF MAGNESIUM: CPT | Performed by: STUDENT IN AN ORGANIZED HEALTH CARE EDUCATION/TRAINING PROGRAM

## 2021-12-19 PROCEDURE — 99233 SBSQ HOSP IP/OBS HIGH 50: CPT | Performed by: STUDENT IN AN ORGANIZED HEALTH CARE EDUCATION/TRAINING PROGRAM

## 2021-12-19 PROCEDURE — 250N000013 HC RX MED GY IP 250 OP 250 PS 637: Performed by: NURSE PRACTITIONER

## 2021-12-19 PROCEDURE — 84295 ASSAY OF SERUM SODIUM: CPT | Performed by: STUDENT IN AN ORGANIZED HEALTH CARE EDUCATION/TRAINING PROGRAM

## 2021-12-19 PROCEDURE — 84484 ASSAY OF TROPONIN QUANT: CPT | Performed by: STUDENT IN AN ORGANIZED HEALTH CARE EDUCATION/TRAINING PROGRAM

## 2021-12-19 PROCEDURE — 250N000011 HC RX IP 250 OP 636: Performed by: NURSE PRACTITIONER

## 2021-12-19 PROCEDURE — 120N000011 HC R&B TRANSPLANT UMMC

## 2021-12-19 PROCEDURE — 85027 COMPLETE CBC AUTOMATED: CPT | Performed by: STUDENT IN AN ORGANIZED HEALTH CARE EDUCATION/TRAINING PROGRAM

## 2021-12-19 PROCEDURE — 250N000012 HC RX MED GY IP 250 OP 636 PS 637: Performed by: NURSE PRACTITIONER

## 2021-12-19 PROCEDURE — 82248 BILIRUBIN DIRECT: CPT | Performed by: STUDENT IN AN ORGANIZED HEALTH CARE EDUCATION/TRAINING PROGRAM

## 2021-12-19 PROCEDURE — 84300 ASSAY OF URINE SODIUM: CPT | Performed by: STUDENT IN AN ORGANIZED HEALTH CARE EDUCATION/TRAINING PROGRAM

## 2021-12-19 PROCEDURE — 36415 COLL VENOUS BLD VENIPUNCTURE: CPT | Performed by: STUDENT IN AN ORGANIZED HEALTH CARE EDUCATION/TRAINING PROGRAM

## 2021-12-19 PROCEDURE — 250N000011 HC RX IP 250 OP 636: Performed by: STUDENT IN AN ORGANIZED HEALTH CARE EDUCATION/TRAINING PROGRAM

## 2021-12-19 PROCEDURE — 250N000013 HC RX MED GY IP 250 OP 250 PS 637: Performed by: PHYSICIAN ASSISTANT

## 2021-12-19 PROCEDURE — 74176 CT ABD & PELVIS W/O CONTRAST: CPT | Mod: 26 | Performed by: RADIOLOGY

## 2021-12-19 PROCEDURE — 83935 ASSAY OF URINE OSMOLALITY: CPT | Performed by: INTERNAL MEDICINE

## 2021-12-19 PROCEDURE — 250N000013 HC RX MED GY IP 250 OP 250 PS 637: Performed by: STUDENT IN AN ORGANIZED HEALTH CARE EDUCATION/TRAINING PROGRAM

## 2021-12-19 RX ORDER — FUROSEMIDE 10 MG/ML
80 INJECTION INTRAMUSCULAR; INTRAVENOUS DAILY
Status: DISCONTINUED | OUTPATIENT
Start: 2021-12-19 | End: 2021-12-20

## 2021-12-19 RX ORDER — SODIUM BICARBONATE 650 MG/1
1950 TABLET ORAL 2 TIMES DAILY
Status: DISCONTINUED | OUTPATIENT
Start: 2021-12-19 | End: 2021-12-22

## 2021-12-19 RX ORDER — NALOXONE HYDROCHLORIDE 0.4 MG/ML
0.2 INJECTION, SOLUTION INTRAMUSCULAR; INTRAVENOUS; SUBCUTANEOUS
Status: DISCONTINUED | OUTPATIENT
Start: 2021-12-19 | End: 2021-12-28 | Stop reason: HOSPADM

## 2021-12-19 RX ORDER — OXYCODONE HYDROCHLORIDE 5 MG/1
5 TABLET ORAL EVERY 6 HOURS PRN
Status: DISCONTINUED | OUTPATIENT
Start: 2021-12-19 | End: 2021-12-28 | Stop reason: HOSPADM

## 2021-12-19 RX ORDER — CALCIUM CARBONATE 500 MG/1
1000 TABLET, CHEWABLE ORAL ONCE
Status: DISCONTINUED | OUTPATIENT
Start: 2021-12-19 | End: 2021-12-20 | Stop reason: CLARIF

## 2021-12-19 RX ORDER — NALOXONE HYDROCHLORIDE 0.4 MG/ML
0.4 INJECTION, SOLUTION INTRAMUSCULAR; INTRAVENOUS; SUBCUTANEOUS
Status: DISCONTINUED | OUTPATIENT
Start: 2021-12-19 | End: 2021-12-28 | Stop reason: HOSPADM

## 2021-12-19 RX ORDER — SIMETHICONE 80 MG
80 TABLET,CHEWABLE ORAL EVERY 6 HOURS PRN
Status: DISCONTINUED | OUTPATIENT
Start: 2021-12-19 | End: 2021-12-28 | Stop reason: HOSPADM

## 2021-12-19 RX ORDER — POTASSIUM CHLORIDE 750 MG/1
40 TABLET, EXTENDED RELEASE ORAL ONCE
Status: COMPLETED | OUTPATIENT
Start: 2021-12-19 | End: 2021-12-19

## 2021-12-19 RX ADMIN — HYDRALAZINE HYDROCHLORIDE 25 MG: 25 TABLET, FILM COATED ORAL at 07:43

## 2021-12-19 RX ADMIN — SUCRALFATE 1 G: 1 TABLET ORAL at 11:30

## 2021-12-19 RX ADMIN — FUROSEMIDE 80 MG: 10 INJECTION, SOLUTION INTRAVENOUS at 14:54

## 2021-12-19 RX ADMIN — SIMETHICONE 80 MG: 80 TABLET, CHEWABLE ORAL at 09:09

## 2021-12-19 RX ADMIN — OXYCODONE HYDROCHLORIDE 5 MG: 5 TABLET ORAL at 18:40

## 2021-12-19 RX ADMIN — CYCLOSPORINE 25 MG: 25 CAPSULE, LIQUID FILLED ORAL at 07:41

## 2021-12-19 RX ADMIN — CALCITRIOL CAPSULES 0.25 MCG 0.5 MCG: 0.25 CAPSULE ORAL at 07:42

## 2021-12-19 RX ADMIN — CYCLOSPORINE 50 MG: 25 CAPSULE, LIQUID FILLED ORAL at 18:38

## 2021-12-19 RX ADMIN — SUCRALFATE 1 G: 1 TABLET ORAL at 17:07

## 2021-12-19 RX ADMIN — ACETAMINOPHEN 975 MG: 325 TABLET, FILM COATED ORAL at 18:39

## 2021-12-19 RX ADMIN — PANTOPRAZOLE SODIUM 40 MG: 40 TABLET, DELAYED RELEASE ORAL at 07:40

## 2021-12-19 RX ADMIN — SUCRALFATE 1 G: 1 TABLET ORAL at 00:54

## 2021-12-19 RX ADMIN — CARVEDILOL 3.12 MG: 3.12 TABLET, FILM COATED ORAL at 18:40

## 2021-12-19 RX ADMIN — MYCOPHENOLATE MOFETIL 750 MG: 250 CAPSULE ORAL at 22:36

## 2021-12-19 RX ADMIN — SUCRALFATE 1 G: 1 TABLET ORAL at 07:41

## 2021-12-19 RX ADMIN — POTASSIUM CHLORIDE 40 MEQ: 750 TABLET, EXTENDED RELEASE ORAL at 11:33

## 2021-12-19 RX ADMIN — CALCIUM CARBONATE 500 MG: 500 TABLET, CHEWABLE ORAL at 07:38

## 2021-12-19 RX ADMIN — SODIUM BICARBONATE 1300 MG: 650 TABLET ORAL at 07:40

## 2021-12-19 RX ADMIN — MYCOPHENOLATE MOFETIL 750 MG: 250 CAPSULE ORAL at 07:42

## 2021-12-19 RX ADMIN — HEPARIN SODIUM 5000 UNITS: 5000 INJECTION, SOLUTION INTRAVENOUS; SUBCUTANEOUS at 11:31

## 2021-12-19 RX ADMIN — DOCUSATE SODIUM 50 MG AND SENNOSIDES 8.6 MG 2 TABLET: 8.6; 5 TABLET, FILM COATED ORAL at 18:39

## 2021-12-19 RX ADMIN — CARVEDILOL 3.12 MG: 3.12 TABLET, FILM COATED ORAL at 07:43

## 2021-12-19 ASSESSMENT — ACTIVITIES OF DAILY LIVING (ADL)
ADLS_ACUITY_SCORE: 9

## 2021-12-19 ASSESSMENT — MIFFLIN-ST. JEOR: SCORE: 1163.33

## 2021-12-19 NOTE — PROGRESS NOTES
[facility]    Medicine Progress Note - Hospitalist Service, Gold 9       Date of Admission:  12/16/2021    Assessment & Plan                Joshua Lala is a 55 year old female admitted on 12/16/2021. 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 She presented to [facility] ED on 12/16/21 with complaints of fatigue, edema, and oliguria. She is admitted to the internal medicine service with transplant nephrology consulting for further evaluation and management.    Interval Changes:  -CT abdomen/pelvis  -Serum osm, urine osm, urine sodium for hyponatremia  -Am cortisol  -Tums,, simethicone for symptomatic relief   -IV diuresis  -Fluid restrict  -Serial sodium monitoring overnight    Hypotonic Hyponatremia   -132(12/18) - nl serum osm, elevated urine sodum  -125(12/19) - serum osm low, urine osm 290s, urine sodium 50  DDx: SIADH vs adrenal insufficiency vs combination hypervolemia/siadh  Plan:  >Continue IV diuresis  >Sodium q4ha  >If sodium < 120 then plan for 3% NS overnight  >Fluid restrict 1800 ml  >Am cortisol    #IgA nephropathy s/p DDKT x 2  #oliguria   #chronic immunosuppression  #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 Second transplant 2007, now with chronic antibody rejection, CKD IV. Last nephrology visit in chart 03/29/21. Missed an appointment in Sept 2021.  - Baseline creatinine 2 - 2.20 as of beginning of 2021. More recently, Cr in the 2.5 - 2.6 range. Cr 2.93 on admit. UA on admit with proteinuria, small blood, and trace LE  -EBV (7300) elevated. SPEP and UPEP with suggestion of hypoalbuminema and albuminuria possibly indicate glomerular damage  -DDx( cardiorenal vs medication vs nephrotic syndrome)  Plan:  > Transplant nephrology consultation    -Cutter,  lambda lightchain, PLA2 R AB pending    -continue PTA immunosuppression: cyclosporine 25mg AM and 50mg PM; MMF 750mg BID    -continue sulfamethoxazole-trimethoprim 400-80mg on MWF for prophylaxis    -cyclosporine level and dosing per transplant nephrology    #prandial pain, epigastric pain  #postprandial vomiting   Patient and daughter report this is an ongoing problem since 04/2021. Patient is only able to eat rice and chicken broth recently.   -She has anorexia which she associates with pain.  -Known hx gastric ulcers.  -GI consulted (12/17)  -worsened pain despite oral meds (12/19) and CT abdomen pelvis ordered  Plan:  >sucralfate 1g BID  >PPI    -H pylori stool antigen testing    -GI consultation    -nutrition consultation  >CT abdomen/pelvis - if unrevealing and pain remains as bothersome will notify GI for consideration of EGD  >Hepatic panel, lipase        #Hypertension  No current concern for hypertensive emergency/end-organ damage.   Plan:  >Hold losartan  >Continue coreg, hydralazine    -PRN labetalol for SBP >170 and/or DBP>100    #Acute decompensated HFpEF  #pulmonary HTN  Pulmonary vascular congestion on CXR, tachypneic on exam in ER on 12/16  -. Previous echocardiogram with HFpEF and c/f pulmonary HTN. Presentation with elevated BNP >9,000, higher than in previous hospitalizations.   -ECHO(12/17) - EF 60%, dilated IVC, PA pressure 39  Plan:  -IV diuresis    -strict I/O    -weigh patient daily    #COVID-19 vaccine series not completed  Patient has received 2 doses of vaccine so far. This does not represent full vaccination in an immunocompromised patient. She requires a third, full dose. We can offer this after ruling out any active viral infection.    -ensure 3rd full dose offered to patient prior to leaving the hospital    Chronic, stable medical problems:  #anemia of chronic renal disease: stable, monitor. Outpatient darbepoetin deana, held.  #leukopenia: stable, monitor   #Mineral bone disorder -  elevated PTH, low ionized calcium.  Currently on calitriol and calcium  #NAGMA - secondary to renal disease - continue sodium bicarb    Malnutrition:    - Level of malnutrition: Moderate   - Based on: mild (or greater) muscle loss, mild (or greater) fluid retention         Diet: Combination Diet Renal Diet  Snacks/Supplements Adult: Other; allow pt to order supplements if requested; With Meals    DVT Prophylaxis: Heparin SQ  Staples Catheter: Not present  Central Lines: None  Code Status: Full Code      Disposition Plan   Expected Discharge: 12/20/2021     Anticipated discharge location: home with family    Delays:            The patient's care was discussed with the Care Coordinator/, Patient and Transplant nephrology Consultant.    Jarrett Mullen MD  Hospitalist Service, [address]  Securely message with the Vocera Web Console (learn more here)  Text page via [employer] Paging/Directory    Please see sign in/sign out for up to date coverage information    Clinically Significant Risk Factors Present on Admission            # Non-Severe Malnutrition, POA: based on Weight loss;Subcutaneous fat loss;Muscle loss;Fluid retention (12/17/21 1200)     ______________________________________________________________________    Interval History   Patient with worsened pain this morning. She did not get relief with GI Cocktail or oral medications. She states dizziness and light headedness have improved. No fever or chills. No chest pain. No shortness of breath.     Data reviewed today: I reviewed all medications, new labs and imaging results over the last 24 hours. I personally reviewed no images or EKG's today.    Physical Exam   Vital Signs: Temp: 97.9  F (36.6  C) Temp src: Oral BP: 137/67 Pulse: 69   Resp: 18 SpO2: 95 % O2 Device: None (Room air)    Weight: 138 lbs 0 oz  Constitutional: alert, oriented, standing comfortably at bedside  Eyes: no icterus  ENT:  JVD+, supple  Respiratory: crackles in bilateral lower lung fields, no wheezing, no respiratory distress  Cardiovascular: normal s1, s2, normal rate and rhythm, systolic murmur 1/6  GI: soft, non-tender, non-distended, bowel sounds present  Skin: no rash  Ext: left upper extremity av fistula with palpable thrill and audible bruiit  Musculoskeletal: trace pitting edema     Data   Recent Labs   Lab 12/18/21  0636 12/17/21  0618 12/16/21  1821   WBC 3.3* 2.8* 3.9*   HGB 8.4* 8.7* 9.9*   MCV 93 90 91   * 125* 148*   * 138 136   POTASSIUM 3.5 3.8 3.8   CHLORIDE 103 110* 108   CO2 18* 18* 20   BUN 24 20 20   CR 2.99* 3.08* 2.93*   ANIONGAP 11 10 8   SHAVON 7.0* 7.3* 7.1*   GLC 83 86 136*   ALBUMIN 1.9* 2.2* 2.8*   PROTTOTAL 5.7* 6.2* 7.2   BILITOTAL 0.7 0.8 0.7   ALKPHOS 142 133 162*   ALT 11 11 14   AST 29 31 36   LIPASE  --   --  536*

## 2021-12-19 NOTE — PROGRESS NOTES
[facility]  Transplant Nephrology progress note  Date of Admission:  12/16/2021  Today's Date: 12/19/2021  Requesting physician: Jarrett Mullen MD    Recommendations:  Continue Lasix 80 mgIV BID   Continue to check sodium level every 3 hours   Notify Nephrology on call if SODIUM <120 or > 130  -- if sodium continues to go down despite getting lasix , then we will consider  1 time dose of 100 ml of 3% sodium chloride  to improve the sodium level. However please call Nephrology on call to assist you in managing   Repeat Sonal, Uosm, Osm in AM ( Ordered)   Follow results of work up for nephrotic range proteinuria   Appreciate GI eval - if symptoms do not get better we advise to consider EGD   Continue to hold losartan  Recheck  CsA  Level on 12/20 ( ordered)   Increase sodium bicarb to 1950 mg BID ( Ordered)   Replace potassium for hypokalemia per primary team     Assessment & Plan   # DDKT and TOSIN: Trend up did not creatinine creatinine down.   - Baseline Creatinine: ~ 2-2.3, but labs have been infrequent   - Proteinuria: Nephrotic range (>3 grams), now 6g/g    - Date DSA Last Checked: Jun/2019      Latest DSA: Moderate DSA (4770-6193 mfi) to DR51 , recheck now   - BK Viremia: No   - Kidney Tx Biopsy: No   -Unclear if patient has TOSIN 2/2 cardiorenal syndrome vs transplant glomerulopathy (known DSA) or both.    -Hold losartan with TOSIN               -Continue Lasix 20 mg IV twice daily ,urine output monitoring, daily weight.              -Follow work-up for nephrotic range proteinuria      # Immunosuppression: Cyclosporine (goal ) and Mycophenolate mofetil (dose 750 mg every 12 hours)   - Changes: Not at this time.    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 -Obtain SPEP, UPEP, C3, C4, JEREMY, immunofixation of both, A1c, K/L, anti PLA2R    -Obtain fasting lipid panel    # Anemia in Chronic Renal Disease: Hgb: Trend down      EDMUND: No, was previously on EDMUND but stopped because she wasn't picking up the phone.  With current nephrotic range proteinuria and hypoalbuminemia, we will refrain from considering starting EDMUND until we complete nephrotic range proteinuria work-up, clean sugars there is no increased risk for clotting with EDMUND use in the setting of hypoalbuminemia   - Iron studies: Not checked recently. Check now. Can give iron or EDMUND or both    # Mineral Bone Disorder:   - Secondary renal hyperparathyroidism; PTH level: Mildly elevated (151-300 pg/ml)        On treatment: Calcitriol  - Vitamin D; level: Normal        On supplement: No  - Calcium; level: Normal        On supplement: Yes  - Phosphorus; level: Normal        On supplement: No   -Continue calcitriol 0.5mcg daily and tums 500mg bid. Check PTH and ionized calcium    # Electrolytes:   - Potassium; level: Normal        On supplement: No  - Magnesium; level: Low        On supplement: No.  Been replaced by primary team  - Bicarbonate; level: Low        On supplement: Yes, increase bicarb 1950 bid. Follow up w/ diuresis    #Hyponatremia  Clinical presentation, urine sodium 40, urine osmolality 285, pointing towards a combination of hypervolemia and SIADH in the setting of pain  Would advise to continue Lasix 80 mg IV twice daily to address volume.  Continue to check sodium every 3 hours.  Notify nephrology on call if sodium is less than 120 or greater than 128.  If sodium falls below 120, will consider giving one-time dose of 100 mL 3% sodium chloride and continue to monitor.  If sodium starts rapidly correcting  and is above 130, then we will need to start her on D5 water infusion to prevent further rise.  We will recheck urine osmolality, urine sodium, osmolality tomorrow morning    # HFpEF:    -Decompensated clinically. Echo stable on 12/17/21 with elevated PASP and dilated IVC, grade II diastolic dysfunction     # Pulmonary HTN: echo 12/17/21 w/ PASP 39mmHg + RA pressure. Likely worsened by volume overload    # Dyspepsia:   -Today patient denies any dysphagia regular diet aphasia.  Oral exam is unremarkable.              - Appreciate GI eval.  Advised PPI 40 mg twice daily for 6 to 8 weeks, Carafate twice daily, and Pepcid 40 mg daily.  Follow-up H. Pylori stool antigen.  Follow-up with GI as outpatient if symptoms persist    # Unintentional weight loss:   -Likely 2/2 her not eating.  EBV PCR 7K (12/16/2021)   -CMV negative (12/16/2021)              - If  appetite does not improve, continues to have weight loss will need pan CT scan evaluation and GI evaluation with EGD and colonoscopy.  We will also wait for nephrotic range proteinuria work-up results                 # Transplant History:  Etiology of Kidney Failure: IgA nephropathy  Tx: DDKT  Transplant: 11/28/2007 (Kidney), 8/8/1998 (Kidney)  Significant changes in immunosuppression: None  Significant transplant-related complications: DSA    Recommendations were communicated to the primary team via this note and verbally  Patient seen and discussed with Dr Ashley Herrera MD, [facility]  Transplant Nephrology Fellow   [facility]   Pager [phone]    Physician Attestation   I, Frank Ramirez MD, personally examined and evaluated this patient.  I discussed the patient with the resident/fellow and care team, and agree with the assessment and plan of care as documented in the note on 12/19/21 .      I personally reviewed vital signs, medications, labs and imaging.    Frank Ramirez MD  Date of Service (when I saw the patient): 12/19/21      Frank Ramirez MD  Transplant Nephrology   Pager: [phone]        ong  assisted with translation over phone    INTERVAL HISTORY  Patient continues to have abdominal discomfort.  No nausea vomiting.  No diarrhea.  No shortness of breath or chest pain.  No cough, fever, chills.  She is fatigued    Review of Systems    The 4 point Review of Systems is negative other than noted in the HPI or here.    Allergies   Allergies   Allergen Reactions     Contrast Dye Shortness Of Breath     Other reaction(s): Angioedema     Amlodipine Other (See Comments)     Prior to Admission Medications     calcitRIOL  0.5 mcg Oral Daily     calcium carbonate  1,000 mg Oral Once     calcium carbonate  500 mg Oral BID     carvedilol  3.125 mg Oral BID w/meals     cycloSPORINE modified  25 mg Oral QAM     cycloSPORINE modified  50 mg Oral QPM     heparin ANTICOAGULANT  5,000 Units Subcutaneous BID     hydrALAZINE  25 mg Oral TID     [Held by provider] losartan  50 mg Oral Daily     mycophenolate  750 mg Oral BID     pantoprazole  40 mg Oral BID     sodium bicarbonate  1,300 mg Oral BID     sodium chloride (PF)  3 mL Intracatheter Q8H     sucralfate  1 g Oral 4x Daily AC & HS     sulfamethoxazole-trimethoprim  1 tablet Oral Q Mon Wed Fri AM         Physical Exam   Temp  Av.4  F (36.9  C)  Min: 97.8  F (36.6  C)  Max: 99.1  F (37.3  C)      Pulse  Av.2  Min: 69  Max: 105 Resp  Av.9  Min: 16  Max: 18  SpO2  Av.7 %  Min: 91 %  Max: 99 %     /72 (BP Location: Right arm)   Pulse 66   Temp 97.6  F (36.4  C) (Oral)   Resp 22   Ht 1.524 m (5')   Wt 64.7 kg (142 lb 9.6 oz)   SpO2 98%   BMI 27.85 kg/m      Admit Weight: 66 kg (145 lb 6.4 oz)     GENERAL APPEARANCE: alert and no distress  HENT: mouth without ulcers or lesions  LYMPHATICS: no cervical or supraclavicular nodes  RESP: rales bilateral bases  CV: normal rhythm, systolic murmur heard throughout the precordium radiating to carotids  EDEMA:  1+ LE edema bilaterally  ABDOMEN: soft, nondistended, nontender, bowel sounds normal  MS: extremities normal - no gross deformities noted, no evidence of inflammation in joints, no muscle tenderness  SKIN: no rash  NEURO: normal strength and tone, sensory exam grossly normal, mentation intact and speech normal  PSYCH: mentation appears normal and affect normal/bright  TX KIDNEY: normal  DIALYSIS ACCESS:  LUE AV fistula w/ good thrill and bruit    Data   CMP  Recent Labs   Lab 12/19/21  0655 12/18/21  0636 12/17/21  0618 12/16/21  1821   * 132* 138 136   POTASSIUM 3.1* 3.5 3.8 3.8   CHLORIDE 93* 103 110* 108   CO2 19* 18* 18* 20   ANIONGAP 13 11 10 8   * 83 86 136*   BUN 29 24 20 20   CR 3.17* 2.99* 3.08* 2.93*   GFRESTIMATED 16* 17* 16* 17*   SHAVON 7.1* 7.0* 7.3* 7.1*   MAG 2.1 1.4*  --   --    PHOS  --  4.5  --   --    PROTTOTAL  --  5.7* 6.2* 7.2   ALBUMIN  --  1.9* 2.2* 2.8*   BILITOTAL  --  0.7 0.8 0.7   ALKPHOS  --  142 133 162*   AST  --  29 31 36   ALT  --  11 11 14     CBC  Recent Labs   Lab 12/19/21  0655 12/18/21  0636 12/17/21  0618 12/16/21  1821   HGB 8.7* 8.4* 8.7* 9.9*   WBC 4.0 3.3* 2.8* 3.9*   RBC 3.16* 3.10* 3.12* 3.57*   HCT 27.3* 28.7* 28.0* 32.3*   MCV 86 93 90 91   MCH 27.5 27.1 27.9 27.7   MCHC 31.9 29.3* 31.1* 30.7*   RDW 13.4 13.9 13.9 14.1   * 122* 125* 148*     INRNo lab results found in last 7 days.  ABGNo lab results found in last 7 days.   Urine Studies  Recent Labs   Lab Test 12/16/21  1810 09/25/21  1637 12/11/20  0734 05/15/20  1920   COLOR Light Yellow Straw Straw Light Yellow  Canceled, Test credited   APPEARANCE Clear Clear Clear Clear  Canceled, Test credited   URINEGLC 30 * Negative Negative Negative  Canceled, Test credited   URINEBILI Negative Negative Negative Negative  Canceled, Test credited   URINEKETONE Negative Negative Negative Negative  Canceled, Test credited   SG 1.005 1.003 1.003 1.004  Canceled, Test credited   UBLD Small* Trace* Negative  Trace*  Canceled, Test credited   URINEPH 6.0 6.0 6.0 6.0  Canceled, Test credited   PROTEIN 200 * 100 * 100* 100*  Canceled, Test credited   NITRITE Negative Negative Negative Negative  Canceled, Test credited   LEUKEST Trace* Negative Negative Large*  Canceled, Test credited   RBCU 1 0 <1 1  Canceled, Test credited   WBCU 5 <1 2 15*  Canceled, Test credited     Recent Labs   Lab Test 12/16/21  1810 09/03/21  0630 01/15/21  0732 03/18/20  1330 06/01/19  0700 02/16/19  0737 02/02/18  0742 11/17/17  0739 04/28/17  0843 10/28/16  0913 04/29/16  1145 06/12/15  1011 01/23/15  0700   UTPG 6.85* 4.32* 3.69* 1.35* 3.89* 3.92* 2.31* 1.74* 1.86* 1.84* 0.49* 0.73* 0.77*     PTH  Recent Labs   Lab Test 12/17/21  1616 03/17/20  0509 04/29/16  1043 02/05/16  1039 10/27/15  0555 03/26/15  1734 12/16/13  0636   PTHI 173* 291* 124* 140* 218* 251* 166*     Iron Studies  Recent Labs   Lab Test 12/17/21  1443 12/17/21  0618 09/03/21  0625 04/23/21  0716 03/12/21  0757 01/15/21  0725 10/29/20  0900 09/17/20  0758 08/10/20  0840 04/24/20  0800 03/17/20  0509   IRON 58  --  84 45 83 96 88 88 89 61 54     --  252 252 245 225* 251 248 242 286 240   IRONSAT 17  --  33 18 34 43 35 36 37 21 23   ANASTASIIA  --  317* 332* 361* 401* 396* 352* 374* 364* 299*  --        IMAGING:  All imaging studies reviewed by me.

## 2021-12-19 NOTE — PLAN OF CARE
"VS: /67 (BP Location: Right arm)   Pulse 69   Temp 97.9  F (36.6  C) (Oral)   Resp 18   Ht 1.524 m (5')   Wt 62.6 kg (138 lb)   SpO2 95%   BMI 26.95 kg/m      Cares:[id number]    Current condition: Patient slept most of the evening.   Neuro:   Cardio:   Respiratory:   GI/: Patient has had troubles with bowl gerd, and heartburn related to gastric ulcers, Patient was given maalox earlier in the evening, and later reported that that did not work and was \"spicy\" on call added a dose oc Calcium Carbonate for the patient however she ended up falling asleep through the remainder of the night  Skin: WNL   Diet:   Renal diet   BG: NONE   LDA:  PIV right arm saline locked, Left fistula   Mobility:  Patient remained in her bed throuout the shift  Pain:See GI/   PRN medications: Calcium Carbonate ordered and available    "

## 2021-12-19 NOTE — PLAN OF CARE
VS: stable   Neuro: A&O - Hmong speaking,  Ipad utilized   Mobility: up ad jennifer  Discomfort: endorses abdominal pain; receiving carafate, tums. Endorses chest pain to MD while rounding; received GI cocktail. No complaints of nausea. Tolerating renal diet   LDAs: PIV intact and SL  Labs: Mag 1.4 - received 4gm per order   : voiding clear urine   GI: no witnessed BM this shift   Plan: will continue to monitor. Daughter at bedside supportive and involved in care.

## 2021-12-19 NOTE — PLAN OF CARE
VS: stable   Neuro: A&O - Hmong speaking,  Ipad utilized   Mobility: up ad jennifer  Discomfort: endorses abdominal discomfort; receiving carafate, tums, PRN simethicone.       Patient states abdominal discomfort has continued all day without relief - I notified MD - states he will discuss with GI in AM      Patient states she feels so bloated (Abd CT read diffuse anasarca) but is also hungry, very uncomfortable - eating small bites of oatmeal and rice noodles family brings from home.   LDAs: PIV intact and SL  Labs: Na 125, then 122 - notify Nephrology if Na <120 per note - received 80mg lasix  : voiding clear urine   GI: patient states last BM 12/18  Plan: will continue to monitor.

## 2021-12-20 ENCOUNTER — APPOINTMENT (OUTPATIENT)
Dept: INTERPRETER SERVICES | Facility: CLINIC | Age: 56
DRG: 698 | End: 2021-12-20
Payer: MEDICARE

## 2021-12-20 LAB
1,3 BETA GLUCAN SER-MCNC: 115 PG/ML
ALBUMIN UR-MCNC: 100 MG/DL
ANA PAT SER IF-IMP: ABNORMAL
ANA PAT SER IF-IMP: ABNORMAL
ANA SER QL IF: POSITIVE
ANA SER QL IF: POSITIVE
ANA TITR SER IF: ABNORMAL {TITER}
ANA TITR SER IF: ABNORMAL {TITER}
ANION GAP SERPL CALCULATED.3IONS-SCNC: 13 MMOL/L (ref 3–14)
APPEARANCE UR: CLEAR
BILIRUB UR QL STRIP: NEGATIVE
BUN SERPL-MCNC: 33 MG/DL (ref 7–30)
C3 SERPL-MCNC: 82 MG/DL (ref 81–157)
C3 SERPL-MCNC: 83 MG/DL (ref 81–157)
C4 SERPL-MCNC: 21 MG/DL (ref 13–39)
C4 SERPL-MCNC: 21 MG/DL (ref 13–39)
CALCIUM SERPL-MCNC: 7.7 MG/DL (ref 8.5–10.1)
CHLORIDE BLD-SCNC: 90 MMOL/L (ref 94–109)
CO2 SERPL-SCNC: 18 MMOL/L (ref 20–32)
COLOR UR AUTO: YELLOW
CORTICOSTER 1H P 250 UG ACTH SERPL-SCNC: 27.3 UG/DL (ref 20–150)
CORTIS SERPL-MCNC: 10.2 UG/DL (ref 4–22)
CORTIS SERPL-MCNC: 15.9 UG/DL (ref 4–22)
CREAT SERPL-MCNC: 3.37 MG/DL (ref 0.52–1.04)
CYCLOSPORINE BLD LC/MS/MS-MCNC: 151 UG/L (ref 50–400)
EBV DNA COPIES/ML, INSTRUMENT: 1016 COPIES/ML
EBV DNA SPEC NAA+PROBE-LOG#: 3 {LOG_COPIES}/ML
ERYTHROCYTE [DISTWIDTH] IN BLOOD BY AUTOMATED COUNT: 13.4 % (ref 10–15)
GALACTOMANNAN AG SERPL QL IA: NEGATIVE
GALACTOMANNAN AG SPEC IA-ACNC: 0.05
GFR SERPL CREATININE-BSD FRML MDRD: 15 ML/MIN/1.73M2
GLUCOSE BLD-MCNC: 93 MG/DL (ref 70–99)
GLUCOSE BLDC GLUCOMTR-MCNC: 99 MG/DL (ref 70–99)
GLUCOSE UR STRIP-MCNC: NEGATIVE MG/DL
HBV SURFACE AB SERPL IA-ACNC: 35.91 M[IU]/ML
HBV SURFACE AG SERPL QL IA: NONREACTIVE
HCT VFR BLD AUTO: 26.8 % (ref 35–47)
HGB BLD-MCNC: 8.6 G/DL (ref 11.7–15.7)
HGB UR QL STRIP: NEGATIVE
HOLD SPECIMEN: NORMAL
KAPPA LC FREE SER-MCNC: 11.89 MG/DL (ref 0.33–1.94)
KAPPA LC FREE/LAMBDA FREE SER NEPH: 1.33 {RATIO} (ref 0.26–1.65)
KETONES UR STRIP-MCNC: NEGATIVE MG/DL
LAMBDA LC FREE SERPL-MCNC: 8.95 MG/DL (ref 0.57–2.63)
LEUKOCYTE ESTERASE UR QL STRIP: ABNORMAL
MAYO MISC RESULT: NORMAL
MCH RBC QN AUTO: 27.4 PG (ref 26.5–33)
MCHC RBC AUTO-ENTMCNC: 32.1 G/DL (ref 31.5–36.5)
MCV RBC AUTO: 85 FL (ref 78–100)
MUCOUS THREADS #/AREA URNS LPF: PRESENT /LPF
NITRATE UR QL: NEGATIVE
OBSERVATION IMP: POSITIVE
OSMOLALITY SERPL: 260 MMOL/KG (ref 275–295)
OSMOLALITY UR: 276 MMOL/KG (ref 100–1200)
PH UR STRIP: 5.5 [PH] (ref 5–7)
PLATELET # BLD AUTO: 131 10E3/UL (ref 150–450)
POTASSIUM BLD-SCNC: 4 MMOL/L (ref 3.4–5.3)
PROT PATTERN SERPL IFE-IMP: NORMAL
RBC # BLD AUTO: 3.14 10E6/UL (ref 3.8–5.2)
RBC URINE: 1 /HPF
SODIUM SERPL-SCNC: 119 MMOL/L (ref 133–144)
SODIUM SERPL-SCNC: 121 MMOL/L (ref 133–144)
SODIUM SERPL-SCNC: 122 MMOL/L (ref 133–144)
SODIUM UR-SCNC: 14 MMOL/L
SP GR UR STRIP: 1.01 (ref 1–1.03)
SQUAMOUS EPITHELIAL: <1 /HPF
TME LAST DOSE: NORMAL H
TME LAST DOSE: NORMAL H
TRANSITIONAL EPI: 1 /HPF
UROBILINOGEN UR STRIP-MCNC: NORMAL MG/DL
WBC # BLD AUTO: 3.5 10E3/UL (ref 4–11)
WBC URINE: 13 /HPF
YEAST #/AREA URNS HPF: ABNORMAL /HPF

## 2021-12-20 PROCEDURE — 81001 URINALYSIS AUTO W/SCOPE: CPT | Performed by: STUDENT IN AN ORGANIZED HEALTH CARE EDUCATION/TRAINING PROGRAM

## 2021-12-20 PROCEDURE — 80158 DRUG ASSAY CYCLOSPORINE: CPT | Performed by: INTERNAL MEDICINE

## 2021-12-20 PROCEDURE — 84165 PROTEIN E-PHORESIS SERUM: CPT | Mod: 26 | Performed by: PATHOLOGY

## 2021-12-20 PROCEDURE — 250N000012 HC RX MED GY IP 250 OP 636 PS 637: Performed by: NURSE PRACTITIONER

## 2021-12-20 PROCEDURE — 86706 HEP B SURFACE ANTIBODY: CPT | Performed by: INTERNAL MEDICINE

## 2021-12-20 PROCEDURE — 99233 SBSQ HOSP IP/OBS HIGH 50: CPT | Performed by: STUDENT IN AN ORGANIZED HEALTH CARE EDUCATION/TRAINING PROGRAM

## 2021-12-20 PROCEDURE — 86335 IMMUNFIX E-PHORSIS/URINE/CSF: CPT | Mod: TC | Performed by: INTERNAL MEDICINE

## 2021-12-20 PROCEDURE — 83935 ASSAY OF URINE OSMOLALITY: CPT | Performed by: INTERNAL MEDICINE

## 2021-12-20 PROCEDURE — 84295 ASSAY OF SERUM SODIUM: CPT | Performed by: STUDENT IN AN ORGANIZED HEALTH CARE EDUCATION/TRAINING PROGRAM

## 2021-12-20 PROCEDURE — 90937 HEMODIALYSIS REPEATED EVAL: CPT

## 2021-12-20 PROCEDURE — 250N000011 HC RX IP 250 OP 636: Performed by: NURSE PRACTITIONER

## 2021-12-20 PROCEDURE — 82533 TOTAL CORTISOL: CPT | Performed by: STUDENT IN AN ORGANIZED HEALTH CARE EDUCATION/TRAINING PROGRAM

## 2021-12-20 PROCEDURE — 87340 HEPATITIS B SURFACE AG IA: CPT | Performed by: INTERNAL MEDICINE

## 2021-12-20 PROCEDURE — 99233 SBSQ HOSP IP/OBS HIGH 50: CPT | Mod: GC | Performed by: INTERNAL MEDICINE

## 2021-12-20 PROCEDURE — 82310 ASSAY OF CALCIUM: CPT | Performed by: STUDENT IN AN ORGANIZED HEALTH CARE EDUCATION/TRAINING PROGRAM

## 2021-12-20 PROCEDURE — 250N000013 HC RX MED GY IP 250 OP 250 PS 637: Performed by: INTERNAL MEDICINE

## 2021-12-20 PROCEDURE — 250N000012 HC RX MED GY IP 250 OP 636 PS 637: Performed by: STUDENT IN AN ORGANIZED HEALTH CARE EDUCATION/TRAINING PROGRAM

## 2021-12-20 PROCEDURE — 84300 ASSAY OF URINE SODIUM: CPT | Performed by: INTERNAL MEDICINE

## 2021-12-20 PROCEDURE — 250N000011 HC RX IP 250 OP 636: Performed by: STUDENT IN AN ORGANIZED HEALTH CARE EDUCATION/TRAINING PROGRAM

## 2021-12-20 PROCEDURE — 250N000013 HC RX MED GY IP 250 OP 250 PS 637: Performed by: NURSE PRACTITIONER

## 2021-12-20 PROCEDURE — 86334 IMMUNOFIX E-PHORESIS SERUM: CPT | Mod: 26 | Performed by: PATHOLOGY

## 2021-12-20 PROCEDURE — 36415 COLL VENOUS BLD VENIPUNCTURE: CPT | Performed by: STUDENT IN AN ORGANIZED HEALTH CARE EDUCATION/TRAINING PROGRAM

## 2021-12-20 PROCEDURE — 250N000009 HC RX 250: Performed by: INTERNAL MEDICINE

## 2021-12-20 PROCEDURE — 120N000011 HC R&B TRANSPLANT UMMC

## 2021-12-20 PROCEDURE — 36415 COLL VENOUS BLD VENIPUNCTURE: CPT | Performed by: INTERNAL MEDICINE

## 2021-12-20 PROCEDURE — 258N000003 HC RX IP 258 OP 636: Performed by: INTERNAL MEDICINE

## 2021-12-20 PROCEDURE — 250N000013 HC RX MED GY IP 250 OP 250 PS 637: Performed by: STUDENT IN AN ORGANIZED HEALTH CARE EDUCATION/TRAINING PROGRAM

## 2021-12-20 PROCEDURE — 83930 ASSAY OF BLOOD OSMOLALITY: CPT | Performed by: INTERNAL MEDICINE

## 2021-12-20 PROCEDURE — 84295 ASSAY OF SERUM SODIUM: CPT | Performed by: INTERNAL MEDICINE

## 2021-12-20 PROCEDURE — 82024 ASSAY OF ACTH: CPT | Performed by: STUDENT IN AN ORGANIZED HEALTH CARE EDUCATION/TRAINING PROGRAM

## 2021-12-20 PROCEDURE — 85027 COMPLETE CBC AUTOMATED: CPT | Performed by: STUDENT IN AN ORGANIZED HEALTH CARE EDUCATION/TRAINING PROGRAM

## 2021-12-20 RX ORDER — DIPHENHYDRAMINE HYDROCHLORIDE 50 MG/ML
50 INJECTION INTRAMUSCULAR; INTRAVENOUS
Status: COMPLETED | OUTPATIENT
Start: 2021-12-20 | End: 2021-12-21

## 2021-12-20 RX ORDER — FUROSEMIDE 10 MG/ML
120 INJECTION INTRAMUSCULAR; INTRAVENOUS EVERY 12 HOURS
Status: DISCONTINUED | OUTPATIENT
Start: 2021-12-20 | End: 2021-12-21

## 2021-12-20 RX ORDER — SUCRALFATE 1 G/1
1 TABLET ORAL
Status: DISCONTINUED | OUTPATIENT
Start: 2021-12-20 | End: 2021-12-28 | Stop reason: HOSPADM

## 2021-12-20 RX ORDER — TOLVAPTAN 15 MG/1
15 TABLET ORAL DAILY
Status: DISCONTINUED | OUTPATIENT
Start: 2021-12-20 | End: 2021-12-22

## 2021-12-20 RX ORDER — COSYNTROPIN 0.25 MG/ML
250 INJECTION, POWDER, FOR SOLUTION INTRAMUSCULAR; INTRAVENOUS ONCE
Status: COMPLETED | OUTPATIENT
Start: 2021-12-20 | End: 2021-12-20

## 2021-12-20 RX ORDER — FAMOTIDINE 20 MG/1
20 TABLET, FILM COATED ORAL AT BEDTIME
Status: DISCONTINUED | OUTPATIENT
Start: 2021-12-20 | End: 2021-12-28 | Stop reason: HOSPADM

## 2021-12-20 RX ORDER — METHYLPREDNISOLONE SODIUM SUCCINATE 125 MG/2ML
125 INJECTION, POWDER, LYOPHILIZED, FOR SOLUTION INTRAMUSCULAR; INTRAVENOUS
Status: DISCONTINUED | OUTPATIENT
Start: 2021-12-20 | End: 2021-12-22

## 2021-12-20 RX ORDER — CYCLOSPORINE 25 MG/1
25 CAPSULE, LIQUID FILLED ORAL EVERY EVENING
Status: DISCONTINUED | OUTPATIENT
Start: 2021-12-20 | End: 2021-12-28 | Stop reason: HOSPADM

## 2021-12-20 RX ADMIN — FAMOTIDINE 20 MG: 20 TABLET ORAL at 22:16

## 2021-12-20 RX ADMIN — SUCRALFATE 1 G: 1 TABLET ORAL at 08:30

## 2021-12-20 RX ADMIN — SUCRALFATE 1 G: 1 TABLET ORAL at 16:06

## 2021-12-20 RX ADMIN — CYCLOSPORINE 25 MG: 25 CAPSULE, LIQUID FILLED ORAL at 08:24

## 2021-12-20 RX ADMIN — MYCOPHENOLATE MOFETIL 750 MG: 250 CAPSULE ORAL at 22:16

## 2021-12-20 RX ADMIN — FUROSEMIDE 120 MG: 10 INJECTION, SOLUTION INTRAVENOUS at 10:29

## 2021-12-20 RX ADMIN — COSYNTROPIN 250 MCG: 0.25 INJECTION, POWDER, LYOPHILIZED, FOR SOLUTION INTRAMUSCULAR; INTRAVENOUS at 11:58

## 2021-12-20 RX ADMIN — LIDOCAINE HYDROCHLORIDE 0.5 ML: 10 INJECTION, SOLUTION EPIDURAL; INFILTRATION; INTRACAUDAL; PERINEURAL at 17:17

## 2021-12-20 RX ADMIN — SODIUM BICARBONATE 1950 MG: 650 TABLET ORAL at 20:46

## 2021-12-20 RX ADMIN — CYCLOSPORINE 25 MG: 25 CAPSULE, LIQUID FILLED ORAL at 20:45

## 2021-12-20 RX ADMIN — PANTOPRAZOLE SODIUM 40 MG: 40 TABLET, DELAYED RELEASE ORAL at 08:29

## 2021-12-20 RX ADMIN — CARVEDILOL 3.12 MG: 3.12 TABLET, FILM COATED ORAL at 20:46

## 2021-12-20 RX ADMIN — TOLVAPTAN 15 MG: 15 TABLET ORAL at 10:28

## 2021-12-20 RX ADMIN — HYDRALAZINE HYDROCHLORIDE 25 MG: 25 TABLET, FILM COATED ORAL at 20:46

## 2021-12-20 RX ADMIN — HEPARIN SODIUM 5000 UNITS: 5000 INJECTION, SOLUTION INTRAVENOUS; SUBCUTANEOUS at 08:30

## 2021-12-20 RX ADMIN — Medication: at 17:25

## 2021-12-20 RX ADMIN — PANTOPRAZOLE SODIUM 40 MG: 40 TABLET, DELAYED RELEASE ORAL at 20:46

## 2021-12-20 RX ADMIN — SODIUM CHLORIDE 300 ML: 9 INJECTION, SOLUTION INTRAVENOUS at 17:17

## 2021-12-20 RX ADMIN — SODIUM BICARBONATE 1950 MG: 650 TABLET ORAL at 08:29

## 2021-12-20 RX ADMIN — MYCOPHENOLATE MOFETIL 750 MG: 250 CAPSULE ORAL at 08:23

## 2021-12-20 RX ADMIN — SODIUM CHLORIDE 250 ML: 9 INJECTION, SOLUTION INTRAVENOUS at 17:17

## 2021-12-20 RX ADMIN — HEPARIN SODIUM 5000 UNITS: 5000 INJECTION, SOLUTION INTRAVENOUS; SUBCUTANEOUS at 20:45

## 2021-12-20 RX ADMIN — LIDOCAINE HYDROCHLORIDE 0.5 ML: 10 INJECTION, SOLUTION EPIDURAL; INFILTRATION; INTRACAUDAL; PERINEURAL at 17:16

## 2021-12-20 RX ADMIN — CALCIUM CARBONATE 500 MG: 500 TABLET, CHEWABLE ORAL at 20:46

## 2021-12-20 RX ADMIN — SUCRALFATE 1 G: 1 TABLET ORAL at 11:58

## 2021-12-20 RX ADMIN — FUROSEMIDE 120 MG: 10 INJECTION, SOLUTION INTRAVENOUS at 20:44

## 2021-12-20 RX ADMIN — CALCITRIOL CAPSULES 0.25 MCG 0.5 MCG: 0.25 CAPSULE ORAL at 08:30

## 2021-12-20 RX ADMIN — SULFAMETHOXAZOLE AND TRIMETHOPRIM 1 TABLET: 400; 80 TABLET ORAL at 08:31

## 2021-12-20 RX ADMIN — CALCIUM CARBONATE 500 MG: 500 TABLET, CHEWABLE ORAL at 11:14

## 2021-12-20 RX ADMIN — HYDRALAZINE HYDROCHLORIDE 25 MG: 25 TABLET, FILM COATED ORAL at 14:22

## 2021-12-20 ASSESSMENT — ACTIVITIES OF DAILY LIVING (ADL)
ADLS_ACUITY_SCORE: 9

## 2021-12-20 ASSESSMENT — MIFFLIN-ST. JEOR
SCORE: 1161.5
SCORE: 1161.5

## 2021-12-20 NOTE — PLAN OF CARE
/79 (BP Location: Right arm)   Pulse 80   Temp 97.9  F (36.6  C) (Oral)   Resp 17   Ht 1.524 m (5')   Wt 64.5 kg (142 lb 3.2 oz)   SpO2 97%   BMI 27.77 kg/m      Shift: 3556-5522  VS: stable on RA, afebrile  Neuro: AOx4  Labs: Q4hrs sodium , critical sodium lab:119 provider notified  Respiratory: non labored breathing, clear lung sounds   Pain/Nausea/PRN: pain in abdomen with food, intermittent nausea   Diet: Renal diet, 1800ml fluid restriction   LDA: R PIV SL, L fistula   GI/: voided 900 ml, 1 bm   Mobility: SBA   Plan: Dialysis started today. Continue plan of care.     Will give report to oncoming nurse. Pt left in stable condition, care relinquished at this time.

## 2021-12-20 NOTE — PROGRESS NOTES
[facility]    Medicine Progress Note - Hospitalist Service, Gold 9       Date of Admission:  12/16/2021    Assessment & Plan                Joshua Lala is a 55 year old female admitted on 12/16/2021. 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 She presented to [facility] ED on 12/16/21 with complaints of fatigue, edema, and oliguria. She is admitted to the internal medicine service with transplant nephrology consulting for further evaluation and management.    Interval Changes:  -Increase lasix to 120 mg   -Tolvaptan given for hyponatremia with minimal effect  -Plan for UF/HD via LUE av fistula per transplant nephrology  -Sodium q4h monitoring overnight, goal sodium no higher than 128, if increasing will give d5 and contact nephrology overnight  -Cosyntropin stim test for adrenal insufficiency negative   -Decrease cyclosporin to 25 mg BID  -IV iron for anemia  -Add pepcid at bedtime for abdominal pain  -Mild elevation in fungitell, given lack of localizing symptoms, fever or pulmonary nodules would not recommend further infectious work up per ID    Hypotonic Hyponatremia   -132(12/18) - nl serum osm, elevated urine sodum  -125(12/19) - serum osm low, urine osm 290s, urine sodium 50  -119(12/20) - serum osm low, urine osm 290s, urine sodium 15  - costynotroponin stim test negative (12/20)  DDx: hypervolemia from CHF and nephrotic syndrome  Plan:  >Continue IV diuresis  >Sodium q4ha - goal sodium 128 overnight, if increasing will give d5  >Plan for UF/HD tonight, transplant nephrology discussed and consented patient for HD thru LUE AV fistula  >Fluid restrict 1800 ml    #IgA nephropathy s/p DDKT x 2  #oliguria   #chronic immunosuppression  #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 Second transplant 2007, now with chronic antibody rejection, CKD IV. Last nephrology visit in chart 03/29/21. Missed an appointment in Sept 2021.  - Baseline creatinine 2 - 2.20 as of beginning of 2021. More recently, Cr in the 2.5 - 2.6 range. Cr 2.93 on admit. UA on admit with proteinuria, small blood, and trace LE  -EBV (7300) elevated. SPEP and UPEP with suggestion of hypoalbuminema and albuminuria possibly indicate glomerular damage  -DDx( cardiorenal vs medication vs nephrotic syndrome)  Plan:  > Transplant nephrology consultation    - PLA2 R AB pending    -continue PTA immunosuppression: cyclosporine 25mg AM and 25mg PM; MMF 750mg BID    -continue sulfamethoxazole-trimethoprim 400-80mg on MWF for prophylaxis    -cyclosporine level and dosing per transplant nephrology    #prandial pain, epigastric pain  #postprandial vomiting   Patient and daughter report this is an ongoing problem since 04/2021. Patient is only able to eat rice and chicken broth recently.   -She has anorexia which she associates with pain.  -Known hx gastric ulcers.  -GI consulted (12/17)  -worsened pain despite oral meds (12/19) and CT abdomen pelvis ordered. CT abdomen/pelvis(12/19) - no acute pathology   Plan:  >sucralfate 1g BID  >PPI, H2 blocker qhs    -H pylori stool antigen testing    -GI consultation    -nutrition consultation    #Acute decompensated HFpEF  #pulmonary HTN  Pulmonary vascular congestion on CXR, tachypneic on exam in ER on 12/16  -. Previous echocardiogram with HFpEF and c/f pulmonary HTN. Presentation with elevated BNP >9,000, higher than in previous hospitalizations.   -ECHO(12/17) - EF 60%, dilated IVC, PA pressure 39  Plan:  -IV diuresis    -strict I/O    -weigh patient daily    #Hypertension  No current concern for hypertensive emergency/end-organ damage.   Plan:  >Hold losartan  >Continue coreg, hydralazine    -PRN labetalol for SBP >170 and/or DBP>100    #COVID-19 vaccine series not completed  Patient  has received 2 doses of vaccine so far. This does not represent full vaccination in an immunocompromised patient. She requires a third, full dose. We can offer this after ruling out any active viral infection.    -ensure 3rd full dose offered to patient prior to leaving the hospital    #anemia of chronic renal disease: stable, monitor. Outpatient darbepoetin deana, IV iron x 5 days (started 12/20)    #Elevated fungitell  -Mild elevation in fungitell, given lack of localizing symptoms, fever or pulmonary nodules would not recommend further infectious work up per discussion with ID on 12/20  -If she were to develop fever, confusion, or new imaging findings would re-consider    Chronic, stable medical problems:  #leukopenia: stable, monitor   #Mineral bone disorder - elevated PTH, low ionized calcium.  Currently on calitriol and calcium  #NAGMA - secondary to renal disease - continue sodium bicarb    Malnutrition:    - Level of malnutrition: Moderate   - Based on: mild (or greater) muscle loss, mild (or greater) fluid retention         Diet: Combination Diet Renal Diet  Snacks/Supplements Adult: Other; allow pt to order supplements if requested; With Meals  Fluid restriction 1800 ML FLUID    DVT Prophylaxis: Heparin SQ  Staples Catheter: Not present  Central Lines: None  Code Status: Full Code      Disposition Plan   Expected Discharge: 12/22/2021     Anticipated discharge location: home with family    Delays:            The patient's care was discussed with the Care Coordinator/, Patient, Patient's Family and GI and Transplant nephrology Consultant.    Jarrett Mullen MD  Hospitalist Service, [address]  Securely message with the Vocera Web Console (learn more here)  Text page via [employer] Paging/Directory    Please see sign in/sign out for up to date coverage information    Clinically Significant Risk Factors Present on Admission            # Non-Severe  Malnutrition, POA: based on Weight loss;Subcutaneous fat loss;Muscle loss;Fluid retention (12/17/21 1200)     ______________________________________________________________________    Interval History   Patient reports continued pain this morning. She states it occurred after eating lunch. She denies fever or chills. She denies chest pain. She states she feels swollen. She has some nausea as well. We discsused her sodium this morning and need for close monitoring. In addition, plan to inform GI about recurrent abdominal pain. Her pain does not occur when pressing on her abdomen.     Data reviewed today: I reviewed all medications, new labs and imaging results over the last 24 hours. I personally reviewed no images or EKG's today.    Physical Exam   Vital Signs: Temp: 98.3  F (36.8  C) Temp src: Oral BP: 125/67 Pulse: 70   Resp: 18 SpO2: 96 % O2 Device: None (Room air)    Weight: 142 lbs 9.6 oz  Constitutional: alert, oriented, sitting in bed, in no acute distress  Eyes: no icterus  ENT: JVD+, supple  Respiratory: crackles in bilateral lower lung fields, no wheezing, no respiratory distress  Cardiovascular: normal s1, s2, normal rate and rhythm, systolic murmur 1/6  GI: soft, non-tender, non-distended, bowel sounds present  Skin: no rash  Ext: left upper extremity av fistula with palpable thrill and audible bruiit  Musculoskeletal: trace pitting edema     Data   Recent Labs   Lab 12/20/21  0543 12/20/21  0226 12/20/21  0130 12/19/21  2125 12/19/21  1738 12/19/21  1313 12/19/21  0655 12/18/21  0636 12/17/21  0618 12/16/21  1821   WBC 3.5*  --   --   --   --   --  4.0 3.3* 2.8* 3.9*   HGB 8.6*  --   --   --   --   --  8.7* 8.4* 8.7* 9.9*   MCV 85  --   --   --   --   --  86 93 90 91   *  --   --   --   --   --  132* 122* 125* 148*   NA  --   --  121* 123* 122*   < > 125* 132* 138 136   POTASSIUM  --   --   --   --   --   --  3.1* 3.5 3.8 3.8   CHLORIDE  --   --   --   --   --   --  93* 103 110* 108   CO2  --    --   --   --   --   --  19* 18* 18* 20   BUN  --   --   --   --   --   --  29 24 20 20   CR  --   --   --   --   --   --  3.17* 2.99* 3.08* 2.93*   ANIONGAP  --   --   --   --   --   --  13 11 10 8   SHAVON  --   --   --   --   --   --  7.1* 7.0* 7.3* 7.1*   GLC  --  99  --   --   --   --  134* 83 86 136*   ALBUMIN  --   --   --   --   --   --  2.1* 1.9* 2.2* 2.8*   PROTTOTAL  --   --   --   --   --   --  5.8* 5.7* 6.2* 7.2   BILITOTAL  --   --   --   --   --   --  0.7 0.7 0.8 0.7   ALKPHOS  --   --   --   --   --   --  144 142 133 162*   ALT  --   --   --   --   --   --  11 11 11 14   AST  --   --   --   --   --   --  31 29 31 36   LIPASE  --   --   --   --   --   --  537*  --   --  536*    < > = values in this interval not displayed.

## 2021-12-20 NOTE — PROGRESS NOTES
[facility]  Transplant Nephrology Progress note  Date of Admission:  12/16/2021  Today's Date: 12/20/2021  Requesting physician: Jarrett Mullen MD    Recommendations:  -Lasix 120mg IV q8h  -iron sucrose IV   -Plan for iHD x3h with 2L UF and 130Na bath to increase sodium to 126      Assessment & Plan   # DDKT: Trend up with TOSIN. 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    -Hold losartan with TOSIN    -iHD today for 3h, 2L UF, 130Na bath   -Consider kidney biopsy in coming days to rule out reversible cause. 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 Check CsA q48h.    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    - PLA2R Ab - negative, C3, C4 - normal, Immunofixation - no monoclonal protein; K/L ratio - 1.33; JEREMY pending      # Anemia in Chronic Renal Disease:  Hgb: Trend down      EDMUND: No, was previously on EDMUND but stopped because she wasn't picking up the phone.    - Iron studies: Low iron saturation. Recommend IV iron sucrose    # Mineral Bone Disorder:   - Secondary renal hyperparathyroidism; PTH level: Mildly elevated (151-300 pg/ml)        On treatment: Calcitriol  - Vitamin D; level: Normal        On supplement: No  - Calcium; level: Normal        On supplement: Yes  - Phosphorus; level: Normal        On supplement: No   -Continue calcitriol 0.5mcg daily and tums 500mg bid. Check PTH and ionized calcium    # Electrolytes:   - Potassium; level: Normal        On supplement: No  - Magnesium; level: Low        On supplement: No.  Been replaced by primary team  - Bicarbonate; level: Low        On supplement: Yes, continue bicarb 1950 bid. Follow up w/ diuresis and dialysis    #Hyponatremia   -Initially thought due to hypervolemia, TOSIN w/ decreased ability to excrete free water. Sonal of 40 and UOsm 285 could also represent tea and toast nephropathy   -Did not respond to tolvaptan 15mg on 12/20.    -Plan for iHD against 130Na bath. Goal SNa no higher than 127 by 5:45am on 12/21   -If SNa increases above that would give D5w 250cc bolus      # HFpEF:    -Decompensated clinically. Echo stable on 12/17/21 with elevated PASP and dilated IVC, grade II diastolic dysfunction   -UF with HD today and evaluate for UF needs again tmrw   -Lasix as above     # Pulmonary HTN: echo 12/17/21 w/ PASP 39mmHg + RA pressure. Likely worsened by volume overload   -UF as above    # Dyspepsia:   - Advised PPI 40 mg twice daily for 6 to 8 weeks, Carafate twice daily, and Pepcid 40 mg daily.  Follow-up H. Pylori stool antigen.   -GI consulted to consider EGD.    # Unintentional weight loss:   -Likely 2/2 her not eating.  EBV PCR 7K (12/16/2021)   -CMV negative (12/16/2021)              -GI consult for EGD        # Transplant History:  Etiology of Kidney Failure: IgA nephropathy  Tx: DDKT  Transplant:  2007 (Kidney), 1998 (Kidney)  Significant changes in immunosuppression: None  Significant transplant-related complications: DSA    Recommendations were communicated to the primary team verbally    Theodore Connor MD, TAM  Transplant Nephrology  Pager: [phone]        Milan Valencia MD    Physician Attestation   I, Theodore Connor MD, personally examined and evaluated this patient.  I discussed the patient with the resident/fellow and care team, and agree with the assessment and plan of care as documented in the note on 21 .      I personally reviewed vital signs, medications, labs and imaging.    Theodore Connor MD  Date of Service (when I saw the patient): 21          [place]  assisted with translation over phone    INTERVAL HISTORY  Continues to be fatigued. Not responding to lasix or tolvaptan    Review of Systems    The 4 point Review of Systems is negative other than noted in the HPI or here.    Allergies   Allergies   Allergen Reactions     Contrast Dye Shortness Of Breath     Other reaction(s): Angioedema     Amlodipine Other (See Comments)     Prior to Admission Medications     calcitRIOL  0.5 mcg Oral Daily     calcium carbonate  500 mg Oral BID     carvedilol  3.125 mg Oral BID w/meals     cycloSPORINE modified  25 mg Oral QPM     cycloSPORINE modified  25 mg Oral QAM     furosemide  120 mg Intravenous Q12H     heparin ANTICOAGULANT  5,000 Units Subcutaneous BID     hydrALAZINE  25 mg Oral TID     [Held by provider] losartan  50 mg Oral Daily     mycophenolate  750 mg Oral BID     pantoprazole  40 mg Oral BID     sodium bicarbonate  1,950 mg Oral BID     sodium chloride (PF)  3 mL Intracatheter Q8H     sucralfate  1 g Oral BID AC     sulfamethoxazole-trimethoprim  1 tablet Oral Q  AM     tolvaptan  15 mg Oral Daily         Physical Exam   Temp  Av.4  F (36.9  C)  Min: 97.8  F (36.6  C)  Max: 99.1  F (37.3  C)      Pulse  Av.2  Min: 69  Max: 105 Resp  Avg:  16.9  Min: 16  Max: 18  SpO2  Av.7 %  Min: 91 %  Max: 99 %     /79 (BP Location: Right arm)   Pulse 80   Temp 97.9  F (36.6  C) (Oral)   Resp 17   Ht 1.524 m (5')   Wt 64.5 kg (142 lb 3.2 oz)   SpO2 97%   BMI 27.77 kg/m      Admit Weight: 66 kg (145 lb 6.4 oz)     GENERAL APPEARANCE: alert and no distress  HENT: mouth without ulcers or lesions  LYMPHATICS: no cervical or supraclavicular nodes  RESP: rales bilateral bases  CV: normal rhythm, systolic murmur heard throughout the precordium radiating to carotids  EDEMA: 1+ LE edema bilaterally  ABDOMEN: soft, nondistended, nontender, bowel sounds normal  MS: extremities normal - no gross deformities noted, no evidence of inflammation in joints, no muscle tenderness  SKIN: no rash  NEURO: normal strength and tone, sensory exam grossly normal, mentation intact and speech normal  PSYCH: mentation appears normal and affect normal/bright  TX KIDNEY: normal  DIALYSIS ACCESS:  LUE AV fistula w/ good thrill and bruit    Data   CMP  Recent Labs   Lab 21  1154 21  0543 21  0226 21  0130 21  2125 21  1313 21  0655 21  0636 21  0618 21  1821   * 121*  121*  --  121* 123*   < > 125* 132* 138 136   POTASSIUM  --  4.0  --   --   --   --  3.1* 3.5 3.8 3.8   CHLORIDE  --  90*  --   --   --   --  93* 103 110* 108   CO2  --  18*  --   --   --   --  19* 18* 18* 20   ANIONGAP  --  13  --   --   --   --  13 11 10 8   GLC  --  93 99  --   --   --  134* 83 86 136*   BUN  --  33*  --   --   --   --  29 24 20 20   CR  --  3.37*  --   --   --   --  3.17* 2.99* 3.08* 2.93*   GFRESTIMATED  --  15*  --   --   --   --  16* 17* 16* 17*   SHAVON  --  7.7*  --   --   --   --  7.1* 7.0* 7.3* 7.1*   MAG  --   --   --   --   --   --  2.1 1.4*  --   --    PHOS  --   --   --   --   --   --   --  4.5  --   --    PROTTOTAL  --   --   --   --   --   --  5.8* 5.7* 6.2* 7.2   ALBUMIN  --   --   --   --   --   --  2.1* 1.9* 2.2*  2.8*   BILITOTAL  --   --   --   --   --   --  0.7 0.7 0.8 0.7   ALKPHOS  --   --   --   --   --   --  144 142 133 162*   AST  --   --   --   --   --   --  31 29 31 36   ALT  --   --   --   --   --   --  11 11 11 14    < > = values in this interval not displayed.     CBC  Recent Labs   Lab 12/20/21  0543 12/19/21  0655 12/18/21  0636 12/17/21  0618   HGB 8.6* 8.7* 8.4* 8.7*   WBC 3.5* 4.0 3.3* 2.8*   RBC 3.14* 3.16* 3.10* 3.12*   HCT 26.8* 27.3* 28.7* 28.0*   MCV 85 86 93 90   MCH 27.4 27.5 27.1 27.9   MCHC 32.1 31.9 29.3* 31.1*   RDW 13.4 13.4 13.9 13.9   * 132* 122* 125*     INRNo lab results found in last 7 days.  ABGNo lab results found in last 7 days.   Urine Studies  Recent Labs   Lab Test 12/20/21  0553 12/16/21  1810 09/25/21  1637 12/11/20  0734   COLOR Yellow Light Yellow Straw Straw   APPEARANCE Clear Clear Clear Clear   URINEGLC Negative 30 * Negative Negative   URINEBILI Negative Negative Negative Negative   URINEKETONE Negative Negative Negative Negative   SG 1.013 1.005 1.003 1.003   UBLD Negative Small* Trace* Negative   URINEPH 5.5 6.0 6.0 6.0   PROTEIN 100 * 200 * 100 * 100*   NITRITE Negative Negative Negative Negative   LEUKEST Small* Trace* Negative Negative   RBCU 1 1 0 <1   WBCU 13* 5 <1 2     Recent Labs   Lab Test 12/16/21  1810 09/03/21  0630 01/15/21  0732 03/18/20  1330 06/01/19  0700 02/16/19  0737 02/02/18  0742 11/17/17  0739 04/28/17  0843 10/28/16  0913 04/29/16  1145 06/12/15  1011 01/23/15  0700   UTPG 6.85* 4.32* 3.69* 1.35* 3.89* 3.92* 2.31* 1.74* 1.86* 1.84* 0.49* 0.73* 0.77*     PTH  Recent Labs   Lab Test 12/17/21  1616 03/17/20  0509 04/29/16  1043 02/05/16  1039 10/27/15  0555 03/26/15  1734 12/16/13  0636   PTHI 173* 291* 124* 140* 218* 251* 166*     Iron Studies  Recent Labs   Lab Test 12/17/21  1443 12/17/21  0618 09/03/21  0625 04/23/21  0716 03/12/21  0757 01/15/21  0725 10/29/20  0900 09/17/20  0758 08/10/20  0840 04/24/20  0800 03/17/20  0509   IRON 58  --  84  45 83 96 88 88 89 61 54     --  252 252 245 225* 251 248 242 286 240   IRONSAT 17  --  33 18 34 43 35 36 37 21 23   ANASTASIIA  --  317* 332* 361* 401* 396* 352* 374* 364* 299*  --        IMAGING:  All imaging studies reviewed by me.

## 2021-12-20 NOTE — PROGRESS NOTES
Brief Medicine Note:    Cross cover following for q4h sodium checks. Sodium stable 122, 123. Patient will be signed out to overnight staff for ongoing monitoring.    Crissy Cole PA-C  Internal Medicine [facility] Hospitalist  [facility]  Pager: [phone]

## 2021-12-20 NOTE — PROVIDER NOTIFICATION
Patient refused blood pressure medications this morning. Blood pressure 125/72. M.D. notified, stated will touch base with patient later today.

## 2021-12-20 NOTE — PROGRESS NOTES
GASTROENTEROLOGY PROGRESS NOTE    Date: 12/20/2021     ASSESSMENT:  Joshua Lala is a 55 year old female with kidney transplant x 2 (1998, 2007) 2/2 IgA nephropathy, EBV viremia (2015), GERD, HSP (1996), Leukopenia, PE (2001), tertiary hyperparathyroidism. She presented to [facility] ED on 12/16/21 with complaints of fatigue, edema, and oliguria also endorsing postprandial vomiting and post prandial pain since 4/2021.      # Post prandial epigastric burning pain  # GERD  # S/p kidney transplant  Presentation most suspicious for worsening of her GERD symptoms. Currently without red flag such as dysphagia, outlet obstruction, odynophagia, anemia. Other ddx considered but less likely are mesenteric ischemia (not vasculopathic), abd wall pain and gastroparesis (nausea/vomiting not a predominant features, not DM, no chronic opioid), CMV esophagitis (on MMF). Malignancy is always a consideration but the patient lack other risk factors such as ETOH, smoking, family history and the weight loss is probably more related to the change in her diet. CT on 12/19 showed anasarca.       Interval history: Conflicting reports about patient's symptoms. Nursing note documented worsening pain and kept the patient up all night. This AM, speaking with patient through , the patient said overall her post prandial pain improved and did not have pain with dinner last night. She could not sleep the other day because hard to fall asleep in the hospital not because of pain. However after breakfast she started having pain again per primary team report. Regardless, the patient's ongoing hospitalization is for her fluid status and Na. The ongoing post prandial pain (going on for several months as outpatient) is still likely from PUD/esphagitis and it might take sometime for PPI BID to take effect (just increased to BID on 12/17). As outpatient, would also hold PPI for 1-2 weeks and test for H pylori.        RECOMMENDATIONS:  -- Cont with PPI  BID (6-8 weeks), Carafate QID  -- Add famotidine 20mg at night.   -- After finishing 6-8 weeks of PPI BID, if continue to have symptoms the reasonable to repeat EGD.   -- Inpatient GI will sign off. Please contact us with further question/concerns.     Gastroenterology follow up recommendations: Pending clinical course.      Thank you for involving us in this patient's care. Please do not hesitate to contact the GI service with any questions or concerns.      Pt care plan discussed with Dr. Kingsley, GI staff physician.    Tim Mendieta MD  Gastroenterology Fellow  [facility]  Text page [phone]    _______________________________________________________________      Subjective:  NAEO. Thinks her abd pain is better. Did not have pain with dinner last night.     Objective:  Blood pressure 125/72, pulse 80, temperature 98.3  F (36.8  C), temperature source Oral, resp. rate 18, height 1.524 m (5'), weight 64.5 kg (142 lb 3.2 oz), SpO2 97 %, not currently breastfeeding.    Constitutional: NAD  Eyes: Conjunctiva anicteric  HEENT: Normal oropharynx without ulcers or exudate, mucus membranes moist  CV: No Jimmy edema  Respiratory: Breathing comfortably on ambient air  Abd:  Nondistended, +bs, no hepatosplenomegaly, nontender, no rebound or guarding, mild ttp in epigastric.    Skin: warm, perfused, no jaundice  Neuro: A&O x 3, No asterixis      LABS:  BMP  Recent Labs   Lab 12/20/21  0543 12/20/21  0226 12/20/21  0130 12/19/21  2125 12/19/21  1738 12/19/21  1313 12/19/21  0655 12/18/21  0636 12/17/21  0618   *  121*  --  121* 123* 122*   < > 125* 132* 138   POTASSIUM 4.0  --   --   --   --   --  3.1* 3.5 3.8   CHLORIDE 90*  --   --   --   --   --  93* 103 110*   SHAVON 7.7*  --   --   --   --   --  7.1* 7.0* 7.3*   CO2 18*  --   --   --   --   --  19* 18* 18*   BUN 33*  --   --   --   --   --  29 24 20   CR 3.37*  --   --   --   --   --  3.17* 2.99* 3.08*   GLC 93 99  --   --   --   --  134* 83 86    < > = values in  this interval not displayed.     CBC  Recent Labs   Lab 12/20/21  0543 12/19/21  0655 12/18/21  0636 12/17/21  0618   WBC 3.5* 4.0 3.3* 2.8*   RBC 3.14* 3.16* 3.10* 3.12*   HGB 8.6* 8.7* 8.4* 8.7*   HCT 26.8* 27.3* 28.7* 28.0*   MCV 85 86 93 90   MCH 27.4 27.5 27.1 27.9   MCHC 32.1 31.9 29.3* 31.1*   RDW 13.4 13.4 13.9 13.9   * 132* 122* 125*     INRNo lab results found in last 7 days.  LFTs  Recent Labs   Lab 12/19/21  0655 12/18/21  0636 12/17/21  0618 12/16/21  1821   ALKPHOS 144 142 133 162*   AST 31 29 31 36   ALT 11 11 11 14   BILITOTAL 0.7 0.7 0.8 0.7   PROTTOTAL 5.8* 5.7* 6.2* 7.2   ALBUMIN 2.1* 1.9* 2.2* 2.8*      PANC  Recent Labs   Lab 12/19/21  0655 12/16/21  1821   LIPASE 537* 536*       IMAGING:

## 2021-12-21 ENCOUNTER — APPOINTMENT (OUTPATIENT)
Dept: GENERAL RADIOLOGY | Facility: CLINIC | Age: 56
DRG: 698 | End: 2021-12-21
Attending: INTERNAL MEDICINE
Payer: MEDICARE

## 2021-12-21 LAB
ACTH PLAS-MCNC: 30 PG/ML
ANION GAP SERPL CALCULATED.3IONS-SCNC: 10 MMOL/L (ref 3–14)
ATRIAL RATE - MUSE: 70 BPM
ATRIAL RATE - MUSE: 86 BPM
B-HCG SERPL-ACNC: 3 IU/L (ref 0–5)
BUN SERPL-MCNC: 18 MG/DL (ref 7–30)
CALCIUM SERPL-MCNC: 7.5 MG/DL (ref 8.5–10.1)
CHLORIDE BLD-SCNC: 88 MMOL/L (ref 94–109)
CO2 SERPL-SCNC: 24 MMOL/L (ref 20–32)
CREAT SERPL-MCNC: 2.25 MG/DL (ref 0.52–1.04)
DIASTOLIC BLOOD PRESSURE - MUSE: NORMAL MMHG
DIASTOLIC BLOOD PRESSURE - MUSE: NORMAL MMHG
DONOR IDENTIFICATION: NORMAL
DR51: 1889
DSA COMMENTS: NORMAL
DSA PRESENT: YES
DSA TEST METHOD: NORMAL
ERYTHROCYTE [DISTWIDTH] IN BLOOD BY AUTOMATED COUNT: 13.3 % (ref 10–15)
GFR SERPL CREATININE-BSD FRML MDRD: 24 ML/MIN/1.73M2
GLUCOSE BLD-MCNC: 86 MG/DL (ref 70–99)
HCT VFR BLD AUTO: 26.5 % (ref 35–47)
HGB BLD-MCNC: 8.6 G/DL (ref 11.7–15.7)
INR PPP: 1.11 (ref 0.85–1.15)
INTERPRETATION ECG - MUSE: NORMAL
INTERPRETATION ECG - MUSE: NORMAL
MCH RBC QN AUTO: 27.7 PG (ref 26.5–33)
MCHC RBC AUTO-ENTMCNC: 32.5 G/DL (ref 31.5–36.5)
MCV RBC AUTO: 85 FL (ref 78–100)
ORGAN: NORMAL
P AXIS - MUSE: NORMAL DEGREES
P AXIS - MUSE: NORMAL DEGREES
PLATELET # BLD AUTO: 133 10E3/UL (ref 150–450)
POTASSIUM BLD-SCNC: 3.4 MMOL/L (ref 3.4–5.3)
PR INTERVAL - MUSE: NORMAL MS
PR INTERVAL - MUSE: NORMAL MS
PROT ELPH PNL UR ELPH: NORMAL
QRS DURATION - MUSE: 102 MS
QRS DURATION - MUSE: 102 MS
QT - MUSE: 414 MS
QT - MUSE: 432 MS
QTC - MUSE: 456 MS
QTC - MUSE: 486 MS
R AXIS - MUSE: 65 DEGREES
R AXIS - MUSE: 79 DEGREES
RBC # BLD AUTO: 3.11 10E6/UL (ref 3.8–5.2)
SA 1 CELL: NORMAL
SA 1 TEST METHOD: NORMAL
SA 2 CELL: NORMAL
SA 2 TEST METHOD: NORMAL
SA1 HI RISK ABY: NORMAL
SA1 MOD RISK ABY: NORMAL
SA2 HI RISK ABY: NORMAL
SA2 MOD RISK ABY: NORMAL
SODIUM SERPL-SCNC: 122 MMOL/L (ref 133–144)
SODIUM SERPL-SCNC: 124 MMOL/L (ref 133–144)
SODIUM SERPL-SCNC: 124 MMOL/L (ref 133–144)
SODIUM SERPL-SCNC: 129 MMOL/L (ref 133–144)
SYSTOLIC BLOOD PRESSURE - MUSE: NORMAL MMHG
SYSTOLIC BLOOD PRESSURE - MUSE: NORMAL MMHG
T AXIS - MUSE: 3 DEGREES
T AXIS - MUSE: 7 DEGREES
TROPONIN I SERPL HS-MCNC: 49 NG/L
UNACCEPTABLE ANTIGENS: NORMAL
UNOS CPRA: 61
VENTRICULAR RATE- MUSE: 67 BPM
VENTRICULAR RATE- MUSE: 83 BPM
WBC # BLD AUTO: 3.9 10E3/UL (ref 4–11)
ZZZSA 1  COMMENTS: NORMAL
ZZZSA 2 COMMENTS: NORMAL

## 2021-12-21 PROCEDURE — 85027 COMPLETE CBC AUTOMATED: CPT | Performed by: INTERNAL MEDICINE

## 2021-12-21 PROCEDURE — 84295 ASSAY OF SERUM SODIUM: CPT | Performed by: STUDENT IN AN ORGANIZED HEALTH CARE EDUCATION/TRAINING PROGRAM

## 2021-12-21 PROCEDURE — 36415 COLL VENOUS BLD VENIPUNCTURE: CPT | Performed by: RADIOLOGY

## 2021-12-21 PROCEDURE — 99233 SBSQ HOSP IP/OBS HIGH 50: CPT | Performed by: INTERNAL MEDICINE

## 2021-12-21 PROCEDURE — 250N000012 HC RX MED GY IP 250 OP 636 PS 637: Performed by: NURSE PRACTITIONER

## 2021-12-21 PROCEDURE — 250N000013 HC RX MED GY IP 250 OP 250 PS 637: Performed by: INTERNAL MEDICINE

## 2021-12-21 PROCEDURE — 5A1D70Z PERFORMANCE OF URINARY FILTRATION, INTERMITTENT, LESS THAN 6 HOURS PER DAY: ICD-10-PCS | Performed by: INTERNAL MEDICINE

## 2021-12-21 PROCEDURE — 85610 PROTHROMBIN TIME: CPT | Performed by: NURSE PRACTITIONER

## 2021-12-21 PROCEDURE — 84295 ASSAY OF SERUM SODIUM: CPT | Performed by: INTERNAL MEDICINE

## 2021-12-21 PROCEDURE — 86225 DNA ANTIBODY NATIVE: CPT | Performed by: INTERNAL MEDICINE

## 2021-12-21 PROCEDURE — 258N000003 HC RX IP 258 OP 636: Performed by: INTERNAL MEDICINE

## 2021-12-21 PROCEDURE — 36415 COLL VENOUS BLD VENIPUNCTURE: CPT | Performed by: INTERNAL MEDICINE

## 2021-12-21 PROCEDURE — 84702 CHORIONIC GONADOTROPIN TEST: CPT | Performed by: RADIOLOGY

## 2021-12-21 PROCEDURE — 250N000013 HC RX MED GY IP 250 OP 250 PS 637: Performed by: STUDENT IN AN ORGANIZED HEALTH CARE EDUCATION/TRAINING PROGRAM

## 2021-12-21 PROCEDURE — 36415 COLL VENOUS BLD VENIPUNCTURE: CPT | Performed by: STUDENT IN AN ORGANIZED HEALTH CARE EDUCATION/TRAINING PROGRAM

## 2021-12-21 PROCEDURE — 250N000013 HC RX MED GY IP 250 OP 250 PS 637: Performed by: NURSE PRACTITIONER

## 2021-12-21 PROCEDURE — 71045 X-RAY EXAM CHEST 1 VIEW: CPT | Mod: 26 | Performed by: RADIOLOGY

## 2021-12-21 PROCEDURE — 93010 ELECTROCARDIOGRAM REPORT: CPT | Mod: 76 | Performed by: INTERNAL MEDICINE

## 2021-12-21 PROCEDURE — 250N000011 HC RX IP 250 OP 636: Performed by: STUDENT IN AN ORGANIZED HEALTH CARE EDUCATION/TRAINING PROGRAM

## 2021-12-21 PROCEDURE — 90937 HEMODIALYSIS REPEATED EVAL: CPT

## 2021-12-21 PROCEDURE — 84484 ASSAY OF TROPONIN QUANT: CPT | Performed by: INTERNAL MEDICINE

## 2021-12-21 PROCEDURE — 250N000012 HC RX MED GY IP 250 OP 636 PS 637: Performed by: STUDENT IN AN ORGANIZED HEALTH CARE EDUCATION/TRAINING PROGRAM

## 2021-12-21 PROCEDURE — 120N000011 HC R&B TRANSPLANT UMMC

## 2021-12-21 PROCEDURE — 250N000011 HC RX IP 250 OP 636: Performed by: NURSE PRACTITIONER

## 2021-12-21 PROCEDURE — 258N000003 HC RX IP 258 OP 636: Performed by: STUDENT IN AN ORGANIZED HEALTH CARE EDUCATION/TRAINING PROGRAM

## 2021-12-21 PROCEDURE — 86335 IMMUNFIX E-PHORSIS/URINE/CSF: CPT | Mod: 26 | Performed by: PATHOLOGY

## 2021-12-21 PROCEDURE — 84295 ASSAY OF SERUM SODIUM: CPT | Performed by: PHYSICIAN ASSISTANT

## 2021-12-21 PROCEDURE — 71045 X-RAY EXAM CHEST 1 VIEW: CPT

## 2021-12-21 PROCEDURE — 93005 ELECTROCARDIOGRAM TRACING: CPT

## 2021-12-21 PROCEDURE — 36415 COLL VENOUS BLD VENIPUNCTURE: CPT | Performed by: PHYSICIAN ASSISTANT

## 2021-12-21 PROCEDURE — 250N000013 HC RX MED GY IP 250 OP 250 PS 637: Performed by: PHYSICIAN ASSISTANT

## 2021-12-21 RX ORDER — FUROSEMIDE 10 MG/ML
120 INJECTION INTRAMUSCULAR; INTRAVENOUS EVERY 12 HOURS
Status: DISCONTINUED | OUTPATIENT
Start: 2021-12-21 | End: 2021-12-21

## 2021-12-21 RX ADMIN — PANTOPRAZOLE SODIUM 40 MG: 40 TABLET, DELAYED RELEASE ORAL at 09:20

## 2021-12-21 RX ADMIN — SUCRALFATE 1 G: 1 TABLET ORAL at 17:50

## 2021-12-21 RX ADMIN — CYCLOSPORINE 25 MG: 25 CAPSULE, LIQUID FILLED ORAL at 17:50

## 2021-12-21 RX ADMIN — HYDRALAZINE HYDROCHLORIDE 25 MG: 25 TABLET, FILM COATED ORAL at 09:20

## 2021-12-21 RX ADMIN — IRON SUCROSE 200 MG: 20 INJECTION, SOLUTION INTRAVENOUS at 00:49

## 2021-12-21 RX ADMIN — SUCRALFATE 1 G: 1 TABLET ORAL at 09:20

## 2021-12-21 RX ADMIN — CARVEDILOL 3.12 MG: 3.12 TABLET, FILM COATED ORAL at 09:20

## 2021-12-21 RX ADMIN — FAMOTIDINE 20 MG: 20 TABLET ORAL at 21:29

## 2021-12-21 RX ADMIN — Medication: at 13:19

## 2021-12-21 RX ADMIN — HYDRALAZINE HYDROCHLORIDE 25 MG: 25 TABLET, FILM COATED ORAL at 20:16

## 2021-12-21 RX ADMIN — CARVEDILOL 3.12 MG: 3.12 TABLET, FILM COATED ORAL at 17:51

## 2021-12-21 RX ADMIN — OXYCODONE HYDROCHLORIDE 5 MG: 5 TABLET ORAL at 00:48

## 2021-12-21 RX ADMIN — TOLVAPTAN 15 MG: 15 TABLET ORAL at 09:21

## 2021-12-21 RX ADMIN — SODIUM CHLORIDE 250 ML: 9 INJECTION, SOLUTION INTRAVENOUS at 13:19

## 2021-12-21 RX ADMIN — CALCITRIOL CAPSULES 0.25 MCG 0.5 MCG: 0.25 CAPSULE ORAL at 09:20

## 2021-12-21 RX ADMIN — SODIUM CHLORIDE 300 ML: 9 INJECTION, SOLUTION INTRAVENOUS at 13:19

## 2021-12-21 RX ADMIN — HEPARIN SODIUM 5000 UNITS: 5000 INJECTION, SOLUTION INTRAVENOUS; SUBCUTANEOUS at 09:21

## 2021-12-21 RX ADMIN — FUROSEMIDE 120 MG: 10 INJECTION, SOLUTION INTRAVENOUS at 09:19

## 2021-12-21 RX ADMIN — CALCIUM CARBONATE 500 MG: 500 TABLET, CHEWABLE ORAL at 20:16

## 2021-12-21 RX ADMIN — SODIUM BICARBONATE 1950 MG: 650 TABLET ORAL at 09:20

## 2021-12-21 RX ADMIN — PANTOPRAZOLE SODIUM 40 MG: 40 TABLET, DELAYED RELEASE ORAL at 20:15

## 2021-12-21 RX ADMIN — MYCOPHENOLATE MOFETIL 750 MG: 250 CAPSULE ORAL at 09:20

## 2021-12-21 RX ADMIN — CYCLOSPORINE 25 MG: 25 CAPSULE, LIQUID FILLED ORAL at 09:20

## 2021-12-21 RX ADMIN — DIPHENHYDRAMINE HYDROCHLORIDE 50 MG: 50 INJECTION INTRAMUSCULAR; INTRAVENOUS at 03:18

## 2021-12-21 RX ADMIN — MYCOPHENOLATE MOFETIL 750 MG: 250 CAPSULE ORAL at 20:15

## 2021-12-21 RX ADMIN — SODIUM BICARBONATE 1950 MG: 650 TABLET ORAL at 20:16

## 2021-12-21 RX ADMIN — CALCIUM CARBONATE 500 MG: 500 TABLET, CHEWABLE ORAL at 09:20

## 2021-12-21 ASSESSMENT — ACTIVITIES OF DAILY LIVING (ADL)
ADLS_ACUITY_SCORE: 9

## 2021-12-21 ASSESSMENT — MIFFLIN-ST. JEOR
SCORE: 1164.5
SCORE: 1184.19
SCORE: 1184.5

## 2021-12-21 NOTE — PROGRESS NOTES
HEMODIALYSIS TREATMENT NOTE    Date: 12/20/2021  Time: 8:27 PM    Data:  Pre Wt:     Desired Wt: 62.5 kg   Post Wt:    Weight change:   kg  Ultrafiltration - Post Run Net Total Removed (mL): 2000 mL  Vascular Access Status: patent  Dialyzer Rinse: Light,Streaked  Total Blood Volume Processed: 52.48 L Liters  Total Dialysis (Treatment) Time: 3.0 Hours    Lab:   Yes    Assessment:  Pt ran for 3 hours on a K3/ Ca 3 bath with / . Access cannulated with 16 marisa needles. Large aneurysm present, pt clotted within 5 minutes of decannulation. Pt  present throughout majority of treatment. Pt denied discomfort throughout. Report given to PCN.      Plan:    Per renal team

## 2021-12-21 NOTE — PROGRESS NOTES
[facility]  Transplant Nephrology Progress note  Date of Admission:  12/16/2021  Today's Date: 12/21/2021  Requesting physician: Jarrett Mullen MD    Recommendations:  -Hold lasix  -Hold iron sucrose and ok to put on allergy list due to back pain as allergy  -Plan for iHD x3h with 2L UF and 135Na bath to increase sodium to 128.   -Please order kidney transplant biopsy tmrw to determine if there is any acute cause for TOSIN or whether this was progression of chronic disease  -NPO at midnight  -Hold heparin tonight  -Consider tube feeds due to malnutrition. Would ask GI to consider EGD   -Send anti dsDNA (ordered)  -EDMUND after HD tmrw  -Stop bicarb (ordered)    Assessment & Plan   # DDKT: Trend up with TOSIN. 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    -Hold losartan with TOSIN               -Ok to hold lasix.   -iHD again today for 3h, 2L UF, 135Na bath   -Plan for kidney txp biopsy tmrw to rule out reversible cause. 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 Check CsA q48h.    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    - PLA2R Ab - negative, C3, C4 - normal, Immunofixation - no monoclonal protein; K/L ratio - 1.33; JEREMY - + 1:320 but complements normal   -Follow up anti ds-DNA sent on 12/21        # Anemia in Chronic Renal Disease: Hgb: Trend down      EDMUND: No, was previously on EDMUND but stopped because she wasn't picking up the phone.    - Iron studies: Low iron saturation. Received half of a dose on 12/20 but developed back pain and it was stopped   -Will plan to give EDMUND in coming days tmrw    # Mineral Bone Disorder:   - Secondary renal hyperparathyroidism; PTH level: Mildly elevated (151-300 pg/ml)        On treatment: Calcitriol  - Vitamin D; level: Normal        On supplement: No  - Calcium; level: Normal        On supplement: Yes  - Phosphorus; level: Normal        On supplement: No   -Continue calcitriol 0.5mcg daily and tums 500mg bid. Check PTH and ionized calcium    # Electrolytes:   - Potassium; level: Normal        On supplement: No  - Magnesium; level: Low        On supplement: No.  Been replaced by primary team  - Bicarbonate; level: Low        On supplement: Yes, stop as she is on dialysis    #Hyponatremia   -Initially thought due to hypervolemia, TOSIN w/ decreased ability to excrete free water. Sonal of 40 and UOsm 285 could also represent tea and toast nephropathy   -Did not respond to tolvaptan 15mg on 12/20.    -Plan for iHD against 130Na bath. Goal SNa no higher than 127 by 5:45am on 12/21   -If SNa increases above that would give D5w 250cc bolus      # HFpEF:    -Decompensated clinically. Echo stable on 12/17/21 with elevated PASP and dilated IVC, grade II diastolic dysfunction   -UF with HD again today and evaluate for UF needs again tmrw   -Hold lasix. Not responding     #  Pulmonary HTN: echo 12/17/21 w/ PASP 39mmHg + RA pressure. Likely worsened by volume overload   -UF as above    # Dyspepsia:   - Advised PPI 40 mg twice daily for 6 to 8 weeks, Carafate twice daily, and Pepcid 40 mg daily.  Follow-up H. Pylori stool antigen.   -GI consulted to consider EGD but deferred. Recommend seeing if they would do it with continued abdomina pain    # Unintentional weight loss:   -Likely 2/2 her not eating.  EBV PCR 7K (12/16/2021)   -CMV negative (12/16/2021)              -GI consult for EGD   -Likely with severe malnutrition   -Consider initiation of tube feeds     # Transplant History:  Etiology of Kidney Failure: IgA nephropathy  Tx: DDKT  Transplant: 11/28/2007 (Kidney), 8/8/1998 (Kidney)  Significant changes in immunosuppression: None  Significant transplant-related complications: DSA    Recommendations were communicated to the primary team verbally    Theodore Connor MD, TAM  Transplant Nephrology  Pager: [phone]      [facility] [place] over phone    INTERVAL HISTORY  Developed back pain during IV iron infusion. Received benadryl which made her feel tired. Tolerated HD with 2L UF yesterday. SNa 124 this morning    Review of Systems    The 4 point Review of Systems is negative other than noted in the HPI or here.    Allergies   Allergies   Allergen Reactions     Contrast Dye Shortness Of Breath     Other reaction(s): Angioedema     Amlodipine Other (See Comments)     Prior to Admission Medications     calcitRIOL  0.5 mcg Oral Daily     calcium carbonate  500 mg Oral BID     carvedilol  3.125 mg Oral BID w/meals     cycloSPORINE modified  25 mg Oral QPM     cycloSPORINE modified  25 mg Oral QAM     famotidine  20 mg Oral At Bedtime     [Held by provider] heparin ANTICOAGULANT  5,000 Units Subcutaneous BID     hydrALAZINE  25 mg Oral TID     [Held by provider] losartan  50 mg Oral Daily     mycophenolate  750 mg Oral BID     pantoprazole  40 mg Oral BID      sodium bicarbonate  1,950 mg Oral BID     sodium chloride (PF)  3 mL Intracatheter Q8H     sucralfate  1 g Oral BID AC     sulfamethoxazole-trimethoprim  1 tablet Oral Q  AM     tolvaptan  15 mg Oral Daily       - MEDICATION INSTRUCTIONS -         Physical Exam   Temp  Av.4  F (36.9  C)  Min: 97.8  F (36.6  C)  Max: 99.1  F (37.3  C)      Pulse  Av.2  Min: 69  Max: 105 Resp  Av.9  Min: 16  Max: 18  SpO2  Av.7 %  Min: 91 %  Max: 99 %     /75   Pulse 79   Temp 97  F (36.1  C) (Oral)   Resp 12   Ht 1.524 m (5')   Wt 66.8 kg (147 lb 4.3 oz)   SpO2 99%   BMI 28.76 kg/m      Admit Weight: 66 kg (145 lb 6.4 oz)     GENERAL APPEARANCE: alert and no distress  HENT: mouth without ulcers or lesions  LYMPHATICS: no cervical or supraclavicular nodes  RESP: rales bilateral bases  CV: normal rhythm, systolic murmur heard throughout the precordium radiating to carotids  EDEMA: 1+ LE edema bilaterally  ABDOMEN: soft, nondistended, nontender, bowel sounds normal  MS: extremities normal - no gross deformities noted, no evidence of inflammation in joints, no muscle tenderness  SKIN: no rash  NEURO: normal strength and tone, sensory exam grossly normal, mentation intact and speech normal  PSYCH: mentation appears normal and affect normal/bright  TX KIDNEY: normal  DIALYSIS ACCESS:  LUE AV fistula w/ good thrill and bruit    Data   CMP  Recent Labs   Lab 21  0946 21  0335 21  0117 21  0003 21  1154 21  0543 21  0226 21  1313 21  0655 21  0636 21  0618 21  1821   * 122*  122* 122* 124*   < > 121*  121*  --    < > 125* 132* 138 136   POTASSIUM  --  3.4  --   --   --  4.0  --   --  3.1* 3.5 3.8 3.8   CHLORIDE  --  88*  --   --   --  90*  --   --  93* 103 110* 108   CO2  --  24  --   --   --  18*  --   --  19* 18* 18* 20   ANIONGAP  --  10  --   --   --  13  --   --  13 11 10 8   GLC  --  86  --   --   --   99  --   134* 83 86 136*   BUN  --  18  --   --   --  33*  --   --  29 24 20 20   CR  --  2.25*  --   --   --  3.37*  --   --  3.17* 2.99* 3.08* 2.93*   GFRESTIMATED  --  24*  --   --   --  15*  --   --  16* 17* 16* 17*   SHAVON  --  7.5*  --   --   --  7.7*  --   --  7.1* 7.0* 7.3* 7.1*   MAG  --   --   --   --   --   --   --   --  2.1 1.4*  --   --    PHOS  --   --   --   --   --   --   --   --   --  4.5  --   --    PROTTOTAL  --   --   --   --   --   --   --   --  5.8* 5.7* 6.2* 7.2   ALBUMIN  --   --   --   --   --   --   --   --  2.1* 1.9* 2.2* 2.8*   BILITOTAL  --   --   --   --   --   --   --   --  0.7 0.7 0.8 0.7   ALKPHOS  --   --   --   --   --   --   --   --  144 142 133 162*   AST  --   --   --   --   --   --   --   --  31 29 31 36   ALT  --   --   --   --   --   --   --   --  11 11 11 14    < > = values in this interval not displayed.     CBC  Recent Labs   Lab 12/21/21  0335 12/20/21  0543 12/19/21  0655 12/18/21  0636   HGB 8.6* 8.6* 8.7* 8.4*   WBC 3.9* 3.5* 4.0 3.3*   RBC 3.11* 3.14* 3.16* 3.10*   HCT 26.5* 26.8* 27.3* 28.7*   MCV 85 85 86 93   MCH 27.7 27.4 27.5 27.1   MCHC 32.5 32.1 31.9 29.3*   RDW 13.3 13.4 13.4 13.9   * 131* 132* 122*     INRNo lab results found in last 7 days.  ABGNo lab results found in last 7 days.   Urine Studies  Recent Labs   Lab Test 12/20/21  0553 12/16/21  1810 09/25/21  1637 12/11/20  0734   COLOR Yellow Light Yellow Straw Straw   APPEARANCE Clear Clear Clear Clear   URINEGLC Negative 30 * Negative Negative   URINEBILI Negative Negative Negative Negative   URINEKETONE Negative Negative Negative Negative   SG 1.013 1.005 1.003 1.003   UBLD Negative Small* Trace* Negative   URINEPH 5.5 6.0 6.0 6.0   PROTEIN 100 * 200 * 100 * 100*   NITRITE Negative Negative Negative Negative   LEUKEST Small* Trace* Negative Negative   RBCU 1 1 0 <1   WBCU 13* 5 <1 2     Recent Labs   Lab Test 12/16/21  1810 09/03/21  0630 01/15/21  0732 03/18/20  1330 06/01/19  0700 02/16/19  0737  02/02/18  0742 11/17/17  0739 04/28/17  0843 10/28/16  0913 04/29/16  1145 06/12/15  1011 01/23/15  0700   UTPG 6.85* 4.32* 3.69* 1.35* 3.89* 3.92* 2.31* 1.74* 1.86* 1.84* 0.49* 0.73* 0.77*     PTH  Recent Labs   Lab Test 12/17/21  1616 03/17/20  0509 04/29/16  1043 02/05/16  1039 10/27/15  0555 03/26/15  1734 12/16/13  0636   PTHI 173* 291* 124* 140* 218* 251* 166*     Iron Studies  Recent Labs   Lab Test 12/17/21  1443 12/17/21  0618 09/03/21  0625 04/23/21  0716 03/12/21  0757 01/15/21  0725 10/29/20  0900 09/17/20  0758 08/10/20  0840 04/24/20  0800 03/17/20  0509   IRON 58  --  84 45 83 96 88 88 89 61 54     --  252 252 245 225* 251 248 242 286 240   IRONSAT 17  --  33 18 34 43 35 36 37 21 23   ANASTASIIA  --  317* 332* 361* 401* 396* 352* 374* 364* 299*  --        IMAGING:  All imaging studies reviewed by me.

## 2021-12-21 NOTE — PROGRESS NOTES
[facility] cover    Called that during IV iron infusion, she developed back  Pain. IV iron infusion was temporarily held, and was resumed, but again the patient woke up complaining of  Pain in back and chest accompanied by SOB. Stable VS. Concerning that she may be reacting to IV iron. Benadryl given.    - stat EKG, pCXR  - stat labs including troponin, CBC, BMP  - OK to discontinue the IV iron infusion for tonight  - day team to discuss w nephrology re future IV iron dosing, could try pre-medication    EKG  HR 67 irregular QRS intervals with no clear p waves, slow afib?    CXR  No new/acute changes    Lab  No significant change, troponin not elevated.    - continue to monitor

## 2021-12-21 NOTE — PROVIDER NOTIFICATION
"PROVIDER NOTIFICATION:    1008: Paged Dr. MATHUES Hernandez at [phone] -- FYI, pt reported she's had blurry vision, lightheadedness, & dizziness persisting since iron infusion last night.    MD called back at 1012, received page and noted information.    1122: Paged Dr. MATHEUS Hernandez at [phone] -- Pt began c/o her \"liver, lungs, and heart feeling like they're melting away\" ~30min away & feels very tired all of a sudden. No pain, SOB, VSS. Dr. Hernandez called writer back, came to bedside within the hour (Dr. Connor, Neph Txp MD also at bedside), both providers believe these feelings are all r/t iron infusion rxn and the IV Benadryl she got to tx the rxn in addition to hyponatremia. Cont supportive cares at this time.  "

## 2021-12-21 NOTE — PLAN OF CARE
BP (!) 145/91 (BP Location: Right arm, Patient Position: Sitting)   Pulse 84   Temp 97.6  F (36.4  C) (Oral)   Resp 18   Ht 1.524 m (5')   Wt 64.5 kg (142 lb 3.2 oz)   SpO2 98%   BMI 27.77 kg/m      Shift: 2501-3328  VS: 's on RA, afebrile  Neuro: AOx4  BG: none  Labs: troponin 49, sodium 124; EKG a fib  Respiratory: on 1 LPM for comfort, feeling SOB  Pain/Nausea/PRN: denies nausea, benadryl x1 for reaction to iron  Diet: renal, 1.8 L FR  LDA: PIV SL, L AVF  GI/: voiding, LBM 12/20  Skin: WDL  Mobility: SBA  Plan: EKG this am to follow up on A fib    Handoff given to following RN.

## 2021-12-21 NOTE — PROVIDER NOTIFICATION
Provider notified that pt was experiencing SOB and chest pain during iron infusion. EKG, Chest XR, labs ordered. Provider, Chris Tsang, came to assess pt, but pt lethargic from IV benadryl. EKg came back a fib, plans for another EKG in AM for comparison.

## 2021-12-21 NOTE — CONSULTS
Interventional Radiology Consult Service Note    Patient is on IR schedule 12/22 for a RLQ renal transplant biopsy.   Platelets WNLs. INR pending. COVID neg.    Orders for NPO have been entered.   Medications to be held include: subcutaneous heparin x1 dose  Consent will be done prior to procedure.     Please contact the IR charge RN at [phone] for estimated time of procedure.     Case discussed with Dr. Hernandez. This is a 55 year old female with a past medical history of kidney transplant x 2 (1998, 2007) 2/2 IgA nephropathy, EBV viremia (2015), GERD, HSP (1996), Leukopenia, PE (2001), tertiary hyperparathyroidism. She presented to [facility] ED on 12/16/21 with complaints of fatigue, edema, and oliguria. She is admitted to the internal medicine service with transplant nephrology consulting for further evaluation and management. Unclear etiology of decreased urine output and TOSIN. Transplant nephrology is requesting biopsy therefore IR is consulted.     Expected date of discharge: TBD    Vitals:   /65   Pulse 86   Temp 97  F (36.1  C) (Oral)   Resp 12   Ht 1.524 m (5')   Wt 66.8 kg (147 lb 4.3 oz)   SpO2 94%   BMI 28.76 kg/m      Pertinent Labs:     Lab Results   Component Value Date    WBC 3.9 (L) 12/21/2021    WBC 3.5 (L) 12/20/2021    WBC 4.0 12/19/2021    WBC 3.3 (L) 03/12/2021    WBC 3.9 (L) 01/15/2021    WBC 1.9 (L) 12/11/2020       Lab Results   Component Value Date    HGB 8.6 12/21/2021    HGB 8.6 12/20/2021    HGB 8.7 12/19/2021    HGB 8.7 04/23/2021    HGB 8.7 03/12/2021    HGB 9.3 01/15/2021       Lab Results   Component Value Date     12/21/2021     12/20/2021     12/19/2021     03/12/2021     01/15/2021     12/11/2020       Lab Results   Component Value Date    INR 1.15 (H) 01/26/2020    PTT 39 (H) 01/26/2020       Lab Results   Component Value Date    POTASSIUM 3.4 12/21/2021    POTASSIUM 4.3 03/12/2021        Amelia Choudhury, APRN CNP  Interventional  Radiology  Pager: [phone]

## 2021-12-21 NOTE — PROGRESS NOTES
[facility]    Medicine Progress Note - Hospitalist Service, Gold 9       Date of Admission:  12/16/2021    Assessment & Plan                Joshua Lala is a 55 year old female admitted on 12/16/2021. 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 She presented to [facility] ED on 12/16/21 with complaints of fatigue, edema, and oliguria. She is admitted to the internal medicine service with transplant nephrology consulting for further evaluation and management.    Interval Changes:  -Stop Lasix  -Hold heparin shots and make NPO tonight for transplanted kidney biopsy  -Plan for UF/HD via LUE av fistula per transplant nephrology  -STOP IV iron  -Sodium q12h monitoring overnight with goal to not increase Na more than 6 every 24 hours.      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 Second transplant 2007, now with chronic antibody rejection, CKD IV. Last nephrology visit in chart 03/29/21. Missed an appointment in Sept 2021.  - Baseline creatinine 2 - 2.20 as of beginning of 2021. More recently, Cr in the 2.5 - 2.6 range. Cr 2.93 on admit. UA on admit with proteinuria, small blood, and trace LE  -EBV (7300) elevated. SPEP and UPEP with suggestion  of hypoalbuminema and albuminuria possibly indicate glomerular damage  -DDx( cardiorenal vs medication vs nephrotic syndrome)  Plan:  > Transplant nephrology consultation    - PLA2 R AB pending    -continue PTA immunosuppression: cyclosporine 25mg AM and 25mg PM; MMF 750mg BID    -continue sulfamethoxazole-trimethoprim 400-80mg on MWF for prophylaxis    -cyclosporine level and dosing per transplant nephrology    - Plan for transplanted renal biopsy tomorrow and thus NPO tonight and hold heparin    #prandial pain, epigastric pain  #postprandial vomiting   Patient and daughter report this is an ongoing problem since 04/2021. Patient is only able to eat rice and chicken broth recently.   -She has anorexia which she associates with pain.  -Known hx gastric ulcers.  -GI consulted (12/17)  -worsened pain despite oral meds (12/19) and CT abdomen pelvis ordered. CT abdomen/pelvis(12/19) - no acute pathology   Plan:  >sucralfate 1g BID  >PPI, H2 blocker qhs    -H pylori stool antigen testing    -GI consultation    -nutrition consultation  -If not eating, will consider feeding tube.    #Acute decompensated HFpEF  #pulmonary HTN  Pulmonary vascular congestion on CXR, tachypneic on exam in ER on 12/16  -. Previous echocardiogram with HFpEF and c/f pulmonary HTN. Presentation with elevated BNP >9,000, higher than in previous hospitalizations.   -ECHO(12/17) - EF 60%, dilated IVC, PA pressure 39  Plan:  -IV diuresis stopped and dialysis started    -strict I/O    -weigh patient daily    #new onset Afib  Will watch for now and consider anticoagulation in future.  Likely not causing her symptoms currently.    #Hypertension  No current concern for hypertensive emergency/end-organ damage.   Plan:  >Hold losartan  >Continue coreg, hydralazine    -PRN labetalol for SBP >170 and/or DBP>100    #COVID-19 vaccine series not completed  Patient has received 2 doses of vaccine so far. This does not represent full vaccination in an  immunocompromised patient. She requires a third, full dose. We can offer this after ruling out any active viral infection.    -ensure 3rd full dose offered to patient prior to leaving the hospital    #anemia of chronic renal disease: stable, monitor. Outpatient darbepoetin deana, IV iron x 5 days (started 12/20)    #Elevated fungitell  -Mild elevation in fungitell, given lack of localizing symptoms, fever or pulmonary nodules would not recommend further infectious work up per discussion with ID on 12/20  -If she were to develop fever, confusion, or new imaging findings would re-consider    Chronic, stable medical problems:  #leukopenia: stable, monitor   #Mineral bone disorder - elevated PTH, low ionized calcium.  Currently on calitriol and calcium  #NAGMA - secondary to renal disease - continue sodium bicarb    Malnutrition:    - Level of malnutrition: Moderate   - Based on: mild (or greater) muscle loss, mild (or greater) fluid retention       Diet: Snacks/Supplements Adult: Other; allow pt to order supplements if requested; With Meals  Fluid restriction 1800 ML FLUID  Dialysis Diet  NPO per Anesthesia Guidelines for Procedure/Surgery Except for: Meds    DVT Prophylaxis: Heparin subcutaneous- held now for biopsy tomorrow  Staples Catheter: Not present  Central Lines: None  Code Status: Full Code      Disposition Plan   Expected Discharge: 12/27/2021     Anticipated discharge location: home with family    Delays:            The patient's care was discussed with the Care Coordinator/, Patient, Patient's Family and GI and Transplant nephrology Consultant.    Shaan Hernandez MD  Hospitalist Service, [address]  Securely message with the Vocera Web Console (learn more here)  Text page via [employer] Paging/Directory    Please see sign in/sign out for up to date coverage information    Clinically Significant Risk Factors Present on Admission             #  Non-Severe Malnutrition, POA: based on Weight loss;Subcutaneous fat loss;Muscle loss;Fluid retention (12/17/21 1200)     ______________________________________________________________________    Interval History     Last night had IV iron and then had reactions x2 including chest tightness and back pain  Appeared to have off and on symptoms  This morning she felt better but reported occasional dizziness  She is not eating much   clarified that most of the discomfort in upper abd not lower.  EKG was done showing afib.    ROS  No fevers  No headaches  No confusion  No diarrhea  No rash    Data reviewed today: I reviewed all medications, new labs and imaging results over the last 24 hours. I personally reviewed no images or EKG's today.    Physical Exam   Vital Signs: Temp: 97  F (36.1  C) Temp src: Oral BP: 131/75 Pulse: 79   Resp: 12 SpO2: 99 % O2 Device: None (Room air) Oxygen Delivery: 1 LPM  Weight: 147 lbs 4.28 oz  Constitutional: alert, oriented, sitting in bed, in no acute distress  Eyes: no icterus  ENT: JVD+, supple  Respiratory: crackles in bilateral lower lung fields, no wheezing, no respiratory distress  Cardiovascular: normal s1, s2, normal rate and rhythm, systolic murmur 1/6  GI: soft, non-tender, non-distended, bowel sounds present  Skin: no rash  Ext: left upper extremity av fistula with palpable thrill and audible bruiit  Musculoskeletal: trace pitting edema     Data   Recent Labs   Lab 12/21/21  0946 12/21/21  0335 12/21/21  0117 12/20/21  1154 12/20/21  0543 12/20/21  0226 12/19/21  1313 12/19/21  0655 12/18/21  0636 12/17/21  0618 12/16/21  1821   WBC  --  3.9*  --   --  3.5*  --   --  4.0 3.3*   < > 3.9*   HGB  --  8.6*  --   --  8.6*  --   --  8.7* 8.4*   < > 9.9*   MCV  --  85  --   --  85  --   --  86 93   < > 91   PLT  --  133*  --   --  131*  --   --  132* 122*   < > 148*   * 122*  122* 122*   < > 121*  121*  --    < > 125* 132*   < > 136   POTASSIUM  --  3.4  --   --  4.0  --    --  3.1* 3.5   < > 3.8   CHLORIDE  --  88*  --   --  90*  --   --  93* 103   < > 108   CO2  --  24  --   --  18*  --   --  19* 18*   < > 20   BUN  --  18  --   --  33*  --   --  29 24   < > 20   CR  --  2.25*  --   --  3.37*  --   --  3.17* 2.99*   < > 2.93*   ANIONGAP  --  10  --   --  13  --   --  13 11   < > 8   SHAVON  --  7.5*  --   --  7.7*  --   --  7.1* 7.0*   < > 7.1*   GLC  --  86  --   --  93 99  --  134* 83   < > 136*   ALBUMIN  --   --   --   --   --   --   --  2.1* 1.9*   < > 2.8*   PROTTOTAL  --   --   --   --   --   --   --  5.8* 5.7*   < > 7.2   BILITOTAL  --   --   --   --   --   --   --  0.7 0.7   < > 0.7   ALKPHOS  --   --   --   --   --   --   --  144 142   < > 162*   ALT  --   --   --   --   --   --   --  11 11   < > 14   AST  --   --   --   --   --   --   --  31 29   < > 36   LIPASE  --   --   --   --   --   --   --  537*  --   --  536*    < > = values in this interval not displayed.

## 2021-12-21 NOTE — PROGRESS NOTES
HEMODIALYSIS TREATMENT NOTE    Date: 12/21/2021  Time: 3:36 PM    Data:  Pre Wt:     Desired Wt: 64.8 kg   Post Wt:    Weight change:   kg  Ultrafiltration - Post Run Net Total Removed (mL): 2000 mL  Vascular Access Status: patent  Dialyzer Rinse: Light,Streaked  Total Blood Volume Processed: 37.29 L Liters  Total Dialysis (Treatment) Time: 3.0 Hours    Lab:        Interventions:  3 hours of HD with 2000 mls pulled. 16 gauge needles and 300 BFR. Tolerated well.      Assessment:  ESRD patient with failing transplant in for HD 2/2 chemistries and fluid overload. VSS A+O     Plan:    Per renal

## 2021-12-22 ENCOUNTER — APPOINTMENT (OUTPATIENT)
Dept: INTERVENTIONAL RADIOLOGY/VASCULAR | Facility: CLINIC | Age: 56
DRG: 698 | End: 2021-12-22
Attending: NURSE PRACTITIONER
Payer: MEDICARE

## 2021-12-22 DIAGNOSIS — T86.11 KIDNEY TRANSPLANT REJECTION: Primary | ICD-10-CM

## 2021-12-22 LAB
ALBUMIN UR-MCNC: 100 MG/DL
ANION GAP SERPL CALCULATED.3IONS-SCNC: 8 MMOL/L (ref 3–14)
APPEARANCE UR: CLEAR
BILIRUB UR QL STRIP: NEGATIVE
BUN SERPL-MCNC: 14 MG/DL (ref 7–30)
CALCIUM SERPL-MCNC: 8 MG/DL (ref 8.5–10.1)
CHLORIDE BLD-SCNC: 90 MMOL/L (ref 94–109)
CO2 SERPL-SCNC: 24 MMOL/L (ref 20–32)
COLOR UR AUTO: ABNORMAL
CREAT SERPL-MCNC: 2.16 MG/DL (ref 0.52–1.04)
CYCLOSPORINE BLD LC/MS/MS-MCNC: 103 UG/L (ref 50–400)
ERYTHROCYTE [DISTWIDTH] IN BLOOD BY AUTOMATED COUNT: 13.5 % (ref 10–15)
GFR SERPL CREATININE-BSD FRML MDRD: 26 ML/MIN/1.73M2
GLUCOSE BLD-MCNC: 95 MG/DL (ref 70–99)
GLUCOSE UR STRIP-MCNC: NEGATIVE MG/DL
HCT VFR BLD AUTO: 27.3 % (ref 35–47)
HGB BLD-MCNC: 8.6 G/DL (ref 11.7–15.7)
HGB UR QL STRIP: NEGATIVE
KETONES UR STRIP-MCNC: NEGATIVE MG/DL
LEUKOCYTE ESTERASE UR QL STRIP: NEGATIVE
Lab: NORMAL
MCH RBC QN AUTO: 27.5 PG (ref 26.5–33)
MCHC RBC AUTO-ENTMCNC: 31.5 G/DL (ref 31.5–36.5)
MCV RBC AUTO: 87 FL (ref 78–100)
MUCOUS THREADS #/AREA URNS LPF: PRESENT /LPF
NITRATE UR QL: NEGATIVE
PERFORMING LABORATORY: NORMAL
PH UR STRIP: 5.5 [PH] (ref 5–7)
PHOSPHATE SERPL-MCNC: 3.1 MG/DL (ref 2.5–4.5)
PLATELET # BLD AUTO: 144 10E3/UL (ref 150–450)
POTASSIUM BLD-SCNC: 3.9 MMOL/L (ref 3.4–5.3)
RBC # BLD AUTO: 3.13 10E6/UL (ref 3.8–5.2)
RBC URINE: 1 /HPF
SODIUM SERPL-SCNC: 122 MMOL/L (ref 133–144)
SODIUM SERPL-SCNC: 131 MMOL/L (ref 133–144)
SP GR UR STRIP: 1.01 (ref 1–1.03)
SPECIMEN STATUS: NORMAL
TEST NAME: NORMAL
TME LAST DOSE: NORMAL H
TME LAST DOSE: NORMAL H
UROBILINOGEN UR STRIP-MCNC: NORMAL MG/DL
WBC # BLD AUTO: 3.9 10E3/UL (ref 4–11)
WBC URINE: 1 /HPF

## 2021-12-22 PROCEDURE — 258N000003 HC RX IP 258 OP 636: Performed by: INTERNAL MEDICINE

## 2021-12-22 PROCEDURE — 84295 ASSAY OF SERUM SODIUM: CPT | Performed by: INTERNAL MEDICINE

## 2021-12-22 PROCEDURE — 36415 COLL VENOUS BLD VENIPUNCTURE: CPT | Performed by: INTERNAL MEDICINE

## 2021-12-22 PROCEDURE — 88350 IMFLUOR EA ADDL 1ANTB STN PX: CPT

## 2021-12-22 PROCEDURE — 84999 UNLISTED CHEMISTRY PROCEDURE: CPT

## 2021-12-22 PROCEDURE — 250N000013 HC RX MED GY IP 250 OP 250 PS 637: Performed by: STUDENT IN AN ORGANIZED HEALTH CARE EDUCATION/TRAINING PROGRAM

## 2021-12-22 PROCEDURE — 99233 SBSQ HOSP IP/OBS HIGH 50: CPT | Performed by: INTERNAL MEDICINE

## 2021-12-22 PROCEDURE — 50200 RENAL BIOPSY PERQ: CPT | Mod: RT | Performed by: PHYSICIAN ASSISTANT

## 2021-12-22 PROCEDURE — 85027 COMPLETE CBC AUTOMATED: CPT | Performed by: STUDENT IN AN ORGANIZED HEALTH CARE EDUCATION/TRAINING PROGRAM

## 2021-12-22 PROCEDURE — 50200 RENAL BIOPSY PERQ: CPT

## 2021-12-22 PROCEDURE — 76942 ECHO GUIDE FOR BIOPSY: CPT | Mod: 26 | Performed by: PHYSICIAN ASSISTANT

## 2021-12-22 PROCEDURE — 99152 MOD SED SAME PHYS/QHP 5/>YRS: CPT | Performed by: PHYSICIAN ASSISTANT

## 2021-12-22 PROCEDURE — 76942 ECHO GUIDE FOR BIOPSY: CPT

## 2021-12-22 PROCEDURE — 99152 MOD SED SAME PHYS/QHP 5/>YRS: CPT

## 2021-12-22 PROCEDURE — 0TB03ZX EXCISION OF RIGHT KIDNEY, PERCUTANEOUS APPROACH, DIAGNOSTIC: ICD-10-PCS | Performed by: PHYSICIAN ASSISTANT

## 2021-12-22 PROCEDURE — 88313 SPECIAL STAINS GROUP 2: CPT

## 2021-12-22 PROCEDURE — 80158 DRUG ASSAY CYCLOSPORINE: CPT | Performed by: INTERNAL MEDICINE

## 2021-12-22 PROCEDURE — 88305 TISSUE EXAM BY PATHOLOGIST: CPT

## 2021-12-22 PROCEDURE — 250N000012 HC RX MED GY IP 250 OP 636 PS 637: Performed by: STUDENT IN AN ORGANIZED HEALTH CARE EDUCATION/TRAINING PROGRAM

## 2021-12-22 PROCEDURE — 81001 URINALYSIS AUTO W/SCOPE: CPT | Performed by: INTERNAL MEDICINE

## 2021-12-22 PROCEDURE — 80048 BASIC METABOLIC PNL TOTAL CA: CPT | Performed by: STUDENT IN AN ORGANIZED HEALTH CARE EDUCATION/TRAINING PROGRAM

## 2021-12-22 PROCEDURE — 250N000009 HC RX 250: Performed by: PHYSICIAN ASSISTANT

## 2021-12-22 PROCEDURE — 250N000012 HC RX MED GY IP 250 OP 636 PS 637: Performed by: NURSE PRACTITIONER

## 2021-12-22 PROCEDURE — 88346 IMFLUOR 1ST 1ANTB STAIN PX: CPT

## 2021-12-22 PROCEDURE — 250N000011 HC RX IP 250 OP 636: Performed by: PHYSICIAN ASSISTANT

## 2021-12-22 PROCEDURE — 84100 ASSAY OF PHOSPHORUS: CPT | Performed by: INTERNAL MEDICINE

## 2021-12-22 PROCEDURE — 120N000011 HC R&B TRANSPLANT UMMC

## 2021-12-22 PROCEDURE — 90937 HEMODIALYSIS REPEATED EVAL: CPT

## 2021-12-22 PROCEDURE — 250N000013 HC RX MED GY IP 250 OP 250 PS 637: Performed by: NURSE PRACTITIONER

## 2021-12-22 PROCEDURE — 250N000011 HC RX IP 250 OP 636: Performed by: INTERNAL MEDICINE

## 2021-12-22 PROCEDURE — 272N000505 HC NEEDLE CR5

## 2021-12-22 RX ORDER — NALOXONE HYDROCHLORIDE 0.4 MG/ML
0.2 INJECTION, SOLUTION INTRAMUSCULAR; INTRAVENOUS; SUBCUTANEOUS
Status: DISCONTINUED | OUTPATIENT
Start: 2021-12-22 | End: 2021-12-22 | Stop reason: HOSPADM

## 2021-12-22 RX ORDER — NALOXONE HYDROCHLORIDE 0.4 MG/ML
0.4 INJECTION, SOLUTION INTRAMUSCULAR; INTRAVENOUS; SUBCUTANEOUS
Status: DISCONTINUED | OUTPATIENT
Start: 2021-12-22 | End: 2021-12-22 | Stop reason: HOSPADM

## 2021-12-22 RX ORDER — FLUMAZENIL 0.1 MG/ML
0.2 INJECTION, SOLUTION INTRAVENOUS
Status: DISCONTINUED | OUTPATIENT
Start: 2021-12-22 | End: 2021-12-22 | Stop reason: HOSPADM

## 2021-12-22 RX ORDER — FENTANYL CITRATE 50 UG/ML
25-50 INJECTION, SOLUTION INTRAMUSCULAR; INTRAVENOUS EVERY 5 MIN PRN
Status: DISCONTINUED | OUTPATIENT
Start: 2021-12-22 | End: 2021-12-22 | Stop reason: HOSPADM

## 2021-12-22 RX ADMIN — CALCIUM CARBONATE 500 MG: 500 TABLET, CHEWABLE ORAL at 19:28

## 2021-12-22 RX ADMIN — MYCOPHENOLATE MOFETIL 750 MG: 250 CAPSULE ORAL at 09:21

## 2021-12-22 RX ADMIN — SUCRALFATE 1 G: 1 TABLET ORAL at 18:53

## 2021-12-22 RX ADMIN — FENTANYL CITRATE 25 MCG: 50 INJECTION INTRAMUSCULAR; INTRAVENOUS at 10:26

## 2021-12-22 RX ADMIN — PANTOPRAZOLE SODIUM 40 MG: 40 TABLET, DELAYED RELEASE ORAL at 19:28

## 2021-12-22 RX ADMIN — Medication: at 15:03

## 2021-12-22 RX ADMIN — HYDRALAZINE HYDROCHLORIDE 25 MG: 25 TABLET, FILM COATED ORAL at 19:28

## 2021-12-22 RX ADMIN — FAMOTIDINE 20 MG: 20 TABLET ORAL at 21:06

## 2021-12-22 RX ADMIN — LIDOCAINE HYDROCHLORIDE 8 ML: 10 INJECTION, SOLUTION EPIDURAL; INFILTRATION; INTRACAUDAL; PERINEURAL at 11:02

## 2021-12-22 RX ADMIN — MIDAZOLAM 0.5 MG: 1 INJECTION INTRAMUSCULAR; INTRAVENOUS at 10:46

## 2021-12-22 RX ADMIN — CYCLOSPORINE 25 MG: 25 CAPSULE, LIQUID FILLED ORAL at 18:51

## 2021-12-22 RX ADMIN — CARVEDILOL 3.12 MG: 3.12 TABLET, FILM COATED ORAL at 18:51

## 2021-12-22 RX ADMIN — FENTANYL CITRATE 25 MCG: 50 INJECTION INTRAMUSCULAR; INTRAVENOUS at 10:46

## 2021-12-22 RX ADMIN — MYCOPHENOLATE MOFETIL 750 MG: 250 CAPSULE ORAL at 19:29

## 2021-12-22 RX ADMIN — DARBEPOETIN ALFA 40 MCG: 40 INJECTION, SOLUTION INTRAVENOUS; SUBCUTANEOUS at 16:34

## 2021-12-22 RX ADMIN — SODIUM CHLORIDE 300 ML: 9 INJECTION, SOLUTION INTRAVENOUS at 15:02

## 2021-12-22 RX ADMIN — MIDAZOLAM 0.5 MG: 1 INJECTION INTRAMUSCULAR; INTRAVENOUS at 10:26

## 2021-12-22 RX ADMIN — SODIUM CHLORIDE 250 ML: 9 INJECTION, SOLUTION INTRAVENOUS at 15:02

## 2021-12-22 RX ADMIN — CYCLOSPORINE 25 MG: 25 CAPSULE, LIQUID FILLED ORAL at 09:21

## 2021-12-22 ASSESSMENT — ACTIVITIES OF DAILY LIVING (ADL)
ADLS_ACUITY_SCORE: 11
ADLS_ACUITY_SCORE: 9
ADLS_ACUITY_SCORE: 9
ADLS_ACUITY_SCORE: 11
ADLS_ACUITY_SCORE: 9
ADLS_ACUITY_SCORE: 11
ADLS_ACUITY_SCORE: 9
ADLS_ACUITY_SCORE: 11
ADLS_ACUITY_SCORE: 9
ADLS_ACUITY_SCORE: 11
ADLS_ACUITY_SCORE: 9
ADLS_ACUITY_SCORE: 11
ADLS_ACUITY_SCORE: 11
ADLS_ACUITY_SCORE: 9
ADLS_ACUITY_SCORE: 11
ADLS_ACUITY_SCORE: 9
ADLS_ACUITY_SCORE: 11

## 2021-12-22 ASSESSMENT — MIFFLIN-ST. JEOR
SCORE: 1171.5
SCORE: 1151.5
SCORE: 1171.5

## 2021-12-22 NOTE — PLAN OF CARE
/65 (BP Location: Right arm)   Pulse 72   Temp 97.7  F (36.5  C) (Oral)   Resp 16   Ht 1.524 m (5')   Wt 64.8 kg (142 lb 13.7 oz)   SpO2 94%   BMI 27.90 kg/m      Shift: 3718-4256  VS: VS on RA, afebrile  Neuro: AOx4  BG: none  Labs: UA with protein urine  Respiratory: WDL  Pain/Nausea/PRN: denies  Diet: NPO for procedure  LDA: PIV SL  GI/: oliguric, having abdominal fullness, feeling heavy, wanting to void. . Per provider order straight cath and sent UA  Skin: WDL  Mobility: SBA for dizziness  Plan: kidney biopsy  @ 2pm to determine acute TOSIN or chronic disease (transplant glomerulopathy).     Handoff given to following RN.

## 2021-12-22 NOTE — PROGRESS NOTES
[facility]    Medicine Progress Note - Hospitalist Service, Gold 9       Date of Admission:  12/16/2021    Assessment & Plan             Joshua Lala is a 55 year old female admitted on 12/16/2021. 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 She presented to [facility] ED on 12/16/21 with complaints of fatigue, edema, and oliguria. She is admitted to the internal medicine service with transplant nephrology consulting for further evaluation and management.    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 Second transplant 2007, now with chronic antibody rejection, CKD IV. Last nephrology visit in chart 03/29/21. Missed an appointment in Sept 2021.  - Baseline creatinine 2 - 2.20 as of beginning of 2021. More recently, Cr in the 2.5 - 2.6 range. Cr 2.93 on admit. UA on admit with proteinuria, small blood, and trace LE  -EBV (7300) elevated. SPEP and UPEP with suggestion of hypoalbuminema and albuminuria possibly indicate glomerular damage  -DDx( cardiorenal vs medication vs nephrotic syndrome)  Plan:  > Transplant nephrology consultation    - PLA2 R AB pending    -continue PTA immunosuppression: cyclosporine 25mg AM and 25mg PM; MMF 750mg BID    -continue sulfamethoxazole-trimethoprim 400-80mg on MWF for prophylaxis     -cyclosporine level and dosing per transplant nephrology    - Transplanted renal biopsy today and  hold heparin    #prandial pain, epigastric pain  #postprandial vomiting   Patient and daughter report this is an ongoing problem since 04/2021. Patient is only able to eat rice and chicken broth recently.   -She has anorexia which she associates with pain.  -Known hx gastric ulcers.  -GI consulted (12/17) and did not recommend EGD  -worsened pain despite oral meds (12/19) and CT abdomen pelvis ordered. CT abdomen/pelvis(12/19) - no acute pathology   Plan:  >sucralfate 1g BID  >PPI, H2 blocker qhs    -H pylori stool antigen testing    -nutrition consultation  -Patient declining feeding tube but will do calorie counts    #Acute decompensated HFpEF  #pulmonary HTN  Pulmonary vascular congestion on CXR, tachypneic on exam in ER on 12/16  -. Previous echocardiogram with HFpEF and c/f pulmonary HTN. Presentation with elevated BNP >9,000, higher than in previous hospitalizations.   -ECHO(12/17) - EF 60%, dilated IVC, PA pressure 39  Plan:  -IV diuresis stopped and dialysis started    -strict I/O    -weigh patient daily    #new onset Afib  Will watch for now and consider anticoagulation in future.  Likely not causing her symptoms currently.    #Hypertension  No current concern for hypertensive emergency/end-organ damage.   Plan:  >Hold losartan  >Continue coreg, hydralazine    -PRN labetalol for SBP >170 and/or DBP>100    #COVID-19 vaccine series not completed  Patient has received 2 doses of vaccine so far. This does not represent full vaccination in an immunocompromised patient. She requires a third, full dose. We can offer this after ruling out any active viral infection.    -ensure 3rd full dose offered to patient prior to leaving the hospital    #anemia of chronic renal disease: stable, monitor. Outpatient darbepoetin deana, IV iron x 5 days (started 12/20)    #Elevated fungitell  -Mild elevation in fungitell, given lack  of localizing symptoms, fever or pulmonary nodules would not recommend further infectious work up per discussion with ID on 12/20  -If she were to develop fever, confusion, or new imaging findings would re-consider    Chronic, stable medical problems:  #leukopenia: stable, monitor   #Mineral bone disorder - elevated PTH, low ionized calcium.  Currently on calitriol and calcium  #NAGMA - secondary to renal disease - continue sodium bicarb    Malnutrition:    - Level of malnutrition: Moderate   - Based on: mild (or greater) muscle loss, mild (or greater) fluid retention       Diet: Snacks/Supplements Adult: Other; allow pt to order supplements if requested; With Meals  Fluid restriction 1000 ML FLUID  Dialysis Diet    DVT Prophylaxis: Heparin subcutaneous- held now for biopsy tomorrow  Staples Catheter: Not present  Central Lines: None  Code Status: Full Code      Disposition Plan   Expected Discharge: 12/27/2021     Anticipated discharge location: home with family    Delays:            The patient's care was discussed with the Care Coordinator/, Patient, Patient's Family and GI and Transplant nephrology Consultant.    Shaan Hernandez MD  Hospitalist Service, [address]  Securely message with the [employer]oc[employer] Web Console (learn more here)  Text page via [employer] Paging/Directory    Please see sign in/sign out for up to date coverage information    Clinically Significant Risk Factors Present on Admission             # Non-Severe Malnutrition, POA: based on Weight loss;Subcutaneous fat loss;Muscle loss;Fluid retention (12/17/21 1200)     ______________________________________________________________________    Interval History     Feels hungry this morning since she is npo  Dizziness has improved  She still has abdominal discomfort  She says specific foods bother her stomach but trying to take ensure  She continues to decline any feeding tube  Her  is  concerned that she often does not understand our questions    ROS  No fevers  No headaches  No confusion  No diarrhea  No rash    Data reviewed today: I reviewed all medications, new labs and imaging results over the last 24 hours. I personally reviewed no images or EKG's today.    Physical Exam   Vital Signs: Temp: 98.2  F (36.8  C) Temp src: Oral BP: 115/63 Pulse: 71   Resp: 20 SpO2: 97 % O2 Device: None (Room air) Oxygen Delivery: 2 LPM  Weight: 144 lbs 6.42 oz  Constitutional: alert, oriented, sitting in bed, in no acute distress  Eyes: no icterus  ENT: JVD+, supple  Respiratory: crackles in bilateral lower lung fields, no wheezing, no respiratory distress  Cardiovascular: normal s1, s2, normal rate and rhythm, systolic murmur 1/6  GI: soft, non-tender, non-distended, bowel sounds present  Skin: no rash  Ext: left upper extremity av fistula with palpable thrill and audible bruiit  Musculoskeletal: trace pitting edema     Data   Recent Labs   Lab 12/22/21  0612 12/21/21  1712 12/21/21  0946 12/21/21  0335 12/20/21  1154 12/20/21  0543 12/19/21  1313 12/19/21  0655 12/18/21  0636 12/17/21  0618 12/16/21  1821   WBC 3.9*  --   --  3.9*  --  3.5*  --  4.0 3.3*   < > 3.9*   HGB 8.6*  --   --  8.6*  --  8.6*  --  8.7* 8.4*   < > 9.9*   MCV 87  --   --  85  --  85  --  86 93   < > 91   *  --   --  133*  --  131*  --  132* 122*   < > 148*   INR  --  1.11  --   --   --   --   --   --   --   --   --    * 129* 124* 122*  122*   < > 121*  121*   < > 125* 132*   < > 136   POTASSIUM 3.9  --   --  3.4  --  4.0  --  3.1* 3.5   < > 3.8   CHLORIDE 90*  --   --  88*  --  90*  --  93* 103   < > 108   CO2 24  --   --  24  --  18*  --  19* 18*   < > 20   BUN 14  --   --  18  --  33*  --  29 24   < > 20   CR 2.16*  --   --  2.25*  --  3.37*  --  3.17* 2.99*   < > 2.93*   ANIONGAP 8  --   --  10  --  13  --  13 11   < > 8   SHAVON 8.0*  --   --  7.5*  --  7.7*  --  7.1* 7.0*   < > 7.1*   GLC 95  --   --  86  --  93   < >  134* 83   < > 136*   ALBUMIN  --   --   --   --   --   --   --  2.1* 1.9*   < > 2.8*   PROTTOTAL  --   --   --   --   --   --   --  5.8* 5.7*   < > 7.2   BILITOTAL  --   --   --   --   --   --   --  0.7 0.7   < > 0.7   ALKPHOS  --   --   --   --   --   --   --  144 142   < > 162*   ALT  --   --   --   --   --   --   --  11 11   < > 14   AST  --   --   --   --   --   --   --  31 29   < > 36   LIPASE  --   --   --   --   --   --   --  537*  --   --  536*    < > = values in this interval not displayed.

## 2021-12-22 NOTE — PRE-PROCEDURE
GENERAL PRE-PROCEDURE:   Procedure:  RLQ transplant renal biopsy  Date/Time:  12/22/2021 10:17 AM    Written consent obtained?: Yes    Risks and benefits: Risks, benefits and alternatives were discussed    Consent given by:  Patient  Patient states understanding of procedure being performed: Yes    Patient's understanding of procedure matches consent: Yes    Procedure consent matches procedure scheduled: Yes    Expected level of sedation:  Moderate  Appropriately NPO:  Yes  ASA Class:  2  Mallampati  :  Grade 2- soft palate, base of uvula, tonsillar pillars, and portion of posterior pharyngeal wall visible  Lungs:  Lungs clear with good breath sounds bilaterally  Heart:  Normal heart sounds and rate  History & Physical reviewed:  History and physical reviewed and no updates needed  Statement of review:  I have reviewed the lab findings, diagnostic data, medications, and the plan for sedation

## 2021-12-22 NOTE — PROGRESS NOTES
[facility]  Transplant Nephrology Progress note  Date of Admission:  12/16/2021  Today's Date: 12/22/2021  Requesting physician: Jarrett Mullen MD    Recommendations:  -Hold lasix  -Hold iron sucrose and ok to put on allergy list due to back pain as allergy  -Plan for iHD x3h with 2L UF and 138Na bath to increase sodium to 128.   -Evaluate for HD tmrw for both volume removal and correction of SNa  -Decrease free water restriction to 1L daily  -Aranesp 40mcg IV during HD today   -Follow up kidney biopsy results to determine likelihood of renal recovery  -Consider tube feeds due to malnutrition.  -Arrange for outpatient HD   -Stop bicarb     Assessment & Plan   # DDKT: Trend up with TOSIN. 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    -iHD again today for 3h, 2L UF, 138Na bath   -Follow up biopsy result to see likelihood of renal recovery or if this is all progression of chronic disease           # Immunosuppression: Cyclosporine (goal ) and Mycophenolate mofetil (dose 750 mg every 12 hours)   - Changes: Not at this time. Check CsA q48h.    # Infection Prophylaxis:   - PJP: Sulfa/TMP (Bactrim) MWF    # Hypertension: Controlled;  Goal BP: < 130/80   - Volume status: Moderately hypervolemic     - Changes:Yes, HD w/ 2L UF again today  Continue the following with holding parameters ( hold if SBP< 100)   Coreg 3.125 mg 2  times a day  Hydralazine 25 mg 3 times a day  Hold losartan  Continue labetalol as needed    # Proteinuria:   -Likely 2/2 transplant glomerulopathy    - PLA2R Ab - negative, C3, C4 - normal, Immunofixation - no monoclonal protein; K/L ratio - 1.33; JEREMY - + 1:320 but complements normal   -Follow up anti ds-DNA sent on 12/21   -Biopsy performed on 12/21/21, awaiting result    # Anemia in Chronic Renal Disease: Hgb: Trend down      EDMUND: No, was previously on EDMUND but stopped because she wasn't picking up the phone.    - Iron studies: Low iron saturation. Received half of a dose on 12/20 but developed back pain and it was stopped   -Will plan to give darbepoetin 40mcg on 12/22/21    # Mineral Bone Disorder:   - Secondary renal hyperparathyroidism; PTH level: Mildly elevated (151-300 pg/ml)        On treatment: Calcitriol  - Vitamin D; level: Normal        On supplement: No  - Calcium; level: Normal        On supplement: Yes  - Phosphorus; level: Normal        On supplement: No    -Continue calcitriol 0.5mcg daily and tums 500mg bid.       # Electrolytes:   - Potassium; level: Normal        On supplement: No  - Magnesium; level: Normal        On supplement: No.    - Bicarbonate; level: Normal        On supplement: Yes, stop as she is on dialysis    #Hyponatremia   -Initially thought due to hypervolemia, TOSIN w/ decreased ability to excrete free water. Sonal of 40 and UOsm 285 could also represent tea and toast nephropathy   -Did not respond to tolvaptan 15mg on 12/20.    -iHD today again on 138Na bath.   -Fluid restriction 1L daily       # HFpEF:    -Decompensated clinically. Echo stable on 12/17/21 with elevated PASP and dilated IVC, grade II diastolic dysfunction   -UF with HD again today and evaluate for UF needs again tmrw     # Pulmonary HTN: echo 12/17/21 w/ PASP 39mmHg + RA pressure. Likely worsened by volume overload   -UF as above    # Dyspepsia:   - Advised PPI 40 mg twice daily for 6 to 8 weeks, Carafate twice  daily, and Pepcid 40 mg daily.  Follow-up H. Pylori stool antigen.   -GI consulted to consider EGD but deferred. Recommend seeing if they would do it with continued abdomina pain    # Unintentional weight loss:   -Likely 2/2 her not eating.  EBV PCR 7K (12/16/2021)   -CMV negative (12/16/2021)   -Likely with severe malnutrition   -Consider initiation of tube feeds     # Transplant History:  Etiology of Kidney Failure: IgA nephropathy  Tx: DDKT  Transplant: 11/28/2007 (Kidney), 8/8/1998 (Kidney)  Significant changes in immunosuppression: None  Significant transplant-related complications: DSA    Recommendations were communicated to the primary team verbally    Theodore Connor MD, TAM  Transplant Nephrology  Pager: [phone]      ong  assisted with translation over phone    INTERVAL HISTORY  S/p biopsy this morning. Somnolent but denies pain    Review of Systems    The 4 point Review of Systems is negative other than noted in the HPI or here.    Allergies   Allergies   Allergen Reactions     Contrast Dye Shortness Of Breath     Other reaction(s): Angioedema     Venofer [Iron Sucrose] Anaphylaxis     Pt had rxn to IV Iron Sucrose infusion in hospital on 12/21/21 -- back pain, chest pain, SOB, blurred vision, dizziness, & lightheadedness.     Amlodipine Other (See Comments)     Prior to Admission Medications     sodium chloride 0.9%  250 mL Intravenous Once in dialysis/CRRT     sodium chloride 0.9%  300 mL Hemodialysis Machine Once     calcitRIOL  0.5 mcg Oral Daily     calcium carbonate  500 mg Oral BID     carvedilol  3.125 mg Oral BID w/meals     cycloSPORINE modified  25 mg Oral QPM     cycloSPORINE modified  25 mg Oral QAM     famotidine  20 mg Oral At Bedtime     [Held by provider] heparin ANTICOAGULANT  5,000 Units Subcutaneous BID     hydrALAZINE  25 mg Oral TID     [Held by provider] losartan  50 mg Oral Daily     mycophenolate  750 mg Oral BID     - MEDICATION INSTRUCTIONS -   Does not apply  Once     pantoprazole  40 mg Oral BID     sodium bicarbonate  1,950 mg Oral BID     sodium chloride (PF)  3 mL Intracatheter Q8H     sucralfate  1 g Oral BID AC     sulfamethoxazole-trimethoprim  1 tablet Oral Q  AM     tolvaptan  15 mg Oral Daily       - MEDICATION INSTRUCTIONS -         Physical Exam   Temp  Av.4  F (36.9  C)  Min: 97.8  F (36.6  C)  Max: 99.1  F (37.3  C)      Pulse  Av.2  Min: 69  Max: 105 Resp  Av.9  Min: 16  Max: 18  SpO2  Av.7 %  Min: 91 %  Max: 99 %     /61 (BP Location: Right arm)   Pulse 83   Temp 98.3  F (36.8  C) (Oral)   Resp 14   Ht 1.524 m (5')   Wt 65.5 kg (144 lb 6.4 oz)   SpO2 100%   BMI 28.20 kg/m      Admit Weight: 66 kg (145 lb 6.4 oz)     GENERAL APPEARANCE: alert and no distress  HENT: mouth without ulcers or lesions  LYMPHATICS: no cervical or supraclavicular nodes  RESP: rales bilateral bases  CV: normal rhythm, systolic murmur heard throughout the precordium radiating to carotids  EDEMA: 1+ LE edema bilaterally  ABDOMEN: soft, nondistended, nontender, bowel sounds normal  MS: extremities normal - no gross deformities noted, no evidence of inflammation in joints, no muscle tenderness  SKIN: no rash  NEURO: normal strength and tone, sensory exam grossly normal, mentation intact and speech normal  PSYCH: mentation appears normal and affect normal/bright  TX KIDNEY: normal  DIALYSIS ACCESS:  LUE AV fistula w/ good thrill and bruit    Data   CMP  Recent Labs   Lab 21  0612 21  1712 21  0946 21  0335 21  1154 21  0543 21  0226 21  1313 21  0655 21  0636 21  0618 21  1821   * 129* 124* 122*  122*   < > 121*  121*  --    < > 125* 132* 138 136   POTASSIUM 3.9  --   --  3.4  --  4.0  --   --  3.1* 3.5 3.8 3.8   CHLORIDE 90*  --   --  88*  --  90*  --   --  93* 103 110* 108   CO2 24  --   --  24  --  18*  --   --  19* 18* 18* 20   ANIONGAP 8  --   --  10  --  13   --   --  13 11 10 8   GLC 95  --   --  86  --  93 99  --  134* 83 86 136*   BUN 14  --   --  18  --  33*  --   --  29 24 20 20   CR 2.16*  --   --  2.25*  --  3.37*  --   --  3.17* 2.99* 3.08* 2.93*   GFRESTIMATED 26*  --   --  24*  --  15*  --   --  16* 17* 16* 17*   SHAVON 8.0*  --   --  7.5*  --  7.7*  --   --  7.1* 7.0* 7.3* 7.1*   MAG  --   --   --   --   --   --   --   --  2.1 1.4*  --   --    PHOS 3.1  --   --   --   --   --   --   --   --  4.5  --   --    PROTTOTAL  --   --   --   --   --   --   --   --  5.8* 5.7* 6.2* 7.2   ALBUMIN  --   --   --   --   --   --   --   --  2.1* 1.9* 2.2* 2.8*   BILITOTAL  --   --   --   --   --   --   --   --  0.7 0.7 0.8 0.7   ALKPHOS  --   --   --   --   --   --   --   --  144 142 133 162*   AST  --   --   --   --   --   --   --   --  31 29 31 36   ALT  --   --   --   --   --   --   --   --  11 11 11 14    < > = values in this interval not displayed.     CBC  Recent Labs   Lab 12/22/21  0612 12/21/21  0335 12/20/21  0543 12/19/21  0655   HGB 8.6* 8.6* 8.6* 8.7*   WBC 3.9* 3.9* 3.5* 4.0   RBC 3.13* 3.11* 3.14* 3.16*   HCT 27.3* 26.5* 26.8* 27.3*   MCV 87 85 85 86   MCH 27.5 27.7 27.4 27.5   MCHC 31.5 32.5 32.1 31.9   RDW 13.5 13.3 13.4 13.4   * 133* 131* 132*     INR  Recent Labs   Lab 12/21/21  1712   INR 1.11     ABGNo lab results found in last 7 days.   Urine Studies  Recent Labs   Lab Test 12/22/21  0044 12/20/21  0553 12/16/21  1810 09/25/21  1637   COLOR Light Yellow Yellow Light Yellow Straw   APPEARANCE Clear Clear Clear Clear   URINEGLC Negative Negative 30 * Negative   URINEBILI Negative Negative Negative Negative   URINEKETONE Negative Negative Negative Negative   SG 1.006 1.013 1.005 1.003   UBLD Negative Negative Small* Trace*   URINEPH 5.5 5.5 6.0 6.0   PROTEIN 100 * 100 * 200 * 100 *   NITRITE Negative Negative Negative Negative   LEUKEST Negative Small* Trace* Negative   RBCU 1 1 1 0   WBCU 1 13* 5 <1     Recent Labs   Lab Test 12/16/21  1810  09/03/21  0630 01/15/21  0732 03/18/20  1330 06/01/19  0700 02/16/19  0737 02/02/18  0742 11/17/17  0739 04/28/17  0843 10/28/16  0913 04/29/16  1145 06/12/15  1011 01/23/15  0700   UTPG 6.85* 4.32* 3.69* 1.35* 3.89* 3.92* 2.31* 1.74* 1.86* 1.84* 0.49* 0.73* 0.77*     PTH  Recent Labs   Lab Test 12/17/21  1616 03/17/20  0509 04/29/16  1043 02/05/16  1039 10/27/15  0555 03/26/15  1734 12/16/13  0636   PTHI 173* 291* 124* 140* 218* 251* 166*     Iron Studies  Recent Labs   Lab Test 12/17/21  1443 12/17/21  0618 09/03/21  0625 04/23/21  0716 03/12/21  0757 01/15/21  0725 10/29/20  0900 09/17/20  0758 08/10/20  0840 04/24/20  0800 03/17/20  0509   IRON 58  --  84 45 83 96 88 88 89 61 54     --  252 252 245 225* 251 248 242 286 240   IRONSAT 17  --  33 18 34 43 35 36 37 21 23   ANASTASIIA  --  317* 332* 361* 401* 396* 352* 374* 364* 299*  --        IMAGING:  All imaging studies reviewed by me.

## 2021-12-22 NOTE — PROGRESS NOTES
HEMODIALYSIS TREATMENT NOTE    Date: 12/22/2021  Time: 4:38 PM    Data:  Pre Wt: 65.5 kg (144 lb 6.4 oz)   Desired Wt: 63.5 kg   Post Wt: 63.5 kg (139 lb 15.9 oz)  Weight change: 2 kg  Ultrafiltration - Post Run Net Total Removed (mL): 2000 mL  Vascular Access Status: Fistula  patent  Dialyzer Rinse: Clear  Total Blood Volume Processed: 60.29 L   Total Dialysis (Treatment) Time: 3   Dialysate Bath: K 3, Ca 3  Heparin: None    Lab:   No    Interventions:  Took over from Larry 15 mins into tx with report that, all RN assessment has been completed. Pt had a stable uncomplicated 3 hours of HD. 2L of fluid net was pulled per order. Aranesp IVP administered as ordered. Ending BP was 137/68. Pt rinsed back post tx, needles were pulled, new dressings applied, and sites were held for about 10 mins to help achieve hemostasis. Hand off report given to PEPPER Sanders.    Assessment:  -Calm & Cooperative  -VSS  -A & O X 4     Plan:    Per renal

## 2021-12-22 NOTE — PROGRESS NOTES
Patient Name: Joshua Lala  Medical Record Number: [id number]  Today's Date: 12/22/2021    Procedure: Image guided renal transplant biopsy  Proceduralist: Arnoldo Garcia PA-C  Pathology present: Not available per renal pathology    Procedure Start: 1052  Procedure end: 1102  Sedation medications administered: Midazolam 1 mg, Fentanyl 50 mcg       Report given to: Jaky PABON  : [facility] #[id number].  Chase Ware    Other Notes: Pt arrived to IR room 1 from . Consent reviewed. Pt denies any questions or concerns regarding procedure. Pt positioned supine and monitored per protocol. 3 cores obtained and sent to pathology.  Gelfoam used to close needle track.  Hemostasis achieved.  Patient to be on bedrest for 2 hours.  Pt tolerated procedure without any noted complications. Pt transferred back to .

## 2021-12-22 NOTE — PROGRESS NOTES
Care Management Follow Up    Length of Stay (days): 6    Expected Discharge Date: 12/27/2021     Concerns to be Addressed: care coordination/care conferences,discharge planning     Patient plan of care discussed at interdisciplinary rounds: Yes    Anticipated Discharge Disposition: Home     Anticipated Discharge Services:    Anticipated Discharge DME: None    Patient/family educated on Medicare website which has current facility and service quality ratings: no  Education Provided on the Discharge Plan:    Patient/Family in Agreement with the Plan: yes    Referrals Placed by CM/SW: External Care Coordination    Additional Information:  Notified by Dr. Connor, Nephrologist that team anticipates patient will need outpatient dialysis arranged.   Per chart review will need Hep B Core and CXR r/o TB results confirmed before outpatient HD placement can be confirmed.  Paged Wang Kirk 9.     Met with pt and spouse with the aide of [facility] .   Provided pt and spouse with Medicare.gov list of star rated HD units.  Pt prefers MWF mid morning chair time.  Pt and spouse note no concerns or questions.   Initiated referral to [facility].     Brittaney Barnes RN BSN, PHN, ACM-RN  7A RN Care Coordinator  Phone: [phone]  Pager [phone]  To contact the weekend RNCC, Page: [phone]    12/22/2021 3:34 PM

## 2021-12-22 NOTE — PLAN OF CARE
BP (!) 141/75 (BP Location: Right arm)   Pulse 78   Temp 98  F (36.7  C) (Oral)   Resp 16   Ht 1.524 m (5')   Wt 64.8 kg (142 lb 13.7 oz)   SpO2 97%   BMI 27.90 kg/m      5505-1955: Pt admitted with hypervolemia. Hmong speaking,  used for all assessments, though it seems there may still be some misunderstanding even with the interpreters, so it is important to ask many clarifying questions, ask for descriptions, have pt point to what she's talking about. Awa Conway, at bedside most of the day, supportive and very involved in cares. HD today, 2L taken off. Na improving, 129 this evening post-HD from 124 this AM.   Neuro: Was c/o blurred vision, dizziness, lightheadedness this AM. Pt states blurred vision is improved but remains, everything else has resolved since HD.  Cardiac: A fib heard upon auscultation and seen on EKG this AM, team aware.  Resp: WDL on RA, able to wean off O2 without a problem.  GI/: LBM yesterday per pt. Strict Is&Os. Voided x1, measured.  Diet/Appetite: On dialysis diet + 1.8L FR, adhering to. Poor appetite per , but did eat a few small meals from home today. No nausea. NPO @ 0000.  Endocrine: NA.  Skin: WDL.  Access: PIVx1 SLd. L+AVF.  Drains: NA.  Activity: Up SBA d/t dizziness/lightheadedness since iron infusion rxn, calling appropriately.  Pain: C/o burning or 'feeling spicy' after eating, encouraging taking sched Carafate AC and provided extensive education to pt and  about medication effects.  Plan: NPO @ 0000 for kidney bx tomorrow, time unknown. Will continue with plan of care and notify team of any changes.?

## 2021-12-22 NOTE — PROCEDURES
[facility]    Procedure: IR Procedure Note    Date/Time: 12/22/2021 11:13 AM  Performed by: Arnoldo Garcia PA-C  Authorized by: Arnoldo Garcia PA-C       UNIVERSAL PROTOCOL   Site Marked: NA  Prior Images Obtained and Reviewed:  Yes  Required items: Required blood products, implants, devices and special equipment available    Patient identity confirmed:  Verbally with patient, arm band, provided demographic data and hospital-assigned identification number  Patient was reevaluated immediately before administering moderate or deep sedation or anesthesia  Confirmation Checklist:  Patient's identity using two indicators, relevant allergies, procedure was appropriate and matched the consent or emergent situation and correct equipment/implants were available  Time out: Immediately prior to the procedure a time out was called    Universal Protocol: the Joint Commission Universal Protocol was followed    Preparation: Patient was prepped and draped in usual sterile fashion       ANESTHESIA    Anesthesia: Local infiltration  Local Anesthetic:  Lidocaine 1% without epinephrine      SEDATION  Patient Sedated: Yes    Vital signs: Vital signs monitored during sedation    See dictated procedure note for full details.  Findings: Sedation medications administered: Midazolam 1 mg, Fentanyl 50 mcg  Sedation time: 10 minutes    Specimens: core needle biopsy specimens sent for pathological analysis    Complications: None    Condition: Stable      PROCEDURE  Describe Procedure: Joshua Lala  [id number]    Completed ultrasound guided RLQ transplant kidney biopsy.  A total of three passes yielded kidney tissue cores collected for further pathological evaluation.  No microspcoy support available.  No immediate complications.  Dx: s/p transplant, elevated creatinine.  Aj    Sedation medications administered: Midazolam 1 mg, Fentanyl 50 mcg  Sedation time: 10  minutes    Patient Tolerance:  Patient tolerated the procedure well with no immediate complications  Length of time physician/provider present for 1:1 monitoring during sedation: 10

## 2021-12-22 NOTE — PLAN OF CARE
/66   Pulse 73   Temp 98.2  F (36.8  C) (Oral)   Resp 15   Ht 1.524 m (5')   Wt 65.5 kg (144 lb 6.4 oz)   SpO2 98%   BMI 28.20 kg/m      Patient VSS on RA-2LPM NC, afebrile. Denies pain. Tolerating dialysis diet with poor appetite. PIV-SL, left AV fistula. No BM. Oliguria. Up SBA. K Bx today, RLQ primapore marked. Pt down to HD.   Will continue with POC and notify MD with changes or concerns.

## 2021-12-23 ENCOUNTER — APPOINTMENT (OUTPATIENT)
Dept: INTERPRETER SERVICES | Facility: CLINIC | Age: 56
DRG: 698 | End: 2021-12-23
Payer: MEDICARE

## 2021-12-23 LAB
ANION GAP SERPL CALCULATED.3IONS-SCNC: 9 MMOL/L (ref 3–14)
BUN SERPL-MCNC: 13 MG/DL (ref 7–30)
CALCIUM SERPL-MCNC: 8.4 MG/DL (ref 8.5–10.1)
CHLORIDE BLD-SCNC: 97 MMOL/L (ref 94–109)
CO2 SERPL-SCNC: 23 MMOL/L (ref 20–32)
CREAT SERPL-MCNC: 2.32 MG/DL (ref 0.52–1.04)
DSDNA AB SER-ACNC: 5.5 IU/ML
ERYTHROCYTE [DISTWIDTH] IN BLOOD BY AUTOMATED COUNT: 13.6 % (ref 10–15)
GFR SERPL CREATININE-BSD FRML MDRD: 24 ML/MIN/1.73M2
GLUCOSE BLD-MCNC: 96 MG/DL (ref 70–99)
HBV CORE AB SERPL QL IA: REACTIVE
HCT VFR BLD AUTO: 27.6 % (ref 35–47)
HGB BLD-MCNC: 8.6 G/DL (ref 11.7–15.7)
MCH RBC QN AUTO: 27.8 PG (ref 26.5–33)
MCHC RBC AUTO-ENTMCNC: 31.2 G/DL (ref 31.5–36.5)
MCV RBC AUTO: 89 FL (ref 78–100)
PLATELET # BLD AUTO: 108 10E3/UL (ref 150–450)
POTASSIUM BLD-SCNC: 4.1 MMOL/L (ref 3.4–5.3)
RBC # BLD AUTO: 3.09 10E6/UL (ref 3.8–5.2)
SODIUM SERPL-SCNC: 128 MMOL/L (ref 133–144)
SODIUM SERPL-SCNC: 129 MMOL/L (ref 133–144)
WBC # BLD AUTO: 4.1 10E3/UL (ref 4–11)

## 2021-12-23 PROCEDURE — 250N000013 HC RX MED GY IP 250 OP 250 PS 637: Performed by: NURSE PRACTITIONER

## 2021-12-23 PROCEDURE — 80048 BASIC METABOLIC PNL TOTAL CA: CPT | Performed by: INTERNAL MEDICINE

## 2021-12-23 PROCEDURE — 250N000011 HC RX IP 250 OP 636: Performed by: INTERNAL MEDICINE

## 2021-12-23 PROCEDURE — 250N000012 HC RX MED GY IP 250 OP 636 PS 637: Performed by: NURSE PRACTITIONER

## 2021-12-23 PROCEDURE — 99207 PR CDG-CUT & PASTE-POTENTIAL IMPACT ON LEVEL: CPT | Performed by: INTERNAL MEDICINE

## 2021-12-23 PROCEDURE — 120N000011 HC R&B TRANSPLANT UMMC

## 2021-12-23 PROCEDURE — 86704 HEP B CORE ANTIBODY TOTAL: CPT | Performed by: INTERNAL MEDICINE

## 2021-12-23 PROCEDURE — 85027 COMPLETE CBC AUTOMATED: CPT | Performed by: INTERNAL MEDICINE

## 2021-12-23 PROCEDURE — 250N000011 HC RX IP 250 OP 636: Performed by: NURSE PRACTITIONER

## 2021-12-23 PROCEDURE — 36415 COLL VENOUS BLD VENIPUNCTURE: CPT | Performed by: INTERNAL MEDICINE

## 2021-12-23 PROCEDURE — 86481 TB AG RESPONSE T-CELL SUSP: CPT | Performed by: INTERNAL MEDICINE

## 2021-12-23 PROCEDURE — 84295 ASSAY OF SERUM SODIUM: CPT | Performed by: INTERNAL MEDICINE

## 2021-12-23 PROCEDURE — 250N000012 HC RX MED GY IP 250 OP 636 PS 637: Performed by: STUDENT IN AN ORGANIZED HEALTH CARE EDUCATION/TRAINING PROGRAM

## 2021-12-23 PROCEDURE — 250N000013 HC RX MED GY IP 250 OP 250 PS 637: Performed by: STUDENT IN AN ORGANIZED HEALTH CARE EDUCATION/TRAINING PROGRAM

## 2021-12-23 PROCEDURE — 99233 SBSQ HOSP IP/OBS HIGH 50: CPT | Performed by: INTERNAL MEDICINE

## 2021-12-23 PROCEDURE — 99233 SBSQ HOSP IP/OBS HIGH 50: CPT | Mod: GC | Performed by: INTERNAL MEDICINE

## 2021-12-23 RX ORDER — FUROSEMIDE 10 MG/ML
120 INJECTION INTRAMUSCULAR; INTRAVENOUS ONCE
Status: COMPLETED | OUTPATIENT
Start: 2021-12-23 | End: 2021-12-23

## 2021-12-23 RX ADMIN — HYDRALAZINE HYDROCHLORIDE 25 MG: 25 TABLET, FILM COATED ORAL at 14:00

## 2021-12-23 RX ADMIN — HEPARIN SODIUM 5000 UNITS: 5000 INJECTION, SOLUTION INTRAVENOUS; SUBCUTANEOUS at 20:21

## 2021-12-23 RX ADMIN — FAMOTIDINE 20 MG: 20 TABLET ORAL at 22:35

## 2021-12-23 RX ADMIN — CYCLOSPORINE 25 MG: 25 CAPSULE, LIQUID FILLED ORAL at 18:15

## 2021-12-23 RX ADMIN — CYCLOSPORINE 25 MG: 25 CAPSULE, LIQUID FILLED ORAL at 08:44

## 2021-12-23 RX ADMIN — SUCRALFATE 1 G: 1 TABLET ORAL at 17:06

## 2021-12-23 RX ADMIN — CALCIUM CARBONATE 500 MG: 500 TABLET, CHEWABLE ORAL at 20:20

## 2021-12-23 RX ADMIN — CARVEDILOL 3.12 MG: 3.12 TABLET, FILM COATED ORAL at 18:14

## 2021-12-23 RX ADMIN — CALCITRIOL CAPSULES 0.25 MCG 0.5 MCG: 0.25 CAPSULE ORAL at 08:44

## 2021-12-23 RX ADMIN — CARVEDILOL 3.12 MG: 3.12 TABLET, FILM COATED ORAL at 08:44

## 2021-12-23 RX ADMIN — SUCRALFATE 1 G: 1 TABLET ORAL at 08:44

## 2021-12-23 RX ADMIN — HYDRALAZINE HYDROCHLORIDE 25 MG: 25 TABLET, FILM COATED ORAL at 20:20

## 2021-12-23 RX ADMIN — CALCIUM CARBONATE 500 MG: 500 TABLET, CHEWABLE ORAL at 08:45

## 2021-12-23 RX ADMIN — HYDRALAZINE HYDROCHLORIDE 25 MG: 25 TABLET, FILM COATED ORAL at 08:45

## 2021-12-23 RX ADMIN — PANTOPRAZOLE SODIUM 40 MG: 40 TABLET, DELAYED RELEASE ORAL at 20:20

## 2021-12-23 RX ADMIN — MYCOPHENOLATE MOFETIL 750 MG: 250 CAPSULE ORAL at 08:44

## 2021-12-23 RX ADMIN — FUROSEMIDE 120 MG: 10 INJECTION, SOLUTION INTRAVENOUS at 14:01

## 2021-12-23 RX ADMIN — PANTOPRAZOLE SODIUM 40 MG: 40 TABLET, DELAYED RELEASE ORAL at 08:44

## 2021-12-23 RX ADMIN — MYCOPHENOLATE MOFETIL 750 MG: 250 CAPSULE ORAL at 20:20

## 2021-12-23 ASSESSMENT — ACTIVITIES OF DAILY LIVING (ADL)
ADLS_ACUITY_SCORE: 9
ADLS_ACUITY_SCORE: 9
ADLS_ACUITY_SCORE: 11
ADLS_ACUITY_SCORE: 9
ADLS_ACUITY_SCORE: 10
ADLS_ACUITY_SCORE: 9
ADLS_ACUITY_SCORE: 10
ADLS_ACUITY_SCORE: 9
ADLS_ACUITY_SCORE: 10
ADLS_ACUITY_SCORE: 9
ADLS_ACUITY_SCORE: 9

## 2021-12-23 ASSESSMENT — MIFFLIN-ST. JEOR: SCORE: 1170.13

## 2021-12-23 NOTE — PLAN OF CARE
/68 (BP Location: Right arm)   Pulse 87   Temp 98  F (36.7  C) (Oral)   Resp 16   Ht 1.524 m (5')   Wt 65.4 kg (144 lb 1.6 oz)   SpO2 96%   BMI 28.14 kg/m      7986-2614: Pt admitted with hypervolemia. Hmong speaking,  used for all assessments, though it seems there may still be some misunderstanding even with the interpreters, so it is important to ask many clarifying questions, ask for descriptions, have pt point to what she's talking about. Dtr, Nou, at bedside most of the day, supportive and involved in cares.   Neuro: WNL  Cardiac: A fib, not currently being treated and not on tele, team aware. Pt asx. 1-2+ edema in BLEs, pt reports she also feels swelling in her flanks, non-pitting.  Resp: WDL on RA.  GI/: LBM yesterday per pt. Strict Is&Os. Voided 45 ml preet urine this shift. 120mg IV Lasix given x1.  Diet/Appetite: On dialysis diet + 1L FR, reinforced education on what 1L FR means for pt, demonstrated good understanding. Family bringing in food, started zion counts today, much better appetite than previous days. Strongly encouraging adequate PO intake.  Endocrine: NA.  Skin: WDL. RLQ kidney bx site drsg changed this AM by previous RN, JULIA.  Access: PIVx1 SLd. L+AVF.  Drains: NA.  Activity: UAL.  Pain: Denied.  Plan: Awaiting kidney bx results, OP HD chair confirmation (RNCC actively working on), and adequate PO intake prior to discharge. Per MD, if pt doesn't have sufficient PO intake, will need to discuss a feeding tube again. Will continue with plan of care and notify team of any changes.?

## 2021-12-23 NOTE — PLAN OF CARE
Pt arrived back from dialysis at 1830. Gave patient scheduled medications. Report handed off to oncoming nurse.

## 2021-12-23 NOTE — PLAN OF CARE
/74 (BP Location: Right arm)   Pulse 85   Temp 98.1  F (36.7  C) (Oral)   Resp 20   Ht 1.524 m (5')   Wt 63.5 kg (139 lb 15.9 oz)   SpO2 95%   BMI 27.34 kg/m      Shift: 5003-2416  VS: VSS on RA, afebrile  Neuro: AOx4  BG: none  Labs: CBC this morning  Respiratory: WDL  Pain/Nausea/PRN: denies  Diet: dialysis, 1 L FR  LDA: PIV SL, L AVF  GI/: oliguric, LBM 12/20  Skin: R lap site CDI  Mobility: SBA  Plan: team will reassess need for dialysis today.    Handoff given to following RN.

## 2021-12-23 NOTE — PROGRESS NOTES
Care Management Follow Up    Length of Stay (days): 7    Expected Discharge Date: 12/27/2021     Concerns to be Addressed: care coordination/care conferences,discharge planning     Patient plan of care discussed at interdisciplinary rounds: Yes    Anticipated Discharge Disposition: Home     Anticipated Discharge Services:    Anticipated Discharge DME: None    Patient/family educated on Medicare website which has current facility and service quality ratings: no  Education Provided on the Discharge Plan:    Patient/Family in Agreement with the Plan: yes    Referrals Placed by CM/SW: External Care Coordination    Additional Information:  Uploaded Hep B Core lab.  Awaiting M Quant Gold Lab.  Notified by [facility] that [facility] is reviewing for acceptance.   Final outpatient dialysis placement pending M Quant Gold result and unit acceptance.    Brittaney Barnes RN BSN, PHN, ACM-RN  7A RN Care Coordinator  Phone: [phone]  Pager [phone]  To contact the weekend RNCC, Page: [phone]    12/23/2021 2:39 PM

## 2021-12-23 NOTE — PROGRESS NOTES
[facility]    Medicine Progress Note - Hospitalist Service, Gold 9       Date of Admission:  12/16/2021    Assessment & Plan             Joshua Lala is a 55 year old female admitted on 12/16/2021. 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 She presented to [facility] ED on 12/16/21 with complaints of fatigue, edema, and oliguria. She is admitted to the internal medicine service with transplant nephrology consulting for further evaluation and management.    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 Second transplant 2007, now with chronic antibody rejection, CKD IV. Last nephrology visit in chart 03/29/21. Missed an appointment in Sept 2021.  - Baseline creatinine 2 - 2.20 as of beginning of 2021. More recently, Cr in the 2.5 - 2.6 range. Cr 2.93 on admit. UA on admit with proteinuria, small blood, and trace LE  -EBV (7300) elevated. SPEP and UPEP with suggestion of hypoalbuminema and albuminuria possibly indicate glomerular damage  -DDx( cardiorenal vs medication vs nephrotic syndrome)  Plan:  > Transplant nephrology consultation    - PLA2 R AB pending    -continue PTA immunosuppression: cyclosporine 25mg AM and 25mg PM; MMF 750mg BID    -continue sulfamethoxazole-trimethoprim 400-80mg on MWF for prophylaxis    -cyclosporine level and dosing per transplant nephrology    - Transplanted renal biopsy pending (done  on 12/22)    #prandial pain, epigastric pain  #postprandial vomiting   Patient and daughter report this is an ongoing problem since 04/2021. Patient is only able to eat rice and chicken broth recently.   -She has anorexia which she associates with pain.  -Known hx gastric ulcers.  -GI consulted (12/17) and did not recommend EGD  -worsened pain despite oral meds (12/19) and CT abdomen pelvis ordered. CT abdomen/pelvis(12/19) - no acute pathology   Plan:  >sucralfate 1g BID (need to consider if worth doing with PPI)  >PPI, H2 blocker qhs    -H pylori stool antigen testing    -nutrition consultation  -Patient declining feeding tube but will do calorie counts    #Acute decompensated HFpEF  #pulmonary HTN  Pulmonary vascular congestion on CXR, tachypneic on exam in ER on 12/16  -. Previous echocardiogram with HFpEF and c/f pulmonary HTN. Presentation with elevated BNP >9,000, higher than in previous hospitalizations.   -ECHO(12/17) - EF 60%, dilated IVC, PA pressure 39  Plan:  -IV prn and dialysis started    -strict I/O    -weigh patient daily    #new onset Afib  Will watch for now and consider anticoagulation in future.  Likely not causing her symptoms currently.    #Hypertension  No current concern for hypertensive emergency/end-organ damage.   Plan:  >Hold losartan  >Continue coreg, hydralazine    -PRN labetalol for SBP >170 and/or DBP>100    #COVID-19 vaccine series not completed  Patient has received 2 doses of vaccine so far. This does not represent full vaccination in an immunocompromised patient. She requires a third, full dose. We can offer this after ruling out any active viral infection.    -ensure 3rd full dose offered to patient prior to leaving the hospital    #anemia of chronic renal disease: stable, monitor. Outpatient darbepoetin deana, IV iron x 5 days (started 12/20)    #Elevated fungitell  -Mild elevation in fungitell, given lack of localizing symptoms, fever or pulmonary nodules would not recommend  "further infectious work up per discussion with ID on 12/20  -If she were to develop fever, confusion, or new imaging findings would re-consider    Chronic, stable medical problems:  #leukopenia: stable, monitor   #Mineral bone disorder - elevated PTH, low ionized calcium.  Currently on calitriol and calcium  #NAGMA - secondary to renal disease - continue sodium bicarb    Malnutrition:    - Level of malnutrition: Moderate   - Based on: mild (or greater) muscle loss, mild (or greater) fluid retention       Diet: Fluid restriction 1000 ML FLUID  Dialysis Diet  Calorie Counts  Snacks/Supplements Adult: Ensure Enlive; With Meals    DVT Prophylaxis: Heparin subcutaneous- held now for biopsy tomorrow  Staples Catheter: Not present  Central Lines: None  Code Status: Full Code      Disposition Plan   Expected Discharge: 12/27/2021     Anticipated discharge location: home with family    Delays:            The patient's care was discussed with the Care Coordinator/, Patient, Patient's Family and GI and Transplant nephrology Consultant.    Shaan Hernandez MD  Hospitalist Service, [address]  Securely message with the Vocera Web Console (learn more here)  Text page via [employer] Paging/Directory    Please see sign in/sign out for up to date coverage information    Clinically Significant Risk Factors Present on Admission             # Non-Severe Malnutrition, POA: based on Weight loss;Subcutaneous fat loss;Muscle loss;Fluid retention (12/17/21 1200)     ______________________________________________________________________    Interval History     Feels better this morning  Dizziness has improved  Abdominal discomfort has improved  Mentioned some meds in the past that has helped with her \"stomach ulcer\"  She continues to decline any feeding tube  Daughter didn't have any additional conversations    ROS  No fevers  No headaches  No confusion  No diarrhea  No rash    Data " reviewed today: I reviewed all medications, new labs and imaging results over the last 24 hours. I personally reviewed no images or EKG's today.    Physical Exam   Vital Signs: Temp: 98  F (36.7  C) Temp src: Oral BP: 113/68 Pulse: 87   Resp: 16 SpO2: 96 % O2 Device: None (Room air)    Weight: 144 lbs 1.6 oz  Constitutional: alert, oriented, sitting in bed, in no acute distress  Eyes: no icterus  ENT: JVD+, supple  Respiratory: crackles in bilateral lower lung fields, no wheezing, no respiratory distress  Cardiovascular: normal s1, s2, normal rate and rhythm, systolic murmur 1/6  GI: soft, non-tender, non-distended, bowel sounds present  Skin: no rash  Ext: left upper extremity av fistula with palpable thrill and audible bruiit  Musculoskeletal: trace pitting edema     Data   Recent Labs   Lab 12/23/21  0659 12/22/21  1828 12/22/21  0612 12/21/21  1712 12/21/21  0946 12/21/21  0335 12/19/21  1313 12/19/21  0655 12/18/21  0636 12/17/21  0618 12/16/21  1821   WBC 4.1  --  3.9*  --   --  3.9*   < > 4.0 3.3*   < > 3.9*   HGB 8.6*  --  8.6*  --   --  8.6*   < > 8.7* 8.4*   < > 9.9*   MCV 89  --  87  --   --  85   < > 86 93   < > 91   *  --  144*  --   --  133*   < > 132* 122*   < > 148*   INR  --   --   --  1.11  --   --   --   --   --   --   --    * 131* 122* 129*   < > 122*  122*   < > 125* 132*   < > 136   POTASSIUM 4.1  --  3.9  --   --  3.4   < > 3.1* 3.5   < > 3.8   CHLORIDE 97  --  90*  --   --  88*   < > 93* 103   < > 108   CO2 23  --  24  --   --  24   < > 19* 18*   < > 20   BUN 13  --  14  --   --  18   < > 29 24   < > 20   CR 2.32*  --  2.16*  --   --  2.25*   < > 3.17* 2.99*   < > 2.93*   ANIONGAP 9  --  8  --   --  10   < > 13 11   < > 8   SHAVON 8.4*  --  8.0*  --   --  7.5*   < > 7.1* 7.0*   < > 7.1*   GLC 96  --  95  --   --  86   < > 134* 83   < > 136*   ALBUMIN  --   --   --   --   --   --   --  2.1* 1.9*   < > 2.8*   PROTTOTAL  --   --   --   --   --   --   --  5.8* 5.7*   < > 7.2    BILITOTAL  --   --   --   --   --   --   --  0.7 0.7   < > 0.7   ALKPHOS  --   --   --   --   --   --   --  144 142   < > 162*   ALT  --   --   --   --   --   --   --  11 11   < > 14   AST  --   --   --   --   --   --   --  31 29   < > 36   LIPASE  --   --   --   --   --   --   --  537*  --   --  536*    < > = values in this interval not displayed.

## 2021-12-23 NOTE — PROGRESS NOTES
[facility]  Transplant Nephrology Progress note  Date of Admission:  12/16/2021  Today's Date: 12/23/2021  Requesting physician: Jarrett Mullen MD    Recommendations:  1. Follow renal allograft biopsy results  2. Continue free water restriction to 1L daily  3. Continue Aranesp 40mcg IV during HD qweekly ( last dose 12/22)   4. Consider tube feeds due to malnutrition.  5. Assess daily for need for HD  6. Start Lasix 120 mg IV x1 dose  ( ordered)  7. Outpatient  Vascular surgery consult  For AVF aneurysm       Assessment & Plan   # DDKT: Trend up with TOSIN. 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    -iHD  12/20,12/21,12/22 - each day 2 LUF achieved     -Follow up biopsy result to see likelihood of renal recovery or if this is all progression of chronic disease         # DIALYSIS ACCESS:  LUE AV fistula w/ good thrill and bruit. However has significant aneurysm , though the skin is intact , no ulcerations . Does experience some pain during cannulation. No major bleeding issue   Patient will need Vascular surgery consult       # Immunosuppression: Cyclosporine (goal ) and Mycophenolate mofetil (dose 750 mg every 12 hours)   - Changes: Not at this time. Check CsA q48h.  CsA trough 103 ( 12/22)   CsA 25/25  /750      # Infection Prophylaxis:   - PJP: Sulfa/TMP (Bactrim)  MWF    # Hypertension: Controlled;  Goal BP: < 130/80   - Volume status: Moderately hypervolemic     - Changes:Yes, HD w/ 2L UF again today  Continue the following with holding parameters ( hold if SBP< 100)   Coreg 3.125 mg 2 times a day  Hydralazine 25 mg 3 times a day  Hold losartan  Continue labetalol as needed  Admission weight 66 kg. Has had 3 HD sessions so far . Total 6 LUF achieved . 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 Got darbepoetin 40mcg on 12/22/21     - Iron studies: Low iron saturation. Received half of a dose on 12/20 but developed back pain, chest pain, SOB, dizziness,blurry vision and hence  it was stopped ( can be considered as allergy )    - plan to give darbepoetin 40mcg weekly and have her follow up with anemia clinic as outpatient upon discharge     # Mineral Bone Disorder:   - Secondary renal hyperparathyroidism; PTH level: Mildly elevated (151-300 pg/ml)        On treatment: Calcitriol  - Vitamin D; level: Normal        On supplement: No  - Calcium; level: Normal        On supplement: Yes  - Phosphorus; level: Normal        On supplement: No    -Continue calcitriol 0.5mcg daily and tums 500mg bid.       # Electrolytes:   - Potassium; level: Normal        On supplement: No  - Magnesium; level: Normal        On supplement: No.    - Bicarbonate; level: Normal        On supplement: Yes, stop as she is on dialysis    #Hyponatremia   -Initially thought due to hypervolemia, TOSIN w/ decreased ability to excrete free water. Sonal of  40 and UOsm 285 could also represent tea and toast nephropathy. She has been responding to UF through HD indicating hypervolemiai contributing    -Did not respond to tolvaptan 15mg on 12/20.    -Fluid restriction 1L daily       # HFpEF:    -Decompensated clinically. Echo stable on 12/17/21 with elevated PASP and dilated IVC, grade II diastolic dysfunction   - continue with volume optimization strategy with HD      # Pulmonary HTN: echo 12/17/21 w/ PASP 39mmHg + RA pressure. Likely worsened by volume overload   - continue with volume optimization strategy with HD     # Dyspepsia:   - Advised PPI 40 mg twice daily for 6 to 8 weeks, Carafate twice daily, and Pepcid 40 mg daily.  Follow-up H. Pylori stool antigen.   -GI consulted to consider EGD but deferred. Didn't report abdominal pain today    # Unintentional weight loss:   -Likely 2/2 her not eating.  EBV PCR 7K (12/16/2021)   -CMV negative (12/16/2021)   -Likely with severe malnutrition   -Consider initiation of tube feeds     # Transplant History:  Etiology of Kidney Failure: IgA nephropathy  Tx: DDKT  Transplant: 11/28/2007 (Kidney), 8/8/1998 (Kidney)  Significant changes in immunosuppression: None  Significant transplant-related complications: DSA    Recommendations were communicated to the primary team verbally  Patient seen and staffed with Dr Antonio Lechuga  [facility]  assisted with translation over phone    This patient has been seen and evaluated by me, Case Milton MD.  I have reviewed the note and agree with plan of care as documented by the fellow.  Case Milton MD on 12/23/2021 at 3:15 PM    INTERVAL HISTORY  Awaiting biopsy results  Sitting comofrtably by the window with her daughter who helped with translation   Vitals stable  No CP/SOB  No abd discomfort . No NV/D      Review of Systems    The 4 point Review of Systems is negative other than noted in the HPI or here.    Allergies   Allergies   Allergen Reactions     Contrast Dye Shortness Of  Breath     Other reaction(s): Angioedema     Venofer [Iron Sucrose] Anaphylaxis     Pt had rxn to IV Iron Sucrose infusion in hospital on 21 -- back pain, chest pain, SOB, blurred vision, dizziness, & lightheadedness.     Amlodipine Other (See Comments)     Prior to Admission Medications     calcitRIOL  0.5 mcg Oral Daily     calcium carbonate  500 mg Oral BID     carvedilol  3.125 mg Oral BID w/meals     cycloSPORINE modified  25 mg Oral QPM     cycloSPORINE modified  25 mg Oral QAM     famotidine  20 mg Oral At Bedtime     [Held by provider] heparin ANTICOAGULANT  5,000 Units Subcutaneous BID     hydrALAZINE  25 mg Oral TID     [Held by provider] losartan  50 mg Oral Daily     mycophenolate  750 mg Oral BID     pantoprazole  40 mg Oral BID     sodium chloride (PF)  3 mL Intracatheter Q8H     sucralfate  1 g Oral BID AC     sulfamethoxazole-trimethoprim  1 tablet Oral Q Mon Wed Fri AM          Physical Exam   Temp  Av.4  F (36.9  C)  Min: 97.8  F (36.6  C)  Max: 99.1  F (37.3  C)      Pulse  Av.2  Min: 69  Max: 105 Resp  Av.9  Min: 16  Max: 18  SpO2  Av.7 %  Min: 91 %  Max: 99 %     /68 (BP Location: Right arm)   Pulse 87   Temp 98  F (36.7  C) (Oral)   Resp 16   Ht 1.524 m (5')   Wt 63.5 kg (139 lb 15.9 oz)   SpO2 96%   BMI 27.34 kg/m      Admit Weight: 66 kg (145 lb 6.4 oz)     GENERAL APPEARANCE: alert and no distress  HENT: mouth without ulcers or lesions  LYMPHATICS: no cervical or supraclavicular nodes  RESP: rales bilateral bases  CV: normal rhythm, systolic murmur heard throughout the precordium radiating to carotids  EDEMA: 1+ LE edema bilaterally  ABDOMEN: soft, nondistended, nontender, bowel sounds normal  MS: extremities normal - no gross deformities noted, no evidence of inflammation in joints, no muscle tenderness  SKIN: no rash  NEURO: normal strength and tone, sensory exam grossly normal, mentation intact and speech normal  PSYCH: mentation appears normal and  affect normal/bright  TX KIDNEY: normal  DIALYSIS ACCESS:  LUE AV fistula w/ good thrill and bruit    Data   CMP  Recent Labs   Lab 12/23/21  0659 12/22/21  1828 12/22/21  0612 12/21/21  1712 12/21/21  0946 12/21/21  0335 12/20/21  1154 12/20/21  0543 12/19/21  1313 12/19/21  0655 12/18/21  0636 12/17/21  0618 12/16/21  1821   * 131* 122* 129*   < > 122*  122*   < > 121*  121*   < > 125* 132* 138 136   POTASSIUM 4.1  --  3.9  --   --  3.4  --  4.0  --  3.1* 3.5 3.8 3.8   CHLORIDE 97  --  90*  --   --  88*  --  90*  --  93* 103 110* 108   CO2 23  --  24  --   --  24  --  18*  --  19* 18* 18* 20   ANIONGAP 9  --  8  --   --  10  --  13  --  13 11 10 8   GLC 96  --  95  --   --  86  --  93   < > 134* 83 86 136*   BUN 13  --  14  --   --  18  --  33*  --  29 24 20 20   CR 2.32*  --  2.16*  --   --  2.25*  --  3.37*  --  3.17* 2.99* 3.08* 2.93*   GFRESTIMATED 24*  --  26*  --   --  24*  --  15*  --  16* 17* 16* 17*   SHAVON 8.4*  --  8.0*  --   --  7.5*  --  7.7*  --  7.1* 7.0* 7.3* 7.1*   MAG  --   --   --   --   --   --   --   --   --  2.1 1.4*  --   --    PHOS  --   --  3.1  --   --   --   --   --   --   --  4.5  --   --    PROTTOTAL  --   --   --   --   --   --   --   --   --  5.8* 5.7* 6.2* 7.2   ALBUMIN  --   --   --   --   --   --   --   --   --  2.1* 1.9* 2.2* 2.8*   BILITOTAL  --   --   --   --   --   --   --   --   --  0.7 0.7 0.8 0.7   ALKPHOS  --   --   --   --   --   --   --   --   --  144 142 133 162*   AST  --   --   --   --   --   --   --   --   --  31 29 31 36   ALT  --   --   --   --   --   --   --   --   --  11 11 11 14    < > = values in this interval not displayed.     CBC  Recent Labs   Lab 12/23/21  0659 12/22/21  0612 12/21/21  0335 12/20/21  0543   HGB 8.6* 8.6* 8.6* 8.6*   WBC 4.1 3.9* 3.9* 3.5*   RBC 3.09* 3.13* 3.11* 3.14*   HCT 27.6* 27.3* 26.5* 26.8*   MCV 89 87 85 85   MCH 27.8 27.5 27.7 27.4   MCHC 31.2* 31.5 32.5 32.1   RDW 13.6 13.5 13.3 13.4   * 144* 133* 131*      INR  Recent Labs   Lab 12/21/21  1712   INR 1.11     ABGNo lab results found in last 7 days.   Urine Studies  Recent Labs   Lab Test 12/22/21  0044 12/20/21  0553 12/16/21  1810 09/25/21  1637   COLOR Light Yellow Yellow Light Yellow Straw   APPEARANCE Clear Clear Clear Clear   URINEGLC Negative Negative 30 * Negative   URINEBILI Negative Negative Negative Negative   URINEKETONE Negative Negative Negative Negative   SG 1.006 1.013 1.005 1.003   UBLD Negative Negative Small* Trace*   URINEPH 5.5 5.5 6.0 6.0   PROTEIN 100 * 100 * 200 * 100 *   NITRITE Negative Negative Negative Negative   LEUKEST Negative Small* Trace* Negative   RBCU 1 1 1 0   WBCU 1 13* 5 <1     Recent Labs   Lab Test 12/16/21  1810 09/03/21  0630 01/15/21  0732 03/18/20  1330 06/01/19  0700 02/16/19  0737 02/02/18  0742 11/17/17  0739 04/28/17  0843 10/28/16  0913 04/29/16  1145 06/12/15  1011 01/23/15  0700   UTPG 6.85* 4.32* 3.69* 1.35* 3.89* 3.92* 2.31* 1.74* 1.86* 1.84* 0.49* 0.73* 0.77*     PTH  Recent Labs   Lab Test 12/17/21  1616 03/17/20  0509 04/29/16  1043 02/05/16  1039 10/27/15  0555 03/26/15  1734 12/16/13  0636   PTHI 173* 291* 124* 140* 218* 251* 166*     Iron Studies  Recent Labs   Lab Test 12/17/21  1443 12/17/21  0618 09/03/21  0625 04/23/21  0716 03/12/21  0757 01/15/21  0725 10/29/20  0900 09/17/20  0758 08/10/20  0840 04/24/20  0800 03/17/20  0509   IRON 58  --  84 45 83 96 88 88 89 61 54     --  252 252 245 225* 251 248 242 286 240   IRONSAT 17  --  33 18 34 43 35 36 37 21 23   ANASTASIIA  --  317* 332* 361* 401* 396* 352* 374* 364* 299*  --        IMAGING:  All imaging studies reviewed by me.

## 2021-12-23 NOTE — PROGRESS NOTES
CLINICAL NUTRITION SERVICES - REASSESSMENT NOTE     Nutrition Prescription    RECOMMENDATIONS FOR MDs/PROVIDERS TO ORDER:  Consider diet liberalization to Regular or low Sodium diet d/t minimal PO intake and stable K+ and Phos levels.     Would strongly encourage nutrition support (if patient unable to consume at least 750 kcal and 40 grams of protein (~60% of assessed needs).  Patient has not been able to meet these amounts thus far.     Malnutrition Status:    Moderate malnutrition in the context of acute on chronic illness    Recommendations already ordered by Registered Dietitian (RD):  Ensure Enlive vanilla once daily     Future/Additional Recommendations:  Increase Ensure frequency if okay (currently on strict fluid restriction).      Encourage small/frequent meals and snacks as tolerated.     EVALUATION OF THE PROGRESS TOWARD GOALS   Diet: Dialysis + 1000 ml fluid restriction   MD ordered calorie counts 12/23-12/25    Intake: Only ordered one meal in the last 3 days (oatmeal).  Did eat some rice noodles and boiled chicken wing (in chicken broth) this morning.  So far it is staying down and she is feeling okay.      *Overall, prolonged very poor intake x 3 months reported on admission (refer to 12/17 RD note).     Per MD note, patient declined a feeding tube.      NEW FINDINGS   Labs: Na 129 (low), Cre 2.32 -> team anticipates need for regular, outpatient dialysis    Meds: Pepcid, Carafate BID, Cyclosporine, Calcitriol, Protonix BID, Cellcept BID    Weight: Lowest weight this admission was 62.1 kg (12/17)    Adjusted estimated protein needs (based on HD start):   Estimated Protein Needs: 60-75 grams protein/day (1.2-1.5 grams of pro/kg)    MALNUTRITION  % Intake: </= 50% for >/= 5 days (severe)  % Weight Loss: Difficult to assess d/t fluid retention  Subcutaneous Fat Loss: Thoracic/intercostal:  mild  Muscle Loss: Thoracic region (clavicle, acromium bone, deltoid, trapezius, pectoral):  mild  Fluid  Accumulation/Edema: Mild (ankles/L leg)  Malnutrition Diagnosis: Moderate malnutrition in the context of acute on chronic illness    Previous Goals   Patient to consume % of nutritionally adequate meal trays TID, or the equivalent with supplements/snacks.  Evaluation: Not met    Previous Nutrition Diagnosis  Inadequate oral intake   Evaluation: No change    CURRENT NUTRITION DIAGNOSIS  Inadequate oral intake related to abdominal pain, reduced appetite as evidenced by minimal PO intake over the last 3 days and 3 months.       INTERVENTIONS  Implementation  Medical food supplement therapy - discussed recommendation for TF, patient willing to do Ensure oral supplement    Nutrition education for recommended modifications - discussed calorie counts, requested daughter help with reporting what food she is bringing and the amounts consumed to help with calorie counting.   Encouraged PO intake in small/frequent amounts and use of oral supplements as tolerated.     Goals  Pt to consume at least 750 kcal and 40 grams protein (~60% needs)    Monitoring/Evaluation  Progress toward goals will be monitored and evaluated per protocol.    Renu Torres MS, RD, LD, CCTD  7A/Obs units, pager [phone]

## 2021-12-24 ENCOUNTER — APPOINTMENT (OUTPATIENT)
Dept: PHYSICAL THERAPY | Facility: CLINIC | Age: 56
DRG: 698 | End: 2021-12-24
Payer: MEDICARE

## 2021-12-24 LAB
ANION GAP SERPL CALCULATED.3IONS-SCNC: 9 MMOL/L (ref 3–14)
BUN SERPL-MCNC: 23 MG/DL (ref 7–30)
CALCIUM SERPL-MCNC: 8.5 MG/DL (ref 8.5–10.1)
CHLORIDE BLD-SCNC: 94 MMOL/L (ref 94–109)
CO2 SERPL-SCNC: 25 MMOL/L (ref 20–32)
CREAT SERPL-MCNC: 3.09 MG/DL (ref 0.52–1.04)
CYCLOSPORINE BLD LC/MS/MS-MCNC: 112 UG/L (ref 50–400)
ERYTHROCYTE [DISTWIDTH] IN BLOOD BY AUTOMATED COUNT: 13.6 % (ref 10–15)
GAMMA INTERFERON BACKGROUND BLD IA-ACNC: 0.05 IU/ML
GFR SERPL CREATININE-BSD FRML MDRD: 17 ML/MIN/1.73M2
GLUCOSE BLD-MCNC: 99 MG/DL (ref 70–99)
HCT VFR BLD AUTO: 26.1 % (ref 35–47)
HGB BLD-MCNC: 8.3 G/DL (ref 11.7–15.7)
LACTATE SERPL-SCNC: 1.1 MMOL/L (ref 0.7–2)
M TB IFN-G BLD-IMP: NEGATIVE
M TB IFN-G CD4+ BCKGRND COR BLD-ACNC: 1.35 IU/ML
MCH RBC QN AUTO: 27.9 PG (ref 26.5–33)
MCHC RBC AUTO-ENTMCNC: 31.8 G/DL (ref 31.5–36.5)
MCV RBC AUTO: 88 FL (ref 78–100)
MITOGEN IGNF BCKGRD COR BLD-ACNC: 0.02 IU/ML
MITOGEN IGNF BCKGRD COR BLD-ACNC: 0.06 IU/ML
PLATELET # BLD AUTO: 136 10E3/UL (ref 150–450)
POTASSIUM BLD-SCNC: 4 MMOL/L (ref 3.4–5.3)
QUANTIFERON MITOGEN: 1.4 IU/ML
QUANTIFERON NIL TUBE: 0.05 IU/ML
QUANTIFERON TB1 TUBE: 0.07 IU/ML
QUANTIFERON TB2 TUBE: 0.11
RBC # BLD AUTO: 2.98 10E6/UL (ref 3.8–5.2)
SODIUM SERPL-SCNC: 128 MMOL/L (ref 133–144)
SODIUM SERPL-SCNC: 131 MMOL/L (ref 133–144)
TME LAST DOSE: NORMAL H
TME LAST DOSE: NORMAL H
WBC # BLD AUTO: 3.9 10E3/UL (ref 4–11)

## 2021-12-24 PROCEDURE — 90937 HEMODIALYSIS REPEATED EVAL: CPT

## 2021-12-24 PROCEDURE — 250N000012 HC RX MED GY IP 250 OP 636 PS 637: Performed by: STUDENT IN AN ORGANIZED HEALTH CARE EDUCATION/TRAINING PROGRAM

## 2021-12-24 PROCEDURE — 80048 BASIC METABOLIC PNL TOTAL CA: CPT | Performed by: INTERNAL MEDICINE

## 2021-12-24 PROCEDURE — 36415 COLL VENOUS BLD VENIPUNCTURE: CPT | Performed by: INTERNAL MEDICINE

## 2021-12-24 PROCEDURE — 97116 GAIT TRAINING THERAPY: CPT | Mod: GP

## 2021-12-24 PROCEDURE — 99233 SBSQ HOSP IP/OBS HIGH 50: CPT | Mod: GC | Performed by: INTERNAL MEDICINE

## 2021-12-24 PROCEDURE — 85027 COMPLETE CBC AUTOMATED: CPT | Performed by: INTERNAL MEDICINE

## 2021-12-24 PROCEDURE — 97110 THERAPEUTIC EXERCISES: CPT | Mod: GP

## 2021-12-24 PROCEDURE — 99207 PR CDG-CUT & PASTE-POTENTIAL IMPACT ON LEVEL: CPT | Performed by: INTERNAL MEDICINE

## 2021-12-24 PROCEDURE — 250N000011 HC RX IP 250 OP 636: Performed by: NURSE PRACTITIONER

## 2021-12-24 PROCEDURE — 83605 ASSAY OF LACTIC ACID: CPT | Performed by: INTERNAL MEDICINE

## 2021-12-24 PROCEDURE — 250N000013 HC RX MED GY IP 250 OP 250 PS 637: Performed by: STUDENT IN AN ORGANIZED HEALTH CARE EDUCATION/TRAINING PROGRAM

## 2021-12-24 PROCEDURE — 84295 ASSAY OF SERUM SODIUM: CPT | Performed by: INTERNAL MEDICINE

## 2021-12-24 PROCEDURE — 82610 CYSTATIN C: CPT | Performed by: INTERNAL MEDICINE

## 2021-12-24 PROCEDURE — 250N000013 HC RX MED GY IP 250 OP 250 PS 637: Performed by: PHYSICIAN ASSISTANT

## 2021-12-24 PROCEDURE — 250N000013 HC RX MED GY IP 250 OP 250 PS 637: Performed by: NURSE PRACTITIONER

## 2021-12-24 PROCEDURE — 258N000003 HC RX IP 258 OP 636: Performed by: INTERNAL MEDICINE

## 2021-12-24 PROCEDURE — 80158 DRUG ASSAY CYCLOSPORINE: CPT | Performed by: INTERNAL MEDICINE

## 2021-12-24 PROCEDURE — 250N000012 HC RX MED GY IP 250 OP 636 PS 637: Performed by: NURSE PRACTITIONER

## 2021-12-24 PROCEDURE — 120N000011 HC R&B TRANSPLANT UMMC

## 2021-12-24 PROCEDURE — 99233 SBSQ HOSP IP/OBS HIGH 50: CPT | Performed by: INTERNAL MEDICINE

## 2021-12-24 RX ADMIN — PANTOPRAZOLE SODIUM 40 MG: 40 TABLET, DELAYED RELEASE ORAL at 20:13

## 2021-12-24 RX ADMIN — CYCLOSPORINE 25 MG: 25 CAPSULE, LIQUID FILLED ORAL at 08:27

## 2021-12-24 RX ADMIN — CALCIUM CARBONATE 500 MG: 500 TABLET, CHEWABLE ORAL at 20:13

## 2021-12-24 RX ADMIN — MYCOPHENOLATE MOFETIL 750 MG: 250 CAPSULE ORAL at 20:14

## 2021-12-24 RX ADMIN — HYDRALAZINE HYDROCHLORIDE 25 MG: 25 TABLET, FILM COATED ORAL at 20:13

## 2021-12-24 RX ADMIN — SODIUM CHLORIDE 300 ML: 9 INJECTION, SOLUTION INTRAVENOUS at 13:31

## 2021-12-24 RX ADMIN — CARVEDILOL 3.12 MG: 3.12 TABLET, FILM COATED ORAL at 17:57

## 2021-12-24 RX ADMIN — SIMETHICONE 80 MG: 80 TABLET, CHEWABLE ORAL at 16:45

## 2021-12-24 RX ADMIN — HEPARIN SODIUM 5000 UNITS: 5000 INJECTION, SOLUTION INTRAVENOUS; SUBCUTANEOUS at 08:32

## 2021-12-24 RX ADMIN — CYCLOSPORINE 25 MG: 25 CAPSULE, LIQUID FILLED ORAL at 17:57

## 2021-12-24 RX ADMIN — CALCITRIOL CAPSULES 0.25 MCG 0.5 MCG: 0.25 CAPSULE ORAL at 08:27

## 2021-12-24 RX ADMIN — SULFAMETHOXAZOLE AND TRIMETHOPRIM 1 TABLET: 400; 80 TABLET ORAL at 08:27

## 2021-12-24 RX ADMIN — OXYCODONE HYDROCHLORIDE 5 MG: 5 TABLET ORAL at 16:44

## 2021-12-24 RX ADMIN — SUCRALFATE 1 G: 1 TABLET ORAL at 08:27

## 2021-12-24 RX ADMIN — SUCRALFATE 1 G: 1 TABLET ORAL at 16:37

## 2021-12-24 RX ADMIN — SODIUM CHLORIDE 250 ML: 9 INJECTION, SOLUTION INTRAVENOUS at 13:32

## 2021-12-24 RX ADMIN — CALCIUM CARBONATE 500 MG: 500 TABLET, CHEWABLE ORAL at 08:27

## 2021-12-24 RX ADMIN — DOCUSATE SODIUM 50 MG AND SENNOSIDES 8.6 MG 2 TABLET: 8.6; 5 TABLET, FILM COATED ORAL at 11:41

## 2021-12-24 RX ADMIN — Medication: at 13:32

## 2021-12-24 RX ADMIN — FAMOTIDINE 20 MG: 20 TABLET ORAL at 21:59

## 2021-12-24 RX ADMIN — PANTOPRAZOLE SODIUM 40 MG: 40 TABLET, DELAYED RELEASE ORAL at 08:27

## 2021-12-24 RX ADMIN — HEPARIN SODIUM 5000 UNITS: 5000 INJECTION, SOLUTION INTRAVENOUS; SUBCUTANEOUS at 20:14

## 2021-12-24 RX ADMIN — CARVEDILOL 3.12 MG: 3.12 TABLET, FILM COATED ORAL at 08:27

## 2021-12-24 RX ADMIN — MYCOPHENOLATE MOFETIL 750 MG: 250 CAPSULE ORAL at 08:26

## 2021-12-24 RX ADMIN — HYDRALAZINE HYDROCHLORIDE 25 MG: 25 TABLET, FILM COATED ORAL at 08:27

## 2021-12-24 ASSESSMENT — ACTIVITIES OF DAILY LIVING (ADL)
ADLS_ACUITY_SCORE: 12
ADLS_ACUITY_SCORE: 10
ADLS_ACUITY_SCORE: 12
ADLS_ACUITY_SCORE: 10
ADLS_ACUITY_SCORE: 12
ADLS_ACUITY_SCORE: 10
ADLS_ACUITY_SCORE: 12
ADLS_ACUITY_SCORE: 10
ADLS_ACUITY_SCORE: 10
ADLS_ACUITY_SCORE: 12
ADLS_ACUITY_SCORE: 10

## 2021-12-24 ASSESSMENT — MIFFLIN-ST. JEOR
SCORE: 1184.5
SCORE: 1185.1
SCORE: 1185.5

## 2021-12-24 NOTE — PROGRESS NOTES
[facility]    Medicine Progress Note - Hospitalist Service, Gold 9       Date of Admission:  12/16/2021    Assessment & Plan             Joshua Lala is a 55 year old female admitted on 12/16/2021. 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 She presented to [facility] ED on 12/16/21 with complaints of fatigue, edema, and oliguria. She is admitted to the internal medicine service with transplant nephrology consulting for further evaluation and management.    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 Second transplant 2007, now with chronic antibody rejection, CKD IV. Last nephrology visit in chart 03/29/21. Missed an appointment in Sept 2021.  - Baseline creatinine 2 - 2.20 as of beginning of 2021. More recently, Cr in the 2.5 - 2.6 range. Cr 2.93 on admit. UA on admit with proteinuria, small blood, and trace LE  -EBV (7300) elevated. SPEP and UPEP with suggestion of hypoalbuminema and albuminuria possibly indicate glomerular damage  -DDx( cardiorenal vs medication vs nephrotic syndrome)  Plan:  > Transplant nephrology consultation    - PLA2 R AB pending    -continue PTA immunosuppression: cyclosporine 25mg AM and 25mg PM; MMF 750mg BID    -continue sulfamethoxazole-trimethoprim 400-80mg on MWF for prophylaxis    -cyclosporine level and dosing per transplant nephrology    - Transplanted renal biopsy  pending (done on 12/22)    #prandial pain, epigastric pain  #postprandial vomiting   Patient and daughter report this is an ongoing problem since 04/2021. Patient is only able to eat rice and chicken broth recently.   -She has anorexia which she associates with pain.  -Known hx gastric ulcers.  -GI consulted (12/17) and did not recommend EGD  -worsened pain despite oral meds (12/19) and CT abdomen pelvis ordered. CT abdomen/pelvis(12/19) - no acute pathology   Plan:  >sucralfate 1g BID (need to consider if worth doing with PPI)  >PPI, H2 blocker qhs    -H pylori stool antigen testing    -nutrition consultation  -Patient declining feeding tube but will do calorie counts and continue conversation    #Acute decompensated HFpEF  #pulmonary HTN  Pulmonary vascular congestion on CXR, tachypneic on exam in ER on 12/16  -. Previous echocardiogram with HFpEF and c/f pulmonary HTN. Presentation with elevated BNP >9,000, higher than in previous hospitalizations.   -ECHO(12/17) - EF 60%, dilated IVC, PA pressure 39  Plan:  -IV prn and dialysis started    -strict I/O    -weigh patient daily    #new onset Afib  Will watch for now and consider anticoagulation in future.  Likely not causing her symptoms currently.    #Hypertension  No current concern for hypertensive emergency/end-organ damage.   Plan:  >Hold losartan  >Continue coreg, hydralazine    -PRN labetalol for SBP >170 and/or DBP>100    #COVID-19 vaccine series not completed  Patient has received 2 doses of vaccine so far. This does not represent full vaccination in an immunocompromised patient. She requires a third, full dose. We can offer this after ruling out any active viral infection.    -ensure 3rd full dose offered to patient prior to leaving the hospital    #anemia of chronic renal disease: stable, monitor. Outpatient darbepoetin deana, IV iron x 5 days (started 12/20)    #Elevated fungitell  -Mild elevation in fungitell, given lack of localizing symptoms, fever or  pulmonary nodules would not recommend further infectious work up per discussion with ID on 12/20  -If she were to develop fever, confusion, or new imaging findings would re-consider    Chronic, stable medical problems:  #leukopenia: stable, monitor   #Mineral bone disorder - elevated PTH, low ionized calcium.  Currently on calitriol and calcium  #NAGMA - secondary to renal disease - continue sodium bicarb    Malnutrition:    - Level of malnutrition: Moderate   - Based on: mild (or greater) muscle loss, mild (or greater) fluid retention       Diet: Fluid restriction 1000 ML FLUID  Dialysis Diet  Calorie Counts  Snacks/Supplements Adult: Ensure Enlive; With Meals    DVT Prophylaxis: Heparin subcutaneous- held now for biopsy tomorrow  Staples Catheter: Not present  Central Lines: None  Code Status: Full Code      Disposition Plan   Expected Discharge: 12/27/2021     Anticipated discharge location: home with family    Delays:            The patient's care was discussed with the Care Coordinator/, Patient, Patient's Family and GI and Transplant nephrology Consultant.    Shaan Hernandez MD  Hospitalist Service, [address]  Securely message with the Vocera Web Console (learn more here)  Text page via [employer] Paging/Directory    Please see sign in/sign out for up to date coverage information    Clinically Significant Risk Factors Present on Admission             # Non-Severe Malnutrition, POA: based on Weight loss;Subcutaneous fat loss;Muscle loss;Fluid retention (12/17/21 1200)     ______________________________________________________________________    Interval History     Continue feeling better this morning  Improved sleep and smiling more  Dizziness has improved  Abdominal discomfort has improved  She continues to decline any feeding tube  Daughter didn't have any additional conversations    ROS  No fevers  No headaches  No confusion  No diarrhea  No  rash    Data reviewed today: I reviewed all medications, new labs and imaging results over the last 24 hours. I personally reviewed no images or EKG's today.    Physical Exam   Vital Signs: Temp: 97.8  F (36.6  C) Temp src: Oral BP: 132/71 Pulse: 84   Resp: 16 SpO2: 99 % O2 Device: None (Room air)    Weight: 147 lbs 6.4 oz  Constitutional: alert, oriented, sitting in bed, in no acute distress  Eyes: no icterus  ENT: JVD+, supple  Respiratory: crackles in bilateral lower lung fields, no wheezing, no respiratory distress  Cardiovascular: normal s1, s2, normal rate and rhythm, systolic murmur 1/6  GI: soft, non-tender, non-distended, bowel sounds present  Skin: no rash  Ext: left upper extremity av fistula with palpable thrill and audible bruiit  Musculoskeletal: trace pitting edema     Data   Recent Labs   Lab 12/24/21  0558 12/23/21  1756 12/23/21  0659 12/22/21  1828 12/22/21  0612 12/21/21  1712 12/19/21  1313 12/19/21  0655 12/18/21  0636   WBC 3.9*  --  4.1  --  3.9*  --    < > 4.0 3.3*   HGB 8.3*  --  8.6*  --  8.6*  --    < > 8.7* 8.4*   MCV 88  --  89  --  87  --    < > 86 93   *  --  108*  --  144*  --    < > 132* 122*   INR  --   --   --   --   --  1.11  --   --   --    * 128* 129*   < > 122* 129*   < > 125* 132*   POTASSIUM 4.0  --  4.1  --  3.9  --    < > 3.1* 3.5   CHLORIDE 94  --  97  --  90*  --    < > 93* 103   CO2 25  --  23  --  24  --    < > 19* 18*   BUN 23  --  13  --  14  --    < > 29 24   CR 3.09*  --  2.32*  --  2.16*  --    < > 3.17* 2.99*   ANIONGAP 9  --  9  --  8  --    < > 13 11   SHAVON 8.5  --  8.4*  --  8.0*  --    < > 7.1* 7.0*   GLC 99  --  96  --  95  --    < > 134* 83   ALBUMIN  --   --   --   --   --   --   --  2.1* 1.9*   PROTTOTAL  --   --   --   --   --   --   --  5.8* 5.7*   BILITOTAL  --   --   --   --   --   --   --  0.7 0.7   ALKPHOS  --   --   --   --   --   --   --  144 142   ALT  --   --   --   --   --   --   --  11 11   AST  --   --   --   --   --   --   --  31  29   LIPASE  --   --   --   --   --   --   --  537*  --     < > = values in this interval not displayed.

## 2021-12-24 NOTE — PROGRESS NOTES
Calorie Count  Intake recorded for: 12/23  Total Kcals: 918 Total Protein: 47g  Kcals from Hospital Food: 0   Protein: 0g  Kcals from Outside Food (average):918 Protein: 47g  # Meals Ordered from Kitchen: food from outside the hospital only  # Meals Recorded: 100% chicken wing, rice noodles & broth, 4 oz Gatorade Zero, 16 oz chicken & rice noodle soup, rice porridge - all from outside the hospital   # Supplements Recorded: 0

## 2021-12-24 NOTE — PROGRESS NOTES
HEMODIALYSIS TREATMENT NOTE    Date: 12/24/2021  Time: 3:47 PM    Data:  Pre Wt: 66.9 kg (147 lb 7.8 oz)   Desired Wt: 65.9 kg   Post Wt: 66.8 kg (147 lb 4.3 oz)  Weight change: 0.1 kg  Ultrafiltration - Post Run Net Total Removed (mL): 100 mL  Vascular Access Status: Fistula  patent  Dialyzer Rinse: Streaked,Light  Total Blood Volume Processed: 38.37 L   Total Dialysis (Treatment) Time: 2 hrs 20 mins   Dialysate Bath: K 3, Ca 3  Heparin: None    Lab:   No    Interventions:  Hot packs applied for cramping  uf off/on for cramping  Education about dialysis process provided    Assessment: pt vitally stable through out. Pt reports on/off cramping of her legs. Hot packs applied and uf turned off at times to reduce cramping. Pt off 40 mins early due to reported pain at her fistula site. Per family member pt has been having swelling and pain in that area recently. Handoff given to floor RN.   Plan:    Per renal team.

## 2021-12-24 NOTE — PLAN OF CARE
/73 (BP Location: Right arm)   Pulse 66   Temp 98.6  F (37  C) (Oral)   Resp 16   Ht 1.524 m (5')   Wt 65.4 kg (144 lb 1.6 oz)   SpO2 98%   BMI 28.14 kg/m     Neuro: A/Ox3, Hmong speaking and pt's daughter at bedside helps with communication  VS: VSS on RA  Pain: Denies  GI: on Renal diet and eats food from home. Denies nausea. No BM this shift  : Oliguric and on HD  PIV SL  Skin: L arm dialysis fistula area bruised and pt uses ice pack  Activity - SBA  Plan of Care - Continue with POC

## 2021-12-24 NOTE — PLAN OF CARE
/68 (BP Location: Right arm)   Pulse 72   Temp 97.9  F (36.6  C) (Oral)   Resp 16   Ht 1.524 m (5')   Wt 65.4 kg (144 lb 1.6 oz)   SpO2 95%   BMI 28.14 kg/m      Shift: 3178-2098  Isolation Status: NA  VS: AVSS on RA.  Neuro: A&O x4. Pt slept comfortably between cares.  Behaviors: Calm, cooperative, and pleasant.  BG: NA.  Respiratory: WDL  Cardiac: New onset Afib. Irregular HR and murmur on auscultation.  Pain/Nausea/PRN: No reported pain or nausea.  Diet:  Regular diet.  LDA: RPIV and L Fistula.  Infusion(s): NA  GI/: WDL. Last BM 12/22. No UOP for me, pt oliguric at baseline.  Skin: WDL  Mobility: UAL and independent.  Plan: Expected discharge home with family on 12/27.

## 2021-12-24 NOTE — PROGRESS NOTES
Care Management Follow Up    Length of Stay (days): 8    Expected Discharge Date: 12/27/2021     Concerns to be Addressed: care coordination/care conferences,discharge planning     Patient plan of care discussed at interdisciplinary rounds: Yes    Anticipated Discharge Disposition: Home     Anticipated Discharge Services:    Anticipated Discharge DME: None    Patient/family educated on Medicare website which has current facility and service quality ratings: no  Education Provided on the Discharge Plan:    Patient/Family in Agreement with the Plan: yes    Referrals Placed by CM/SW: External Care Coordination    Additional Information:  Awaiting M Quant Gold results.  Notified by [facility] awaiting financial clearance, quant gold result before outpatient dialyses placement will be confirmed.     1345: M quant gold result faxed to [facility]    Brittaney Barnes RN BSN, PHN, ACM-RN  7A RN Care Coordinator  Phone: [phone]  Pager [phone]  To contact the weekend RNCC, Page: [phone]    12/24/2021 10:15 AM

## 2021-12-25 ENCOUNTER — APPOINTMENT (OUTPATIENT)
Dept: ULTRASOUND IMAGING | Facility: CLINIC | Age: 56
DRG: 698 | End: 2021-12-25
Attending: INTERNAL MEDICINE
Payer: MEDICARE

## 2021-12-25 LAB
ANION GAP SERPL CALCULATED.3IONS-SCNC: 7 MMOL/L (ref 3–14)
BUN SERPL-MCNC: 18 MG/DL (ref 7–30)
CALCIUM SERPL-MCNC: 8.1 MG/DL (ref 8.5–10.1)
CHLORIDE BLD-SCNC: 98 MMOL/L (ref 94–109)
CO2 SERPL-SCNC: 24 MMOL/L (ref 20–32)
CREAT SERPL-MCNC: 2.93 MG/DL (ref 0.52–1.04)
CYCLOSPORINE BLD LC/MS/MS-MCNC: 104 UG/L (ref 50–400)
CYSTATIN C SERPL-MCNC: 3.6 MG/L
ERYTHROCYTE [DISTWIDTH] IN BLOOD BY AUTOMATED COUNT: 13.8 % (ref 10–15)
GFR SERPL CREATININE-BSD FRML MDRD: 18 ML/MIN/1.73M2
GFR/BSA.PRED SERPLBLD CYS-BASED-ARV: 13 ML/MIN/BSA
GLUCOSE BLD-MCNC: 104 MG/DL (ref 70–99)
HCT VFR BLD AUTO: 26.7 % (ref 35–47)
HGB BLD-MCNC: 8.4 G/DL (ref 11.7–15.7)
MCH RBC QN AUTO: 28.3 PG (ref 26.5–33)
MCHC RBC AUTO-ENTMCNC: 31.5 G/DL (ref 31.5–36.5)
MCV RBC AUTO: 90 FL (ref 78–100)
PLATELET # BLD AUTO: 139 10E3/UL (ref 150–450)
POTASSIUM BLD-SCNC: 4 MMOL/L (ref 3.4–5.3)
RBC # BLD AUTO: 2.97 10E6/UL (ref 3.8–5.2)
SARS-COV-2 RNA RESP QL NAA+PROBE: NEGATIVE
SODIUM SERPL-SCNC: 129 MMOL/L (ref 133–144)
TME LAST DOSE: NORMAL H
TME LAST DOSE: NORMAL H
WBC # BLD AUTO: 4.3 10E3/UL (ref 4–11)

## 2021-12-25 PROCEDURE — 36415 COLL VENOUS BLD VENIPUNCTURE: CPT | Performed by: INTERNAL MEDICINE

## 2021-12-25 PROCEDURE — 80158 DRUG ASSAY CYCLOSPORINE: CPT | Performed by: INTERNAL MEDICINE

## 2021-12-25 PROCEDURE — 250N000013 HC RX MED GY IP 250 OP 250 PS 637: Performed by: NURSE PRACTITIONER

## 2021-12-25 PROCEDURE — U0005 INFEC AGEN DETEC AMPLI PROBE: HCPCS | Performed by: INTERNAL MEDICINE

## 2021-12-25 PROCEDURE — 99221 1ST HOSP IP/OBS SF/LOW 40: CPT | Mod: GC | Performed by: SURGERY

## 2021-12-25 PROCEDURE — 85027 COMPLETE CBC AUTOMATED: CPT | Performed by: INTERNAL MEDICINE

## 2021-12-25 PROCEDURE — 93990 DOPPLER FLOW TESTING: CPT | Mod: 26 | Performed by: RADIOLOGY

## 2021-12-25 PROCEDURE — 250N000013 HC RX MED GY IP 250 OP 250 PS 637: Performed by: INTERNAL MEDICINE

## 2021-12-25 PROCEDURE — 250N000013 HC RX MED GY IP 250 OP 250 PS 637: Performed by: STUDENT IN AN ORGANIZED HEALTH CARE EDUCATION/TRAINING PROGRAM

## 2021-12-25 PROCEDURE — 80048 BASIC METABOLIC PNL TOTAL CA: CPT | Performed by: INTERNAL MEDICINE

## 2021-12-25 PROCEDURE — 99233 SBSQ HOSP IP/OBS HIGH 50: CPT | Performed by: INTERNAL MEDICINE

## 2021-12-25 PROCEDURE — 93990 DOPPLER FLOW TESTING: CPT

## 2021-12-25 PROCEDURE — 250N000012 HC RX MED GY IP 250 OP 636 PS 637: Performed by: STUDENT IN AN ORGANIZED HEALTH CARE EDUCATION/TRAINING PROGRAM

## 2021-12-25 PROCEDURE — 250N000012 HC RX MED GY IP 250 OP 636 PS 637: Performed by: NURSE PRACTITIONER

## 2021-12-25 PROCEDURE — 120N000011 HC R&B TRANSPLANT UMMC

## 2021-12-25 RX ORDER — POLYETHYLENE GLYCOL 3350 17 G/17G
17 POWDER, FOR SOLUTION ORAL DAILY
Status: DISCONTINUED | OUTPATIENT
Start: 2021-12-25 | End: 2021-12-28 | Stop reason: HOSPADM

## 2021-12-25 RX ADMIN — MYCOPHENOLATE MOFETIL 750 MG: 250 CAPSULE ORAL at 20:25

## 2021-12-25 RX ADMIN — HYDRALAZINE HYDROCHLORIDE 25 MG: 25 TABLET, FILM COATED ORAL at 08:03

## 2021-12-25 RX ADMIN — CYCLOSPORINE 25 MG: 25 CAPSULE, LIQUID FILLED ORAL at 18:17

## 2021-12-25 RX ADMIN — SUCRALFATE 1 G: 1 TABLET ORAL at 16:43

## 2021-12-25 RX ADMIN — PANTOPRAZOLE SODIUM 40 MG: 40 TABLET, DELAYED RELEASE ORAL at 20:25

## 2021-12-25 RX ADMIN — ACETAMINOPHEN 975 MG: 325 TABLET, FILM COATED ORAL at 11:46

## 2021-12-25 RX ADMIN — CALCIUM CARBONATE 500 MG: 500 TABLET, CHEWABLE ORAL at 08:03

## 2021-12-25 RX ADMIN — FAMOTIDINE 20 MG: 20 TABLET ORAL at 22:30

## 2021-12-25 RX ADMIN — CALCIUM CARBONATE 500 MG: 500 TABLET, CHEWABLE ORAL at 20:25

## 2021-12-25 RX ADMIN — CYCLOSPORINE 25 MG: 25 CAPSULE, LIQUID FILLED ORAL at 08:02

## 2021-12-25 RX ADMIN — POLYETHYLENE GLYCOL 3350 17 G: 17 POWDER, FOR SOLUTION ORAL at 11:40

## 2021-12-25 RX ADMIN — SUCRALFATE 1 G: 1 TABLET ORAL at 08:03

## 2021-12-25 RX ADMIN — HYDRALAZINE HYDROCHLORIDE 25 MG: 25 TABLET, FILM COATED ORAL at 14:10

## 2021-12-25 RX ADMIN — MYCOPHENOLATE MOFETIL 750 MG: 250 CAPSULE ORAL at 08:02

## 2021-12-25 RX ADMIN — PANTOPRAZOLE SODIUM 40 MG: 40 TABLET, DELAYED RELEASE ORAL at 08:03

## 2021-12-25 RX ADMIN — CARVEDILOL 3.12 MG: 3.12 TABLET, FILM COATED ORAL at 18:17

## 2021-12-25 RX ADMIN — HYDRALAZINE HYDROCHLORIDE 25 MG: 25 TABLET, FILM COATED ORAL at 20:25

## 2021-12-25 RX ADMIN — CARVEDILOL 3.12 MG: 3.12 TABLET, FILM COATED ORAL at 08:03

## 2021-12-25 RX ADMIN — CALCITRIOL CAPSULES 0.25 MCG 0.5 MCG: 0.25 CAPSULE ORAL at 08:03

## 2021-12-25 ASSESSMENT — ACTIVITIES OF DAILY LIVING (ADL)
ADLS_ACUITY_SCORE: 12

## 2021-12-25 NOTE — PROGRESS NOTES
[facility]  Transplant Nephrology Progress note  Date of Admission:  12/16/2021  Today's Date: 12/24/2021  Requesting physician: Jarrett Mullen MD    Recommendations:  1. Follow renal allograft biopsy results ([facility] send out)  2. Continue free water restriction to 1L daily  3. Continue Aranesp 40mcg IV during HD qweekly ( last dose 12/22)   4. HD today   6. Please order duplex LUE AVF &  consult  Vascular surgery consult to evaluate pain at AV fistula tomorrow AM  . She is experiencing pain in AV fistula site during dialysis   7. Check Cyclosporine level in AM ( Ordered)   8. cystatin c GFR sent 12/24          Assessment & Plan   # DDKT: Trend up with TOSIN. Suspect eGF an overestimate of true GFR given sarcopenia.   Non Oliguric ~ 1300 ml UOP in 24 hrs   - Baseline Creatinine: ~ 2-2.3, but labs have been infrequent   - Proteinuria: Nephrotic range (>3 grams), now 6g/g    - Date DSA Last Checked: Jun/2019      Latest DSA: Moderate DSA (2750-4421 mfi) to DR51 , recheck now   - BK Viremia: No   - Kidney Tx Biopsy: Dec 22, 2021; Result: pending   -iHD  12/20,12/21,12/22 - each day 2 LUF achieved   -Follow up biopsy result to see likelihood of renal recovery or if this is all progression of chronic disease        - cystatin C GFR pending 12/24    # DIALYSIS ACCESS:  LUE AV fistula w/ good thrill and bruit. However has significant aneurysm , though the skin is intact , no ulcerations . Does experience some pain during cannulation. No major bleeding issue   Will seek vascular surgery consult       # Immunosuppression: Cyclosporine (goal ) and Mycophenolate mofetil (dose 750 mg every 12 hours)   - Changes: Not at this time. Check CsA q48h.  CsA trough 103 ( 12/22) --> 112 ( 12/24)   CsA 25/25  /750      # Infection Prophylaxis:   - PJP: Sulfa/TMP (Bactrim) MWF    # Hypertension: Controlled;  Goal BP: < 130/80   - Volume status: Moderately hypervolemic     -  Changes:Yes, HD w/ 2L UF again today  Continue the following with holding parameters ( hold if SBP< 100)   Coreg 3.125 mg 2 times a day  Hydralazine 25 mg 3 times a day  Hold losartan  Continue labetalol as needed  Admission weight 66 kg. Has had 3 HD sessions so far . Total 6 LUF achieved . 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 Got darbepoetin 40mcg on 12/22/21     - Iron studies: Low iron saturation. Received half of a dose on 12/20 but developed back pain, chest pain, SOB, dizziness,blurry vision and hence  it was stopped ( can be considered as allergy )    - plan to give darbepoetin 40mcg weekly and have her follow up with anemia clinic as outpatient upon discharge     # Mineral Bone Disorder:   - Secondary renal hyperparathyroidism; PTH level: Mildly elevated (151-300 pg/ml)        On treatment: Calcitriol  - Vitamin D; level: Normal        On supplement: No  - Calcium; level: Normal        On supplement: Yes  - Phosphorus; level: Normal        On supplement: No    -Continue calcitriol 0.5mcg daily and tums 500mg bid.       # Electrolytes:   - Potassium; level: Normal        On supplement: No  - Magnesium; level: Normal        On supplement: No.    - Bicarbonate; level: Normal        On supplement: Yes, stop as she is on dialysis    #Hyponatremia   -Initially thought due to hypervolemia, TOSIN w/ decreased ability to excrete free water. 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 She has been responding to UF through HD  indicating hypervolemiai contributing    -Did not respond to tolvaptan 15mg on 12/20.    -Fluid restriction 1L daily       # HFpEF:    -Decompensated clinically. Echo stable on 12/17/21 with elevated PASP and dilated IVC, grade II diastolic dysfunction   - continue with volume optimization strategy with HD      # Pulmonary HTN: echo 12/17/21 w/ PASP 39mmHg + RA pressure. Likely worsened by volume overload   - continue with volume optimization strategy with HD     # Dyspepsia:   - Advised PPI 40 mg twice daily for 6 to 8 weeks, Carafate twice daily, and Pepcid 40 mg daily.  Follow-up H. Pylori stool antigen.   -GI consulted to consider EGD but deferred. Didn't report abdominal pain today    # Unintentional weight loss:   -Likely 2/2 her not eating.  EBV PCR 7K (12/16/2021)   -CMV negative (12/16/2021)   -Likely with severe malnutrition   -Consider initiation of tube feeds     # Transplant History:  Etiology of Kidney Failure: IgA nephropathy  Tx: DDKT  Transplant: 11/28/2007 (Kidney), 8/8/1998 (Kidney)  Significant changes in immunosuppression: None  Significant transplant-related complications: DSA    Recommendations were communicated to the primary team via this note  Patient seen and staffed with Dr Antonio Lechuga  [facility]  assisted with translation over phone    This patient has been seen and evaluated by me, Case Milton MD.  I have reviewed the note and agree with plan of care as documented by the fellow.  Case Milton MD on 12/24/2021       INTERVAL HISTORY  Awaiting biopsy results  Sitting comofrtably by the window with her daughter who helped with translation   Vitals stable  No CP/SOB  No abd discomfort . No NV/D      Review of Systems    The 4 point Review of Systems is negative other than noted in the HPI or here.    Allergies   Allergies   Allergen Reactions     Contrast Dye Shortness Of Breath     Other reaction(s): Angioedema     Venofer [Iron Sucrose] Anaphylaxis     Pt had rxn to IV Iron  Sucrose infusion in hospital on 21 -- back pain, chest pain, SOB, blurred vision, dizziness, & lightheadedness.     Amlodipine Other (See Comments)     Prior to Admission Medications     calcitRIOL  0.5 mcg Oral Daily     calcium carbonate  500 mg Oral BID     carvedilol  3.125 mg Oral BID w/meals     cycloSPORINE modified  25 mg Oral QPM     cycloSPORINE modified  25 mg Oral QAM     famotidine  20 mg Oral At Bedtime     heparin ANTICOAGULANT  5,000 Units Subcutaneous BID     hydrALAZINE  25 mg Oral TID     [Held by provider] losartan  50 mg Oral Daily     mycophenolate  750 mg Oral BID     pantoprazole  40 mg Oral BID     sodium chloride (PF)  3 mL Intracatheter Q8H     sucralfate  1 g Oral BID AC     sulfamethoxazole-trimethoprim  1 tablet Oral Q Mon Wed Fri AM         Physical Exam   Temp  Av.4  F (36.9  C)  Min: 97.8  F (36.6  C)  Max: 99.1  F (37.3  C)      Pulse  Av.2  Min: 69  Max: 105 Resp  Av.9  Min: 16  Max: 18  SpO2  Av.7 %  Min: 91 %  Max: 99 %     BP (!) 143/74 (BP Location: Right arm)   Pulse 78   Temp 97.9  F (36.6  C) (Oral)   Resp 18   Ht 1.524 m (5')   Wt 66.8 kg (147 lb 4.3 oz)   SpO2 97%   BMI 28.76 kg/m      Admit Weight: 66 kg (145 lb 6.4 oz)     GENERAL APPEARANCE: alert and no distress  HENT: mouth without ulcers or lesions  LYMPHATICS: no cervical or supraclavicular nodes  RESP: rales bilateral bases  CV: normal rhythm, systolic murmur heard throughout the precordium radiating to carotids  EDEMA: 1+ LE edema bilaterally  ABDOMEN: soft, nondistended, nontender, bowel sounds normal  MS: extremities normal - no gross deformities noted, no evidence of inflammation in joints, no muscle tenderness  SKIN: no rash  NEURO: normal strength and tone, sensory exam grossly normal, mentation intact and speech normal  PSYCH: mentation appears normal and affect normal/bright  TX KIDNEY: normal  DIALYSIS ACCESS:  LUE AV fistula w/ good thrill and bruit    Data   CMP  Recent  Labs   Lab 12/24/21  0558 12/23/21  1756 12/23/21  0659 12/22/21  1828 12/22/21  0612 12/21/21  0946 12/21/21  0335 12/19/21  1313 12/19/21  0655 12/18/21  0636   * 128* 129* 131* 122*   < > 122*  122*   < > 125* 132*   POTASSIUM 4.0  --  4.1  --  3.9  --  3.4   < > 3.1* 3.5   CHLORIDE 94  --  97  --  90*  --  88*   < > 93* 103   CO2 25  --  23  --  24  --  24   < > 19* 18*   ANIONGAP 9  --  9  --  8  --  10   < > 13 11   GLC 99  --  96  --  95  --  86   < > 134* 83   BUN 23  --  13  --  14  --  18   < > 29 24   CR 3.09*  --  2.32*  --  2.16*  --  2.25*   < > 3.17* 2.99*   GFRESTIMATED 17*  --  24*  --  26*  --  24*   < > 16* 17*   SHAVON 8.5  --  8.4*  --  8.0*  --  7.5*   < > 7.1* 7.0*   MAG  --   --   --   --   --   --   --   --  2.1 1.4*   PHOS  --   --   --   --  3.1  --   --   --   --  4.5   PROTTOTAL  --   --   --   --   --   --   --   --  5.8* 5.7*   ALBUMIN  --   --   --   --   --   --   --   --  2.1* 1.9*   BILITOTAL  --   --   --   --   --   --   --   --  0.7 0.7   ALKPHOS  --   --   --   --   --   --   --   --  144 142   AST  --   --   --   --   --   --   --   --  31 29   ALT  --   --   --   --   --   --   --   --  11 11    < > = values in this interval not displayed.     CBC  Recent Labs   Lab 12/24/21  0558 12/23/21  0659 12/22/21  0612 12/21/21  0335   HGB 8.3* 8.6* 8.6* 8.6*   WBC 3.9* 4.1 3.9* 3.9*   RBC 2.98* 3.09* 3.13* 3.11*   HCT 26.1* 27.6* 27.3* 26.5*   MCV 88 89 87 85   MCH 27.9 27.8 27.5 27.7   MCHC 31.8 31.2* 31.5 32.5   RDW 13.6 13.6 13.5 13.3   * 108* 144* 133*     INR  Recent Labs   Lab 12/21/21  1712   INR 1.11     ABGNo lab results found in last 7 days.   Urine Studies  Recent Labs   Lab Test 12/22/21  0044 12/20/21  0553 12/16/21  1810 09/25/21  1637   COLOR Light Yellow Yellow Light Yellow Straw   APPEARANCE Clear Clear Clear Clear   URINEGLC Negative Negative 30 * Negative   URINEBILI Negative Negative Negative Negative   URINEKETONE Negative Negative Negative Negative    SG 1.006 1.013 1.005 1.003   UBLD Negative Negative Small* Trace*   URINEPH 5.5 5.5 6.0 6.0   PROTEIN 100 * 100 * 200 * 100 *   NITRITE Negative Negative Negative Negative   LEUKEST Negative Small* Trace* Negative   RBCU 1 1 1 0   WBCU 1 13* 5 <1     Recent Labs   Lab Test 12/16/21  1810 09/03/21  0630 01/15/21  0732 03/18/20  1330 06/01/19  0700 02/16/19  0737 02/02/18  0742 11/17/17  0739 04/28/17  0843 10/28/16  0913 04/29/16  1145 06/12/15  1011 01/23/15  0700   UTPG 6.85* 4.32* 3.69* 1.35* 3.89* 3.92* 2.31* 1.74* 1.86* 1.84* 0.49* 0.73* 0.77*     PTH  Recent Labs   Lab Test 12/17/21  1616 03/17/20  0509 04/29/16  1043 02/05/16  1039 10/27/15  0555 03/26/15  1734 12/16/13  0636   PTHI 173* 291* 124* 140* 218* 251* 166*     Iron Studies  Recent Labs   Lab Test 12/17/21  1443 12/17/21  0618 09/03/21  0625 04/23/21  0716 03/12/21  0757 01/15/21  0725 10/29/20  0900 09/17/20  0758 08/10/20  0840 04/24/20  0800 03/17/20  0509   IRON 58  --  84 45 83 96 88 88 89 61 54     --  252 252 245 225* 251 248 242 286 240   IRONSAT 17  --  33 18 34 43 35 36 37 21 23   ANASTASIIA  --  317* 332* 361* 401* 396* 352* 374* 364* 299*  --        IMAGING:  All imaging studies reviewed by me.    This patient has been seen and evaluated by me, Case Milton MD.  I have reviewed the note and agree with plan of care as documented by the fellow.  Case Milton MD on 12/24/2021

## 2021-12-25 NOTE — PLAN OF CARE
Neuro: A/Ox3, Hmong speaking and pt's daughter at bedside helps with communication  VS: VSS on RA  Pain: c/o Abdominal/leg pain treated by PRN oxycodone 5mg and Simethicone  GI: on Renal diet and eats food from home. Denies nausea. Small dry BM this shift and received PRN senna  : Oliguric and on HD and she had dialysis done today  PIV SL  Skin: L arm dialysis fistula area bruised and pt uses ice pack  Activity - SBA  Plan of Care - Continue with POC

## 2021-12-25 NOTE — PROGRESS NOTES
[facility]  Transplant Nephrology Progress note  Date of Admission:  12/16/2021  Today's Date: 12/25/2021  Requesting physician: Jarrett Mullen MD    Recommendations:    - duplex LUE AVF & vascular surgery consult  - no acute RRT indications today  -  Pending read of renal allograft biopsy results ([place] send out)  - f/up cystatin C GFR  - check daily standing weights & I/o  - free water restriction to <1L/d    Assessment & Plan   # DDKT: stable. Suspect eGF an overestimate of true GFR given sarcopenia.   Non Oliguric ~ 1300 ml UOP in 24 hrs   - Baseline Creatinine: ~ 2-2.3, but labs have been infrequent   - Proteinuria: Nephrotic range (>3 grams), now 6g/g    - Date DSA Last Checked: Jun/2019      Latest DSA:    - BK Viremia: No   - Kidney Tx Biopsy: Dec 22, 2021; Result: pending   -iHD  12/20,12/21,12/22 -2 L UF   -Follow up biopsy result to see likelihood of renal recovery or if this is all progression of chronic disease        - cystatin C GFR pending 12/24    # DIALYSIS ACCESS:  LUE AV fistula w/ good thrill and bruit.. large aneurysm and pain during cannulation. Duplex AVF & vascular surgery consult     # Immunosuppression: Cyclosporine (goal ) and Mycophenolate mofetil (dose 750 mg every 12 hours)   - Changes: Not at this time. Check CsA q48h.  CsA trough 103 ( 12/22) --> 112 ( 12/24) ->104 12/25  CsA 25/25 & /750      # Infection Prophylaxis:   - PJP: Sulfa/TMP (Bactrim) MWF    # Hypertension: Controlled;  Goal BP: < 130/80   - Volume status: Moderately hypervolemic     - Changes:Yes, HD w/ 2L UF again today  Continue the following with holding parameters ( hold if SBP< 100)   Coreg 3.125 mg 2 times a day  Hydralazine 25 mg 3 times a day  Hold losartan  Continue labetalol as needed  Admission weight 66 kg. Has had 3 HD sessions so far . Total 6 LUF achieved .weight 63.5 kg ( 12/22) most recent back up to 66 kg. 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 Got darbepoetin 40mcg on 12/22/21     - Iron studies: Low iron saturation. Received half of a dose on 12/20 but developed back pain, chest pain, SOB, dizziness,blurry vision and hence  it was stopped ( can be considered as allergy )    - plan to give darbepoetin 40mcg weekly and have her follow up with anemia clinic as outpatient upon discharge     # Mineral Bone Disorder:   - Secondary renal hyperparathyroidism; PTH level: Mildly elevated (151-300 pg/ml)        On treatment: Calcitriol  - Vitamin D; level: Normal        On supplement: No  - Calcium; level: Normal        On supplement: Yes  - Phosphorus; level: Normal        On supplement: No    -Continue calcitriol 0.5mcg daily and tums 500mg bid.       # Electrolytes:   - Potassium; level: Normal        On supplement: No  - Magnesium; level: Normal        On supplement: No.    - Bicarbonate; level: Normal        On supplement: Yes, stop as she is on dialysis    #Hyponatremia   -Initially thought due to hypervolemia, TOSIN w/ decreased ability to excrete free water. 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 She has been responding to UF through HD indicating hypervolemiai contributing    -Did not respond to tolvaptan 15mg on 12/20.    -Fluid restriction 1L daily       # HFpEF:    -Decompensated clinically. Echo stable on 12/17/21 with elevated PASP and dilated IVC, grade II diastolic dysfunction   - continue with volume optimization strategy  with HD      # Pulmonary HTN: echo 12/17/21 w/ PASP 39mmHg + RA pressure. Likely worsened by volume overload   - continue with volume optimization strategy with HD     # Dyspepsia:   - Advised PPI 40 mg twice daily for 6 to 8 weeks, Carafate twice daily, and Pepcid 40 mg daily.  Follow-up H. Pylori stool antigen.   -GI consulted to consider EGD but deferred. Didn't report abdominal pain today    # Unintentional weight loss:   -Likely 2/2 her not eating.  EBV PCR 7K (12/16/2021)   -CMV negative (12/16/2021)        # Transplant History:  Etiology of Kidney Failure: IgA nephropathy  Tx: DDKT  Transplant: 11/28/2007 (Kidney), 8/8/1998 (Kidney)  Significant changes in immunosuppression: None  Significant transplant-related complications: DSA    Case Milton MD on 12/25/2021 at 12:51 PM      INTERVAL HISTORY  C/o pain at the cannulation of her AVF, HD stopped prematurely due to pain & cramps, no UF  Denies any nausea, vomiting, chest pain, shortness of breath  No weight today & no accurate UOP recorded      Review of Systems    The 4 point Review of Systems is negative other than noted in the HPI or here.    Allergies   Allergies   Allergen Reactions     Contrast Dye Shortness Of Breath     Other reaction(s): Angioedema     Venofer [Iron Sucrose] Anaphylaxis     Pt had rxn to IV Iron Sucrose infusion in hospital on 12/21/21 -- back pain, chest pain, SOB, blurred vision, dizziness, & lightheadedness.     Amlodipine Other (See Comments)     Prior to Admission Medications     calcitRIOL  0.5 mcg Oral Daily     calcium carbonate  500 mg Oral BID     carvedilol  3.125 mg Oral BID w/meals     cycloSPORINE modified  25 mg Oral QPM     cycloSPORINE modified  25 mg Oral QAM     famotidine  20 mg Oral At Bedtime     hydrALAZINE  25 mg Oral TID     [Held by provider] losartan  50 mg Oral Daily     mycophenolate  750 mg Oral BID     pantoprazole  40 mg Oral BID     polyethylene glycol  17 g Oral Daily     sodium chloride (PF)  3  mL Intracatheter Q8H     sucralfate  1 g Oral BID AC     sulfamethoxazole-trimethoprim  1 tablet Oral Q Mon Wed Fri AM         Physical Exam   Temp  Av.4  F (36.9  C)  Min: 97.8  F (36.6  C)  Max: 99.1  F (37.3  C)      Pulse  Av.2  Min: 69  Max: 105 Resp  Av.9  Min: 16  Max: 18  SpO2  Av.7 %  Min: 91 %  Max: 99 %     /75 (BP Location: Right arm)   Pulse 87   Temp 98.5  F (36.9  C) (Oral)   Resp 16   Ht 1.524 m (5')   Wt 66.8 kg (147 lb 4.3 oz)   SpO2 98%   BMI 28.76 kg/m      Admit Weight: 66 kg (145 lb 6.4 oz)     GENERAL APPEARANCE: alert and no distress  HENT: mouth without ulcers or lesions  LYMPHATICS: no cervical or supraclavicular nodes  RESP: rales bilateral bases  CV: normal rhythm, systolic murmur heard throughout the precordium radiating to carotids  EDEMA: 1+ LE edema bilaterally  ABDOMEN: soft, nondistended, nontender, bowel sounds normal  MS: extremities normal - no gross deformities noted, no evidence of inflammation in joints, no muscle tenderness  SKIN: no rash  NEURO: normal strength and tone, sensory exam grossly normal, mentation intact and speech normal  PSYCH: mentation appears normal and affect normal/bright  TX KIDNEY: normal  DIALYSIS ACCESS:  LUE AV fistula w/ good thrill and bruit aneurysmal dilation noted    Data   CMP  Recent Labs   Lab 21  0633 21  1818 21  0558 21  1756 21  0659 21  1828 21  0612 21  1313 21  0655   * 131* 128* 128* 129*   < > 122*   < > 125*   POTASSIUM 4.0  --  4.0  --  4.1  --  3.9   < > 3.1*   CHLORIDE 98  --  94  --  97  --  90*   < > 93*   CO2 24  --  25  --  23  --  24   < > 19*   ANIONGAP 7  --  9  --  9  --  8   < > 13   *  --  99  --  96  --  95   < > 134*   BUN 18  --  23  --  13  --  14   < > 29   CR 2.93*  --  3.09*  --  2.32*  --  2.16*   < > 3.17*   GFRESTIMATED 18*  --  17*  --  24*  --  26*   < > 16*   SHAVON 8.1*  --  8.5  --  8.4*  --  8.0*   < > 7.1*    MAG  --   --   --   --   --   --   --   --  2.1   PHOS  --   --   --   --   --   --  3.1  --   --    PROTTOTAL  --   --   --   --   --   --   --   --  5.8*   ALBUMIN  --   --   --   --   --   --   --   --  2.1*   BILITOTAL  --   --   --   --   --   --   --   --  0.7   ALKPHOS  --   --   --   --   --   --   --   --  144   AST  --   --   --   --   --   --   --   --  31   ALT  --   --   --   --   --   --   --   --  11    < > = values in this interval not displayed.     CBC  Recent Labs   Lab 12/25/21  0633 12/24/21  0558 12/23/21  0659 12/22/21  0612   HGB 8.4* 8.3* 8.6* 8.6*   WBC 4.3 3.9* 4.1 3.9*   RBC 2.97* 2.98* 3.09* 3.13*   HCT 26.7* 26.1* 27.6* 27.3*   MCV 90 88 89 87   MCH 28.3 27.9 27.8 27.5   MCHC 31.5 31.8 31.2* 31.5   RDW 13.8 13.6 13.6 13.5   * 136* 108* 144*     INR  Recent Labs   Lab 12/21/21  1712   INR 1.11     ABGNo lab results found in last 7 days.   Urine Studies  Recent Labs   Lab Test 12/22/21  0044 12/20/21  0553 12/16/21 1810 09/25/21  1637   COLOR Light Yellow Yellow Light Yellow Straw   APPEARANCE Clear Clear Clear Clear   URINEGLC Negative Negative 30 * Negative   URINEBILI Negative Negative Negative Negative   URINEKETONE Negative Negative Negative Negative   SG 1.006 1.013 1.005 1.003   UBLD Negative Negative Small* Trace*   URINEPH 5.5 5.5 6.0 6.0   PROTEIN 100 * 100 * 200 * 100 *   NITRITE Negative Negative Negative Negative   LEUKEST Negative Small* Trace* Negative   RBCU 1 1 1 0   WBCU 1 13* 5 <1     Recent Labs   Lab Test 12/16/21 1810 09/03/21  0630 01/15/21  0732 03/18/20  1330 06/01/19  0700 02/16/19  0737 02/02/18  0742 11/17/17  0739 04/28/17  0843 10/28/16  0913 04/29/16  1145 06/12/15  1011 01/23/15  0700   UTPG 6.85* 4.32* 3.69* 1.35* 3.89* 3.92* 2.31* 1.74* 1.86* 1.84* 0.49* 0.73* 0.77*     PTH  Recent Labs   Lab Test 12/17/21  1616 03/17/20  0509 04/29/16  1043 02/05/16  1039 10/27/15  0555 03/26/15  1734 12/16/13  0636   PTHI 173* 291* 124* 140* 218* 251* 166*      Iron Studies  Recent Labs   Lab Test 12/17/21  1443 12/17/21  0618 09/03/21  0625 04/23/21  0716 03/12/21  0757 01/15/21  0725 10/29/20  0900 09/17/20  0758 08/10/20  0840 04/24/20  0800 03/17/20  0509   IRON 58  --  84 45 83 96 88 88 89 61 54     --  252 252 245 225* 251 248 242 286 240   IRONSAT 17  --  33 18 34 43 35 36 37 21 23   ANASTASIIA  --  317* 332* 361* 401* 396* 352* 374* 364* 299*  --        IMAGING:  All imaging studies reviewed by me.

## 2021-12-25 NOTE — PROGRESS NOTES
Calorie Count  Intake recorded for: 12/24  Total Kcals: 0 Total Protein: 0g  Kcals from Hospital Food: 0   Protein: 0g  Kcals from Outside Food (average):0 Protein: 0g  # Meals Ordered from Kitchen: 0 meals ordered  # Meals Recorded: 0 meals recorded  # Supplements Recorded: 0

## 2021-12-25 NOTE — CONSULTS
Vascular Surgery Consultation Note    Joshua Lala  MRN: [id number]  female  55 year old    Chief Complaint:  Pain at fistula site     HPI:  55 year old female with history of IgA nephropathy s/p DDKT (, ), EBV viremia, GERD, HSP, leukopenia, PE (no on anticoagulation) presented to [facility] ED on 21 with edema, fatigue, and oligouria . Of note, she had a LUE AV fistual created prior to kidney transplantation (unknown date) which has not been used since transplantion. She underwent a run of HD yesterday and experienced pain at the site of cannulation for which vascular surgery was consulted. She denies any pain at rest or prior to HD yesterday. She denies any numbness or paresthesias in the left hand.      Patient Active Problem List   Diagnosis     Kidney replaced by transplant     Parathyroid related hypercalcemia (H)     Encounter for long-term current use of medication     CKD (chronic kidney disease) stage 3, GFR 30-59 ml/min (H)     Acute pharyngitis     Weakness     HTN, kidney transplant related     Chest pain at rest     Chest pain, non-cardiac     Cough     Hyponatremia     Aftercare following organ transplant     Vitamin D deficiency     Abdominal wall cellulitis     Oliguria     Postprandial vomiting     Immunosuppressed status (H)     IgA nephropathy     Hypertensive urgency     Chronic heart failure with preserved ejection fraction (H)     Pain aggravated by eating or drinking     Elevated brain natriuretic peptide (BNP) level     Anemia of renal disease     CKD (chronic kidney disease) stage 4, GFR 15-29 ml/min (H)     COVID-19 vaccine series not completed       Past Surgical History:   Past Surgical History:   Procedure Laterality Date     CHOLECYSTECTOMY       TRANSPLANT KIDNEY RECIPIENT  DONOR      failed       TRANSPLANT KIDNEY RECIPIENT  DONOR      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 No pertinent family history.     Allergies:   Allergies   Allergen Reactions     Contrast Dye Shortness Of Breath     Other reaction(s): Angioedema     Venofer [Iron Sucrose] Anaphylaxis     Pt had rxn to IV Iron Sucrose infusion in hospital on 12/21/21 -- back pain, chest pain, SOB, blurred vision, dizziness, & lightheadedness.     Amlodipine Other (See Comments)       Active Medications:   No current outpatient medications on file.       ROS:  Otherwise negative    Physical Examination:   Vital Signs: /75 (BP Location: Right arm)   Pulse 87   Temp 98.5  F (36.9  C) (Oral)   Resp 16   Ht 1.524 m (5')   Wt 66.8 kg (147 lb 4.3 oz)   SpO2 98%   BMI 28.76 kg/m    Hmong speaking, sitting up comfortably in bed, NAD   NLB on RA  RRR on pulse   LUE: large AV fistula with significant aneurysms with overlying ecchymosis however skin not threatened. Palpable thrill. Palpable radial and ulnar pulses. Arm and forearm compartments are soft. Hands are warm and well perfused.     Labs/Imaging/Other:  US reviewed    Admitting Diagnosis:   1. Decreased urine output    2. Chronic heart failure with preserved ejection fraction (H)    3. Kidney replaced by transplant    4. Kidney transplant rejection        Assessment & Plan:  55 year old female history of IgA nephropathy s/p DDKT (1998, 2007), EBV viremia, GERD, HSP, leukopenia, PE (no on anticoagulation) presented to [facility] ED on 12/16/21 with edema, fatigue, and oligouria . She underwent  a run of HD and felt pain at the cannulation site of her LUE AV fistula. On exam the fistula is dilated significantly, however with a palpable thrill, soft compartments, and no concerning findings distally. Her pain is likely due to the initial cannulization with the HD catheter. US flow volumes (9774 ml/min) are concerning however it does not warrant an intervention at this time.    - Okay to use AV fistula for further HD if needed     D/w vascular fellow Dr. Gomez who will discuss with staff     Arnoldo Herrera MD  Surgery PGY2

## 2021-12-25 NOTE — PLAN OF CARE
[id number]. VSS on RA. Patient complained of abdominal pain, believes it is related to sub q heparin injections. Refused any interventions to pain. Denies nausea. Renal diet with 1 L fluid restriction. Patient eats food from home. LBM 12/24. Oliguric, on HD. PIV SL. L arm fistula area bruised but patient refusing ice pack and is also not complaining of pain. Patient UAL in room. Continue with plan of care and notify team of any changes.

## 2021-12-25 NOTE — PROGRESS NOTES
[facility]    Medicine Progress Note - Hospitalist Service, Gold 9       Date of Admission:  12/16/2021    Assessment & Plan             Joshua Lala is a 55 year old female admitted on 12/16/2021. 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 She presented to [facility] ED on 12/16/21 with complaints of fatigue, edema, and oliguria. She is admitted to the internal medicine service with transplant nephrology consulting for further evaluation and management.    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 Second transplant 2007, now with chronic antibody rejection, CKD IV. Last nephrology visit in chart 03/29/21. Missed an appointment in Sept 2021.  - Baseline creatinine 2 - 2.20 as of beginning of 2021. More recently, Cr in the 2.5 - 2.6 range. Cr 2.93 on admit. UA on admit with proteinuria, small blood, and trace LE  -EBV (7300) elevated. SPEP and UPEP with suggestion of hypoalbuminema and albuminuria possibly indicate glomerular damage  -DDx( cardiorenal vs medication vs nephrotic syndrome)  Plan:  > Transplant nephrology consultation    - PLA2 R AB pending    -continue PTA immunosuppression: cyclosporine 25mg AM and 25mg PM; MMF 750mg BID    -continue sulfamethoxazole-trimethoprim 400-80mg on  MWF for prophylaxis    -cyclosporine level and dosing per transplant nephrology    - Transplanted renal biopsy pending (done on 12/22)    - AV Fistula with pain during dialysis. Check U/S and consult vascular    #prandial pain, epigastric pain  #postprandial vomiting   #Constipation  Patient and daughter report this is an ongoing problem since 04/2021. Patient is only able to eat rice and chicken broth recently.   -She has anorexia which she associates with pain.  -Known hx gastric ulcers.  -GI consulted (12/17) and did not recommend EGD  -worsened pain despite oral meds (12/19) and CT abdomen pelvis ordered. CT abdomen/pelvis(12/19) - no acute pathology   Plan:  >Start miralax scheduled  >sucralfate 1g BID (need to consider if worth doing with PPI)  >PPI, H2 blocker qhs    -H pylori stool antigen testing    -nutrition consultation  -Patient declining feeding tube but will do calorie counts and continue conversation    #Acute decompensated HFpEF  #pulmonary HTN  Pulmonary vascular congestion on CXR, tachypneic on exam in ER on 12/16  -. Previous echocardiogram with HFpEF and c/f pulmonary HTN. Presentation with elevated BNP >9,000, higher than in previous hospitalizations.   -ECHO(12/17) - EF 60%, dilated IVC, PA pressure 39  Plan:  -IV prn and dialysis started    -strict I/O  -weigh patient daily    #new onset Afib  Will watch for now and consider anticoagulation in future.  Likely not causing her symptoms currently.    #Hypertension  No current concern for hypertensive emergency/end-organ damage.   Plan:  >Hold losartan  >Continue coreg, hydralazine    -PRN labetalol for SBP >170 and/or DBP>100    #COVID-19 vaccine series not completed  Patient has received 2 doses of vaccine so far. This does not represent full vaccination in an immunocompromised patient. She requires a third, full dose. We can offer this after ruling out any active viral infection.    -ensure 3rd full dose offered to patient prior to leaving the  hospital    #anemia of chronic renal disease: stable, monitor. Outpatient darbepoetin deana, IV iron was attempted but she complained of pain and not feeling well and thus was stopped after 2 attempts.    #Elevated fungitell  -Mild elevation in fungitell, given lack of localizing symptoms, fever or pulmonary nodules would not recommend further infectious work up per discussion with ID on 12/20  -If she were to develop fever, confusion, or new imaging findings would re-consider    Chronic, stable medical problems:  #leukopenia: stable, monitor   #Mineral bone disorder - elevated PTH, low ionized calcium.  Currently on calitriol and calcium  #NAGMA - secondary to renal disease - continue sodium bicarb    Malnutrition:    - Level of malnutrition: Moderate   - Based on: mild (or greater) muscle loss, mild (or greater) fluid retention       Diet: Fluid restriction 1000 ML FLUID  Dialysis Diet  Calorie Counts  Snacks/Supplements Adult: Ensure Enlive; With Meals    DVT Prophylaxis: Heparin subcutaneous- Stopped per patient request and she will instead ambulate  Staples Catheter: Not present  Central Lines: None  Code Status: Full Code      Disposition Plan   Expected Discharge: 12/27/2021     Anticipated discharge location: home with family    Delays:          The patient's care was discussed with the Patient and Patient's Family.    Shaan Hernandez MD  Hospitalist Service, [address]  Securely message with the Vocera Web Console (learn more here)  Text page via [employer] Paging/Directory    Please see sign in/sign out for up to date coverage information    Clinically Significant Risk Factors Present on Admission             # Non-Severe Malnutrition, POA: based on Weight loss;Subcutaneous fat loss;Muscle loss;Fluid retention (12/17/21 1200)     ______________________________________________________________________    Interval History     Does not feel well today  Had pain at  AV fistula site during dialsys  Noted also having hard time pushing her BMs  She wants to stop the heparin shots because it is causing her fatigue and would rather walk.  She continues to decline any feeding tube    ROS  No fevers  No headaches  No confusion  No diarrhea  No rash    Data reviewed today: I reviewed all medications, new labs and imaging results over the last 24 hours. I personally reviewed no images or EKG's today.    Physical Exam   Vital Signs: Temp: 98.5  F (36.9  C) Temp src: Oral BP: 125/75 Pulse: 87   Resp: 16 SpO2: 98 % O2 Device: None (Room air)    Weight: 147 lbs 4.28 oz  Constitutional: alert, oriented, sitting in bed, in no acute distress  Eyes: no icterus  ENT: JVD+, supple  Respiratory: crackles in bilateral lower lung fields, no wheezing, no respiratory distress  Cardiovascular: normal s1, s2, normal rate and rhythm, systolic murmur 1/6  GI: soft, non-tender, non-distended, bowel sounds present  Skin: no rash  Ext: left upper extremity av fistula with palpable thrill and audible bruit but with bruising  Musculoskeletal: trace pitting edema     Data   Recent Labs   Lab 12/25/21  0633 12/24/21  1818 12/24/21  0558 12/23/21  1756 12/23/21  0659 12/22/21  0612 12/21/21  1712 12/19/21  1313 12/19/21  0655   WBC 4.3  --  3.9*  --  4.1   < >  --    < > 4.0   HGB 8.4*  --  8.3*  --  8.6*   < >  --    < > 8.7*   MCV 90  --  88  --  89   < >  --    < > 86   *  --  136*  --  108*   < >  --    < > 132*   INR  --   --   --   --   --   --  1.11  --   --    * 131* 128*   < > 129*   < > 129*   < > 125*   POTASSIUM 4.0  --  4.0  --  4.1   < >  --    < > 3.1*   CHLORIDE 98  --  94  --  97   < >  --    < > 93*   CO2 24  --  25  --  23   < >  --    < > 19*   BUN 18  --  23  --  13   < >  --    < > 29   CR 2.93*  --  3.09*  --  2.32*   < >  --    < > 3.17*   ANIONGAP 7  --  9  --  9   < >  --    < > 13   SHAVON 8.1*  --  8.5  --  8.4*   < >  --    < > 7.1*   *  --  99  --  96   < >  --    <  > 134*   ALBUMIN  --   --   --   --   --   --   --   --  2.1*   PROTTOTAL  --   --   --   --   --   --   --   --  5.8*   BILITOTAL  --   --   --   --   --   --   --   --  0.7   ALKPHOS  --   --   --   --   --   --   --   --  144   ALT  --   --   --   --   --   --   --   --  11   AST  --   --   --   --   --   --   --   --  31   LIPASE  --   --   --   --   --   --   --   --  537*    < > = values in this interval not displayed.

## 2021-12-25 NOTE — PLAN OF CARE
/75 (BP Location: Right arm)   Pulse 87   Temp 98.5  F (36.9  C) (Oral)   Resp 16   Ht 1.524 m (5')   Wt 66.8 kg (147 lb 4.3 oz)   SpO2 98%   BMI 28.76 kg/m     Neuro: A/Ox3  VS: VSS on RA  Pain: C/o Abdominal discomfort and L arm pain treated by tylenol  GI: on Renal diet and 1000ml FR, Tolerating good and prefers food from home. Denies nausea. BMx1  : Oliguric voided 175ml dark urine, on HD  PIV SL  Skin: L arm fistula bruise, dry skin/intact  Activity - SBA  Education - Pain/bowel management  Plan of Care - Continue with POC

## 2021-12-26 ENCOUNTER — APPOINTMENT (OUTPATIENT)
Dept: GENERAL RADIOLOGY | Facility: CLINIC | Age: 56
DRG: 698 | End: 2021-12-26
Attending: PHYSICIAN ASSISTANT
Payer: MEDICARE

## 2021-12-26 LAB
ANION GAP SERPL CALCULATED.3IONS-SCNC: 9 MMOL/L (ref 3–14)
BUN SERPL-MCNC: 25 MG/DL (ref 7–30)
CALCIUM SERPL-MCNC: 8 MG/DL (ref 8.5–10.1)
CHLORIDE BLD-SCNC: 95 MMOL/L (ref 94–109)
CO2 SERPL-SCNC: 23 MMOL/L (ref 20–32)
CREAT SERPL-MCNC: 3.54 MG/DL (ref 0.52–1.04)
CYCLOSPORINE BLD LC/MS/MS-MCNC: 93 UG/L (ref 50–400)
GFR SERPL CREATININE-BSD FRML MDRD: 15 ML/MIN/1.73M2
GLUCOSE BLD-MCNC: 93 MG/DL (ref 70–99)
MAGNESIUM SERPL-MCNC: 1.6 MG/DL (ref 1.6–2.3)
POTASSIUM BLD-SCNC: 4.3 MMOL/L (ref 3.4–5.3)
SODIUM SERPL-SCNC: 127 MMOL/L (ref 133–144)
TME LAST DOSE: NORMAL H
TME LAST DOSE: NORMAL H
TROPONIN I SERPL HS-MCNC: 26 NG/L
TROPONIN I SERPL HS-MCNC: 27 NG/L

## 2021-12-26 PROCEDURE — 84484 ASSAY OF TROPONIN QUANT: CPT | Performed by: PHYSICIAN ASSISTANT

## 2021-12-26 PROCEDURE — 93005 ELECTROCARDIOGRAM TRACING: CPT

## 2021-12-26 PROCEDURE — 99233 SBSQ HOSP IP/OBS HIGH 50: CPT | Performed by: INTERNAL MEDICINE

## 2021-12-26 PROCEDURE — 71046 X-RAY EXAM CHEST 2 VIEWS: CPT | Mod: 26 | Performed by: STUDENT IN AN ORGANIZED HEALTH CARE EDUCATION/TRAINING PROGRAM

## 2021-12-26 PROCEDURE — 36415 COLL VENOUS BLD VENIPUNCTURE: CPT | Performed by: INTERNAL MEDICINE

## 2021-12-26 PROCEDURE — 71046 X-RAY EXAM CHEST 2 VIEWS: CPT

## 2021-12-26 PROCEDURE — 250N000012 HC RX MED GY IP 250 OP 636 PS 637: Performed by: NURSE PRACTITIONER

## 2021-12-26 PROCEDURE — 80048 BASIC METABOLIC PNL TOTAL CA: CPT | Performed by: INTERNAL MEDICINE

## 2021-12-26 PROCEDURE — 83735 ASSAY OF MAGNESIUM: CPT | Performed by: PHYSICIAN ASSISTANT

## 2021-12-26 PROCEDURE — 250N000013 HC RX MED GY IP 250 OP 250 PS 637: Performed by: NURSE PRACTITIONER

## 2021-12-26 PROCEDURE — 99207 PR CDG-CUT & PASTE-POTENTIAL IMPACT ON LEVEL: CPT | Performed by: INTERNAL MEDICINE

## 2021-12-26 PROCEDURE — 250N000012 HC RX MED GY IP 250 OP 636 PS 637: Performed by: STUDENT IN AN ORGANIZED HEALTH CARE EDUCATION/TRAINING PROGRAM

## 2021-12-26 PROCEDURE — 36415 COLL VENOUS BLD VENIPUNCTURE: CPT | Performed by: PHYSICIAN ASSISTANT

## 2021-12-26 PROCEDURE — 120N000011 HC R&B TRANSPLANT UMMC

## 2021-12-26 PROCEDURE — 93010 ELECTROCARDIOGRAM REPORT: CPT | Performed by: INTERNAL MEDICINE

## 2021-12-26 PROCEDURE — 80158 DRUG ASSAY CYCLOSPORINE: CPT | Performed by: INTERNAL MEDICINE

## 2021-12-26 PROCEDURE — 250N000013 HC RX MED GY IP 250 OP 250 PS 637: Performed by: STUDENT IN AN ORGANIZED HEALTH CARE EDUCATION/TRAINING PROGRAM

## 2021-12-26 PROCEDURE — 250N000013 HC RX MED GY IP 250 OP 250 PS 637: Performed by: INTERNAL MEDICINE

## 2021-12-26 RX ORDER — BUMETANIDE 2 MG/1
2 TABLET ORAL
Status: DISCONTINUED | OUTPATIENT
Start: 2021-12-26 | End: 2021-12-28 | Stop reason: HOSPADM

## 2021-12-26 RX ORDER — VITAMIN B COMPLEX
50 TABLET ORAL DAILY
Status: DISCONTINUED | OUTPATIENT
Start: 2021-12-26 | End: 2021-12-28 | Stop reason: HOSPADM

## 2021-12-26 RX ADMIN — FAMOTIDINE 20 MG: 20 TABLET ORAL at 22:22

## 2021-12-26 RX ADMIN — SUCRALFATE 1 G: 1 TABLET ORAL at 17:28

## 2021-12-26 RX ADMIN — Medication 50 MCG: at 12:48

## 2021-12-26 RX ADMIN — HYDRALAZINE HYDROCHLORIDE 25 MG: 25 TABLET, FILM COATED ORAL at 07:46

## 2021-12-26 RX ADMIN — MYCOPHENOLATE MOFETIL 750 MG: 250 CAPSULE ORAL at 19:57

## 2021-12-26 RX ADMIN — CYCLOSPORINE 25 MG: 25 CAPSULE, LIQUID FILLED ORAL at 18:44

## 2021-12-26 RX ADMIN — CARVEDILOL 3.12 MG: 3.12 TABLET, FILM COATED ORAL at 18:44

## 2021-12-26 RX ADMIN — PANTOPRAZOLE SODIUM 40 MG: 40 TABLET, DELAYED RELEASE ORAL at 19:56

## 2021-12-26 RX ADMIN — Medication 1 MG: at 22:05

## 2021-12-26 RX ADMIN — CYCLOSPORINE 25 MG: 25 CAPSULE, LIQUID FILLED ORAL at 07:46

## 2021-12-26 RX ADMIN — MYCOPHENOLATE MOFETIL 750 MG: 250 CAPSULE ORAL at 07:45

## 2021-12-26 RX ADMIN — CALCIUM CARBONATE 500 MG: 500 TABLET, CHEWABLE ORAL at 07:46

## 2021-12-26 RX ADMIN — HYDRALAZINE HYDROCHLORIDE 25 MG: 25 TABLET, FILM COATED ORAL at 14:09

## 2021-12-26 RX ADMIN — POLYETHYLENE GLYCOL 3350 17 G: 17 POWDER, FOR SOLUTION ORAL at 07:49

## 2021-12-26 RX ADMIN — CARVEDILOL 3.12 MG: 3.12 TABLET, FILM COATED ORAL at 07:46

## 2021-12-26 RX ADMIN — CALCITRIOL CAPSULES 0.25 MCG 0.5 MCG: 0.25 CAPSULE ORAL at 07:46

## 2021-12-26 RX ADMIN — PANTOPRAZOLE SODIUM 40 MG: 40 TABLET, DELAYED RELEASE ORAL at 07:46

## 2021-12-26 RX ADMIN — SUCRALFATE 1 G: 1 TABLET ORAL at 07:46

## 2021-12-26 RX ADMIN — BUMETANIDE 2 MG: 2 TABLET ORAL at 14:11

## 2021-12-26 RX ADMIN — CALCIUM CARBONATE 500 MG: 500 TABLET, CHEWABLE ORAL at 19:58

## 2021-12-26 RX ADMIN — HYDRALAZINE HYDROCHLORIDE 25 MG: 25 TABLET, FILM COATED ORAL at 19:56

## 2021-12-26 RX ADMIN — BUMETANIDE 2 MG: 2 TABLET ORAL at 19:58

## 2021-12-26 ASSESSMENT — ACTIVITIES OF DAILY LIVING (ADL)
ADLS_ACUITY_SCORE: 12

## 2021-12-26 ASSESSMENT — MIFFLIN-ST. JEOR: SCORE: 1186.5

## 2021-12-26 NOTE — PLAN OF CARE
[id number]. VSS on RA. Denied pain or nausea. PIV SL. Oliguric as patient is on HD. SBA. Patient slept and rested comfortably all shift. Continue with plan of care and notify team of any changes.

## 2021-12-26 NOTE — PROGRESS NOTES
Calorie Count  Intake recorded for: 12/25  Total Kcals: 0 Total Protein: 0g  Kcals from Hospital Food: 0   Protein: 0g  Kcals from Outside Food (average):0 Protein: 0g  # Meals Ordered from Kitchen: 1 meal  # Meals Recorded: No food intake recorded  # Supplements Recorded: No food intake recorded    Per Epic Food Record, Pt consumed 75% at 2PM and 100% at 12AM, but not enough information to calculate calories/protein.

## 2021-12-26 NOTE — PLAN OF CARE
/73 (BP Location: Right arm)   Pulse 71   Temp 99  F (37.2  C) (Oral)   Resp 16   Ht 1.524 m (5')   Wt 66.8 kg (147 lb 4.3 oz)   SpO2 97%   BMI 28.76 kg/m     Neuro: A/Ox3  Respiratory: c/o shortness of breathes with activities  VS: VSS on RA  Pain: c/o Abd discomfort and denies pain meds  GI: on Renal diet and 1000ml FR. Denies nausea. BMx1  : Oliguric on HD, c/o feeling full of fluid, provider notified and ordered Bumex 2mgx2/day  Skin: Dry, edema 2+  PIV SL  Education - SBA  Plan of Care - Continue with POC

## 2021-12-26 NOTE — PLAN OF CARE
/71 (BP Location: Right arm)   Pulse 66   Temp 98.4  F (36.9  C) (Oral)   Resp 18   Ht 1.524 m (5')   Wt 66.8 kg (147 lb 4.3 oz)   SpO2 100%   BMI 28.76 kg/m    [id number]  Patient vitally stable on RA, afebrile. Denied any pain or nausea. Tolerating renal diet with okay appetite. PIV SL. Oliguric-on HD. SBA.   Will continue with POC and notify MD with changes or concerns.

## 2021-12-26 NOTE — PROGRESS NOTES
[facility]    Medicine Progress Note - Hospitalist Service, Gold 9       Date of Admission:  12/16/2021    Assessment & Plan             Joshua Lala is a 55 year old female admitted on 12/16/2021. 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 She presented to [facility] ED on 12/16/21 with complaints of fatigue, edema, and oliguria. She is admitted to the internal medicine service with transplant nephrology consulting for further evaluation and management.    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 Second transplant 2007, now with chronic antibody rejection, CKD IV. Last nephrology visit in chart 03/29/21. Missed an appointment in Sept 2021.  - Baseline creatinine 2 - 2.20 as of beginning of 2021. More recently, Cr in the 2.5 - 2.6 range. Cr 2.93 on admit. UA on admit with proteinuria, small blood, and trace LE  -EBV (7300) elevated. SPEP and UPEP with suggestion of hypoalbuminema and albuminuria possibly indicate glomerular damage  -DDx( cardiorenal vs medication vs nephrotic syndrome)  -Evaluated AVF by vascular surgery and ultrasound on 12/25 and Ok to use  Plan:  > Transplant nephrology consultation    - PLA2 R AB pending    -continue PTA immunosuppression: cyclosporine 25mg AM and 25mg PM; MMF 750mg BID    -continue sulfamethoxazole-trimethoprim 400-80mg on MWF  for prophylaxis    -cyclosporine level and dosing per transplant nephrology    - Transplanted renal biopsy pending (done on 12/22)    #prandial pain, epigastric pain  #postprandial vomiting   #Constipation  Patient and daughter report this is an ongoing problem since 04/2021. Patient is only able to eat rice and chicken broth recently.   -She has anorexia which she associates with pain.  -Known hx gastric ulcers.  -GI consulted (12/17) and did not recommend EGD  -worsened pain despite oral meds (12/19) and CT abdomen pelvis ordered. CT abdomen/pelvis(12/19) - no acute pathology   Plan:  >Start miralax scheduled  >sucralfate 1g BID (need to consider if worth doing with PPI)  >PPI, H2 blocker qhs    -H pylori stool antigen testing    -nutrition consultation  -Patient declining feeding tube     #Acute decompensated HFpEF  #pulmonary HTN  Pulmonary vascular congestion on CXR, tachypneic on exam in ER on 12/16  -. Previous echocardiogram with HFpEF and c/f pulmonary HTN. Presentation with elevated BNP >9,000, higher than in previous hospitalizations.   -ECHO(12/17) - EF 60%, dilated IVC, PA pressure 39  Plan:  -IV prn and dialysis started      #new onset Afib  Noted on EKG. Will watch for now and consider anticoagulation in future.  Likely not causing her symptoms currently.    #Hypertension  No current concern for hypertensive emergency/end-organ damage.   Plan:  >Hold losartan  >Continue coreg, hydralazine    -PRN labetalol for SBP >170 and/or DBP>100    #COVID-19 vaccine series not completed  Patient has received 2 doses of vaccine so far. This does not represent full vaccination in an immunocompromised patient. She requires a third, full dose. We can offer this after ruling out any active viral infection.    -ensure 3rd full dose offered to patient prior to leaving the hospital    #anemia of chronic renal disease: stable, monitor. Outpatient darbepoetin deana, IV iron was attempted but she complained of pain and not  feeling well and thus was stopped after 2 attempts.    #Elevated fungitell  -Mild elevation in fungitell, given lack of localizing symptoms, fever or pulmonary nodules would not recommend further infectious work up per discussion with ID on 12/20  -If she were to develop fever, confusion, or new imaging findings would re-consider    Chronic, stable medical problems:  #leukopenia: stable, monitor   #Mineral bone disorder - elevated PTH, low ionized calcium.  Currently on calitriol and calcium  #NAGMA - secondary to renal disease - continue sodium bicarb    Malnutrition:    - Level of malnutrition: Moderate   - Based on: mild (or greater) muscle loss, mild (or greater) fluid retention       Diet: Fluid restriction 1000 ML FLUID  Dialysis Diet  Snacks/Supplements Adult: Ensure Enlive; With Meals    DVT Prophylaxis: Heparin subcutaneous- Stopped per patient request and she will instead ambulate  Staples Catheter: Not present  Central Lines: None  Code Status: Full Code      Disposition Plan   Expected Discharge: 12/27/2021     Anticipated discharge location: home with family    Delays:          The patient's care was discussed with the Patient and Patient's Family.    Shaan Hernandez MD  Hospitalist Service, [address]  Securely message with the Vocera Web Console (learn more here)  Text page via [email or web address] Paging/Directory    Please see sign in/sign out for up to date coverage information    Clinically Significant Risk Factors Present on Admission             # Non-Severe Malnutrition, POA: based on Weight loss;Subcutaneous fat loss;Muscle loss;Fluid retention (12/17/21 1200)     ______________________________________________________________________    Interval History   Feeling much better today  She is still concerned about using her AV fistula but not really having pain now  She notes ambulating and eating the food from home  She continues to decline any feeding  tube    ROS  No fevers  No headaches  No confusion  No diarrhea  No rash  No bleeding    Data reviewed today: I reviewed all medications, new labs and imaging results over the last 24 hours. I personally reviewed no images or EKG's today.    Physical Exam   Vital Signs: Temp: 99  F (37.2  C) Temp src: Oral BP: 119/73 Pulse: 71   Resp: 16 SpO2: 97 % O2 Device: None (Room air)    Weight: 147 lbs 4.28 oz  Constitutional: alert, oriented, sitting in bed, in no acute distress  Eyes: no icterus  ENT: JVD+, supple  Respiratory: crackles in bilateral lower lung fields, no wheezing, no respiratory distress  Cardiovascular: normal s1, s2, normal rate and rhythm, systolic murmur 1/6  GI: soft, non-tender, non-distended, bowel sounds present  Skin: no rash  Ext: left upper extremity av fistula with palpable thrill and audible bruit but with bruising  Musculoskeletal: trace pitting edema     Data   Recent Labs   Lab 12/26/21  0723 12/25/21  0633 12/24/21  1818 12/24/21  0558 12/23/21  1756 12/23/21  0659 12/22/21  0612 12/21/21  1712   WBC  --  4.3  --  3.9*  --  4.1   < >  --    HGB  --  8.4*  --  8.3*  --  8.6*   < >  --    MCV  --  90  --  88  --  89   < >  --    PLT  --  139*  --  136*  --  108*   < >  --    INR  --   --   --   --   --   --   --  1.11   * 129* 131* 128*   < > 129*   < > 129*   POTASSIUM 4.3 4.0  --  4.0  --  4.1   < >  --    CHLORIDE 95 98  --  94  --  97   < >  --    CO2 23 24  --  25  --  23   < >  --    BUN 25 18  --  23  --  13   < >  --    CR 3.54* 2.93*  --  3.09*  --  2.32*   < >  --    ANIONGAP 9 7  --  9  --  9   < >  --    SHAVON 8.0* 8.1*  --  8.5  --  8.4*   < >  --    GLC 93 104*  --  99  --  96   < >  --     < > = values in this interval not displayed.

## 2021-12-26 NOTE — PROGRESS NOTES
[facility]  Transplant Nephrology Progress note  Date of Admission:  12/16/2021  Today's Date: 12/26/2021  Requesting physician: Jarrett Mullen MD    Recommendations:    - add bumex 2mg po bid  - will evaluate for HD needs tomorrow  -  Pending read of renal allograft biopsy results ([place] send out)  - check daily standing weights & I/o  - free water restriction to <1L/d  - will discuss with vascular surgery whether high flow AVF contributing to high out heart failure    Assessment & Plan   # DDKT: stable. Suspect eGF an overestimate of true GFR given sarcopenia.   Non Oliguric ~ 1300 ml UOP in 24 hrs   - Baseline Creatinine: ~ 2-2.3, but labs have been infrequent   - Proteinuria: Nephrotic range (>3 grams), now 6g/g    - Date DSA Last Checked: Jun/2019      Latest DSA:    - BK Viremia: No   - Kidney Tx Biopsy: Dec 22, 2021; Result: pending   -iHD  12/20,12/21,12/22 -2 L UF   -Follow up biopsy result to see likelihood of renal recovery or if this is all progression of chronic disease        - cystatin C GFR 12/24 13 ml/min    # DIALYSIS ACCESS:  LUE AV fistula w/ good thrill and bruit.. large aneurysm and pain during cannulation. Duplex AVF & vascular surgery consult recommended no intervention and ok to use though high flows concerning and may be contributing to high output heart failure    # Immunosuppression: Cyclosporine (goal ) and Mycophenolate mofetil (dose 750 mg every 12 hours)   - Changes: Not at this time. Check CsA q48h.  CsA trough 103 ( 12/22) --> 112 ( 12/24) ->104 12/25  CsA 25/25 & /750      # Infection Prophylaxis:   - PJP: Sulfa/TMP (Bactrim) MWF    # Hypertension: Controlled;  Goal BP: < 130/80   - Volume status: Moderately hypervolemic     - Changes:Yes, HD w/ 2L UF again today  Continue the following with holding parameters ( hold if SBP< 100)   Coreg 3.125 mg 2 times a day  Hydralazine 25 mg 3 times a day  Hold losartan  Continue  labetalol as needed  Admission weight 66 kg. Has had 3 HD sessions so far . Total 6 LUF achieved .weight 63.5 kg ( 12/22) most recent back up to 66 kg. 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 Got darbepoetin 40mcg on 12/22/21     - Iron studies: Low iron saturation. Received half of a dose on 12/20 but developed back pain, chest pain, SOB, dizziness,blurry vision and hence  it was stopped ( can be considered as allergy )    - plan to give darbepoetin 40mcg weekly and have her follow up with anemia clinic as outpatient upon discharge     # Mineral Bone Disorder:   - Secondary renal hyperparathyroidism; PTH level: Mildly elevated (151-300 pg/ml)        On treatment: Calcitriol  - Vitamin D; level: Normal        On supplement: No  - Calcium; level: Normal        On supplement: Yes  - Phosphorus; level: Normal        On supplement: No    -Continue calcitriol 0.5mcg daily and tums 500mg bid.       # Electrolytes:   - Potassium; level: Normal        On supplement: No  - Magnesium; level: Normal        On supplement: No.    - Bicarbonate; level: Normal        On supplement: Yes, stop as she is on dialysis    #Hyponatremia   -hypervolemic hyponatremia in the setting of advanced CKD & CHF    -Fluid restriction 1L daily       # HFpEF:    -Decompensated clinically. Echo stable on 12/17/21 with elevated PASP and dilated IVC, grade II diastolic dysfunction   - continue with volume optimization strategy with HD      # Pulmonary HTN: echo  12/17/21 w/ PASP 39mmHg + RA pressure. Likely worsened by volume overload   - continue with volume optimization strategy with HD     # Dyspepsia:   - Advised PPI 40 mg twice daily for 6 to 8 weeks, Carafate twice daily, and Pepcid 40 mg daily.  Follow-up H. Pylori stool antigen.   -GI consulted to consider EGD but deferred    # Unintentional weight loss:   -Likely 2/2 her not eating.  EBV PCR 7K (12/16/2021)   -CMV negative (12/16/2021)        # Transplant History:  Etiology of Kidney Failure: IgA nephropathy  Tx: DDKT  Transplant: 11/28/2007 (Kidney), 8/8/1998 (Kidney)  Significant changes in immunosuppression: None  Significant transplant-related complications: DSA    Case Milton MD       INTERVAL HISTORY  Feels better today. Pain over AVF cannulation subsided, denies any shortness of breath, no nausea or vomiting  Seen by vascular surgery who recommended no acute interventions, duplex AVf with high flow at the anastomosis    Review of Systems    The 4 point Review of Systems is negative other than noted in the HPI or here.    Allergies   Allergies   Allergen Reactions     Contrast Dye Shortness Of Breath     Other reaction(s): Angioedema     Venofer [Iron Sucrose] Anaphylaxis     Pt had rxn to IV Iron Sucrose infusion in hospital on 12/21/21 -- back pain, chest pain, SOB, blurred vision, dizziness, & lightheadedness.     Amlodipine Other (See Comments)     Prior to Admission Medications     calcitRIOL  0.5 mcg Oral Daily     calcium carbonate  500 mg Oral BID     carvedilol  3.125 mg Oral BID w/meals     cycloSPORINE modified  25 mg Oral QPM     cycloSPORINE modified  25 mg Oral QAM     famotidine  20 mg Oral At Bedtime     hydrALAZINE  25 mg Oral TID     [Held by provider] losartan  50 mg Oral Daily     mycophenolate  750 mg Oral BID     pantoprazole  40 mg Oral BID     polyethylene glycol  17 g Oral Daily     sodium chloride (PF)  3 mL Intracatheter Q8H     sucralfate  1 g Oral BID AC      sulfamethoxazole-trimethoprim  1 tablet Oral Q  AM         Physical Exam   Temp  Av.4  F (36.9  C)  Min: 97.8  F (36.6  C)  Max: 99.1  F (37.3  C)      Pulse  Av.2  Min: 69  Max: 105 Resp  Av.9  Min: 16  Max: 18  SpO2  Av.7 %  Min: 91 %  Max: 99 %     /73 (BP Location: Right arm)   Pulse 71   Temp 99  F (37.2  C) (Oral)   Resp 16   Ht 1.524 m (5')   Wt 66.8 kg (147 lb 4.3 oz)   SpO2 97%   BMI 28.76 kg/m      Admit Weight: 66 kg (145 lb 6.4 oz)     GENERAL APPEARANCE: alert and no distress  HENT: mouth without ulcers or lesions  LYMPHATICS: no cervical or supraclavicular nodes  RESP: rales bilateral bases  CV: normal rhythm, systolic murmur heard throughout the precordium radiating to carotids  EDEMA: 1+ LE edema bilaterally  ABDOMEN: soft, nondistended, nontender, bowel sounds normal  MS: extremities normal - no gross deformities noted, no evidence of inflammation in joints, no muscle tenderness  SKIN: no rash  NEURO: normal strength and tone, sensory exam grossly normal, mentation intact and speech normal  PSYCH: mentation appears normal and affect normal/bright  TX KIDNEY: normal  DIALYSIS ACCESS:  LUE AV fistula w/ good thrill and bruit aneurysmal dilation noted    Data   CMP  Recent Labs   Lab 21  0723 21  0633 21  1818 21  0558 21  1756 21  0659 21  1828 21  0612   * 129* 131* 128*   < > 129*   < > 122*   POTASSIUM 4.3 4.0  --  4.0  --  4.1  --  3.9   CHLORIDE 95 98  --  94  --  97  --  90*   CO2 23 24  --  25  --  23  --  24   ANIONGAP 9 7  --  9  --  9  --  8   GLC 93 104*  --  99  --  96  --  95   BUN 25 18  --  23  --  13  --  14   CR 3.54* 2.93*  --  3.09*  --  2.32*  --  2.16*   GFRESTIMATED 15* 18*  --  17*  --  24*  --  26*   SHAVON 8.0* 8.1*  --  8.5  --  8.4*  --  8.0*   PHOS  --   --   --   --   --   --   --  3.1    < > = values in this interval not displayed.     CBC  Recent Labs   Lab 21  0677  12/24/21  0558 12/23/21  0659 12/22/21  0612   HGB 8.4* 8.3* 8.6* 8.6*   WBC 4.3 3.9* 4.1 3.9*   RBC 2.97* 2.98* 3.09* 3.13*   HCT 26.7* 26.1* 27.6* 27.3*   MCV 90 88 89 87   MCH 28.3 27.9 27.8 27.5   MCHC 31.5 31.8 31.2* 31.5   RDW 13.8 13.6 13.6 13.5   * 136* 108* 144*     INR  Recent Labs   Lab 12/21/21  1712   INR 1.11     ABGNo lab results found in last 7 days.   Urine Studies  Recent Labs   Lab Test 12/22/21  0044 12/20/21  0553 12/16/21  1810 09/25/21  1637   COLOR Light Yellow Yellow Light Yellow Straw   APPEARANCE Clear Clear Clear Clear   URINEGLC Negative Negative 30 * Negative   URINEBILI Negative Negative Negative Negative   URINEKETONE Negative Negative Negative Negative   SG 1.006 1.013 1.005 1.003   UBLD Negative Negative Small* Trace*   URINEPH 5.5 5.5 6.0 6.0   PROTEIN 100 * 100 * 200 * 100 *   NITRITE Negative Negative Negative Negative   LEUKEST Negative Small* Trace* Negative   RBCU 1 1 1 0   WBCU 1 13* 5 <1     Recent Labs   Lab Test 12/16/21  1810 09/03/21  0630 01/15/21  0732 03/18/20  1330 06/01/19  0700 02/16/19  0737 02/02/18  0742 11/17/17  0739 04/28/17  0843 10/28/16  0913 04/29/16  1145 06/12/15  1011 01/23/15  0700   UTPG 6.85* 4.32* 3.69* 1.35* 3.89* 3.92* 2.31* 1.74* 1.86* 1.84* 0.49* 0.73* 0.77*     PTH  Recent Labs   Lab Test 12/17/21  1616 03/17/20  0509 04/29/16  1043 02/05/16  1039 10/27/15  0555 03/26/15  1734 12/16/13  0636   PTHI 173* 291* 124* 140* 218* 251* 166*     Iron Studies  Recent Labs   Lab Test 12/17/21  1443 12/17/21  0618 09/03/21  0625 04/23/21  0716 03/12/21  0757 01/15/21  0725 10/29/20  0900 09/17/20  0758 08/10/20  0840 04/24/20  0800 03/17/20  0509   IRON 58  --  84 45 83 96 88 88 89 61 54     --  252 252 245 225* 251 248 242 286 240   IRONSAT 17  --  33 18 34 43 35 36 37 21 23   ANASTASIIA  --  317* 332* 361* 401* 396* 352* 374* 364* 299*  --        IMAGING:  All imaging studies reviewed by me.

## 2021-12-27 ENCOUNTER — APPOINTMENT (OUTPATIENT)
Dept: PHYSICAL THERAPY | Facility: CLINIC | Age: 56
DRG: 698 | End: 2021-12-27
Payer: MEDICARE

## 2021-12-27 LAB
ANION GAP SERPL CALCULATED.3IONS-SCNC: 11 MMOL/L (ref 3–14)
ATRIAL RATE - MUSE: 36 BPM
BUN SERPL-MCNC: 31 MG/DL (ref 7–30)
CALCIUM SERPL-MCNC: 8.3 MG/DL (ref 8.5–10.1)
CHLORIDE BLD-SCNC: 94 MMOL/L (ref 94–109)
CO2 SERPL-SCNC: 23 MMOL/L (ref 20–32)
CREAT SERPL-MCNC: 3.92 MG/DL (ref 0.52–1.04)
DIASTOLIC BLOOD PRESSURE - MUSE: NORMAL MMHG
ERYTHROCYTE [DISTWIDTH] IN BLOOD BY AUTOMATED COUNT: 14.2 % (ref 10–15)
GFR SERPL CREATININE-BSD FRML MDRD: 13 ML/MIN/1.73M2
GLUCOSE BLD-MCNC: 90 MG/DL (ref 70–99)
HCT VFR BLD AUTO: 25.9 % (ref 35–47)
HGB BLD-MCNC: 8 G/DL (ref 11.7–15.7)
INTERPRETATION ECG - MUSE: NORMAL
MAGNESIUM SERPL-MCNC: 1.7 MG/DL (ref 1.6–2.3)
MAYO MISC RESULT: NORMAL
MCH RBC QN AUTO: 27.7 PG (ref 26.5–33)
MCHC RBC AUTO-ENTMCNC: 30.9 G/DL (ref 31.5–36.5)
MCV RBC AUTO: 90 FL (ref 78–100)
P AXIS - MUSE: NORMAL DEGREES
PLATELET # BLD AUTO: 160 10E3/UL (ref 150–450)
POTASSIUM BLD-SCNC: 4.1 MMOL/L (ref 3.4–5.3)
PR INTERVAL - MUSE: NORMAL MS
QRS DURATION - MUSE: 90 MS
QT - MUSE: 388 MS
QTC - MUSE: 466 MS
R AXIS - MUSE: 54 DEGREES
RBC # BLD AUTO: 2.89 10E6/UL (ref 3.8–5.2)
SODIUM SERPL-SCNC: 128 MMOL/L (ref 133–144)
SYSTOLIC BLOOD PRESSURE - MUSE: NORMAL MMHG
T AXIS - MUSE: -48 DEGREES
VENTRICULAR RATE- MUSE: 87 BPM
WBC # BLD AUTO: 3.4 10E3/UL (ref 4–11)

## 2021-12-27 PROCEDURE — 97116 GAIT TRAINING THERAPY: CPT | Mod: GP

## 2021-12-27 PROCEDURE — 250N000012 HC RX MED GY IP 250 OP 636 PS 637: Performed by: STUDENT IN AN ORGANIZED HEALTH CARE EDUCATION/TRAINING PROGRAM

## 2021-12-27 PROCEDURE — 36415 COLL VENOUS BLD VENIPUNCTURE: CPT | Performed by: INTERNAL MEDICINE

## 2021-12-27 PROCEDURE — 90937 HEMODIALYSIS REPEATED EVAL: CPT

## 2021-12-27 PROCEDURE — 85027 COMPLETE CBC AUTOMATED: CPT | Performed by: INTERNAL MEDICINE

## 2021-12-27 PROCEDURE — 99207 PR CDG-CUT & PASTE-POTENTIAL IMPACT ON LEVEL: CPT | Performed by: INTERNAL MEDICINE

## 2021-12-27 PROCEDURE — 97530 THERAPEUTIC ACTIVITIES: CPT | Mod: GP

## 2021-12-27 PROCEDURE — 99233 SBSQ HOSP IP/OBS HIGH 50: CPT | Mod: GC | Performed by: INTERNAL MEDICINE

## 2021-12-27 PROCEDURE — 258N000003 HC RX IP 258 OP 636: Performed by: STUDENT IN AN ORGANIZED HEALTH CARE EDUCATION/TRAINING PROGRAM

## 2021-12-27 PROCEDURE — 250N000013 HC RX MED GY IP 250 OP 250 PS 637: Performed by: PHYSICIAN ASSISTANT

## 2021-12-27 PROCEDURE — 250N000012 HC RX MED GY IP 250 OP 636 PS 637: Performed by: NURSE PRACTITIONER

## 2021-12-27 PROCEDURE — 99233 SBSQ HOSP IP/OBS HIGH 50: CPT | Performed by: INTERNAL MEDICINE

## 2021-12-27 PROCEDURE — 80048 BASIC METABOLIC PNL TOTAL CA: CPT | Performed by: INTERNAL MEDICINE

## 2021-12-27 PROCEDURE — 250N000013 HC RX MED GY IP 250 OP 250 PS 637: Performed by: STUDENT IN AN ORGANIZED HEALTH CARE EDUCATION/TRAINING PROGRAM

## 2021-12-27 PROCEDURE — 83735 ASSAY OF MAGNESIUM: CPT | Performed by: PHYSICIAN ASSISTANT

## 2021-12-27 PROCEDURE — 250N000013 HC RX MED GY IP 250 OP 250 PS 637: Performed by: INTERNAL MEDICINE

## 2021-12-27 PROCEDURE — 120N000011 HC R&B TRANSPLANT UMMC

## 2021-12-27 PROCEDURE — 250N000013 HC RX MED GY IP 250 OP 250 PS 637: Performed by: NURSE PRACTITIONER

## 2021-12-27 RX ADMIN — CYCLOSPORINE 25 MG: 25 CAPSULE, LIQUID FILLED ORAL at 19:43

## 2021-12-27 RX ADMIN — CALCIUM CARBONATE 500 MG: 500 TABLET, CHEWABLE ORAL at 19:46

## 2021-12-27 RX ADMIN — HYDRALAZINE HYDROCHLORIDE 25 MG: 25 TABLET, FILM COATED ORAL at 19:46

## 2021-12-27 RX ADMIN — MYCOPHENOLATE MOFETIL 750 MG: 250 CAPSULE ORAL at 19:42

## 2021-12-27 RX ADMIN — Medication: at 15:33

## 2021-12-27 RX ADMIN — SULFAMETHOXAZOLE AND TRIMETHOPRIM 1 TABLET: 400; 80 TABLET ORAL at 08:12

## 2021-12-27 RX ADMIN — BUMETANIDE 2 MG: 2 TABLET ORAL at 08:12

## 2021-12-27 RX ADMIN — SUCRALFATE 1 G: 1 TABLET ORAL at 19:42

## 2021-12-27 RX ADMIN — MYCOPHENOLATE MOFETIL 750 MG: 250 CAPSULE ORAL at 08:17

## 2021-12-27 RX ADMIN — BUMETANIDE 2 MG: 2 TABLET ORAL at 19:43

## 2021-12-27 RX ADMIN — CARVEDILOL 3.12 MG: 3.12 TABLET, FILM COATED ORAL at 19:46

## 2021-12-27 RX ADMIN — SODIUM CHLORIDE 250 ML: 9 INJECTION, SOLUTION INTRAVENOUS at 15:33

## 2021-12-27 RX ADMIN — POLYETHYLENE GLYCOL 3350 17 G: 17 POWDER, FOR SOLUTION ORAL at 08:12

## 2021-12-27 RX ADMIN — Medication 50 MCG: at 08:12

## 2021-12-27 RX ADMIN — CALCIUM CARBONATE 500 MG: 500 TABLET, CHEWABLE ORAL at 08:12

## 2021-12-27 RX ADMIN — CALCITRIOL CAPSULES 0.25 MCG 0.5 MCG: 0.25 CAPSULE ORAL at 08:12

## 2021-12-27 RX ADMIN — OXYCODONE HYDROCHLORIDE 5 MG: 5 TABLET ORAL at 17:51

## 2021-12-27 RX ADMIN — PANTOPRAZOLE SODIUM 40 MG: 40 TABLET, DELAYED RELEASE ORAL at 19:42

## 2021-12-27 RX ADMIN — FAMOTIDINE 20 MG: 20 TABLET ORAL at 22:08

## 2021-12-27 RX ADMIN — SUCRALFATE 1 G: 1 TABLET ORAL at 08:13

## 2021-12-27 RX ADMIN — SODIUM CHLORIDE 300 ML: 9 INJECTION, SOLUTION INTRAVENOUS at 15:33

## 2021-12-27 RX ADMIN — PANTOPRAZOLE SODIUM 40 MG: 40 TABLET, DELAYED RELEASE ORAL at 08:12

## 2021-12-27 RX ADMIN — CYCLOSPORINE 25 MG: 25 CAPSULE, LIQUID FILLED ORAL at 08:12

## 2021-12-27 ASSESSMENT — ACTIVITIES OF DAILY LIVING (ADL)
ADLS_ACUITY_SCORE: 12

## 2021-12-27 ASSESSMENT — MIFFLIN-ST. JEOR: SCORE: 1186.5

## 2021-12-27 NOTE — PROGRESS NOTES
CLINICAL NUTRITION SERVICES - BRIEF NOTE      Reason for RD note: Following up on kcal cts.    New Findings/Chart Review:  Oral Diet/Intake: Dialysis diet + 1000 mL fluid restriction + Vanilla Ensure Enlive @ Lunch    12/23     Total Kcals: 918       Total Protein: 47g -- food from OSH only, 0 supplements  12/24     Total Kcals: 0           Total Protein: 0g -- 0 meals ordered, 0 meals recorded, 0 supplements  12/25     Total Kcals: 0           Total Protein: 0g  -- 1 meal ordered, 0 meals recorded, 0 supplements, Per Epic Food Record, Pt consumed 75% at 2PM and 100% at 12AM, but not enough information to calculate calories/protein.  -->Insufficient data to calculate kcal/protein intake. Pt noted to continue to decline FT placement per Gold 9 note 12/26. Pt reports good appetite.     Labs: Na+ 128 (L), K+ 4.1 (WNL), Phos 2.2 (WNL on 12/22 when last checked)    Interventions:  -Continue supplement as ordered.  -Nutrition education (via [employer]  over the phone): Pt is eating at least 2 meals daily (she says she will eat 3 if she has rice available) and family often brings her food from home. She has a couple sandwiches, smoothies, and assortment of food in a bag at bedside. She reports taking 1 Ensure daily and likes these. Encouraged pt to continue Ensure drink especially if only eating 2 meals daily.    Future/Additional Recommendations:  Consider diet liberalization to regular diet to allow for greater food options. K+ and Phos have been normal to low this admit.    Nutrition will continue to follow per protocol.    Veronica Mario RD, LD  Pager: [phone]

## 2021-12-27 NOTE — DISCHARGE INSTRUCTIONS
Outpatient Dialysis  [facility]  [address] [place] [address], [place], [place], [address]  Phone: [phone]  Fax: [phone]  Monday Wednesday Friday 2:45 p.m. chair time.   First outpatient dialysis run Wednesday, December 29th 01:30 PM

## 2021-12-27 NOTE — PROGRESS NOTES
Patient complained of chest tightness and shortness of breath at change of shift,see flow sheet for vital signs,oxygen sat 98% on room air.Provider was notified,provider came to assess pt,EKG done,.troponin level  Done,see result.Chest x-ray yet to bed done.Continue to monitor pt.

## 2021-12-27 NOTE — PROGRESS NOTES
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 I evaluated the pt briefly at bedside and she reported \"tightness\". No chest pain/pressure or palpitations. Her symptoms spontaneously resolved on their own. Exam remarkable for bilateral lower lobe lung crackles and irregularly irregular rhythm. Vitals very stable.    EKG was reviewed and was without ST segment elevation or depression. Atrial fibrillation with rate 87. Some new subtle t wave inversions V5 and V6 as compared to previous.    Initial troponin normal. Chest XR with slightly enlarged R pleural effusion which is likely the cause of her complaints.    Plan:  - Check additional troponin and place on telemetry overnight given new t wave inversions.  - Check magnesium level  - Monitor closely  - Diuresis per primary team.    Zeke Dias PA-C on 12/26/2021 at 9:05 PM    "

## 2021-12-27 NOTE — PROGRESS NOTES
Care Management Follow Up    Length of Stay (days): 11    Expected Discharge Date: 12/27/2021     Concerns to be Addressed: care coordination/care conferences,discharge planning     Patient plan of care discussed at interdisciplinary rounds: Yes    Anticipated Discharge Disposition: Home     Anticipated Discharge Services:    Anticipated Discharge DME: None    Patient/family educated on Medicare website which has current facility and service quality ratings: no  Education Provided on the Discharge Plan:    Patient/Family in Agreement with the Plan: yes    Referrals Placed by CM/SW: External Care Coordination    Additional Information:  Received confirmation that patient has been accepted at:    [facility]  [address] [place] [address], [facility] [address], [place], [place], [address]  Phone: [phone]  Fax: [phone]  Monday Wednesday Friday 2:45 p.m. chair time.   First outpatient dialysis run Wednesday, December 29th 01:30 PM    Paged provider with above information.   Anticipate discharge home pending medical clearance.  Discharge orders updated.     1025: Spoke with Dr. Hernandez, team waiting on biopsy results.  Anticipate discharge possibly late today vs tomorrow.  Updated PENNY i[facility]llard Charge.     Brittaney Barnes RN BSN, PHN, ACM-RN  7A RN Care Coordinator  Phone: [phone]  Pager [phone]  To contact the weekend RNCC, Page: [phone]    12/27/2021 9:46 AM

## 2021-12-27 NOTE — PROGRESS NOTES
[facility]    Medicine Progress Note - Hospitalist Service, Gold 9       Date of Admission:  12/16/2021    Assessment & Plan             Joshua Lala is a 55 year old female admitted on 12/16/2021. 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 She presented to [facility] ED on 12/16/21 with complaints of fatigue, edema, and oliguria. She is admitted to the internal medicine service with transplant nephrology consulting for further evaluation and management.    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 Second transplant 2007, now with chronic antibody rejection, CKD IV. Last nephrology visit in chart 03/29/21. Missed an appointment in Sept 2021.  - Baseline creatinine 2 - 2.20 as of beginning of 2021. More recently, Cr in the 2.5 - 2.6 range. Cr 2.93 on admit. UA on admit with proteinuria, small blood, and trace LE  -EBV (7300) elevated. SPEP and UPEP with suggestion of hypoalbuminema and albuminuria possibly indicate glomerular damage  -DDx( cardiorenal vs medication vs nephrotic syndrome)  -Evaluated AVF by vascular surgery and ultrasound on 12/25 and Ok to use  Plan:  > Transplant nephrology consultation    - PLA2 R AB pending    -continue PTA immunosuppression:  cyclosporine 25mg AM and 25mg PM; MMF 750mg BID    -continue sulfamethoxazole-trimethoprim 400-80mg on MWF for prophylaxis    -cyclosporine level and dosing per transplant nephrology    - Transplanted renal biopsy pending (done on 12/22)    #prandial pain, epigastric pain  #postprandial vomiting   #Constipation  Patient and daughter report this is an ongoing problem since 04/2021. Patient is only able to eat rice and chicken broth recently.   -She has anorexia which she associates with pain.  -Known hx gastric ulcers.  -GI consulted (12/17) and did not recommend EGD  -worsened pain despite oral meds (12/19) and CT abdomen pelvis ordered. CT abdomen/pelvis(12/19) - no acute pathology   Plan:  >Start miralax scheduled  >sucralfate 1g BID (need to consider if worth doing with PPI)  -- Increase PPI to omeprazole 40mg BID for 6-8 weeks per GI recs  -- Other medical therapy can be considered: Maalox prn, add famotidine 40mg QDAY.   -Patient declining feeding tube     #Acute decompensated HFpEF  #pulmonary HTN  Pulmonary vascular congestion on CXR, tachypneic on exam in ER on 12/16  -. Previous echocardiogram with HFpEF and c/f pulmonary HTN. Presentation with elevated BNP >9,000, higher than in previous hospitalizations.   -ECHO(12/17) - EF 60%, dilated IVC, PA pressure 39  Plan:  -IV prn and dialysis started      #new onset Afib  Noted on EKG. ECHO as above. TSH normal. Likely not causing her symptoms currently.  Has CHADS-VASc score of 3 which qualify her for anticoagulation.  We have not fully address this as she wants to wait on her renal biopsy results.    #Hypertension  No current concern for hypertensive emergency/end-organ damage.   Plan:  >Hold losartan  >Continue coreg, hydralazine    -PRN labetalol for SBP >170 and/or DBP>100    #COVID-19 vaccine series not completed  Patient has received 2 doses of vaccine so far. This does not represent full vaccination in an immunocompromised patient. She requires a third,  full dose. We can offer this after ruling out any active viral infection.    -ensure 3rd full dose offered to patient prior to leaving the hospital    #anemia of chronic renal disease: stable, monitor. Outpatient darbepoetin deana, IV iron was attempted but she complained of pain and not feeling well and thus was stopped after 2 attempts.    #Elevated fungitell  -Mild elevation in fungitell, given lack of localizing symptoms, fever or pulmonary nodules would not recommend further infectious work up per discussion with ID on 12/20  -If she were to develop fever, confusion, or new imaging findings would re-consider    Chronic, stable medical problems:  #leukopenia: stable, monitor   #Mineral bone disorder - elevated PTH, low ionized calcium.  Currently on calitriol and calcium  #NAGMA - secondary to renal disease - continue sodium bicarb    Malnutrition:    - Level of malnutrition: Moderate   - Based on: mild (or greater) muscle loss, mild (or greater) fluid retention       Diet: Fluid restriction 1000 ML FLUID  Dialysis Diet  Snacks/Supplements Adult: Ensure Enlive; With Meals    DVT Prophylaxis: Heparin subcutaneous- Stopped per patient request and she will instead ambulate  Staples Catheter: Not present  Central Lines: None  Code Status: Full Code      Disposition Plan   Expected Discharge: 12/27/2021     Anticipated discharge location: home with family    Delays:          The patient's care was discussed with the Patient and Patient's Family.    Shaan Hernandez MD  Hospitalist Service, [address]  Securely message with the Vocera Web Console (learn more here)  Text page via [employer] Paging/Directory    Please see sign in/sign out for up to date coverage information    Clinically Significant Risk Factors Present on Admission             # Non-Severe Malnutrition, POA: based on Weight loss;Subcutaneous fat loss;Muscle loss;Fluid retention (12/17/21 1200)      ______________________________________________________________________    Interval History   Had some stomach discomfort and then had EKG which some small t wave inversions  Trops were negative  Feels much better today  Eating home cooked food well  Wants to know her renal biopsy results before she cgoes home.  She notes ambulating   She continues to decline any feeding tube    ROS  No fevers  No headaches  No confusion  No diarrhea  No rash  No bleeding    Data reviewed today: I reviewed all medications, new labs and imaging results over the last 24 hours. I personally reviewed no images or EKG's today.    Physical Exam   Vital Signs: Temp: 98.3  F (36.8  C) Temp src: Oral BP: 106/63 Pulse: 70   Resp: 16 SpO2: 95 % O2 Device: None (Room air)    Weight: 147 lbs 11.33 oz  Constitutional: alert, oriented, sitting in bed, in no acute distress  Eyes: no icterus  ENT: JVD+, supple  Respiratory: crackles in bilateral lower lung fields, no wheezing, no respiratory distress  Cardiovascular: normal s1, s2, normal rate and rhythm, systolic murmur 1/6  GI: soft, non-tender, non-distended, bowel sounds present  Skin: no rash  Ext: left upper extremity av fistula with palpable thrill and audible bruit but with bruising  Musculoskeletal: trace pitting edema     Data   Recent Labs   Lab 12/27/21  0621 12/26/21  0723 12/25/21  0633 12/24/21  1818 12/24/21  0558 12/22/21  0612 12/21/21  1712   WBC 3.4*  --  4.3  --  3.9*   < >  --    HGB 8.0*  --  8.4*  --  8.3*   < >  --    MCV 90  --  90  --  88   < >  --      --  139*  --  136*   < >  --    INR  --   --   --   --   --   --  1.11   * 127* 129*   < > 128*   < > 129*   POTASSIUM 4.1 4.3 4.0  --  4.0   < >  --    CHLORIDE 94 95 98  --  94   < >  --    CO2 23 23 24  --  25   < >  --    BUN 31* 25 18  --  23   < >  --    CR 3.92* 3.54* 2.93*  --  3.09*   < >  --    ANIONGAP 11 9 7  --  9   < >  --    SHAVON 8.3* 8.0* 8.1*  --  8.5   < >  --    GLC 90 93 104*  --  99   < >   --     < > = values in this interval not displayed.

## 2021-12-27 NOTE — PLAN OF CARE
Physical Therapy Discharge Summary    Reason for therapy discharge:    All goals and outcomes met, no further needs identified. Anticipate pt to discharge home with A from daughter prn today vs tomorrow.     Progress towards therapy goal(s). See goals on Care Plan in [facility] electronic health record for goal details.  Goals met    Therapy recommendation(s):    Continue home exercise program. Patient encouraged to continue with LE HEP, handout provided, and walking program.

## 2021-12-27 NOTE — PROGRESS NOTES
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  - HD Info  Access: LUE AV Fistula, Days: MWF, Length: 3.0 hrs, EDW: pending, Heparin: No, EDMUND: no, IV Iron: no, Vit D analog: no     - Baseline Creatinine: ~ 2-2.3, but labs have been infrequent   - Proteinuria: Nephrotic range (>3 grams), now 6g/g    - Date DSA Last Checked: Jun/2019      Latest DSA:    - BK Viremia: No   - Kidney Tx Biopsy: Dec 22, 2021; Result: pending   -iHD  12/20,12/21,12/22 -2 L UF   -Follow up biopsy result to see likelihood of renal recovery or if this is all progression of chronic disease        - cystatin C GFR 12/24 13 ml/min    # DIALYSIS ACCESS:  LUE AV fistula w/ good thrill and bruit.. large aneurysm and pain during cannulation. Duplex AVF & vascular surgery consult recommended no intervention and ok to use though high flows concerning and may be contributing to high output heart failure    # Immunosuppression: Cyclosporine (goal ) and Mycophenolate mofetil (dose 750 mg every 12 hours)   - Changes: Not at this time. Check CsA q48h.  CsA trough 103 ( 12/22) --> 112 ( 12/24) ->104 12/25  CsA 25/25 & /750    # Infection Prophylaxis:   - PJP: Sulfa/TMP (Bactrim) MWF    # Hypertension: Controlled;  Goal BP: < 130/80   - Volume status: Moderately hypervolemic     - Changes:Yes, HD w/ 2L UF again today  Continue the following with holding parameters ( hold if SBP< 100)   Coreg 3.125 mg 2 times a day  Hydralazine 25 mg 3 times a day  Hold losartan  Continue labetalol as needed  Admission weight 66 kg. Has had 3 HD sessions so far . Total 6 LUF achieved .weight 63.5  kg ( 12/22) most recent back up to 66 kg. 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 Got darbepoetin 40mcg on 12/22/21     - Iron studies: Low iron saturation. Received half of a dose on 12/20 but developed back pain, chest pain, SOB, dizziness,blurry vision and hence  it was stopped ( can be considered as allergy )    - plan to give darbepoetin 40mcg weekly and have her follow up with anemia clinic as outpatient upon discharge     # Mineral Bone Disorder:   - Secondary renal hyperparathyroidism; PTH level: Mildly elevated (151-300 pg/ml)        On treatment: Calcitriol  - Vitamin D; level: Normal        On supplement: No  - Calcium; level: Normal        On supplement: Yes  - Phosphorus; level: Normal        On supplement: No    -Continue calcitriol 0.5mcg daily and tums 500mg bid.     # Electrolytes:   - Potassium; level: Normal        On supplement: No  - Magnesium; level: Normal        On supplement: No.    - Bicarbonate; level: Normal        On supplement: Yes, stop as she is on dialysis    #Hyponatremia   -hypervolemic hyponatremia in the setting of advanced CKD & CHF    -Fluid restriction 1L daily     # HFpEF:    -Decompensated clinically. Echo stable on 12/17/21 with elevated PASP and dilated IVC, grade II diastolic dysfunction   - continue with volume optimization strategy with HD      # Pulmonary HTN: echo 12/17/21 w/ PASP 39mmHg + RA pressure. Likely worsened by volume overload   - continue with volume optimization  strategy with HD     # Dyspepsia:   - Advised PPI 40 mg twice daily for 6 to 8 weeks, Carafate twice daily, and Pepcid 40 mg daily.  Follow-up H. Pylori stool antigen.   -GI consulted to consider EGD but deferred    # Unintentional weight loss:   -Likely 2/2 her not eating.  EBV PCR 7K (12/16/2021)   -CMV negative (12/16/2021)     # Transplant History:  Etiology of Kidney Failure: IgA nephropathy  Tx: DDKT  Transplant: 11/28/2007 (Kidney), 8/8/1998 (Kidney)  Significant changes in immunosuppression: None  Significant transplant-related complications: DSA    Cale Sims MD     Attestation:  This patient has been seen and evaluated by me, Akil Bah MD.  I have reviewed the note and agree with plan of care as documented by the fellow.     Interval History  No concerns or complaints today, but is curious about the results of her biopsy, and would also like to discharge home whenever possible.    Review of Systems    The 4 point Review of Systems is negative other than noted in the HPI or here.    Allergies   Allergies   Allergen Reactions     Contrast Dye Shortness Of Breath     Other reaction(s): Angioedema     Venofer [Iron Sucrose] Anaphylaxis     Pt had rxn to IV Iron Sucrose infusion in hospital on 12/21/21 -- back pain, chest pain, SOB, blurred vision, dizziness, & lightheadedness.     Amlodipine Other (See Comments)     Prior to Admission Medications     bumetanide  2 mg Oral BID     calcitRIOL  0.5 mcg Oral Daily     calcium carbonate  500 mg Oral BID     carvedilol  3.125 mg Oral BID w/meals     cycloSPORINE modified  25 mg Oral QPM     cycloSPORINE modified  25 mg Oral QAM     famotidine  20 mg Oral At Bedtime     hydrALAZINE  25 mg Oral TID     [Held by provider] losartan  50 mg Oral Daily     mycophenolate  750 mg Oral BID     pantoprazole  40 mg Oral BID     polyethylene glycol  17 g Oral Daily     sodium chloride (PF)  3 mL Intracatheter Q8H     sucralfate  1 g Oral BID AC      sulfamethoxazole-trimethoprim  1 tablet Oral Q  AM     Vitamin D3  50 mcg Oral Daily         Physical Exam   Temp  Av.4  F (36.9  C)  Min: 97.8  F (36.6  C)  Max: 99.1  F (37.3  C)      Pulse  Av.2  Min: 69  Max: 105 Resp  Av.9  Min: 16  Max: 18  SpO2  Av.7 %  Min: 91 %  Max: 99 %     /74 (BP Location: Right arm)   Pulse 79   Temp 97.7  F (36.5  C) (Oral)   Resp 16   Ht 1.524 m (5')   Wt 67 kg (147 lb 11.3 oz)   SpO2 94%   BMI 28.85 kg/m      Admit Weight: 66 kg (145 lb 6.4 oz)     GENERAL APPEARANCE: alert and no distress  HENT: mouth without ulcers or lesions  LYMPHATICS: no cervical or supraclavicular nodes  RESP: rales bilateral bases  CV: normal rhythm, systolic murmur heard throughout the precordium radiating to carotids  EDEMA: 1+ LE edema bilaterally  ABDOMEN: soft, nondistended, nontender, bowel sounds normal  MS: extremities normal - no gross deformities noted, no evidence of inflammation in joints, no muscle tenderness  SKIN: no rash  NEURO: normal strength and tone, sensory exam grossly normal, mentation intact and speech normal  PSYCH: mentation appears normal and affect normal/bright  TX KIDNEY: normal  DIALYSIS ACCESS:  LUE AV fistula w/ good thrill and bruit aneurysmal dilation noted    Data   CMP  Recent Labs   Lab 21  0621 21  2218 21  0723 21  0633 21  1818 21  0558 21  1828 21  0612   *  --  127* 129* 131* 128*   < > 122*   POTASSIUM 4.1  --  4.3 4.0  --  4.0   < > 3.9   CHLORIDE 94  --  95 98  --  94   < > 90*   CO2 23  --  23 24  --  25   < > 24   ANIONGAP 11  --  9 7  --  9   < > 8   GLC 90  --  93 104*  --  99   < > 95   BUN 31*  --  25 18  --  23   < > 14   CR 3.92*  --  3.54* 2.93*  --  3.09*   < > 2.16*   GFRESTIMATED 13*  --  15* 18*  --  17*   < > 26*   SHAVON 8.3*  --  8.0* 8.1*  --  8.5   < > 8.0*   MAG 1.7 1.6  --   --   --   --   --   --    PHOS  --   --   --   --   --   --   --  3.1    < >  = values in this interval not displayed.     CBC  Recent Labs   Lab 12/27/21  0621 12/25/21  0633 12/24/21  0558 12/23/21  0659   HGB 8.0* 8.4* 8.3* 8.6*   WBC 3.4* 4.3 3.9* 4.1   RBC 2.89* 2.97* 2.98* 3.09*   HCT 25.9* 26.7* 26.1* 27.6*   MCV 90 90 88 89   MCH 27.7 28.3 27.9 27.8   MCHC 30.9* 31.5 31.8 31.2*   RDW 14.2 13.8 13.6 13.6    139* 136* 108*     INR  Recent Labs   Lab 12/21/21  1712   INR 1.11     ABGNo lab results found in last 7 days.   Urine Studies  Recent Labs   Lab Test 12/22/21  0044 12/20/21  0553 12/16/21  1810 09/25/21  1637   COLOR Light Yellow Yellow Light Yellow Straw   APPEARANCE Clear Clear Clear Clear   URINEGLC Negative Negative 30 * Negative   URINEBILI Negative Negative Negative Negative   URINEKETONE Negative Negative Negative Negative   SG 1.006 1.013 1.005 1.003   UBLD Negative Negative Small* Trace*   URINEPH 5.5 5.5 6.0 6.0   PROTEIN 100 * 100 * 200 * 100 *   NITRITE Negative Negative Negative Negative   LEUKEST Negative Small* Trace* Negative   RBCU 1 1 1 0   WBCU 1 13* 5 <1     Recent Labs   Lab Test 12/16/21  1810 09/03/21  0630 01/15/21  0732 03/18/20  1330 06/01/19  0700 02/16/19  0737 02/02/18  0742 11/17/17  0739 04/28/17  0843 10/28/16  0913 04/29/16  1145 06/12/15  1011 01/23/15  0700   UTPG 6.85* 4.32* 3.69* 1.35* 3.89* 3.92* 2.31* 1.74* 1.86* 1.84* 0.49* 0.73* 0.77*     PTH  Recent Labs   Lab Test 12/17/21  1616 03/17/20  0509 04/29/16  1043 02/05/16  1039 10/27/15  0555 03/26/15  1734 12/16/13  0636   PTHI 173* 291* 124* 140* 218* 251* 166*     Iron Studies  Recent Labs   Lab Test 12/17/21  1443 12/17/21  0618 09/03/21  0625 04/23/21  0716 03/12/21  0757 01/15/21  0725 10/29/20  0900 09/17/20  0758 08/10/20  0840 04/24/20  0800 03/17/20  0509   IRON 58  --  84 45 83 96 88 88 89 61 54     --  252 252 245 225* 251 248 242 286 240   IRONSAT 17  --  33 18 34 43 35 36 37 21 23   ANASTASIIA  --  317* 332* 361* 401* 396* 352* 374* 364* 299*  --        IMAGING:  All  imaging studies reviewed by me.

## 2021-12-27 NOTE — PLAN OF CARE
[id number]. VSS on RA. Mag was 1.6 after episode of chest pain on day shift. Denies pain or nausea this shift, but endorses some abdominal fullness. Denies chest pain/discomfort. Patient placed on tele this shift. PIV SL. Oliguric; LBM 12/25. Up independently in room. Patient resting comfortably. Continue with plan of care and notify team of any changes.

## 2021-12-28 ENCOUNTER — APPOINTMENT (OUTPATIENT)
Dept: CARDIOLOGY | Facility: CLINIC | Age: 56
DRG: 698 | End: 2021-12-28
Attending: STUDENT IN AN ORGANIZED HEALTH CARE EDUCATION/TRAINING PROGRAM
Payer: MEDICARE

## 2021-12-28 VITALS
OXYGEN SATURATION: 97 % | SYSTOLIC BLOOD PRESSURE: 116 MMHG | RESPIRATION RATE: 18 BRPM | HEART RATE: 85 BPM | WEIGHT: 151.9 LBS | DIASTOLIC BLOOD PRESSURE: 68 MMHG | HEIGHT: 60 IN | TEMPERATURE: 97.8 F | BODY MASS INDEX: 29.82 KG/M2

## 2021-12-28 LAB
ANION GAP SERPL CALCULATED.3IONS-SCNC: 7 MMOL/L (ref 3–14)
BUN SERPL-MCNC: 18 MG/DL (ref 7–30)
CALCIUM SERPL-MCNC: 8.4 MG/DL (ref 8.5–10.1)
CHLORIDE BLD-SCNC: 97 MMOL/L (ref 94–109)
CO2 SERPL-SCNC: 27 MMOL/L (ref 20–32)
CREAT SERPL-MCNC: 2.9 MG/DL (ref 0.52–1.04)
CYCLOSPORINE BLD LC/MS/MS-MCNC: 97 UG/L (ref 50–400)
ERYTHROCYTE [DISTWIDTH] IN BLOOD BY AUTOMATED COUNT: 14.3 % (ref 10–15)
GFR SERPL CREATININE-BSD FRML MDRD: 18 ML/MIN/1.73M2
GLUCOSE BLD-MCNC: 95 MG/DL (ref 70–99)
HCT VFR BLD AUTO: 28 % (ref 35–47)
HGB BLD-MCNC: 8.7 G/DL (ref 11.7–15.7)
MCH RBC QN AUTO: 27.7 PG (ref 26.5–33)
MCHC RBC AUTO-ENTMCNC: 31.1 G/DL (ref 31.5–36.5)
MCV RBC AUTO: 89 FL (ref 78–100)
PLATELET # BLD AUTO: 153 10E3/UL (ref 150–450)
POTASSIUM BLD-SCNC: 4 MMOL/L (ref 3.4–5.3)
RBC # BLD AUTO: 3.14 10E6/UL (ref 3.8–5.2)
SODIUM SERPL-SCNC: 131 MMOL/L (ref 133–144)
TME LAST DOSE: NORMAL H
TME LAST DOSE: NORMAL H
WBC # BLD AUTO: 3.3 10E3/UL (ref 4–11)

## 2021-12-28 PROCEDURE — 85027 COMPLETE CBC AUTOMATED: CPT | Performed by: INTERNAL MEDICINE

## 2021-12-28 PROCEDURE — 36415 COLL VENOUS BLD VENIPUNCTURE: CPT | Performed by: INTERNAL MEDICINE

## 2021-12-28 PROCEDURE — 91301 HC RX IP 250 OP 636: CPT | Performed by: STUDENT IN AN ORGANIZED HEALTH CARE EDUCATION/TRAINING PROGRAM

## 2021-12-28 PROCEDURE — 99238 HOSP IP/OBS DSCHRG MGMT 30/<: CPT | Performed by: STUDENT IN AN ORGANIZED HEALTH CARE EDUCATION/TRAINING PROGRAM

## 2021-12-28 PROCEDURE — 250N000011 HC RX IP 250 OP 636: Performed by: STUDENT IN AN ORGANIZED HEALTH CARE EDUCATION/TRAINING PROGRAM

## 2021-12-28 PROCEDURE — 250N000013 HC RX MED GY IP 250 OP 250 PS 637: Performed by: INTERNAL MEDICINE

## 2021-12-28 PROCEDURE — 80048 BASIC METABOLIC PNL TOTAL CA: CPT | Performed by: INTERNAL MEDICINE

## 2021-12-28 PROCEDURE — 250N000013 HC RX MED GY IP 250 OP 250 PS 637: Performed by: STUDENT IN AN ORGANIZED HEALTH CARE EDUCATION/TRAINING PROGRAM

## 2021-12-28 PROCEDURE — 250N000013 HC RX MED GY IP 250 OP 250 PS 637: Performed by: NURSE PRACTITIONER

## 2021-12-28 PROCEDURE — 80158 DRUG ASSAY CYCLOSPORINE: CPT | Performed by: INTERNAL MEDICINE

## 2021-12-28 PROCEDURE — 0013A HC ADMIN COVID VAC MODERNA, 3RD DOSE IMM COMP PT: CPT | Performed by: STUDENT IN AN ORGANIZED HEALTH CARE EDUCATION/TRAINING PROGRAM

## 2021-12-28 PROCEDURE — 93246 EXT ECG>7D<15D RECORDING: CPT

## 2021-12-28 PROCEDURE — 250N000012 HC RX MED GY IP 250 OP 636 PS 637: Performed by: NURSE PRACTITIONER

## 2021-12-28 PROCEDURE — 99233 SBSQ HOSP IP/OBS HIGH 50: CPT | Mod: GC | Performed by: INTERNAL MEDICINE

## 2021-12-28 RX ORDER — DIPHENHYDRAMINE HYDROCHLORIDE 50 MG/ML
50 INJECTION INTRAMUSCULAR; INTRAVENOUS
Status: DISCONTINUED | OUTPATIENT
Start: 2021-12-28 | End: 2021-12-28 | Stop reason: HOSPADM

## 2021-12-28 RX ORDER — DIPHENHYDRAMINE HCL 25 MG
50 CAPSULE ORAL
Status: DISCONTINUED | OUTPATIENT
Start: 2021-12-28 | End: 2021-12-28 | Stop reason: HOSPADM

## 2021-12-28 RX ORDER — BUMETANIDE 2 MG/1
2 TABLET ORAL
Qty: 60 TABLET | Refills: 0 | Status: SHIPPED | OUTPATIENT
Start: 2021-12-28 | End: 2022-01-27

## 2021-12-28 RX ORDER — CYCLOSPORINE 25 MG/1
CAPSULE, LIQUID FILLED ORAL
Qty: 90 CAPSULE | Refills: 11 | Status: SHIPPED | OUTPATIENT
Start: 2021-12-28 | End: 2022-01-03

## 2021-12-28 RX ADMIN — HYDRALAZINE HYDROCHLORIDE 25 MG: 25 TABLET, FILM COATED ORAL at 08:30

## 2021-12-28 RX ADMIN — CX-024414 0.5 ML: 0.2 INJECTION, SUSPENSION INTRAMUSCULAR at 15:59

## 2021-12-28 RX ADMIN — Medication 50 MCG: at 08:29

## 2021-12-28 RX ADMIN — CARVEDILOL 3.12 MG: 3.12 TABLET, FILM COATED ORAL at 08:29

## 2021-12-28 RX ADMIN — MYCOPHENOLATE MOFETIL 750 MG: 250 CAPSULE ORAL at 08:29

## 2021-12-28 RX ADMIN — BUMETANIDE 2 MG: 2 TABLET ORAL at 14:54

## 2021-12-28 RX ADMIN — CALCIUM CARBONATE 500 MG: 500 TABLET, CHEWABLE ORAL at 08:29

## 2021-12-28 RX ADMIN — POLYETHYLENE GLYCOL 3350 17 G: 17 POWDER, FOR SOLUTION ORAL at 08:30

## 2021-12-28 RX ADMIN — PANTOPRAZOLE SODIUM 40 MG: 40 TABLET, DELAYED RELEASE ORAL at 08:29

## 2021-12-28 RX ADMIN — BUMETANIDE 2 MG: 2 TABLET ORAL at 08:31

## 2021-12-28 RX ADMIN — SUCRALFATE 1 G: 1 TABLET ORAL at 08:30

## 2021-12-28 RX ADMIN — CYCLOSPORINE 25 MG: 25 CAPSULE, LIQUID FILLED ORAL at 08:29

## 2021-12-28 RX ADMIN — CALCITRIOL CAPSULES 0.25 MCG 0.5 MCG: 0.25 CAPSULE ORAL at 08:29

## 2021-12-28 RX ADMIN — HYDRALAZINE HYDROCHLORIDE 25 MG: 25 TABLET, FILM COATED ORAL at 14:54

## 2021-12-28 ASSESSMENT — ACTIVITIES OF DAILY LIVING (ADL)
ADLS_ACUITY_SCORE: 12
ADLS_ACUITY_SCORE: 13
ADLS_ACUITY_SCORE: 12
ADLS_ACUITY_SCORE: 13
ADLS_ACUITY_SCORE: 12
ADLS_ACUITY_SCORE: 13
ADLS_ACUITY_SCORE: 13
ADLS_ACUITY_SCORE: 12
ADLS_ACUITY_SCORE: 12
ADLS_ACUITY_SCORE: 13
ADLS_ACUITY_SCORE: 12

## 2021-12-28 ASSESSMENT — MIFFLIN-ST. JEOR: SCORE: 1205.5

## 2021-12-28 NOTE — PROGRESS NOTES
[facility] [place]  Transplant Nephrology Progress note  Date of Admission:  12/16/2021  Today's Date: 12/28/2021  Requesting physician: Jarrett Mullen MD    Recommendations:   - Biopsy shows chronic changes, little potential for recovery  - Okay for discharge from renal standpoint, accepted at [facility] with Dr. 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  - HD Info  Access: LUE AV Fistula, Days: MWF, Length: 3.0 hrs, EDW: pending, Heparin: No, EDMUND: no, IV Iron: no, Vit D analog: no     - Baseline Creatinine: ~ 2-2.3, but labs have been infrequent   - Proteinuria: Nephrotic range (>3 grams), now 6g/g    - Date DSA Last Checked: Jun/2019      Latest DSA:    - BK Viremia: No   - Kidney Tx Biopsy: Dec 22, 2021; Result:  chronic changes, no acute process   -iHD  12/20,12/21,12/22 -2 L UF   -Follow up biopsy result to see likelihood of renal recovery or if this is all progression of chronic disease        - cystatin C GFR 12/24 13 ml/min    # DIALYSIS ACCESS:  LUE AV fistula w/ good thrill and bruit.. large aneurysm and pain during cannulation. Duplex AVF & vascular surgery consult recommended no intervention and ok to use though high flows concerning and may be contributing to high output heart failure    # Immunosuppression: Cyclosporine (goal ) and Mycophenolate mofetil (dose 750 mg every 12 hours)   - Changes: Not at this time. Check CsA q48h.  CsA trough 103 ( 12/22) --> 112 ( 12/24) ->104 12/25  CsA 25/25 & /750    # Infection Prophylaxis:   - PJP: Sulfa/TMP (Bactrim) MWF    # Hypertension: Controlled;  Goal BP: < 130/80   - Volume status: Moderately hypervolemic     - Changes:Yes, HD w/ 2L UF again today  Continue the following with holding parameters ( hold if SBP< 100)   Coreg 3.125 mg 2 times a day  Hydralazine 25 mg 3 times a day  Hold losartan  Continue labetalol as needed  Admission weight 66 kg. Has had 3 HD sessions so far .  Total 6 LUF achieved .weight 63.5 kg ( 12/22) most recent back up to 66 kg. 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 Got darbepoetin 40mcg on 12/22/21     - Iron studies: Low iron saturation. Received half of a dose on 12/20 but developed back pain, chest pain, SOB, dizziness,blurry vision and hence  it was stopped ( can be considered as allergy )    - plan to give darbepoetin 40mcg weekly and have her follow up with anemia clinic as outpatient upon discharge     # Mineral Bone Disorder:   - Secondary renal hyperparathyroidism; PTH level: Mildly elevated (151-300 pg/ml)        On treatment: Calcitriol  - Vitamin D; level: Normal        On supplement: No  - Calcium; level: Normal        On supplement: Yes  - Phosphorus; level: Normal        On supplement: No    -Continue calcitriol 0.5mcg daily and tums 500mg bid.     # Electrolytes:   - Potassium; level: Normal        On supplement: No  - Magnesium; level: Normal        On supplement: No.    - Bicarbonate; level: Normal        On supplement: Yes, stop as she is on dialysis    #Hyponatremia   -hypervolemic hyponatremia in the setting of advanced CKD & CHF    -Fluid restriction 1L daily     # HFpEF:    -Decompensated clinically. Echo stable on 12/17/21 with elevated PASP and dilated IVC, grade II diastolic dysfunction   - continue with volume optimization strategy with HD      # Pulmonary HTN: echo 12/17/21 w/ PASP 39mmHg + RA pressure. Likely worsened by volume overload   -  continue with volume optimization strategy with HD     # Dyspepsia:   - Advised PPI 40 mg twice daily for 6 to 8 weeks, Carafate twice daily, and Pepcid 40 mg daily.  Follow-up H. Pylori stool antigen.   -GI consulted to consider EGD but deferred    # Unintentional weight loss:   -Likely 2/2 her not eating.  EBV PCR 7K (12/16/2021)   -CMV negative (12/16/2021)     # Transplant History:  Etiology of Kidney Failure: IgA nephropathy  Tx: DDKT  Transplant: 11/28/2007 (Kidney), 8/8/1998 (Kidney)  Significant changes in immunosuppression: None  Significant transplant-related complications: DSA    Cale Sims MD     Attestation:  This patient has been seen and evaluated by me, Akil Bah MD.  I have reviewed the note and agree with plan of care as documented by the fellow.       Interval History  No issues with dialysis yesterday, did require one time dose of oxy for pain with needle.    Seen with her  on rounds. They are disappointed to hear that her kidney function is unlikely to recover and that we have no further treatments to offer other than dialysis. They are interested in discharge to home.    Review of Systems    The 4 point Review of Systems is negative other than noted in the HPI or here.    Allergies   Allergies   Allergen Reactions     Contrast Dye Shortness Of Breath     Other reaction(s): Angioedema     Venofer [Iron Sucrose] Anaphylaxis     Pt had rxn to IV Iron Sucrose infusion in hospital on 12/21/21 -- back pain, chest pain, SOB, blurred vision, dizziness, & lightheadedness.     Amlodipine Other (See Comments)     Prior to Admission Medications     bumetanide  2 mg Oral BID     calcitRIOL  0.5 mcg Oral Daily     calcium carbonate  500 mg Oral BID     carvedilol  3.125 mg Oral BID w/meals     cycloSPORINE modified  25 mg Oral QPM     cycloSPORINE modified  25 mg Oral QAM     famotidine  20 mg Oral At Bedtime     hydrALAZINE  25 mg Oral TID     [Held by provider] losartan  50  mg Oral Daily     mycophenolate  750 mg Oral BID     pantoprazole  40 mg Oral BID     polyethylene glycol  17 g Oral Daily     sodium chloride (PF)  3 mL Intracatheter Q8H     sucralfate  1 g Oral BID AC     sulfamethoxazole-trimethoprim  1 tablet Oral Q  AM     Vitamin D3  50 mcg Oral Daily         Physical Exam   Temp  Av.4  F (36.9  C)  Min: 97.8  F (36.6  C)  Max: 99.1  F (37.3  C)      Pulse  Av.2  Min: 69  Max: 105 Resp  Av.9  Min: 16  Max: 18  SpO2  Av.7 %  Min: 91 %  Max: 99 %     /73   Pulse 82   Temp 98.4  F (36.9  C) (Oral)   Resp 18   Ht 1.524 m (5')   Wt 67 kg (147 lb 11.3 oz)   SpO2 95%   BMI 28.85 kg/m      Admit Weight: 66 kg (145 lb 6.4 oz)     GENERAL APPEARANCE: alert and no distress  HENT: mouth without ulcers or lesions  LYMPHATICS: no cervical or supraclavicular nodes  RESP: rales bilateral bases  CV: normal rhythm, systolic murmur heard throughout the precordium radiating to carotids  EDEMA: none  ABDOMEN: soft, nondistended, nontender, bowel sounds normal  MS: extremities normal - no gross deformities noted, no evidence of inflammation in joints, no muscle tenderness  SKIN: no rash  NEURO: normal strength and tone, sensory exam grossly normal, mentation intact and speech normal  PSYCH: mentation appears normal and affect normal/bright  TX KIDNEY: normal  DIALYSIS ACCESS:  LUE AV fistula w/ good thrill and bruit aneurysmal dilation noted    Data   CMP  Recent Labs   Lab 21  0627 21  0621 21  2218 21  0723 21  0633 21  1828 21  0612   * 128*  --  127* 129*   < > 122*   POTASSIUM 4.0 4.1  --  4.3 4.0   < > 3.9   CHLORIDE 97 94  --  95 98   < > 90*   CO2 27 23  --  23 24   < > 24   ANIONGAP 7 11  --  9 7   < > 8   GLC 95 90  --  93 104*   < > 95   BUN 18 31*  --  25 18   < > 14   CR 2.90* 3.92*  --  3.54* 2.93*   < > 2.16*   GFRESTIMATED 18* 13*  --  15* 18*   < > 26*   SHAVON 8.4* 8.3*  --  8.0* 8.1*   < >  8.0*   MAG  --  1.7 1.6  --   --   --   --    PHOS  --   --   --   --   --   --  3.1    < > = values in this interval not displayed.     CBC  Recent Labs   Lab 12/28/21  0627 12/27/21  0621 12/25/21  0633 12/24/21  0558   HGB 8.7* 8.0* 8.4* 8.3*   WBC 3.3* 3.4* 4.3 3.9*   RBC 3.14* 2.89* 2.97* 2.98*   HCT 28.0* 25.9* 26.7* 26.1*   MCV 89 90 90 88   MCH 27.7 27.7 28.3 27.9   MCHC 31.1* 30.9* 31.5 31.8   RDW 14.3 14.2 13.8 13.6    160 139* 136*     INR  Recent Labs   Lab 12/21/21  1712   INR 1.11     ABGNo lab results found in last 7 days.   Urine Studies  Recent Labs   Lab Test 12/22/21  0044 12/20/21  0553 12/16/21  1810 09/25/21  1637   COLOR Light Yellow Yellow Light Yellow Straw   APPEARANCE Clear Clear Clear Clear   URINEGLC Negative Negative 30 * Negative   URINEBILI Negative Negative Negative Negative   URINEKETONE Negative Negative Negative Negative   SG 1.006 1.013 1.005 1.003   UBLD Negative Negative Small* Trace*   URINEPH 5.5 5.5 6.0 6.0   PROTEIN 100 * 100 * 200 * 100 *   NITRITE Negative Negative Negative Negative   LEUKEST Negative Small* Trace* Negative   RBCU 1 1 1 0   WBCU 1 13* 5 <1     Recent Labs   Lab Test 12/16/21  1810 09/03/21  0630 01/15/21  0732 03/18/20  1330 06/01/19  0700 02/16/19  0737 02/02/18  0742 11/17/17  0739 04/28/17  0843 10/28/16  0913 04/29/16  1145 06/12/15  1011 01/23/15  0700   UTPG 6.85* 4.32* 3.69* 1.35* 3.89* 3.92* 2.31* 1.74* 1.86* 1.84* 0.49* 0.73* 0.77*     PTH  Recent Labs   Lab Test 12/17/21  1616 03/17/20  0509 04/29/16  1043 02/05/16  1039 10/27/15  0555 03/26/15  1734 12/16/13  0636   PTHI 173* 291* 124* 140* 218* 251* 166*     Iron Studies  Recent Labs   Lab Test 12/17/21  1443 12/17/21  0618 09/03/21  0625 04/23/21  0716 03/12/21  0757 01/15/21  0725 10/29/20  0900 09/17/20  0758 08/10/20  0840 04/24/20  0800 03/17/20  0509   IRON 58  --  84 45 83 96 88 88 89 61 54     --  252 252 245 225* 251 248 242 286 240   IRONSAT 17  --  33 18 34 43 35 36 37  21 23   ANASTASIIA  --  317* 332* 361* 401* 396* 352* 374* 364* 299*  --        IMAGING:  All imaging studies reviewed by me.

## 2021-12-28 NOTE — PLAN OF CARE
Vitals: /74 (BP Location: Right arm)   Pulse 85   Temp 98.5  F (36.9  C) (Oral)   Resp 18   Ht 1.524 m (5')   Wt 67 kg (147 lb 11.3 oz)   SpO2 97%   BMI 28.85 kg/m      Endocrine: n/a  Labs: Stable labs.  Pain: Patient denies pain.  PRN's: n/a  Diet: Dialysis diet, 1L FR.  LDA: PIV saline locked, L arm fistula.  GI: Regular BM's.  : Patient is on HD, oliguric.  Skin: Skin is intact.  Neuro: Pleasant oriented.  Mobility: Up ad jennifer.  Education: n/a  Plan: Discharge home, orders reviewed with patient and her . Belongings from security given to the patient. Portable EKG set up. Covid booster administered.

## 2021-12-28 NOTE — PLAN OF CARE
/78   Pulse 77   Temp 97.9  F (36.6  C) (Oral)   Resp 18   Ht 1.524 m (5')   Wt 67 kg (147 lb 11.3 oz)   SpO2 99%   BMI 28.85 kg/m    4124-4451  HD from 9451-6195.  Patient vitally stable  on RA, afebrile. Denied any pain. Tolerating renal diet with 1L FR, fair appetite. PIV SL. Oliguric with HD. No BM. Pt complaint of some leaky from fistula and biopsy (RN did not see any fluid, but placed bandaid over site per pt request), MD aware. Pt is UAL.   Will continue with POC and notify MD with changes or concerns.

## 2021-12-28 NOTE — PLAN OF CARE
2303-1226. VSS on RA, afebrile. Patient denies pain or nausea. Renal diet with 1L fluid restriction. Patient up independently in room. Resting and sleeping comfortably overnight. Continue with plan of care and update team with any changes.     Patient and patient's  did express some concerns regarding care plan and have some questions they would like to address with the team. See sticky note regarding this.

## 2021-12-28 NOTE — DISCHARGE SUMMARY
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  Abdominal Pain  Constipation    Hx Gastric Ulcers  Hypertension  Acute on chronic decompensated HFpEF  pulmonary HTN  New Onset Atrial Fibrillation - follow up with primary care for ziopatch  Anemia of chronic renal disease  Leukopenia  Mineral bone disorder  NAGMA  Malnutrition Mild    Follow-ups Needed After Discharge   Follow-up Appointments     Adult [facility] Follow-up and recommended labs and tests      Follow up with primary care provider, TAY BREWER, within 7 days to   evaluate medication change, to evaluate treatment change, and for hospital   follow- up.  No follow up labs or test are needed.      Appointments on [place] and/or [facility] (with [facility] or [facility]   provider or service). Call [phone] if you haven't heard regarding   these appointments within 7 days of discharge.             Unresulted Labs Ordered in the Past 30 Days of this Admission     Date and Time Order Name Status Description    12/22/2021  6:36 AM Surgical pathology exam - Transplant Renal In process       These results will be followed up by primary care and nephrology.    Discharge Disposition   Discharged to home  Condition at discharge: Stable    Hospital Course  Joshua Lala is a 55 year old female admitted on 12/16/2021. 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 She presented to [facility] ED on 12/16/21 with complaints of fatigue, edema, and oliguria. She  is admitted to the internal medicine service with transplant nephrology consulting for further evaluation and management.    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 Second transplant 2007, now with chronic antibody rejection, CKD IV. Last nephrology visit in chart 03/29/21. Missed an appointment in Sept 2021. B/L Cr 2 - 2.2 as of 2021. Now in the 2.5 - 2.6 range. Cr 2.93 on admit. UA on admit with proteinuria, small blood, and trace LE. EBV (7300) elevated. SPEP and UPEP with suggestion of hypoalbuminema and albuminuria possibly indicate glomerular damage  - DDx (cardiorenal vs medication vs nephrotic syndrome)  - Evaluated AVF by vascular surgery and ultrasound on 12/25 and Ok to use  - Transplant nephrology consultation  - PLA2 R AB pending  - Transplant Biopsied 12/22, Pathology nonspecific chronic changes, discussed nephrology and nephrology will manage  - Continue cyclosporine 25mg AM and 25mg PM; MMF 750mg BID (per transplant nephro)  - Bactrim 400-80mg on MWF for prophylaxis  - Start bumex 2mg BID, UF/HD, Fluid Restric, Dialysis PRN now, but will need as outpatient.  - Plan to continue to utilize HD as outpatient.    Abdominal Pain - epigastric, postprandial vomiting  Constipation  - ongoing issue since 4/2021, only eating rice and chicken broth recently, anorexia symptoms when pain flares.  Hx Gastric Ulcers - GI consulted 12/17, no EGD  - Worsened pain despite oral meds (12/19), CT abdomen negative  - Scheduled miralax  - Sucralfate 1g BID   - PPI, H2 blocker at bedtime  - H pylori stool antigen testing  - Nutrition consulted but patient declines tube feeds  - Check bladder scan prior to discharge    Hypertension - no HTN urgency / emergency  -Hold losartan at  "discharge  -Continue coreg, hydralazine  -PRN labetalol for SBP >170 and/or DBP>100     Acute on chronic decompensated HFpEF  pulmonary HTN  Pulmonary vascular congestion on CXR, tachypneic on exam in ER on 12/16, Previous echocardiogram with HFpEF and c/f pulmonary HTN. Presentation with elevated BNP >9,000, higher than in previous hospitalizations.   - ECHO(12/17) - EF 60%, dilated IVC, PA pressure 39  - Diurese with bumex as above, dialysis as well  - possible contribution of AVF to high output heart failure     New Onset Atrial Fibrillation  Noted on EKG. Will watch for now and consider anticoagulation in future.  Likely not causing her symptoms currently.  - ziopatch at discharge     COVID-19 vaccine series not completed  Patient has received 2 doses of vaccine so far, needs 3 doses to be \"full vaccinated\" given immunosuppression  - Offered 3rd dose to patient prior to discharge     Anemia of chronic renal disease: stable, monitor. Outpatient darbepoetin deana, IV iron was attempted but she complained of pain and not feeling well and thus was stopped after 2 attempts.    Chronic, stable medical problems:  Leukopenia: stable, monitor   Mineral bone disorder - elevated PTH, low ionized calcium.  Currently on calitriol and calcium  NAGMA - secondary to renal disease - continue sodium bicarb  Malnutrition:    - Level of malnutrition: Moderate   - Based on: mild (or greater) muscle loss, mild (or greater) fluid retention    Consultations This Hospital Stay   NUTRITION SERVICES ADULT IP CONSULT  PHYSICAL THERAPY ADULT IP CONSULT  NEPHROLOGY KIDNEY/PANCREAS TRANSPLANT ADULT IP CONSULT  GI LUMINAL ADULT IP CONSULT  CARE MANAGEMENT / SOCIAL WORK IP CONSULT  INTERVENTIONAL RADIOLOGY ADULT/PEDS IP CONSULT  VASCULAR SURGERY ADULT IP CONSULT    Code Status   Full Code    Time Spent on this Encounter   IFransico DO, personally saw the patient today and spent less than or equal to 30 minutes discharging this " patient.       DO YANIV Gaona [facility] [address] [address]  Phone: [phone]  ______________________________________________________________________    Physical Exam   Vital Signs: Temp: 98.5  F (36.9  C) Temp src: Oral BP: 129/74 Pulse: 85   Resp: 18 SpO2: 97 % O2 Device: None (Room air)    Weight: 151 lbs 14.35 oz  General: non-distressed adult  HEENT: Normocephalic, atraumatic, pupils equal round reactive, membranes moist  CV: normal capillary refill, heart regular rate and rhythm  Respiratory: Non-labored breathing, bronchovesicular lung sounds  Abdominal: soft, non-tender, no rebound, no guarding, no rigidity, +bowel sounds  Genitourinary: mild suprapubic tenderness  Musculoskeletal: normal bulk and tone  Skin: no rash, normal turgor, large LEFT AV fistula with bruit noted on exam also noted murmur on heart exam likely from this fistula  Neurologic: no facial droop, moving upper and lower extremities spontaneously, sensation in tact to light touch on extremities  Psychiatric: normal mood and affect       Primary Care Physician   TAY BREWER    Discharge Orders      Reason for your hospital stay    Fluid overload     Activity    Your activity upon discharge: activity as tolerated and no driving for today     Adult [facility] Follow-up and recommended labs and tests    Follow up with primary care provider, TAY BREWER, within 7 days to evaluate medication change, to evaluate treatment change, and for hospital follow- up.  No follow up labs or test are needed.      Appointments on [place] and/or [facility] (with [facility] or [facility] provider or service). Call [phone] if you haven't heard regarding these appointments within 7 days of discharge.     Full Code     Diet    Follow this diet upon discharge: Orders Placed This Encounter      Fluid restriction 1000 ML FLUID      Calorie Counts      Snacks/Supplements Adult: Ensure Enlive; With Meals       Dialysis Diet       Significant Results and Procedures   Most Recent 3 CBC's:Recent Labs   Lab Test 12/28/21  0627 12/27/21  0621 12/25/21  0633   WBC 3.3* 3.4* 4.3   HGB 8.7* 8.0* 8.4*   MCV 89 90 90    160 139*     Most Recent 3 BMP's:Recent Labs   Lab Test 12/28/21  0627 12/27/21  0621 12/26/21  0723   * 128* 127*   POTASSIUM 4.0 4.1 4.3   CHLORIDE 97 94 95   CO2 27 23 23   BUN 18 31* 25   CR 2.90* 3.92* 3.54*   ANIONGAP 7 11 9   SHAVON 8.4* 8.3* 8.0*   GLC 95 90 93     Most Recent 2 LFT's:Recent Labs   Lab Test 12/19/21  0655 12/18/21  0636   AST 31 29   ALT 11 11   ALKPHOS 144 142   BILITOTAL 0.7 0.7       Discharge Medications   Current Discharge Medication List      START taking these medications    Details   bumetanide (BUMEX) 2 MG tablet Take 1 tablet (2 mg) by mouth 2 times daily  Qty: 60 tablet, Refills: 0    Associated Diagnoses: CKD (chronic kidney disease) stage 4, GFR 15-29 ml/min (H); Elevated brain natriuretic peptide (BNP) level; IgA nephropathy; Oliguria         CONTINUE these medications which have CHANGED    Details   NEORAL (BRAND) 25 MG capsule Take 1 capsule (25 mg) by mouth every morning and evening  Qty: 90 capsule, Refills: 11    Associated Diagnoses: Kidney transplanted      omeprazole (PRILOSEC) 20 MG DR capsule Take 2 capsules (40 mg) by mouth 2 times daily  Qty: 120 capsule, Refills: 0    Comments: High dose for 8 weeks.  Associated Diagnoses: Abdominal pain, generalized         CONTINUE these medications which have NOT CHANGED    Details   calcitRIOL (ROCALTROL) 0.25 MCG capsule Take 2 capsules (0.5 mcg) by mouth daily  Qty: 60 capsule, Refills: 3    Associated Diagnoses: S/P kidney transplant      calcium carbonate (TUMS) 500 MG chewable tablet Take 1 chew tab by mouth 2 times daily      CELLCEPT (BRAND) 250 MG capsule Take 3 capsules (750 mg) by mouth 2 times daily  Qty: 180 capsule, Refills: 11    Associated Diagnoses: Kidney transplant recipient; Long-term use of  immunosuppressant medication      sulfamethoxazole-trimethoprim (BACTRIM) 400-80 MG tablet Take 1 tablet by mouth Every Mon, Wed, Fri Morning  Qty: 13 tablet, Refills: 11    Associated Diagnoses: Kidney transplant recipient; Long-term use of immunosuppressant medication      VITAMIN D3 25 MCG (1000 UT) tablet TAKE TWO TABLETS BY MOUTH ONCE DAILY  Qty: 180 tablet, Refills: 3    Associated Diagnoses: Kidney transplanted      carvedilol (COREG) 3.125 MG tablet Take 1 tablet (3.125 mg) by mouth 2 times daily (with meals)  Qty: 180 tablet, Refills: 3    Associated Diagnoses: Acute on chronic diastolic heart failure (H)      cinacalcet (SENSIPAR) 30 MG tablet Take 1 tablet (30 mg) by mouth daily HOLD  Qty: 30 tablet, Refills: 11    Associated Diagnoses: Kidney transplant recipient; Long-term use of immunosuppressant medication      darbepoetin deana-polysorbate (ARANESP) 25 MCG/0.42ML injection Inject 0.42 mLs (25 mcg) Subcutaneous every 14 days Dosed with anemia clinic.  Qty:      Associated Diagnoses: Anemia due to chronic kidney disease, unspecified CKD stage      hydrALAZINE (APRESOLINE) 25 MG tablet Take 1 tablet (25 mg) by mouth 3 times daily  Qty: 270 tablet, Refills: 3    Associated Diagnoses: HTN, kidney transplant related         STOP taking these medications       bacitracin-neomycin-polymyxin (NEOSPORIN) 400-5-5000 external ointment Comments:   Reason for Stopping:         losartan (COZAAR) 25 MG tablet Comments:   Reason for Stopping:         sodium bicarbonate 650 MG tablet Comments:   Reason for Stopping:             Allergies   Allergies   Allergen Reactions     Contrast Dye Shortness Of Breath     Other reaction(s): Angioedema     Venofer [Iron Sucrose] Anaphylaxis     Pt had rxn to IV Iron Sucrose infusion in hospital on 12/21/21 -- back pain, chest pain, SOB, blurred vision, dizziness, & lightheadedness.     Amlodipine Other (See Comments)

## 2021-12-28 NOTE — PROGRESS NOTES
Care Management Follow Up    Length of Stay (days): 12    Expected Discharge Date: 12/29/2021     Concerns to be Addressed: care coordination/care conferences,discharge planning     Patient plan of care discussed at interdisciplinary rounds: Yes    Anticipated Discharge Disposition: Home     Anticipated Discharge Services:    Anticipated Discharge DME: None    Patient/family educated on Medicare website which has current facility and service quality ratings: no  Education Provided on the Discharge Plan:    Patient/Family in Agreement with the Plan: yes    Referrals Placed by CM/SW: External Care Coordination    Additional Information:  Met with pt and her spouse utilizing Ipad .   Reviewed outpatient dialysis placement/schedule.   Patient and spouse note no concerns regarding schedule/placement.  Pt was able to talk with Nephrology team about her kidney function and on going need for outpatient dailysis.  Pt requesting confirmation of who the outpatient nephrologist will be.      Spoke with [facility] who confirmed that Dr. Nichole Aranda Nephrologist will be following patient.   Dialysis unit requests orders be faxed when available.  Also requesting access information.  Updated Dr. Bah, Nephrologist.  Most recent nephrology note from 12/27 faxed to outpatient dialysis unit with information on dialysis access.     Paged [facility] 9 provider inquiring about discharge plan.     1420: Spoke with Dr. Lawrence, Patient cleared to discharge home today.      Outpatient Dialysis:  [facility]  [address], [place], [place], [address]  Phone: [phone]  Fax: [phone]  Monday Wednesday Friday 2:45 p.m. chair time.   First outpatient dialysis run Wednesday, December 29th 01:30 PM    Brittaney Barnes RN BSN, PHN, ACM-RN  7A RN Care Coordinator  Phone: [phone]  Pager [phone]  To contact the weekend RNCC, Page: [phone]    12/28/2021 1:56 PM

## 2021-12-28 NOTE — DISCHARGE SUMMARY
Dialysis Discharge Summary Brief    [facility]  Division of Nephrology  Nephrology Discharge Dialysis Orders  Ph: [phone]  Fax: [phone]    Joshua Lala  MRN: [id number]  YOB: 1965    [facility]: [facility]  Primary Nephrologist: Dr. Varela    Date of Admission: 12/16/2021  Date of Discharge: 12/28/2021  Discharge Diagnosis:    Joshua Lala is a 55 year old female admitted on 12/16/2021. 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 She presented to [facility] ED on 12/16/21 with complaints of fatigue, edema, and oliguria. She was admitted to the internal medicine service with transplant nephrology consulting for further evaluation and management.    Upon evaluation of her kidney function unfortunately it was found that her graft function was no longer sufficient, and transplant biopsy on 12/22 revealed chronic pathology. She was restarted on HD on 12/20 and has been dialyzing three times weekly.    Access is LUE AVF. Does have aneurysmal dilatation, was evaluated by vascular surgery and this was cleared for use. Does have pain with needle placement.      ICD-10-CM    1. Abdominal pain, generalized  R10.84 omeprazole (PRILOSEC) 20 MG DR capsule   2. Decreased urine output  R34    3. Chronic heart failure with preserved ejection fraction (H)  I50.32    4. Kidney replaced by transplant  Z94.0    5. Kidney transplant rejection  T86.11 [facility]; RPCWT (Laboratory Miscellaneous Order)     [facility]; RPCWT (Laboratory Miscellaneous Order)     : [facility] Miscellaneous Test     [facility]: [facility] Miscellaneous Test   6. Gastroesophageal reflux disease with esophagitis  K21.00        [x] New initiation, new dialysis orders will be faxed.      New Orders (if not applicable put NA):  Estimated Dry Weight TBD,  last 65 kg   Dialysis Duration 3h   Dialysis Access LUE AVF   Antibiotics (dose per dialysis, end date) N/A           Labs to be drawn at dialysis Per outpt dialysis provider   Other major changes to dialysis prescription (e.g. Dialysate bath, heparin, blood flow rate, etc)   MWF dialysis, , , 4K bath   Medication changes (also fax the unit a copy of the discharge summary)         See separate medicine discharge summary, also will be faxed.     Name of physician completing this form: MD Akil Tamayo MD

## 2021-12-28 NOTE — PROGRESS NOTES
HEMODIALYSIS TREATMENT NOTE    Date: 12/27/2021  Time: 7:03 PM    Data:  Pre Wt: 67 kg (147 lb 11.3 oz)   Desired Wt: 65 kg   Post Wt: 65 kg (143 lb 4.8 oz)  Weight change: 2 kg  Ultrafiltration - Post Run Net Total Removed (mL): 2000 mL  Vascular Access Status: patent  Dialyzer Rinse: Light,Streaked  Total Blood Volume Processed: 63.45 L Liters  Total Dialysis (Treatment) Time: 3 Hours    Lab:        Interventions:  3 hours of HD with 2000 mls pulled with no complications. OXY 5 mg PO x 1 for needle pain .     Assessment:  ESRD patient from transplant in for her regular HD run VSS A+O      Plan:    Per transplant

## 2021-12-28 NOTE — PHARMACY
Moderna COVID-19 vaccine given. Confirmed with Dr. Lawrence that this is considered a 3rd dose in an immunocompromised patient, so the dose given was 0.5 mL.     Manasa Duvall, PharmD

## 2022-01-03 DIAGNOSIS — Z94.0 KIDNEY TRANSPLANT RECIPIENT: ICD-10-CM

## 2022-01-03 DIAGNOSIS — Z79.60 LONG-TERM USE OF IMMUNOSUPPRESSANT MEDICATION: ICD-10-CM

## 2022-01-03 DIAGNOSIS — Z94.0 KIDNEY TRANSPLANTED: Primary | ICD-10-CM

## 2022-01-03 RX ORDER — CINACALCET 30 MG/1
TABLET, FILM COATED ORAL
Qty: 30 TABLET | Refills: 11 | Status: SHIPPED | OUTPATIENT
Start: 2022-01-03

## 2022-01-03 RX ORDER — CYCLOSPORINE 25 MG/1
25 CAPSULE, LIQUID FILLED ORAL 2 TIMES DAILY
Qty: 60 CAPSULE | Refills: 11 | Status: SHIPPED | OUTPATIENT
Start: 2022-01-03

## 2022-01-14 ENCOUNTER — TELEPHONE (OUTPATIENT)
Dept: TRANSPLANT | Facility: CLINIC | Age: 57
End: 2022-01-14
Payer: MEDICARE

## 2022-01-17 ENCOUNTER — APPOINTMENT (OUTPATIENT)
Dept: INTERPRETER SERVICES | Facility: CLINIC | Age: 57
End: 2022-01-17
Payer: MEDICARE

## 2022-01-17 NOTE — TELEPHONE ENCOUNTER
Patient Call: Voicemail  Date/Time: 1/14/22 @ 11:02am  Reason for call: message was left regarding this patient that they needed to speak with kidney doctor. Caller did not leave their name or call back number.     
Returned a call to Joshua with a [facility] . No answer, no voice mail, unable to leave a message. Joshua is not on My chart.   
MD Ronel, [facility]   [address] [place] [address]  Phone: (   )    -  Fax: (   )    -

## 2022-01-21 DIAGNOSIS — I15.1 HYPERTENSION SECONDARY TO OTHER RENAL DISORDERS: Primary | ICD-10-CM

## 2022-01-21 DIAGNOSIS — I50.33 ACUTE ON CHRONIC DIASTOLIC HEART FAILURE (H): ICD-10-CM

## 2022-01-21 RX ORDER — CARVEDILOL 6.25 MG/1
6.25 TABLET ORAL 2 TIMES DAILY WITH MEALS
Qty: 60 TABLET | Refills: 11 | Status: SHIPPED | OUTPATIENT
Start: 2022-01-21 | End: 2022-01-21

## 2022-01-21 RX ORDER — CARVEDILOL 6.25 MG/1
6.25 TABLET ORAL 2 TIMES DAILY WITH MEALS
Qty: 60 TABLET | Refills: 11 | Status: SHIPPED | OUTPATIENT
Start: 2022-01-21 | End: 2022-10-18

## 2022-01-25 LAB
PATH REPORT.COMMENTS IMP SPEC: NORMAL
PATH REPORT.COMMENTS IMP SPEC: NORMAL
PATH REPORT.FINAL DX SPEC: NORMAL
PHOTO IMAGE: NORMAL

## 2022-01-26 ENCOUNTER — TELEPHONE (OUTPATIENT)
Dept: TRANSPLANT | Facility: CLINIC | Age: 57
End: 2022-01-26
Payer: MEDICARE

## 2022-01-26 NOTE — TELEPHONE ENCOUNTER
ISSUE:  Failed kidney transplant, restarted HD in December 2021, being managed by Dr. Varela who reached out to transplant office re IS management    PLAN:  Theodore Connor MD [place], Ashtyn RN; Ashtyn Varela MD  Ok to reduce MMF 500mg bid x2 months, 250mg bid x2 months, then stop. Continue CsA  until 6m from now and then decrease to 50-75 indefinitely (if she wants another txp) which should be checked q2 months. Ashtyn Chin can you please refer for repeat txp eval if she is interested?     RNCC verified with Dr. Vraela that she will order/manage above mentioned IS taper plan outlined by Dr. Connor.        Call to patient's daughter Nichelle to discuss option of referral for retransplant evaluation.    OUTCOME:   Pt's daughter discussed with patient and they are interested in referral for repeat transplant eval, message sent to pre-transplant team.

## 2022-02-01 DIAGNOSIS — Z94.0 S/P KIDNEY TRANSPLANT: ICD-10-CM

## 2022-02-01 RX ORDER — CALCITRIOL 0.25 UG/1
0.5 CAPSULE, LIQUID FILLED ORAL DAILY
Qty: 60 CAPSULE | Refills: 3 | Status: SHIPPED | OUTPATIENT
Start: 2022-02-01

## 2022-02-09 ENCOUNTER — DOCUMENTATION ONLY (OUTPATIENT)
Dept: TRANSPLANT | Facility: CLINIC | Age: 57
End: 2022-02-09

## 2022-02-09 NOTE — PROGRESS NOTES
Received notification of failed kidney transplant from chart review.     Fail is indicated by resumption of dialysis.  Date of organ failure was reported as 12/20/2021  Cause of failure is reported as chronic rejection.  Biopsy was 12/22/2021 at [facility]. Sent to [facility] for interpretation. [place] results:Limited sample showing severe arteriolar hyalinosis with tubular atrophy and interstitial fibrosis.  TIS verfication is complete.

## 2022-02-14 ENCOUNTER — TELEPHONE (OUTPATIENT)
Dept: NEPHROLOGY | Facility: CLINIC | Age: 57
End: 2022-02-14
Payer: MEDICARE

## 2022-02-14 NOTE — TELEPHONE ENCOUNTER
[facility] Call Center    Phone Message    May a detailed message be left on voicemail: yes     Reason for Call: Other: Melissa, RN from [facility] called stating that she needs orders for Joshua to have a port placed prior to surgery and to come off of a immunosuppressive medication before as well. Please call Melissa back to discuss,thank you!     Action Taken: Message routed to:  [facility]    Travel Screening: Not Applicable                                                                       No

## 2022-02-15 NOTE — TELEPHONE ENCOUNTER
"Yes, I will take care of the dialysis access orders. I do not understand the comment in the message of \"and to come off of a immunosuppressive medication before as well\". I will defer that to transplant. Ashtyn Varela MD   "

## 2022-02-23 ENCOUNTER — TELEPHONE (OUTPATIENT)
Dept: NEPHROLOGY | Facility: CLINIC | Age: 57
End: 2022-02-23
Payer: MEDICARE

## 2022-02-23 DIAGNOSIS — Z79.60 LONG-TERM USE OF IMMUNOSUPPRESSANT MEDICATION: ICD-10-CM

## 2022-02-23 DIAGNOSIS — Z94.0 KIDNEY TRANSPLANT RECIPIENT: ICD-10-CM

## 2022-02-23 RX ORDER — MYCOPHENOLATE MOFETIL 250 MG/1
500 CAPSULE ORAL 2 TIMES DAILY
Qty: 60 CAPSULE | Refills: 11 | Status: SHIPPED | OUTPATIENT
Start: 2022-02-23 | End: 2022-03-03

## 2022-02-23 NOTE — TELEPHONE ENCOUNTER
[facility] Call Center    Phone Message    May a detailed message be left on voicemail: yes     Reason for Call: Other: Melissa RN is calling again seeing if Dr. Varela is arranging a catheter placement for Joshua before surgery on 3/10 as the fistula will be unusable until it is healed. She is wondering if Dr. Varela is wanting them to have her stop taking Cellcept and if so how many days it should be stopped. Please call Melissa back to discuss, thank you.     Action Taken: Message routed to:  [facility] ([facility]    Travel Screening: Not Applicable

## 2022-02-23 NOTE — TELEPHONE ENCOUNTER
I spoke with them, Lili nurse at [facility] will help coordinate. She is getting access care at [facility]. Ashtyn Varela MD

## 2022-02-24 NOTE — TELEPHONE ENCOUNTER
Per Dr. Varela, pt will have assistance from Melissa PABON in coordinating access care as pt goes to outside facility for dialysis access.    CONCHA AMEZQUITA RN on 2/24/2022 at 10:01 AM  Dialysis Access Care Coordinator  Phone: [phone]

## 2022-03-03 ENCOUNTER — TELEPHONE (OUTPATIENT)
Dept: TRANSPLANT | Facility: CLINIC | Age: 57
End: 2022-03-03
Payer: MEDICARE

## 2022-03-03 DIAGNOSIS — Z94.0 KIDNEY TRANSPLANT RECIPIENT: ICD-10-CM

## 2022-03-03 DIAGNOSIS — Z79.60 LONG-TERM USE OF IMMUNOSUPPRESSANT MEDICATION: ICD-10-CM

## 2022-03-03 RX ORDER — MYCOPHENOLATE MOFETIL 250 MG/1
500 CAPSULE ORAL 2 TIMES DAILY
Qty: 120 CAPSULE | Refills: 11 | Status: SHIPPED | OUTPATIENT
Start: 2022-03-03 | End: 2022-03-25

## 2022-03-03 NOTE — TELEPHONE ENCOUNTER
Medication/Refill approved per CPA:    [facility]LL[facility]RE[facility] FOR  IMMUNOSUPPRESSENT PRESCRIPTION MODIFICATION.      Routing encounter to Transplant as an FYI.    Thanks,  Chandra Stone [facility]  Specialty Pharmacist [phone]

## 2022-03-08 DIAGNOSIS — I15.1 HTN, KIDNEY TRANSPLANT RELATED: Primary | ICD-10-CM

## 2022-03-08 DIAGNOSIS — Z94.0 HTN, KIDNEY TRANSPLANT RELATED: Primary | ICD-10-CM

## 2022-03-08 RX ORDER — METHYLPREDNISOLONE 32 MG/1
32 TABLET ORAL DAILY
Qty: 2 TABLET | Refills: 0 | Status: SHIPPED | OUTPATIENT
Start: 2022-03-08 | End: 2022-03-10

## 2022-03-17 ENCOUNTER — APPOINTMENT (OUTPATIENT)
Dept: INTERPRETER SERVICES | Facility: CLINIC | Age: 57
End: 2022-03-17
Payer: MEDICARE

## 2022-03-17 DIAGNOSIS — Z88.8 HISTORY OF INTRAVENOUS IRON ALLERGY: ICD-10-CM

## 2022-03-17 DIAGNOSIS — D50.9 IRON DEFICIENCY ANEMIA, UNSPECIFIED IRON DEFICIENCY ANEMIA TYPE: Primary | ICD-10-CM

## 2022-03-25 DIAGNOSIS — Z79.60 LONG-TERM USE OF IMMUNOSUPPRESSANT MEDICATION: ICD-10-CM

## 2022-03-25 DIAGNOSIS — Z94.0 KIDNEY TRANSPLANT RECIPIENT: ICD-10-CM

## 2022-03-25 RX ORDER — MYCOPHENOLATE MOFETIL 250 MG/1
250 CAPSULE ORAL 2 TIMES DAILY
Qty: 120 CAPSULE | Refills: 0 | Status: SHIPPED | OUTPATIENT
Start: 2022-04-22 | End: 2022-11-03

## 2022-03-25 RX ORDER — MYCOPHENOLATE MOFETIL 250 MG/1
250 CAPSULE ORAL 2 TIMES DAILY
Qty: 60 CAPSULE | Refills: 0 | Status: SHIPPED | OUTPATIENT
Start: 2022-03-25 | End: 2022-03-25

## 2022-03-28 PROBLEM — L03.90 CELLULITIS: Status: ACTIVE | Noted: 2021-09-25

## 2022-03-28 PROBLEM — T86.11 KIDNEY TRANSPLANT REJECTION: Status: ACTIVE | Noted: 2021-12-20

## 2022-10-10 ENCOUNTER — DIALYSIS VISIT (OUTPATIENT)
Dept: NEPHROLOGY | Facility: CLINIC | Age: 57
End: 2022-10-10

## 2022-10-10 NOTE — PROGRESS NOTES
Seen on dialysis  Documented in [facility] EMR  Joshua continues to have signs and symptoms of hypervolemia, however, has a lot of cramping with ultrafiltration, not reaching target weight. Does not agree to increase treatment time and does not agree to increase frequency of dialysis due to issues with transportation and feeling sick with nausea and other symptoms with more ultrafiltration.  Will continue to UF as able with 3 times weekly dialysis, 180 minute treatment time.  Ashtyn Varela MD

## 2022-10-17 DIAGNOSIS — I50.33 ACUTE ON CHRONIC DIASTOLIC HEART FAILURE (H): ICD-10-CM

## 2022-10-17 DIAGNOSIS — I15.1 HYPERTENSION SECONDARY TO OTHER RENAL DISORDERS: ICD-10-CM

## 2022-10-18 RX ORDER — CARVEDILOL 6.25 MG/1
TABLET ORAL
Qty: 60 TABLET | Refills: 11 | Status: SHIPPED | OUTPATIENT
Start: 2022-10-18

## 2022-11-03 ENCOUNTER — TELEPHONE (OUTPATIENT)
Dept: TRANSPLANT | Facility: CLINIC | Age: 57
End: 2022-11-03

## 2022-11-03 DIAGNOSIS — Z94.0 KIDNEY TRANSPLANT RECIPIENT: ICD-10-CM

## 2022-11-03 DIAGNOSIS — Z79.60 LONG-TERM USE OF IMMUNOSUPPRESSANT MEDICATION: Primary | ICD-10-CM

## 2022-11-03 RX ORDER — MYCOPHENOLATE MOFETIL 250 MG/1
250 CAPSULE ORAL 2 TIMES DAILY
Qty: 120 CAPSULE | Refills: 0 | Status: SHIPPED | OUTPATIENT
Start: 2022-11-03 | End: 2023-02-24

## 2022-11-03 NOTE — TELEPHONE ENCOUNTER
----- Message from Ashtyn Varela MD sent at 10/28/2022 11:55 AM CDT -----  Regarding: cellcept wean  Hi - I did my best to wean her cellcept, called her daughter with dose changes etc last spring - per recent hospital notes from [place], she is still taking cellcept 1000 mg bid. Can you try to wean?  Thanks  Ashtyn Varela MD

## 2022-11-03 NOTE — LETTER
PHYSICIAN ORDERS      DATE & TIME ISSUED: 22 11:48 AM   PATIENT NAME: Joshua Lala   : 1965     [facility] MR# [if applicable]: [id number]     DIAGNOSIS:  Kidney transplant   ICD-10 CODE: Z94.0      Please repeat the following level in one week   - T-cell subset   - Cyclosporine 12 hour drug level   - BMP  - CBC    Any questions please call: [phone] option 5    Please fax results to [phone].      Moe Johnston

## 2022-11-03 NOTE — TELEPHONE ENCOUNTER
General  Route to LPN    Reason for call: Nichelle called to let Melissa know her mom is taking CELLCEPT 2 capsules twice a day.     Call back needed? Yes    Return Call Needed  Same as documented in contacts section  When to return call?: Greater than one day: Route standard priority

## 2022-11-03 NOTE — TELEPHONE ENCOUNTER
ISSUE  Pt was supposed to wean off of Cellcept but never completed it. Currently taking 500mg BID. Next plan was 250mg BID x 2 months then stop. Continue CsA with goal of 50-75.     PLAN  Daughter confirmed current dosing is 500mg BID. Will review with Dr. Connor if the above plan is still appropriate.     Will also ask Dr. Varela if she is managing the CsA drug levels. If not then we will need to continue following pt.     OUTCOME  Confirmed with Dr. Connor that we can continue with plan to wean.   Will obtain T-cell subset as pt has not been taking Bactrim.  Contacted daughter with update and they will go for labs soon. She is unsure when pts last CsA level was so we will obtain a full set of labs.    Waiting response from Dr. Varela regarding who is managing CsA levels

## 2022-11-07 NOTE — TELEPHONE ENCOUNTER
Dr. Varela has been unable to obtain drug levels and asked that we assist with this. Lab orders in place. Spoke to daughter to confirm the plan again (continue Cellcept 250 bid until January and then discontinue).     Remain on CsA goal 50-75. Pt should continue to follow up with Dr. Varela for management of drug levels. Discussed this at length with daughter. She states they would like orders placed for [place] ([facility] lab).     Will send an FYI message to Dr. Varela.

## 2022-11-09 ENCOUNTER — TELEPHONE (OUTPATIENT)
Dept: TRANSPLANT | Facility: CLINIC | Age: 57
End: 2022-11-09

## 2022-11-09 NOTE — TELEPHONE ENCOUNTER
Spoke to Dr. Bangura to confirm IS plan (cellcept 250mg BID, cyclosporine 25mg BID). He states pt has a lot of questions and confusion about her meds/IS. Will plan to call her early next week after discharged to review.

## 2022-11-09 NOTE — TELEPHONE ENCOUNTER
Provider Call: General  Route to LPN    Reason for call:  calling  Pt inpt at [facility]  Wants to review Immunosupression medications     Call back needed? Yes    Return Call Needed  Same as documented in contacts section  When to return call?: Same day: Route High Priority

## 2022-11-14 NOTE — TELEPHONE ENCOUNTER
Spoke to pt and daughter and again verified plan to continue Cellcept 250mg BID (which she has lowered) until January and then stop. She will continue taking Neoral with drug levels followed by Dr. Varela.

## 2023-01-01 DIAGNOSIS — F41.9 ANXIETY: ICD-10-CM

## 2023-01-01 DIAGNOSIS — L29.89 UREMIC PRURITUS: Primary | ICD-10-CM

## 2023-01-01 RX ORDER — HYDROXYZINE HYDROCHLORIDE 25 MG/1
25 TABLET, FILM COATED ORAL 3 TIMES DAILY PRN
Qty: 90 TABLET | Refills: 11 | Status: SHIPPED | OUTPATIENT
Start: 2023-01-01

## 2023-02-06 ENCOUNTER — TELEPHONE (OUTPATIENT)
Dept: TRANSPLANT | Facility: CLINIC | Age: 58
End: 2023-02-06

## 2023-02-06 NOTE — TELEPHONE ENCOUNTER
Please call PEPPER Fernandez ( pronounced 'Gone') from [employer] in [facility] # [phone] wanted to discuss her case    (also; I have requested Rebecca to fax [phone] form)

## 2023-02-24 ENCOUNTER — TELEPHONE (OUTPATIENT)
Dept: TRANSPLANT | Facility: CLINIC | Age: 58
End: 2023-02-24
Payer: MEDICARE

## 2023-02-24 NOTE — TELEPHONE ENCOUNTER
Return call to daughter. Reviewed again IS plan and that pt should now be fully off Cellcept and remaining only on Neoral. Her last known dose was 25mg BID and drug levels were going to be followed by Dr. Varela. Daughter states pt has labs drawn at dialysis. She will call back with any questions.

## 2023-02-24 NOTE — TELEPHONE ENCOUNTER
----- Message from Chelle Sapp RN sent at 2/20/2023 11:59 AM CST -----  Regarding: Questions anout medication  Hi there,     I was told you are the coord for Joshua. Her daughter got transferred to me. She has questions about her medications and wanted to talk to her mom's nurse.     Can you please give her a call.   [phone].       Thank You,     Chelle Sapp RN   [facility]  [address] [address]   [email or web address]   Office : [phone]  Fax: [phone]

## 2023-05-07 NOTE — PLAN OF CARE
/68 (BP Location: Right arm, Patient Position: Sitting)   Pulse 88   Temp 98.2  F (36.8  C) (Oral)   Resp 18   Ht 1.524 m (5')   Wt 62.1 kg (137 lb)   SpO2 95%   BMI 26.76 kg/m      Pt resting quietly in bed at this time with eyes closed. While awake, she is able to make all needs known. She speaks very little english but she can get her point across. Resp. even and unlabored. Calm and cooperative. Call light within reach and bed in lowest position. IV access saline lock pattern at this time and flushed as ordered. Bowel distended but non tender. Bruit and thrill noted on dialysis fistular No acute distress noted. Will continue to monitor and address any abnormal findings.    no radiation

## 2024-03-04 ENCOUNTER — POST MORTEM DOCUMENTATION (OUTPATIENT)
Dept: TRANSPLANT | Facility: CLINIC | Age: 59
End: 2024-03-04
Payer: MEDICARE

## 2024-03-04 NOTE — PROGRESS NOTES
Received notification on 24 at 12:39 PM of patient's death from Dr. Varela, contact information is in Epic.  Place of death was reported as home.  Graft status at the time of death was reported as Not functioning.  Additional information: Family informed dialysis unit that patient passed away at home 3/3/24  TIS verification is: Pending  The Transplant Office has been notified that patient is . The Post Mortem Encounter has been completed. Notifications have been sent to the Care team.   Instructions have been sent to cancel pending appointments, discontinue pending orders, eliminate paper chart, and send a sympathy card to the family.
